# Patient Record
Sex: FEMALE | Race: BLACK OR AFRICAN AMERICAN | NOT HISPANIC OR LATINO
[De-identification: names, ages, dates, MRNs, and addresses within clinical notes are randomized per-mention and may not be internally consistent; named-entity substitution may affect disease eponyms.]

---

## 2017-01-11 ENCOUNTER — APPOINTMENT (OUTPATIENT)
Dept: OBGYN | Facility: HOSPITAL | Age: 82
End: 2017-01-11

## 2017-01-11 ENCOUNTER — OUTPATIENT (OUTPATIENT)
Dept: OUTPATIENT SERVICES | Facility: HOSPITAL | Age: 82
LOS: 1 days | End: 2017-01-11

## 2017-01-11 VITALS
BODY MASS INDEX: 19.15 KG/M2 | HEART RATE: 58 BPM | HEIGHT: 67 IN | DIASTOLIC BLOOD PRESSURE: 70 MMHG | SYSTOLIC BLOOD PRESSURE: 133 MMHG | WEIGHT: 122 LBS

## 2017-01-12 DIAGNOSIS — Z46.89 ENCOUNTER FOR FITTING AND ADJUSTMENT OF OTHER SPECIFIED DEVICES: ICD-10-CM

## 2017-02-16 ENCOUNTER — APPOINTMENT (OUTPATIENT)
Dept: ELECTROPHYSIOLOGY | Facility: CLINIC | Age: 82
End: 2017-02-16

## 2017-02-23 ENCOUNTER — APPOINTMENT (OUTPATIENT)
Dept: INTERNAL MEDICINE | Facility: HOSPITAL | Age: 82
End: 2017-02-23

## 2017-02-23 ENCOUNTER — OUTPATIENT (OUTPATIENT)
Dept: OUTPATIENT SERVICES | Facility: HOSPITAL | Age: 82
LOS: 1 days | End: 2017-02-23

## 2017-02-23 VITALS — SYSTOLIC BLOOD PRESSURE: 138 MMHG | DIASTOLIC BLOOD PRESSURE: 76 MMHG

## 2017-02-23 DIAGNOSIS — M17.9 OSTEOARTHRITIS OF KNEE, UNSPECIFIED: ICD-10-CM

## 2017-02-23 DIAGNOSIS — M81.0 AGE-RELATED OSTEOPOROSIS W/OUT CURRENT PATHOLOGICAL FRACTURE: ICD-10-CM

## 2017-02-23 DIAGNOSIS — I10 ESSENTIAL (PRIMARY) HYPERTENSION: ICD-10-CM

## 2017-02-23 DIAGNOSIS — E78.5 HYPERLIPIDEMIA, UNSPECIFIED: ICD-10-CM

## 2017-02-23 DIAGNOSIS — F09 UNSPECIFIED MENTAL DISORDER DUE TO KNOWN PHYSIOLOGICAL CONDITION: ICD-10-CM

## 2017-02-23 RX ORDER — FUROSEMIDE 20 MG/1
20 TABLET ORAL DAILY
Qty: 30 | Refills: 5 | Status: ACTIVE | COMMUNITY
Start: 2017-02-23 | End: 1900-01-01

## 2017-02-27 DIAGNOSIS — M17.9 OSTEOARTHRITIS OF KNEE, UNSPECIFIED: ICD-10-CM

## 2017-02-27 DIAGNOSIS — M81.0 AGE-RELATED OSTEOPOROSIS WITHOUT CURRENT PATHOLOGICAL FRACTURE: ICD-10-CM

## 2017-02-27 DIAGNOSIS — M17.5 OTHER UNILATERAL SECONDARY OSTEOARTHRITIS OF KNEE: ICD-10-CM

## 2017-02-27 DIAGNOSIS — E78.5 HYPERLIPIDEMIA, UNSPECIFIED: ICD-10-CM

## 2017-02-27 DIAGNOSIS — F09 UNSPECIFIED MENTAL DISORDER DUE TO KNOWN PHYSIOLOGICAL CONDITION: ICD-10-CM

## 2017-02-27 DIAGNOSIS — I42.9 CARDIOMYOPATHY, UNSPECIFIED: ICD-10-CM

## 2017-02-27 DIAGNOSIS — I10 ESSENTIAL (PRIMARY) HYPERTENSION: ICD-10-CM

## 2017-02-27 DIAGNOSIS — G40.909 EPILEPSY, UNSPECIFIED, NOT INTRACTABLE, WITHOUT STATUS EPILEPTICUS: ICD-10-CM

## 2017-03-09 ENCOUNTER — EMERGENCY (EMERGENCY)
Facility: HOSPITAL | Age: 82
LOS: 1 days | Discharge: HOME | End: 2017-03-09
Attending: EMERGENCY MEDICINE | Admitting: EMERGENCY MEDICINE
Payer: MEDICARE

## 2017-03-09 VITALS
OXYGEN SATURATION: 100 % | SYSTOLIC BLOOD PRESSURE: 160 MMHG | HEART RATE: 74 BPM | TEMPERATURE: 99 F | WEIGHT: 125 LBS | DIASTOLIC BLOOD PRESSURE: 78 MMHG | HEIGHT: 65 IN | RESPIRATION RATE: 16 BRPM

## 2017-03-09 VITALS
HEART RATE: 69 BPM | DIASTOLIC BLOOD PRESSURE: 77 MMHG | SYSTOLIC BLOOD PRESSURE: 181 MMHG | OXYGEN SATURATION: 95 % | TEMPERATURE: 99 F | RESPIRATION RATE: 16 BRPM

## 2017-03-09 LAB
ALBUMIN SERPL ELPH-MCNC: 3.9 G/DL — SIGNIFICANT CHANGE UP (ref 3.3–5)
ALP SERPL-CCNC: 59 U/L — SIGNIFICANT CHANGE UP (ref 40–120)
ALT FLD-CCNC: 10 U/L — SIGNIFICANT CHANGE UP (ref 4–33)
AST SERPL-CCNC: 21 U/L — SIGNIFICANT CHANGE UP (ref 4–32)
BASE EXCESS BLDV CALC-SCNC: 5.5 MMOL/L — SIGNIFICANT CHANGE UP
BASOPHILS # BLD AUTO: 0.03 K/UL — SIGNIFICANT CHANGE UP (ref 0–0.2)
BASOPHILS NFR BLD AUTO: 0.6 % — SIGNIFICANT CHANGE UP (ref 0–2)
BILIRUB SERPL-MCNC: 0.5 MG/DL — SIGNIFICANT CHANGE UP (ref 0.2–1.2)
BLOOD GAS VENOUS - CREATININE: 0.81 MG/DL — SIGNIFICANT CHANGE UP (ref 0.5–1.3)
BUN SERPL-MCNC: 21 MG/DL — SIGNIFICANT CHANGE UP (ref 7–23)
CALCIUM SERPL-MCNC: 9.2 MG/DL — SIGNIFICANT CHANGE UP (ref 8.4–10.5)
CHLORIDE BLDV-SCNC: 100 MMOL/L — SIGNIFICANT CHANGE UP (ref 96–108)
CHLORIDE SERPL-SCNC: 99 MMOL/L — SIGNIFICANT CHANGE UP (ref 98–107)
CO2 SERPL-SCNC: 28 MMOL/L — SIGNIFICANT CHANGE UP (ref 22–31)
CREAT SERPL-MCNC: 0.89 MG/DL — SIGNIFICANT CHANGE UP (ref 0.5–1.3)
EOSINOPHIL # BLD AUTO: 0.12 K/UL — SIGNIFICANT CHANGE UP (ref 0–0.5)
EOSINOPHIL NFR BLD AUTO: 2.4 % — SIGNIFICANT CHANGE UP (ref 0–6)
GAS PNL BLDV: 140 MMOL/L — SIGNIFICANT CHANGE UP (ref 136–146)
GLUCOSE BLDV-MCNC: 104 — HIGH (ref 70–99)
GLUCOSE SERPL-MCNC: 107 MG/DL — HIGH (ref 70–99)
HCO3 BLDV-SCNC: 29 MMOL/L — HIGH (ref 20–27)
HCT VFR BLD CALC: 34.4 % — LOW (ref 34.5–45)
HCT VFR BLDV CALC: 35.8 % — SIGNIFICANT CHANGE UP (ref 34.5–45)
HGB BLD-MCNC: 11.2 G/DL — LOW (ref 11.5–15.5)
HGB BLDV-MCNC: 11.6 G/DL — SIGNIFICANT CHANGE UP (ref 11.5–15.5)
IMM GRANULOCYTES NFR BLD AUTO: 0.2 % — SIGNIFICANT CHANGE UP (ref 0–1.5)
LACTATE BLDV-MCNC: 0.9 MMOL/L — SIGNIFICANT CHANGE UP (ref 0.5–2)
LYMPHOCYTES # BLD AUTO: 1.16 K/UL — SIGNIFICANT CHANGE UP (ref 1–3.3)
LYMPHOCYTES # BLD AUTO: 22.9 % — SIGNIFICANT CHANGE UP (ref 13–44)
MCHC RBC-ENTMCNC: 29.9 PG — SIGNIFICANT CHANGE UP (ref 27–34)
MCHC RBC-ENTMCNC: 32.6 % — SIGNIFICANT CHANGE UP (ref 32–36)
MCV RBC AUTO: 92 FL — SIGNIFICANT CHANGE UP (ref 80–100)
MONOCYTES # BLD AUTO: 0.31 K/UL — SIGNIFICANT CHANGE UP (ref 0–0.9)
MONOCYTES NFR BLD AUTO: 6.1 % — SIGNIFICANT CHANGE UP (ref 2–14)
NEUTROPHILS # BLD AUTO: 3.44 K/UL — SIGNIFICANT CHANGE UP (ref 1.8–7.4)
NEUTROPHILS NFR BLD AUTO: 67.8 % — SIGNIFICANT CHANGE UP (ref 43–77)
PCO2 BLDV: 47 MMHG — SIGNIFICANT CHANGE UP (ref 41–51)
PH BLDV: 7.42 PH — SIGNIFICANT CHANGE UP (ref 7.32–7.43)
PLATELET # BLD AUTO: 237 K/UL — SIGNIFICANT CHANGE UP (ref 150–400)
PMV BLD: 9.4 FL — SIGNIFICANT CHANGE UP (ref 7–13)
PO2 BLDV: 41 MMHG — HIGH (ref 35–40)
POTASSIUM BLDV-SCNC: 3.4 MMOL/L — SIGNIFICANT CHANGE UP (ref 3.4–4.5)
POTASSIUM SERPL-MCNC: 3.5 MMOL/L — SIGNIFICANT CHANGE UP (ref 3.5–5.3)
POTASSIUM SERPL-SCNC: 3.5 MMOL/L — SIGNIFICANT CHANGE UP (ref 3.5–5.3)
PROT SERPL-MCNC: 7 G/DL — SIGNIFICANT CHANGE UP (ref 6–8.3)
RBC # BLD: 3.74 M/UL — LOW (ref 3.8–5.2)
RBC # FLD: 13.1 % — SIGNIFICANT CHANGE UP (ref 10.3–14.5)
SAO2 % BLDV: 73.4 % — SIGNIFICANT CHANGE UP (ref 60–85)
SODIUM SERPL-SCNC: 140 MMOL/L — SIGNIFICANT CHANGE UP (ref 135–145)
WBC # BLD: 5.07 K/UL — SIGNIFICANT CHANGE UP (ref 3.8–10.5)
WBC # FLD AUTO: 5.07 K/UL — SIGNIFICANT CHANGE UP (ref 3.8–10.5)

## 2017-03-09 PROCEDURE — 71020: CPT

## 2017-03-09 PROCEDURE — 49427 INJECTION ABDOMINAL SHUNT: CPT

## 2017-03-09 PROCEDURE — 74020: CPT

## 2017-03-09 PROCEDURE — 75809 NONVASCULAR SHUNT X-RAY: CPT | Mod: 26

## 2017-03-09 PROCEDURE — 70450 CT HEAD/BRAIN W/O DYE: CPT | Mod: 26

## 2017-03-09 PROCEDURE — 99285 EMERGENCY DEPT VISIT HI MDM: CPT | Mod: 25,GC

## 2017-03-09 PROCEDURE — 70250 X-RAY EXAM OF SKULL: CPT

## 2017-03-09 RX ORDER — ACETAMINOPHEN 500 MG
650 TABLET ORAL ONCE
Qty: 0 | Refills: 0 | Status: COMPLETED | OUTPATIENT
Start: 2017-03-09 | End: 2017-03-09

## 2017-03-09 RX ADMIN — Medication 650 MILLIGRAM(S): at 06:07

## 2017-03-09 RX ADMIN — Medication 650 MILLIGRAM(S): at 05:07

## 2017-03-09 NOTE — ED PROVIDER NOTE - CONSTITUTIONAL, MLM
normal... Well appearing, well nourished, awake, alert, oriented to person, place, time/situation and in no apparent distress. nontoxic. Well appearing, well nourished, awake, alert, oriented to person, place, time/situation and in no apparent distress.

## 2017-03-09 NOTE — ED PROVIDER NOTE - OBJECTIVE STATEMENT
86 y/o female with PMH of  MR, pulmonary HTN, CHF s/p ICD, CVA, HTN, epilepsy s/p intracranial shunt, PPM 86 y/o female with PMHx of  MR, pulmonary HTN, CHF s/p ICD/PPM, CVA, HTN, epilepsy s/p  shunt p/w left sided HA x2 days. HA has been constant with varying intensity. Denies N/V/visual changes, fever and chills. Has not taken any medications to relieve pain. No photophobia, rhinorrhea. States pain is diffusely throughout left side of head, same side as shunt.

## 2017-03-09 NOTE — ED PROVIDER NOTE - PMH
Absence Seizure    Afib    CHF (Congestive Heart Failure)    CVA (Cerebral Infarction)    HTN (Hypertension)    Hydronephrosis; Right kidney    Hypercholesteremia    Memory Loss; chronic "past few years"    Pulmonary hypertension    Seizure

## 2017-03-09 NOTE — ED PROVIDER NOTE - ATTENDING CONTRIBUTION TO CARE
att85 y/o f with h/o HTN, CVA,  shunt  for cyst drainage, afib, pacemaker, seizure, not on AC, 81mg asa, presents with gradual onset L sided headache for 2 days. No fever. no photophobia. No weakness, no vomiting, no vision changes. Normal neuro exam. Well appearing. Neck supple. Plan for labs, CT head, shunt series, tylenol, and NSGY c/s if needed. Not consistent presentation with infection. Will reassess.   I have personally performed a face to face bedside history and physical examination of this patient. I have discussed the history, examination, review of systems, assessment and plan of management with the resident. I have reviewed the electronic medical record and amended it to reflect my history, review of systems, physical exam, assessment and plan.

## 2017-03-09 NOTE — ED ADULT TRIAGE NOTE - CHIEF COMPLAINT QUOTE
Pt comes via [facility] from home with complaints of 10/10 headache, malaise and weak since this evening, up until that point felt fine, ambulates with cane at baseline, history of stroke no residual weakness  shunt placement in 1994 for cyst drainage, A&OX3 offers no other complaints, denies CP, SOB, blurred vision, dizziness, N/V/D

## 2017-03-09 NOTE — ED PROVIDER NOTE - PROGRESS NOTE DETAILS
Gilmer Graves, PGY1: Pt states her headache has resolved. Neurosurgery consult does not recommend any intervention at this time. Pt will follow up with Dr. Jessica. Instructions provided in discharge paperwork. JAS: Pt was signed out to me pending NeuroSx who saw pt at bedside in ED and reports that they think she is stable to go home and f/u with PMD and neuroSx outpt. Labs and imaging reviewed. Pt reports significantly improved symptoms and no longer with pain/headache. No neurological changes. Family will bring pt back to ED immediately if symptoms worsen. Pt denies any fevers, chills, nausea, vomiting ot signs of infection.

## 2017-03-09 NOTE — ED PROVIDER NOTE - PSH
History of Stroke    HTN (Hypertension)    Infection of  Shunt    Infection of  Shunt    Pacemaker    Pacemaker    S/P Brain Surgery; for "brain cyst   few years ago"    S/P  Shunt    Seizure    Ureteral Obstruction; right kidney; stent insertion 12/10

## 2017-03-20 ENCOUNTER — APPOINTMENT (OUTPATIENT)
Dept: NEUROSURGERY | Facility: CLINIC | Age: 82
End: 2017-03-20

## 2017-03-20 VITALS
HEIGHT: 67 IN | HEART RATE: 65 BPM | BODY MASS INDEX: 19.62 KG/M2 | WEIGHT: 125 LBS | SYSTOLIC BLOOD PRESSURE: 123 MMHG | DIASTOLIC BLOOD PRESSURE: 74 MMHG

## 2017-03-20 DIAGNOSIS — R56.9 UNSPECIFIED CONVULSIONS: ICD-10-CM

## 2017-03-20 DIAGNOSIS — D33.2 BENIGN NEOPLASM OF BRAIN, UNSPECIFIED: ICD-10-CM

## 2017-03-22 PROBLEM — D33.2 BRAIN TUMOR (BENIGN): Status: ACTIVE | Noted: 2017-03-22

## 2017-03-23 ENCOUNTER — APPOINTMENT (OUTPATIENT)
Dept: RADIOLOGY | Facility: IMAGING CENTER | Age: 82
End: 2017-03-23

## 2017-03-30 ENCOUNTER — OUTPATIENT (OUTPATIENT)
Dept: OUTPATIENT SERVICES | Facility: HOSPITAL | Age: 82
LOS: 1 days | End: 2017-03-30

## 2017-03-30 ENCOUNTER — APPOINTMENT (OUTPATIENT)
Dept: OPHTHALMOLOGY | Facility: CLINIC | Age: 82
End: 2017-03-30

## 2017-04-05 ENCOUNTER — APPOINTMENT (OUTPATIENT)
Dept: RADIOLOGY | Facility: HOSPITAL | Age: 82
End: 2017-04-05

## 2017-04-05 ENCOUNTER — OUTPATIENT (OUTPATIENT)
Dept: OUTPATIENT SERVICES | Facility: HOSPITAL | Age: 82
LOS: 1 days | End: 2017-04-05
Payer: MEDICARE

## 2017-04-05 ENCOUNTER — APPOINTMENT (OUTPATIENT)
Dept: ORTHOPEDIC SURGERY | Facility: HOSPITAL | Age: 82
End: 2017-04-05

## 2017-04-05 VITALS
BODY MASS INDEX: 19.63 KG/M2 | SYSTOLIC BLOOD PRESSURE: 166 MMHG | DIASTOLIC BLOOD PRESSURE: 93 MMHG | WEIGHT: 125.33 LBS | HEART RATE: 65 BPM

## 2017-04-05 DIAGNOSIS — M17.11 UNILATERAL PRIMARY OSTEOARTHRITIS, RIGHT KNEE: ICD-10-CM

## 2017-04-05 PROCEDURE — 73562 X-RAY EXAM OF KNEE 3: CPT | Mod: 26,RT

## 2017-04-06 DIAGNOSIS — M17.11 UNILATERAL PRIMARY OSTEOARTHRITIS, RIGHT KNEE: ICD-10-CM

## 2017-04-12 ENCOUNTER — OUTPATIENT (OUTPATIENT)
Dept: OUTPATIENT SERVICES | Facility: HOSPITAL | Age: 82
LOS: 1 days | End: 2017-04-12

## 2017-04-12 ENCOUNTER — APPOINTMENT (OUTPATIENT)
Dept: OBGYN | Facility: HOSPITAL | Age: 82
End: 2017-04-12

## 2017-04-12 VITALS
WEIGHT: 124 LBS | HEART RATE: 61 BPM | DIASTOLIC BLOOD PRESSURE: 92 MMHG | SYSTOLIC BLOOD PRESSURE: 180 MMHG | BODY MASS INDEX: 19.42 KG/M2

## 2017-04-16 ENCOUNTER — INPATIENT (INPATIENT)
Facility: HOSPITAL | Age: 82
LOS: 11 days | Discharge: HOME HEALTH | End: 2017-04-28
Attending: INTERNAL MEDICINE | Admitting: INTERNAL MEDICINE
Payer: MEDICARE

## 2017-04-16 VITALS
HEIGHT: 66 IN | WEIGHT: 139.99 LBS | HEART RATE: 81 BPM | DIASTOLIC BLOOD PRESSURE: 122 MMHG | RESPIRATION RATE: 18 BRPM | SYSTOLIC BLOOD PRESSURE: 230 MMHG | OXYGEN SATURATION: 94 % | TEMPERATURE: 100 F

## 2017-04-16 DIAGNOSIS — Z98.2 PRESENCE OF CEREBROSPINAL FLUID DRAINAGE DEVICE: Chronic | ICD-10-CM

## 2017-04-16 DIAGNOSIS — R41.82 ALTERED MENTAL STATUS, UNSPECIFIED: ICD-10-CM

## 2017-04-16 DIAGNOSIS — I10 ESSENTIAL (PRIMARY) HYPERTENSION: ICD-10-CM

## 2017-04-16 DIAGNOSIS — R56.9 UNSPECIFIED CONVULSIONS: ICD-10-CM

## 2017-04-16 LAB
ALBUMIN SERPL ELPH-MCNC: 3.4 G/DL — SIGNIFICANT CHANGE UP (ref 3.3–5)
ALP SERPL-CCNC: 64 U/L — SIGNIFICANT CHANGE UP (ref 40–120)
ALT FLD-CCNC: 21 U/L — SIGNIFICANT CHANGE UP (ref 12–78)
ANION GAP SERPL CALC-SCNC: 9 MMOL/L — SIGNIFICANT CHANGE UP (ref 5–17)
APPEARANCE UR: CLEAR — SIGNIFICANT CHANGE UP
APTT BLD: 50 SEC — HIGH (ref 27.5–37.4)
AST SERPL-CCNC: 23 U/L — SIGNIFICANT CHANGE UP (ref 15–37)
BACTERIA # UR AUTO: ABNORMAL
BASOPHILS # BLD AUTO: 0.1 K/UL — SIGNIFICANT CHANGE UP (ref 0–0.2)
BASOPHILS NFR BLD AUTO: 1.3 % — SIGNIFICANT CHANGE UP (ref 0–2)
BILIRUB SERPL-MCNC: 0.5 MG/DL — SIGNIFICANT CHANGE UP (ref 0.2–1.2)
BILIRUB UR-MCNC: NEGATIVE — SIGNIFICANT CHANGE UP
BUN SERPL-MCNC: 20 MG/DL — SIGNIFICANT CHANGE UP (ref 7–23)
CALCIUM SERPL-MCNC: 8.8 MG/DL — SIGNIFICANT CHANGE UP (ref 8.5–10.1)
CHLORIDE SERPL-SCNC: 101 MMOL/L — SIGNIFICANT CHANGE UP (ref 96–108)
CO2 SERPL-SCNC: 29 MMOL/L — SIGNIFICANT CHANGE UP (ref 22–31)
COLOR SPEC: YELLOW — SIGNIFICANT CHANGE UP
COMMENT - URINE: SIGNIFICANT CHANGE UP
CREAT SERPL-MCNC: 1.25 MG/DL — SIGNIFICANT CHANGE UP (ref 0.5–1.3)
DIFF PNL FLD: ABNORMAL
EOSINOPHIL # BLD AUTO: 0.1 K/UL — SIGNIFICANT CHANGE UP (ref 0–0.5)
EOSINOPHIL NFR BLD AUTO: 1.9 % — SIGNIFICANT CHANGE UP (ref 0–6)
EPI CELLS # UR: SIGNIFICANT CHANGE UP
GLUCOSE SERPL-MCNC: 123 MG/DL — HIGH (ref 70–99)
GLUCOSE UR QL: NEGATIVE MG/DL — SIGNIFICANT CHANGE UP
HCT VFR BLD CALC: 35.3 % — SIGNIFICANT CHANGE UP (ref 34.5–45)
HGB BLD-MCNC: 12.2 G/DL — SIGNIFICANT CHANGE UP (ref 11.5–15.5)
INR BLD: 1.11 RATIO — SIGNIFICANT CHANGE UP (ref 0.88–1.16)
KETONES UR-MCNC: NEGATIVE — SIGNIFICANT CHANGE UP
LACTATE SERPL-SCNC: 1.1 MMOL/L — SIGNIFICANT CHANGE UP (ref 0.7–2)
LEUKOCYTE ESTERASE UR-ACNC: ABNORMAL
LIDOCAIN IGE QN: 88 U/L — SIGNIFICANT CHANGE UP (ref 73–393)
LYMPHOCYTES # BLD AUTO: 1.2 K/UL — SIGNIFICANT CHANGE UP (ref 1–3.3)
LYMPHOCYTES # BLD AUTO: 17.9 % — SIGNIFICANT CHANGE UP (ref 13–44)
MAGNESIUM SERPL-MCNC: 2.4 MG/DL — SIGNIFICANT CHANGE UP (ref 1.8–2.4)
MCHC RBC-ENTMCNC: 31.5 PG — SIGNIFICANT CHANGE UP (ref 27–34)
MCHC RBC-ENTMCNC: 34.4 GM/DL — SIGNIFICANT CHANGE UP (ref 32–36)
MCV RBC AUTO: 91.7 FL — SIGNIFICANT CHANGE UP (ref 80–100)
MONOCYTES # BLD AUTO: 0.6 K/UL — SIGNIFICANT CHANGE UP (ref 0–0.9)
MONOCYTES NFR BLD AUTO: 9.2 % — SIGNIFICANT CHANGE UP (ref 2–14)
NEUTROPHILS # BLD AUTO: 4.6 K/UL — SIGNIFICANT CHANGE UP (ref 1.8–7.4)
NEUTROPHILS NFR BLD AUTO: 69.6 % — SIGNIFICANT CHANGE UP (ref 43–77)
NITRITE UR-MCNC: NEGATIVE — SIGNIFICANT CHANGE UP
NT-PROBNP SERPL-SCNC: 889 PG/ML — HIGH (ref 0–450)
PH UR: 7 — SIGNIFICANT CHANGE UP (ref 4.8–8)
PLATELET # BLD AUTO: 229 K/UL — SIGNIFICANT CHANGE UP (ref 150–400)
POTASSIUM SERPL-MCNC: 4.1 MMOL/L — SIGNIFICANT CHANGE UP (ref 3.5–5.3)
POTASSIUM SERPL-SCNC: 4.1 MMOL/L — SIGNIFICANT CHANGE UP (ref 3.5–5.3)
PROT SERPL-MCNC: 7.1 GM/DL — SIGNIFICANT CHANGE UP (ref 6–8.3)
PROT UR-MCNC: 15 MG/DL
PROTHROM AB SERPL-ACNC: 12.1 SEC — SIGNIFICANT CHANGE UP (ref 9.8–12.7)
RBC # BLD: 3.85 M/UL — SIGNIFICANT CHANGE UP (ref 3.8–5.2)
RBC # FLD: 12.9 % — SIGNIFICANT CHANGE UP (ref 11–15)
RBC CASTS # UR COMP ASSIST: ABNORMAL /HPF (ref 0–4)
SODIUM SERPL-SCNC: 139 MMOL/L — SIGNIFICANT CHANGE UP (ref 135–145)
SP GR SPEC: 1 — LOW (ref 1.01–1.02)
TROPONIN I SERPL-MCNC: <.015 NG/ML — SIGNIFICANT CHANGE UP (ref 0.01–0.04)
UROBILINOGEN FLD QL: NEGATIVE MG/DL — SIGNIFICANT CHANGE UP
WBC # BLD: 6.6 K/UL — SIGNIFICANT CHANGE UP (ref 3.8–10.5)
WBC # FLD AUTO: 6.6 K/UL — SIGNIFICANT CHANGE UP (ref 3.8–10.5)
WBC UR QL: >50

## 2017-04-16 PROCEDURE — 99291 CRITICAL CARE FIRST HOUR: CPT

## 2017-04-16 PROCEDURE — 71010: CPT | Mod: 26

## 2017-04-16 PROCEDURE — 70450 CT HEAD/BRAIN W/O DYE: CPT | Mod: 26

## 2017-04-16 PROCEDURE — 99292 CRITICAL CARE ADDL 30 MIN: CPT

## 2017-04-16 RX ORDER — LABETALOL HCL 100 MG
10 TABLET ORAL EVERY 6 HOURS
Qty: 0 | Refills: 0 | Status: DISCONTINUED | OUTPATIENT
Start: 2017-04-16 | End: 2017-04-28

## 2017-04-16 RX ORDER — LABETALOL HCL 100 MG
10 TABLET ORAL ONCE
Qty: 0 | Refills: 0 | Status: COMPLETED | OUTPATIENT
Start: 2017-04-16 | End: 2017-04-16

## 2017-04-16 RX ORDER — LEVETIRACETAM 250 MG/1
1000 TABLET, FILM COATED ORAL
Qty: 0 | Refills: 0 | Status: DISCONTINUED | OUTPATIENT
Start: 2017-04-16 | End: 2017-04-17

## 2017-04-16 RX ORDER — NICARDIPINE HYDROCHLORIDE 30 MG/1
5 CAPSULE, EXTENDED RELEASE ORAL
Qty: 40 | Refills: 0 | Status: DISCONTINUED | OUTPATIENT
Start: 2017-04-16 | End: 2017-04-17

## 2017-04-16 RX ORDER — LEVETIRACETAM 250 MG/1
1000 TABLET, FILM COATED ORAL ONCE
Qty: 0 | Refills: 0 | Status: COMPLETED | OUTPATIENT
Start: 2017-04-16 | End: 2017-04-16

## 2017-04-16 RX ORDER — LAMOTRIGINE 25 MG/1
100 TABLET, ORALLY DISINTEGRATING ORAL DAILY
Qty: 0 | Refills: 0 | Status: DISCONTINUED | OUTPATIENT
Start: 2017-04-16 | End: 2017-04-28

## 2017-04-16 RX ORDER — HEPARIN SODIUM 5000 [USP'U]/ML
5000 INJECTION INTRAVENOUS; SUBCUTANEOUS EVERY 8 HOURS
Qty: 0 | Refills: 0 | Status: DISCONTINUED | OUTPATIENT
Start: 2017-04-16 | End: 2017-04-18

## 2017-04-16 RX ORDER — PANTOPRAZOLE SODIUM 20 MG/1
40 TABLET, DELAYED RELEASE ORAL DAILY
Qty: 0 | Refills: 0 | Status: DISCONTINUED | OUTPATIENT
Start: 2017-04-16 | End: 2017-04-17

## 2017-04-16 RX ORDER — LEVETIRACETAM 250 MG/1
500 TABLET, FILM COATED ORAL EVERY 12 HOURS
Qty: 0 | Refills: 0 | Status: DISCONTINUED | OUTPATIENT
Start: 2017-04-16 | End: 2017-04-17

## 2017-04-16 RX ADMIN — Medication 2 MILLIGRAM(S): at 22:19

## 2017-04-16 RX ADMIN — Medication 10 MILLIGRAM(S): at 19:57

## 2017-04-16 RX ADMIN — PANTOPRAZOLE SODIUM 40 MILLIGRAM(S): 20 TABLET, DELAYED RELEASE ORAL at 22:01

## 2017-04-16 RX ADMIN — LEVETIRACETAM 400 MILLIGRAM(S): 250 TABLET, FILM COATED ORAL at 21:48

## 2017-04-16 RX ADMIN — NICARDIPINE HYDROCHLORIDE 25 MG/HR: 30 CAPSULE, EXTENDED RELEASE ORAL at 22:06

## 2017-04-16 RX ADMIN — Medication 10 MILLIGRAM(S): at 20:58

## 2017-04-16 NOTE — ED ADULT NURSE NOTE - PMH
CHF (congestive heart failure)    High cholesterol    HTN (hypertension) CHF (congestive heart failure)    High cholesterol    HTN (hypertension)    Pulmonary hypertension    Seizure

## 2017-04-16 NOTE — H&P ADULT. - ATTENDING COMMENTS
Pt is an 87 yo F with h/o Pulm HTN, NICM, s/p AICD placement, HTN, HLD, absence Sz, CVA, s/p shunt for brain cystic lesion presented 2 to MS change. Pt was  last seen normal about 4 pm and and then at 4:15pm, pt's daughter noticed that she had slurred speech, was not herself, and ? R facial droop. Pt's daughter thought it was another Sz so she waited until later to bring her mother to the hospital when she did not get better. In the ER pt was found to be hypertensive (246/198) and sent for CT head: No acute bleed. Admitting dx: 1) Hypertensive encephalopathy vs Stroke Vs Sz 2) UTI    ID: May Rx for UTI  [facility]: SBP in 180-200 range is acceptable  Neuro: Cont Keppra + Lamictal/ ? MRI/ EEG/ F/u as per Neuro

## 2017-04-16 NOTE — H&P ADULT. - PROBLEM SELECTOR PLAN 2
- Will start nicardipine drip for SBP around 180; once MRI negative for ischemic stoke, will decrease BP more and gradually.

## 2017-04-16 NOTE — H&P ADULT. - PMH
CHF (congestive heart failure)    High cholesterol    HTN (hypertension)    Pulmonary hypertension    Seizure

## 2017-04-16 NOTE — ED PROVIDER NOTE - CARE PLAN
Principal Discharge DX:	Cerebrovascular accident (CVA), unspecified mechanism  Secondary Diagnosis:	Hypertensive emergency

## 2017-04-16 NOTE — ED PROVIDER NOTE - NEUROLOGICAL LEVEL OF CONSCIOUSNESS
Alert, intermittently responds to commands. Appears not to understand directions. No neglect. Daughter says maybe mild R. side facial droop. PERRLA. Pt does not respond to commands for more complete neuro exam. See stroke note.

## 2017-04-16 NOTE — H&P ADULT. - HISTORY OF PRESENT ILLNESS
87 y/o female with hx long-standing MR and severe pHTN, NICM s/p ICD, epilepsy s/p intracranial shunt, PPM, absent seizure, p/w sudden onset of confusion. Last seen normal was this afternoon. Around 450pm today, daughter found patient was confused and having slurred speech/aphasia. Daughter thought she was having absent seizure and did not call ambulance until 2-3 hours later. In the ED, SBP in 240s. ICU was called. tPA not given.     Seen at the bedside. Aphasic and noncomprehensable. SBP between 200-240.

## 2017-04-16 NOTE — CONSULT NOTE ADULT - SUBJECTIVE AND OBJECTIVE BOX
Subjective Complaints: Seizure and confusion.   Historian:    Daughter   Consult requested by ER doctor:   Juan Miguel              Attending:     HPI:   JOSE CHAUDHARI    PAST MEDICAL & SURGICAL HISTORY:  CHF (congestive heart failure)  High cholesterol  HTN (hypertension)  86yFemale    MEDICATIONS  (STANDING):  niCARdipine Infusion 5mG/Hr IV Continuous <Continuous>  levETIRAcetam  IVPB 1000milliGRAM(s) IV Intermittent once    MEDICATIONS  (PRN):      Allergies    No Known Allergies    Intolerances      FAMILY HISTORY:    Vital Signs Last 24 Hrs  T(C): 37.6, Max: 37.6 ( @ 19:20)  T(F): 99.7, Max: 99.7 ( @ 19:20)  HR: 85 (81 - 85)  BP: 196/100 (196/100 - 230/122)  BP(mean): --  RR: 22 (18 - 22)  SpO2: 93% (93% - 94%)    NEUROLOGICAL EXAM:  HENT:  Normocephalic head; atraumatic head.  Neck supple.  ENT: normal looking.  Mental State:    Alert.  Fully oriented to person, place and date.  Coherent.  Speech clear and intact.  Cooperative.  Responds appropriately.    Cranial Nerves:  II-XII:   Pupils round and reactive to light and accommodation.  Extraocular movements full.  Visual fields full (no homonymous hemianopsia).  Visual acuity wnl.  Facial symmetry intact.  Tongue midline.  Motor Functions:  Moves all extremities.  No pronator drift of UE.  Claps hand well.  Hand  intact bilaterally.  Ambulatory.    Sensory Functions:   Intact to touch and pinprick to face and extremities.    Reflexes:  Deep tendon reflexes normoactive to biceps, knees and ankles.  Babinski absent (present).  Cerebellar Testing:    Finger to nose intact.  Nystagmus absent.  Neurovascular: Carotid auscultation full without bruits.      LABS:                        12.2   6.6   )-----------( 229      ( 2017 20:08 )             35.3     04-16    139  |  101  |  20  ----------------------------<  123<H>  4.1   |  29  |  1.25    Ca    8.8      2017 20:08  Mg     2.4     -16    TPro  7.1  /  Alb  3.4  /  TBili  0.5  /  DBili  x   /  AST  23  /  ALT  21  /  AlkPhos  64  -        Urinalysis Basic - ( 2017 20:22 )    Color: Yellow / Appearance: Clear / S.005 / pH: x  Gluc: x / Ketone: Negative  / Bili: Negative / Urobili: Negative mg/dL   Blood: x / Protein: 15 mg/dL / Nitrite: Negative   Leuk Esterase: Moderate / RBC: x / WBC x   Sq Epi: x / Non Sq Epi: x / Bacteria: x        RADIOLOGY & ADDITIONAL STUDIES:    CT Head No Cont: Urgent   Indication: right sided weakness  Transport: Stretcher-Crib  Exam Completed  Provider&#x27;s Contact #: [phone] ( @ 19:22)  labetalol Injectable: [Known as NORMODYNE Injectable]  10 milliGRAM(s), IV Push, once, Stop After 1 Doses  Provider&#x27;s Contact #: [phone] ( @ 19:56) [completed]  Complete Blood Count + Automated Diff: STAT ( @ 19:59)  Comprehensive Metabolic Panel: STAT ( @ 19:59)  Troponin I, Serum: STAT ( @ 19:59)  Creatine Kinase,Total,Serum: STAT ( @ 19:59)  Lactate, Blood: STAT ( @ 19:59)  Lipase, Serum: STAT ( @ 19:59)  Magnesium, Serum: STAT ( @ 19:59)  Serum Pro-Brain Natriuretic Peptide: STAT ( @ 19:59)  Culture - Urine: Routine  Specimen Source: Clean Catch (Midstream) ( @ 19:59)  Urinalysis: STAT ( @ 19:59)  Xray Chest 1 View AP/PA.: Urgent   Indication: AMS  Transport: Stretcher-Crib  Provider&#x27;s Contact #: [phone] ( @ 19:59)  (Floorstock):   Qty Removed: 1 each ( @ 20:11) [completed]  labetalol Injectable: [Known as NORMODYNE Injectable]  10 milliGRAM(s), IV Push, once, Stop After 1 Doses  Provider&#x27;s Contact #: [phone] ( @ 20:18) [completed]  Levetiracetam Level, Serum: STAT ( @ 20:26)  Lamotrigine Level, Serum: STAT ( @ 20:26)  Urine Microscopic-Add On (NC): 20:15 ( @ 20:39)  (Floorstock):   Qty Removed: 1 each ( @ 20:45) [completed]  CT Head No Cont: Urgent   Indication: fall  Transport: Stretcher-Crib ( @ 20:47)  CT Cervical Spine No Cont: Urgent   Indication: fall  Transport: Stretcher-Crib ( @ 20:47)  Indwelling Urethral Catheter:     Connect To:  Straight Drainage/Gravity    Indication:  Urine Output Monitoring in Critically Ill ( @ 20:48)  niCARdipine Infusion:   40 milliGRAM(s) in sodium chloride 0.9% 200 milliLiter(s), infuse at 25 mL/Hr  Dose Rate: 5 mG/Hr  Special Instructions: Do not have BP below 180 systolic  Provider&#x27;s Contact #: [phone] ( @ 21:26)  levETIRAcetam  IVPB: [Ordered as KEPPRA IVPB]  1000 milliGRAM(s) in IV Solution 100 milliLiter(s), IV Intermittent, once, infuse over 15 Minute(s), Stop After 1 Doses  Provider&#x27;s Contact #: [phone] ( @ 21:26)      Assessment & Opinion:    Recommendations:  Brain MRI.  Carotid doppler.  Echocardiogram.  EEG.   DVT prophylaxis as ordered.  Medications: Subjective Complaints: Seizure and confusion.   Historian:    Daughter   Consult requested by ER doctor:   Juan Miguel              Attending:     HPI:   JOSE CHAUDHARI.   87 y/o female with hx long-standing MR and severe pHTN, NICM s/p ICD, epilepsy s/p intracranial shunt, PPM, absent seizure, p/w sudden onset of confusion. Last seen normal was this afternoon. Around 450pm today, daughter found patient was confused and having slurred speech/aphasia. Daughter thought she was having absent seizure and did not call ambulance until 2-3 hours later. In the ED, SBP in 240s. ICU was called. tPA not given.    Current medications:  Keppra 1000 mg BID; Lamotrigine 100 mg daily.     SBP between 200-240.     PAST MEDICAL & SURGICAL HISTORY:  CHF (congestive heart failure)  High cholesterol  HTN (hypertension)  86yFemale    MEDICATIONS  (STANDING):  niCARdipine Infusion 5mG/Hr IV Continuous <Continuous>  levETIRAcetam  IVPB 1000milliGRAM(s) IV Intermittent once    Allergies: No Known Allergies  Intolerances  FAMILY HISTORY:    Vital Signs Last 24 Hrs  T(C): 37.6, Max: 37.6 (04-16 @ 19:20)  T(F): 99.7, Max: 99.7 (04-16 @ 19:20)  HR: 85 (81 - 85)  BP: 196/100 (196/100 - 230/122)  BP(mean): --  RR: 22 (18 - 22)  SpO2: 93% (93% - 94%)    NEUROLOGICAL EXAM:  HENT:  Normocephalic head; atraumatic head.  Neck supple.  ENT: normal looking.  Mental State:    Alert.  Fully oriented to person, place and date.  Coherent.  Speech clear and intact.  Cooperative.  Responds appropriately.    Cranial Nerves:  II-XII:   Pupils round and reactive to light and accommodation.  Extraocular movements full.  Visual fields full (no homonymous hemianopsia).  Visual acuity wnl.  Facial symmetry intact.  Tongue midline.  Motor Functions:  Moves all extremities.  No pronator drift of UE.  Claps hand well.  Hand  intact bilaterally.  Ambulatory.    Sensory Functions:   Intact to touch and pinprick to face and extremities.    Reflexes:  Deep tendon reflexes normoactive to biceps, knees and ankles.  Babinski absent (present).  Cerebellar Testing:    Finger to nose intact.  Nystagmus absent.  Neurovascular: Carotid auscultation full without bruits.      LABS:                        12.2   6.6   )-----------( 229      ( 2017 20:08 )             35.3         139  |  101  |  20  ----------------------------<  123<H>  4.1   |  29  |  1.25    Ca    8.8      2017 20:08  Mg     2.4         TPro  7.1  /  Alb  3.4  /  TBili  0.5  /  DBili  x   /  AST  23  /  ALT  21  /  AlkPhos  64          Urinalysis Basic - ( 2017 20:22 )    Color: Yellow / Appearance: Clear / S.005 / pH: x  Gluc: x / Ketone: Negative  / Bili: Negative / Urobili: Negative mg/dL   Blood: x / Protein: 15 mg/dL / Nitrite: Negative   Leuk Esterase: Moderate / RBC: x / WBC x   Sq Epi: x / Non Sq Epi: x / Bacteria: x        RADIOLOGY & ADDITIONAL STUDIES:    CT Head No Cont: Urgent   Indication: right sided weakness  Transport: Stretcher-Crib  Exam Completed  Provider&#x27;s Contact #: [phone] ( @ 19:22)  labetalol Injectable: [Known as NORMODYNE Injectable]  10 milliGRAM(s), IV Push, once, Stop After 1 Doses  Provider&#x27;s Contact #: [phone] ( @ 19:56) [completed]  Complete Blood Count + Automated Diff: STAT ( @ 19:59)  Comprehensive Metabolic Panel: STAT ( @ 19:59)  Troponin I, Serum: STAT ( 19:59)  Creatine Kinase,Total,Serum: STAT ( @ 19:59)  Lactate, Blood: STAT ( 19:59)  Lipase, Serum: STAT ( 19:59)  Magnesium, Serum: STAT ( 19:59)  Serum Pro-Brain Natriuretic Peptide: STAT ( @ 19:59)  Culture - Urine: Routine  Specimen Source: Clean Catch (Midstream) ( @ 19:59)  Urinalysis: STAT ( @ 19:59)  Xray Chest 1 View AP/PA.: Urgent   Indication: AMS  Transport: Stretcher-Crib  Provider&#x27;s Contact #: [phone] ( @ 19:59)  (Floorstock):   Qty Removed: 1 each ( @ 20:11) [completed]  labetalol Injectable: [Known as NORMODYNE Injectable]  10 milliGRAM(s), IV Push, once, Stop After 1 Doses  Provider&#x27;s Contact #: [phone] ( @ 20:18) [completed]  Levetiracetam Level, Serum: STAT ( @ 20:26)  Lamotrigine Level, Serum: STAT ( @ 20:26)  Urine Microscopic-Add On (NC): 20:15 ( @ 20:39)  (Floorstock):   Qty Removed: 1 each ( @ 20:45) [completed]  CT Head No Cont: Urgent   Indication: fall  Transport: Stretcher-Crib ( @ 20:47)  CT Cervical Spine No Cont: Urgent   Indication: fall  Transport: Stretcher-Crib ( @ 20:47)  Indwelling Urethral Catheter:     Connect To:  Straight Drainage/Gravity    Indication:  Urine Output Monitoring in Critically Ill ( @ 20:48)  niCARdipine Infusion:   40 milliGRAM(s) in sodium chloride 0.9% 200 milliLiter(s), infuse at 25 mL/Hr  Dose Rate: 5 mG/Hr  Special Instructions: Do not have BP below 180 systolic  Provider&#x27;s Contact #: [phone] ( @ 21:26)  levETIRAcetam  IVPB: [Ordered as KEPPRA IVPB]  1000 milliGRAM(s) in IV Solution 100 milliLiter(s), IV Intermittent, once, infuse over 15 Minute(s), Stop After 1 Doses  Provider&#x27;s Contact #: [phone] ( @ 21:26)      Assessment & Opinion:    Recommendations:  Brain MRI.  Carotid doppler.  Echocardiogram.  EEG.   DVT prophylaxis as ordered.  Medications: Subjective Complaints: Seizure and confusion.  Patient actively having right focal seizures.  Stat Keppra 1000 mg IVPB and Ativan 2 mg iv given.   Historian:    Daughter   Consult requested by ER doctor:   Juan Miguel            Attending:     HPI:   JOSE CHAUDHARI.   87 y/o female with hx long-standing MR and severe pHTN, NICM s/p ICD, epilepsy s/p intracranial shunt, PPM, absent seizure, p/w sudden onset of confusion. Last seen normal was this afternoon. Around 450pm today, daughter found patient was confused and having slurred speech/aphasia. Daughter thought she was having absent seizure and did not call ambulance until 2-3 hours later. In the ED, SBP in 240s. ICU was called. tPA not given.    Current medications:  Keppra 1000 mg BID; Lamotrigine 100 mg daily. SBP between 200-240.   Reviewed brain CT scan:  No acute hemorrhage. Redemonstration of a left posterior approach catheter situated within a left parafalcine cystic lesion which is smaller than the prior exam of 3/9/2017.Again seen is a right posterior approach catheter which terminates within a cystic structure along the left posterior parafalcine region. This has decreased in size since the prior exam and now measures approximately 2.8 x 2.1 x 3.2 cm, previously 4.6 x 2.4 x 3.9 cm. The additional cystic structure along the superolateral margin of the dominant cyst is similar in size measuring 1.7 x 1.8 cm and is again situated adjacent to the tip of the catheter. This latter lesion is again noted to be CSF density while the dominant lesion demonstrates density slightly higher than that of fluid and may represent proteinaceous contents.    PAST MEDICAL & SURGICAL HISTORY:CHF (congestive heart failure); High cholesterol; HTN (hypertension);   MEDICATIONS  (STANDING):  niCARdipine Infusion 5mG/Hr IV Continuous <Continuous>  levETIRAcetam  IVPB 1000milliGRAM(s) IV Intermittent once    Allergies: No Known Allergies  Intolerances  FAMILY HISTORY:    Vital Signs Last 24 Hrs  T(C): 37.6, Max: 37.6 (04-16 @ 19:20)  T(F): 99.7, Max: 99.7 (04-16 @ 19:20)  HR: 85 (81 - 85)  BP: 196/100 (196/100 - 230/122)  BP(mean): --  RR: 22 (18 - 22)  SpO2: 93% (93% - 94%)    NEUROLOGICAL EXAM:  HENT:  Normocephalic head; atraumatic head.  Neck supple.  ENT: normal looking.  Mental State:    Awake but expressively aphasic and incomprehensible. Responds inappropriately.    Cranial Nerves:  II-XII:   Pupils round and reactive to light and accommodation.  Extraocular movements full.  Facial symmetry intact.  Tongue midline.  Motor Functions:  Exhibits right sided weakness with focal right hand jerks/tremors.  Right leg appears poorly mobile.     Sensory Functions:   Intact to touch and pinprick to both upper and lower extremities.    Reflexes:  Deep tendon reflexes normoactive to biceps, knees and ankles.  Babinski absent   Cerebellar Testing:    Unable to test. Neurovascular: Carotid auscultation full without bruits.      LABS:                        12.2   6.6   )-----------( 229      ( 2017 20:08 )             35.3         139  |  101  |  20  ----------------------------<  123<H>  4.1   |  29  |  1.25    Ca    8.8      2017 20:08  Mg     2.4         TPro  7.1  /  Alb  3.4  /  TBili  0.5  /  DBili  x   /  AST  23  /  ALT  21  /  AlkPhos  64      Urinalysis Basic - ( 2017 20:22 )    Color: Yellow / Appearance: Clear / S.005 / pH: x  Gluc: x / Ketone: Negative  / Bili: Negative / Urobili: Negative mg/dL   Blood: x / Protein: 15 mg/dL / Nitrite: Negative   Leuk Esterase: Moderate / RBC: x / WBC x   Sq Epi: x / Non Sq Epi: x / Bacteria: x    RADIOLOGY & ADDITIONAL STUDIES:    CT Head No Cont: Urgent   Indication: right sided weakness  Transport: Stretcher-Crib  Exam Completed  Provider&#x27;s Contact #: [phone] ( @ 19:22)  labetalol Injectable: [Known as NORMODYNE Injectable]  10 milliGRAM(s), IV Push, once, Stop After 1 Doses  Provider&#x27;s Contact #: [phone] ( @ 19:56) [completed]  Complete Blood Count + Automated Diff: STAT ( @ 19:59)  Comprehensive Metabolic Panel: STAT ( @ 19:59)  Troponin I, Serum: STAT ( @ 19:59)  Creatine Kinase,Total,Serum: STAT ( @ 19:59)  Lactate, Blood: STAT ( @ 19:59)  Lipase, Serum: STAT ( @ 19:59)  Magnesium, Serum: STAT ( @ 19:59)  Serum Pro-Brain Natriuretic Peptide: STAT ( @ 19:59)  Culture - Urine: Routine  Specimen Source: Clean Catch (Midstream) ( @ 19:59)  Urinalysis: STAT ( @ 19:59)  Xray Chest 1 View AP/PA.: Urgent   Indication: AMS  Transport: Stretcher-Crib  Provider&#x27;s Contact #: [phone] ( @ 19:59)  (Floorstock):   Qty Removed: 1 each ( @ 20:11) [completed]  labetalol Injectable: [Known as NORMODYNE Injectable]  10 milliGRAM(s), IV Push, once, Stop After 1 Doses  Provider&#x27;s Contact #: [phone] ( @ 20:18) [completed]  Levetiracetam Level, Serum: STAT ( @ 20:26)  Lamotrigine Level, Serum: STAT ( @ 20:26)  Urine Microscopic-Add On (NC): 20:15 ( @ 20:39)  (Floorstock):   Qty Removed: 1 each ( @ 20:45) [completed]  CT Head No Cont: Urgent   Indication: fall  Transport: Stretcher-Crib ( @ 20:47)  CT Cervical Spine No Cont: Urgent   Indication: fall  Transport: Stretcher-Crib ( @ 20:47)  Indwelling Urethral Catheter:     Connect To:  Straight Drainage/Gravity    Indication:  Urine Output Monitoring in Critically Ill ( @ 20:48)  niCARdipine Infusion:   40 milliGRAM(s) in sodium chloride 0.9% 200 milliLiter(s), infuse at 25 mL/Hr  Dose Rate: 5 mG/Hr  Special Instructions: Do not have BP below 180 systolic  Provider&#x27;s Contact #: [phone] ( @ 21:26)  levETIRAcetam  IVPB: [Ordered as KEPPRA IVPB]  1000 milliGRAM(s) in IV Solution 100 milliLiter(s), IV Intermittent, once, infuse over 15 Minute(s), Stop After 1 Doses  Provider&#x27;s Contact #: [phone] ( @ 21:26)    Assessment & Opinion:  Seizure Disorder, generalized convulsive and absence.  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 Doubt CVA.    Recommendations:  Unable to do Brain MRI.  Carotid doppler.  Echocardiogram.  EEG.   DVT prophylaxis as ordered.  Medications:  ContinUe Keppra AND Lamotrigine.

## 2017-04-16 NOTE — ED ADULT NURSE REASSESSMENT NOTE - NS ED NURSE REASSESS COMMENT FT1
patient was found on the floor approximately 2045,Enrique LANGSTON made aware.ccollar placed seen and examined by

## 2017-04-16 NOTE — H&P ADULT. - ASSESSMENT
85 y/o female with hx long-standing MR and severe pHTN, NICM s/p ICD, epilepsy s/p intracranial shunt, PPM, absent seizure, p/w sudden onset of confusion. Last seen normal was this afternoon. Around 450pm today, daughter found patient was confused and having slurred speech/aphasia. Daughter thought she was having absent seizure and did not call ambulance until 2-3 hours later. In the ED, SBP in 240s. ICU was called. tPA not given.

## 2017-04-16 NOTE — ED ADULT TRIAGE NOTE - CHIEF COMPLAINT QUOTE
Patient BIBA:  Patient with Stroke symptoms: Facial droop, difficultly speaking, and right sided weakness.

## 2017-04-16 NOTE — H&P ADULT. - PROBLEM SELECTOR PLAN 1
- Unknown etiology, hypertensive encephalopathy vs seizure vs stroke  - Will get MRI of the brain.  - Will get EEG.  - Will control BP to SBP around 180.  - Will start anti-epileptics.   - Closely monitor in ICU.   - Neuro check q1h  - ECHO, carotid doppler, LE doppler. - Unknown etiology, hypertensive encephalopathy vs seizure vs stroke  - Unable to get MRI of brain due to the PPM; will get CT head with contrast.   - Will get EEG.  - Will control BP to SBP around 180.  - Will start anti-epileptics.   - Closely monitor in ICU.   - Neuro check q1h  - ECHO, carotid doppler, LE doppler. - Unknown etiology, hypertensive encephalopathy vs seizure vs stroke  - Unable to get MRI of brain due to the PPM; per Neuro- more likely to be seizure than stroke so no more workup for now.   - Will get EEG.  - Will control BP to SBP around 180.  - Will start anti-epileptics.   - Closely monitor in ICU.   - Neuro check q1h  - ECHO, carotid doppler, LE doppler.

## 2017-04-16 NOTE — ED PROVIDER NOTE - OBJECTIVE STATEMENT
Pt is a 86 Pt is a 85 yo lady with a pmhx of HTN, HL, absence seizure, CVA sp shunt for cystic structure, AICD on asa who presents to the ED with ams. Was last normal at 4 PM before she went to [facility], and at 4:15, daughter noticed slurred speech, not being herself, perhaps facial droop on R. Daughter thought it may have just been another seizure, last was 2 years ago, and so waited until later to bring her to hospital when mother did not recover. No recent fevers, coughs, chest pain, sob, or dysuria. Pt now is not following commands, but comfortable and protecting airway, but very hypertensive. Code stroke called.

## 2017-04-17 DIAGNOSIS — I50.9 HEART FAILURE, UNSPECIFIED: ICD-10-CM

## 2017-04-17 LAB
ANION GAP SERPL CALC-SCNC: 11 MMOL/L — SIGNIFICANT CHANGE UP (ref 5–17)
BUN SERPL-MCNC: 15 MG/DL — SIGNIFICANT CHANGE UP (ref 7–23)
CALCIUM SERPL-MCNC: 8.2 MG/DL — LOW (ref 8.5–10.1)
CHLORIDE SERPL-SCNC: 104 MMOL/L — SIGNIFICANT CHANGE UP (ref 96–108)
CK SERPL-CCNC: 83 U/L — SIGNIFICANT CHANGE UP (ref 26–192)
CO2 SERPL-SCNC: 27 MMOL/L — SIGNIFICANT CHANGE UP (ref 22–31)
CREAT SERPL-MCNC: 0.94 MG/DL — SIGNIFICANT CHANGE UP (ref 0.5–1.3)
CULTURE RESULTS: SIGNIFICANT CHANGE UP
FOLATE SERPL-MCNC: >20 NG/ML — SIGNIFICANT CHANGE UP (ref 4.8–24.2)
GLUCOSE SERPL-MCNC: 103 MG/DL — HIGH (ref 70–99)
HCT VFR BLD CALC: 32.9 % — LOW (ref 34.5–45)
HGB BLD-MCNC: 11.2 G/DL — LOW (ref 11.5–15.5)
MAGNESIUM SERPL-MCNC: 2.3 MG/DL — SIGNIFICANT CHANGE UP (ref 1.8–2.4)
MCHC RBC-ENTMCNC: 31.5 PG — SIGNIFICANT CHANGE UP (ref 27–34)
MCHC RBC-ENTMCNC: 34.1 GM/DL — SIGNIFICANT CHANGE UP (ref 32–36)
MCV RBC AUTO: 92.3 FL — SIGNIFICANT CHANGE UP (ref 80–100)
NT-PROBNP SERPL-SCNC: 1520 PG/ML — HIGH (ref 0–450)
PHOSPHATE SERPL-MCNC: 2.7 MG/DL — SIGNIFICANT CHANGE UP (ref 2.5–4.5)
PLATELET # BLD AUTO: 221 K/UL — SIGNIFICANT CHANGE UP (ref 150–400)
POTASSIUM SERPL-MCNC: 3.7 MMOL/L — SIGNIFICANT CHANGE UP (ref 3.5–5.3)
POTASSIUM SERPL-SCNC: 3.7 MMOL/L — SIGNIFICANT CHANGE UP (ref 3.5–5.3)
RBC # BLD: 3.56 M/UL — LOW (ref 3.8–5.2)
RBC # FLD: 12.7 % — SIGNIFICANT CHANGE UP (ref 11–15)
SODIUM SERPL-SCNC: 142 MMOL/L — SIGNIFICANT CHANGE UP (ref 135–145)
SPECIMEN SOURCE: SIGNIFICANT CHANGE UP
TROPONIN I SERPL-MCNC: 0.05 NG/ML — HIGH (ref 0.01–0.04)
VIT B12 SERPL-MCNC: 973 PG/ML — HIGH (ref 243–894)
WBC # BLD: 8.8 K/UL — SIGNIFICANT CHANGE UP (ref 3.8–10.5)
WBC # FLD AUTO: 8.8 K/UL — SIGNIFICANT CHANGE UP (ref 3.8–10.5)

## 2017-04-17 PROCEDURE — 72125 CT NECK SPINE W/O DYE: CPT | Mod: 26

## 2017-04-17 PROCEDURE — 99233 SBSQ HOSP IP/OBS HIGH 50: CPT

## 2017-04-17 PROCEDURE — 76377 3D RENDER W/INTRP POSTPROCES: CPT | Mod: 26

## 2017-04-17 PROCEDURE — 99291 CRITICAL CARE FIRST HOUR: CPT

## 2017-04-17 PROCEDURE — 70450 CT HEAD/BRAIN W/O DYE: CPT | Mod: 26

## 2017-04-17 RX ORDER — CARVEDILOL PHOSPHATE 80 MG/1
25 CAPSULE, EXTENDED RELEASE ORAL EVERY 12 HOURS
Qty: 0 | Refills: 0 | Status: DISCONTINUED | OUTPATIENT
Start: 2017-04-17 | End: 2017-04-21

## 2017-04-17 RX ORDER — LEVETIRACETAM 250 MG/1
500 TABLET, FILM COATED ORAL
Qty: 0 | Refills: 0 | Status: DISCONTINUED | OUTPATIENT
Start: 2017-04-17 | End: 2017-04-19

## 2017-04-17 RX ORDER — SODIUM CHLORIDE 9 MG/ML
1000 INJECTION, SOLUTION INTRAVENOUS
Qty: 0 | Refills: 0 | Status: DISCONTINUED | OUTPATIENT
Start: 2017-04-17 | End: 2017-04-17

## 2017-04-17 RX ORDER — ASPIRIN/CALCIUM CARB/MAGNESIUM 324 MG
81 TABLET ORAL DAILY
Qty: 0 | Refills: 0 | Status: DISCONTINUED | OUTPATIENT
Start: 2017-04-17 | End: 2017-04-18

## 2017-04-17 RX ORDER — LOSARTAN POTASSIUM 100 MG/1
50 TABLET, FILM COATED ORAL DAILY
Qty: 0 | Refills: 0 | Status: DISCONTINUED | OUTPATIENT
Start: 2017-04-17 | End: 2017-04-21

## 2017-04-17 RX ORDER — FUROSEMIDE 40 MG
20 TABLET ORAL DAILY
Qty: 0 | Refills: 0 | Status: DISCONTINUED | OUTPATIENT
Start: 2017-04-18 | End: 2017-04-21

## 2017-04-17 RX ORDER — ATORVASTATIN CALCIUM 80 MG/1
40 TABLET, FILM COATED ORAL AT BEDTIME
Qty: 0 | Refills: 0 | Status: DISCONTINUED | OUTPATIENT
Start: 2017-04-17 | End: 2017-04-28

## 2017-04-17 RX ORDER — AMLODIPINE BESYLATE 2.5 MG/1
10 TABLET ORAL DAILY
Qty: 0 | Refills: 0 | Status: DISCONTINUED | OUTPATIENT
Start: 2017-04-17 | End: 2017-04-21

## 2017-04-17 RX ADMIN — LAMOTRIGINE 100 MILLIGRAM(S): 25 TABLET, ORALLY DISINTEGRATING ORAL at 13:29

## 2017-04-17 RX ADMIN — LEVETIRACETAM 500 MILLIGRAM(S): 250 TABLET, FILM COATED ORAL at 17:05

## 2017-04-17 RX ADMIN — LOSARTAN POTASSIUM 50 MILLIGRAM(S): 100 TABLET, FILM COATED ORAL at 17:05

## 2017-04-17 RX ADMIN — ATORVASTATIN CALCIUM 40 MILLIGRAM(S): 80 TABLET, FILM COATED ORAL at 21:45

## 2017-04-17 RX ADMIN — HEPARIN SODIUM 5000 UNIT(S): 5000 INJECTION INTRAVENOUS; SUBCUTANEOUS at 13:38

## 2017-04-17 RX ADMIN — HEPARIN SODIUM 5000 UNIT(S): 5000 INJECTION INTRAVENOUS; SUBCUTANEOUS at 00:06

## 2017-04-17 RX ADMIN — Medication 81 MILLIGRAM(S): at 11:53

## 2017-04-17 RX ADMIN — CARVEDILOL PHOSPHATE 25 MILLIGRAM(S): 80 CAPSULE, EXTENDED RELEASE ORAL at 17:06

## 2017-04-17 RX ADMIN — PANTOPRAZOLE SODIUM 40 MILLIGRAM(S): 20 TABLET, DELAYED RELEASE ORAL at 11:53

## 2017-04-17 RX ADMIN — AMLODIPINE BESYLATE 10 MILLIGRAM(S): 2.5 TABLET ORAL at 11:53

## 2017-04-17 RX ADMIN — LEVETIRACETAM 420 MILLIGRAM(S): 250 TABLET, FILM COATED ORAL at 06:26

## 2017-04-17 RX ADMIN — HEPARIN SODIUM 5000 UNIT(S): 5000 INJECTION INTRAVENOUS; SUBCUTANEOUS at 06:26

## 2017-04-17 RX ADMIN — HEPARIN SODIUM 5000 UNIT(S): 5000 INJECTION INTRAVENOUS; SUBCUTANEOUS at 21:45

## 2017-04-17 NOTE — PROGRESS NOTE ADULT - SUBJECTIVE AND OBJECTIVE BOX
INTERVAL HPI/OVERNIGHT EVENTS:   HPI:  87 y/o female with hx long-standing MR and severe pHTN, NICM s/p ICD, epilepsy s/p intracranial shunt, PPM, absent seizure, p/w sudden onset of confusion. Last seen normal was this afternoon. Around 450pm today, daughter found patient was confused and having slurred speech/aphasia. Daughter thought she was having absent seizure and did not call ambulance until 2-3 hours later. In the ED, SBP in 240s. ICU was called. tPA not given.     Seen at the bedside. Aphasic and noncomprehensable. SBP between 200-240. (2017 21:34)    Admitted to ICU for acute CVA outside the tPA window requiring critical care neurochecks / monitoring.      CENTRAL LINE: [ ] YES [X ] NO  LOCATION:   DATE INSERTED:  REMOVE: [ ] YES [ ] NO  EXPLAIN:    FU: [ ] YES [X ] NO    DATE INSERTED:  REMOVE:  [ ] YES [ ] NO  EXPLAIN:    A-LINE:  [ ] YES [X ] NO  LOCATION:   DATE INSERTED:  REMOVE:  [ ] YES [ ] NO  EXPLAIN:    PAST MEDICAL & SURGICAL HISTORY:  Pulmonary hypertension  Seizure  CHF (congestive heart failure)  High cholesterol  HTN (hypertension)  S/P  shunt      REVIEW OF SYSTEMS:    CONSTITUTIONAL: No fever, weight loss, or fatigue  EYES: No eye pain, visual disturbances, or discharge  ENMT:  No difficulty hearing, tinnitus, vertigo; No sinus or throat pain  RESPIRATORY: No cough, wheezing, chills or hemoptysis; No shortness of breath  CARDIOVASCULAR: No chest pain, palpitations, dizziness, or leg swelling  GASTROINTESTINAL: No abdominal or epigastric pain. No nausea, vomiting, or hematemesis; No diarrhea or constipation. No melena or hematochezia.  GENITOURINARY: No dysuria, frequency, hematuria, or incontinence  NEUROLOGICAL: (+) confusion, slurred speech  SKIN: No itching, burning, rashes, or lesions   LYMPH NODES: No enlarged glands  ENDOCRINE: No heat or cold intolerance; No hair loss  MUSCULOSKELETAL: No joint pain or swelling; No muscle, back, or extremity pain  PSYCHIATRIC: No depression, anxiety, mood swings, or difficulty sleeping    ICU Vital Signs Last 24 Hrs  T(C): 36.6, Max: 37.8 (0416 @ 23:09)  T(F): 97.8, Max: 100 (04-16 @ 23:09)  HR: 64 (61 - 130)  BP: 185/93 (128/93 - 230/122)  BP(mean): 107 (91 - 107)  ABP: --  ABP(mean): --  RR: 21 (18 - 26)  SpO2: 98% (93% - 100%)          I&O's Detail    I & Os for current day (as of 2017 15:00)  =============================================  IN:    lactated ringers.: 375 ml    IV PiggyBack: 100 ml    Total IN: 475 ml  ---------------------------------------------  OUT:    Indwelling Catheter - Urethral: 1375 ml    Total OUT: 1375 ml  ---------------------------------------------  Total NET: -900 ml        CAPILLARY BLOOD GLUCOSE  128 (2017 19:20)    PHYSICAL EXAM:    GENERAL: NAD, well-groomed, well-developed  HEAD:  Atraumatic, Normocephalic  EYES: EOMI, PERRLA, conjunctiva and sclera clear  ENMT: No tonsillar erythema, exudates, or enlargement; Moist mucous membranes, Good dentition, No lesions  NECK: Supple, No JVD, Normal thyroid  NERVOUS SYSTEM:  Alert & Oriented X3, Good concentration; Motor Strength 5/5 B/L upper and lower extremities; DTRs 2+ intact and symmetric  CHEST/LUNG: Clear to percussion bilaterally; No rales, rhonchi, wheezing, or rubs  HEART: Regular rate and rhythm; No murmurs, rubs, or gallops  ABDOMEN: Soft, Nontender, Nondistended; Bowel sounds present  EXTREMITIES:  2+ Peripheral Pulses, No clubbing, cyanosis, or edema  SKIN: No rashes or lesions      LABS:                        11.2   8.8   )-----------( 221      ( 2017 04:06 )             32.9      04-17    142  |  104  |  15  ----------------------------<  103<H>  3.7   |  27  |  0.94    Ca    8.2<L>      2017 04:06  Phos  2.7       Mg     2.3         TPro  7.1  /  Alb  3.4  /  TBili  0.5  /  DBili  x   /  AST  23  /  ALT  21  /  AlkPhos  64  -16    PT/INR - ( 2017 23:24 )   PT: 12.1 sec;   INR: 1.11 ratio         PTT - ( 2017 23:24 )  PTT:50.0 sec  Urinalysis Basic - ( 2017 20:22 )    Color: Yellow / Appearance: Clear / S.005 / pH: x  Gluc: x / Ketone: Negative  / Bili: Negative / Urobili: Negative mg/dL   Blood: x / Protein: 15 mg/dL / Nitrite: Negative   Leuk Esterase: Moderate / RBC: 6-10 /HPF / WBC >50   Sq Epi: x / Non Sq Epi: Few / Bacteria: Moderate          RADIOLOGY & ADDITIONAL STUDIES:      Assessment and Plan:    CRITICAL CARE TIME SPENT: INTERVAL HPI/OVERNIGHT EVENTS:   HPI:  87 y/o female with hx long-standing MR and severe pHTN, NICM s/p ICD, epilepsy s/p intracranial shunt, PPM, absent seizure, p/w sudden onset of confusion. Last seen normal was this afternoon. Around 450pm today, daughter found patient was confused and having slurred speech/aphasia. Daughter thought she was having absent seizure and did not call ambulance until 2-3 hours later. In the ED, SBP in 240s. ICU was called. tPA not given.     Seen at the bedside. Aphasic and noncomprehensable. SBP between 200-240. (2017 21:34)    Admitted to ICU for acute CVA outside the tPA window requiring critical care neurochecks / monitoring.      CENTRAL LINE: [ ] YES [X ] NO  LOCATION:   DATE INSERTED:  REMOVE: [ ] YES [ ] NO  EXPLAIN:    FU: [ ] YES [X ] NO    DATE INSERTED:  REMOVE:  [ ] YES [ ] NO  EXPLAIN:    A-LINE:  [ ] YES [X ] NO  LOCATION:   DATE INSERTED:  REMOVE:  [ ] YES [ ] NO  EXPLAIN:    PAST MEDICAL & SURGICAL HISTORY:  Pulmonary hypertension  Seizure  CHF (congestive heart failure)  High cholesterol  HTN (hypertension)  S/P  shunt      REVIEW OF SYSTEMS:    CONSTITUTIONAL: No fever, weight loss, or fatigue  EYES: No eye pain, visual disturbances, or discharge  ENMT:  No difficulty hearing, tinnitus, vertigo; No sinus or throat pain  RESPIRATORY: No cough, wheezing, chills or hemoptysis; No shortness of breath  CARDIOVASCULAR: No chest pain, palpitations, dizziness, or leg swelling  GASTROINTESTINAL: No abdominal or epigastric pain. No nausea, vomiting, or hematemesis; No diarrhea or constipation. No melena or hematochezia.  GENITOURINARY: No dysuria, frequency, hematuria, or incontinence  NEUROLOGICAL: (+) confusion, slurred speech  SKIN: No itching, burning, rashes, or lesions   LYMPH NODES: No enlarged glands  ENDOCRINE: No heat or cold intolerance; No hair loss  MUSCULOSKELETAL: No joint pain or swelling; No muscle, back, or extremity pain  PSYCHIATRIC: No depression, anxiety, mood swings, or difficulty sleeping    ICU Vital Signs Last 24 Hrs  T(C): 36.6, Max: 37.8 (0416 @ 23:09)  T(F): 97.8, Max: 100 (04-16 @ 23:09)  HR: 64 (61 - 130)  BP: 185/93 (128/93 - 230/122)  BP(mean): 107 (91 - 107)  ABP: --  ABP(mean): --  RR: 21 (18 - 26)  SpO2: 98% (93% - 100%)          I&O's Detail    I & Os for current day (as of 2017 15:00)  =============================================  IN:    lactated ringers.: 375 ml    IV PiggyBack: 100 ml    Total IN: 475 ml  ---------------------------------------------  OUT:    Indwelling Catheter - Urethral: 1375 ml    Total OUT: 1375 ml  ---------------------------------------------  Total NET: -900 ml        CAPILLARY BLOOD GLUCOSE  128 (2017 19:20)    PHYSICAL EXAM:    GENERAL: NAD, well-groomed, well-developed. pleasant demeanor.  HEAD:  Atraumatic, Normocephalic  EYES: EOMI, PERRLA, conjunctiva and sclera clear  ENMT: No tonsillar erythema, exudates, or enlargement; Moist mucous membranes, Good dentition, No lesions  NECK: Supple, No JVD, Normal thyroid  NERVOUS SYSTEM:  Alert & Oriented X3, Motor Strength 5/5 B/L upper and lower extremities;   CHEST/LUNG: Clear to percussion bilaterally; No rales, rhonchi, wheezing, or rubs  HEART: Regular rate and rhythm; No murmurs, rubs, or gallops  ABDOMEN: Soft, Nontender, Nondistended; Bowel sounds present  EXTREMITIES:  2+ Peripheral Pulses, No clubbing, cyanosis, or edema  SKIN: No rashes or lesions      LABS:                        11.2   8.8   )-----------( 221      ( 2017 04:06 )             32.9      04-17    142  |  104  |  15  ----------------------------<  103<H>  3.7   |  27  |  0.94    Ca    8.2<L>      2017 04:06  Phos  2.7       Mg     2.3         TPro  7.1  /  Alb  3.4  /  TBili  0.5  /  DBili  x   /  AST  23  /  ALT  21  /  AlkPhos  64      PT/INR - ( 2017 23:24 )   PT: 12.1 sec;   INR: 1.11 ratio         PTT - ( 2017 23:24 )  PTT:50.0 sec  Urinalysis Basic - ( 2017 20:22 )    Color: Yellow / Appearance: Clear / S.005 / pH: x  Gluc: x / Ketone: Negative  / Bili: Negative / Urobili: Negative mg/dL   Blood: x / Protein: 15 mg/dL / Nitrite: Negative   Leuk Esterase: Moderate / RBC: 6-10 /HPF / WBC >50   Sq Epi: x / Non Sq Epi: Few / Bacteria: Moderate          RADIOLOGY & ADDITIONAL STUDIES: negative Ct head for large infarcts or bleeding.  shunt in place.      CRITICAL CARE TIME SPENT:30 minutes INTERVAL HPI/OVERNIGHT EVENTS:   HPI:  85 y/o female with hx long-standing MR and severe pHTN, NICM s/p ICD, epilepsy s/p intracranial shunt, PPM, absent seizure, p/w sudden onset of confusion. Last seen normal was this afternoon. Around 450pm today, daughter found patient was confused and having slurred speech/aphasia. Daughter thought she was having absent seizure and did not call ambulance until 2-3 hours later. In the ED, SBP in 240s. ICU was called. tPA not given.     Seen at the bedside. Aphasic and noncomprehensable. SBP between 200-240. (2017 21:34)    Admitted to ICU for r/o acute CVA, outside the tPA window requiring critical care neurochecks / monitoring and for hypertensive urgency on cardene drip.       CENTRAL LINE: [ ] YES [X ] NO      FU: [ ] YES [X ] NO  - d/mitiz    A-LINE:  [ ] YES [X ] NO      PAST MEDICAL & SURGICAL HISTORY:  Pulmonary hypertension  Seizure  CHF (congestive heart failure)  High cholesterol  HTN (hypertension)  S/P  shunt      REVIEW OF SYSTEMS:    CONSTITUTIONAL: No fever or fatigue  EYES: No visual disturbances  ENMT:  No difficulty hearing, No sinus or throat pain  RESPIRATORY: No cough; No shortness of breath  CARDIOVASCULAR: No chest pain, palpitations  GASTROINTESTINAL: No abdominal. No nausea, vomiting  GENITOURINARY: No dysuria, frequency  NEUROLOGICAL: (+) confusion, slurred speech  MUSCULOSKELETAL: No joint pain; No muscle, back, or extremity pain  PSYCHIATRIC: No depression, anxiety    ICU Vital Signs Last 24 Hrs  T(C): 36.6, Max: 37.8 (16 @ 23:09)  T(F): 97.8, Max: 100 (-16 @ 23:09)  HR: 64 (61 - 130)  BP: 185/93 (128/93 - 230/122)  BP(mean): 107 (91 - 107)  RR: 21 (18 - 26)  SpO2: 98% (93% - 100%)      I&O's Detail    I & Os for current day (as of 2017 15:00)  =============================================  IN:    lactated ringers.: 375 ml    IV PiggyBack: 100 ml    Total IN: 475 ml  ---------------------------------------------  OUT:    Indwelling Catheter - Urethral: 1375 ml    Total OUT: 1375 ml  ---------------------------------------------  Total NET: -900 ml        CAPILLARY BLOOD GLUCOSE: 128     PHYSICAL EXAM:    GENERAL: NAD, well-groomed, well-developed. pleasant demeanor.  HEAD:  Atraumatic, Normocephalic  EYES: EOMI, PERRLA, conjunctiva and sclera clear  ENMT: No tonsillar erythema, exudates, or enlargement; Moist mucous membranes  NECK: Supple, No JVD, Normal thyroid  NERVOUS SYSTEM:  Alert & Oriented X2 (mildly confused at times), Motor Strength 5/5 B/L upper and lower extremities with the slightest bit of weakness on right   CHEST/LUNG: Clear to auscultation bilaterally; No rales, rhonchi, wheezing  HEART: Regular rate and rhythm  ABDOMEN: Soft, Nontender, Nondistended; Bowel sounds present  EXTREMITIES:  2+ Peripheral Pulses, No clubbing, cyanosis, or edema        LABS:                        11.2   8.8   )-----------( 221      ( 2017 04:06 )             32.9      04-17    142  |  104  |  15  ----------------------------<  103<H>  3.7   |  27  |  0.94    Ca    8.2<L>      2017 04:06  Phos  2.7     04-17  Mg     2.3     04-17    TPro  7.1  /  Alb  3.4  /  TBili  0.5  /  AST  23  /  ALT  21  /  AlkPhos  64  04-16    PT/INR - ( 2017 23:24 )   PT: 12.1 sec;   INR: 1.11 ratio         PTT - ( 2017 23:24 )  PTT:50.0 sec  Urinalysis Basic - ( 2017 20:22 )    Color: Yellow / Appearance: Clear / S.005 / Ketone: Negative  / Bili: Negative / Urobili: Negative mg/dL / Protein: 15 mg/dL / Nitrite: Negative / Leuk Esterase: Moderate / RBC: 6-10 /HPF / WBC >50 / Non Sq Epi: Few / Bacteria: Moderate      RADIOLOGY & ADDITIONAL STUDIES: negative Ct head for large infarcts or bleeding.  shunt in place.      CRITICAL CARE TIME SPENT: 35 minutes

## 2017-04-17 NOTE — PHYSICAL THERAPY INITIAL EVALUATION ADULT - GENERAL OBSERVATIONS, REHAB EVAL
Pt encountered supine in bed, alert and oriented x3, cardiac monitor, hua, IV and supplemental oxygen via nasal canula in place, NAD.

## 2017-04-17 NOTE — PHYSICAL THERAPY INITIAL EVALUATION ADULT - PERTINENT HX OF CURRENT PROBLEM, REHAB EVAL
CVA, hypertensive emergency. CT is negative. Daughter thought that pt was having a seizure, was brought in secondary to confusion and slurred speech.  EEG was performed, results pending.

## 2017-04-17 NOTE — SWALLOW BEDSIDE ASSESSMENT ADULT - SLP GENERAL OBSERVATIONS
Pt received, sitting upright in chair. Pt able to follow presented 1-2 step commands presented. Pt demonstrates difficulty recalling personal history, however; pt communicates basic information. Pt with increased desire for PO consumption.

## 2017-04-17 NOTE — PHYSICAL THERAPY INITIAL EVALUATION ADULT - MODIFIED CLINICAL TEST OF SENSORY INTEGRATION IN BALANCE TEST
Barthel Index: Feeding Score _10__, Bathing Score _0__, Grooming Score _0__, Dressing Score _5__, Bowels Score _10__, Bladder Score _0__, Toilet Score _5__, Transfers Score _10__, Mobility Score _0__, Stairs Score _0__,     Total Score __40_

## 2017-04-17 NOTE — PHYSICAL THERAPY INITIAL EVALUATION ADULT - PRECAUTIONS/LIMITATIONS, REHAB EVAL
fall precautions/on supplemental oxygen via nasal canula to start session/oxygen therapy device and L/min

## 2017-04-17 NOTE — SWALLOW BEDSIDE ASSESSMENT ADULT - SWALLOW EVAL: DIAGNOSIS
Pt presents with mild oral phase dysphagia, characterized by prolonged mastication and oral residue.

## 2017-04-17 NOTE — SWALLOW BEDSIDE ASSESSMENT ADULT - SWALLOW EVAL: FUNCTIONAL LEVEL AT TIME OF EVAL
Pt alert, oriented x1 and verbally responsive. Pt able to express needs/wants. Pt alert, oriented x3 and verbally responsive. Pt able to express needs/wants. Pt appeared to frequently forget desired thoughts during evaluation.

## 2017-04-17 NOTE — SWALLOW BEDSIDE ASSESSMENT ADULT - COMMENTS
CHEST XRAY: 04/16/2017 IMPRESSION: Cardiomegaly. Pacemaker in situ. Underlying COPD.    CT BRAIN: 04/16/2017 IMPRESSION: No acute hemorrhage. Redemonstration of a left posterior approach catheter situated within a left parafalcine cystic lesion which is smaller than the prior exam of 3/9/2017.

## 2017-04-17 NOTE — PROGRESS NOTE ADULT - SUBJECTIVE AND OBJECTIVE BOX
INTERVAL HPI/OVERNIGHT EVENTS:  Subjective Complaints:  Offers no complaints.  Patient in CCU sitting up and talking with no trace of aphasia; responding and reacting quite appropriately; moving all extremities. Vital signs stable.    RECENT RADIOLOGY & ADDITIONAL TESTS:    NEUROLOGICAL EXAM (Pertinent):  Vital Signs Last 24 Hrs  T(C): 37, Max: 37.8 ( @ 23:09)  T(F): 98.6, Max: 100 ( @ 23:09)  HR: 64 (61 - 130)  BP: 185/93 (128/93 - 230/122)  BP(mean): 107 (91 - 107)  RR: 21 (18 - 26)  SpO2: 98% (93% - 100%)    MEDICATIONS  (STANDING):  pantoprazole  Injectable 40milliGRAM(s) IV Push daily  heparin  Injectable 5000Unit(s) SubCutaneous every 8 hours  levETIRAcetam  IVPB 500milliGRAM(s) IV Intermittent every 12 hours  lamoTRIgine 100milliGRAM(s) Oral daily  lactated ringers. 1000milliLiter(s) IV Continuous <Continuous>  amLODIPine   Tablet 10milliGRAM(s) Oral daily  carvedilol 25milliGRAM(s) Oral every 12 hours  aspirin  chewable 81milliGRAM(s) Oral daily    MEDICATIONS  (PRN):  labetalol Injectable 10milliGRAM(s) IV Push every 6 hours PRN Systolic blood pressure >180    LABS:                        11.2   8.8   )-----------( 221      ( 2017 04:06 )             32.9     -17    142  |  104  |  15  ----------------------------<  103<H>  3.7   |  27  |  0.94    Ca    8.2<L>      2017 04:06  Phos  2.7     04-17  Mg     2.3     -17    TPro  7.1  /  Alb  3.4  /  TBili  0.5  /  DBili  x   /  AST  23  /  ALT  21  /  AlkPhos  64  04-16    PT/INR - ( 2017 23:24 )   PT: 12.1 sec;   INR: 1.11 ratio         PTT - ( 2017 23:24 )  PTT:50.0 sec  Urinalysis Basic - ( 2017 20:22 )    Color: Yellow / Appearance: Clear / S.005 / pH: x  Gluc: x / Ketone: Negative  / Bili: Negative / Urobili: Negative mg/dL   Blood: x / Protein: 15 mg/dL / Nitrite: Negative   Leuk Esterase: Moderate / RBC: 6-10 /HPF / WBC >50   Sq Epi: x / Non Sq Epi: Few / Bacteria: Moderate      Assessment & Opinion:  Seizure Disorder, generalized convulsive and absence.  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 Doubt CVA.    Recommendations:  Unable to do Brain MRI.  Carotid doppler.  Echocardiogram.  EEG.   DVT prophylaxis as ordered.  Medications:  Continue Keppra AND Lamotrigine.

## 2017-04-17 NOTE — CHART NOTE - NSCHARTNOTEFT_GEN_A_CORE
86F PMH Pulm HTN, non ischemic cardiomyopathy, s/p AICD placement, HTN, HLD, absence Sz, CVA, s/p  shunt presents to ER AMS. pt noted by family with slurred speech, not acting herself, with questionable R facial droop. Family assumed pt had another seizure. But when symptoms did not improve, pt was brought into ER for evaluation. Code stroke activated in ER. Pt found to be hypertensive (246/198) and sent for CT head: No acute bleed. Admitted to ICU for hypertensive urgency. Placed briefly on cardene drip (off since 4/17/17 1AM). now on home oral antihypertensive with -160s.     Symptoms resolved. Pt awake and alert. Following commands. Strength relatively symmetrical with very slight right sided weakness (which unclear if is from prior old CVA). Suspect pt to have had AMS secondary to hypertensive encephalopathy vs seizure.     Cont with lamictal, keppra. Neurology following patient. EEG performed. Follow up results.    BP improved. Mental status improved. Sitting in chair, eating. Worked with PT.    Stable for transfer to medical floor. Sign out given to hospitalist service, Dr Seay.

## 2017-04-17 NOTE — SWALLOW BEDSIDE ASSESSMENT ADULT - SWALLOW EVAL: RECOMMENDED FEEDING/EATING TECHNIQUES
maintain upright posture during/after eating for 30 mins/allow for swallow between intakes/alternate food with liquid/small sips/bites/check mouth frequently for oral residue/pocketing

## 2017-04-18 DIAGNOSIS — I16.1 HYPERTENSIVE EMERGENCY: ICD-10-CM

## 2017-04-18 DIAGNOSIS — R55 SYNCOPE AND COLLAPSE: ICD-10-CM

## 2017-04-18 DIAGNOSIS — K92.2 GASTROINTESTINAL HEMORRHAGE, UNSPECIFIED: ICD-10-CM

## 2017-04-18 DIAGNOSIS — I50.9 HEART FAILURE, UNSPECIFIED: ICD-10-CM

## 2017-04-18 DIAGNOSIS — G93.41 METABOLIC ENCEPHALOPATHY: ICD-10-CM

## 2017-04-18 LAB
ANION GAP SERPL CALC-SCNC: 10 MMOL/L — SIGNIFICANT CHANGE UP (ref 5–17)
ANION GAP SERPL CALC-SCNC: 7 MMOL/L — SIGNIFICANT CHANGE UP (ref 5–17)
BLD GP AB SCN SERPL QL: SIGNIFICANT CHANGE UP
BUN SERPL-MCNC: 10 MG/DL — SIGNIFICANT CHANGE UP (ref 7–23)
BUN SERPL-MCNC: 13 MG/DL — SIGNIFICANT CHANGE UP (ref 7–23)
CALCIUM SERPL-MCNC: 7.5 MG/DL — LOW (ref 8.5–10.1)
CALCIUM SERPL-MCNC: 8.6 MG/DL — SIGNIFICANT CHANGE UP (ref 8.5–10.1)
CHLORIDE SERPL-SCNC: 103 MMOL/L — SIGNIFICANT CHANGE UP (ref 96–108)
CHLORIDE SERPL-SCNC: 106 MMOL/L — SIGNIFICANT CHANGE UP (ref 96–108)
CHOLEST SERPL-MCNC: 160 MG/DL — SIGNIFICANT CHANGE UP (ref 10–199)
CO2 SERPL-SCNC: 26 MMOL/L — SIGNIFICANT CHANGE UP (ref 22–31)
CO2 SERPL-SCNC: 31 MMOL/L — SIGNIFICANT CHANGE UP (ref 22–31)
CREAT SERPL-MCNC: 0.86 MG/DL — SIGNIFICANT CHANGE UP (ref 0.5–1.3)
CREAT SERPL-MCNC: 1.02 MG/DL — SIGNIFICANT CHANGE UP (ref 0.5–1.3)
GLUCOSE SERPL-MCNC: 160 MG/DL — HIGH (ref 70–99)
GLUCOSE SERPL-MCNC: 87 MG/DL — SIGNIFICANT CHANGE UP (ref 70–99)
HCT VFR BLD CALC: 29 % — LOW (ref 34.5–45)
HCT VFR BLD CALC: 32.5 % — LOW (ref 34.5–45)
HCT VFR BLD CALC: 35.2 % — SIGNIFICANT CHANGE UP (ref 34.5–45)
HDLC SERPL-MCNC: 80 MG/DL — SIGNIFICANT CHANGE UP (ref 40–125)
HGB BLD-MCNC: 10 G/DL — LOW (ref 11.5–15.5)
HGB BLD-MCNC: 11.4 G/DL — LOW (ref 11.5–15.5)
HGB BLD-MCNC: 12.1 G/DL — SIGNIFICANT CHANGE UP (ref 11.5–15.5)
INR BLD: 1.3 RATIO — HIGH (ref 0.88–1.16)
LACTATE SERPL-SCNC: 2.7 MMOL/L — HIGH (ref 0.7–2)
LAMOTRIGINE SERPL-MCNC: 2.2 MCG/ML — LOW (ref 2.5–15)
LEVETIRACETAM SERPL-MCNC: 83.2 MCG/ML — HIGH (ref 12–46)
LIPID PNL WITH DIRECT LDL SERPL: 72 MG/DL — SIGNIFICANT CHANGE UP
MAGNESIUM SERPL-MCNC: 2 MG/DL — SIGNIFICANT CHANGE UP (ref 1.8–2.4)
MCHC RBC-ENTMCNC: 31.4 PG — SIGNIFICANT CHANGE UP (ref 27–34)
MCHC RBC-ENTMCNC: 31.9 PG — SIGNIFICANT CHANGE UP (ref 27–34)
MCHC RBC-ENTMCNC: 32 PG — SIGNIFICANT CHANGE UP (ref 27–34)
MCHC RBC-ENTMCNC: 34.3 GM/DL — SIGNIFICANT CHANGE UP (ref 32–36)
MCHC RBC-ENTMCNC: 34.7 GM/DL — SIGNIFICANT CHANGE UP (ref 32–36)
MCHC RBC-ENTMCNC: 35 GM/DL — SIGNIFICANT CHANGE UP (ref 32–36)
MCV RBC AUTO: 91.3 FL — SIGNIFICANT CHANGE UP (ref 80–100)
MCV RBC AUTO: 91.7 FL — SIGNIFICANT CHANGE UP (ref 80–100)
MCV RBC AUTO: 92.1 FL — SIGNIFICANT CHANGE UP (ref 80–100)
PHOSPHATE SERPL-MCNC: 3.3 MG/DL — SIGNIFICANT CHANGE UP (ref 2.5–4.5)
PLATELET # BLD AUTO: 180 K/UL — SIGNIFICANT CHANGE UP (ref 150–400)
PLATELET # BLD AUTO: 206 K/UL — SIGNIFICANT CHANGE UP (ref 150–400)
PLATELET # BLD AUTO: 209 K/UL — SIGNIFICANT CHANGE UP (ref 150–400)
POTASSIUM SERPL-MCNC: 3.5 MMOL/L — SIGNIFICANT CHANGE UP (ref 3.5–5.3)
POTASSIUM SERPL-MCNC: 3.5 MMOL/L — SIGNIFICANT CHANGE UP (ref 3.5–5.3)
POTASSIUM SERPL-SCNC: 3.5 MMOL/L — SIGNIFICANT CHANGE UP (ref 3.5–5.3)
POTASSIUM SERPL-SCNC: 3.5 MMOL/L — SIGNIFICANT CHANGE UP (ref 3.5–5.3)
PROTHROM AB SERPL-ACNC: 14.3 SEC — HIGH (ref 9.8–12.7)
RBC # BLD: 3.15 M/UL — LOW (ref 3.8–5.2)
RBC # BLD: 3.56 M/UL — LOW (ref 3.8–5.2)
RBC # BLD: 3.84 M/UL — SIGNIFICANT CHANGE UP (ref 3.8–5.2)
RBC # FLD: 12.3 % — SIGNIFICANT CHANGE UP (ref 11–15)
RBC # FLD: 12.6 % — SIGNIFICANT CHANGE UP (ref 11–15)
RBC # FLD: 12.7 % — SIGNIFICANT CHANGE UP (ref 11–15)
SODIUM SERPL-SCNC: 141 MMOL/L — SIGNIFICANT CHANGE UP (ref 135–145)
SODIUM SERPL-SCNC: 142 MMOL/L — SIGNIFICANT CHANGE UP (ref 135–145)
TOTAL CHOLESTEROL/HDL RATIO MEASUREMENT: 2 RATIO — LOW (ref 3.3–7.1)
TRIGL SERPL-MCNC: 42 MG/DL — SIGNIFICANT CHANGE UP (ref 10–149)
WBC # BLD: 5.2 K/UL — SIGNIFICANT CHANGE UP (ref 3.8–10.5)
WBC # BLD: 6.3 K/UL — SIGNIFICANT CHANGE UP (ref 3.8–10.5)
WBC # BLD: 8.2 K/UL — SIGNIFICANT CHANGE UP (ref 3.8–10.5)
WBC # FLD AUTO: 5.2 K/UL — SIGNIFICANT CHANGE UP (ref 3.8–10.5)
WBC # FLD AUTO: 6.3 K/UL — SIGNIFICANT CHANGE UP (ref 3.8–10.5)
WBC # FLD AUTO: 8.2 K/UL — SIGNIFICANT CHANGE UP (ref 3.8–10.5)

## 2017-04-18 PROCEDURE — 99291 CRITICAL CARE FIRST HOUR: CPT

## 2017-04-18 PROCEDURE — 74177 CT ABD & PELVIS W/CONTRAST: CPT | Mod: 26

## 2017-04-18 RX ORDER — SODIUM CHLORIDE 9 MG/ML
1000 INJECTION INTRAMUSCULAR; INTRAVENOUS; SUBCUTANEOUS ONCE
Qty: 0 | Refills: 0 | Status: COMPLETED | OUTPATIENT
Start: 2017-04-18 | End: 2017-04-18

## 2017-04-18 RX ORDER — PANTOPRAZOLE SODIUM 20 MG/1
8 TABLET, DELAYED RELEASE ORAL
Qty: 80 | Refills: 0 | Status: DISCONTINUED | OUTPATIENT
Start: 2017-04-18 | End: 2017-04-19

## 2017-04-18 RX ORDER — IOHEXOL 300 MG/ML
30 INJECTION, SOLUTION INTRAVENOUS ONCE
Qty: 0 | Refills: 0 | Status: COMPLETED | OUTPATIENT
Start: 2017-04-18 | End: 2017-04-18

## 2017-04-18 RX ORDER — SODIUM CHLORIDE 9 MG/ML
1000 INJECTION INTRAMUSCULAR; INTRAVENOUS; SUBCUTANEOUS
Qty: 0 | Refills: 0 | Status: DISCONTINUED | OUTPATIENT
Start: 2017-04-18 | End: 2017-04-21

## 2017-04-18 RX ADMIN — IOHEXOL 30 MILLILITER(S): 300 INJECTION, SOLUTION INTRAVENOUS at 21:38

## 2017-04-18 RX ADMIN — CARVEDILOL PHOSPHATE 25 MILLIGRAM(S): 80 CAPSULE, EXTENDED RELEASE ORAL at 17:42

## 2017-04-18 RX ADMIN — LOSARTAN POTASSIUM 50 MILLIGRAM(S): 100 TABLET, FILM COATED ORAL at 06:10

## 2017-04-18 RX ADMIN — LAMOTRIGINE 100 MILLIGRAM(S): 25 TABLET, ORALLY DISINTEGRATING ORAL at 12:15

## 2017-04-18 RX ADMIN — HEPARIN SODIUM 5000 UNIT(S): 5000 INJECTION INTRAVENOUS; SUBCUTANEOUS at 05:56

## 2017-04-18 RX ADMIN — ATORVASTATIN CALCIUM 40 MILLIGRAM(S): 80 TABLET, FILM COATED ORAL at 22:27

## 2017-04-18 RX ADMIN — LEVETIRACETAM 500 MILLIGRAM(S): 250 TABLET, FILM COATED ORAL at 05:56

## 2017-04-18 RX ADMIN — CARVEDILOL PHOSPHATE 25 MILLIGRAM(S): 80 CAPSULE, EXTENDED RELEASE ORAL at 05:56

## 2017-04-18 RX ADMIN — SODIUM CHLORIDE 50 MILLILITER(S): 9 INJECTION INTRAMUSCULAR; INTRAVENOUS; SUBCUTANEOUS at 16:52

## 2017-04-18 RX ADMIN — SODIUM CHLORIDE 4000 MILLILITER(S): 9 INJECTION INTRAMUSCULAR; INTRAVENOUS; SUBCUTANEOUS at 13:45

## 2017-04-18 RX ADMIN — Medication 20 MILLIGRAM(S): at 06:10

## 2017-04-18 RX ADMIN — AMLODIPINE BESYLATE 10 MILLIGRAM(S): 2.5 TABLET ORAL at 05:56

## 2017-04-18 RX ADMIN — LEVETIRACETAM 500 MILLIGRAM(S): 250 TABLET, FILM COATED ORAL at 17:42

## 2017-04-18 RX ADMIN — SODIUM CHLORIDE 4000 MILLILITER(S): 9 INJECTION INTRAMUSCULAR; INTRAVENOUS; SUBCUTANEOUS at 13:34

## 2017-04-18 RX ADMIN — PANTOPRAZOLE SODIUM 10 MG/HR: 20 TABLET, DELAYED RELEASE ORAL at 16:52

## 2017-04-18 NOTE — CONSULT NOTE ADULT - SUBJECTIVE AND OBJECTIVE BOX
HPI:  85 y/o female with hx long-standing MR and severe pHTN, NICM s/p ICD, epilepsy s/p intracranial shunt, PPM, absent seizure, p/w sudden onset of confusion. Last seen normal was this afternoon. Around 450pm today, daughter found patient was confused and having slurred speech/aphasia. Daughter thought she was having absent seizure and did not call ambulance until 2-3 hours later. In the ED, SBP in 240s. ICU was called. tPA not given.     Seen at the bedside. Aphasic and noncomprehensable. SBP between 200-240. (2017 21:34)    Patient was found today sitting on the toilet unresponsive. There was blood in the toilet and  rectal vault. Patient does not recall incident. She has a history of vaginal spotting ( history of pessary )   Otherwise, the patient denies melena, hematochezia, hematemesis, nausea, vomiting, abdominal pain, constipation, diarrhea, or change in bowel movements  Last colonoscopy many years ago. ( findings ? )  Patient now asymptomatic.      PAST MEDICAL & SURGICAL HISTORY:  Pulmonary hypertension  Seizure  CHF (congestive heart failure)  High cholesterol  HTN (hypertension)  S/P  shunt      MEDICATIONS  (STANDING):  lamoTRIgine 100milliGRAM(s) Oral daily  amLODIPine   Tablet 10milliGRAM(s) Oral daily  carvedilol 25milliGRAM(s) Oral every 12 hours  atorvastatin 40milliGRAM(s) Oral at bedtime  losartan 50milliGRAM(s) Oral daily  levETIRAcetam 500milliGRAM(s) Oral two times a day  furosemide    Tablet 20milliGRAM(s) Oral daily  pantoprazole Infusion 8mG/Hr IV Continuous <Continuous>  sodium chloride 0.9%. 1000milliLiter(s) IV Continuous <Continuous>    MEDICATIONS  (PRN):  labetalol Injectable 10milliGRAM(s) IV Push every 6 hours PRN Systolic blood pressure >180      Allergies    No Known Allergies    Intolerances        FAMILY HISTORY:      REVIEW OF SYSTEMS:    CONSTITUTIONAL: No fever, weight loss, or fatigue  EYES: No eye pain, visual disturbances, or discharge  ENMT:  No difficulty hearing, tinnitus, vertigo; No sinus or throat pain  NECK: No pain or stiffness  BREASTS: No pain, masses, or nipple discharge  RESPIRATORY: No cough, wheezing, chills or hemoptysis; No shortness of breath  CARDIOVASCULAR: No chest pain, palpitations, dizziness, or leg swelling  GASTROINTESTINAL: See above.  GENITOURINARY: No dysuria, frequency, hematuria, or incontinence  NEUROLOGICAL: No headaches, memory loss, loss of strength, numbness, or tremors  SKIN: No itching, burning, rashes, or lesions   LYMPH NODES: No enlarged glands  ENDOCRINE: No heat or cold intolerance; No hair loss  MUSCULOSKELETAL: No joint pain or swelling; No muscle, back, or extremity pain  PSYCHIATRIC: No depression, anxiety, mood swings, or difficulty sleeping  HEME/LYMPH: No easy bruising, or bleeding gums  ALLERGY AND IMMUNOLOGIC: No hives or eczema          SOCIAL HISTORY:    FAMILY HISTORY:      Vital Signs Last 24 Hrs  T(C): 36.7, Max: 37.6 ( @ 11:52)  T(F): 98, Max: 99.6 ( @ 11:52)  HR: 67 (50 - 67)  BP: 150/77 (75/47 - 184/76)  BP(mean): --  RR: 16 (16 - 28)  SpO2: 98% (81% - 99%)    PHYSICAL EXAM:    GENERAL: NAD, well-groomed, well-developed  HEAD:  Atraumatic, Normocephalic  EYES: EOMI, PERRLA, conjunctiva and sclera clear  ENMT: No tonsillar erythema, exudates, or enlargement; Moist mucous membranes, Good dentition, No lesions  NECK: Supple, No JVD, Normal thyroid  NERVOUS SYSTEM:  Alert & Oriented X3, Good concentration; Motor Strength 5/5 B/L upper and lower extremities; DTRs 2+ intact and symmetric  CHEST/LUNG: Clear to percussion bilaterally; No rales, rhonchi, wheezing, or rubs  HEART: Regular rate and rhythm; No murmurs, rubs, or gallops  ABDOMEN: Soft, Nontender, Nondistended; Bowel sounds present  EXTREMITIES:  2+ Peripheral Pulses, No clubbing, cyanosis, or edema  LYMPH: No lymphadenopathy noted   RECTAL: Refused.   SKIN: No rashes or lesions    LABS:                        10.0   6.3   )-----------( 180      ( 2017 14:05 )             29.0     2017 14:05    142    |  106    |  13     ----------------------------<  160    3.5     |  26     |  1.02   2017 07:26    141    |  103    |  10     ----------------------------<  87     3.5     |  31     |  0.86   2017 04:06    142    |  104    |  15     ----------------------------<  103    3.7     |  27     |  0.94   2017 20:08    139    |  101    |  20     ----------------------------<  123    4.1     |  29     |  1.25     Ca    7.5        2017 14:05  Ca    8.6        2017 07:26  Ca    8.2        2017 04:06  Ca    8.8        2017 20:08  Phos  3.3       2017 07:26  Phos  2.7       2017 04:06  Mg     2.0       2017 07:26  Mg     2.3       2017 04:06  Mg     2.4       2017 20:08    TPro  7.1    /  Alb  3.4    /  TBili  0.5    /  DBili  x      /  AST  23     /  ALT  21     /  AlkPhos  64     2017 20:08    PT/INR - ( 2017 14:05 )   PT: 14.3 sec;   INR: 1.30 ratio         PTT - ( 2017 23:24 )  PTT:50.0 sec  Urinalysis Basic - ( 2017 20:22 )    Color: Yellow / Appearance: Clear / S.005 / pH: x  Gluc: x / Ketone: Negative  / Bili: Negative / Urobili: Negative mg/dL   Blood: x / Protein: 15 mg/dL / Nitrite: Negative   Leuk Esterase: Moderate / RBC: 6-10 /HPF / WBC >50   Sq Epi: x / Non Sq Epi: Few / Bacteria: Moderate          RADIOLOGY & ADDITIONAL STUDIES:

## 2017-04-18 NOTE — PROGRESS NOTE ADULT - PROBLEM SELECTOR PLAN 1
continue with bp meds with holding parameters, may need to back off if continues to have active bleed

## 2017-04-18 NOTE — PROGRESS NOTE ADULT - SUBJECTIVE AND OBJECTIVE BOX
Patient is a 86y old  Female who presents with a chief complaint of AMS, confusion (2017 21:34)      INTERVAL HPI/OVERNIGHT EVENTS:  transferred out of ICU, first nursing report of "vaginal bleed", then pt. had syncopal episode in bathroom, now evidence of rectal bleed rather than vaginal  pt. seen twice today, before and after RRT for syncope, is awake and alert , "feels confused", family report that her slurred speech is improved  MEDICATIONS  (STANDING):  lamoTRIgine 100milliGRAM(s) Oral daily  amLODIPine   Tablet 10milliGRAM(s) Oral daily  carvedilol 25milliGRAM(s) Oral every 12 hours  atorvastatin 40milliGRAM(s) Oral at bedtime  losartan 50milliGRAM(s) Oral daily  levETIRAcetam 500milliGRAM(s) Oral two times a day  furosemide    Tablet 20milliGRAM(s) Oral daily  pantoprazole Infusion 8mG/Hr IV Continuous <Continuous>  sodium chloride 0.9%. 1000milliLiter(s) IV Continuous <Continuous>    MEDICATIONS  (PRN):  labetalol Injectable 10milliGRAM(s) IV Push every 6 hours PRN Systolic blood pressure >180      Allergies    No Known Allergies    Intolerances        REVIEW OF SYSTEMS:  CONSTITUTIONAL: No fever, weight loss, +fatigue  EYES: No eye pain, visual disturbances, or discharge  ENMT:  No difficulty hearing, tinnitus, vertigo; No sinus or throat pain  NECK: No pain or stiffness  BREASTS: No pain, masses, or nipple discharge  RESPIRATORY: No cough, wheezing, chills or hemoptysis; No shortness of breath  CARDIOVASCULAR: No chest pain, palpitations, dizziness, or leg swelling  GASTROINTESTINAL: No abdominal, +rectal bleed  GENITOURINARY: No dysuria, frequency, hematuria, or incontinence  NEUROLOGICAL: No headaches, memory loss, loss of strength, numbness, or tremors, "feels confused"  SKIN: No itching, burning, rashes, or lesions   LYMPH NODES: No enlarged glands  ENDOCRINE: No heat or cold intolerance; No hair loss  MUSCULOSKELETAL: No joint pain or swelling; No muscle, back, or extremity pain  PSYCHIATRIC: No depression, anxiety, mood swings, or difficulty sleeping  HEME/LYMPH: No easy bruising, or bleeding gums  ALLERGY AND IMMUNOLOGIC: No hives or eczema    Vital Signs Last 24 Hrs  T(C): 36.7, Max: 37.6 ( @ 11:52)  T(F): 98, Max: 99.6 ( @ 11:52)  HR: 67 (50 - 67)  BP: 150/77 (75/47 - 184/76)  BP(mean): --  RR: 16 (16 - 28)  SpO2: 98% (81% - 98%)    PHYSICAL EXAM:  GENERAL: NAD, alert, smiling  HEAD:  Atraumatic, Normocephalic  EYES: EOMI, PERRLA, conjunctiva and sclera clear  ENMT: No tonsillar erythema, exudates, or enlargement; Moist mucous membranes,   NECK: Supple, No JVD, Normal thyroid  NERVOUS SYSTEM:  Alert & Oriented X2,  Motor Strength 4/5 B/L upper and lower extremities; DTRs 2+ intact and symmetric  CHEST/LUNG: Clear to percussion bilaterally; No rales, rhonchi, wheezing, or rubs  HEART: Regular rate and rhythm; No murmurs, rubs, or gallops  ABDOMEN: Soft, Nontender, Nondistended; Bowel sounds present  EXTREMITIES:  2+ Peripheral Pulses, No clubbing, cyanosis, or edema  LYMPH: No lymphadenopathy noted  SKIN: No rashes or lesions    LABS:                        11.4   8.2   )-----------( 209      ( 2017 17:58 )             32.5     -    142  |  106  |  13  ----------------------------<  160<H>  3.5   |  26  |  1.02    Ca    7.5<L>      2017 14:05  Phos  3.3     -18  Mg     2.0         TPro  7.1  /  Alb  3.4  /  TBili  0.5  /  DBili  x   /  AST  23  /  ALT  21  /  AlkPhos  64  04-16    PT/INR - ( 2017 14:05 )   PT: 14.3 sec;   INR: 1.30 ratio         PTT - ( 2017 23:24 )  PTT:50.0 sec  Urinalysis Basic - ( 2017 20:22 )    Color: Yellow / Appearance: Clear / S.005 / pH: x  Gluc: x / Ketone: Negative  / Bili: Negative / Urobili: Negative mg/dL   Blood: x / Protein: 15 mg/dL / Nitrite: Negative   Leuk Esterase: Moderate / RBC: 6-10 /HPF / WBC >50   Sq Epi: x / Non Sq Epi: Few / Bacteria: Moderate      CAPILLARY BLOOD GLUCOSE  182 (2017 13:32)      RADIOLOGY & ADDITIONAL TESTS:    Imaging Personally Reviewed:  [x ] YES  [ ] NO    Consultant(s) Notes Reviewed:  [x ] YES  [ ] NO    Care Discussed with Consultants/Other Providers [x ] YES  [ ] NO

## 2017-04-18 NOTE — CONSULT NOTE ADULT - ASSESSMENT
GI bleed - probably lower GI bleed. Did the bleed cause the patient her unresponsiveness or is the causeof the bleed ischemia. ( patient w/o any abdominal tenderness ).  1) clear liquid diet  2) IV PPI  3) hold anticoagulation  4) GI bleed - probably lower GI bleed. Did the bleed cause the patient her unresponsiveness or is the causeof the bleed ischemia. ( patient w/o any abdominal tenderness ).  1) clear liquid diet  2) IV PPI  3) hold anticoagulation  4)  abd/pelvic CT scan ( evaluate for ischemia and watershed area )

## 2017-04-18 NOTE — CHART NOTE - NSCHARTNOTEFT_GEN_A_CORE
RRT called for being found unresponsive in bathroom on toilet.  According to PCT, pt was found sitting on toilet and not responding to commands.  Pt was brought to bed and was minimally responsive.  BP noted to be in SBP 70's.  1L NS bolus given with good response.    HR noted to be in 50's and pacing.  Pt slowly regained consciousness and able to follow commands.    RN noted bleeding in susy.  Dr. Ojeda examined pt and found blood in rectal vault.      Stat labs ordered, will endorse to hospitalist to follow up.  Recommend GI consult. RRT called for being found unresponsive in bathroom on toilet.  According to PCT, pt was found sitting on toilet and not responding to commands.  Pt was brought to bed and was minimally responsive.  BP noted to be in SBP 70's.  1L NS bolus given with good response.    HR noted to be in 50's and pacing.  Pt slowly regained consciousness and able to follow commands.    RN noted bleeding in susy.  Dr. Ojeda examined pt and found blood in rectal vault.      Stat labs ordered, will endorse to hospitalist to follow up.  Recommend GI consult.          AttendinF PMH Pulm HTN, non ischemic cardiomyopathy, s/p AICD placement, HTN, HLD, absence Sz, CVA, s/p  shunt presents to ER for AMS. pt noted by family with slurred speech, not acting herself, with questionable R facial droop. Family assumed pt had another seizure. But when symptoms did not improve, pt was brought into ER for evaluation. Code stroke activated in ER. Pt found to be hypertensive (246/198) and sent for CT head: No acute bleed. Admitted to ICU for hypertensive urgency. Placed briefly on cardene drip and transitioned to home oral antihypertensive with -160s. AMS and symtpoms resolved- suspect underlying hypertensive encephlopathy vs seizure with post ictal state and less likely CVA.    Pt today in AM noted to have blood mixed with clots when using bathroom. Per nursing staff report pt had 3 episodes total with presumed vaginal bleeding. Pt was found in the bathroom on toilet unresponsive. RRT called. Pt moved to bed. Noted to have blood on bed susy. Pt hypotensive SBP 70s which responded with 1L IVF bolus with -160s and pt becoming fully awake and alert.     Manual vaginal exam done without blood noted and no blood on gloves    rectal exam performed and blood noted on exam and gloves.    STAT CBC, coags, and type and screen performed    Spoke with hospitalist, recommend GI eval. pRBC transfusion prn. asa and heparin sc on hold    Total Critical Care Time: 35 min    DX: syncope and collapse, hypotension, acute GI bleed RRT called for being found unresponsive in bathroom on toilet.  According to PCT, pt was found sitting on toilet and not responding to commands.  Pt was brought to bed and was minimally responsive.  BP noted to be in SBP 70's.  1L NS bolus given with good response.    HR noted to be in 50's and pacing.  Pt slowly regained consciousness and able to follow commands.    RN noted bleeding in susy.  Dr. Ojeda examined pt and found blood in rectal vault.      Stat labs ordered, will endorse to hospitalist to follow up.  Recommend GI consult.          AttendinF PMH Pulm HTN, non ischemic cardiomyopathy, s/p AICD placement, HTN, HLD, absence Sz, CVA, s/p  shunt presents to ER for AMS. pt noted by family with slurred speech, not acting herself, with questionable R facial droop. Family assumed pt had another seizure. But when symptoms did not improve, pt was brought into ER for evaluation. Code stroke activated in ER. Pt found to be hypertensive (246/198) and sent for CT head: No acute bleed. Admitted to ICU for hypertensive urgency. Placed briefly on cardene drip and transitioned to home oral antihypertensive with -160s. AMS and symtpoms resolved- suspect underlying seizure with post ictal state +/- hypertensive encephalopathy and less likely CVA. EEG with spike wave concerning for sz and lamictal level noted to be subtherapeutic.     Pt today in AM noted to have blood mixed with clots when using bathroom. Per nursing staff report pt had 3 episodes total with presumed vaginal bleeding. Pt was found in the bathroom on toilet unresponsive. RRT called. Pt moved to bed. Noted to have blood on bed susy. Pt hypotensive SBP 70s which responded with 1L IVF bolus with -160s and pt becoming fully awake and alert.     Manual vaginal exam done without blood noted and no blood on gloves    rectal exam performed and blood noted on exam and gloves.    STAT CBC, coags, and type and screen performed    Spoke with hospitalist, recommend GI eval. pRBC transfusion prn. asa and heparin sc on hold    Total Critical Care Time: 35 min    DX: syncope and collapse, hypotension, acute GI bleed seizure disorder    - neuro follow up regarding antiepileptic management ? need for increase in dose of lamictal

## 2017-04-19 DIAGNOSIS — G93.40 ENCEPHALOPATHY, UNSPECIFIED: ICD-10-CM

## 2017-04-19 DIAGNOSIS — Z46.89 ENCOUNTER FOR FITTING AND ADJUSTMENT OF OTHER SPECIFIED DEVICES: ICD-10-CM

## 2017-04-19 DIAGNOSIS — I67.4 HYPERTENSIVE ENCEPHALOPATHY: ICD-10-CM

## 2017-04-19 DIAGNOSIS — K55.039 ACUTE (REVERSIBLE) ISCHEMIA OF LARGE INTESTINE, EXTENT UNSPECIFIED: ICD-10-CM

## 2017-04-19 LAB
ANION GAP SERPL CALC-SCNC: 7 MMOL/L — SIGNIFICANT CHANGE UP (ref 5–17)
BUN SERPL-MCNC: 9 MG/DL — SIGNIFICANT CHANGE UP (ref 7–23)
CALCIUM SERPL-MCNC: 7.9 MG/DL — LOW (ref 8.5–10.1)
CHLORIDE SERPL-SCNC: 105 MMOL/L — SIGNIFICANT CHANGE UP (ref 96–108)
CO2 SERPL-SCNC: 29 MMOL/L — SIGNIFICANT CHANGE UP (ref 22–31)
CREAT SERPL-MCNC: 0.87 MG/DL — SIGNIFICANT CHANGE UP (ref 0.5–1.3)
GLUCOSE SERPL-MCNC: 92 MG/DL — SIGNIFICANT CHANGE UP (ref 70–99)
HBA1C BLD-MCNC: 5.6 % — SIGNIFICANT CHANGE UP (ref 4–5.6)
HCT VFR BLD CALC: 29 % — LOW (ref 34.5–45)
HGB BLD-MCNC: 9.9 G/DL — LOW (ref 11.5–15.5)
MAGNESIUM SERPL-MCNC: 2 MG/DL — SIGNIFICANT CHANGE UP (ref 1.8–2.4)
MCHC RBC-ENTMCNC: 30.6 PG — SIGNIFICANT CHANGE UP (ref 27–34)
MCHC RBC-ENTMCNC: 34.1 GM/DL — SIGNIFICANT CHANGE UP (ref 32–36)
MCV RBC AUTO: 89.9 FL — SIGNIFICANT CHANGE UP (ref 80–100)
PHOSPHATE SERPL-MCNC: 3.1 MG/DL — SIGNIFICANT CHANGE UP (ref 2.5–4.5)
PLATELET # BLD AUTO: 209 K/UL — SIGNIFICANT CHANGE UP (ref 150–400)
POTASSIUM SERPL-MCNC: 3.5 MMOL/L — SIGNIFICANT CHANGE UP (ref 3.5–5.3)
POTASSIUM SERPL-SCNC: 3.5 MMOL/L — SIGNIFICANT CHANGE UP (ref 3.5–5.3)
RBC # BLD: 3.23 M/UL — LOW (ref 3.8–5.2)
RBC # FLD: 12.1 % — SIGNIFICANT CHANGE UP (ref 11–15)
SODIUM SERPL-SCNC: 141 MMOL/L — SIGNIFICANT CHANGE UP (ref 135–145)
TROPONIN I SERPL-MCNC: 0.03 NG/ML — SIGNIFICANT CHANGE UP (ref 0.01–0.04)
WBC # BLD: 7.6 K/UL — SIGNIFICANT CHANGE UP (ref 3.8–10.5)
WBC # FLD AUTO: 7.6 K/UL — SIGNIFICANT CHANGE UP (ref 3.8–10.5)

## 2017-04-19 PROCEDURE — 99233 SBSQ HOSP IP/OBS HIGH 50: CPT

## 2017-04-19 PROCEDURE — 93306 TTE W/DOPPLER COMPLETE: CPT | Mod: 26

## 2017-04-19 RX ORDER — PIPERACILLIN AND TAZOBACTAM 4; .5 G/20ML; G/20ML
3.38 INJECTION, POWDER, LYOPHILIZED, FOR SOLUTION INTRAVENOUS EVERY 8 HOURS
Qty: 0 | Refills: 0 | Status: DISCONTINUED | OUTPATIENT
Start: 2017-04-19 | End: 2017-04-23

## 2017-04-19 RX ORDER — PIPERACILLIN AND TAZOBACTAM 4; .5 G/20ML; G/20ML
2.25 INJECTION, POWDER, LYOPHILIZED, FOR SOLUTION INTRAVENOUS EVERY 6 HOURS
Qty: 0 | Refills: 0 | Status: DISCONTINUED | OUTPATIENT
Start: 2017-04-19 | End: 2017-04-19

## 2017-04-19 RX ORDER — PANTOPRAZOLE SODIUM 20 MG/1
40 TABLET, DELAYED RELEASE ORAL
Qty: 0 | Refills: 0 | Status: DISCONTINUED | OUTPATIENT
Start: 2017-04-19 | End: 2017-04-28

## 2017-04-19 RX ORDER — PIPERACILLIN AND TAZOBACTAM 4; .5 G/20ML; G/20ML
3.38 INJECTION, POWDER, LYOPHILIZED, FOR SOLUTION INTRAVENOUS ONCE
Qty: 0 | Refills: 0 | Status: COMPLETED | OUTPATIENT
Start: 2017-04-19 | End: 2017-04-19

## 2017-04-19 RX ADMIN — CARVEDILOL PHOSPHATE 25 MILLIGRAM(S): 80 CAPSULE, EXTENDED RELEASE ORAL at 18:34

## 2017-04-19 RX ADMIN — PIPERACILLIN AND TAZOBACTAM 200 GRAM(S): 4; .5 INJECTION, POWDER, LYOPHILIZED, FOR SOLUTION INTRAVENOUS at 12:30

## 2017-04-19 RX ADMIN — CARVEDILOL PHOSPHATE 25 MILLIGRAM(S): 80 CAPSULE, EXTENDED RELEASE ORAL at 05:56

## 2017-04-19 RX ADMIN — ATORVASTATIN CALCIUM 40 MILLIGRAM(S): 80 TABLET, FILM COATED ORAL at 22:33

## 2017-04-19 RX ADMIN — AMLODIPINE BESYLATE 10 MILLIGRAM(S): 2.5 TABLET ORAL at 05:56

## 2017-04-19 RX ADMIN — PIPERACILLIN AND TAZOBACTAM 25 GRAM(S): 4; .5 INJECTION, POWDER, LYOPHILIZED, FOR SOLUTION INTRAVENOUS at 22:33

## 2017-04-19 RX ADMIN — PIPERACILLIN AND TAZOBACTAM 25 GRAM(S): 4; .5 INJECTION, POWDER, LYOPHILIZED, FOR SOLUTION INTRAVENOUS at 15:14

## 2017-04-19 RX ADMIN — PANTOPRAZOLE SODIUM 10 MG/HR: 20 TABLET, DELAYED RELEASE ORAL at 02:38

## 2017-04-19 RX ADMIN — Medication 20 MILLIGRAM(S): at 05:56

## 2017-04-19 RX ADMIN — LOSARTAN POTASSIUM 50 MILLIGRAM(S): 100 TABLET, FILM COATED ORAL at 05:56

## 2017-04-19 RX ADMIN — LAMOTRIGINE 100 MILLIGRAM(S): 25 TABLET, ORALLY DISINTEGRATING ORAL at 15:16

## 2017-04-19 RX ADMIN — SODIUM CHLORIDE 50 MILLILITER(S): 9 INJECTION INTRAMUSCULAR; INTRAVENOUS; SUBCUTANEOUS at 14:57

## 2017-04-19 NOTE — PROGRESS NOTE ADULT - SUBJECTIVE AND OBJECTIVE BOX
INTERVAL HPI/OVERNIGHT EVENTS:  Subjective Complaints:  Offers no complaints.  Patient in 1E  talking with no trace of aphasia; responding and reacting quite appropriately; moving all extremities. Vital signs stable.  Currently on Keppra and Lamotrigine.    RECENT RADIOLOGY & ADDITIONAL TESTS:  EEG: Abnormal EEG with the presence of paroxysmal single spike wave complexes noted over the left frontotemporal regions consistent with focal epileptiform focus. Correlate clinically.    NEUROLOGICAL EXAM (Pertinent):  Vital Signs Last 24 Hrs  T(C): 36.6, Max: 36.7 (04-18 @ 14:00)  T(F): 97.9, Max: 98 (04-18 @ 14:00)  HR: 74 (50 - 74)  BP: 145/75 (75/47 - 160/78)  BP(mean): --  RR: 17 (16 - 28)  SpO2: 98% (81% - 100%)    MEDICATIONS  (STANDING):  lamoTRIgine 100milliGRAM(s) Oral daily  amLODIPine   Tablet 10milliGRAM(s) Oral daily  carvedilol 25milliGRAM(s) Oral every 12 hours  atorvastatin 40milliGRAM(s) Oral at bedtime  losartan 50milliGRAM(s) Oral daily  levETIRAcetam 500milliGRAM(s) Oral two times a day  furosemide    Tablet 20milliGRAM(s) Oral daily  pantoprazole Infusion 8mG/Hr IV Continuous <Continuous>  sodium chloride 0.9%. 1000milliLiter(s) IV Continuous <Continuous>  piperacillin/tazobactam IVPB. 3.375Gram(s) IV Intermittent once  piperacillin/tazobactam IVPB. 3.375Gram(s) IV Intermittent every 8 hours    MEDICATIONS  (PRN):  labetalol Injectable 10milliGRAM(s) IV Push every 6 hours PRN Systolic blood pressure >180    LABS:                        9.9    7.6   )-----------( 209      ( 19 Apr 2017 06:42 )             29.0     04-19    141  |  105  |  9   ----------------------------<  92  3.5   |  29  |  0.87    Ca    7.9<L>      19 Apr 2017 06:42  Phos  3.1     04-19  Mg     2.0     04-19      PT/INR - ( 18 Apr 2017 14:05 )   PT: 14.3 sec;   INR: 1.30 ratio         Assessment & Opinion:  Seizure Disorder, generalized convulsive and absence.  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 No CVA  Rec:  Continue Keppra and Lamotrigine as ordered.

## 2017-04-19 NOTE — PROGRESS NOTE ADULT - SUBJECTIVE AND OBJECTIVE BOX
Patient is a 86y old  Female who presents with a chief complaint of AMS, confusion (16 Apr 2017 21:34)      HPI:  87 y/o female with hx long-standing MR and severe pHTN, NICM s/p ICD, epilepsy s/p intracranial shunt, PPM, absent seizure, p/w sudden onset of confusion. Last seen normal was this afternoon. Around 450pm today, daughter found patient was confused and having slurred speech/aphasia. Daughter thought she was having absent seizure and did not call ambulance until 2-3 hours later. In the ED, SBP in 240s. ICU was called. tPA not given.     Seen at the bedside. Aphasic and noncomprehensable. SBP between 200-240. (16 Apr 2017 21:34)      INTERVAL HPI/OVERNIGHT EVENTS:  Patient had one small blood clot in the toilet bowel while urinating. The patient, otherwise, denies melena, hematemesis, nausea, vomiting, abdominal pain, constipation, diarrhea, or change in bowel movements     MEDICATIONS  (STANDING):  lamoTRIgine 100milliGRAM(s) Oral daily  amLODIPine   Tablet 10milliGRAM(s) Oral daily  carvedilol 25milliGRAM(s) Oral every 12 hours  atorvastatin 40milliGRAM(s) Oral at bedtime  losartan 50milliGRAM(s) Oral daily  levETIRAcetam 500milliGRAM(s) Oral two times a day  furosemide    Tablet 20milliGRAM(s) Oral daily  pantoprazole Infusion 8mG/Hr IV Continuous <Continuous>  sodium chloride 0.9%. 1000milliLiter(s) IV Continuous <Continuous>    MEDICATIONS  (PRN):  labetalol Injectable 10milliGRAM(s) IV Push every 6 hours PRN Systolic blood pressure >180      FAMILY HISTORY:      Allergies    No Known Allergies    Intolerances        PMH/PSH:  Pulmonary hypertension  Seizure  CHF (congestive heart failure)  High cholesterol  HTN (hypertension)  S/P  shunt        REVIEW OF SYSTEMS:  CONSTITUTIONAL: No fever, weight loss, or fatigue  EYES: No eye pain, visual disturbances, or discharge  ENMT:  No difficulty hearing, tinnitus, vertigo; No sinus or throat pain  NECK: No pain or stiffness  BREASTS: No pain, masses, or nipple discharge  RESPIRATORY: No cough, wheezing, chills or hemoptysis; No shortness of breath  CARDIOVASCULAR: No chest pain, palpitations, dizziness, or leg swelling  GASTROINTESTINAL: No abdominal or epigastric pain. No nausea, vomiting, or hematemesis; No diarrhea or constipation. No melena or hematochezia.  GENITOURINARY: No dysuria, frequency, hematuria, or incontinence  NEUROLOGICAL: No headaches, memory loss, loss of strength, numbness, or tremors  SKIN: No itching, burning, rashes, or lesions     Vital Signs Last 24 Hrs  T(C): 36.6, Max: 37.6 (04-18 @ 11:52)  T(F): 97.9, Max: 99.6 (04-18 @ 11:52)  HR: 74 (50 - 74)  BP: 145/75 (75/47 - 163/-)  BP(mean): --  RR: 17 (16 - 28)  SpO2: 98% (81% - 100%)    PHYSICAL EXAM:  GENERAL: NAD, well-groomed, well-developed  HEAD:  Atraumatic, Normocephalic  EYES: EOMI, PERRLA, conjunctiva and sclera clear  NECK: Supple, No JVD, Normal thyroid  NERVOUS SYSTEM:  Alert & Oriented X3, Good concentration;   CHEST/LUNG: Clear to percussion bilaterally; No rales, rhonchi, wheezing, or rubs  HEART: Regular rate and rhythm; No murmurs, rubs, or gallops  ABDOMEN: Soft, Nontender, Nondistended; Bowel sounds present  EXTREMITIES:  2+ Peripheral Pulses, No clubbing, cyanosis, or edema  LYMPH: No lymphadenopathy noted  SKIN: No rashes or lesions    LAB  04-16 @ 20:08  amylase --   lipase 88                           9.9    7.6   )-----------( 209      ( 19 Apr 2017 06:42 )             29.0       CBC:  04-19 @ 06:42  WBC 7.6   Hgb 9.9   Hct 29.0   Plts 209    04-18 @ 17:58  WBC 8.2   Hgb 11.4   Hct 32.5   Plts 209    04-18 @ 14:05  WBC 6.3   Hgb 10.0   Hct 29.0   Plts 180    04-18 @ 07:26  WBC 5.2   Hgb 12.1   Hct 35.2   Plts 206    04-17 @ 04:06  WBC 8.8   Hgb 11.2   Hct 32.9   Plts 221    04-16 @ 20:08  WBC 6.6   Hgb 12.2   Hct 35.3   Plts 229        Chemistry:  04-19 @ 06:42  Na+ 141  K+ 3.5  Cl- 105  CO2 29  BUN 9  Cr 0.87     04-18 @ 14:05  Na+ 142  K+ 3.5  Cl- 106  CO2 26  BUN 13  Cr 1.02     04-18 @ 07:26  Na+ 141  K+ 3.5  Cl- 103  CO2 31  BUN 10  Cr 0.86     04-17 @ 04:06  Na+ 142  K+ 3.7  Cl- 104  CO2 27  BUN 15  Cr 0.94     04-16 @ 20:08  Na+ 139  K+ 4.1  Cl- 101  CO2 29  BUN 20  Cr 1.25         Glucose, Serum: 92 mg/dL (04-19 @ 06:42)  Glucose, Serum: 160 mg/dL (04-18 @ 14:05)  Glucose, Serum: 87 mg/dL (04-18 @ 07:26)  Glucose, Serum: 103 mg/dL (04-17 @ 04:06)  Glucose, Serum: 123 mg/dL (04-16 @ 20:08)      19 Apr 2017 06:42    141    |  105    |  9      ----------------------------<  92     3.5     |  29     |  0.87   18 Apr 2017 14:05    142    |  106    |  13     ----------------------------<  160    3.5     |  26     |  1.02   18 Apr 2017 07:26    141    |  103    |  10     ----------------------------<  87     3.5     |  31     |  0.86   17 Apr 2017 04:06    142    |  104    |  15     ----------------------------<  103    3.7     |  27     |  0.94   16 Apr 2017 20:08    139    |  101    |  20     ----------------------------<  123    4.1     |  29     |  1.25     Ca    7.9        19 Apr 2017 06:42  Ca    7.5        18 Apr 2017 14:05  Ca    8.6        18 Apr 2017 07:26  Ca    8.2        17 Apr 2017 04:06  Ca    8.8        16 Apr 2017 20:08  Phos  3.1       19 Apr 2017 06:42  Phos  3.3       18 Apr 2017 07:26  Phos  2.7       17 Apr 2017 04:06  Mg     2.0       19 Apr 2017 06:42  Mg     2.0       18 Apr 2017 07:26  Mg     2.3       17 Apr 2017 04:06  Mg     2.4       16 Apr 2017 20:08    TPro  7.1    /  Alb  3.4    /  TBili  0.5    /  DBili  x      /  AST  23     /  ALT  21     /  AlkPhos  64     16 Apr 2017 20:08      PT/INR - ( 18 Apr 2017 14:05 )   PT: 14.3 sec;   INR: 1.30 ratio                 CAPILLARY BLOOD GLUCOSE  182 (18 Apr 2017 13:32)          RADIOLOGY & ADDITIONAL TESTS:      EXAM:  CT ABDOMEN AND PELVIS OC IC                            PROCEDURE DATE:  04/18/2017        INTERPRETATION:  CLINICAL INFORMATION: CVA with hypertensive emergency,   evaluate for ischemic bowel    COMPARISON: None    PROCEDURE:   CT of the Abdomen and Pelvis was performed with intravenous contrast.   Intravenous contrast: 96 ml Omnipaque 350. 4 ml discarded.  Oral contrast: positive contrast was administered.  Sagittal and coronal reformats were performed.    FINDINGS:    LOWER CHEST: There are 5 patchy/nodular opacities within the left lower   lobe. The heart is enlarged and cardiac pacing wires are partially   visualized.    LIVER: There are scattered subcentimeter hypodensities within the liver,   too small to characterize.  BILE DUCTS: Normal caliber.  GALLBLADDER: Within normal limits.  SPLEEN: Within normal limits.  PANCREAS: Within normal limits.  ADRENALS: Nonspecific bilateral adrenal gland thickening  KIDNEYS/URETERS: There is a right-sided double-J ureteral stent with mild   right hydronephrosis.. There are large bilateral renal cysts as well as   additional subcentimeter hypodensities which are too small to   characterize. The largest is in the upper pole of the left kidney   measuring 8.3 cm.    BLADDER: Mildly distended. Distal end of the left-sided double-J ureteral   stent is seen within the bladder.  REPRODUCTIVE ORGANS: Fibroid uterus. Pessary within the vagina.    BOWEL: No bowel obstruction. Short segment wall thickening involving the   distal descending/proximal sigmoid colon. The appendix is normal.  PERITONEUM: No ascites.  VESSELS:  Marked atherosclerotic changes of the aorta and branching   vessels  RETROPERITONEUM: No lymphadenopathy.    ABDOMINAL WALL: Within normal limits.  BONES: Degenerative changes of the lower lumbar spine with mild   levoscoliosis.    IMPRESSION:     Short segment wall thickening involving the distal descending/proximal   sigmoid colon compatible with colitis which may be ischemic, infectious   or inflammatory in etiology. Correlation with colonoscopy is also   recommended to exclude underlying neoplasm.    Right-sided double-J ureteral stent with mild right   hydroureteronephrosis. Large bilateral renal cysts.    Additional incidental findings as above.                  LOBO MORALES M.D., ATTENDING RADIOLOGIST  This document has been electronically signed. Apr 19 2017  9:52AM              Imaging Personally Reviewed:  [ ] YES  [ ] NO    Consultant(s) Notes Reviewed:  [ ] YES  [ ] NO    Care Discussed with Consultants/Other Providers [ ] YES  [ ] NO

## 2017-04-19 NOTE — PROGRESS NOTE ADULT - PROBLEM SELECTOR PLAN 1
Patient passed a small blood clot while urinating. Lactate 2.7 [id number]. CT Scan shows wall thickening of descending/sigmoid colon that could be consistent with ischemic colitis.  - Cardiac w/u in progress. MUST R/O ischemic colitis.  -- Clear liquid diet, CRP, f/u WBC and broad spectrum antibiotics. Will hold off on any invasive GI testing at present time.

## 2017-04-19 NOTE — PROGRESS NOTE ADULT - PROBLEM SELECTOR PLAN 1
-likely 2/2 to hypertensive encephalopathy vs seizure activity  - EEG= Abnormal EEG with the presence of paroxysmal single spike   wave complexes noted over the left frontotemporal regions consistent with   focal epileptiform focus.   - Mental status at baseline as per family(daughter at bedside)

## 2017-04-19 NOTE — PROGRESS NOTE ADULT - SUBJECTIVE AND OBJECTIVE BOX
Patient is a 86y old  Female who presents with a chief complaint of AMS, confusion (16 Apr 2017 21:34)       OVERNIGHT EVENTS:  Keppra levels elevated    MEDICATIONS  (STANDING):  lamoTRIgine 100milliGRAM(s) Oral daily  amLODIPine   Tablet 10milliGRAM(s) Oral daily  carvedilol 25milliGRAM(s) Oral every 12 hours  atorvastatin 40milliGRAM(s) Oral at bedtime  losartan 50milliGRAM(s) Oral daily  furosemide    Tablet 20milliGRAM(s) Oral daily  sodium chloride 0.9%. 1000milliLiter(s) IV Continuous <Continuous>  piperacillin/tazobactam IVPB. 3.375Gram(s) IV Intermittent every 8 hours  pantoprazole    Tablet 40milliGRAM(s) Oral before breakfast    MEDICATIONS  (PRN):  labetalol Injectable 10milliGRAM(s) IV Push every 6 hours PRN Systolic blood pressure >180       Vital Signs Last 24 Hrs  T(C): 37.5, Max: 37.5 (04-19 @ 17:54)  T(F): 99.5, Max: 99.5 (04-19 @ 17:54)  HR: 67 (66 - 74)  BP: 132/72 (132/72 - 147/77)  BP(mean): --  RR: 17 (16 - 17)  SpO2: 98% (96% - 100%)    PHYSICAL EXAM:  GENERAL: NAD, well-groomed, well-developed, sitting up brushing teeth  HEAD:  Atraumatic, Normocephalic  EYES: EOMI, PERRLA, conjunctiva and sclera clear  ENMT: No tonsillar erythema, exudates, or enlargement; Moist mucous membranes   NERVOUS SYSTEM:  Alert & Oriented X2, confused at times  CHEST/LUNG: Clear to percussion bilaterally; No rales, rhonchi, wheezing, or rubs  HEART: Regular rate and rhythm; No murmurs, rubs, or gallops  ABDOMEN: Soft, Nontender, Nondistended; Bowel sounds present  EXTREMITIES:  2+ Peripheral Pulses, No clubbing, cyanosis, or edema      LABS:                        9.9    7.6   )-----------( 209      ( 19 Apr 2017 06:42 )             29.0     04-19    141  |  105  |  9   ----------------------------<  92  3.5   |  29  |  0.87    Ca    7.9<L>      19 Apr 2017 06:42  Phos  3.1     04-19  Mg     2.0     04-19      PT/INR - ( 18 Apr 2017 14:05 )   PT: 14.3 sec;   INR: 1.30 ratio            cardiac markers Troponin .025  CK --      CAPILLARY BLOOD GLUCOSE    Cultures    RADIOLOGY & ADDITIONAL TESTS:    Imaging Personally Reviewed:  [x ] YES  [ ] NO    Consultant(s) Notes Reviewed:  [x ] YES  [ ] NO    Care Discussed with Consultants/Other Providers [x ] YES  [ ] NO

## 2017-04-19 NOTE — CHART NOTE - NSCHARTNOTEFT_GEN_A_CORE
Medicine Hospitalist PA    Was notified pt. had wide QRS, similar to bundle branch block as per Tele tech. Pt was seen and examined at bedside, laying comfortably. Pt has a pacemaker. Pt states she feels fine. Denies cp, palpitations, sob, dizziness, headache, N/V. VSS    VS:  BP: 138/73  HR: 70  O2: 97  RR: 17    General: alert, awake and oriented x 3  CV: S1 S2+, pacemaker   Resp: CTA b/l  Abd: soft, NT/NS, BS+    A/P: 86 YOF admitted with CVA and HTN emergency hx of seizures now with cardiac arrythmia. Keppra level elevated 83.2 possible drug side effect?   - EKG  - F/U labs + Mg/Phos  - F/U Keppra and Lamictal levels  - Hold morning dose of Keppra until further recommendations   - Will notify Dr. Gomes of Keppra levels  - Continue to monitor

## 2017-04-20 LAB
ANION GAP SERPL CALC-SCNC: 10 MMOL/L — SIGNIFICANT CHANGE UP (ref 5–17)
BUN SERPL-MCNC: 7 MG/DL — SIGNIFICANT CHANGE UP (ref 7–23)
CALCIUM SERPL-MCNC: 8.2 MG/DL — LOW (ref 8.5–10.1)
CHLORIDE SERPL-SCNC: 103 MMOL/L — SIGNIFICANT CHANGE UP (ref 96–108)
CO2 SERPL-SCNC: 29 MMOL/L — SIGNIFICANT CHANGE UP (ref 22–31)
CREAT SERPL-MCNC: 0.92 MG/DL — SIGNIFICANT CHANGE UP (ref 0.5–1.3)
CRP SERPL-MCNC: 4.4 MG/DL — HIGH (ref 0–0.4)
GLUCOSE SERPL-MCNC: 96 MG/DL — SIGNIFICANT CHANGE UP (ref 70–99)
HCT VFR BLD CALC: 28.9 % — LOW (ref 34.5–45)
HGB BLD-MCNC: 10.2 G/DL — LOW (ref 11.5–15.5)
LACTATE SERPL-SCNC: 1 MMOL/L — SIGNIFICANT CHANGE UP (ref 0.7–2)
LAMOTRIGINE SERPL-MCNC: 3.1 MCG/ML — SIGNIFICANT CHANGE UP (ref 2.5–15)
LEVETIRACETAM SERPL-MCNC: 14 MCG/ML — SIGNIFICANT CHANGE UP (ref 12–46)
MCHC RBC-ENTMCNC: 31.3 PG — SIGNIFICANT CHANGE UP (ref 27–34)
MCHC RBC-ENTMCNC: 35.4 GM/DL — SIGNIFICANT CHANGE UP (ref 32–36)
MCV RBC AUTO: 88.4 FL — SIGNIFICANT CHANGE UP (ref 80–100)
PLATELET # BLD AUTO: 196 K/UL — SIGNIFICANT CHANGE UP (ref 150–400)
POTASSIUM SERPL-MCNC: 3.3 MMOL/L — LOW (ref 3.5–5.3)
POTASSIUM SERPL-SCNC: 3.3 MMOL/L — LOW (ref 3.5–5.3)
RBC # BLD: 3.27 M/UL — LOW (ref 3.8–5.2)
RBC # FLD: 13.2 % — SIGNIFICANT CHANGE UP (ref 11–15)
SODIUM SERPL-SCNC: 142 MMOL/L — SIGNIFICANT CHANGE UP (ref 135–145)
WBC # BLD: 5.7 K/UL — SIGNIFICANT CHANGE UP (ref 3.8–10.5)
WBC # FLD AUTO: 5.7 K/UL — SIGNIFICANT CHANGE UP (ref 3.8–10.5)

## 2017-04-20 PROCEDURE — 99233 SBSQ HOSP IP/OBS HIGH 50: CPT

## 2017-04-20 RX ORDER — POTASSIUM CHLORIDE 20 MEQ
40 PACKET (EA) ORAL ONCE
Qty: 0 | Refills: 0 | Status: COMPLETED | OUTPATIENT
Start: 2017-04-20 | End: 2017-04-20

## 2017-04-20 RX ADMIN — PIPERACILLIN AND TAZOBACTAM 25 GRAM(S): 4; .5 INJECTION, POWDER, LYOPHILIZED, FOR SOLUTION INTRAVENOUS at 21:34

## 2017-04-20 RX ADMIN — CARVEDILOL PHOSPHATE 25 MILLIGRAM(S): 80 CAPSULE, EXTENDED RELEASE ORAL at 06:32

## 2017-04-20 RX ADMIN — CARVEDILOL PHOSPHATE 25 MILLIGRAM(S): 80 CAPSULE, EXTENDED RELEASE ORAL at 18:20

## 2017-04-20 RX ADMIN — SODIUM CHLORIDE 50 MILLILITER(S): 9 INJECTION INTRAMUSCULAR; INTRAVENOUS; SUBCUTANEOUS at 23:55

## 2017-04-20 RX ADMIN — LAMOTRIGINE 100 MILLIGRAM(S): 25 TABLET, ORALLY DISINTEGRATING ORAL at 11:29

## 2017-04-20 RX ADMIN — Medication 20 MILLIGRAM(S): at 06:32

## 2017-04-20 RX ADMIN — AMLODIPINE BESYLATE 10 MILLIGRAM(S): 2.5 TABLET ORAL at 06:32

## 2017-04-20 RX ADMIN — PIPERACILLIN AND TAZOBACTAM 25 GRAM(S): 4; .5 INJECTION, POWDER, LYOPHILIZED, FOR SOLUTION INTRAVENOUS at 13:46

## 2017-04-20 RX ADMIN — ATORVASTATIN CALCIUM 40 MILLIGRAM(S): 80 TABLET, FILM COATED ORAL at 21:33

## 2017-04-20 RX ADMIN — SODIUM CHLORIDE 50 MILLILITER(S): 9 INJECTION INTRAMUSCULAR; INTRAVENOUS; SUBCUTANEOUS at 06:31

## 2017-04-20 RX ADMIN — LOSARTAN POTASSIUM 50 MILLIGRAM(S): 100 TABLET, FILM COATED ORAL at 06:32

## 2017-04-20 RX ADMIN — PANTOPRAZOLE SODIUM 40 MILLIGRAM(S): 20 TABLET, DELAYED RELEASE ORAL at 08:08

## 2017-04-20 RX ADMIN — Medication 40 MILLIEQUIVALENT(S): at 21:34

## 2017-04-20 RX ADMIN — PIPERACILLIN AND TAZOBACTAM 25 GRAM(S): 4; .5 INJECTION, POWDER, LYOPHILIZED, FOR SOLUTION INTRAVENOUS at 06:31

## 2017-04-20 NOTE — DISCHARGE NOTE ADULT - PATIENT PORTAL LINK FT
“You can access the [employer] Patient Portal, offered by [facility], by registering with the following website: [email or web address]”

## 2017-04-20 NOTE — DISCHARGE NOTE ADULT - NSTOBACCOHOTLINE_GEN_A_CS
[facility] Smokers Quitline ([id number]) [facility] Smokers Quitline ([id number]) [facility] Smokers Quitline ([id number])

## 2017-04-20 NOTE — DISCHARGE NOTE ADULT - PLAN OF CARE
hold off any BP meds, follow up with  as outpatient within 1 week  follow up with  within one week continue with current meds, prescriptions sent resolved monitor BP well, f/u your PMD  within 1-2 weeks continue with keppra, lamotrigine, prescriptions sent

## 2017-04-20 NOTE — DISCHARGE NOTE ADULT - CARE PLAN
Principal Discharge DX:	Acute encephalopathy  Goal:	resolved  Instructions for follow-up, activity and diet:	monitor BP well, f/u your PMD  within 1-2 weeks  Secondary Diagnosis:	Hypertensive encephalopathy  Instructions for follow-up, activity and diet:	monitor BP well, f/u your PMD  within 1-2 weeks  Secondary Diagnosis:	Hypertensive emergency  Instructions for follow-up, activity and diet:	monitor BP well, f/u your PMD  within 1-2 weeks  Secondary Diagnosis:	Acute ischemic colitis  Instructions for follow-up, activity and diet:	resolved  Secondary Diagnosis:	Seizure  Instructions for follow-up, activity and diet:	continue with current meds, prescriptions sent  Secondary Diagnosis:	Hypotension, unspecified hypotension type  Instructions for follow-up, activity and diet:	hold off any BP meds, follow up with  as outpatient within 1 week  follow up with  within one week Principal Discharge DX:	Acute encephalopathy  Goal:	resolved  Instructions for follow-up, activity and diet:	monitor BP well, f/u your PMD  within 1-2 weeks  Secondary Diagnosis:	Hypertensive encephalopathy  Instructions for follow-up, activity and diet:	monitor BP well, f/u your PMD  within 1-2 weeks  Secondary Diagnosis:	Hypertensive emergency  Instructions for follow-up, activity and diet:	monitor BP well, f/u your PMD  within 1-2 weeks  Secondary Diagnosis:	Acute ischemic colitis  Instructions for follow-up, activity and diet:	resolved  Secondary Diagnosis:	Seizure  Instructions for follow-up, activity and diet:	continue with keppra, lamotrigine, prescriptions sent  Secondary Diagnosis:	Hypotension, unspecified hypotension type  Instructions for follow-up, activity and diet:	hold off any BP meds, follow up with  as outpatient within 1 week  follow up with  within one week

## 2017-04-20 NOTE — DISCHARGE NOTE ADULT - PROVIDER TOKENS
TOKEN:'[id number]',FREE:[LAST:[your],FIRST:[pmd],PHONE:[(   )    -],FAX:[(   )    -]],TOKEN:'[id number]' TOKEN:'[id number]',TOKEN:'[id number]',FREE:[[facility]PHONE:[(   )    -],FAX:[(   )    -]],TOKEN:'[id number]'

## 2017-04-20 NOTE — PROGRESS NOTE ADULT - PROBLEM SELECTOR PLAN 1
Patient passed a small blood clot while urinating. Lactate 2.7 [id number]. CT Scan shows wall thickening of descending/sigmoid colon that could be consistent with ischemic colitis.  - Cardiac w/u in progress. MUST R/O ischemic colitis.  -- Clear liquid diet, CRP, f/u WBC and broad spectrum antibiotics. Will hold off on any invasive GI testing at present time. Patient without any GI symptoms. Tolerating liquids. Lactate now 1.0. CRP  4.40 CT Scan shows wall thickening of descending/sigmoid colon that could be consistent with ischemic colitis.  - Must R/O ischemic colitis.  -- Full liquid diet, f/u CRP, f/u WBC and broad spectrum antibiotics. Will hold off on any invasive GI testing at present time.

## 2017-04-20 NOTE — DISCHARGE NOTE ADULT - MEDICATION SUMMARY - MEDICATIONS TO STOP TAKING
I will STOP taking the medications listed below when I get home from the hospital:    furosemide 20 mg oral tablet  --  by mouth    losartan 50 mg oral tablet  -- 1 tab(s) by mouth once a day    amLODIPine 10 mg oral tablet  -- 1 tab(s) by mouth once a day    carvedilol 25 mg oral tablet  -- 1 tab(s) by mouth 2 times a day

## 2017-04-20 NOTE — PROGRESS NOTE ADULT - SUBJECTIVE AND OBJECTIVE BOX
Patient is a 86y old  Female who presents with a chief complaint of AMS, confusion (20 Apr 2017 11:02)       OVERNIGHT EVENTS: no reported events    MEDICATIONS  (STANDING):  lamoTRIgine 100milliGRAM(s) Oral daily  amLODIPine   Tablet 10milliGRAM(s) Oral daily  carvedilol 25milliGRAM(s) Oral every 12 hours  atorvastatin 40milliGRAM(s) Oral at bedtime  losartan 50milliGRAM(s) Oral daily  furosemide    Tablet 20milliGRAM(s) Oral daily  sodium chloride 0.9%. 1000milliLiter(s) IV Continuous <Continuous>  piperacillin/tazobactam IVPB. 3.375Gram(s) IV Intermittent every 8 hours  pantoprazole    Tablet 40milliGRAM(s) Oral before breakfast    MEDICATIONS  (PRN):  labetalol Injectable 10milliGRAM(s) IV Push every 6 hours PRN Systolic blood pressure >180       Vital Signs Last 24 Hrs  T(C): 37, Max: 37.5 (04-19 @ 17:54)  T(F): 98.6, Max: 99.5 (04-19 @ 17:54)  HR: 80 (64 - 80)  BP: 117/70 (117/70 - 139/71)  BP(mean): --  RR: 16 (16 - 17)  SpO2: 98% (96% - 98%)        PHYSICAL EXAM:  GENERAL: NAD, well-groomed, well-developed, sitting up in bed  HEAD:  Atraumatic, Normocephalic  EYES: EOMI, PERRLA, conjunctiva and sclera clear  ENMT: No tonsillar erythema, exudates, or enlargement; Moist mucous membranes   NERVOUS SYSTEM:  Alert & Oriented X2, confused at times  CHEST/LUNG: Clear to percussion bilaterally; No rales, rhonchi, wheezing, or rubs  HEART: Regular rate and rhythm; No murmurs, rubs, or gallops  ABDOMEN: Soft, Nontender, Nondistended; Bowel sounds present  EXTREMITIES:  2+ Peripheral Pulses, No clubbing, cyanosis, or edema    LABS:                        10.2   5.7   )-----------( 196      ( 20 Apr 2017 06:11 )             28.9     04-20    142  |  103  |  7   ----------------------------<  96  3.3<L>   |  29  |  0.92    Ca    8.2<L>      20 Apr 2017 06:11  Phos  3.1     04-19  Mg     2.0     04-19         cardiac markers     CAPILLARY BLOOD GLUCOSE    Cultures    RADIOLOGY & ADDITIONAL TESTS:    Imaging Personally Reviewed:  [x ] YES  [ ] NO    Consultant(s) Notes Reviewed:  [x ] YES  [ ] NO    Care Discussed with Consultants/Other Providers [x ] YES  [ ] NO

## 2017-04-20 NOTE — DISCHARGE NOTE ADULT - MEDICATION SUMMARY - MEDICATIONS TO TAKE
I will START or STAY ON the medications listed below when I get home from the hospital:    lamoTRIgine 100 mg oral tablet  -- 1 tab(s) by mouth once a day  -- Indication: For Seizure    levETIRAcetam 1000 mg oral tablet  -- 1 tab(s) by mouth 2 times a day  -- Indication: For Seizure    Crestor 10 mg oral tablet  --  by mouth once a day (at bedtime)  -- Indication: For HLD I will START or STAY ON the medications listed below when I get home from the hospital:    lamoTRIgine 100 mg oral tablet  -- 1 tab(s) by mouth once a day  -- Indication: For Seizure    levETIRAcetam 1000 mg oral tablet  -- 1 tab(s) by mouth 2 times a day  -- Indication: For Seizure    Crestor 10 mg oral tablet  -- 1 tab(s) by mouth once a day (at bedtime)  -- Indication: For Hld I will START or STAY ON the medications listed below when I get home from the hospital:    physical therapy  -- please assess and treat  2 - 3 x per week  -- Indication: For unsteady gait    lamoTRIgine 100 mg oral tablet  -- 1 tab(s) by mouth once a day  -- Indication: For Seizure    levETIRAcetam 1000 mg oral tablet  -- 1 tab(s) by mouth 2 times a day  -- Indication: For Seizure    Crestor 10 mg oral tablet  -- 1 tab(s) by mouth once a day (at bedtime)  -- Indication: For Hld    Glydo 2% topical gel with applicator  -- Apply on skin to affected area 2 times a day  -- For external use only.    -- Indication: For Pain

## 2017-04-20 NOTE — PROGRESS NOTE ADULT - SUBJECTIVE AND OBJECTIVE BOX
Patient is a 86y old  Female who presents with a chief complaint of AMS, confusion (20 Apr 2017 11:02)      HPI:  87 y/o female with hx long-standing MR and severe pHTN, NICM s/p ICD, epilepsy s/p intracranial shunt, PPM, absent seizure, p/w sudden onset of confusion. Last seen normal was this afternoon. Around 450pm today, daughter found patient was confused and having slurred speech/aphasia. Daughter thought she was having absent seizure and did not call ambulance until 2-3 hours later. In the ED, SBP in 240s. ICU was called. tPA not given.     Seen at the bedside. Aphasic and noncomprehensable. SBP between 200-240. (16 Apr 2017 21:34)      INTERVAL HPI/OVERNIGHT EVENTS:  The patient denies melena, hematochezia, hematemesis, nausea, vomiting, abdominal pain, constipation, diarrhea, or change in bowel movements     MEDICATIONS  (STANDING):  lamoTRIgine 100milliGRAM(s) Oral daily  amLODIPine   Tablet 10milliGRAM(s) Oral daily  carvedilol 25milliGRAM(s) Oral every 12 hours  atorvastatin 40milliGRAM(s) Oral at bedtime  losartan 50milliGRAM(s) Oral daily  furosemide    Tablet 20milliGRAM(s) Oral daily  sodium chloride 0.9%. 1000milliLiter(s) IV Continuous <Continuous>  piperacillin/tazobactam IVPB. 3.375Gram(s) IV Intermittent every 8 hours  pantoprazole    Tablet 40milliGRAM(s) Oral before breakfast    MEDICATIONS  (PRN):  labetalol Injectable 10milliGRAM(s) IV Push every 6 hours PRN Systolic blood pressure >180      FAMILY HISTORY:      Allergies    No Known Allergies    Intolerances        PMH/PSH:  Pulmonary hypertension  Seizure  CHF (congestive heart failure)  High cholesterol  HTN (hypertension)  S/P  shunt        REVIEW OF SYSTEMS:  CONSTITUTIONAL: No fever, weight loss, or fatigue  EYES: No eye pain, visual disturbances, or discharge  ENMT:  No difficulty hearing, tinnitus, vertigo; No sinus or throat pain  NECK: No pain or stiffness  BREASTS: No pain, masses, or nipple discharge  RESPIRATORY: No cough, wheezing, chills or hemoptysis; No shortness of breath  CARDIOVASCULAR: No chest pain, palpitations, dizziness, or leg swelling  GASTROINTESTINAL: No abdominal or epigastric pain. No nausea, vomiting, or hematemesis; No diarrhea or constipation. No melena or hematochezia.  GENITOURINARY: No dysuria, frequency, hematuria, or incontinence  NEUROLOGICAL: No headaches, memory loss, loss of strength, numbness, or tremors  SKIN: No itching, burning, rashes, or lesions       Vital Signs Last 24 Hrs  T(C): 37, Max: 37.5 (04-19 @ 17:54)  T(F): 98.6, Max: 99.5 (04-19 @ 17:54)  HR: 80 (64 - 80)  BP: 117/70 (117/70 - 139/71)  BP(mean): --  RR: 16 (16 - 17)  SpO2: 98% (96% - 98%)    PHYSICAL EXAM:  GENERAL: NAD, well-groomed, well-developed  HEAD:  Atraumatic, Normocephalic  EYES: EOMI, PERRLA, conjunctiva and sclera clear  NECK: Supple, No JVD, Normal thyroid  NERVOUS SYSTEM:  Alert & Oriented X2, Good concentration;   CHEST/LUNG: Clear to percussion bilaterally; No rales, rhonchi, wheezing, or rubs  HEART: Regular rate and rhythm; No murmurs, rubs, or gallops  ABDOMEN: Soft, Nontender, Nondistended; Bowel sounds present  EXTREMITIES:  2+ Peripheral Pulses, No clubbing, cyanosis, or edema  LYMPH: No lymphadenopathy noted  SKIN: No rashes or lesions    LAB  04-16 @ 20:08  amylase --   lipase 88                           10.2   5.7   )-----------( 196      ( 20 Apr 2017 06:11 )             28.9       CBC:  04-20 @ 06:11  WBC 5.7   Hgb 10.2   Hct 28.9   Plts 196    04-19 @ 06:42  WBC 7.6   Hgb 9.9   Hct 29.0   Plts 209    04-18 @ 17:58  WBC 8.2   Hgb 11.4   Hct 32.5   Plts 209    04-18 @ 14:05  WBC 6.3   Hgb 10.0   Hct 29.0   Plts 180    04-18 @ 07:26  WBC 5.2   Hgb 12.1   Hct 35.2   Plts 206    04-17 @ 04:06  WBC 8.8   Hgb 11.2   Hct 32.9   Plts 221    04-16 @ 20:08  WBC 6.6   Hgb 12.2   Hct 35.3   Plts 229        Chemistry:  04-20 @ 06:11  Na+ 142  K+ 3.3  Cl- 103  CO2 29  BUN 7  Cr 0.92     04-19 @ 06:42  Na+ 141  K+ 3.5  Cl- 105  CO2 29  BUN 9  Cr 0.87     04-18 @ 14:05  Na+ 142  K+ 3.5  Cl- 106  CO2 26  BUN 13  Cr 1.02     04-18 @ 07:26  Na+ 141  K+ 3.5  Cl- 103  CO2 31  BUN 10  Cr 0.86     04-17 @ 04:06  Na+ 142  K+ 3.7  Cl- 104  CO2 27  BUN 15  Cr 0.94     04-16 @ 20:08  Na+ 139  K+ 4.1  Cl- 101  CO2 29  BUN 20  Cr 1.25         Glucose, Serum: 96 mg/dL (04-20 @ 06:11)  Glucose, Serum: 92 mg/dL (04-19 @ 06:42)  Glucose, Serum: 160 mg/dL (04-18 @ 14:05)  Glucose, Serum: 87 mg/dL (04-18 @ 07:26)  Glucose, Serum: 103 mg/dL (04-17 @ 04:06)  Glucose, Serum: 123 mg/dL (04-16 @ 20:08)      20 Apr 2017 06:11    142    |  103    |  7      ----------------------------<  96     3.3     |  29     |  0.92   19 Apr 2017 06:42    141    |  105    |  9      ----------------------------<  92     3.5     |  29     |  0.87   18 Apr 2017 14:05    142    |  106    |  13     ----------------------------<  160    3.5     |  26     |  1.02   18 Apr 2017 07:26    141    |  103    |  10     ----------------------------<  87     3.5     |  31     |  0.86   17 Apr 2017 04:06    142    |  104    |  15     ----------------------------<  103    3.7     |  27     |  0.94   16 Apr 2017 20:08    139    |  101    |  20     ----------------------------<  123    4.1     |  29     |  1.25     Ca    8.2        20 Apr 2017 06:11  Ca    7.9        19 Apr 2017 06:42  Ca    7.5        18 Apr 2017 14:05  Ca    8.6        18 Apr 2017 07:26  Ca    8.2        17 Apr 2017 04:06  Ca    8.8        16 Apr 2017 20:08  Phos  3.1       19 Apr 2017 06:42  Phos  3.3       18 Apr 2017 07:26  Phos  2.7       17 Apr 2017 04:06  Mg     2.0       19 Apr 2017 06:42  Mg     2.0       18 Apr 2017 07:26  Mg     2.3       17 Apr 2017 04:06  Mg     2.4       16 Apr 2017 20:08    TPro  7.1    /  Alb  3.4    /  TBili  0.5    /  DBili  x      /  AST  23     /  ALT  21     /  AlkPhos  64     16 Apr 2017 20:08      PT/INR - ( 18 Apr 2017 14:05 )   PT: 14.3 sec;   INR: 1.30 ratio                 CAPILLARY BLOOD GLUCOSE      C-Reactive Protein, Serum: 4.40 mg/dL (04-20 @ 08:02)      RADIOLOGY & ADDITIONAL TESTS:    Imaging Personally Reviewed:  [ ] YES  [ ] NO    Consultant(s) Notes Reviewed:  [ ] YES  [ ] NO    Care Discussed with Consultants/Other Providers [ ] YES  [ ] NO

## 2017-04-20 NOTE — PROGRESS NOTE ADULT - PROBLEM SELECTOR PLAN 1
-likely 2/2 to hypertensive encephalopathy vs seizure activity  - EEG= Abnormal EEG with the presence of paroxysmal single spike   wave complexes noted over the left frontotemporal regions consistent with   focal epileptiform focus.   - Mental status at baseline as per family

## 2017-04-20 NOTE — DISCHARGE NOTE ADULT - CARE PROVIDERS DIRECT ADDRESSES
,[email or web address],DirectAddress_Unknown,DirectAddress_Unknown,DirectAddress_Unknown ,[email or web address],DirectAddress_Unknown,DirectAddress_Unknown,DirectAddress_Unknown,DirectAddress_Unknown

## 2017-04-20 NOTE — DISCHARGE NOTE ADULT - SECONDARY DIAGNOSIS.
Hypertensive encephalopathy Hypertensive emergency Acute ischemic colitis Seizure Hypotension, unspecified hypotension type

## 2017-04-20 NOTE — DISCHARGE NOTE ADULT - HOSPITAL COURSE
85 y/o female with hx long-standing MR and severe pHTN, NICM s/p ICD, epilepsy s/p intracranial shunt, PPM, absent seizure, p/w sudden onset of confusion.  daughter found patient was confused and having slurred speech/aphasia. Daughter thought she was having absent seizure and did not call ambulance until 2-3 hours later. In the ED, SBP in 240s. Admitted to ICU for r/o acute CVA, outside the tPA window requiring critical care neurochecks / monitoring and for hypertensive urgency on cardene drip, stabilized and now transfered to medical floor   Acute encephalopathy.   -likely 2/2 to hypertensive encephalopathy vs seizure activity  - EEG= Abnormal EEG with the presence of paroxysmal single spike   wave complexes noted over the left frontotemporal regions consistent with   focal epileptiform focus.   - Mental status at baseline as per family.     - Orthostatic hypotension.   -Pt ambulating with PT , felt pale , dizzy, found BP in 60s/30s  - given  IVF, held BP meds,  TTE done   - cardiology  consulted.     Seizure.  Plan: - restarted keppra    - c/w Lamotrigine   - - EEG= Abnormal EEG with the presence of paroxysmal single spike   wave complexes noted over the left frontotemporal regions consistent with   focal epileptiform focus.   - discussed with .    : Acute ischemic colitis.   - likely 2/2 to hypertensive crisis  - advanced   diet, tolerating well , no abdominal pain  - on zosyn IV   - no plans for GI intervention at this point   - discussed with .   Discussed with family -daughter leon and son in law Lm in extended length 4 times today in person and over the phone and answered all Qs. Entire hospital course explained in detail; again and asked to hold off all BP meds  untill seen by PMD or cardiologist Dr.DEl barrios. PT eval done, ambulating well, pt's family upset about Pt eval . explained again in detail.  called  [phone] and was told he is no working this week and will be back next week and no one totake msg. they requested that family should call to make appt and as she is admitted in hospital she will be given appt within 1-2 weeks. told family that same and ask them to call  . Spoke to family along with nursing maxime madsen, case [place] Pat at bedside . discussed all the above in detail 85 y/o female with hx long-standing MR and severe pHTN, NICM s/p ICD, epilepsy s/p intracranial shunt, PPM, absent seizure, p/w sudden onset of confusion.  daughter found patient was confused and having slurred speech/aphasia. Daughter thought she was having absent seizure and did not call ambulance until 2-3 hours later. In the ED, SBP in 240s. Admitted to ICU for r/o acute CVA, outside the tPA window requiring critical care neurochecks / monitoring and for hypertensive urgency on cardene drip, stabilized and now transfered to medical floor   Acute encephalopathy.   -likely 2/2 to hypertensive encephalopathy vs seizure activity  - EEG= Abnormal EEG with the presence of paroxysmal single spike   wave complexes noted over the left frontotemporal regions consistent with   focal epileptiform focus.   - Mental status at baseline as per family.     - Orthostatic hypotension.   -Pt ambulating with PT , felt pale , dizzy, found BP in 60s/30s  - given  IVF, held BP meds,  TTE done   - cardiology  consulted.     Seizure.  Plan: - restarted keppra    - c/w Lamotrigine   - - EEG= Abnormal EEG with the presence of paroxysmal single spike   wave complexes noted over the left frontotemporal regions consistent with   focal epileptiform focus.   - discussed with .    : Acute ischemic colitis.   - likely 2/2 to hypertensive crisis  - advanced   diet, tolerating well , no abdominal pain  - on zosyn IV   - no plans for GI intervention at this point   - discussed with .   Discussed with family -daughter leon and son in law Lm in extended length 4 times today in person and over the phone and answered all Qs. Entire hospital course explained in detail; again and asked to hold off all BP meds  untill seen by PMD or cardiologist Dr.DEl barrios. PT eval done, ambulating well, pt's family upset about Pt eval . explained again in detail.  called  [phone] and was told he is no working this week and will be back next week and no one totake msg. they requested that family should call to make appt and as she is admitted in hospital she will be given appt within 1-2 weeks. told family that same and ask them to call  . Spoke to family along with nursing maxime madsen, case [place] Pat at bedside . discussed all the above in detail  pt. to be discharged home with home care

## 2017-04-21 DIAGNOSIS — I95.1 ORTHOSTATIC HYPOTENSION: ICD-10-CM

## 2017-04-21 LAB — LEVETIRACETAM SERPL-MCNC: 2.6 MCG/ML — LOW (ref 12–46)

## 2017-04-21 PROCEDURE — 99233 SBSQ HOSP IP/OBS HIGH 50: CPT

## 2017-04-21 RX ORDER — SODIUM CHLORIDE 9 MG/ML
1000 INJECTION INTRAMUSCULAR; INTRAVENOUS; SUBCUTANEOUS
Qty: 0 | Refills: 0 | Status: DISCONTINUED | OUTPATIENT
Start: 2017-04-21 | End: 2017-04-22

## 2017-04-21 RX ORDER — AMLODIPINE BESYLATE 2.5 MG/1
10 TABLET ORAL DAILY
Qty: 0 | Refills: 0 | Status: DISCONTINUED | OUTPATIENT
Start: 2017-04-21 | End: 2017-04-21

## 2017-04-21 RX ORDER — LOSARTAN POTASSIUM 100 MG/1
50 TABLET, FILM COATED ORAL DAILY
Qty: 0 | Refills: 0 | Status: DISCONTINUED | OUTPATIENT
Start: 2017-04-21 | End: 2017-04-21

## 2017-04-21 RX ORDER — LEVETIRACETAM 250 MG/1
1000 TABLET, FILM COATED ORAL
Qty: 0 | Refills: 0 | Status: DISCONTINUED | OUTPATIENT
Start: 2017-04-21 | End: 2017-04-28

## 2017-04-21 RX ADMIN — SODIUM CHLORIDE 80 MILLILITER(S): 9 INJECTION INTRAMUSCULAR; INTRAVENOUS; SUBCUTANEOUS at 12:44

## 2017-04-21 RX ADMIN — AMLODIPINE BESYLATE 10 MILLIGRAM(S): 2.5 TABLET ORAL at 05:56

## 2017-04-21 RX ADMIN — PIPERACILLIN AND TAZOBACTAM 25 GRAM(S): 4; .5 INJECTION, POWDER, LYOPHILIZED, FOR SOLUTION INTRAVENOUS at 14:28

## 2017-04-21 RX ADMIN — ATORVASTATIN CALCIUM 40 MILLIGRAM(S): 80 TABLET, FILM COATED ORAL at 22:11

## 2017-04-21 RX ADMIN — PANTOPRAZOLE SODIUM 40 MILLIGRAM(S): 20 TABLET, DELAYED RELEASE ORAL at 07:59

## 2017-04-21 RX ADMIN — SODIUM CHLORIDE 80 MILLILITER(S): 9 INJECTION INTRAMUSCULAR; INTRAVENOUS; SUBCUTANEOUS at 17:21

## 2017-04-21 RX ADMIN — PIPERACILLIN AND TAZOBACTAM 25 GRAM(S): 4; .5 INJECTION, POWDER, LYOPHILIZED, FOR SOLUTION INTRAVENOUS at 05:55

## 2017-04-21 RX ADMIN — LAMOTRIGINE 100 MILLIGRAM(S): 25 TABLET, ORALLY DISINTEGRATING ORAL at 12:36

## 2017-04-21 RX ADMIN — PIPERACILLIN AND TAZOBACTAM 25 GRAM(S): 4; .5 INJECTION, POWDER, LYOPHILIZED, FOR SOLUTION INTRAVENOUS at 22:11

## 2017-04-21 RX ADMIN — CARVEDILOL PHOSPHATE 25 MILLIGRAM(S): 80 CAPSULE, EXTENDED RELEASE ORAL at 05:56

## 2017-04-21 RX ADMIN — LOSARTAN POTASSIUM 50 MILLIGRAM(S): 100 TABLET, FILM COATED ORAL at 05:56

## 2017-04-21 RX ADMIN — LEVETIRACETAM 1000 MILLIGRAM(S): 250 TABLET, FILM COATED ORAL at 17:20

## 2017-04-21 RX ADMIN — Medication 20 MILLIGRAM(S): at 05:56

## 2017-04-21 RX ADMIN — LEVETIRACETAM 1000 MILLIGRAM(S): 250 TABLET, FILM COATED ORAL at 12:40

## 2017-04-21 NOTE — CONSULT NOTE ADULT - SUBJECTIVE AND OBJECTIVE BOX
HPI:  HPI:  85 y/o female with hx long-standing MR and severe pHTN, NICM s/p ICD, epilepsy s/p intracranial shunt, PPM, absent seizure, p/w sudden onset of confusion. Last seen normal was this afternoon. Around 450pm today, daughter found patient was confused and having slurred speech/aphasia. Daughter thought she was having absent seizure and did not call ambulance until 2-3 hours later. In the ED, SBP in 240s. ICU was called. tPA not given.     Seen at the bedside. Aphasic and noncomprehensable. SBP between 200-240. (16 Apr 2017 21:34)      Chief Complaint:  Patient is a 86y old  Female who presents with a chief complaint of AMS, confusion (20 Apr 2017 11:02)      Review of Systems:    ENT:  No sore throat, pain, runny nose, dysphagia  CV:  No pain, palpitations, positive hypotension  Resp:  No dyspnea, cough, tachypnea, wheezing  GI:  No pain, nausea, vomiting, diarrhea, constipation           Social History/Family History  SOCHX:   tobacco,  -  alcohol    FMHX: FA/MO  - contributory       Discussed with:  PMD, Family    Physical Exam:    Vital Signs:  Vital Signs Last 24 Hrs  T(C): 36.2, Max: 37.1 (04-21 @ 14:02)  T(F): 97.2, Max: 98.8 (04-21 @ 14:02)  HR: 68 (57 - 68)  BP: 94/51 (89/60 - 142/73)  BP(mean): --  RR: 16 (15 - 16)  SpO2: 96% (96% - 99%)  Daily     Daily   I&O's Summary  I & Os for 24h ending 21 Apr 2017 07:00  =============================================  IN: 2150 ml / OUT: 500 ml / NET: 1650 ml    I & Os for current day (as of 21 Apr 2017 20:42)  =============================================  IN: 1510 ml / OUT: 0 ml / NET: 1510 ml  D  Chest:  Full & symmetric excursion, no increased effort, breath sounds clear  Cardiovascular:  Regular rhythm, S1, S2, no murmur/rub/S3/S4, no carotid/femoral/abdominal bruit, radial/pedal pulses 2+, no edema  Abdomen:  Soft, non-tender, non-distended, normoactive bowel sounds, no HSM      Laboratory:                          10.2   5.7   )-----------( 196      ( 20 Apr 2017 06:11 )             28.9     04-20    142  |  103  |  7   ----------------------------<  96  3.3<L>   |  29  |  0.92    Ca    8.2<L>      20 Apr 2017 06:11            CAPILLARY BLOOD GLUCOSE            Assessment:  HTN, now Hypotensive.   Meds with hold parameters.   IV hydration acceptable  Midodrine as needed for orthostatic hypotension  Risk to hold SBP meds, CVA  I will hold meds and place an order for Hydralazine if SBP greater than 150, after which we may slowly add meds HPI:  HPI:  85 y/o female with hx long-standing MR and severe pHTN, NICM s/p ICD, epilepsy s/p intracranial shunt, PPM, absent seizure, p/w sudden onset of confusion. Last seen normal was this afternoon. Around 450pm today, daughter found patient was confused and having slurred speech/aphasia. Daughter thought she was having absent seizure and did not call ambulance until 2-3 hours later. In the ED, SBP in 240s. ICU was called. tPA not given.     Seen at the bedside. Aphasic and noncomprehensable. SBP between 200-240. (16 Apr 2017 21:34)      Chief Complaint:  Patient is a 86y old  Female who presents with a chief complaint of AMS, confusion (20 Apr 2017 11:02)      Review of Systems:    ENT:  No sore throat, pain, runny nose, dysphagia  CV:  No pain, palpitations, positive hypotension  Resp:  No dyspnea, cough, tachypnea, wheezing  GI:  No pain, nausea, vomiting, diarrhea, constipation           Social History/Family History  SOCHX:   tobacco,  -  alcohol    FMHX: FA/MO  - contributory       Discussed with:  PMD, Family    Physical Exam:    Vital Signs:  Vital Signs Last 24 Hrs  T(C): 36.2, Max: 37.1 (04-21 @ 14:02)  T(F): 97.2, Max: 98.8 (04-21 @ 14:02)  HR: 68 (57 - 68)  BP: 94/51 (89/60 - 142/73)  BP(mean): --  RR: 16 (15 - 16)  SpO2: 96% (96% - 99%)  Daily     Daily   I&O's Summary  I & Os for 24h ending 21 Apr 2017 07:00  =============================================  IN: 2150 ml / OUT: 500 ml / NET: 1650 ml    I & Os for current day (as of 21 Apr 2017 20:42)  =============================================  IN: 1510 ml / OUT: 0 ml / NET: 1510 ml  D  Chest:  Full & symmetric excursion, no increased effort, breath sounds clear  Cardiovascular:  Regular rhythm, S1, S2, no murmur/rub/S3/S4, no carotid/femoral/abdominal bruit, radial/pedal pulses 2+, no edema  Abdomen:  Soft, non-tender, non-distended, normoactive bowel sounds, no HSM      Laboratory:                          10.2   5.7   )-----------( 196      ( 20 Apr 2017 06:11 )             28.9     04-20    142  |  103  |  7   ----------------------------<  96  3.3<L>   |  29  |  0.92    Ca    8.2<L>      20 Apr 2017 06:11            CAPILLARY BLOOD GLUCOSE            Assessment:  HTN, now Hypotensive.   Meds with hold parameters.   IV hydration acceptable  Midodrine as needed for orthostatic hypotension  Risk to hold SBP meds, CVA  I will hold meds and place an order for Hydralazine if SBP greater than 150, after which we may slowly add meds   Or keep the Labetolol PRN order for now

## 2017-04-21 NOTE — PROGRESS NOTE ADULT - SUBJECTIVE AND OBJECTIVE BOX
Patient is a 86y old  Female who presents with a chief complaint of AMS, confusion (20 Apr 2017 11:02)      HPI:  87 y/o female with hx long-standing MR and severe pHTN, NICM s/p ICD, epilepsy s/p intracranial shunt, PPM, absent seizure, p/w sudden onset of confusion. Last seen normal was this afternoon. Around 450pm today, daughter found patient was confused and having slurred speech/aphasia. Daughter thought she was having absent seizure and did not call ambulance until 2-3 hours later. In the ED, SBP in 240s. ICU was called. tPA not given.     Seen at the bedside. Aphasic and noncomprehensable. SBP between 200-240. (16 Apr 2017 21:34)      INTERVAL HPI/OVERNIGHT EVENTS:  The patient denies melena, hematochezia, hematemesis, nausea, vomiting, abdominal pain, constipation, diarrhea, or change in bowel movements. ( nurse confirms )  MEDICATIONS  (STANDING):  lamoTRIgine 100milliGRAM(s) Oral daily  amLODIPine   Tablet 10milliGRAM(s) Oral daily  carvedilol 25milliGRAM(s) Oral every 12 hours  atorvastatin 40milliGRAM(s) Oral at bedtime  losartan 50milliGRAM(s) Oral daily  furosemide    Tablet 20milliGRAM(s) Oral daily  sodium chloride 0.9%. 1000milliLiter(s) IV Continuous <Continuous>  piperacillin/tazobactam IVPB. 3.375Gram(s) IV Intermittent every 8 hours  pantoprazole    Tablet 40milliGRAM(s) Oral before breakfast    MEDICATIONS  (PRN):  labetalol Injectable 10milliGRAM(s) IV Push every 6 hours PRN Systolic blood pressure >180      FAMILY HISTORY:      Allergies    No Known Allergies    Intolerances        PMH/PSH:  Pulmonary hypertension  Seizure  CHF (congestive heart failure)  High cholesterol  HTN (hypertension)  S/P  shunt        REVIEW OF SYSTEMS:  CONSTITUTIONAL: No fever, weight loss, or fatigue  EYES: No eye pain, visual disturbances, or discharge  ENMT:  No difficulty hearing, tinnitus, vertigo; No sinus or throat pain  NECK: No pain or stiffness  BREASTS: No pain, masses, or nipple discharge  RESPIRATORY: No cough, wheezing, chills or hemoptysis; No shortness of breath  CARDIOVASCULAR: No chest pain, palpitations, dizziness, or leg swelling  GASTROINTESTINAL: No abdominal or epigastric pain. No nausea, vomiting, or hematemesis; No diarrhea or constipation. No melena or hematochezia.  GENITOURINARY: No dysuria, frequency, hematuria, or incontinence  NEUROLOGICAL: No headaches, memory loss, loss of strength, numbness, or tremors  SKIN: No itching, burning, rashes, or lesions       Vital Signs Last 24 Hrs  T(C): 37, Max: 37.1 (04-20 @ 12:30)  T(F): 98.6, Max: 98.8 (04-20 @ 12:30)  HR: 65 (57 - 80)  BP: 142/73 (117/70 - 149/76)  BP(mean): --  RR: 16 (16 - 16)  SpO2: 96% (96% - 99%)    PHYSICAL EXAM:  GENERAL: NAD, well-groomed, well-developed  HEAD:  Atraumatic, Normocephalic  EYES: EOMI, PERRLA, conjunctiva and sclera clear  NECK: Supple, No JVD, Normal thyroid  NERVOUS SYSTEM:  Alert & Oriented X 2, Good concentration  CHEST/LUNG: Clear to percussion bilaterally; No rales, rhonchi, wheezing, or rubs  HEART: Regular rate and rhythm; No murmurs, rubs, or gallops  ABDOMEN: Soft, Nontender, Nondistended; Bowel sounds present  EXTREMITIES:  2+ Peripheral Pulses, No clubbing, cyanosis, or edema  LYMPH: No lymphadenopathy noted  SKIN: No rashes or lesions    LAB  04-16 @ 20:08  amylase --   lipase 88                           10.2   5.7   )-----------( 196      ( 20 Apr 2017 06:11 )             28.9       CBC:  04-20 @ 06:11  WBC 5.7   Hgb 10.2   Hct 28.9   Plts 196    04-19 @ 06:42  WBC 7.6   Hgb 9.9   Hct 29.0   Plts 209    04-18 @ 17:58  WBC 8.2   Hgb 11.4   Hct 32.5   Plts 209    04-18 @ 14:05  WBC 6.3   Hgb 10.0   Hct 29.0   Plts 180    04-18 @ 07:26  WBC 5.2   Hgb 12.1   Hct 35.2   Plts 206    04-17 @ 04:06  WBC 8.8   Hgb 11.2   Hct 32.9   Plts 221    04-16 @ 20:08  WBC 6.6   Hgb 12.2   Hct 35.3   Plts 229        Chemistry:  04-20 @ 06:11  Na+ 142  K+ 3.3  Cl- 103  CO2 29  BUN 7  Cr 0.92     04-19 @ 06:42  Na+ 141  K+ 3.5  Cl- 105  CO2 29  BUN 9  Cr 0.87     04-18 @ 14:05  Na+ 142  K+ 3.5  Cl- 106  CO2 26  BUN 13  Cr 1.02     04-18 @ 07:26  Na+ 141  K+ 3.5  Cl- 103  CO2 31  BUN 10  Cr 0.86     04-17 @ 04:06  Na+ 142  K+ 3.7  Cl- 104  CO2 27  BUN 15  Cr 0.94     04-16 @ 20:08  Na+ 139  K+ 4.1  Cl- 101  CO2 29  BUN 20  Cr 1.25         Glucose, Serum: 96 mg/dL (04-20 @ 06:11)  Glucose, Serum: 92 mg/dL (04-19 @ 06:42)  Glucose, Serum: 160 mg/dL (04-18 @ 14:05)  Glucose, Serum: 87 mg/dL (04-18 @ 07:26)  Glucose, Serum: 103 mg/dL (04-17 @ 04:06)  Glucose, Serum: 123 mg/dL (04-16 @ 20:08)      20 Apr 2017 06:11    142    |  103    |  7      ----------------------------<  96     3.3     |  29     |  0.92   19 Apr 2017 06:42    141    |  105    |  9      ----------------------------<  92     3.5     |  29     |  0.87   18 Apr 2017 14:05    142    |  106    |  13     ----------------------------<  160    3.5     |  26     |  1.02   18 Apr 2017 07:26    141    |  103    |  10     ----------------------------<  87     3.5     |  31     |  0.86   17 Apr 2017 04:06    142    |  104    |  15     ----------------------------<  103    3.7     |  27     |  0.94   16 Apr 2017 20:08    139    |  101    |  20     ----------------------------<  123    4.1     |  29     |  1.25     Ca    8.2        20 Apr 2017 06:11  Ca    7.9        19 Apr 2017 06:42  Ca    7.5        18 Apr 2017 14:05  Ca    8.6        18 Apr 2017 07:26  Ca    8.2        17 Apr 2017 04:06  Ca    8.8        16 Apr 2017 20:08  Phos  3.1       19 Apr 2017 06:42  Phos  3.3       18 Apr 2017 07:26  Phos  2.7       17 Apr 2017 04:06  Mg     2.0       19 Apr 2017 06:42  Mg     2.0       18 Apr 2017 07:26  Mg     2.3       17 Apr 2017 04:06  Mg     2.4       16 Apr 2017 20:08    TPro  7.1    /  Alb  3.4    /  TBili  0.5    /  DBili  x      /  AST  23     /  ALT  21     /  AlkPhos  64     16 Apr 2017 20:08              CAPILLARY BLOOD GLUCOSE      C-Reactive Protein, Serum: 4.40 mg/dL (04-20 @ 08:02)      RADIOLOGY & ADDITIONAL TESTS:    Imaging Personally Reviewed:  [ ] YES  [ ] NO    Consultant(s) Notes Reviewed:  [ ] YES  [ ] NO    Care Discussed with Consultants/Other Providers [ ] YES  [ ] NO

## 2017-04-21 NOTE — PROGRESS NOTE ADULT - SUBJECTIVE AND OBJECTIVE BOX
Patient is a 86y old  Female who presents with a chief complaint of AMS, confusion (20 Apr 2017 11:02)       OVERNIGHT EVENTS: Pt ambulating with PT , felt pale , dizzy, found BP in 60s/30s    MEDICATIONS  (STANDING):  lamoTRIgine 100milliGRAM(s) Oral daily  atorvastatin 40milliGRAM(s) Oral at bedtime  piperacillin/tazobactam IVPB. 3.375Gram(s) IV Intermittent every 8 hours  pantoprazole    Tablet 40milliGRAM(s) Oral before breakfast  levETIRAcetam 1000milliGRAM(s) Oral two times a day  sodium chloride 0.9%. 1000milliLiter(s) IV Continuous <Continuous>  losartan 50milliGRAM(s) Oral daily  amLODIPine   Tablet 10milliGRAM(s) Oral daily    MEDICATIONS  (PRN):  labetalol Injectable 10milliGRAM(s) IV Push every 6 hours PRN Systolic blood pressure >180      Vital Signs Last 24 Hrs  T(C): 37.1, Max: 37.1 (04-21 @ 14:02)  T(F): 98.8, Max: 98.8 (04-21 @ 14:02)  HR: 62 (57 - 75)  BP: 129/67 (89/60 - 149/76)  BP(mean): --  RR: 15 (15 - 16)  SpO2: 98% (96% - 99%)     PHYSICAL EXAM:  GENERAL: NAD, well-groomed, well-developed, lying in bed  HEAD:  Atraumatic, Normocephalic  EYES: EOMI, PERRLA, conjunctiva and sclera clear  ENMT: No tonsillar erythema, exudates, or enlargement; Moist mucous membranes   NERVOUS SYSTEM:  Alert & Oriented X2, confused at times  CHEST/LUNG: Clear to percussion bilaterally; No rales, rhonchi, wheezing, or rubs  HEART: Regular rate and rhythm; No murmurs, rubs, or gallops  ABDOMEN: Soft, Nontender, Nondistended; Bowel sounds present  EXTREMITIES:  2+ Peripheral Pulses, No clubbing, cyanosis, or edema        LABS:                        10.2   5.7   )-----------( 196      ( 20 Apr 2017 06:11 )             28.9     04-20    142  |  103  |  7   ----------------------------<  96  3.3<L>   |  29  |  0.92    Ca    8.2<L>      20 Apr 2017 06:11         cardiac markers     CAPILLARY BLOOD GLUCOSE    Cultures    RADIOLOGY & ADDITIONAL TESTS:  TTE=  1. Left ventricular ejection fraction, by visual estimation, is 60 to   65%.   2. Technically good study.   3. Normal global left ventricular systolic function.   4. Normal left ventricular internal cavity size.   5. Spectral Doppler shows impaired relaxation pattern of left   ventricular myocardial filling (Grade I diastolic dysfunction).   6. There is mild concentric left ventricular hypertrophy.   7. Normal right ventricular size and function.   8. Mildly dilated right atrium.   9. There is no evidence of pericardial effusion.  10. Mild mitral annular calcification.  11. Mild mitral valve regurgitation.  12. Thickening and calcification of the anterior and posterior mitral   valve leaflets.  13. Mild aortic regurgitation.  14. Sclerotic aortic valve with normal opening.  15. Mildly enlarged left atrium.  16. There is mild aortic root calcification.  Imaging Personally Reviewed:  [x ] YES  [ ] NO    Consultant(s) Notes Reviewed:  [x ] YES  [ ] NO    Care Discussed with Consultants/Other Providers [x ] YES  [ ] NO

## 2017-04-21 NOTE — PROGRESS NOTE ADULT - SUBJECTIVE AND OBJECTIVE BOX
INTERVAL HPI/OVERNIGHT EVENTS:  No seizures.  Subjective Complaints: Offers no complaints.  Alert and awake with intact speech. IN no acute distress.  Moves all extremities.    RECENT RADIOLOGY & ADDITIONAL TESTS:  Keppra level:  14.0    NEUROLOGICAL EXAM (Pertinent):  Vital Signs Last 24 Hrs  T(C): 37, Max: 37.1 (04-20 @ 12:30)  T(F): 98.6, Max: 98.8 (04-20 @ 12:30)  HR: 60 (57 - 75)  BP: 89/60 (89/60 - 149/76)  BP(mean): --  RR: 16 (16 - 16)  SpO2: 98% (96% - 99%)    MEDICATIONS  (STANDING):  lamoTRIgine 100milliGRAM(s) Oral daily  amLODIPine   Tablet 10milliGRAM(s) Oral daily  carvedilol 25milliGRAM(s) Oral every 12 hours  atorvastatin 40milliGRAM(s) Oral at bedtime  losartan 50milliGRAM(s) Oral daily  furosemide    Tablet 20milliGRAM(s) Oral daily  sodium chloride 0.9%. 1000milliLiter(s) IV Continuous <Continuous>  piperacillin/tazobactam IVPB. 3.375Gram(s) IV Intermittent every 8 hours  pantoprazole    Tablet 40milliGRAM(s) Oral before breakfast    MEDICATIONS  (PRN):  labetalol Injectable 10milliGRAM(s) IV Push every 6 hours PRN Systolic blood pressure >180    LABS:                        10.2   5.7   )-----------( 196      ( 20 Apr 2017 06:11 )             28.9     04-20    142  |  103  |  7   ----------------------------<  96  3.3<L>   |  29  |  0.92    Ca    8.2<L>      20 Apr 2017 06:11       Assessment & Opinion:  Seizure Disorder, generalized convulsive and absence.  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 No CVA  Rec:  Continue Keppra and Lamotrigine as ordered.  Lamotrigine is not enough for seizure control

## 2017-04-22 LAB
ANION GAP SERPL CALC-SCNC: 9 MMOL/L — SIGNIFICANT CHANGE UP (ref 5–17)
ANION GAP SERPL CALC-SCNC: 9 MMOL/L — SIGNIFICANT CHANGE UP (ref 5–17)
BUN SERPL-MCNC: 10 MG/DL — SIGNIFICANT CHANGE UP (ref 7–23)
BUN SERPL-MCNC: 13 MG/DL — SIGNIFICANT CHANGE UP (ref 7–23)
CALCIUM SERPL-MCNC: 7.6 MG/DL — LOW (ref 8.5–10.1)
CALCIUM SERPL-MCNC: 8.1 MG/DL — LOW (ref 8.5–10.1)
CHLORIDE SERPL-SCNC: 107 MMOL/L — SIGNIFICANT CHANGE UP (ref 96–108)
CHLORIDE SERPL-SCNC: 108 MMOL/L — SIGNIFICANT CHANGE UP (ref 96–108)
CO2 SERPL-SCNC: 27 MMOL/L — SIGNIFICANT CHANGE UP (ref 22–31)
CO2 SERPL-SCNC: 27 MMOL/L — SIGNIFICANT CHANGE UP (ref 22–31)
CREAT SERPL-MCNC: 0.85 MG/DL — SIGNIFICANT CHANGE UP (ref 0.5–1.3)
CREAT SERPL-MCNC: 0.94 MG/DL — SIGNIFICANT CHANGE UP (ref 0.5–1.3)
GLUCOSE SERPL-MCNC: 106 MG/DL — HIGH (ref 70–99)
GLUCOSE SERPL-MCNC: 88 MG/DL — SIGNIFICANT CHANGE UP (ref 70–99)
HCT VFR BLD CALC: 27 % — LOW (ref 34.5–45)
HGB BLD-MCNC: 9.4 G/DL — LOW (ref 11.5–15.5)
MCHC RBC-ENTMCNC: 32 PG — SIGNIFICANT CHANGE UP (ref 27–34)
MCHC RBC-ENTMCNC: 34.8 GM/DL — SIGNIFICANT CHANGE UP (ref 32–36)
MCV RBC AUTO: 91.9 FL — SIGNIFICANT CHANGE UP (ref 80–100)
PLATELET # BLD AUTO: 215 K/UL — SIGNIFICANT CHANGE UP (ref 150–400)
POTASSIUM SERPL-MCNC: 2.8 MMOL/L — CRITICAL LOW (ref 3.5–5.3)
POTASSIUM SERPL-MCNC: 3.6 MMOL/L — SIGNIFICANT CHANGE UP (ref 3.5–5.3)
POTASSIUM SERPL-SCNC: 2.8 MMOL/L — CRITICAL LOW (ref 3.5–5.3)
POTASSIUM SERPL-SCNC: 3.6 MMOL/L — SIGNIFICANT CHANGE UP (ref 3.5–5.3)
RBC # BLD: 2.93 M/UL — LOW (ref 3.8–5.2)
RBC # FLD: 12.6 % — SIGNIFICANT CHANGE UP (ref 11–15)
SODIUM SERPL-SCNC: 143 MMOL/L — SIGNIFICANT CHANGE UP (ref 135–145)
SODIUM SERPL-SCNC: 144 MMOL/L — SIGNIFICANT CHANGE UP (ref 135–145)
WBC # BLD: 5.7 K/UL — SIGNIFICANT CHANGE UP (ref 3.8–10.5)
WBC # FLD AUTO: 5.7 K/UL — SIGNIFICANT CHANGE UP (ref 3.8–10.5)

## 2017-04-22 RX ORDER — POTASSIUM CHLORIDE 20 MEQ
20 PACKET (EA) ORAL ONCE
Qty: 0 | Refills: 0 | Status: COMPLETED | OUTPATIENT
Start: 2017-04-22 | End: 2017-04-22

## 2017-04-22 RX ORDER — POTASSIUM CHLORIDE 20 MEQ
40 PACKET (EA) ORAL ONCE
Qty: 0 | Refills: 0 | Status: COMPLETED | OUTPATIENT
Start: 2017-04-22 | End: 2017-04-22

## 2017-04-22 RX ORDER — VANCOMYCIN HCL 1 G
1000 VIAL (EA) INTRAVENOUS ONCE
Qty: 0 | Refills: 0 | Status: COMPLETED | OUTPATIENT
Start: 2017-04-22 | End: 2017-04-22

## 2017-04-22 RX ORDER — DEXTROSE MONOHYDRATE, SODIUM CHLORIDE, AND POTASSIUM CHLORIDE 50; .745; 4.5 G/1000ML; G/1000ML; G/1000ML
1000 INJECTION, SOLUTION INTRAVENOUS
Qty: 0 | Refills: 0 | Status: DISCONTINUED | OUTPATIENT
Start: 2017-04-22 | End: 2017-04-23

## 2017-04-22 RX ADMIN — PIPERACILLIN AND TAZOBACTAM 25 GRAM(S): 4; .5 INJECTION, POWDER, LYOPHILIZED, FOR SOLUTION INTRAVENOUS at 13:30

## 2017-04-22 RX ADMIN — Medication 20 MILLIEQUIVALENT(S): at 11:46

## 2017-04-22 RX ADMIN — PIPERACILLIN AND TAZOBACTAM 25 GRAM(S): 4; .5 INJECTION, POWDER, LYOPHILIZED, FOR SOLUTION INTRAVENOUS at 05:35

## 2017-04-22 RX ADMIN — Medication 250 MILLIGRAM(S): at 10:04

## 2017-04-22 RX ADMIN — Medication 40 MILLIEQUIVALENT(S): at 06:50

## 2017-04-22 RX ADMIN — DEXTROSE MONOHYDRATE, SODIUM CHLORIDE, AND POTASSIUM CHLORIDE 80 MILLILITER(S): 50; .745; 4.5 INJECTION, SOLUTION INTRAVENOUS at 09:25

## 2017-04-22 RX ADMIN — LEVETIRACETAM 1000 MILLIGRAM(S): 250 TABLET, FILM COATED ORAL at 17:28

## 2017-04-22 RX ADMIN — SODIUM CHLORIDE 80 MILLILITER(S): 9 INJECTION INTRAMUSCULAR; INTRAVENOUS; SUBCUTANEOUS at 05:35

## 2017-04-22 RX ADMIN — ATORVASTATIN CALCIUM 40 MILLIGRAM(S): 80 TABLET, FILM COATED ORAL at 22:11

## 2017-04-22 RX ADMIN — DEXTROSE MONOHYDRATE, SODIUM CHLORIDE, AND POTASSIUM CHLORIDE 80 MILLILITER(S): 50; .745; 4.5 INJECTION, SOLUTION INTRAVENOUS at 21:00

## 2017-04-22 RX ADMIN — PANTOPRAZOLE SODIUM 40 MILLIGRAM(S): 20 TABLET, DELAYED RELEASE ORAL at 07:55

## 2017-04-22 RX ADMIN — LAMOTRIGINE 100 MILLIGRAM(S): 25 TABLET, ORALLY DISINTEGRATING ORAL at 11:46

## 2017-04-22 RX ADMIN — PIPERACILLIN AND TAZOBACTAM 25 GRAM(S): 4; .5 INJECTION, POWDER, LYOPHILIZED, FOR SOLUTION INTRAVENOUS at 22:11

## 2017-04-22 RX ADMIN — LEVETIRACETAM 1000 MILLIGRAM(S): 250 TABLET, FILM COATED ORAL at 05:35

## 2017-04-22 NOTE — PROGRESS NOTE ADULT - SUBJECTIVE AND OBJECTIVE BOX
Assessment:  HTN, now Hypotensive.   Meds with hold parameters.   IV hydration acceptable  Midodrine as needed for orthostatic hypotension  Risk to hold SBP meds, CVA  I will hold meds and place an order for Hydralazine if SBP greater than 150, after which we may slowly add meds   Or keep the Labetolol PRN order for now

## 2017-04-23 LAB
CULTURE RESULTS: SIGNIFICANT CHANGE UP
CULTURE RESULTS: SIGNIFICANT CHANGE UP
GRAM STN FLD: SIGNIFICANT CHANGE UP
SPECIMEN SOURCE: SIGNIFICANT CHANGE UP
SPECIMEN SOURCE: SIGNIFICANT CHANGE UP

## 2017-04-23 PROCEDURE — 99232 SBSQ HOSP IP/OBS MODERATE 35: CPT

## 2017-04-23 RX ORDER — HEPARIN SODIUM 5000 [USP'U]/ML
5000 INJECTION INTRAVENOUS; SUBCUTANEOUS EVERY 12 HOURS
Qty: 0 | Refills: 0 | Status: DISCONTINUED | OUTPATIENT
Start: 2017-04-23 | End: 2017-04-23

## 2017-04-23 RX ADMIN — LEVETIRACETAM 1000 MILLIGRAM(S): 250 TABLET, FILM COATED ORAL at 06:28

## 2017-04-23 RX ADMIN — LEVETIRACETAM 1000 MILLIGRAM(S): 250 TABLET, FILM COATED ORAL at 18:18

## 2017-04-23 RX ADMIN — LAMOTRIGINE 100 MILLIGRAM(S): 25 TABLET, ORALLY DISINTEGRATING ORAL at 12:46

## 2017-04-23 RX ADMIN — PIPERACILLIN AND TAZOBACTAM 25 GRAM(S): 4; .5 INJECTION, POWDER, LYOPHILIZED, FOR SOLUTION INTRAVENOUS at 14:50

## 2017-04-23 RX ADMIN — PIPERACILLIN AND TAZOBACTAM 25 GRAM(S): 4; .5 INJECTION, POWDER, LYOPHILIZED, FOR SOLUTION INTRAVENOUS at 06:28

## 2017-04-23 RX ADMIN — PANTOPRAZOLE SODIUM 40 MILLIGRAM(S): 20 TABLET, DELAYED RELEASE ORAL at 08:47

## 2017-04-23 RX ADMIN — DEXTROSE MONOHYDRATE, SODIUM CHLORIDE, AND POTASSIUM CHLORIDE 80 MILLILITER(S): 50; .745; 4.5 INJECTION, SOLUTION INTRAVENOUS at 08:50

## 2017-04-23 RX ADMIN — ATORVASTATIN CALCIUM 40 MILLIGRAM(S): 80 TABLET, FILM COATED ORAL at 22:17

## 2017-04-23 NOTE — PROGRESS NOTE ADULT - SUBJECTIVE AND OBJECTIVE BOX
CC/F/U for:    INTERVAL HPI/OVERNIGHT EVENTS:  Pt seen and examined at bedside.     Allergies/Intolerance: No Known Allergies      MEDICATIONS  (STANDING):  lamoTRIgine 100milliGRAM(s) Oral daily  atorvastatin 40milliGRAM(s) Oral at bedtime  piperacillin/tazobactam IVPB. 3.375Gram(s) IV Intermittent every 8 hours  pantoprazole    Tablet 40milliGRAM(s) Oral before breakfast  levETIRAcetam 1000milliGRAM(s) Oral two times a day  sodium chloride 0.9% with potassium chloride 20 mEq/L 1000milliLiter(s) IV Continuous <Continuous>    MEDICATIONS  (PRN):  labetalol Injectable 10milliGRAM(s) IV Push every 6 hours PRN Systolic blood pressure >180        ROS: all systems reviewed and wnl      PHYSICAL EXAMINATION:  Vital Signs Last 24 Hrs  T(C): 36.6, Max: 37.1 (04-22 @ 23:23)  T(F): 97.8, Max: 98.8 (04-22 @ 23:23)  HR: 76 (73 - 78)  BP: 125/67 (125/67 - 163/74)  BP(mean): --  RR: 16 (16 - 17)  SpO2: 96% (96% - 100%)  CAPILLARY BLOOD GLUCOSE      GENERAL: NAD, well-groomed, well-developed  HEAD:  atraumatic, normocephalic  EYES: sclera anicteric  ENMT: mucous membranes moist  NECK: supple, No JVD  CHEST/LUNG: clear to auscultation bilaterally; no rales, rhonchi, or wheezing b/l  HEART: normal S1, S2  ABDOMEN: BS+, soft, ND, NT   EXTREMITIES:  pulses palpable; no clubbing, cyanosis, or edema b/l LEs  NEURO: awake, alert, interactive; moves all extremities  SKIN: no rashes or lesions      LABS:                        9.4    5.7   )-----------( 215      ( 22 Apr 2017 05:29 )             27.0     04-22    143  |  107  |  10  ----------------------------<  106<H>  3.6   |  27  |  0.85    Ca    8.1<L>      22 Apr 2017 15:47              RADIOLOGY & ADDITIONAL TESTS:      ASSESSMENT AND PLAN: CC/F/U for: seizure and altered mental status.      INTERVAL HPI/OVERNIGHT EVENTS:  Pt seen and examined at bedside.     Allergies/Intolerance: No Known Allergies      MEDICATIONS  (STANDING):  lamoTRIgine 100milliGRAM(s) Oral daily  atorvastatin 40milliGRAM(s) Oral at bedtime  piperacillin/tazobactam IVPB. 3.375Gram(s) IV Intermittent every 8 hours  pantoprazole    Tablet 40milliGRAM(s) Oral before breakfast  levETIRAcetam 1000milliGRAM(s) Oral two times a day  sodium chloride 0.9% with potassium chloride 20 mEq/L 1000milliLiter(s) IV Continuous <Continuous>    MEDICATIONS  (PRN):  labetalol Injectable 10milliGRAM(s) IV Push every 6 hours PRN Systolic blood pressure >180        ROS: all systems reviewed and wnl      PHYSICAL EXAMINATION:  Vital Signs Last 24 Hrs  T(C): 36.6, Max: 37.1 (04-22 @ 23:23)  T(F): 97.8, Max: 98.8 (04-22 @ 23:23)  HR: 76 (73 - 78)  BP: 125/67 (125/67 - 163/74)  BP(mean): --  RR: 16 (16 - 17)  SpO2: 96% (96% - 100%)  CAPILLARY BLOOD GLUCOSE      GENERAL: NAD, well-groomed, well-developed  HEAD:  atraumatic, normocephalic  EYES: sclera anicteric  ENMT: mucous membranes moist  NECK: supple, No JVD  CHEST/LUNG: clear to auscultation bilaterally; no rales, rhonchi, or wheezing b/l  HEART: normal S1, S2  ABDOMEN: BS+, soft, ND, NT   EXTREMITIES:  pulses palpable; no clubbing, cyanosis, or edema b/l LEs  NEURO: awake, alert, interactive; moves all extremities  SKIN: no rashes or lesions      LABS:                        9.4    5.7   )-----------( 215      ( 22 Apr 2017 05:29 )             27.0     04-22    143  |  107  |  10  ----------------------------<  106<H>  3.6   |  27  |  0.85    Ca    8.1<L>      22 Apr 2017 15:47              RADIOLOGY & ADDITIONAL TESTS: none.         Plan : 85 y/o female with hx long-standing MR and severe pHTN, NICM s/p ICD, epilepsy s/p intracranial shunt, PPM, absent seizure, p/w sudden onset of confusion.  Daughter found patient was confused and having slurred speech/aphasia. Daughter thought she was having absent seizure and did not call ambulance until 2-3 hours later. In the ED, SBP in 240s. Admitted to ICU for r/o acute CVA, outside the tPA window requiring critical care neurochecks / monitoring and for hypertensive urgency on cardene drip, stabilized and now transfered to medical floor.     Continue regular diet and IVF Normal Saline 80/hour. Orthostatics normalized, will discontinue. CC/F/U for: seizure and altered mental status.      INTERVAL HPI/OVERNIGHT EVENTS:  Pt seen and examined at bedside.     Allergies/Intolerance: No Known Allergies      MEDICATIONS  (STANDING):  lamoTRIgine 100milliGRAM(s) Oral daily  atorvastatin 40milliGRAM(s) Oral at bedtime  piperacillin/tazobactam IVPB. 3.375Gram(s) IV Intermittent every 8 hours  pantoprazole    Tablet 40milliGRAM(s) Oral before breakfast  levETIRAcetam 1000milliGRAM(s) Oral two times a day  sodium chloride 0.9% with potassium chloride 20 mEq/L 1000milliLiter(s) IV Continuous <Continuous>    MEDICATIONS  (PRN):  labetalol Injectable 10milliGRAM(s) IV Push every 6 hours PRN Systolic blood pressure >180        ROS: all systems reviewed and wnl      PHYSICAL EXAMINATION:  Vital Signs Last 24 Hrs  T(C): 36.6, Max: 37.1 (04-22 @ 23:23)  T(F): 97.8, Max: 98.8 (04-22 @ 23:23)  HR: 76 (73 - 78)  BP: 125/67 (125/67 - 163/74)  BP(mean): --  RR: 16 (16 - 17)  SpO2: 96% (96% - 100%)  CAPILLARY BLOOD GLUCOSE      GENERAL: NAD, well-groomed, well-developed  HEAD:  atraumatic, normocephalic  EYES: sclera anicteric  ENMT: mucous membranes moist  NECK: supple, No JVD  CHEST/LUNG: clear to auscultation bilaterally; no rales, rhonchi, or wheezing b/l  HEART: normal S1, S2  ABDOMEN: BS+, soft, ND, NT   EXTREMITIES:  pulses palpable; no clubbing, cyanosis, or edema b/l LEs  NEURO: awake, alert, interactive; moves all extremities  SKIN: no rashes or lesions      LABS:                        9.4    5.7   )-----------( 215      ( 22 Apr 2017 05:29 )             27.0     04-22    143  |  107  |  10  ----------------------------<  106<H>  3.6   |  27  |  0.85    Ca    8.1<L>      22 Apr 2017 15:47              RADIOLOGY & ADDITIONAL TESTS: none.         Plan : 87 y/o female with hx long-standing MR and severe pHTN, NICM s/p ICD, epilepsy s/p intracranial shunt, PPM, absent seizure, p/w sudden onset of confusion.  Daughter found patient was confused and having slurred speech/aphasia. Daughter thought she was having absent seizure and did not call ambulance until 2-3 hours later. In the ED, SBP in 240s. Admitted to ICU for r/o acute CVA, outside the tPA window requiring critical care neurochecks / monitoring and for hypertensive urgency on cardene drip, stabilized and now transfered to medical floor.     Continue regular diet and IVF Normal Saline 80/hour. Orthostatics normalized, will discontinue.  Stop Zosyn.  Continue Kepra 1,000 mg bid and Lamictal 100 mg/day for seizure history. Lipitor for HLD. Continue current management and plan. CC/F/U for: seizure and altered mental status.  Stable, comfortable, NAD.     INTERVAL HPI/OVERNIGHT EVENTS:  Pt seen and examined at bedside.     Allergies/Intolerance: No Known Allergies      MEDICATIONS  (STANDING):  lamoTRIgine 100milliGRAM(s) Oral daily  atorvastatin 40milliGRAM(s) Oral at bedtime  piperacillin/tazobactam IVPB. 3.375Gram(s) IV Intermittent every 8 hours  pantoprazole    Tablet 40milliGRAM(s) Oral before breakfast  levETIRAcetam 1000milliGRAM(s) Oral two times a day  sodium chloride 0.9% with potassium chloride 20 mEq/L 1000milliLiter(s) IV Continuous <Continuous>    MEDICATIONS  (PRN):  labetalol Injectable 10milliGRAM(s) IV Push every 6 hours PRN Systolic blood pressure >180        ROS: all systems reviewed and wnl      PHYSICAL EXAMINATION:  Vital Signs Last 24 Hrs  T(C): 36.6, Max: 37.1 (04-22 @ 23:23)  T(F): 97.8, Max: 98.8 (04-22 @ 23:23)  HR: 76 (73 - 78)  BP: 125/67 (125/67 - 163/74)  BP(mean): --  RR: 16 (16 - 17)  SpO2: 96% (96% - 100%)  CAPILLARY BLOOD GLUCOSE      GENERAL: NAD, well-groomed, well-developed  HEAD:  atraumatic, normocephalic  EYES: sclera anicteric  ENMT: mucous membranes moist  NECK: supple, No JVD  CHEST/LUNG: clear to auscultation bilaterally; no rales, rhonchi, or wheezing b/l  HEART: normal S1, S2  ABDOMEN: BS+, soft, ND, NT   EXTREMITIES:  pulses palpable; no clubbing, cyanosis, or edema b/l LEs  NEURO: awake, alert, interactive; moves all extremities  SKIN: no rashes or lesions      LABS:                        9.4    5.7   )-----------( 215      ( 22 Apr 2017 05:29 )             27.0     04-22    143  |  107  |  10  ----------------------------<  106<H>  3.6   |  27  |  0.85    Ca    8.1<L>      22 Apr 2017 15:47              RADIOLOGY & ADDITIONAL TESTS: none.         Plan : 85 y/o female with hx long-standing MR and severe pHTN, NICM s/p ICD, epilepsy s/p intracranial shunt, PPM, absent seizure, p/w sudden onset of confusion.  Daughter found patient was confused and having slurred speech/aphasia. Daughter thought she was having absent seizure and did not call ambulance until 2-3 hours later. In the ED, SBP in 240s. Admitted to ICU for r/o acute CVA, outside the tPA window requiring critical care neurochecks / monitoring and for hypertensive urgency on cardene drip, stabilized and now transfered to medical floor.     Continue regular diet. Will stop IVF as BP normal. Orthostatics normalized, will discontinue.  Stop Zosyn, no obvious infection. Continue Kepra 1,000 mg bid and Lamictal 100 mg/day for seizure history. Lipitor for HLD. Continue current management and plan.

## 2017-04-23 NOTE — DIETITIAN INITIAL EVALUATION ADULT. - OTHER INFO
Pt seen today due to Length Of Stay. Pt lives @ home c her daughter whom does the food shopping/cooking. Denies food Allergies, Last BM on 4/22. positive Dentures - partial bottom. negative difficulty chewing/swallowing. 4/17- s/p swallow eval c recommendations for mechanical soft, thin liquids. Pt is alert at time of assessment. Consuming 75 - 100% of meal.

## 2017-04-23 NOTE — DIETITIAN INITIAL EVALUATION ADULT. - PROBLEM SELECTOR PLAN 1
- Unknown etiology, hypertensive encephalopathy vs seizure vs stroke  - Unable to get MRI of brain due to the PPM; per Neuro- more likely to be seizure than stroke so no more workup for now.   - Will get EEG.  - Will control BP to SBP around 180.  - Will start anti-epileptics.   - Closely monitor in ICU.   - Neuro check q1h  - ECHO, carotid doppler, LE doppler.

## 2017-04-23 NOTE — CHART NOTE - NSCHARTNOTEFT_GEN_A_CORE
Upon Nutritional Assessment by the Registered Dietitian your patient was determined to meet criteria / has evidence of the following diagnosis/diagnoses:          [ ]  Mild Protein Calorie Malnutrition        [ ]  Moderate Protein Calorie Malnutrition        [x ] Severe Protein Calorie Malnutrition        [ ] Unspecified Protein Calorie Malnutrition        [ ] Underweight / BMI <19        [ ] Morbid Obesity / BMI > 40      Findings as based on:  •  Comprehensive nutrition assessment and consultation  •  Calorie counts (nutrient intake analysis)  •  Food acceptance and intake status from observations by staff  •  Follow up  •  Patient education  •  Intervention secondary to interdisciplinary rounds  •   concerns      Treatment:    The following diet has been recommended: Mechanical soft; Fiber/residue restricted; Dash/TLC c ensure enlive 8 oz twice per day (700 audra & 40 g pro)       PROVIDER Section:     By signing this assessment you are acknowledging and agree with the diagnosis/diagnoses assigned by the Registered Dietitian    Comments:

## 2017-04-23 NOTE — DIETITIAN INITIAL EVALUATION ADULT. - PHYSICAL APPEARANCE
BMI = 19.2, no edema noted, Nutrition focused physical exam conducted ; found signs of malnutrition [ ]absent [x ]present.  Subcutaneous fat loss: [moderate ] Orbital fat pads region, [mild ]Buccal fat region, [severe ]Triceps region,  [severe ]Ribs region.  Muscle wasting: [severe ]Temples region, [severe ]Clavicle region, [severe ]Shoulder region, [severe ]Scapula region, [severe ]Interosseous region,  [ ]thigh region, [ ]Calf region/underweight

## 2017-04-23 NOTE — DIETITIAN INITIAL EVALUATION ADULT. - PERTINENT LABORATORY DATA
04-22 Na143 mmol/L Glu 106 mg/dL<H> K+ 3.6 mmol/L Cr  0.85 mg/dL BUN 10 mg/dL Phos n/a   Alb n/a   PAB n/a

## 2017-04-24 ENCOUNTER — OTHER (OUTPATIENT)
Age: 82
End: 2017-04-24

## 2017-04-24 PROCEDURE — 99233 SBSQ HOSP IP/OBS HIGH 50: CPT

## 2017-04-24 RX ORDER — LAMOTRIGINE 25 MG/1
1 TABLET, ORALLY DISINTEGRATING ORAL
Qty: 30 | Refills: 0 | OUTPATIENT
Start: 2017-04-24 | End: 2017-05-24

## 2017-04-24 RX ORDER — LEVETIRACETAM 250 MG/1
1 TABLET, FILM COATED ORAL
Qty: 60 | Refills: 0 | OUTPATIENT
Start: 2017-04-24 | End: 2017-05-24

## 2017-04-24 RX ORDER — ROSUVASTATIN CALCIUM 5 MG/1
1 TABLET ORAL
Qty: 30 | Refills: 0 | OUTPATIENT
Start: 2017-04-24 | End: 2017-05-24

## 2017-04-24 RX ORDER — LAMOTRIGINE 25 MG/1
1 TABLET, ORALLY DISINTEGRATING ORAL
Qty: 0 | Refills: 0 | COMMUNITY
Start: 2017-04-24

## 2017-04-24 RX ORDER — LEVETIRACETAM 250 MG/1
1 TABLET, FILM COATED ORAL
Qty: 0 | Refills: 0 | COMMUNITY
Start: 2017-04-24

## 2017-04-24 RX ADMIN — LAMOTRIGINE 100 MILLIGRAM(S): 25 TABLET, ORALLY DISINTEGRATING ORAL at 12:05

## 2017-04-24 RX ADMIN — LEVETIRACETAM 1000 MILLIGRAM(S): 250 TABLET, FILM COATED ORAL at 05:59

## 2017-04-24 RX ADMIN — PANTOPRAZOLE SODIUM 40 MILLIGRAM(S): 20 TABLET, DELAYED RELEASE ORAL at 07:53

## 2017-04-24 RX ADMIN — ATORVASTATIN CALCIUM 40 MILLIGRAM(S): 80 TABLET, FILM COATED ORAL at 22:18

## 2017-04-24 RX ADMIN — LEVETIRACETAM 1000 MILLIGRAM(S): 250 TABLET, FILM COATED ORAL at 17:40

## 2017-04-24 NOTE — PROGRESS NOTE ADULT - PROBLEM SELECTOR PLAN 1
-likely 2/2 to hypertensive encephalopathy vs seizure activity  - EEG= Abnormal EEG with the presence of paroxysmal single spike   wave complexes noted over the left frontotemporal regions consistent with   focal epileptiform focus.   - Mental status at baseline as per family  -pt stable to be discharged , but family appealing the discharge   Discussed with family -daughter leon and son in law Lm in extended length

## 2017-04-24 NOTE — PROGRESS NOTE ADULT - SUBJECTIVE AND OBJECTIVE BOX
INTERVAL HPI/OVERNIGHT EVENTS:  Subjective Complaints:  Offers no complaints.  Patient appears in no acute distress, smiling and with intact speech.  She is oriented to person and place.  Exhibits no focal motor sensory deficits.  Vital signs stable.    RECENT RADIOLOGY & ADDITIONAL TESTS:    NEUROLOGICAL EXAM (Pertinent):  Vital Signs Last 24 Hrs  T(C): 37.3, Max: 37.3 (04-24 @ 17:29)  T(F): 99.2, Max: 99.2 (04-24 @ 17:29)  HR: 85 (68 - 85)  BP: 151/81 (127/64 - 151/81)  BP(mean): --  RR: 16 (16 - 18)  SpO2: 98% (94% - 98%)    MEDICATIONS  (STANDING):  lamoTRIgine 100milliGRAM(s) Oral daily  atorvastatin 40milliGRAM(s) Oral at bedtime  pantoprazole    Tablet 40milliGRAM(s) Oral before breakfast  levETIRAcetam 1000milliGRAM(s) Oral two times a day    MEDICATIONS  (PRN):  labetalol Injectable 10milliGRAM(s) IV Push every 6 hours PRN Systolic blood pressure >180    Assessment & Opinion:  Seizure Disorder, generalized convulsive and absence, stable and seizure free since admission.  No CVA  Rec:  Continue Keppra and Lamotrigine as ordered.  Neurologically stable for discharge.

## 2017-04-24 NOTE — PROGRESS NOTE ADULT - SUBJECTIVE AND OBJECTIVE BOX
Patient is a 86y old  Female who presents with a chief complaint of AMS, confusion (20 Apr 2017 11:02)       OVERNIGHT EVENTS: no reported events    MEDICATIONS  (STANDING):  lamoTRIgine 100milliGRAM(s) Oral daily  atorvastatin 40milliGRAM(s) Oral at bedtime  pantoprazole    Tablet 40milliGRAM(s) Oral before breakfast  levETIRAcetam 1000milliGRAM(s) Oral two times a day    MEDICATIONS  (PRN):  labetalol Injectable 10milliGRAM(s) IV Push every 6 hours PRN Systolic blood pressure >180        Vital Signs Last 24 Hrs  T(C): 37, Max: 37.3 (04-24 @ 17:29)  T(F): 98.6, Max: 99.2 (04-24 @ 17:29)  HR: 72 (70 - 85)  BP: 143/78 (143/78 - 151/83)  BP(mean): --  RR: 16 (16 - 16)  SpO2: 98% (98% - 98%)      PHYSICAL EXAM:  GENERAL: NAD, well-groomed, well-developed, sitting up in chair  HEAD:  Atraumatic, Normocephalic  EYES: EOMI, PERRLA, conjunctiva and sclera clear  ENMT: No tonsillar erythema, exudates, or enlargement; Moist mucous membranes   NERVOUS SYSTEM:  Alert & Oriented X2, forgetful at times  CHEST/LUNG: Clear to percussion bilaterally; No rales, rhonchi, wheezing, or rubs  HEART: Regular rate and rhythm; No murmurs, rubs, or gallops  ABDOMEN: Soft, Nontender, Nondistended; Bowel sounds present  EXTREMITIES:  2+ Peripheral Pulses, No clubbing, cyanosis, or edema    LABS:             cardiac markers     CAPILLARY BLOOD GLUCOSE    Cultures    RADIOLOGY & ADDITIONAL TESTS:    Imaging Personally Reviewed:  [x ] YES  [ ] NO    Consultant(s) Notes Reviewed:  [x ] YES  [ ] NO    Care Discussed with Consultants/Other Providers [ x] YES  [ ] NO

## 2017-04-24 NOTE — PROGRESS NOTE ADULT - SUBJECTIVE AND OBJECTIVE BOX
Assessment:  HTN, now Hypotensive.   Meds with hold parameters.   IV hydration acceptable  Midodrine as needed for orthostatic hypotension  Risk to hold SBP meds, CVA  I will hold meds   and keep the Labetolol PRN order for now  SBP as been stable and no Labetalol PRN has been given.

## 2017-04-25 PROCEDURE — 99233 SBSQ HOSP IP/OBS HIGH 50: CPT

## 2017-04-25 RX ORDER — INSULIN HUMAN 100 [IU]/ML
5 INJECTION, SOLUTION SUBCUTANEOUS ONCE
Qty: 0 | Refills: 0 | Status: DISCONTINUED | OUTPATIENT
Start: 2017-04-25 | End: 2017-04-25

## 2017-04-25 RX ADMIN — LEVETIRACETAM 1000 MILLIGRAM(S): 250 TABLET, FILM COATED ORAL at 06:32

## 2017-04-25 RX ADMIN — LEVETIRACETAM 1000 MILLIGRAM(S): 250 TABLET, FILM COATED ORAL at 18:27

## 2017-04-25 RX ADMIN — LAMOTRIGINE 100 MILLIGRAM(S): 25 TABLET, ORALLY DISINTEGRATING ORAL at 12:10

## 2017-04-25 RX ADMIN — PANTOPRAZOLE SODIUM 40 MILLIGRAM(S): 20 TABLET, DELAYED RELEASE ORAL at 08:51

## 2017-04-25 NOTE — PROGRESS NOTE ADULT - NSHPATTENDINGPLANDISCUSS_GEN_ALL_CORE
daughter
Discussed with family -daughter leon and son in law Lm in extended length
left voice msg with call back info [facility] [phone]
pt's daughter at bedside in detail

## 2017-04-25 NOTE — PROGRESS NOTE ADULT - SUBJECTIVE AND OBJECTIVE BOX
Patient is a 86y old  Female who presents with a chief complaint of AMS, confusion (20 Apr 2017 11:02)       OVERNIGHT EVENTS: no reported events    MEDICATIONS  (STANDING):  lamoTRIgine 100milliGRAM(s) Oral daily  atorvastatin 40milliGRAM(s) Oral at bedtime  pantoprazole    Tablet 40milliGRAM(s) Oral before breakfast  levETIRAcetam 1000milliGRAM(s) Oral two times a day    MEDICATIONS  (PRN):  labetalol Injectable 10milliGRAM(s) IV Push every 6 hours PRN Systolic blood pressure >180         Vital Signs Last 24 Hrs  T(C): 37, Max: 37.3 (04-24 @ 17:29)  T(F): 98.6, Max: 99.2 (04-24 @ 17:29)  HR: 72 (70 - 85)  BP: 143/78 (143/78 - 151/83)  BP(mean): --  RR: 16 (16 - 16)  SpO2: 98% (98% - 98%)        PHYSICAL EXAM:  GENERAL: NAD, well-groomed, well-developed, lying in bed  HEAD:  Atraumatic, Normocephalic  EYES: EOMI, PERRLA, conjunctiva and sclera clear  ENMT: No tonsillar erythema, exudates, or enlargement; Moist mucous membranes   NERVOUS SYSTEM:  Alert & Oriented X2, confused at times  CHEST/LUNG: Clear to percussion bilaterally; No rales, rhonchi, wheezing, or rubs  HEART: Regular rate and rhythm; No murmurs, rubs, or gallops  ABDOMEN: Soft, Nontender, Nondistended; Bowel sounds present  EXTREMITIES:  2+ Peripheral Pulses, No clubbing, cyanosis, or edema    LABS:             cardiac markers     CAPILLARY BLOOD GLUCOSE    Cultures    RADIOLOGY & ADDITIONAL TESTS:    Imaging Personally Reviewed:  [x ] YES  [ ] NO    Consultant(s) Notes Reviewed:  [x ] YES  [ ] NO    Care Discussed with Consultants/Other Providers [x ] YES  [ ] NO

## 2017-04-26 PROCEDURE — 99233 SBSQ HOSP IP/OBS HIGH 50: CPT

## 2017-04-26 RX ORDER — ACETAMINOPHEN 500 MG
650 TABLET ORAL ONCE
Qty: 0 | Refills: 0 | Status: COMPLETED | OUTPATIENT
Start: 2017-04-26 | End: 2017-04-26

## 2017-04-26 RX ORDER — ACETAMINOPHEN 500 MG
650 TABLET ORAL ONCE
Qty: 0 | Refills: 0 | Status: DISCONTINUED | OUTPATIENT
Start: 2017-04-26 | End: 2017-04-26

## 2017-04-26 RX ADMIN — Medication 650 MILLIGRAM(S): at 15:36

## 2017-04-26 RX ADMIN — LEVETIRACETAM 1000 MILLIGRAM(S): 250 TABLET, FILM COATED ORAL at 18:07

## 2017-04-26 RX ADMIN — LAMOTRIGINE 100 MILLIGRAM(S): 25 TABLET, ORALLY DISINTEGRATING ORAL at 12:13

## 2017-04-26 RX ADMIN — LEVETIRACETAM 1000 MILLIGRAM(S): 250 TABLET, FILM COATED ORAL at 05:15

## 2017-04-26 RX ADMIN — Medication 650 MILLIGRAM(S): at 16:30

## 2017-04-26 RX ADMIN — ATORVASTATIN CALCIUM 40 MILLIGRAM(S): 80 TABLET, FILM COATED ORAL at 21:55

## 2017-04-26 RX ADMIN — PANTOPRAZOLE SODIUM 40 MILLIGRAM(S): 20 TABLET, DELAYED RELEASE ORAL at 07:40

## 2017-04-26 NOTE — PROGRESS NOTE ADULT - SUBJECTIVE AND OBJECTIVE BOX
Patient is a 86y old  Female who presents with a chief complaint of AMS, confusion (20 Apr 2017 11:02)       OVERNIGHT EVENTS: c/o back pain, ambulating well w/o any difficulties    MEDICATIONS  (STANDING):  lamoTRIgine 100milliGRAM(s) Oral daily  atorvastatin 40milliGRAM(s) Oral at bedtime  pantoprazole    Tablet 40milliGRAM(s) Oral before breakfast  levETIRAcetam 1000milliGRAM(s) Oral two times a day    MEDICATIONS  (PRN):  labetalol Injectable 10milliGRAM(s) IV Push every 6 hours PRN Systolic blood pressure >180       Vital Signs Last 24 Hrs  T(C): 36.9, Max: 36.9 (04-26 @ 17:36)  T(F): 98.5, Max: 98.5 (04-26 @ 17:36)  HR: 80 (68 - 85)  BP: 132/82 (106/70 - 159/79)  BP(mean): --  RR: 16 (16 - 17)  SpO2: 99% (96% - 100%)        PHYSICAL EXAM:  GENERAL: NAD, well-groomed, well-developed, lying in bed  HEAD:  Atraumatic, Normocephalic  EYES: EOMI, PERRLA, conjunctiva and sclera clear  ENMT: No tonsillar erythema, exudates, or enlargement; Moist mucous membranes   NERVOUS SYSTEM:  Alert & Oriented X2, confused at times  CHEST/LUNG: Clear to percussion bilaterally; No rales, rhonchi, wheezing, or rubs  HEART: Regular rate and rhythm; No murmurs, rubs, or gallops  ABDOMEN: Soft, Nontender, Nondistended; Bowel sounds present  EXTREMITIES:  2+ Peripheral Pulses, No clubbing, cyanosis, or edema    LABS:             cardiac markers     CAPILLARY BLOOD GLUCOSE    Cultures    RADIOLOGY & ADDITIONAL TESTS:    Imaging Personally Reviewed:  [x ] YES  [ ] NO    Consultant(s) Notes Reviewed:  [ x] YES  [ ] NO    Care Discussed with Consultants/Other Providers [x ] YES  [ ] NO

## 2017-04-26 NOTE — PROGRESS NOTE ADULT - SUBJECTIVE AND OBJECTIVE BOX
INTERVAL HPI/OVERNIGHT EVENTS:  Subjective Complaints:  Uneventful and patient offers no complaints.  No seizures or altered mental state.  On Keppra as ordered.    RECENT RADIOLOGY & ADDITIONAL TESTS:    NEUROLOGICAL EXAM (Pertinent):  Vital Signs Last 24 Hrs  T(C): 36.1, Max: 36.8 (04-25 @ 17:38)  T(F): 97, Max: 98.2 (04-25 @ 17:38)  HR: 79 (68 - 104)  BP: 123/72 (106/70 - 159/79)  BP(mean): --  RR: 17 (16 - 17)  SpO2: 96% (96% - 100%)    MEDICATIONS  (STANDING):  lamoTRIgine 100milliGRAM(s) Oral daily  atorvastatin 40milliGRAM(s) Oral at bedtime  pantoprazole    Tablet 40milliGRAM(s) Oral before breakfast  levETIRAcetam 1000milliGRAM(s) Oral two times a day    MEDICATIONS  (PRN):  labetalol Injectable 10milliGRAM(s) IV Push every 6 hours PRN Systolic blood pressure >180  acetaminophen   Tablet. 650milliGRAM(s) Oral once PRN Mild Pain (1 - 3)    LABS:    Assessment & Opinion:  Seizure Disorder, generalized convulsive and absence, stable and seizure free since admission.  No CVA  Rec:  Continue Keppra and Lamotrigine as ordered.  Neurologically stable for discharge.  No need for Neurologic follow up care for now.

## 2017-04-26 NOTE — PROGRESS NOTE ADULT - ATTENDING COMMENTS
Discussed with family -daughter leon and son in law Lm in extended length 4 times today in person and over the phone and answered all Qs. Entire hospital course explained in detail; again and asked to hold off all BP meds  untill seen by PMD or cardiologist Dr.DEl barrios. PT eval done, ambulating well, pt's family upset about Pt eval . explained again in detail.  called  [phone] and was told he is no working this week and will be back next week and no one to take msg. they requested that family should call to make appt and as she is admitted in hospital she will be given appt within 1-2 weeks. told family that same and ask them to call  . Spoke to family along with nursing benitez madsen, case BENITEZ Hollis at bedside . discussed all the above in detail  family wanting to appeal the discharge
family appealed d/c, awaiting
86F PMH Pulm HTN, non ischemic cardiomyopathy, s/p AICD placement, HTN, HLD, absence seizures (on keppra and lamictal), CVA, s/p  shunt presents to ER AMS. pt noted by family with slurred speech, not acting herself, with questionable R facial droop. Family assumed pt had another seizure. But when symptoms did not improve, pt was brought into ER for evaluation. Code stroke activated in ER. Pt found to be hypertensive (246/198) and sent for CT head: No acute bleed. Admitted to ICU for hypertensive urgency. Placed briefly on cardene drip (off since 4/17/17 1AM). DX: AMS/acute encephalopathy, Seizure disorder, hypertensive urgency, hypertensive encephalopathy.    1. NEURO  - mental status significantly improved  - pt moving all extremities spontaneously and  equally  - awake and alert, responsive  - suspect underlying AMS, slurred speech due to hypertensive encephalopathy vs seizure; doubt true CVA as symptoms quickly resolved  - follow up EEG results  - cont keprra and lamictal  - neuro follow up    2. CV  - hypertensive urgency resolved: now on home BP meds: losartan, carvedilol, amlodipine  - BP improved  - off cardene drip  - resume home lasix in next 24 hrs, no signs of volume overload currently    3. Gen  - physical therapy  - OOB to chair  - if BP stable, transfer to medical floor

## 2017-04-26 NOTE — PROGRESS NOTE ADULT - SUBJECTIVE AND OBJECTIVE BOX
Assessment:  HTN,   Meds with hold parameters.   IV hydration acceptable  Risk to hold SBP meds, CVA   and keep the Labetolol PRN order for now

## 2017-04-27 DIAGNOSIS — M54.6 PAIN IN THORACIC SPINE: ICD-10-CM

## 2017-04-27 PROCEDURE — 72070 X-RAY EXAM THORAC SPINE 2VWS: CPT | Mod: 26

## 2017-04-27 PROCEDURE — 71110 X-RAY EXAM RIBS BIL 3 VIEWS: CPT | Mod: 26

## 2017-04-27 RX ORDER — LIDOCAINE 4 G/100G
1 CREAM TOPICAL DAILY
Qty: 0 | Refills: 0 | Status: DISCONTINUED | OUTPATIENT
Start: 2017-04-27 | End: 2017-04-28

## 2017-04-27 RX ADMIN — LEVETIRACETAM 1000 MILLIGRAM(S): 250 TABLET, FILM COATED ORAL at 05:32

## 2017-04-27 RX ADMIN — PANTOPRAZOLE SODIUM 40 MILLIGRAM(S): 20 TABLET, DELAYED RELEASE ORAL at 08:53

## 2017-04-27 RX ADMIN — LAMOTRIGINE 100 MILLIGRAM(S): 25 TABLET, ORALLY DISINTEGRATING ORAL at 12:57

## 2017-04-27 RX ADMIN — LEVETIRACETAM 1000 MILLIGRAM(S): 250 TABLET, FILM COATED ORAL at 19:05

## 2017-04-27 RX ADMIN — LIDOCAINE 1 PATCH: 4 CREAM TOPICAL at 23:00

## 2017-04-27 RX ADMIN — LIDOCAINE 1 PATCH: 4 CREAM TOPICAL at 11:07

## 2017-04-27 RX ADMIN — ATORVASTATIN CALCIUM 40 MILLIGRAM(S): 80 TABLET, FILM COATED ORAL at 22:35

## 2017-04-27 NOTE — PROGRESS NOTE ADULT - SUBJECTIVE AND OBJECTIVE BOX
Patient is a 86y old  Female who presents with a chief complaint of AMS, confusion (20 Apr 2017 11:02)       OVERNIGHT EVENTS: c/o back pain, ambulating well w/o any difficulties  lidoderm added -still no relief  awaiting decision [facility] decision    MEDICATIONS  (STANDING):  lamoTRIgine 100milliGRAM(s) Oral daily  atorvastatin 40milliGRAM(s) Oral at bedtime  pantoprazole    Tablet 40milliGRAM(s) Oral before breakfast  levETIRAcetam 1000milliGRAM(s) Oral two times a day    MEDICATIONS  (PRN):  labetalol Injectable 10milliGRAM(s) IV Push every 6 hours PRN Systolic blood pressure >180       Vital Signs Last 24 Hrs  T(C): 36.9, Max: 36.9 (04-26 @ 17:36)  T(F): 98.5, Max: 98.5 (04-26 @ 17:36)  HR: 80 (68 - 85)  BP: 132/82 (106/70 - 159/79)  BP(mean): --  RR: 16 (16 - 17)  SpO2: 99% (96% - 100%)        PHYSICAL EXAM:  GENERAL: NAD, well-groomed, well-developed, lying in bed  HEAD:  Atraumatic, Normocephalic  EYES: EOMI, PERRLA, conjunctiva and sclera clear  ENMT: No tonsillar erythema, exudates, or enlargement; Moist mucous membranes   NERVOUS SYSTEM:  Alert & Oriented X2, confused at times  CHEST/LUNG: Clear to percussion bilaterally; No rales, rhonchi, wheezing, or rubs  HEART: Regular rate and rhythm; No murmurs, rubs, or gallops  ABDOMEN: Soft, Nontender, Nondistended; Bowel sounds present  EXTREMITIES:  2+ Peripheral Pulses, No clubbing, cyanosis, or edema    LABS:             cardiac markers     CAPILLARY BLOOD GLUCOSE    Cultures    RADIOLOGY & ADDITIONAL TESTS:    Imaging Personally Reviewed:  [x ] YES  [ ] NO    Consultant(s) Notes Reviewed:  [ x] YES  [ ] NO    Care Discussed with Consultants/Other Providers [x ] YES  [ ] NO

## 2017-04-27 NOTE — PROGRESS NOTE ADULT - PROBLEM SELECTOR PLAN 2
- monitor BP  - c/w  adjusted meds
- monitor BP  - c/w  adjusted meds  -
- monitor BP  - c/w current meds  - better controlled now
- monitor BP  - c/w current meds  - better controlled now
clinically euvolemic.  elevated cardiac enzymes. un likely cardiac event given presentation  would repeat cardiac enzymes in A.M.  Given patient overall comorbidities and age, if cardiac ischemia is suggested, patient would best be managed medically anyway. Patient on ASA 81mg daily currently.  continue to observe.
returning to baseline as per family

## 2017-04-27 NOTE — PROGRESS NOTE ADULT - PROBLEM SELECTOR PLAN 3
- Hold keppra for now, level>80  - Repeat level pending   - c/w Lamotrigine   - - EEG= Abnormal EEG with the presence of paroxysmal single spike   wave complexes noted over the left frontotemporal regions consistent with   focal epileptiform focus.   - discussed with 
- Hold keppra for now, level>80  - Repeat level today   - c/w Lamotrigine   - - EEG= Abnormal EEG with the presence of paroxysmal single spike   wave complexes noted over the left frontotemporal regions consistent with   focal epileptiform focus.   - discussed with 
- on monday ,Pt ambulating with PT , felt pale , dizzy, found BP in 60s/30s  -   d/c all BP meds    - cardiology  on board
- on monday ,Pt ambulating with PT , felt pale , dizzy, found BP in 60s/30s  -   d/c all BP meds    - cardiology  on board
-Pt ambulating with PT , felt pale , dizzy, found BP in 60s/30s  - increase IVF, readjusted BP meds, cannot d/c all BP meds as pt's SBP has been 140s w/ 3 BP meds.    - cardiology  on board
-Pt ambulating with PT , felt pale , dizzy, found BP in 60s/30s  - increase IVF, readjusted BP meds, cannot d/c all BP meds as pt's SBP has been 140s w/ 3 BP meds.    - cardiology  on board
-Pt ambulating with PT , felt pale , dizzy, found BP in 60s/30s  - increase IVF, readjusted BP meds, cannot d/c all BP meds as pt's SBP has been 140s w/ 3 BP meds. reordered w/ parameters  - cardiology  consulted
consent for blood transfusion obtained, monitor cbc , transfuse PRBC if cbc <8 or bleeding recurs  protonix gtt  gi following

## 2017-04-27 NOTE — PROGRESS NOTE ADULT - PROBLEM SELECTOR PROBLEM 5
Acute ischemic colitis
Syncope, unspecified syncope type

## 2017-04-27 NOTE — PROGRESS NOTE ADULT - ASSESSMENT
85 y/o female with hx long-standing MR and severe pHTN, NICM s/p ICD, epilepsy s/p intracranial shunt, PPM, absent seizure, p/w sudden onset of confusion.  daughter found patient was confused and having slurred speech/aphasia. Daughter thought she was having absent seizure and did not call ambulance until 2-3 hours later. In the ED, SBP in 240s. Admitted to ICU for r/o acute CVA, outside the tPA window requiring critical care neurochecks / monitoring and for hypertensive urgency on cardene drip, stabilized and now transfered to medical floor
86F HTN CHF w/ PPM, seizure hx,  shunt, p/w seizure (costa's paralysis) vs CVA vs TIA clinically stable and improved.
87 y/o female with hx long-standing MR and severe pHTN, NICM s/p ICD, epilepsy s/p intracranial shunt, PPM, absent seizure, p/w sudden onset of confusion.  daughter found patient was confused and having slurred speech/aphasia. Daughter thought she was having absent seizure and did not call ambulance until 2-3 hours later. In the ED, SBP in 240s. Admitted to ICU for r/o acute CVA, outside the tPA window requiring critical care neurochecks / monitoring and for hypertensive urgency on cardene drip, stabilized and now transfered to medical floor
87 y/o female with hx long-standing MR and severe pHTN, NICM s/p ICD, epilepsy s/p intracranial shunt, PPM, absent seizure, p/w sudden onset of confusion.  daughter found patient was confused and having slurred speech/aphasia. Daughter thought she was having absent seizure and did not call ambulance until 2-3 hours later. In the ED, SBP in 240s. Admitted to ICU for r/o acute CVA, outside the tPA window requiring critical care neurochecks / monitoring and for hypertensive urgency on cardene drip, stabilized and now transfered to medical floor
87 y/o female with hx long-standing MR and severe pHTN, NICM s/p ICD, epilepsy s/p intracranial shunt, PPM, absent seizure, p/w sudden onset of confusion. Last seen normal was this afternoon. Around 450pm today, daughter found patient was confused and having slurred speech/aphasia. Daughter thought she was having absent seizure and did not call ambulance until 2-3 hours later. In the ED, SBP in 240s. ICU was called. tPA not given    Seen at the bedside. Aphasic and noncomprehensable. SBP between 200-240. (16 Apr 2017 21:34)  Pt. admitted with hypertensive emergency, r/stroke vs seizure  transferred out of icu  syncopal episode today with hypotension and rectal bleed  Now with GI bleed
HPI:  85 y/o female with hx long-standing MR and severe pHTN, NICM s/p ICD, epilepsy s/p intracranial shunt, PPM, absent seizure, p/w sudden onset of confusion. Last seen normal was this afternoon. Around 450pm today, daughter found patient was confused and having slurred speech/aphasia. Daughter thought she was having absent seizure and did not call ambulance until 2-3 hours later. In the ED, SBP in 240s. ICU was called. tPA not given.     Seen at the bedside. Aphasic and noncomprehensable. SBP between 200-240. (16 Apr 2017 21:34)
HPI:  87 y/o female with hx long-standing MR and severe pHTN, NICM s/p ICD, epilepsy s/p intracranial shunt, PPM, absent seizure, p/w sudden onset of confusion. Last seen normal was this afternoon. Around 450pm today, daughter found patient was confused and having slurred speech/aphasia. Daughter thought she was having absent seizure and did not call ambulance until 2-3 hours later. In the ED, SBP in 240s. ICU was called. tPA not given.     Seen at the bedside. Aphasic and noncomprehensable. SBP between 200-240. (16 Apr 2017 21:34)
HPI:  87 y/o female with hx long-standing MR and severe pHTN, NICM s/p ICD, epilepsy s/p intracranial shunt, PPM, absent seizure, p/w sudden onset of confusion. Last seen normal was this afternoon. Around 450pm today, daughter found patient was confused and having slurred speech/aphasia. Daughter thought she was having absent seizure and did not call ambulance until 2-3 hours later. In the ED, SBP in 240s. ICU was called. tPA not given.     Seen at the bedside. Aphasic and noncomprehensable. SBP between 200-240. (16 Apr 2017 21:34)
85 y/o female with hx long-standing MR and severe pHTN, NICM s/p ICD, epilepsy s/p intracranial shunt, PPM, absent seizure, p/w sudden onset of confusion.  daughter found patient was confused and having slurred speech/aphasia. Daughter thought she was having absent seizure and did not call ambulance until 2-3 hours later. In the ED, SBP in 240s. Admitted to ICU for r/o acute CVA, outside the tPA window requiring critical care neurochecks / monitoring and for hypertensive urgency on cardene drip, stabilized and now transfered to medical floor

## 2017-04-27 NOTE — PROGRESS NOTE ADULT - PROBLEM SELECTOR PROBLEM 2
Hypertensive encephalopathy
CHF (congestive heart failure)
Metabolic encephalopathy

## 2017-04-27 NOTE — PROGRESS NOTE ADULT - PROBLEM SELECTOR PLAN 5
- advanced   diet, tolerating well , no abdominal pain   - no plans for GI intervention at this point   - discussed with 
- elevated CRP  - advanced   diet, tolerating well , no abdominal pain  - on zosyn IV   - no plans for GI intervention at this point   - discussed with 
monitor on tele

## 2017-04-27 NOTE — PROGRESS NOTE ADULT - PROBLEM SELECTOR PLAN 4
- elevated CRP  - advanced to full liquid diet, no abdominal pain  - on zosyn IV   - no plans for GI intervention at this point   - discussed with 
- repeat lactate,   - on clear liquid diet, no abdominal pain  - on zosyn IV   - no plans for GI intervention at this point   - discussed with 
- restarted keppra    - c/w Lamotrigine   - - EEG= Abnormal EEG with the presence of paroxysmal single spike   wave complexes noted over the left frontotemporal regions consistent with   focal epileptiform focus.   - discussed with 
continue with lasix for now, hold if bp drops again, TTE here or obtain outside medical records to determine type , but likely combined given MR, pulm HTN, ICD

## 2017-04-27 NOTE — PROGRESS NOTE ADULT - PROBLEM SELECTOR PROBLEM 1
Acute encephalopathy
Gastrointestinal hemorrhage, unspecified gastrointestinal hemorrhage type
Gastrointestinal hemorrhage, unspecified gastrointestinal hemorrhage type
Acute encephalopathy
Altered mental status
Gastrointestinal hemorrhage, unspecified gastrointestinal hemorrhage type
Hypertensive emergency

## 2017-04-27 NOTE — PROGRESS NOTE ADULT - PROBLEM SELECTOR PROBLEM 3
Orthostatic hypotension
Seizure
Seizure
Gastrointestinal hemorrhage, unspecified gastrointestinal hemorrhage type

## 2017-04-27 NOTE — PROGRESS NOTE ADULT - PROBLEM SELECTOR PROBLEM 4
Acute ischemic colitis
Seizure
Acute ischemic colitis
Chronic congestive heart failure, unspecified congestive heart failure type

## 2017-04-28 VITALS
DIASTOLIC BLOOD PRESSURE: 78 MMHG | OXYGEN SATURATION: 99 % | TEMPERATURE: 99 F | SYSTOLIC BLOOD PRESSURE: 121 MMHG | HEART RATE: 96 BPM | RESPIRATION RATE: 16 BRPM

## 2017-04-28 PROCEDURE — 99239 HOSP IP/OBS DSCHRG MGMT >30: CPT

## 2017-04-28 RX ORDER — LIDOCAINE 4 G/100G
3 CREAM TOPICAL
Qty: 1 | Refills: 0 | OUTPATIENT
Start: 2017-04-28 | End: 2017-05-13

## 2017-04-28 RX ADMIN — LAMOTRIGINE 100 MILLIGRAM(S): 25 TABLET, ORALLY DISINTEGRATING ORAL at 12:32

## 2017-04-28 RX ADMIN — LEVETIRACETAM 1000 MILLIGRAM(S): 250 TABLET, FILM COATED ORAL at 06:11

## 2017-04-28 RX ADMIN — PANTOPRAZOLE SODIUM 40 MILLIGRAM(S): 20 TABLET, DELAYED RELEASE ORAL at 08:37

## 2017-04-28 RX ADMIN — LEVETIRACETAM 1000 MILLIGRAM(S): 250 TABLET, FILM COATED ORAL at 17:33

## 2017-04-28 RX ADMIN — LIDOCAINE 1 PATCH: 4 CREAM TOPICAL at 12:32

## 2017-04-28 NOTE — PROGRESS NOTE ADULT - PROVIDER SPECIALTY LIST ADULT
Critical Care
Gastroenterology
Hospitalist
Neurology
Cardiology

## 2017-05-01 ENCOUNTER — OUTPATIENT (OUTPATIENT)
Dept: OUTPATIENT SERVICES | Facility: HOSPITAL | Age: 82
LOS: 1 days | End: 2017-05-01
Payer: MEDICAID

## 2017-05-01 ENCOUNTER — INPATIENT (INPATIENT)
Facility: HOSPITAL | Age: 82
LOS: 6 days | Discharge: NURSING, REHAB OR LONG-TERM CARE | End: 2017-05-08
Attending: INTERNAL MEDICINE | Admitting: INTERNAL MEDICINE
Payer: MEDICARE

## 2017-05-01 VITALS
HEIGHT: 64 IN | DIASTOLIC BLOOD PRESSURE: 88 MMHG | SYSTOLIC BLOOD PRESSURE: 172 MMHG | WEIGHT: 125 LBS | HEART RATE: 74 BPM | RESPIRATION RATE: 20 BRPM | OXYGEN SATURATION: 98 %

## 2017-05-01 DIAGNOSIS — Z98.2 PRESENCE OF CEREBROSPINAL FLUID DRAINAGE DEVICE: Chronic | ICD-10-CM

## 2017-05-01 DIAGNOSIS — I10 ESSENTIAL (PRIMARY) HYPERTENSION: ICD-10-CM

## 2017-05-01 DIAGNOSIS — R56.9 UNSPECIFIED CONVULSIONS: ICD-10-CM

## 2017-05-01 DIAGNOSIS — Z95.0 PRESENCE OF CARDIAC PACEMAKER: ICD-10-CM

## 2017-05-01 DIAGNOSIS — E78.00 PURE HYPERCHOLESTEROLEMIA, UNSPECIFIED: ICD-10-CM

## 2017-05-01 DIAGNOSIS — Z86.73 PERSONAL HISTORY OF TRANSIENT ISCHEMIC ATTACK (TIA), AND CEREBRAL INFARCTION WITHOUT RESIDUAL DEFICITS: ICD-10-CM

## 2017-05-01 DIAGNOSIS — R41.82 ALTERED MENTAL STATUS, UNSPECIFIED: ICD-10-CM

## 2017-05-01 DIAGNOSIS — I27.2 OTHER SECONDARY PULMONARY HYPERTENSION: ICD-10-CM

## 2017-05-01 DIAGNOSIS — K92.2 GASTROINTESTINAL HEMORRHAGE, UNSPECIFIED: ICD-10-CM

## 2017-05-01 DIAGNOSIS — I50.9 HEART FAILURE, UNSPECIFIED: ICD-10-CM

## 2017-05-01 LAB
ALBUMIN SERPL ELPH-MCNC: 3.2 G/DL — LOW (ref 3.3–5)
ALP SERPL-CCNC: 73 U/L — SIGNIFICANT CHANGE UP (ref 40–120)
ALT FLD-CCNC: 15 U/L — SIGNIFICANT CHANGE UP (ref 12–78)
ANION GAP SERPL CALC-SCNC: 9 MMOL/L — SIGNIFICANT CHANGE UP (ref 5–17)
APPEARANCE UR: ABNORMAL
APTT BLD: 50.7 SEC — HIGH (ref 27.5–37.4)
AST SERPL-CCNC: 22 U/L — SIGNIFICANT CHANGE UP (ref 15–37)
BACTERIA # UR AUTO: ABNORMAL
BASOPHILS # BLD AUTO: 0.1 K/UL — SIGNIFICANT CHANGE UP (ref 0–0.2)
BASOPHILS NFR BLD AUTO: 1.7 % — SIGNIFICANT CHANGE UP (ref 0–2)
BILIRUB SERPL-MCNC: 0.4 MG/DL — SIGNIFICANT CHANGE UP (ref 0.2–1.2)
BILIRUB UR-MCNC: NEGATIVE — SIGNIFICANT CHANGE UP
BLD GP AB SCN SERPL QL: SIGNIFICANT CHANGE UP
BUN SERPL-MCNC: 13 MG/DL — SIGNIFICANT CHANGE UP (ref 7–23)
CALCIUM SERPL-MCNC: 8.8 MG/DL — SIGNIFICANT CHANGE UP (ref 8.5–10.1)
CHLORIDE SERPL-SCNC: 102 MMOL/L — SIGNIFICANT CHANGE UP (ref 96–108)
CK MB BLD-MCNC: 1.2 % — SIGNIFICANT CHANGE UP (ref 0–3.5)
CK MB CFR SERPL CALC: 0.5 NG/ML — SIGNIFICANT CHANGE UP (ref 0.5–3.6)
CK SERPL-CCNC: 42 U/L — SIGNIFICANT CHANGE UP (ref 26–192)
CO2 SERPL-SCNC: 29 MMOL/L — SIGNIFICANT CHANGE UP (ref 22–31)
COLOR SPEC: YELLOW — SIGNIFICANT CHANGE UP
CREAT SERPL-MCNC: 0.96 MG/DL — SIGNIFICANT CHANGE UP (ref 0.5–1.3)
DIFF PNL FLD: ABNORMAL
EOSINOPHIL # BLD AUTO: 0.2 K/UL — SIGNIFICANT CHANGE UP (ref 0–0.5)
EOSINOPHIL NFR BLD AUTO: 3.1 % — SIGNIFICANT CHANGE UP (ref 0–6)
EPI CELLS # UR: SIGNIFICANT CHANGE UP
GLUCOSE SERPL-MCNC: 119 MG/DL — HIGH (ref 70–99)
GLUCOSE UR QL: NEGATIVE MG/DL — SIGNIFICANT CHANGE UP
HCT VFR BLD CALC: 33.8 % — LOW (ref 34.5–45)
HGB BLD-MCNC: 10.9 G/DL — LOW (ref 11.5–15.5)
INR BLD: 1.1 RATIO — SIGNIFICANT CHANGE UP (ref 0.88–1.16)
KETONES UR-MCNC: NEGATIVE — SIGNIFICANT CHANGE UP
LEUKOCYTE ESTERASE UR-ACNC: ABNORMAL
LYMPHOCYTES # BLD AUTO: 1.2 K/UL — SIGNIFICANT CHANGE UP (ref 1–3.3)
LYMPHOCYTES # BLD AUTO: 20.4 % — SIGNIFICANT CHANGE UP (ref 13–44)
MCHC RBC-ENTMCNC: 29.8 PG — SIGNIFICANT CHANGE UP (ref 27–34)
MCHC RBC-ENTMCNC: 32.4 GM/DL — SIGNIFICANT CHANGE UP (ref 32–36)
MCV RBC AUTO: 92 FL — SIGNIFICANT CHANGE UP (ref 80–100)
MONOCYTES # BLD AUTO: 0.6 K/UL — SIGNIFICANT CHANGE UP (ref 0–0.9)
MONOCYTES NFR BLD AUTO: 9.6 % — SIGNIFICANT CHANGE UP (ref 2–14)
NEUTROPHILS # BLD AUTO: 3.8 K/UL — SIGNIFICANT CHANGE UP (ref 1.8–7.4)
NEUTROPHILS NFR BLD AUTO: 65.2 % — SIGNIFICANT CHANGE UP (ref 43–77)
NITRITE UR-MCNC: NEGATIVE — SIGNIFICANT CHANGE UP
PH UR: 7 — SIGNIFICANT CHANGE UP (ref 5–8)
PLATELET # BLD AUTO: 350 K/UL — SIGNIFICANT CHANGE UP (ref 150–400)
POTASSIUM SERPL-MCNC: 4.1 MMOL/L — SIGNIFICANT CHANGE UP (ref 3.5–5.3)
POTASSIUM SERPL-SCNC: 4.1 MMOL/L — SIGNIFICANT CHANGE UP (ref 3.5–5.3)
PROT SERPL-MCNC: 7.3 GM/DL — SIGNIFICANT CHANGE UP (ref 6–8.3)
PROT UR-MCNC: 100 MG/DL
PROTHROM AB SERPL-ACNC: 12 SEC — SIGNIFICANT CHANGE UP (ref 9.8–12.7)
RBC # BLD: 3.67 M/UL — LOW (ref 3.8–5.2)
RBC # FLD: 12.8 % — SIGNIFICANT CHANGE UP (ref 11–15)
RBC CASTS # UR COMP ASSIST: >50 /HPF (ref 0–4)
SODIUM SERPL-SCNC: 140 MMOL/L — SIGNIFICANT CHANGE UP (ref 135–145)
SP GR SPEC: 1.01 — SIGNIFICANT CHANGE UP (ref 1.01–1.02)
TROPONIN I SERPL-MCNC: 0.02 NG/ML — SIGNIFICANT CHANGE UP (ref 0.01–0.04)
UROBILINOGEN FLD QL: NEGATIVE MG/DL — SIGNIFICANT CHANGE UP
WBC # BLD: 5.9 K/UL — SIGNIFICANT CHANGE UP (ref 3.8–10.5)
WBC # FLD AUTO: 5.9 K/UL — SIGNIFICANT CHANGE UP (ref 3.8–10.5)
WBC UR QL: ABNORMAL

## 2017-05-01 PROCEDURE — 99291 CRITICAL CARE FIRST HOUR: CPT

## 2017-05-01 PROCEDURE — 71010: CPT | Mod: 26

## 2017-05-01 PROCEDURE — 99223 1ST HOSP IP/OBS HIGH 75: CPT

## 2017-05-01 PROCEDURE — 70450 CT HEAD/BRAIN W/O DYE: CPT | Mod: 26

## 2017-05-01 RX ORDER — FUROSEMIDE 40 MG
20 TABLET ORAL DAILY
Qty: 0 | Refills: 0 | Status: DISCONTINUED | OUTPATIENT
Start: 2017-05-01 | End: 2017-05-08

## 2017-05-01 RX ORDER — SODIUM CHLORIDE 9 MG/ML
1000 INJECTION, SOLUTION INTRAVENOUS
Qty: 0 | Refills: 0 | Status: DISCONTINUED | OUTPATIENT
Start: 2017-05-01 | End: 2017-05-03

## 2017-05-01 RX ORDER — CHOLECALCIFEROL (VITAMIN D3) 125 MCG
1000 CAPSULE ORAL DAILY
Qty: 0 | Refills: 0 | Status: DISCONTINUED | OUTPATIENT
Start: 2017-05-01 | End: 2017-05-08

## 2017-05-01 RX ORDER — LOSARTAN POTASSIUM 100 MG/1
50 TABLET, FILM COATED ORAL DAILY
Qty: 0 | Refills: 0 | Status: DISCONTINUED | OUTPATIENT
Start: 2017-05-01 | End: 2017-05-08

## 2017-05-01 RX ORDER — ATORVASTATIN CALCIUM 80 MG/1
40 TABLET, FILM COATED ORAL AT BEDTIME
Qty: 0 | Refills: 0 | Status: DISCONTINUED | OUTPATIENT
Start: 2017-05-01 | End: 2017-05-08

## 2017-05-01 RX ORDER — AMLODIPINE BESYLATE 2.5 MG/1
10 TABLET ORAL DAILY
Qty: 0 | Refills: 0 | Status: DISCONTINUED | OUTPATIENT
Start: 2017-05-01 | End: 2017-05-08

## 2017-05-01 RX ORDER — LEVETIRACETAM 250 MG/1
1000 TABLET, FILM COATED ORAL
Qty: 0 | Refills: 0 | Status: DISCONTINUED | OUTPATIENT
Start: 2017-05-01 | End: 2017-05-08

## 2017-05-01 RX ORDER — LAMOTRIGINE 25 MG/1
100 TABLET, ORALLY DISINTEGRATING ORAL DAILY
Qty: 0 | Refills: 0 | Status: DISCONTINUED | OUTPATIENT
Start: 2017-05-01 | End: 2017-05-08

## 2017-05-01 RX ORDER — ASPIRIN/CALCIUM CARB/MAGNESIUM 324 MG
81 TABLET ORAL DAILY
Qty: 0 | Refills: 0 | Status: DISCONTINUED | OUTPATIENT
Start: 2017-05-01 | End: 2017-05-08

## 2017-05-01 RX ORDER — CARVEDILOL PHOSPHATE 80 MG/1
25 CAPSULE, EXTENDED RELEASE ORAL EVERY 12 HOURS
Qty: 0 | Refills: 0 | Status: DISCONTINUED | OUTPATIENT
Start: 2017-05-01 | End: 2017-05-08

## 2017-05-01 RX ADMIN — CARVEDILOL PHOSPHATE 25 MILLIGRAM(S): 80 CAPSULE, EXTENDED RELEASE ORAL at 19:46

## 2017-05-01 RX ADMIN — ATORVASTATIN CALCIUM 40 MILLIGRAM(S): 80 TABLET, FILM COATED ORAL at 23:34

## 2017-05-01 RX ADMIN — LOSARTAN POTASSIUM 50 MILLIGRAM(S): 100 TABLET, FILM COATED ORAL at 19:53

## 2017-05-01 RX ADMIN — SODIUM CHLORIDE 75 MILLILITER(S): 9 INJECTION, SOLUTION INTRAVENOUS at 21:51

## 2017-05-01 RX ADMIN — AMLODIPINE BESYLATE 10 MILLIGRAM(S): 2.5 TABLET ORAL at 19:46

## 2017-05-01 NOTE — ED ADULT NURSE NOTE - OBJECTIVE STATEMENT
received paitnet from covering rn post ct came in for [id number] confusion and expressive aphasia . history of cva in 1996 with left side weakness moves all extremities speech clear. patient appears to rn confused other then to name ivhl right a/c #20 blood drawn ekg monitor. daughter at bedside emotional support provided

## 2017-05-01 NOTE — H&P ADULT. - ATTENDING COMMENTS
Pt seen/examined at bed side, agree with above h/p, a/p, pt with AMS, as per family mental status worsened compared to baseline, code stroke on arrival, NIHSS 4, CT brain neg for acute pathology, pt with recent admission with w/u, will check b12, folate, rpr given AMS, also to rule out UTI  and will follow with neurology for further recommendations,

## 2017-05-01 NOTE — H&P ADULT. - HISTORY OF PRESENT ILLNESS
86 YOF with PMHx of HTN, HLD, CHF with PM/AICD, absence seizures, hx of CVA, GI Bleed, ischemic colitis, hx of shunt draining cystic structure now with AMS. Spoke to daughter, Moraima and Son-in-Law at bedside since pt unable to put thoughts together. Family says pt is disoriented and her coordination is off, having some slurred speech. States it is similar to previous admission reason and concerned. Pt able to understand, however has trouble expressing thoughts. Admits to headache located at the forehead this morning, but now resolved, similar to previous headaches. Denies cp, sob, vomiting, diarrhea, constipation, abd pain. In previous admission pt had similar episode of AMS accompanied with HTN in 240's was admitted in ICU for BP control and then cleared to floor. Mental status was slightly improved however now presenting with similar symptoms. EEG done on 4/17/17 abnormal, focal epileptiform. ECHO done 4/19/17 EF: 60-65%.     In ED Code Stroke: CT head was negative for acute findings. CXR: unchanged from previous, L defibrillator noted. NEURO CONSULT: Dr. Orosco.     ED attending Dr. Bullard spoke to Dr. Seay who admitted pt.

## 2017-05-01 NOTE — H&P ADULT. - ASSESSMENT
86 YOF with PMHx of HTN, HLD, CHF with PM/AICD, absence seizures, hx of CVA, hx of GI Bleed, ischemic colitis, hx of shunt draining cystic structure admitted with acute encephalopathy and r/o stroke.

## 2017-05-01 NOTE — CONSULT NOTE ADULT - ASSESSMENT
consult dict  awaek alert maylin foote htn ct head no acute path hx of pacemaker non focla exam for tsh b12 eeg pt rehab asa 81 mg had work up recently htn encepahlopathy

## 2017-05-01 NOTE — H&P ADULT. - RS GEN PE MLT RESP DETAILS PC
breath sounds equal/no wheezes/respirations non-labored/good air movement/no rales/no rhonchi/airway patent/normal

## 2017-05-01 NOTE — H&P ADULT. - MUSCULOSKELETAL
details… detailed exam ROM intact/normal/no joint swelling/no joint erythema/no calf tenderness/no joint warmth/normal strength

## 2017-05-01 NOTE — H&P ADULT. - GASTROINTESTINAL DETAILS
no guarding/no distention/no masses palpable/nontender/normal/soft/no rebound tenderness/no rigidity/bowel sounds normal

## 2017-05-01 NOTE — H&P ADULT. - PROBLEM SELECTOR PLAN 2
- Restarted Losartan 50mg daily  - Restarted Lasix 20mg daily  - Restarted Norvasc 10mg daily  - Restarted Coreg 25mg BID  - Continue to monitor

## 2017-05-01 NOTE — H&P ADULT. - PMH
CHF (congestive heart failure)    GI bleed    High cholesterol    History of CVA (cerebrovascular accident)    HTN (hypertension)    Pacemaker    Pulmonary hypertension    Seizure

## 2017-05-01 NOTE — ED PROVIDER NOTE - CRITICAL CARE PROVIDED
direct patient care (not related to procedure)/consultation with other physicians/documentation/consult w/ pt's family directly relating to pts condition/additional history taking

## 2017-05-01 NOTE — H&P ADULT. - PROBLEM SELECTOR PLAN 1
- Admit to Telemetry  - R/o CVA/TIA  - Neuro Consult: Dr. Orosco  - A1C ordered  - Lipid panel ordered  - Folic acid RBC ordered  - RPR ordered  - Vit b12 serum ordered  - F/U labs  - F/U Urine Culture - Admit to Telemetry  - R/o CVA/TIA  - Neuro Consult: Dr. Orosco  - A1C ordered  - Lipid panel ordered  - Folic acid RBC ordered  - RPR ordered  - Vit b12 serum ordered  - F/U labs  - F/U Urine Culture  - NPO until Speech and Swallow  - PT/OT - Admit to Telemetry  -AMS sec to ?stroke vs ?seizure   - R/o CVA/TIA  - Neuro Consult: Dr. Orosco  - A1C ordered  - Lipid panel ordered  - Folic acid RBC ordered  - RPR ordered  - Vit b12 serum ordered  - F/U labs  - F/U Urine Culture  - NPO until Speech and Swallow  - PT/OT - Admit to Telemetry for acute encephalopathy/AMS due to ?etiology  -AMS sec to ?stroke vs ?seizure   - R/o CVA/TIA  - Neuro Consult: Dr. Orosco  - A1C ordered  - Lipid panel ordered  - Folic acid RBC ordered  - RPR ordered  - Vit b12 serum ordered  - F/U labs  - F/U Urine Culture  - NPO until Speech and Swallow  - PT/OT

## 2017-05-01 NOTE — H&P ADULT. - NEUROLOGICAL DETAILS
sensation intact/responds to verbal commands/alert and oriented x 3/responds to pain/normal strength

## 2017-05-01 NOTE — STROKE CODE NOTE - RELATIVE EXCLUSION OTHER CRITERIA
Consulted with Dr. Medina and agree that no focal neuro deficits without any slurred speech, now with just AMS, not candidate for tpa. Pt family informed and are relieved and agree.

## 2017-05-01 NOTE — H&P ADULT. - NEGATIVE NEUROLOGICAL SYMPTOMS
no focal seizures/no weakness/no generalized seizures/no syncope/no tremors/no vertigo/no transient paralysis

## 2017-05-02 ENCOUNTER — APPOINTMENT (OUTPATIENT)
Dept: INTERNAL MEDICINE | Facility: HOSPITAL | Age: 82
End: 2017-05-02

## 2017-05-02 DIAGNOSIS — N30.00 ACUTE CYSTITIS WITHOUT HEMATURIA: ICD-10-CM

## 2017-05-02 DIAGNOSIS — I10 ESSENTIAL (PRIMARY) HYPERTENSION: ICD-10-CM

## 2017-05-02 DIAGNOSIS — G93.41 METABOLIC ENCEPHALOPATHY: ICD-10-CM

## 2017-05-02 LAB
ALBUMIN SERPL ELPH-MCNC: 2.9 G/DL — LOW (ref 3.3–5)
ALP SERPL-CCNC: 65 U/L — SIGNIFICANT CHANGE UP (ref 40–120)
ALT FLD-CCNC: 11 U/L — LOW (ref 12–78)
ANION GAP SERPL CALC-SCNC: 9 MMOL/L — SIGNIFICANT CHANGE UP (ref 5–17)
AST SERPL-CCNC: 24 U/L — SIGNIFICANT CHANGE UP (ref 15–37)
BILIRUB SERPL-MCNC: 0.5 MG/DL — SIGNIFICANT CHANGE UP (ref 0.2–1.2)
BUN SERPL-MCNC: 9 MG/DL — SIGNIFICANT CHANGE UP (ref 7–23)
CALCIUM SERPL-MCNC: 8.6 MG/DL — SIGNIFICANT CHANGE UP (ref 8.5–10.1)
CHLORIDE SERPL-SCNC: 105 MMOL/L — SIGNIFICANT CHANGE UP (ref 96–108)
CHOLEST SERPL-MCNC: 156 MG/DL — SIGNIFICANT CHANGE UP (ref 10–199)
CO2 SERPL-SCNC: 28 MMOL/L — SIGNIFICANT CHANGE UP (ref 22–31)
CREAT SERPL-MCNC: 0.78 MG/DL — SIGNIFICANT CHANGE UP (ref 0.5–1.3)
FOLATE RBC-MCNC: 1475 NG/ML — SIGNIFICANT CHANGE UP (ref 499–1504)
GLUCOSE SERPL-MCNC: 110 MG/DL — HIGH (ref 70–99)
HCT VFR BLD CALC: 29.5 % — LOW (ref 34.5–45)
HCT VFR BLD CALC: 31 % — LOW (ref 34–45)
HDLC SERPL-MCNC: 81 MG/DL — SIGNIFICANT CHANGE UP (ref 40–125)
HGB BLD-MCNC: 10.7 G/DL — LOW (ref 11.5–15.5)
LIPID PNL WITH DIRECT LDL SERPL: 70 MG/DL — SIGNIFICANT CHANGE UP
MCHC RBC-ENTMCNC: 32.8 PG — SIGNIFICANT CHANGE UP (ref 27–34)
MCHC RBC-ENTMCNC: 36.4 GM/DL — HIGH (ref 32–36)
MCV RBC AUTO: 90.4 FL — SIGNIFICANT CHANGE UP (ref 80–100)
PLATELET # BLD AUTO: 316 K/UL — SIGNIFICANT CHANGE UP (ref 150–400)
POTASSIUM SERPL-MCNC: 3.6 MMOL/L — SIGNIFICANT CHANGE UP (ref 3.5–5.3)
POTASSIUM SERPL-SCNC: 3.6 MMOL/L — SIGNIFICANT CHANGE UP (ref 3.5–5.3)
PROT SERPL-MCNC: 6.5 GM/DL — SIGNIFICANT CHANGE UP (ref 6–8.3)
RBC # BLD: 3.27 M/UL — LOW (ref 3.8–5.2)
RBC # FLD: 12.5 % — SIGNIFICANT CHANGE UP (ref 11–15)
SODIUM SERPL-SCNC: 142 MMOL/L — SIGNIFICANT CHANGE UP (ref 135–145)
T PALLIDUM AB TITR SER: NEGATIVE — SIGNIFICANT CHANGE UP
TOTAL CHOLESTEROL/HDL RATIO MEASUREMENT: 1.9 RATIO — LOW (ref 3.3–7.1)
TRIGL SERPL-MCNC: 27 MG/DL — SIGNIFICANT CHANGE UP (ref 10–149)
TSH SERPL-MCNC: 1.16 UIU/ML — SIGNIFICANT CHANGE UP (ref 0.36–3.74)
VIT B12 SERPL-MCNC: 1107 PG/ML — HIGH (ref 243–894)
WBC # BLD: 4.8 K/UL — SIGNIFICANT CHANGE UP (ref 3.8–10.5)
WBC # FLD AUTO: 4.8 K/UL — SIGNIFICANT CHANGE UP (ref 3.8–10.5)

## 2017-05-02 RX ORDER — CEFTRIAXONE 500 MG/1
INJECTION, POWDER, FOR SOLUTION INTRAMUSCULAR; INTRAVENOUS
Qty: 0 | Refills: 0 | Status: DISCONTINUED | OUTPATIENT
Start: 2017-05-02 | End: 2017-05-08

## 2017-05-02 RX ORDER — CEFTRIAXONE 500 MG/1
1 INJECTION, POWDER, FOR SOLUTION INTRAMUSCULAR; INTRAVENOUS ONCE
Qty: 0 | Refills: 0 | Status: COMPLETED | OUTPATIENT
Start: 2017-05-02 | End: 2017-05-02

## 2017-05-02 RX ORDER — CEFTRIAXONE 500 MG/1
1 INJECTION, POWDER, FOR SOLUTION INTRAMUSCULAR; INTRAVENOUS EVERY 24 HOURS
Qty: 0 | Refills: 0 | Status: DISCONTINUED | OUTPATIENT
Start: 2017-05-03 | End: 2017-05-08

## 2017-05-02 RX ADMIN — ATORVASTATIN CALCIUM 40 MILLIGRAM(S): 80 TABLET, FILM COATED ORAL at 21:28

## 2017-05-02 RX ADMIN — Medication 1000 UNIT(S): at 12:07

## 2017-05-02 RX ADMIN — CEFTRIAXONE 100 GRAM(S): 500 INJECTION, POWDER, FOR SOLUTION INTRAMUSCULAR; INTRAVENOUS at 21:28

## 2017-05-02 RX ADMIN — Medication 81 MILLIGRAM(S): at 12:08

## 2017-05-02 RX ADMIN — Medication 20 MILLIGRAM(S): at 07:15

## 2017-05-02 RX ADMIN — LEVETIRACETAM 1000 MILLIGRAM(S): 250 TABLET, FILM COATED ORAL at 07:14

## 2017-05-02 RX ADMIN — Medication 1 TABLET(S): at 12:08

## 2017-05-02 RX ADMIN — LAMOTRIGINE 100 MILLIGRAM(S): 25 TABLET, ORALLY DISINTEGRATING ORAL at 12:07

## 2017-05-02 RX ADMIN — CARVEDILOL PHOSPHATE 25 MILLIGRAM(S): 80 CAPSULE, EXTENDED RELEASE ORAL at 18:00

## 2017-05-02 RX ADMIN — LEVETIRACETAM 1000 MILLIGRAM(S): 250 TABLET, FILM COATED ORAL at 18:00

## 2017-05-02 RX ADMIN — CARVEDILOL PHOSPHATE 25 MILLIGRAM(S): 80 CAPSULE, EXTENDED RELEASE ORAL at 07:17

## 2017-05-02 RX ADMIN — AMLODIPINE BESYLATE 10 MILLIGRAM(S): 2.5 TABLET ORAL at 07:17

## 2017-05-02 RX ADMIN — LOSARTAN POTASSIUM 50 MILLIGRAM(S): 100 TABLET, FILM COATED ORAL at 07:14

## 2017-05-02 NOTE — PHYSICAL THERAPY INITIAL EVALUATION ADULT - ACTIVE RANGE OF MOTION EXAMINATION, REHAB EVAL
rogelio. upper extremity Active ROM was WNL (within normal limits)/bilateral lower extremity Active ROM was WNL (within normal limits)

## 2017-05-02 NOTE — OCCUPATIONAL THERAPY INITIAL EVALUATION ADULT - IMPAIRMENTS CONTRIBUTING IMPAIRED BED MOBILITY, REHAB EVAL
decreased flexibility/impaired sensory feedback/decreased sensation/impaired motor control/narrow base of support/pain/impaired postural control/scissoring/decreased ROM/cognition/impaired coordination/abnormal muscle tone/decreased strength/ataxic/impaired balance

## 2017-05-02 NOTE — OCCUPATIONAL THERAPY INITIAL EVALUATION ADULT - IMPAIRED TRANSFERS: BED/CHAIR, REHAB EVAL
decreased flexibility/impaired sensory feedback/decreased strength/scissoring/impaired coordination/abnormal muscle tone/impaired postural control/impaired motor control/decreased ROM/decreased sensation/ataxic/impaired balance/cognition/narrow base of support/pain

## 2017-05-02 NOTE — OCCUPATIONAL THERAPY INITIAL EVALUATION ADULT - RANGE OF MOTION EXAMINATION, LOWER EXTREMITY
Right LE Active ROM was WFL   (within functional limits)/Right LE Passive ROM was WFL  (within functional limits)/Left LE Passive ROM was WFL (w/i functional limits)/Left LE Active Assistive ROM was WFL (within functional limits)/decreased concentric and eccentric control  are noted in LLE due to weakness

## 2017-05-02 NOTE — OCCUPATIONAL THERAPY INITIAL EVALUATION ADULT - PERSONAL SAFETY AND JUDGMENT, REHAB EVAL
at risk behaviors demonstrated/Pt needs verbal cues  for proper hand and foot placement during transfer

## 2017-05-02 NOTE — SWALLOW BEDSIDE ASSESSMENT ADULT - SLP PERTINENT HISTORY OF CURRENT PROBLEM
86 YOF with PMHx of HTN, HLD, CHF with PM/AICD, absence seizures, hx of CVA, GI Bleed, ischemic colitis, hx of shunt draining cystic structure now with AMS. Spoke to daughter, Moraima and Son-in-Law at bedside since pt unable to put thoughts together. Family says pt is disoriented and her coordination is off, having some slurred speech. States it is similar to previous admission reason and concerned. Pt able to understand, however has trouble expressing thoughts. Admits to headache located at the forehead this morning, but now resolved, similar to previous headaches. Denies cp, sob, vomiting, diarrhea, constipation, abd pain. In previous admission pt had similar episode of AMS accompanied with HTN in 240's was admitted in ICU for BP control and then cleared to floor. Mental status was slightly improved however now presenting with similar symptoms. EEG done on 4/17/17 abnormal, focal epileptiform. ECHO done 4/19/17 EF: 60-65%.

## 2017-05-02 NOTE — SWALLOW BEDSIDE ASSESSMENT ADULT - SWALLOW EVAL: DIAGNOSIS
Pt is an 86 y o female admitted with altered mental status, h/o CVA, CHF presented with oropharyngeal phases of the swallow marked by increased mastication time with solids, suspect mild delay in swallow trigger, reduced laryngeal elevation, and inconsistent cough with thin liquid.

## 2017-05-02 NOTE — OCCUPATIONAL THERAPY INITIAL EVALUATION ADULT - RANGE OF MOTION EXAMINATION, UPPER EXTREMITY
decreased concentric and eccentric control  are noted in LUE due to weakness/Left UE Passive ROM was WFL  (within functional limits)/Left UE Active Assistive ROM was WFL  (within functional limits)

## 2017-05-02 NOTE — SWALLOW BEDSIDE ASSESSMENT ADULT - SWALLOW EVAL: RECOMMENDED FEEDING/EATING TECHNIQUES
allow for swallow between intakes/position upright (90 degrees)/maintain upright posture during/after eating for 30 mins/oral hygiene

## 2017-05-02 NOTE — PHYSICAL THERAPY INITIAL EVALUATION ADULT - GENERAL OBSERVATIONS, REHAB EVAL
Found supine, awake, follow simple one step commands with verbal, visual and tactile cues, with periods of confusion, disorientation, forgetfulness and short attention span.l

## 2017-05-02 NOTE — SWALLOW BEDSIDE ASSESSMENT ADULT - SPECIFY REASON(S)
MD order states NPO until further recommendations made. Pt known to this department from previous admission and eval on 4/17/2017 at which time SLP recommended mechanical soft with thin liquid.

## 2017-05-02 NOTE — PHYSICAL THERAPY INITIAL EVALUATION ADULT - LIVES WITH, PROFILE
As per patient she lives in an apartment house with daughter, has few steps to enter (but cannot remember details, information to be confirmed from other  reliable sources  due to patient confusion)

## 2017-05-02 NOTE — OCCUPATIONAL THERAPY INITIAL EVALUATION ADULT - GENERAL OBSERVATIONS, REHAB EVAL
Ms Gonzalez seen for initial OT evaluation in the ER , encountered supine in bed, AA&Ox to self; pt is confused , but, cooperative & followed simmple command with verbal cues to initiate, sequence and attend to tasks.Left side neglect necessitate verbal cues to attend to left side during ADL task; pt  presents with left side weakness and impaired muscle tone, balance and coordination from old CVA; . Dexterity and fine motor skills are diminished.  in both hands, but is more pronounced in left hand. All prehension patterns are impaired in leftt UE; pt is able to use left hand as a stabilizer during transfer and can incorporate both sides of her body with assistance; tendon reflexes are diminished in LUE/LE and movement velocity in left UE/LE  is reduced. pt is able to make needs & wants known .

## 2017-05-02 NOTE — OCCUPATIONAL THERAPY INITIAL EVALUATION ADULT - LEVEL OF CONSCIOUSNESS, OT EVAL
Pt is functioning at level  2a on the [place] [employer].  Pt. initiated attention but has difficulty sustaining attention. Pt. is able to follow one-step commands but inconsistently. Pt. may functional automatically in over-learned behaviors. Pt. does not initiate activities and may wander if left unsupervised./alert/confused

## 2017-05-02 NOTE — PHYSICAL THERAPY INITIAL EVALUATION ADULT - IMPAIRMENTS FOUND, PT EVAL
muscle strength/ergonomics and body mechanics/arousal, attention, and cognition/gait, locomotion, and balance/aerobic capacity/endurance

## 2017-05-02 NOTE — PHYSICAL THERAPY INITIAL EVALUATION ADULT - ADDITIONAL COMMENTS
As per patient she uses a cane prior to admission (but needs to be confirmed from other reliable source due to patient being confused and disoriented)

## 2017-05-02 NOTE — PHYSICAL THERAPY INITIAL EVALUATION ADULT - TRANSFER SAFETY CONCERNS NOTED: BED/CHAIR, REHAB EVAL
decreased balance during turns decreased sequencing ability/decreased balance during turns/decreased step length

## 2017-05-02 NOTE — OCCUPATIONAL THERAPY INITIAL EVALUATION ADULT - SOCIAL CONCERNS
Pt's  voiced concerns about getting help at home to assist with  the care of her mother./Complex psychosocial needs/coping issues Complex psychosocial needs/coping issues/Pt's daughter voiced concerns about getting help at home to assist with  the care of her mother.

## 2017-05-02 NOTE — OCCUPATIONAL THERAPY INITIAL EVALUATION ADULT - PATIENT/FAMILY/SIGNIFICANT OTHER GOALS STATEMENT, OT EVAL
Pt's daughter Mana would like her mother to be restored to prior level of function so that she can safely care her at home..

## 2017-05-02 NOTE — OCCUPATIONAL THERAPY INITIAL EVALUATION ADULT - PLANNED THERAPY INTERVENTIONS, OT EVAL
parent/caregiver training.../strengthening/stretching/cognitive, visual perceptual/joint mobilization/neuromuscular re-education/IADL retraining/fine motor coordination training/energy conservation techniques/ADL retraining/balance training/bed mobility training/ROM/massage/motor coordination training/transfer training

## 2017-05-02 NOTE — ED ADULT NURSE REASSESSMENT NOTE - NS ED NURSE REASSESS COMMENT FT1
Pt alert and confused, no seizure activity noted while in ED. Pt denies any chest pain, respiration even and unlabored. Pt awaiting transfer to bed assigned. Pt continue to be monitored
pt Received from [facility] [place] with c/o unresponsive, code stroke called and followed, not in any distress, medicated as per ordered for b/p of over 180 systolic, will monitor for b/p
pt stable, not in any distress, vss, due am medication given, report given to EL[facility] [place],
refused dinner tray  family refused straight cath says patient urinates   sbp high 180's maintained doctor gely informed no further orders.

## 2017-05-02 NOTE — OCCUPATIONAL THERAPY INITIAL EVALUATION ADULT - TRANSFER SAFETY CONCERNS NOTED: BED/CHAIR, REHAB EVAL
stand pivot/decreased step length/losing balance/decreased sequencing ability/decreased proprioception/squat pivot/decreased weight-shifting ability

## 2017-05-02 NOTE — OCCUPATIONAL THERAPY INITIAL EVALUATION ADULT - BALANCE DISTURBANCE, IDENTIFIED IMPAIRMENT CONTRIBUTE, REHAB EVAL
abnormal muscle tone/decreased strength/impaired coordination/impaired postural control/pain/impaired sensory feedback

## 2017-05-02 NOTE — PHYSICAL THERAPY INITIAL EVALUATION ADULT - CRITERIA FOR SKILLED THERAPEUTIC INTERVENTIONS
risk reduction/prevention/anticipated discharge recommendation/functional limitations in following categories/impairments found

## 2017-05-02 NOTE — PHYSICAL THERAPY INITIAL EVALUATION ADULT - PASSIVE RANGE OF MOTION EXAMINATION, REHAB EVAL
bilateral upper extremity Passive ROM was WNL (within normal limits)/bilateral lower extremity Passive ROM was WNL

## 2017-05-02 NOTE — SWALLOW BEDSIDE ASSESSMENT ADULT - SLP GENERAL OBSERVATIONS
Pt was seen bedside alert and oriented to self. Pt followed 1-step directions. Communicated with some tangential utterances however speech intelligibility good.

## 2017-05-02 NOTE — OCCUPATIONAL THERAPY INITIAL EVALUATION ADULT - PRECAUTIONS/LIMITATIONS, REHAB EVAL
cardiac precautions/Pt exhibits diminished reaction time due to age related changes ; spatial relation, depth perception, figure ground and proprioception are impaired  ;

## 2017-05-02 NOTE — SWALLOW BEDSIDE ASSESSMENT ADULT - PHARYNGEAL PHASE
Delayed pharyngeal swallow/Decreased laryngeal elevation Decreased laryngeal elevation/Delayed pharyngeal swallow 3/4/Delayed pharyngeal swallow/Decreased laryngeal elevation/Cough post oral intake

## 2017-05-02 NOTE — SWALLOW BEDSIDE ASSESSMENT ADULT - COMMENTS
CT Brain Stroke Protocol 5/1/2017 Impression: Involutional and ischemic gliotic changes. No acute intracranial hemorrhage.    CXR 5/1/2017 Impression: Chest is similar to April 27 at which time lungs were clear.

## 2017-05-02 NOTE — PROGRESS NOTE ADULT - SUBJECTIVE AND OBJECTIVE BOX
Patient is a 86y old  Female who presents with a chief complaint of altered mental status (01 May 2017 18:02)      INTERVAL HPI/OVERNIGHT EVENTS:  no new complaitns, pt. awake and alert an but states she feels "confused"    MEDICATIONS  (STANDING):  lamoTRIgine 100milliGRAM(s) Oral daily  levETIRAcetam 1000milliGRAM(s) Oral two times a day  atorvastatin 40milliGRAM(s) Oral at bedtime  furosemide    Tablet 20milliGRAM(s) Oral daily  losartan 50milliGRAM(s) Oral daily  amLODIPine   Tablet 10milliGRAM(s) Oral daily  carvedilol 25milliGRAM(s) Oral every 12 hours  multivitamin 1Tablet(s) Oral daily  cholecalciferol 1000Unit(s) Oral daily  dextrose 5% + sodium chloride 0.9%. 1000milliLiter(s) IV Continuous <Continuous>  aspirin  chewable 81milliGRAM(s) Oral daily  cefTRIAXone   IVPB  IV Intermittent     MEDICATIONS  (PRN):      Allergies    No Known Allergies    Intolerances        REVIEW OF SYSTEMS:  unreliable historian, unable to assess    Vital Signs Last 24 Hrs  T(C): 36.7, Max: 36.8 (-02 @ 07:20)  T(F): 98, Max: 98.3 (05-02 @ 07:20)  HR: 70 (58 - 79)  BP: 151/72 (130/76 - 165/80)  BP(mean): --  RR: 17 (15 - 20)  SpO2: 98% (96% - 100%)    PHYSICAL EXAM:  GENERAL: NAD, pleasant, awake, family at bed side  HEAD:  Atraumatic, Normocephalic  EYES: EOMI, PERRLA, conjunctiva and sclera clear  ENMT: No tonsillar erythema, exudates, or enlargement; Moist mucous membranes,   NECK: Supple, No JVD, Normal thyroid  NERVOUS SYSTEM:  Alert  Motor Strength 4/5 B/L upper and lower extremities; DTRs 2+ intact and symmetric  CHEST/LUNG: Clear to percussion bilaterally; No rales, rhonchi, wheezing, or rubs  HEART: Regular rate and rhythm; No murmurs, rubs, or gallops  ABDOMEN: Soft, Nontender, Nondistended; Bowel sounds present  EXTREMITIES:  2+ Peripheral Pulses, No clubbing, cyanosis, or edema      LABS:                        x      x     )-----------( x        ( 02 May 2017 08:18 )             31       05-    142  |  105  |  9   ----------------------------<  110<H>  3.6   |  28  |  0.78    Ca    8.6      02 May 2017 05:41    TPro  6.5  /  Alb  2.9<L>  /  TBili  0.5  /  DBili  x   /  AST  24  /  ALT  11<L>  /  AlkPhos  65  05-02    PT/INR - ( 01 May 2017 16:24 )   PT: 12.0 sec;   INR: 1.10 ratio         PTT - ( 01 May 2017 16:24 )  PTT:50.7 sec  Urinalysis Basic - ( 01 May 2017 22:07 )    Color: Yellow / Appearance: Slightly Turbid / S.010 / pH: x  Gluc: x / Ketone: Negative  / Bili: Negative / Urobili: Negative mg/dL   Blood: x / Protein: 100 mg/dL / Nitrite: Negative   Leuk Esterase: Moderate / RBC: >50 /HPF / WBC 26-50   Sq Epi: x / Non Sq Epi: Few / Bacteria: Many      CAPILLARY BLOOD GLUCOSE      RADIOLOGY & ADDITIONAL TESTS:    Imaging Personally Reviewed:  [ x] YES  [ ] NO    Consultant(s) Notes Reviewed:  [ ] YES  [ ] NO    Care Discussed with Consultants/Other Providers [ ] YES  [ ] NO

## 2017-05-02 NOTE — PHYSICAL THERAPY INITIAL EVALUATION ADULT - ORIENTATION, REHAB EVAL
not oriented to person, place, time or situation/with combination of being confused, disoriented , forgetful and short t attention span

## 2017-05-02 NOTE — OCCUPATIONAL THERAPY INITIAL EVALUATION ADULT - PERTINENT HX OF CURRENT PROBLEM, REHAB EVAL
Ms Carlos Pt presented to ER due to acute onset of neurological deficit. Pt is diagnosed with  altered mental status  MRI  is pending ; head CT on 5/1/17results confirm  involutional  isthmica gliotic changes

## 2017-05-02 NOTE — OCCUPATIONAL THERAPY INITIAL EVALUATION ADULT - ADDITIONAL COMMENTS
Prior to admission, Pt was functioning in her roles as best as she could , Pt ambulated  independently  with a straight cane and daughter assisted with BADL and IADL; presently , pt needs more assistance with ADL and functional mobility that before.  Pt displays decreased protective responses in sitting and standing  when balance  is displaced .Pt able to reach out of base of support in sitting without loss of balance but unable to do it in standing. The scale below depicts a picture of the pt's current level of functioning. Barthel Index: Feeding Score____5_, Bathing Score__0____, Grooming Score____0_, Dressing Score__0___, Bowel Score__0___, Bladder Score___0___, Toilet Score_0____, Transfer Score___5___, Mobility Score_____10, Stairs Score_____, Total Score___15/100__.

## 2017-05-02 NOTE — OCCUPATIONAL THERAPY INITIAL EVALUATION ADULT - LIVES WITH, PROFILE
her daughter and son -in law in a private  house with  15 steps to enter  with shaky bilateral hand rails her daughter and son -in law in a private  house with  15 steps to enter  with shaky bilateral hand rails; pot's bathroom hasa tub and shower comination aaand is not equipped with shower chair or grab bars . her daughter and son-in law in a private house with  15 steps to enter with shaky bilateral hand rails; pt's bathroom has a tub and shower combination and is not equipped with shower chair or grab bars .

## 2017-05-03 DIAGNOSIS — E78.00 PURE HYPERCHOLESTEROLEMIA, UNSPECIFIED: ICD-10-CM

## 2017-05-03 DIAGNOSIS — N13.30 UNSPECIFIED HYDRONEPHROSIS: ICD-10-CM

## 2017-05-03 DIAGNOSIS — I16.0 HYPERTENSIVE URGENCY: ICD-10-CM

## 2017-05-03 DIAGNOSIS — F79 UNSPECIFIED INTELLECTUAL DISABILITIES: ICD-10-CM

## 2017-05-03 DIAGNOSIS — G40.909 EPILEPSY, UNSPECIFIED, NOT INTRACTABLE, WITHOUT STATUS EPILEPTICUS: ICD-10-CM

## 2017-05-03 DIAGNOSIS — E43 UNSPECIFIED SEVERE PROTEIN-CALORIE MALNUTRITION: ICD-10-CM

## 2017-05-03 DIAGNOSIS — I08.0 RHEUMATIC DISORDERS OF BOTH MITRAL AND AORTIC VALVES: ICD-10-CM

## 2017-05-03 DIAGNOSIS — R55 SYNCOPE AND COLLAPSE: ICD-10-CM

## 2017-05-03 DIAGNOSIS — N39.0 URINARY TRACT INFECTION, SITE NOT SPECIFIED: ICD-10-CM

## 2017-05-03 DIAGNOSIS — R26.81 UNSTEADINESS ON FEET: ICD-10-CM

## 2017-05-03 DIAGNOSIS — K92.2 GASTROINTESTINAL HEMORRHAGE, UNSPECIFIED: ICD-10-CM

## 2017-05-03 DIAGNOSIS — N28.1 CYST OF KIDNEY, ACQUIRED: ICD-10-CM

## 2017-05-03 DIAGNOSIS — I95.1 ORTHOSTATIC HYPOTENSION: ICD-10-CM

## 2017-05-03 DIAGNOSIS — I50.9 HEART FAILURE, UNSPECIFIED: ICD-10-CM

## 2017-05-03 DIAGNOSIS — I49.9 CARDIAC ARRHYTHMIA, UNSPECIFIED: ICD-10-CM

## 2017-05-03 DIAGNOSIS — I42.9 CARDIOMYOPATHY, UNSPECIFIED: ICD-10-CM

## 2017-05-03 DIAGNOSIS — G93.0 CEREBRAL CYSTS: ICD-10-CM

## 2017-05-03 DIAGNOSIS — I67.4 HYPERTENSIVE ENCEPHALOPATHY: ICD-10-CM

## 2017-05-03 DIAGNOSIS — K55.9 VASCULAR DISORDER OF INTESTINE, UNSPECIFIED: ICD-10-CM

## 2017-05-03 DIAGNOSIS — G93.41 METABOLIC ENCEPHALOPATHY: ICD-10-CM

## 2017-05-03 DIAGNOSIS — Z98.2 PRESENCE OF CEREBROSPINAL FLUID DRAINAGE DEVICE: ICD-10-CM

## 2017-05-03 DIAGNOSIS — R47.01 APHASIA: ICD-10-CM

## 2017-05-03 DIAGNOSIS — I11.0 HYPERTENSIVE HEART DISEASE WITH HEART FAILURE: ICD-10-CM

## 2017-05-03 DIAGNOSIS — Z95.810 PRESENCE OF AUTOMATIC (IMPLANTABLE) CARDIAC DEFIBRILLATOR: ICD-10-CM

## 2017-05-03 LAB
BASE EXCESS BLDA CALC-SCNC: 5.3 MMOL/L — HIGH (ref -2–2)
BLOOD GAS COMMENTS: SIGNIFICANT CHANGE UP
BLOOD GAS SOURCE: SIGNIFICANT CHANGE UP
CULTURE RESULTS: SIGNIFICANT CHANGE UP
HCO3 BLDA-SCNC: 29 MMOL/L — SIGNIFICANT CHANGE UP (ref 21–29)
HOROWITZ INDEX BLDA+IHG-RTO: 21 — SIGNIFICANT CHANGE UP
PCO2 BLDA: 38 MMHG — SIGNIFICANT CHANGE UP (ref 32–46)
PH BLD: 7.49 — HIGH (ref 7.35–7.45)
PO2 BLDA: 83 MMHG — SIGNIFICANT CHANGE UP (ref 74–108)
SAO2 % BLDA: 97 % — HIGH (ref 92–96)
SPECIMEN SOURCE: SIGNIFICANT CHANGE UP

## 2017-05-03 PROCEDURE — 70450 CT HEAD/BRAIN W/O DYE: CPT | Mod: 26

## 2017-05-03 PROCEDURE — 99232 SBSQ HOSP IP/OBS MODERATE 35: CPT

## 2017-05-03 RX ADMIN — CARVEDILOL PHOSPHATE 25 MILLIGRAM(S): 80 CAPSULE, EXTENDED RELEASE ORAL at 17:18

## 2017-05-03 RX ADMIN — ATORVASTATIN CALCIUM 40 MILLIGRAM(S): 80 TABLET, FILM COATED ORAL at 21:27

## 2017-05-03 RX ADMIN — Medication 81 MILLIGRAM(S): at 12:26

## 2017-05-03 RX ADMIN — LOSARTAN POTASSIUM 50 MILLIGRAM(S): 100 TABLET, FILM COATED ORAL at 05:50

## 2017-05-03 RX ADMIN — CARVEDILOL PHOSPHATE 25 MILLIGRAM(S): 80 CAPSULE, EXTENDED RELEASE ORAL at 05:50

## 2017-05-03 RX ADMIN — Medication 20 MILLIGRAM(S): at 05:50

## 2017-05-03 RX ADMIN — AMLODIPINE BESYLATE 10 MILLIGRAM(S): 2.5 TABLET ORAL at 05:50

## 2017-05-03 RX ADMIN — Medication 1000 UNIT(S): at 12:26

## 2017-05-03 RX ADMIN — Medication 1 TABLET(S): at 12:26

## 2017-05-03 RX ADMIN — LEVETIRACETAM 1000 MILLIGRAM(S): 250 TABLET, FILM COATED ORAL at 05:50

## 2017-05-03 RX ADMIN — LEVETIRACETAM 1000 MILLIGRAM(S): 250 TABLET, FILM COATED ORAL at 17:18

## 2017-05-03 RX ADMIN — LAMOTRIGINE 100 MILLIGRAM(S): 25 TABLET, ORALLY DISINTEGRATING ORAL at 12:26

## 2017-05-03 RX ADMIN — CEFTRIAXONE 100 GRAM(S): 500 INJECTION, POWDER, FOR SOLUTION INTRAMUSCULAR; INTRAVENOUS at 17:19

## 2017-05-03 NOTE — CHART NOTE - NSCHARTNOTEFT_GEN_A_CORE
RRT called to pt's room for unresponsiveness.     On arrival to room SBP 160s. FS > 100  Pt withdraws and moves all extremities to tactile painful stimuli. Moans and groans. Nods head. lethargic but attempting to follow commands.     Blood gas sent. EKG done    ABG without elevated in CO2 to explain for lethargy. EKG a paced rhythm.    NEURO: pupils equal, moves all extremities with painful stimuli, withdraws to pain, opens eyes on command but lethargic. more arousable towards end of RRT.     CT head urgent ordered    Pt progressively becoming more responsive spontaneously opening eyes. moving all 4 extremities.     86F PMH HTN, HLD, CHF s/p PPM, absence seizures, hx of CVA, GI Bleed presents for altered mental status. Again with episode of unresponsiveness / AMS.     - follow up CT head  - ? seizure with post ictal state   - recommend neuro follow up  - recommend rechecking keppra and lamotrigine levels

## 2017-05-04 PROCEDURE — 99233 SBSQ HOSP IP/OBS HIGH 50: CPT

## 2017-05-04 RX ADMIN — Medication 20 MILLIGRAM(S): at 05:15

## 2017-05-04 RX ADMIN — CARVEDILOL PHOSPHATE 25 MILLIGRAM(S): 80 CAPSULE, EXTENDED RELEASE ORAL at 17:44

## 2017-05-04 RX ADMIN — Medication 1000 UNIT(S): at 11:42

## 2017-05-04 RX ADMIN — LAMOTRIGINE 100 MILLIGRAM(S): 25 TABLET, ORALLY DISINTEGRATING ORAL at 11:42

## 2017-05-04 RX ADMIN — LOSARTAN POTASSIUM 50 MILLIGRAM(S): 100 TABLET, FILM COATED ORAL at 05:15

## 2017-05-04 RX ADMIN — CEFTRIAXONE 100 GRAM(S): 500 INJECTION, POWDER, FOR SOLUTION INTRAMUSCULAR; INTRAVENOUS at 17:45

## 2017-05-04 RX ADMIN — Medication 81 MILLIGRAM(S): at 11:42

## 2017-05-04 RX ADMIN — CARVEDILOL PHOSPHATE 25 MILLIGRAM(S): 80 CAPSULE, EXTENDED RELEASE ORAL at 05:21

## 2017-05-04 RX ADMIN — AMLODIPINE BESYLATE 10 MILLIGRAM(S): 2.5 TABLET ORAL at 05:15

## 2017-05-04 RX ADMIN — Medication 1 TABLET(S): at 11:42

## 2017-05-04 RX ADMIN — LEVETIRACETAM 1000 MILLIGRAM(S): 250 TABLET, FILM COATED ORAL at 17:44

## 2017-05-04 RX ADMIN — LEVETIRACETAM 1000 MILLIGRAM(S): 250 TABLET, FILM COATED ORAL at 05:15

## 2017-05-04 RX ADMIN — ATORVASTATIN CALCIUM 40 MILLIGRAM(S): 80 TABLET, FILM COATED ORAL at 21:43

## 2017-05-04 NOTE — PROGRESS NOTE ADULT - SUBJECTIVE AND OBJECTIVE BOX
CHIEF COMPLAINT/INTERVAL HISTORY:    Patient is a 86y old  Female who presents with a chief complaint of altered mental status (01 May 2017 18:02)      HPI:  86 YOF with PMHx of HTN, HLD, CHF with PM/AICD, absence seizures, hx of CVA, GI Bleed, ischemic colitis, hx of shunt draining cystic structure now with AMS. Spoke to daughter, Moraima and Son-in-Law at bedside since pt unable to put thoughts together. Family says pt is disoriented and her coordination is off, having some slurred speech. States it is similar to previous admission reason and concerned. Pt able to understand, however has trouble expressing thoughts. Admits to headache located at the forehead this morning, but now resolved, similar to previous headaches. Denies cp, sob, vomiting, diarrhea, constipation, abd pain. In previous admission pt had similar episode of AMS accompanied with HTN in 240's was admitted in ICU for BP control and then cleared to floor. Mental status was slightly improved however now presenting with similar symptoms. EEG done on 4/17/17 abnormal, focal epileptiform. ECHO done 4/19/17 EF: 60-65%.     In ED Code Stroke: CT head was negative for acute findings. CXR: unchanged from previous, L defibrillator noted. NEURO CONSULT: Dr. Orosco.     ED attending Dr. Bullard spoke to Dr. Seay who admitted pt. (01 May 2017 18:02)    Overnight issues  had a seizure           SUBJECTIVE & OBJECTIVE: Pt seen and examined at bedside.   ROS:  CONSTITUTIONAL: No fever, weight loss, or fatigue  NECK: No pain or stiffness  RESPIRATORY: No cough, wheezing, chills or hemoptysis; No shortness of breath  CARDIOVASCULAR: No chest pain, palpitations, dizziness, or leg swelling  GASTROINTESTINAL: No abdominal or epigastric pain. No nausea, vomiting, or hematemesis; No diarrhea or constipation. No melena or hematochezia.  GENITOURINARY: No dysuria, frequency, hematuria, or incontinence  NEUROLOGICAL: No headaches, memory loss, loss of strength, numbness, or POSITIVE tremors  SKIN: No itching, burning, rashes, or lesions   ICU Vital Signs Last 24 Hrs  T(C): 36.7, Max: 37.6 (05-04 @ 04:12)  T(F): 98, Max: 99.6 (05-04 @ 04:12)  HR: 70 (64 - 75)  BP: 137/63 (130/72 - 165/71)  BP(mean): --  ABP: --  ABP(mean): --  RR: 17 (16 - 189)  SpO2: 97% (97% - 99%)        MEDICATIONS  (STANDING):  lamoTRIgine 100milliGRAM(s) Oral daily  levETIRAcetam 1000milliGRAM(s) Oral two times a day  atorvastatin 40milliGRAM(s) Oral at bedtime  furosemide    Tablet 20milliGRAM(s) Oral daily  losartan 50milliGRAM(s) Oral daily  amLODIPine   Tablet 10milliGRAM(s) Oral daily  carvedilol 25milliGRAM(s) Oral every 12 hours  multivitamin 1Tablet(s) Oral daily  cholecalciferol 1000Unit(s) Oral daily  aspirin  chewable 81milliGRAM(s) Oral daily  cefTRIAXone   IVPB 1Gram(s) IV Intermittent every 24 hours  cefTRIAXone   IVPB  IV Intermittent     MEDICATIONS  (PRN):        PHYSICAL EXAM:    GENERAL: NAD, well-groomed, well-developed  HEAD:  Atraumatic, Normocephalic  EYES: EOMI, PERRLA, conjunctiva and sclera clear  ENMT: Moist mucous membranes  NECK: Supple, No JVD  NERVOUS SYSTEM:  Alert & Oriented X1 Motor Strength 4/5 B/L upper and lower extremities; DTRs 2+ intact and symmetric  CHEST/LUNG: Clear to auscultation bilaterally; No rales, rhonchi, wheezing, or rubs  HEART: Regular rate and rhythm; No murmurs, rubs, or gallops  ABDOMEN: Soft, Nontender, Nondistended; Bowel sounds present  EXTREMITIES:  2+ Peripheral Pulses, No clubbing, cyanosis, or edema    LABS:                CAPILLARY BLOOD GLUCOSE      RECENT CULTURES:      RADIOLOGY & ADDITIONAL TESTS:  Imaging Personally Reviewed:  [ ] YES      Consultant(s) Notes Reviewed:  [ ] YES     Care Discussed with [ ] Consultants [X ] Patient [ ] Family  [x ]    [x ]  Other; RN  HEALTH ISSUES - PROBLEM Dx:  Essential hypertension: Essential hypertension  Acute cystitis without hematuria: Acute cystitis without hematuria  Metabolic encephalopathy: Metabolic encephalopathy  Pacemaker: Pacemaker  History of CVA (cerebrovascular accident): History of CVA (cerebrovascular accident)  GI bleed: GI bleed  Pulmonary hypertension: Pulmonary hypertension  Seizure: Seizure  CHF (congestive heart failure): CHF (congestive heart failure)  High cholesterol: High cholesterol  HTN (hypertension): HTN (hypertension)  Altered mental status, unspecified altered mental status type: Altered mental status, unspecified altered mental status type        DVT/GI ppx  Discussed with pt @ bedside

## 2017-05-05 DIAGNOSIS — R69 ILLNESS, UNSPECIFIED: ICD-10-CM

## 2017-05-05 PROCEDURE — 99233 SBSQ HOSP IP/OBS HIGH 50: CPT

## 2017-05-05 RX ORDER — AMLODIPINE BESYLATE 2.5 MG/1
1 TABLET ORAL
Qty: 0 | Refills: 0 | COMMUNITY
Start: 2017-05-05

## 2017-05-05 RX ORDER — ASPIRIN/CALCIUM CARB/MAGNESIUM 324 MG
1 TABLET ORAL
Qty: 0 | Refills: 0 | COMMUNITY
Start: 2017-05-05

## 2017-05-05 RX ORDER — CARVEDILOL PHOSPHATE 80 MG/1
1 CAPSULE, EXTENDED RELEASE ORAL
Qty: 0 | Refills: 0 | COMMUNITY
Start: 2017-05-05

## 2017-05-05 RX ORDER — LEVETIRACETAM 250 MG/1
1 TABLET, FILM COATED ORAL
Qty: 0 | Refills: 0 | COMMUNITY
Start: 2017-05-05

## 2017-05-05 RX ORDER — FUROSEMIDE 40 MG
1 TABLET ORAL
Qty: 0 | Refills: 0 | COMMUNITY
Start: 2017-05-05

## 2017-05-05 RX ORDER — CHOLECALCIFEROL (VITAMIN D3) 125 MCG
1000 CAPSULE ORAL
Qty: 0 | Refills: 0 | DISCHARGE
Start: 2017-05-05

## 2017-05-05 RX ORDER — LAMOTRIGINE 25 MG/1
1 TABLET, ORALLY DISINTEGRATING ORAL
Qty: 0 | Refills: 0 | COMMUNITY
Start: 2017-05-05

## 2017-05-05 RX ORDER — LOSARTAN POTASSIUM 100 MG/1
1 TABLET, FILM COATED ORAL
Qty: 0 | Refills: 0 | COMMUNITY
Start: 2017-05-05

## 2017-05-05 RX ORDER — ATORVASTATIN CALCIUM 80 MG/1
1 TABLET, FILM COATED ORAL
Qty: 0 | Refills: 0 | COMMUNITY
Start: 2017-05-05

## 2017-05-05 RX ADMIN — CEFTRIAXONE 100 GRAM(S): 500 INJECTION, POWDER, FOR SOLUTION INTRAMUSCULAR; INTRAVENOUS at 17:37

## 2017-05-05 RX ADMIN — Medication 1 TABLET(S): at 11:18

## 2017-05-05 RX ADMIN — LEVETIRACETAM 1000 MILLIGRAM(S): 250 TABLET, FILM COATED ORAL at 17:37

## 2017-05-05 RX ADMIN — CARVEDILOL PHOSPHATE 25 MILLIGRAM(S): 80 CAPSULE, EXTENDED RELEASE ORAL at 05:38

## 2017-05-05 RX ADMIN — Medication 20 MILLIGRAM(S): at 05:37

## 2017-05-05 RX ADMIN — LOSARTAN POTASSIUM 50 MILLIGRAM(S): 100 TABLET, FILM COATED ORAL at 05:38

## 2017-05-05 RX ADMIN — LAMOTRIGINE 100 MILLIGRAM(S): 25 TABLET, ORALLY DISINTEGRATING ORAL at 11:18

## 2017-05-05 RX ADMIN — AMLODIPINE BESYLATE 10 MILLIGRAM(S): 2.5 TABLET ORAL at 05:41

## 2017-05-05 RX ADMIN — ATORVASTATIN CALCIUM 40 MILLIGRAM(S): 80 TABLET, FILM COATED ORAL at 21:45

## 2017-05-05 RX ADMIN — Medication 81 MILLIGRAM(S): at 11:18

## 2017-05-05 RX ADMIN — LEVETIRACETAM 1000 MILLIGRAM(S): 250 TABLET, FILM COATED ORAL at 05:38

## 2017-05-05 RX ADMIN — Medication 1000 UNIT(S): at 11:18

## 2017-05-05 RX ADMIN — CARVEDILOL PHOSPHATE 25 MILLIGRAM(S): 80 CAPSULE, EXTENDED RELEASE ORAL at 17:37

## 2017-05-05 NOTE — DISCHARGE NOTE ADULT - CARE PLAN
Principal Discharge DX:	Altered mental status, unspecified altered mental status type  Goal:	resolved  Instructions for follow-up, activity and diet:	continue current seizaure meds  dr mcdermott Follow up  Secondary Diagnosis:	Seizure  Secondary Diagnosis:	Pulmonary hypertension  Secondary Diagnosis:	Essential hypertension  Secondary Diagnosis:	Acute cystitis without hematuria

## 2017-05-05 NOTE — DISCHARGE NOTE ADULT - CARE PROVIDER_API CALL
Karly Medina), Neurology  [facility] [place], [place] [id number]  Phone: [phone]  Fax: [phone] Karly Medina (MD), Neurology  [facility] [place], [place] [id number]  Phone: [phone]  Fax: [phone]    Alber Gray (DO), Obstetrics and Gynecology  [address] [address]  Phone: [phone]  Fax: [phone]

## 2017-05-05 NOTE — PROGRESS NOTE ADULT - SUBJECTIVE AND OBJECTIVE BOX
CHIEF COMPLAINT/INTERVAL HISTORY:    Patient is a 86y old  Female who presents with a chief complaint of altered mental status (01 May 2017 18:02)      HPI:  86 YOF with PMHx of HTN, HLD, CHF with PM/AICD, absence seizures, hx of CVA, GI Bleed, ischemic colitis, hx of shunt draining cystic structure now with AMS. Spoke to daughter, Moraima and Son-in-Law at bedside since pt unable to put thoughts together. Family says pt is disoriented and her coordination is off, having some slurred speech. States it is similar to previous admission reason and concerned. Pt able to understand, however has trouble expressing thoughts. Admits to headache located at the forehead this morning, but now resolved, similar to previous headaches. Denies cp, sob, vomiting, diarrhea, constipation, abd pain. In previous admission pt had similar episode of AMS accompanied with HTN in 240's was admitted in ICU for BP control and then cleared to floor. Mental status was slightly improved however now presenting with similar symptoms. EEG done on 4/17/17 abnormal, focal epileptiform. ECHO done 4/19/17 EF: 60-65%.     In ED Code Stroke: CT head was negative for acute findings. CXR: unchanged from previous, L defibrillator noted. NEURO CONSULT: Dr. Orosco.     ED attending Dr. Bullard spoke to Dr. Seay who admitted pt. (01 May 2017 18:02)    Overnight issues clinically improved             SUBJECTIVE & OBJECTIVE: Pt seen and examined at bedside.   ROS:  CONSTITUTIONAL: No fever, weight loss, or fatigue  NECK: No pain or stiffness  RESPIRATORY: No cough, wheezing, chills or hemoptysis; No shortness of breath  CARDIOVASCULAR: No chest pain, palpitations, dizziness, or leg swelling  GASTROINTESTINAL: No abdominal or epigastric pain. No nausea, vomiting, or hematemesis; No diarrhea or constipation. No melena or hematochezia.  GENITOURINARY: No dysuria, frequency, hematuria, or incontinence  NEUROLOGICAL: No headaches, memory loss, loss of strength, numbness, or tremors  SKIN: No itching, burning, rashes, or lesions   ICU Vital Signs Last 24 Hrs  T(C): 37.3, Max: 37.3 (05-05 @ 04:52)  T(F): 99.2, Max: 99.2 (05-05 @ 04:52)  HR: 67 (67 - 82)  BP: 152/74 (115/83 - 152/74)  BP(mean): --  ABP: --  ABP(mean): --  RR: 17 (16 - 18)  SpO2: 97% (95% - 100%)        MEDICATIONS  (STANDING):  lamoTRIgine 100milliGRAM(s) Oral daily  levETIRAcetam 1000milliGRAM(s) Oral two times a day  atorvastatin 40milliGRAM(s) Oral at bedtime  furosemide    Tablet 20milliGRAM(s) Oral daily  losartan 50milliGRAM(s) Oral daily  amLODIPine   Tablet 10milliGRAM(s) Oral daily  carvedilol 25milliGRAM(s) Oral every 12 hours  multivitamin 1Tablet(s) Oral daily  cholecalciferol 1000Unit(s) Oral daily  aspirin  chewable 81milliGRAM(s) Oral daily  cefTRIAXone   IVPB 1Gram(s) IV Intermittent every 24 hours  cefTRIAXone   IVPB  IV Intermittent     MEDICATIONS  (PRN):        PHYSICAL EXAM:    GENERAL: NAD, well-groomed, well-developed  HEAD:  Atraumatic, Normocephalic  EYES: EOMI, PERRLA, conjunctiva and sclera clear  ENMT: Moist mucous membranes  NECK: Supple, No JVD  NERVOUS SYSTEM:  Alert & Oriented X3, Motor Strength 5/5 B/L upper and lower extremities; DTRs 2+ intact and symmetric  CHEST/LUNG: Clear to auscultation bilaterally; No rales, rhonchi, wheezing, or rubs  HEART: Regular rate and rhythm; No murmurs, rubs, or gallops  ABDOMEN: Soft, Nontender, Nondistended; Bowel sounds present  EXTREMITIES:  2+ Peripheral Pulses, No clubbing, cyanosis, or edema    LABS:                CAPILLARY BLOOD GLUCOSE      RECENT CULTURES:      RADIOLOGY & ADDITIONAL TESTS:  Imaging Personally Reviewed:  [ ] YES      Consultant(s) Notes Reviewed:  [ ] YES     Care Discussed with [ ] Consultants [X ] Patient [ ] Family  [x ]    [x ]  Other; RN  HEALTH ISSUES - PROBLEM Dx:  Essential hypertension: Essential hypertension  Acute cystitis without hematuria: Acute cystitis without hematuria  Metabolic encephalopathy: Metabolic encephalopathy  Pacemaker: Pacemaker  History of CVA (cerebrovascular accident): History of CVA (cerebrovascular accident)  GI bleed: GI bleed  Pulmonary hypertension: Pulmonary hypertension  Seizure: Seizure  CHF (congestive heart failure): CHF (congestive heart failure)  High cholesterol: High cholesterol  HTN (hypertension): HTN (hypertension)  Altered mental status, unspecified altered mental status type: Altered mental status, unspecified altered mental status type        DVT/GI ppx  Discussed with pt @ bedside

## 2017-05-05 NOTE — DISCHARGE NOTE ADULT - HOSPITAL COURSE
86 YOF with PMHx of HTN, HLD, CHF with PM/AICD, absence seizures, hx of CVA, GI Bleed, ischemic colitis, hx of shunt draining cystic structure now with AMS. Spoke to daughter, Moraima and Son-in-Law at bedside since pt unable to put thoughts together. Family says pt is disoriented and her coordination is off, having some slurred speech. States it is similar to previous admission reason and concerned. Pt able to understand, however has trouble expressing thoughts. Admits to headache located at the forehead this morning, but now resolved, similar to previous headaches. Denies cp, sob, vomiting, diarrhea, constipation, abd pain. In previous admission pt had similar episode of AMS accompanied with HTN in 240's was admitted in ICU for BP control and then cleared to floor. Mental status was slightly improved however now presenting with similar symptoms. EEG done on 4/17/17 abnormal, focal epileptiform. ECHO done 4/19/17 EF: 60-65%.     In ED Code Stroke: CT head was negative for acute findings. CXR: unchanged from previous, L defibrillator noted. NEURO CONSULT: Dr. Orosco saw the patient and adjusted the seizure meds. eeg was done . patient also treated for urinary tract infection . patient evaluated by physical therapy and felt that she would be better at a skilled nursing facility . 86 YOF with PMHx of HTN, HLD, CHF with PM/AICD, absence seizures, hx of CVA, GI Bleed, ischemic colitis, hx of shunt draining cystic structure now with AMS. Spoke to daughter, Moraima and Son-in-Law at bedside since pt unable to put thoughts together. Family says pt is disoriented and her coordination is off, having some slurred speech. States it is similar to previous admission reason and concerned. Pt able to understand, however has trouble expressing thoughts. Admits to headache located at the forehead this morning, but now resolved, similar to previous headaches. Denies cp, sob, vomiting, diarrhea, constipation, abd pain. In previous admission pt had similar episode of AMS accompanied with HTN in 240's was admitted in ICU for BP control and then cleared to floor. Mental status was slightly improved however now presenting with similar symptoms. EEG done on 4/17/17 abnormal, focal epileptiform. ECHO done 4/19/17 EF: 60-65%.     In ED Code Stroke: CT head was negative for acute findings. CXR: unchanged from previous, L defibrillator noted. NEURO CONSULT: Dr. Orosco saw the patient and adjusted the seizure meds. eeg was done . patient also treated for urinary tract infection . patient evaluated by physical therapy and felt that she would be better at a skilled nursing facility .   she was seen by the gyn pa for the pesarry that was coming out and was reinserted and for Follow up with the gynecologist

## 2017-05-05 NOTE — DISCHARGE NOTE ADULT - MEDICATION SUMMARY - MEDICATIONS TO TAKE
I will START or STAY ON the medications listed below when I get home from the hospital:    aspirin 81 mg oral tablet, chewable  -- 1 tab(s) by mouth once a day  -- Indication: For CHF (congestive heart failure)    losartan 50 mg oral tablet  -- 1 tab(s) by mouth once a day  -- Indication: For CHF (congestive heart failure)    lamoTRIgine 100 mg oral tablet  -- 1 tab(s) by mouth once a day  -- Indication: For Seizure    levETIRAcetam 1000 mg oral tablet  -- 1 tab(s) by mouth 2 times a day  -- Indication: For Seizure    atorvastatin 40 mg oral tablet  -- 1 tab(s) by mouth once a day (at bedtime)  -- Indication: For High cholesterol    carvedilol 25 mg oral tablet  -- 1 tab(s) by mouth every 12 hours  -- Indication: For HTN (hypertension)    amLODIPine 10 mg oral tablet  -- 1 tab(s) by mouth once a day  -- Indication: For HTN (hypertension)    furosemide 20 mg oral tablet  -- 1 tab(s) by mouth once a day  -- Indication: For CHF (congestive heart failure)    timolol maleate 0.5% ophthalmic gel forming solution  -- 1 drop(s) to each affected eye once a day  -- Indication: For Glaucoma    Restasis 0.05% ophthalmic emulsion  -- 1 drop(s) to each affected eye every 12 hours  -- Indication: For Glaucoma    Travatan 0.004% ophthalmic solution  -- 1 drop(s) to each affected eye once a day (in the evening)  -- Indication: For Glaucoma    Multiple Vitamins oral tablet  -- 1 tab(s) by mouth once a day  -- Indication: For HTN (hypertension)    cholecalciferol oral tablet  -- 1000 unit(s) by mouth once a day  -- Indication: For osteoporosis I will START or STAY ON the medications listed below when I get home from the hospital:    aspirin 81 mg oral tablet, chewable  -- 1 tab(s) by mouth once a day  -- Indication: For CHF (congestive heart failure)    acetaminophen 325 mg oral tablet  -- 2 tab(s) by mouth every 4 hours, As needed, Moderate Pain (4 - 6)  -- Indication: For Altered mental status, unspecified altered mental status type    losartan 50 mg oral tablet  -- 1 tab(s) by mouth once a day  -- Indication: For CHF (congestive heart failure)    lamoTRIgine 100 mg oral tablet  -- 1 tab(s) by mouth once a day  -- Indication: For Seizure    levETIRAcetam 1000 mg oral tablet  -- 1 tab(s) by mouth 2 times a day  -- Indication: For Seizure    atorvastatin 40 mg oral tablet  -- 1 tab(s) by mouth once a day (at bedtime)  -- Indication: For High cholesterol    carvedilol 25 mg oral tablet  -- 1 tab(s) by mouth every 12 hours  -- Indication: For HTN (hypertension)    amLODIPine 10 mg oral tablet  -- 1 tab(s) by mouth once a day  -- Indication: For HTN (hypertension)    lidocaine 2% topical gel with applicator  -- 1 application on skin once, As needed, vaginal pain to aid in pessary repositioning  -- Indication: For Pain    furosemide 20 mg oral tablet  -- 1 tab(s) by mouth once a day  -- Indication: For CHF (congestive heart failure)    Fleet Enema 7 g-19 g rectal enema  -- 133 milliliter(s) rectally once, As Needed  -- Indication: For Contipation    timolol maleate 0.5% ophthalmic gel forming solution  -- 1 drop(s) to each affected eye once a day  -- Indication: For Glaucoma    Restasis 0.05% ophthalmic emulsion  -- 1 drop(s) to each affected eye every 12 hours  -- Indication: For Glaucoma    Travatan 0.004% ophthalmic solution  -- 1 drop(s) to each affected eye once a day (in the evening)  -- Indication: For Glaucoma    Multiple Vitamins oral tablet  -- 1 tab(s) by mouth once a day  -- Indication: For HTN (hypertension)    cholecalciferol oral tablet  -- 1000 unit(s) by mouth once a day  -- Indication: For osteoporosis I will START or STAY ON the medications listed below when I get home from the hospital:    aspirin 81 mg oral tablet, chewable  -- 1 tab(s) by mouth once a day  -- Indication: For CHF (congestive heart failure)    acetaminophen 325 mg oral tablet  -- 2 tab(s) by mouth every 4 hours, As needed, Moderate Pain (4 - 6)  -- Indication: For Altered mental status, unspecified altered mental status type    losartan 50 mg oral tablet  -- 1 tab(s) by mouth once a day  -- Indication: For CHF (congestive heart failure)    lamoTRIgine 100 mg oral tablet  -- 1 tab(s) by mouth once a day  -- Indication: For Seizure    levETIRAcetam 1000 mg oral tablet  -- 1 tab(s) by mouth 2 times a day  -- Indication: For Seizure    atorvastatin 40 mg oral tablet  -- 1 tab(s) by mouth once a day (at bedtime)  -- Indication: For High cholesterol    carvedilol 25 mg oral tablet  -- 1 tab(s) by mouth every 12 hours  -- Indication: For HTN (hypertension)    amLODIPine 10 mg oral tablet  -- 1 tab(s) by mouth once a day  -- Indication: For HTN (hypertension)    lidocaine 2% topical gel with applicator  -- 1 application on skin once, As needed, vaginal pain to aid in pessary repositioning  -- Indication: For Pain    furosemide 20 mg oral tablet  -- 1 tab(s) by mouth once a day  -- Indication: For CHF (congestive heart failure)    Fleet Enema 7 g-19 g rectal enema  -- 133 milliliter(s) rectally once, As Needed  -- Indication: For Contipation    docusate sodium 100 mg oral capsule  -- 1 cap(s) by mouth 3 times a day  -- Indication: For Constipation    polyethylene glycol 3350 oral powder for reconstitution  -- 17 gram(s) by mouth once a day  -- Indication: For Constipation    senna oral tablet  -- 2 tab(s) by mouth once a day (at bedtime)  -- Indication: For Constipation    timolol maleate 0.5% ophthalmic gel forming solution  -- 1 drop(s) to each affected eye once a day  -- Indication: For Glaucoma    Restasis 0.05% ophthalmic emulsion  -- 1 drop(s) to each affected eye every 12 hours  -- Indication: For Glaucoma    Travatan 0.004% ophthalmic solution  -- 1 drop(s) to each affected eye once a day (in the evening)  -- Indication: For Glaucoma    Multiple Vitamins oral tablet  -- 1 tab(s) by mouth once a day  -- Indication: For HTN (hypertension)    cholecalciferol oral tablet  -- 1000 unit(s) by mouth once a day  -- Indication: For osteoporosis

## 2017-05-05 NOTE — DISCHARGE NOTE ADULT - NS AS DC STROKE DX YN
----- Message from Anahi Basilio [phone] .  Thnx-[facility]    no

## 2017-05-05 NOTE — DISCHARGE NOTE ADULT - PATIENT PORTAL LINK FT
“You can access the [facility] Patient Portal, offered by [facility], by registering with the following website: [email or web address]”

## 2017-05-06 PROCEDURE — 99232 SBSQ HOSP IP/OBS MODERATE 35: CPT

## 2017-05-06 RX ORDER — ACETAMINOPHEN 500 MG
650 TABLET ORAL EVERY 6 HOURS
Qty: 0 | Refills: 0 | Status: DISCONTINUED | OUTPATIENT
Start: 2017-05-06 | End: 2017-05-07

## 2017-05-06 RX ADMIN — LOSARTAN POTASSIUM 50 MILLIGRAM(S): 100 TABLET, FILM COATED ORAL at 05:01

## 2017-05-06 RX ADMIN — LAMOTRIGINE 100 MILLIGRAM(S): 25 TABLET, ORALLY DISINTEGRATING ORAL at 11:31

## 2017-05-06 RX ADMIN — CARVEDILOL PHOSPHATE 25 MILLIGRAM(S): 80 CAPSULE, EXTENDED RELEASE ORAL at 18:39

## 2017-05-06 RX ADMIN — CEFTRIAXONE 100 GRAM(S): 500 INJECTION, POWDER, FOR SOLUTION INTRAMUSCULAR; INTRAVENOUS at 18:39

## 2017-05-06 RX ADMIN — LEVETIRACETAM 1000 MILLIGRAM(S): 250 TABLET, FILM COATED ORAL at 05:00

## 2017-05-06 RX ADMIN — Medication 1 TABLET(S): at 11:31

## 2017-05-06 RX ADMIN — Medication 650 MILLIGRAM(S): at 22:00

## 2017-05-06 RX ADMIN — Medication 650 MILLIGRAM(S): at 21:02

## 2017-05-06 RX ADMIN — CARVEDILOL PHOSPHATE 25 MILLIGRAM(S): 80 CAPSULE, EXTENDED RELEASE ORAL at 05:01

## 2017-05-06 RX ADMIN — LEVETIRACETAM 1000 MILLIGRAM(S): 250 TABLET, FILM COATED ORAL at 18:39

## 2017-05-06 RX ADMIN — Medication 20 MILLIGRAM(S): at 05:01

## 2017-05-06 RX ADMIN — ATORVASTATIN CALCIUM 40 MILLIGRAM(S): 80 TABLET, FILM COATED ORAL at 21:03

## 2017-05-06 RX ADMIN — AMLODIPINE BESYLATE 10 MILLIGRAM(S): 2.5 TABLET ORAL at 05:01

## 2017-05-06 RX ADMIN — Medication 1000 UNIT(S): at 11:31

## 2017-05-06 RX ADMIN — Medication 81 MILLIGRAM(S): at 11:31

## 2017-05-06 NOTE — PROGRESS NOTE ADULT - SUBJECTIVE AND OBJECTIVE BOX
CHIEF COMPLAINT/INTERVAL HISTORY:    Patient is a 86y old  Female who presents with a chief complaint of altered mental status (05 May 2017 11:26)      HPI:  86 YOF with PMHx of HTN, HLD, CHF with PM/AICD, absence seizures, hx of CVA, GI Bleed, ischemic colitis, hx of shunt draining cystic structure now with AMS. Spoke to daughter, Moraima and Son-in-Law at bedside since pt unable to put thoughts together. Family says pt is disoriented and her coordination is off, having some slurred speech. States it is similar to previous admission reason and concerned. Pt able to understand, however has trouble expressing thoughts. Admits to headache located at the forehead this morning, but now resolved, similar to previous headaches. Denies cp, sob, vomiting, diarrhea, constipation, abd pain. In previous admission pt had similar episode of AMS accompanied with HTN in 240's was admitted in ICU for BP control and then cleared to floor. Mental status was slightly improved however now presenting with similar symptoms. EEG done on 4/17/17 abnormal, focal epileptiform. ECHO done 4/19/17 EF: 60-65%.     In ED Code Stroke: CT head was negative for acute findings. CXR: unchanged from previous, L defibrillator noted. NEURO CONSULT: Dr. Orosco.     ED attending Dr. Bullard spoke to Dr. Seay who admitted pt. (01 May 2017 18:02)    Overnight issues  patient sitting up in chair having breakfast  No fever Chills  No chest pain/SOB  No headache  No dizziness          SUBJECTIVE & OBJECTIVE: Pt seen and examined at bedside.     ICU Vital Signs Last 24 Hrs  T(C): 37, Max: 37.1 (05-05 @ 16:43)  T(F): 98.6, Max: 98.8 (05-05 @ 16:43)  HR: 80 (66 - 81)  BP: 151/77 (113/71 - 151/77)  BP(mean): --  ABP: --  ABP(mean): --  RR: 17 (16 - 17)  SpO2: 95% (95% - 100%)        MEDICATIONS  (STANDING):  lamoTRIgine 100milliGRAM(s) Oral daily  levETIRAcetam 1000milliGRAM(s) Oral two times a day  atorvastatin 40milliGRAM(s) Oral at bedtime  furosemide    Tablet 20milliGRAM(s) Oral daily  losartan 50milliGRAM(s) Oral daily  amLODIPine   Tablet 10milliGRAM(s) Oral daily  carvedilol 25milliGRAM(s) Oral every 12 hours  multivitamin 1Tablet(s) Oral daily  cholecalciferol 1000Unit(s) Oral daily  aspirin  chewable 81milliGRAM(s) Oral daily  cefTRIAXone   IVPB 1Gram(s) IV Intermittent every 24 hours  cefTRIAXone   IVPB  IV Intermittent     MEDICATIONS  (PRN):        PHYSICAL EXAM:    GENERAL: NAD, well-groomed, well-developed  HEAD:  Atraumatic, Normocephalic  EYES: EOMI, PERRLA, conjunctiva and sclera clear  ENMT: Moist mucous membranes  NECK: Supple, No JVD  NERVOUS SYSTEM:  Alert & Oriented X3, Moving all 4 limbs  CHEST/LUNG: Clear to auscultation bilaterally; No rales, rhonchi, wheezing, or rubs  HEART: Regular rate and rhythm; No murmurs, rubs, or gallops  ABDOMEN: Soft, Nontender, Nondistended; Bowel sounds present  EXTREMITIES:  2+ Peripheral Pulses, No clubbing, cyanosis, or edema    LABS:                CAPILLARY BLOOD GLUCOSE  114 (05 May 2017 11:54)      RECENT CULTURES:      RADIOLOGY & ADDITIONAL TESTS:  Imaging Personally Reviewed:  [ ] YES      Consultant(s) Notes Reviewed:  [ ] YES     Care Discussed with [ ] Consultants [X ] Patient [ ] Family  [ ]    [x ]  Other; RN  HEALTH ISSUES - PROBLEM Dx:  Essential hypertension: Essential hypertension  Acute cystitis without hematuria: Acute cystitis without hematuria  Metabolic encephalopathy: Metabolic encephalopathy  Pacemaker: Pacemaker  History of CVA (cerebrovascular accident): History of CVA (cerebrovascular accident)  GI bleed: GI bleed  Pulmonary hypertension: Pulmonary hypertension  Seizure: Seizure  CHF (congestive heart failure): CHF (congestive heart failure)  High cholesterol: High cholesterol  HTN (hypertension): HTN (hypertension)  Altered mental status, unspecified altered mental status type: Altered mental status, unspecified altered mental status type        DVT/GI ppx  Discussed with pt @ bedside

## 2017-05-07 PROCEDURE — 99232 SBSQ HOSP IP/OBS MODERATE 35: CPT

## 2017-05-07 RX ORDER — LIDOCAINE 4 G/100G
1 CREAM TOPICAL ONCE
Qty: 0 | Refills: 0 | Status: DISCONTINUED | OUTPATIENT
Start: 2017-05-07 | End: 2017-05-08

## 2017-05-07 RX ORDER — ACETAMINOPHEN 500 MG
650 TABLET ORAL EVERY 4 HOURS
Qty: 0 | Refills: 0 | Status: DISCONTINUED | OUTPATIENT
Start: 2017-05-07 | End: 2017-05-08

## 2017-05-07 RX ORDER — ACETAMINOPHEN 500 MG
325 TABLET ORAL EVERY 4 HOURS
Qty: 0 | Refills: 0 | Status: COMPLETED | OUTPATIENT
Start: 2017-05-07 | End: 2017-05-07

## 2017-05-07 RX ADMIN — Medication 325 MILLIGRAM(S): at 22:57

## 2017-05-07 RX ADMIN — Medication 650 MILLIGRAM(S): at 19:42

## 2017-05-07 RX ADMIN — CARVEDILOL PHOSPHATE 25 MILLIGRAM(S): 80 CAPSULE, EXTENDED RELEASE ORAL at 17:33

## 2017-05-07 RX ADMIN — ATORVASTATIN CALCIUM 40 MILLIGRAM(S): 80 TABLET, FILM COATED ORAL at 21:57

## 2017-05-07 RX ADMIN — AMLODIPINE BESYLATE 10 MILLIGRAM(S): 2.5 TABLET ORAL at 05:39

## 2017-05-07 RX ADMIN — Medication 20 MILLIGRAM(S): at 05:40

## 2017-05-07 RX ADMIN — CEFTRIAXONE 100 GRAM(S): 500 INJECTION, POWDER, FOR SOLUTION INTRAMUSCULAR; INTRAVENOUS at 21:57

## 2017-05-07 RX ADMIN — Medication 325 MILLIGRAM(S): at 21:57

## 2017-05-07 RX ADMIN — LAMOTRIGINE 100 MILLIGRAM(S): 25 TABLET, ORALLY DISINTEGRATING ORAL at 12:05

## 2017-05-07 RX ADMIN — LEVETIRACETAM 1000 MILLIGRAM(S): 250 TABLET, FILM COATED ORAL at 17:33

## 2017-05-07 RX ADMIN — Medication 650 MILLIGRAM(S): at 20:42

## 2017-05-07 RX ADMIN — CARVEDILOL PHOSPHATE 25 MILLIGRAM(S): 80 CAPSULE, EXTENDED RELEASE ORAL at 05:40

## 2017-05-07 RX ADMIN — LOSARTAN POTASSIUM 50 MILLIGRAM(S): 100 TABLET, FILM COATED ORAL at 05:40

## 2017-05-07 RX ADMIN — LEVETIRACETAM 1000 MILLIGRAM(S): 250 TABLET, FILM COATED ORAL at 05:40

## 2017-05-07 RX ADMIN — Medication 1000 UNIT(S): at 12:05

## 2017-05-07 RX ADMIN — Medication 1 TABLET(S): at 12:05

## 2017-05-07 RX ADMIN — Medication 81 MILLIGRAM(S): at 12:05

## 2017-05-07 NOTE — PROGRESS NOTE ADULT - SUBJECTIVE AND OBJECTIVE BOX
SURGERY PROGRESS HPI:  Called by RN and medicine PA to evaluate patient's pessary partially out of vaginal vault. Pt seen and examined at bedside. Patient states she has had vaginal pain for the past 4 days but pain worsened at 6pm. No suprapubic pain, no abdominal pain, no back pain, no vaginal bleeding/purulence. Patient last voided 4pm, no hematuria. Pt denies chest pain, SOB, dizziness, fever, chills.       Vital Signs Last 24 Hrs  T(C): 36.8, Max: 37.4 (05-06 @ 23:43)  T(F): 98.2, Max: 99.4 (05-06 @ 23:43)  HR: 77 (65 - 77)  BP: 120/63 (98/55 - 144/68)  BP(mean): --  RR: 23 (17 - 23)  SpO2: 97% (97% - 98%)      PHYSICAL EXAM:    GENERAL: NAD  CHEST/LUNG: Clear to ausculation, bilaterally   HEART: RRR S1S2  ABDOMEN: non distended, +BS, soft, non tender, no guarding  GYN: Normal outer vaginal anatomy. Pessary slightly out of vaginal vault. No vaginal bleeding. No purulence. No discharge. Pessary completely intact. Pessary pushed back into proper position and patient's pain relieved. Patient tolerated well.   EXTREMITIES:  calf soft, non tender b/l    I&O's Detail  I & Os for 24h ending 07 May 2017 07:00  =============================================  IN:    Oral Fluid: 368 ml    Total IN: 368 ml  ---------------------------------------------  OUT:    Total OUT: 0 ml  ---------------------------------------------  Total NET: 368 ml    I & Os for current day (as of 07 May 2017 20:56)  =============================================  IN:    Oral Fluid: 230 ml    Total IN: 230 ml  ---------------------------------------------  OUT:    Total OUT: 0 ml  ---------------------------------------------  Total NET: 230 ml      Assessment: 86 YOF with PMHx of HTN, HLD, CHF with PM/AICD, absence seizures, hx of CVA, GI Bleed, ischemic colitis, hx of shunt draining cystic structure now with AMS. Spoke to daughter, Moraima and Son-in-Law at bedside since pt unable to put thoughts together. Family says pt is disoriented and her coordination is off, having some slurred speech. States it is similar to previous admission reason and concerned. Pt able to understand, however has trouble expressing thoughts. Admits to headache located at the forehead this morning, but now resolved, similar to previous headaches. Denies cp, sob, vomiting, diarrhea, constipation, abd pain. In previous admission pt had similar episode of AMS accompanied with HTN in 240's was admitted in ICU for BP control and then cleared to floor. Mental status was slightly improved however now presenting with similar symptoms. EEG done on 4/17/17 abnormal, focal epileptiform. ECHO done 4/19/17 EF: 60-65%.   with pessary slightly out of vaginal vault    Plan:  -pessary pushed back into proper position as stated above and patient tolerated well  -Dr. Gray notified and recommendation to push pessary back into proper position with GYN outpatient Follow up the next day  -Patient to Follow up with GYN outpatient to evaluate pessary  -Patient's contact leon pedersen called with no answer  -discharge to rehab set up by medicine SURGERY PROGRESS HPI:  Called by RN and medicine PA to evaluate patient's pessary partially out of vaginal vault. Pt seen and examined at bedside. Patient states she has had vaginal pain for the past 4 days but pain worsened at 6pm. No suprapubic pain, no abdominal pain, no back pain, no vaginal bleeding/purulence. Patient last voided 4pm, no hematuria. Pt denies chest pain, SOB, dizziness, fever, chills.       Vital Signs Last 24 Hrs  T(C): 36.8, Max: 37.4 (05-06 @ 23:43)  T(F): 98.2, Max: 99.4 (05-06 @ 23:43)  HR: 77 (65 - 77)  BP: 120/63 (98/55 - 144/68)  BP(mean): --  RR: 23 (17 - 23)  SpO2: 97% (97% - 98%)      PHYSICAL EXAM:    GENERAL: NAD  CHEST/LUNG: Clear to ausculation, bilaterally   HEART: RRR S1S2  ABDOMEN: non distended, +BS, soft, non tender, no guarding  GYN: Normal outer vaginal anatomy. Pessary slightly out of vaginal vault. No vaginal bleeding. No purulence. No discharge. Pessary completely intact. Pessary pushed back into proper position and patient's pain relieved. Patient tolerated well.   EXTREMITIES:  calf soft, non tender b/l    I&O's Detail  I & Os for 24h ending 07 May 2017 07:00  =============================================  IN:    Oral Fluid: 368 ml    Total IN: 368 ml  ---------------------------------------------  OUT:    Total OUT: 0 ml  ---------------------------------------------  Total NET: 368 ml    I & Os for current day (as of 07 May 2017 20:56)  =============================================  IN:    Oral Fluid: 230 ml    Total IN: 230 ml  ---------------------------------------------  OUT:    Total OUT: 0 ml  ---------------------------------------------  Total NET: 230 ml      Assessment: 86 YOF with PMHx of HTN, HLD, CHF with PM/AICD, absence seizures, hx of CVA, GI Bleed, ischemic colitis, hx of shunt draining cystic structure now with AMS. Spoke to daughter, Moraima and Son-in-Law at bedside since pt unable to put thoughts together. Family says pt is disoriented and her coordination is off, having some slurred speech. States it is similar to previous admission reason and concerned. Pt able to understand, however has trouble expressing thoughts. Admits to headache located at the forehead this morning, but now resolved, similar to previous headaches. Denies cp, sob, vomiting, diarrhea, constipation, abd pain. In previous admission pt had similar episode of AMS accompanied with HTN in 240's was admitted in ICU for BP control and then cleared to floor. Mental status was slightly improved however now presenting with similar symptoms. EEG done on 4/17/17 abnormal, focal epileptiform. ECHO done 4/19/17 EF: 60-65%.   with pessary slightly out of vaginal vault    Plan:  -pessary pushed back into proper position as stated above and patient tolerated well  -Dr. Gray notified and recommendation to push pessary back into proper position with GYN outpatient Follow up the next day  -Patient to Follow up with GYN outpatient to evaluate pessary  -Patient's contact mana kruse called with no answer  -discharge to rehab set up by medicine      Addendum:  called by RN because patient complaining of vaginal pain again  -pessary fell out of proper position again. once again the pessary was repositioned to achieve proper position with lidocaine topical gel for patient comfort  -Tylenol for pain prn  -GYN Follow up as outpatient. Daughter Mana Kruse ([phone]) called and informed of the events and instructed to make an outpatient GYN appointment for tomorrow when patient is discharged from hospital. Daughter understood

## 2017-05-07 NOTE — CHART NOTE - NSCHARTNOTEFT_GEN_A_CORE
Medicine Pa Note    Called by Rn that patient is complaining of something in her vagina. Patient seen and examined at bedside. Patient pointing to inside her vagina. Patient noted to have a pessary partial seen in vaginal vault. Spoke with BABATUNDE Ortez who is coverng surgery who spoke with Dr Gray. Will continue to monitor.

## 2017-05-08 VITALS — WEIGHT: 119.93 LBS

## 2017-05-08 PROCEDURE — 99239 HOSP IP/OBS DSCHRG MGMT >30: CPT

## 2017-05-08 RX ORDER — ACETAMINOPHEN 500 MG
2 TABLET ORAL
Qty: 0 | Refills: 0 | COMMUNITY
Start: 2017-05-08

## 2017-05-08 RX ORDER — SENNA PLUS 8.6 MG/1
2 TABLET ORAL AT BEDTIME
Qty: 0 | Refills: 0 | Status: DISCONTINUED | OUTPATIENT
Start: 2017-05-08 | End: 2017-05-08

## 2017-05-08 RX ORDER — SENNA PLUS 8.6 MG/1
2 TABLET ORAL
Qty: 0 | Refills: 0 | DISCHARGE
Start: 2017-05-08

## 2017-05-08 RX ORDER — SENNA PLUS 8.6 MG/1
2 TABLET ORAL
Qty: 0 | Refills: 0 | COMMUNITY
Start: 2017-05-08

## 2017-05-08 RX ORDER — POLYETHYLENE GLYCOL 3350 17 G/17G
17 POWDER, FOR SOLUTION ORAL DAILY
Qty: 0 | Refills: 0 | Status: DISCONTINUED | OUTPATIENT
Start: 2017-05-08 | End: 2017-05-08

## 2017-05-08 RX ORDER — POLYETHYLENE GLYCOL 3350 17 G/17G
17 POWDER, FOR SOLUTION ORAL
Qty: 0 | Refills: 0 | DISCHARGE
Start: 2017-05-08

## 2017-05-08 RX ORDER — DOCUSATE SODIUM 100 MG
1 CAPSULE ORAL
Qty: 0 | Refills: 0 | COMMUNITY
Start: 2017-05-08

## 2017-05-08 RX ORDER — DOCUSATE SODIUM 100 MG
2 CAPSULE ORAL
Qty: 0 | Refills: 0 | DISCHARGE
Start: 2017-05-08

## 2017-05-08 RX ORDER — ACETAMINOPHEN 500 MG
2 TABLET ORAL
Qty: 0 | Refills: 0 | DISCHARGE
Start: 2017-05-08

## 2017-05-08 RX ORDER — DOCUSATE SODIUM 100 MG
100 CAPSULE ORAL THREE TIMES A DAY
Qty: 0 | Refills: 0 | Status: DISCONTINUED | OUTPATIENT
Start: 2017-05-08 | End: 2017-05-08

## 2017-05-08 RX ORDER — LIDOCAINE 4 G/100G
1 CREAM TOPICAL
Qty: 0 | Refills: 0 | COMMUNITY
Start: 2017-05-08

## 2017-05-08 RX ADMIN — Medication 650 MILLIGRAM(S): at 06:11

## 2017-05-08 RX ADMIN — Medication 650 MILLIGRAM(S): at 10:05

## 2017-05-08 RX ADMIN — LOSARTAN POTASSIUM 50 MILLIGRAM(S): 100 TABLET, FILM COATED ORAL at 05:11

## 2017-05-08 RX ADMIN — Medication 20 MILLIGRAM(S): at 05:11

## 2017-05-08 RX ADMIN — POLYETHYLENE GLYCOL 3350 17 GRAM(S): 17 POWDER, FOR SOLUTION ORAL at 13:46

## 2017-05-08 RX ADMIN — Medication 100 MILLIGRAM(S): at 13:46

## 2017-05-08 RX ADMIN — LEVETIRACETAM 1000 MILLIGRAM(S): 250 TABLET, FILM COATED ORAL at 05:11

## 2017-05-08 RX ADMIN — Medication 1000 UNIT(S): at 11:52

## 2017-05-08 RX ADMIN — AMLODIPINE BESYLATE 10 MILLIGRAM(S): 2.5 TABLET ORAL at 05:11

## 2017-05-08 RX ADMIN — Medication 81 MILLIGRAM(S): at 11:52

## 2017-05-08 RX ADMIN — Medication 650 MILLIGRAM(S): at 11:05

## 2017-05-08 RX ADMIN — LAMOTRIGINE 100 MILLIGRAM(S): 25 TABLET, ORALLY DISINTEGRATING ORAL at 11:52

## 2017-05-08 RX ADMIN — CARVEDILOL PHOSPHATE 25 MILLIGRAM(S): 80 CAPSULE, EXTENDED RELEASE ORAL at 05:11

## 2017-05-08 RX ADMIN — Medication 1 ENEMA: at 12:41

## 2017-05-08 RX ADMIN — Medication 1 TABLET(S): at 11:52

## 2017-05-08 RX ADMIN — Medication 650 MILLIGRAM(S): at 05:11

## 2017-05-08 NOTE — DIETITIAN INITIAL EVALUATION ADULT. - SOURCE
other (specify)/Chart review , pt is a poor historian, was able to state date of birth, Nursing/patient

## 2017-05-08 NOTE — DIETITIAN INITIAL EVALUATION ADULT. - ENERGY NEEDS
Height (cm): 162.6 (05-02)  Weight (kg): 54.5 (05-02)  BMI (kg/m2): 20.6 (05-02)  IBW: 54.5kg  % IBW: 100%, ? accuracy of height, pt appears taller that 5'4"/162.6 cm

## 2017-05-08 NOTE — PROGRESS NOTE ADULT - SUBJECTIVE AND OBJECTIVE BOX
CHIEF COMPLAINT/INTERVAL HISTORY:    Patient is a 86y old  Female who presents with a chief complaint of altered mental status (05 May 2017 11:26)      HPI:  86 YOF with PMHx of HTN, HLD, CHF with PM/AICD, absence seizures, hx of CVA, GI Bleed, ischemic colitis, hx of shunt draining cystic structure now with AMS. Spoke to daughter, Moraima and Son-in-Law at bedside since pt unable to put thoughts together. Family says pt is disoriented and her coordination is off, having some slurred speech. States it is similar to previous admission reason and concerned. Pt able to understand, however has trouble expressing thoughts. Admits to headache located at the forehead this morning, but now resolved, similar to previous headaches. Denies cp, sob, vomiting, diarrhea, constipation, abd pain. In previous admission pt had similar episode of AMS accompanied with HTN in 240's was admitted in ICU for BP control and then cleared to floor. Mental status was slightly improved however now presenting with similar symptoms. EEG done on 4/17/17 abnormal, focal epileptiform. ECHO done 4/19/17 EF: 60-65%.     In ED Code Stroke: CT head was negative for acute findings. CXR: unchanged from previous, L defibrillator noted. NEURO CONSULT: Dr. Orosco.     ED attending Dr. Bullard spoke to Dr. Seay who admitted pt. (01 May 2017 18:02)    Overnight issues  complain of constipation          SUBJECTIVE & OBJECTIVE: Pt seen and examined at bedside.   ROS:  CONSTITUTIONAL: No fever, weight loss, or fatigue  NECK: No pain or stiffness  RESPIRATORY: No cough, wheezing, chills or hemoptysis; No shortness of breath  CARDIOVASCULAR: No chest pain, palpitations, dizziness, or leg swelling  GASTROINTESTINAL: No abdominal or epigastric pain. No nausea, vomiting, or hematemesis; POSITIVE constipation. No melena or hematochezia.  GENITOURINARY: No dysuria, frequency, hematuria, or incontinence  NEUROLOGICAL: No headaches, memory loss, loss of strength, numbness, or tremors  SKIN: No itching, burning, rashes, or lesions   ICU Vital Signs Last 24 Hrs  T(C): 37.3, Max: 37.3 (05-08 @ 11:52)  T(F): 99.2, Max: 99.2 (05-08 @ 11:52)  HR: 71 (71 - 77)  BP: 104/66 (104/66 - 140/74)  BP(mean): --  ABP: --  ABP(mean): --  RR: 18 (16 - 23)  SpO2: 99% (95% - 99%)        MEDICATIONS  (STANDING):  lamoTRIgine 100milliGRAM(s) Oral daily  levETIRAcetam 1000milliGRAM(s) Oral two times a day  atorvastatin 40milliGRAM(s) Oral at bedtime  furosemide    Tablet 20milliGRAM(s) Oral daily  losartan 50milliGRAM(s) Oral daily  amLODIPine   Tablet 10milliGRAM(s) Oral daily  carvedilol 25milliGRAM(s) Oral every 12 hours  multivitamin 1Tablet(s) Oral daily  cholecalciferol 1000Unit(s) Oral daily  aspirin  chewable 81milliGRAM(s) Oral daily  cefTRIAXone   IVPB 1Gram(s) IV Intermittent every 24 hours  cefTRIAXone   IVPB  IV Intermittent   polyethylene glycol 3350 17Gram(s) Oral daily  docusate sodium 100milliGRAM(s) Oral three times a day  senna 2Tablet(s) Oral at bedtime    MEDICATIONS  (PRN):  lidocaine 2% Gel 1Application(s) Topical once PRN vaginal pain to aid in pessary repositioning  acetaminophen   Tablet. 650milliGRAM(s) Oral every 4 hours PRN Moderate Pain (4 - 6)  sodium biphosphate Rectal Enema 1Enema Rectal daily PRN constipation        PHYSICAL EXAM:    GENERAL: NAD, well-groomed, well-developed  HEAD:  Atraumatic, Normocephalic  EYES: EOMI, PERRLA, conjunctiva and sclera clear  ENMT: Moist mucous membranes  NECK: Supple, No JVD  NERVOUS SYSTEM:  Alert & Oriented X3, Motor Strength 5/5 B/L upper and lower extremities; DTRs 2+ intact and symmetric  CHEST/LUNG: Clear to auscultation bilaterally; No rales, rhonchi, wheezing, or rubs  HEART: Regular rate and rhythm; No murmurs, rubs, or gallops  ABDOMEN: Soft, Nontender, Nondistended; Bowel sounds present  EXTREMITIES:  2+ Peripheral Pulses, No clubbing, cyanosis, or edema    LABS:                CAPILLARY BLOOD GLUCOSE      RECENT CULTURES:      RADIOLOGY & ADDITIONAL TESTS:  Imaging Personally Reviewed:  [ ] YES      Consultant(s) Notes Reviewed:  [ ] YES     Care Discussed with [ ] Consultants [X ] Patient [ ] Family  [x ]    [x ]  Other; RN  HEALTH ISSUES - PROBLEM Dx:  Essential hypertension: Essential hypertension  Acute cystitis without hematuria: Acute cystitis without hematuria  Metabolic encephalopathy: Metabolic encephalopathy  Pacemaker: Pacemaker  History of CVA (cerebrovascular accident): History of CVA (cerebrovascular accident)  GI bleed: GI bleed  Pulmonary hypertension: Pulmonary hypertension  Seizure: Seizure  CHF (congestive heart failure): CHF (congestive heart failure)  High cholesterol: High cholesterol  HTN (hypertension): HTN (hypertension)  Altered mental status, unspecified altered mental status type: Altered mental status, unspecified altered mental status type        DVT/GI ppx  Discussed with pt @ bedside

## 2017-05-08 NOTE — PROGRESS NOTE ADULT - PROBLEM SELECTOR PLAN 4
BP acceptable-continue with bp meds, monitor
BP acceptable-continue with bp meds, monitor
continue with bp meds, monitor

## 2017-05-08 NOTE — PROGRESS NOTE ADULT - ASSESSMENT
awaek alert speech fluent    arm leg 4/5 no seziure dementia  unsteady gait for sub acute rehab
events noted ct head noted no change old shunt arm leg 3/5 confused htn encephalopathy for sub aCUTE REHAB .
marlene pimentel sitting in chair in am discuss with family for sub acute rehab cathy gonzalez .
pt is sleeping hx of htn ct head no acute path dementia had work up last time for pt sub acute rehab no sezoire
86 YOF with PMHx of HTN, HLD, CHF with PM/AICD, absence seizures, hx of CVA, GI Bleed, ischemic colitis, hx of shunt draining cystic structure now with AMS. Spoke to daughter, Moraima and Son-in-Law at bedside since pt unable to put thoughts together. Family says pt is disoriented and her coordination is off, having some slurred speech. States it is similar to previous admission reason and concerned. Pt able to understand, however has trouble expressing thoughts. Admits to headache located at the forehead this morning, but now resolved, similar to previous headaches. Denies cp, sob, vomiting, diarrhea, constipation, abd pain. In previous admission pt had similar episode of AMS accompanied with HTN in 240's was admitted in ICU for BP control and then cleared to floor. Mental status was slightly improved however now presenting with similar symptoms. EEG done on 4/17/17 abnormal, focal epileptiform. ECHO done 4/19/17 EF: 60-65%.   +uti

## 2017-05-08 NOTE — DIETITIAN INITIAL EVALUATION ADULT. - FACTORS AFF FOOD INTAKE
pt w/o upper dentures in place, missing bottom teeth noted, 5/2, swallow evaluation recommended mechanical soft diet c thin liquids/difficulty chewing/persistent constipation/other (specify)

## 2017-05-08 NOTE — PROGRESS NOTE ADULT - PROBLEM SELECTOR PLAN 1
2/2 uti, treat underlying medical condition-resolved
2/2 uti, treat underlying medical condition
2/2 uti, treat underlying medical condition

## 2017-05-08 NOTE — DIETITIAN INITIAL EVALUATION ADULT. - PHYSICAL APPEARANCE
BMI=20.6(5/2), Nutrition focused physical exam conducted; found physical signs of malnutrition [ ]absent [ X]present; Subcutaneous fat Exam;  [ moderate ]  Orbital fat pads region,  [unable to partial edentulism   ]Buccal fat region,  [ severe ]triceps region, [ unable, distended abdomen noted ]ribs region.  Muscle Exam; [ moderate ]temples region, [ severe ]clavicle region, [ severe ]shoulder region, [ unable ]Scapula region, [ severe ]Interosseous region, [moderate  ]thigh region, [  moderate]Calf region/debilitated/underweight/other (specify)

## 2017-05-08 NOTE — DIETITIAN INITIAL EVALUATION ADULT. - DIET TYPE
DASH/TLC (sodium and cholesterol restricted diet)/Ensure Enlive 3 x day(5/3)/dysphagia 2, mechanical soft, thin liquids

## 2017-05-08 NOTE — CHART NOTE - NSCHARTNOTEFT_GEN_A_CORE
Upon Nutritional Assessment by the Registered Dietitian your patient was determined to meet criteria / has evidence of the following diagnosis/diagnoses:          [ ]  Mild Protein Calorie Malnutrition        [ ]  Moderate Protein Calorie Malnutrition        [X ] Severe Protein Calorie Malnutrition        [ ] Unspecified Protein Calorie Malnutrition        [ ] Underweight / BMI <19        [ ] Morbid Obesity / BMI > 40      Findings as based on:  •  Comprehensive nutrition assessment and consultation  •  Calorie counts (nutrient intake analysis)  •  Food acceptance and intake status from observations by staff  •  Follow up  •  Patient education  •  Intervention secondary to interdisciplinary rounds  •   concerns      Treatment:    The following diet has been recommended:  Low sodium , Dysphagia 2 Mechanical Soft - Thin Liquids ,  Ensure Enlive 3 x day=1125 calories, 60 grams protein         PROVIDER Section:     By signing this assessment you are acknowledging and agree with the diagnosis/diagnoses assigned by the Registered Dietitian    Comments:

## 2017-05-08 NOTE — DIETITIAN INITIAL EVALUATION ADULT. - OTHER INFO
Pt seen for length of stay .  Pt reports to be eating in small amounts & consuming Ensure Enlive.  As per Nursing Assistant, pt consumed 50% of lunch today.  Pt reports wt. loss, but unable to provide detail wt. history

## 2017-05-08 NOTE — DIETITIAN INITIAL EVALUATION ADULT. - PERTINENT MEDS FT
ABX , senna , docusate sodium , polyethylene glycol , sodium biphosphate rectal enema, cholecalciferol , multivitamin , amlodipine , carvedilol , losartan , furosemide , atorvastatin

## 2017-05-08 NOTE — DIETITIAN INITIAL EVALUATION ADULT. - NS AS NUTRI INTERV MEALS SNACK
Recommend Low sodium , Dysphagia 2 Mechanical Soft - Thin Liquids &  continue with Ensure Enlive 3 x day=1125 calories, 60 grams protein as ordered/Texture-modified diet/Mineral - modified diet

## 2017-05-08 NOTE — PROGRESS NOTE ADULT - PROBLEM SELECTOR PROBLEM 2
Acute cystitis without hematuria

## 2017-05-08 NOTE — PROGRESS NOTE ADULT - PROBLEM SELECTOR PLAN 3
cont with anti-epileptics  check eeg  stable for discharge
cont with anti-epileptics  check eeg  stable for discharge
cont with anti-epileptics  stable for discharge  as per neuro JOHAN
cont with anti-epileptics  stable for discharge  as per neuro JOHAN
cont with anti-epileptics
cont with anti-epileptics

## 2017-05-08 NOTE — DIETITIAN INITIAL EVALUATION ADULT. - PROBLEM SELECTOR PLAN 1
- Admit to Telemetry for acute encephalopathy/AMS due to ?etiology  -AMS sec to ?stroke vs ?seizure   - R/o CVA/TIA  - Neuro Consult: Dr. Orosco  - A1C ordered  - Lipid panel ordered  - Folic acid RBC ordered  - RPR ordered  - Vit b12 serum ordered  - F/U labs  - F/U Urine Culture  - NPO until Speech and Swallow  - PT/OT

## 2017-05-08 NOTE — PROGRESS NOTE ADULT - PROBLEM SELECTOR PLAN 2
continue with ceftriaxone,   culture 10,000-49,000 Coagulase negative stahp  Will continue ABX for 7 days to complete course
continue with ceftriaxone, fu cultures

## 2017-05-08 NOTE — PROGRESS NOTE ADULT - PROBLEM SELECTOR PROBLEM 1
Metabolic encephalopathy

## 2017-05-08 NOTE — PROGRESS NOTE ADULT - PROBLEM SELECTOR PROBLEM 4
Essential hypertension

## 2017-05-11 DIAGNOSIS — Z87.891 PERSONAL HISTORY OF NICOTINE DEPENDENCE: ICD-10-CM

## 2017-05-11 DIAGNOSIS — R41.82 ALTERED MENTAL STATUS, UNSPECIFIED: ICD-10-CM

## 2017-05-11 DIAGNOSIS — E43 UNSPECIFIED SEVERE PROTEIN-CALORIE MALNUTRITION: ICD-10-CM

## 2017-05-11 DIAGNOSIS — R47.81 SLURRED SPEECH: ICD-10-CM

## 2017-05-11 DIAGNOSIS — N30.00 ACUTE CYSTITIS WITHOUT HEMATURIA: ICD-10-CM

## 2017-05-11 DIAGNOSIS — Z87.19 PERSONAL HISTORY OF OTHER DISEASES OF THE DIGESTIVE SYSTEM: ICD-10-CM

## 2017-05-11 DIAGNOSIS — F03.90 UNSPECIFIED DEMENTIA WITHOUT BEHAVIORAL DISTURBANCE: ICD-10-CM

## 2017-05-11 DIAGNOSIS — R26.81 UNSTEADINESS ON FEET: ICD-10-CM

## 2017-05-11 DIAGNOSIS — E78.00 PURE HYPERCHOLESTEROLEMIA, UNSPECIFIED: ICD-10-CM

## 2017-05-11 DIAGNOSIS — I50.9 HEART FAILURE, UNSPECIFIED: ICD-10-CM

## 2017-05-11 DIAGNOSIS — I27.2 OTHER SECONDARY PULMONARY HYPERTENSION: ICD-10-CM

## 2017-05-11 DIAGNOSIS — R53.83 OTHER FATIGUE: ICD-10-CM

## 2017-05-11 DIAGNOSIS — R51 HEADACHE: ICD-10-CM

## 2017-05-11 DIAGNOSIS — Z95.810 PRESENCE OF AUTOMATIC (IMPLANTABLE) CARDIAC DEFIBRILLATOR: ICD-10-CM

## 2017-05-11 DIAGNOSIS — R13.10 DYSPHAGIA, UNSPECIFIED: ICD-10-CM

## 2017-05-11 DIAGNOSIS — E78.5 HYPERLIPIDEMIA, UNSPECIFIED: ICD-10-CM

## 2017-05-11 DIAGNOSIS — I11.0 HYPERTENSIVE HEART DISEASE WITH HEART FAILURE: ICD-10-CM

## 2017-05-11 DIAGNOSIS — G93.41 METABOLIC ENCEPHALOPATHY: ICD-10-CM

## 2017-05-11 DIAGNOSIS — R56.9 UNSPECIFIED CONVULSIONS: ICD-10-CM

## 2017-05-11 DIAGNOSIS — Z86.73 PERSONAL HISTORY OF TRANSIENT ISCHEMIC ATTACK (TIA), AND CEREBRAL INFARCTION WITHOUT RESIDUAL DEFICITS: ICD-10-CM

## 2017-05-11 DIAGNOSIS — N89.8 OTHER SPECIFIED NONINFLAMMATORY DISORDERS OF VAGINA: ICD-10-CM

## 2017-05-12 DIAGNOSIS — H40.9 UNSPECIFIED GLAUCOMA: ICD-10-CM

## 2017-05-12 DIAGNOSIS — K59.00 CONSTIPATION, UNSPECIFIED: ICD-10-CM

## 2017-05-12 RX ORDER — TRAVOPROST 0.04 MG/ML
0 SOLUTION/ DROPS OPHTHALMIC
Refills: 0 | Status: ACTIVE | COMMUNITY
Start: 2017-05-12

## 2017-05-12 RX ORDER — LORATADINE 10 MG
17 TABLET,DISINTEGRATING ORAL DAILY
Refills: 0 | Status: ACTIVE | COMMUNITY
Start: 2017-05-12

## 2017-05-12 RX ORDER — SENNOSIDES 8.6 MG TABLETS 8.6 MG/1
8.6 TABLET ORAL
Qty: 180 | Refills: 3 | Status: ACTIVE | COMMUNITY
Start: 2017-05-12

## 2017-05-12 RX ORDER — TIMOLOL MALEATE 5 MG/ML
0.5 SOLUTION OPHTHALMIC
Qty: 2 | Refills: 0 | Status: ACTIVE | COMMUNITY
Start: 2017-05-12

## 2017-05-12 RX ORDER — ATORVASTATIN CALCIUM 40 MG/1
40 TABLET, FILM COATED ORAL
Refills: 0 | Status: ACTIVE | COMMUNITY
Start: 2017-05-12

## 2017-05-12 RX ORDER — CYCLOSPORINE 0.5 MG/ML
0.05 EMULSION OPHTHALMIC
Refills: 0 | Status: ACTIVE | COMMUNITY
Start: 2017-05-12

## 2017-05-18 ENCOUNTER — APPOINTMENT (OUTPATIENT)
Dept: ELECTROPHYSIOLOGY | Facility: CLINIC | Age: 82
End: 2017-05-18

## 2017-06-06 ENCOUNTER — INPATIENT (INPATIENT)
Facility: HOSPITAL | Age: 82
LOS: 2 days | Discharge: HOME | End: 2017-06-09
Attending: HOSPITALIST | Admitting: HOSPITALIST
Payer: MEDICARE

## 2017-06-06 VITALS
SYSTOLIC BLOOD PRESSURE: 145 MMHG | OXYGEN SATURATION: 100 % | TEMPERATURE: 98 F | DIASTOLIC BLOOD PRESSURE: 82 MMHG | HEIGHT: 65 IN | HEART RATE: 98 BPM | WEIGHT: 125 LBS

## 2017-06-06 DIAGNOSIS — S32.591A OTHER SPECIFIED FRACTURE OF RIGHT PUBIS, INITIAL ENCOUNTER FOR CLOSED FRACTURE: ICD-10-CM

## 2017-06-06 DIAGNOSIS — I10 ESSENTIAL (PRIMARY) HYPERTENSION: ICD-10-CM
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
## 2017-06-06 DIAGNOSIS — E78.00 PURE HYPERCHOLESTEROLEMIA, UNSPECIFIED: ICD-10-CM

## 2017-06-06 DIAGNOSIS — E43 UNSPECIFIED SEVERE PROTEIN-CALORIE MALNUTRITION: ICD-10-CM

## 2017-06-06 DIAGNOSIS — I50.32 CHRONIC DIASTOLIC (CONGESTIVE) HEART FAILURE: ICD-10-CM

## 2017-06-06 DIAGNOSIS — N30.01 ACUTE CYSTITIS WITH HEMATURIA: ICD-10-CM

## 2017-06-06 DIAGNOSIS — Z98.2 PRESENCE OF CEREBROSPINAL FLUID DRAINAGE DEVICE: Chronic | ICD-10-CM

## 2017-06-06 DIAGNOSIS — R56.9 UNSPECIFIED CONVULSIONS: ICD-10-CM

## 2017-06-06 LAB
ALBUMIN SERPL ELPH-MCNC: 3.5 G/DL — SIGNIFICANT CHANGE UP (ref 3.3–5)
ALP SERPL-CCNC: 80 U/L — SIGNIFICANT CHANGE UP (ref 40–120)
ALT FLD-CCNC: 23 U/L — SIGNIFICANT CHANGE UP (ref 12–78)
ANION GAP SERPL CALC-SCNC: 11 MMOL/L — SIGNIFICANT CHANGE UP (ref 5–17)
APPEARANCE UR: ABNORMAL
AST SERPL-CCNC: 30 U/L — SIGNIFICANT CHANGE UP (ref 15–37)
BACTERIA # UR AUTO: ABNORMAL
BASOPHILS # BLD AUTO: 0 K/UL — SIGNIFICANT CHANGE UP (ref 0–0.2)
BASOPHILS NFR BLD AUTO: 0.3 % — SIGNIFICANT CHANGE UP (ref 0–2)
BILIRUB SERPL-MCNC: 0.5 MG/DL — SIGNIFICANT CHANGE UP (ref 0.2–1.2)
BILIRUB UR-MCNC: NEGATIVE — SIGNIFICANT CHANGE UP
BUN SERPL-MCNC: 15 MG/DL — SIGNIFICANT CHANGE UP (ref 7–23)
CALCIUM SERPL-MCNC: 9 MG/DL — SIGNIFICANT CHANGE UP (ref 8.5–10.1)
CHLORIDE SERPL-SCNC: 103 MMOL/L — SIGNIFICANT CHANGE UP (ref 96–108)
CK MB CFR SERPL CALC: <0.5 NG/ML — SIGNIFICANT CHANGE UP (ref 0.5–3.6)
CO2 SERPL-SCNC: 27 MMOL/L — SIGNIFICANT CHANGE UP (ref 22–31)
COLOR SPEC: YELLOW — SIGNIFICANT CHANGE UP
CREAT SERPL-MCNC: 0.88 MG/DL — SIGNIFICANT CHANGE UP (ref 0.5–1.3)
DIFF PNL FLD: ABNORMAL
EOSINOPHIL # BLD AUTO: 0 K/UL — SIGNIFICANT CHANGE UP (ref 0–0.5)
EOSINOPHIL NFR BLD AUTO: 0.4 % — SIGNIFICANT CHANGE UP (ref 0–6)
EPI CELLS # UR: SIGNIFICANT CHANGE UP
GLUCOSE SERPL-MCNC: 124 MG/DL — HIGH (ref 70–99)
GLUCOSE UR QL: NEGATIVE MG/DL — SIGNIFICANT CHANGE UP
HCT VFR BLD CALC: 34.6 % — SIGNIFICANT CHANGE UP (ref 34.5–45)
HGB BLD-MCNC: 12.1 G/DL — SIGNIFICANT CHANGE UP (ref 11.5–15.5)
KETONES UR-MCNC: NEGATIVE — SIGNIFICANT CHANGE UP
LACTATE SERPL-SCNC: 1.8 MMOL/L — SIGNIFICANT CHANGE UP (ref 0.7–2)
LACTATE SERPL-SCNC: 2.2 MMOL/L — HIGH (ref 0.7–2)
LEUKOCYTE ESTERASE UR-ACNC: ABNORMAL
LIDOCAIN IGE QN: 92 U/L — SIGNIFICANT CHANGE UP (ref 73–393)
LYMPHOCYTES # BLD AUTO: 1.1 K/UL — SIGNIFICANT CHANGE UP (ref 1–3.3)
LYMPHOCYTES # BLD AUTO: 11.8 % — LOW (ref 13–44)
MCHC RBC-ENTMCNC: 31.5 PG — SIGNIFICANT CHANGE UP (ref 27–34)
MCHC RBC-ENTMCNC: 34.9 GM/DL — SIGNIFICANT CHANGE UP (ref 32–36)
MCV RBC AUTO: 90.1 FL — SIGNIFICANT CHANGE UP (ref 80–100)
MONOCYTES # BLD AUTO: 0.4 K/UL — SIGNIFICANT CHANGE UP (ref 0–0.9)
MONOCYTES NFR BLD AUTO: 4.3 % — SIGNIFICANT CHANGE UP (ref 2–14)
NEUTROPHILS # BLD AUTO: 8 K/UL — HIGH (ref 1.8–7.4)
NEUTROPHILS NFR BLD AUTO: 83.2 % — HIGH (ref 43–77)
NITRITE UR-MCNC: NEGATIVE — SIGNIFICANT CHANGE UP
PH UR: 8 — SIGNIFICANT CHANGE UP (ref 5–8)
PLATELET # BLD AUTO: 296 K/UL — SIGNIFICANT CHANGE UP (ref 150–400)
POTASSIUM SERPL-MCNC: 3.3 MMOL/L — LOW (ref 3.5–5.3)
POTASSIUM SERPL-SCNC: 3.3 MMOL/L — LOW (ref 3.5–5.3)
PROT SERPL-MCNC: 7.8 GM/DL — SIGNIFICANT CHANGE UP (ref 6–8.3)
PROT UR-MCNC: 30 MG/DL
RBC # BLD: 3.84 M/UL — SIGNIFICANT CHANGE UP (ref 3.8–5.2)
RBC # FLD: 11.9 % — SIGNIFICANT CHANGE UP (ref 11–15)
RBC CASTS # UR COMP ASSIST: ABNORMAL /HPF (ref 0–4)
SODIUM SERPL-SCNC: 141 MMOL/L — SIGNIFICANT CHANGE UP (ref 135–145)
SP GR SPEC: 1.01 — SIGNIFICANT CHANGE UP (ref 1.01–1.02)
TROPONIN I SERPL-MCNC: <.015 NG/ML — SIGNIFICANT CHANGE UP (ref 0.01–0.04)
UROBILINOGEN FLD QL: NEGATIVE MG/DL — SIGNIFICANT CHANGE UP
WBC # BLD: 9.6 K/UL — SIGNIFICANT CHANGE UP (ref 3.8–10.5)
WBC # FLD AUTO: 9.6 K/UL — SIGNIFICANT CHANGE UP (ref 3.8–10.5)
WBC UR QL: ABNORMAL

## 2017-06-06 PROCEDURE — 73502 X-RAY EXAM HIP UNI 2-3 VIEWS: CPT | Mod: 26,RT

## 2017-06-06 PROCEDURE — 74177 CT ABD & PELVIS W/CONTRAST: CPT | Mod: 26

## 2017-06-06 PROCEDURE — 70450 CT HEAD/BRAIN W/O DYE: CPT | Mod: 26

## 2017-06-06 PROCEDURE — 99223 1ST HOSP IP/OBS HIGH 75: CPT

## 2017-06-06 PROCEDURE — 99285 EMERGENCY DEPT VISIT HI MDM: CPT | Mod: 25

## 2017-06-06 PROCEDURE — 71010: CPT | Mod: 26

## 2017-06-06 RX ORDER — FUROSEMIDE 40 MG
20 TABLET ORAL DAILY
Qty: 0 | Refills: 0 | Status: DISCONTINUED | OUTPATIENT
Start: 2017-06-06 | End: 2017-06-09

## 2017-06-06 RX ORDER — CARVEDILOL PHOSPHATE 80 MG/1
25 CAPSULE, EXTENDED RELEASE ORAL EVERY 12 HOURS
Qty: 0 | Refills: 0 | Status: DISCONTINUED | OUTPATIENT
Start: 2017-06-06 | End: 2017-06-09

## 2017-06-06 RX ORDER — LOSARTAN POTASSIUM 100 MG/1
50 TABLET, FILM COATED ORAL DAILY
Qty: 0 | Refills: 0 | Status: DISCONTINUED | OUTPATIENT
Start: 2017-06-06 | End: 2017-06-09

## 2017-06-06 RX ORDER — AMLODIPINE BESYLATE 2.5 MG/1
10 TABLET ORAL DAILY
Qty: 0 | Refills: 0 | Status: DISCONTINUED | OUTPATIENT
Start: 2017-06-06 | End: 2017-06-09

## 2017-06-06 RX ORDER — ASPIRIN/CALCIUM CARB/MAGNESIUM 324 MG
81 TABLET ORAL DAILY
Qty: 0 | Refills: 0 | Status: DISCONTINUED | OUTPATIENT
Start: 2017-06-06 | End: 2017-06-08

## 2017-06-06 RX ORDER — CEFTRIAXONE 500 MG/1
INJECTION, POWDER, FOR SOLUTION INTRAMUSCULAR; INTRAVENOUS
Qty: 0 | Refills: 0 | Status: DISCONTINUED | OUTPATIENT
Start: 2017-06-06 | End: 2017-06-08

## 2017-06-06 RX ORDER — ATORVASTATIN CALCIUM 80 MG/1
10 TABLET, FILM COATED ORAL AT BEDTIME
Qty: 0 | Refills: 0 | Status: DISCONTINUED | OUTPATIENT
Start: 2017-06-06 | End: 2017-06-09

## 2017-06-06 RX ORDER — POTASSIUM CHLORIDE 20 MEQ
20 PACKET (EA) ORAL ONCE
Qty: 0 | Refills: 0 | Status: COMPLETED | OUTPATIENT
Start: 2017-06-06 | End: 2017-06-06

## 2017-06-06 RX ORDER — SODIUM CHLORIDE 9 MG/ML
1000 INJECTION INTRAMUSCULAR; INTRAVENOUS; SUBCUTANEOUS ONCE
Qty: 0 | Refills: 0 | Status: COMPLETED | OUTPATIENT
Start: 2017-06-06 | End: 2017-06-06

## 2017-06-06 RX ORDER — HEPARIN SODIUM 5000 [USP'U]/ML
5000 INJECTION INTRAVENOUS; SUBCUTANEOUS EVERY 12 HOURS
Qty: 0 | Refills: 0 | Status: DISCONTINUED | OUTPATIENT
Start: 2017-06-06 | End: 2017-06-08

## 2017-06-06 RX ORDER — CEFTRIAXONE 500 MG/1
1 INJECTION, POWDER, FOR SOLUTION INTRAMUSCULAR; INTRAVENOUS EVERY 24 HOURS
Qty: 0 | Refills: 0 | Status: DISCONTINUED | OUTPATIENT
Start: 2017-06-07 | End: 2017-06-08

## 2017-06-06 RX ORDER — ONDANSETRON 8 MG/1
4 TABLET, FILM COATED ORAL ONCE
Qty: 0 | Refills: 0 | Status: COMPLETED | OUTPATIENT
Start: 2017-06-06 | End: 2017-06-06

## 2017-06-06 RX ORDER — POLYETHYLENE GLYCOL 3350 17 G/17G
17 POWDER, FOR SOLUTION ORAL DAILY
Qty: 0 | Refills: 0 | Status: DISCONTINUED | OUTPATIENT
Start: 2017-06-06 | End: 2017-06-09

## 2017-06-06 RX ORDER — SODIUM CHLORIDE 9 MG/ML
3 INJECTION INTRAMUSCULAR; INTRAVENOUS; SUBCUTANEOUS EVERY 8 HOURS
Qty: 0 | Refills: 0 | Status: DISCONTINUED | OUTPATIENT
Start: 2017-06-06 | End: 2017-06-09

## 2017-06-06 RX ORDER — ACETAMINOPHEN 500 MG
650 TABLET ORAL EVERY 6 HOURS
Qty: 0 | Refills: 0 | Status: DISCONTINUED | OUTPATIENT
Start: 2017-06-06 | End: 2017-06-09

## 2017-06-06 RX ORDER — LAMOTRIGINE 25 MG/1
100 TABLET, ORALLY DISINTEGRATING ORAL DAILY
Qty: 0 | Refills: 0 | Status: DISCONTINUED | OUTPATIENT
Start: 2017-06-06 | End: 2017-06-09

## 2017-06-06 RX ORDER — IOHEXOL 300 MG/ML
30 INJECTION, SOLUTION INTRAVENOUS ONCE
Qty: 0 | Refills: 0 | Status: COMPLETED | OUTPATIENT
Start: 2017-06-06 | End: 2017-06-06

## 2017-06-06 RX ORDER — DOCUSATE SODIUM 100 MG
100 CAPSULE ORAL
Qty: 0 | Refills: 0 | Status: DISCONTINUED | OUTPATIENT
Start: 2017-06-06 | End: 2017-06-09

## 2017-06-06 RX ORDER — LEVETIRACETAM 250 MG/1
1000 TABLET, FILM COATED ORAL
Qty: 0 | Refills: 0 | Status: DISCONTINUED | OUTPATIENT
Start: 2017-06-06 | End: 2017-06-09

## 2017-06-06 RX ORDER — SENNA PLUS 8.6 MG/1
2 TABLET ORAL AT BEDTIME
Qty: 0 | Refills: 0 | Status: DISCONTINUED | OUTPATIENT
Start: 2017-06-06 | End: 2017-06-09

## 2017-06-06 RX ORDER — CHOLECALCIFEROL (VITAMIN D3) 125 MCG
1000 CAPSULE ORAL DAILY
Qty: 0 | Refills: 0 | Status: DISCONTINUED | OUTPATIENT
Start: 2017-06-06 | End: 2017-06-09

## 2017-06-06 RX ORDER — MORPHINE SULFATE 50 MG/1
4 CAPSULE, EXTENDED RELEASE ORAL ONCE
Qty: 0 | Refills: 0 | Status: DISCONTINUED | OUTPATIENT
Start: 2017-06-06 | End: 2017-06-06

## 2017-06-06 RX ORDER — CEFTRIAXONE 500 MG/1
1 INJECTION, POWDER, FOR SOLUTION INTRAMUSCULAR; INTRAVENOUS ONCE
Qty: 0 | Refills: 0 | Status: COMPLETED | OUTPATIENT
Start: 2017-06-06 | End: 2017-06-06

## 2017-06-06 RX ADMIN — MORPHINE SULFATE 4 MILLIGRAM(S): 50 CAPSULE, EXTENDED RELEASE ORAL at 10:06

## 2017-06-06 RX ADMIN — SENNA PLUS 2 TABLET(S): 8.6 TABLET ORAL at 21:08

## 2017-06-06 RX ADMIN — SODIUM CHLORIDE 1000 MILLILITER(S): 9 INJECTION INTRAMUSCULAR; INTRAVENOUS; SUBCUTANEOUS at 14:53

## 2017-06-06 RX ADMIN — Medication 20 MILLIEQUIVALENT(S): at 18:22

## 2017-06-06 RX ADMIN — ONDANSETRON 4 MILLIGRAM(S): 8 TABLET, FILM COATED ORAL at 10:06

## 2017-06-06 RX ADMIN — Medication 20 MILLIEQUIVALENT(S): at 11:40

## 2017-06-06 RX ADMIN — CEFTRIAXONE 100 GRAM(S): 500 INJECTION, POWDER, FOR SOLUTION INTRAMUSCULAR; INTRAVENOUS at 13:30

## 2017-06-06 RX ADMIN — SODIUM CHLORIDE 3 MILLILITER(S): 9 INJECTION INTRAMUSCULAR; INTRAVENOUS; SUBCUTANEOUS at 21:11

## 2017-06-06 RX ADMIN — LEVETIRACETAM 1000 MILLIGRAM(S): 250 TABLET, FILM COATED ORAL at 18:52

## 2017-06-06 RX ADMIN — Medication 100 MILLIGRAM(S): at 18:22

## 2017-06-06 RX ADMIN — CARVEDILOL PHOSPHATE 25 MILLIGRAM(S): 80 CAPSULE, EXTENDED RELEASE ORAL at 18:22

## 2017-06-06 RX ADMIN — IOHEXOL 30 MILLILITER(S): 300 INJECTION, SOLUTION INTRAVENOUS at 09:45

## 2017-06-06 RX ADMIN — ATORVASTATIN CALCIUM 10 MILLIGRAM(S): 80 TABLET, FILM COATED ORAL at 21:08

## 2017-06-06 RX ADMIN — HEPARIN SODIUM 5000 UNIT(S): 5000 INJECTION INTRAVENOUS; SUBCUTANEOUS at 18:22

## 2017-06-06 RX ADMIN — MORPHINE SULFATE 4 MILLIGRAM(S): 50 CAPSULE, EXTENDED RELEASE ORAL at 11:06

## 2017-06-06 NOTE — H&P ADULT - PROBLEM SELECTOR PLAN 1
Admit to med/surg   Ortho consult requested, discussed with Ortho resident, will follow official ortho consult for recommendations/weight bearing instructions.  Pain control with tylenol/Percocet  PT/Rehab  SW consult

## 2017-06-06 NOTE — CONSULT NOTE ADULT - ASSESSMENT
consult dict fell fx pelvis hx of seziure  ct head noted old left cva hx of v/p shunt  arm 3/5 leg mives toes sensory intact   ortho eval  will follow  eeg

## 2017-06-06 NOTE — H&P ADULT - NSHPLABSRESULTS_GEN_ALL_CORE
LABS:                        12.1   9.6   )-----------( 296      ( 2017 08:30 )             34.6     -    141  |  103  |  15  ----------------------------<  124<H>  3.3<L>   |  27  |  0.88    Ca    9.0      2017 08:30    TPro  7.8  /  Alb  3.5  /  TBili  0.5  /  DBili  x   /  AST  30  /  ALT  23  /  AlkPhos  80  06-      Urinalysis Basic - ( 2017 10:48 )    Color: Yellow / Appearance: Slightly Turbid / S.010 / pH: x  Gluc: x / Ketone: Negative  / Bili: Negative / Urobili: Negative mg/dL   Blood: x / Protein: 30 mg/dL / Nitrite: Negative   Leuk Esterase: Moderate / RBC: 25-50 /HPF / WBC 6-10   Sq Epi: x / Non Sq Epi: Few / Bacteria: Many      Lipid panel:   CARDIAC MARKERS ( 2017 08:30 )  <.015 ng/mL / x     / x     / x     / <0.5 ng/mL    CT Head:  IMPRESSION:    1)  multifocal chronic ischemic changes. Old left PCA territory infarct.   Benign-appearing cysts again noted in the medial left parietal region   with a drainage catheter in situ. Similar findings were noted previously..  2)  no intracerebral hemorrhage or contusion is identified.    X-ray Right Hip:  IMPRESSION:  Findings suggestive of an acute fracture of the right pubic bone and   right superior pubic ramus.

## 2017-06-06 NOTE — H&P ADULT - NSHPREVIEWOFSYSTEMS_GEN_ALL_CORE
CONSTITUTIONAL: No fever, + fatigue  EYES: No eye pain, visual disturbances, or discharge  ENMT:  No difficulty hearing, tinnitus, vertigo; No sinus or throat pain  NECK: No pain or stiffness  RESPIRATORY: No cough, wheezing, chills or hemoptysis; No shortness of breath  CARDIOVASCULAR: No chest pain, palpitations, dizziness, or leg swelling  GASTROINTESTINAL: No abdominal or epigastric pain. No nausea, vomiting, or hematemesis; No diarrhea or constipation. No melena or hematochezia.  GENITOURINARY: No dysuria, frequency, hematuria, or incontinence  NEUROLOGICAL: No headaches,  loss of strength, numbness, or tremors  SKIN: No itching, burning, rashes, or lesions   MUSCULOSKELETAL: right groin pain since morning/since the fall, chronic Low back pain.

## 2017-06-06 NOTE — CONSULT NOTE ADULT - SUBJECTIVE AND OBJECTIVE BOX
HPI  86F  complains of R groin pain for 1 day status post fall at home. Pt denies numbness, paresthesias, pt cannot remember nature of fall, cannot remember headstrike/loss of consciousness.  Denies any other signs/symptoms at this time. Patient is a community ambulator who ambulates with a cane at baseline.      Allergies: NKDA   PMH: CHF, HLD, HTN, PPM, CVA s/p shunt  PSH: PPM  FH: Noncontributory  Imaging: XR/CT: right superior pubic ramus fx.  Official read pending on CT AP.    Physical exam  VS: Afebrile, vital signs stable  Gen: NAD  RLE: Skin intact. No erythema/ecchymosis/warmth. No TTP bony prominences at knee/ankle/foot/hip.+EHL/FHL/TA/GS. SILT L3-S1, +DP pulse, capillary refill brisk. Compartments soft and nontender. Pain in groin with log roll.  Neg heel strike.  unable to SLR 2/2 pain.  Secondary survey: No TTP bony prominences with full painless AROM at baseline per patient. SILT. Capillary refill brisk. Able to SLR with contralateral leg. No TTP axial spine.    A/P:  86F with right superior pubic ramus fracture  pain control  WBAT RLE  pt/ot  dvt ppx  fu CT AP official read  no acute orthopedic surgical intervention at this time  ortho stable for DC  follow up in 1-2 weeks from hospital DC as outpatient  will d/w attending, and advise if plan changes

## 2017-06-06 NOTE — H&P ADULT - HISTORY OF PRESENT ILLNESS
87 y/o F with PMH of HTN, HLD, CHF s/p PPM/AICD, absence seizures, hx of CVA, h/o GI Bleed, ischemic colitis, s/p  shunt for cyst drainage, h/o pulm HTN,  h/o multiple prior admissions, recently discharged from [facility] about a week ago and this morning pt found herself on the floor (probably fell from the bed but she does not remember), She called out for her niece who lives downstairs , niece came upstairs and helped her to the bed and called EMS, since then pt c/o new on set right groin/pelvic pain and worsening of chronic LBP.  As per daughter she works 11 to 7 overnight shift , when she came home found her on the bed and EMS was in the house.  Pt denies CP, sob, vomiting, diarrhea, constipation, abd pain.  No cough, dizziness, HA or blurry vision, all other ROS negative.  In ED x-ray c/w pubic Fx and FC was placed in ED.    EEG done in 4/17 abnormal, focal epileptiform. ECHO done 4/19/17 EF: 60-65%.     Daughter and son in law by the bed side and concerned that she might have sustained this Fx on 5/16th when she fell during prior hospitalization. I explained to them, X ray read by radiologist as acute fracture.

## 2017-06-06 NOTE — ED PROVIDER NOTE - OBJECTIVE STATEMENT
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 groin pain. Started today, per family. Was recently discharged from [facility] rehab on Friday. Family says there may have been a fall at home in the last couple days, unclear. Says had a fall on 5/16. No n/v/d, no fevers, no cough, no dysuria.

## 2017-06-06 NOTE — ED ADULT NURSE NOTE - OBJECTIVE STATEMENT
pt lives at home, pt c/o back pain, right hip pain, and groin pain. pt also c/o urinary frequency. pt sister called paramedics. pt had possible seizure episode while at bedside.

## 2017-06-06 NOTE — H&P ADULT - ASSESSMENT
85 y/o F with PMH of HTN, HLD, CHF s/p PPM/AICD, absence seizures, hx of CVA, h/o GI Bleed, ischemic colitis, s/p  shunt for cyst drainage,  severe PCM, h/o multiple prior admissions, recently discharged from [facility] about a week ago and this morning pt found herself on the floor (probably fell from the bed but she does not remember), She called out for her niece who lives downstairs , niece came upstairs and helped her to the bed and called EMS, since then pt c/o new on set right groin/pelvic pain and worsening of chronic LBP. 85 y/o F with PMH of HTN, HLD, CHF s/p PPM/AICD, absence seizures, hx of CVA, h/o GI Bleed, ischemic colitis, s/p  shunt for cyst drainage,  severe PCM, h/o multiple prior admissions, recently discharged from [facility] about a week ago and this morning pt found herself on the floor (probably fell from the bed but she does not remember), She called out for her niece who lives downstairs , niece came upstairs and helped her to the bed and called EMS, since then pt c/o new on set right groin/pelvic pain and worsening of chronic LBP. In ED X- Ray c/w acute fracture of the right pubic bone and right superior pubic ramus. CTH negative for any new changes, no ICH.  Fall likely resulted from seizures, (fall not witnessed by any one as per family), resulting in acute right pubic Fx.

## 2017-06-06 NOTE — ED PROVIDER NOTE - MEDICAL DECISION MAKING DETAILS
Ddx: Hip fx/ pelvis fx/ ro ich/ ro abdominal etiology  Plan: xrays, labs, CT head, abd/pelvis, unable to bear weight, admission

## 2017-06-06 NOTE — H&P ADULT - NSHPPHYSICALEXAM_GEN_ALL_CORE
Vital Signs Last 24 Hrs  T(C): 37.6, Max: 37.6 (06-06 @ 13:16)  T(F): 99.6, Max: 99.6 (06-06 @ 13:16)  HR: 76 (69 - 98)  BP: 165/87 (145/82 - 165/87)  BP(mean): --  RR: 18 (18 - 18)  SpO2: 100% (100% - 100%)    PHYSICAL EXAM:  GENERAL: NAD, malnourished  HEAD:  Atraumatic, Normocephalic  EYES: EOMI, PERRLA, conjunctiva and sclera clear, + pallor  ENMT: Moist mucous membranes, poor oral hygiene.  NECK: Supple, No JVD, Normal thyroid  NERVOUS SYSTEM:  Alert & Oriented X3, ; Motor Strength 4/5 B/L upper and lower extremities;   CHEST/LUNG: CTA b/l,  No rales, rhonchi, wheezing, or rubs  HEART: Regular rate and rhythm; S1, S2  ABDOMEN: Soft, Nontender, Nondistended; Bowel sounds present  EXTREMITIES:  no cyanosis, or edema  LYMPH: No lymphadenopathy noted

## 2017-06-07 LAB
ANION GAP SERPL CALC-SCNC: 10 MMOL/L — SIGNIFICANT CHANGE UP (ref 5–17)
BUN SERPL-MCNC: 15 MG/DL — SIGNIFICANT CHANGE UP (ref 7–23)
CALCIUM SERPL-MCNC: 8.5 MG/DL — SIGNIFICANT CHANGE UP (ref 8.5–10.1)
CHLORIDE SERPL-SCNC: 104 MMOL/L — SIGNIFICANT CHANGE UP (ref 96–108)
CO2 SERPL-SCNC: 27 MMOL/L — SIGNIFICANT CHANGE UP (ref 22–31)
CREAT SERPL-MCNC: 0.8 MG/DL — SIGNIFICANT CHANGE UP (ref 0.5–1.3)
GLUCOSE SERPL-MCNC: 102 MG/DL — HIGH (ref 70–99)
POTASSIUM SERPL-MCNC: 3.7 MMOL/L — SIGNIFICANT CHANGE UP (ref 3.5–5.3)
POTASSIUM SERPL-SCNC: 3.7 MMOL/L — SIGNIFICANT CHANGE UP (ref 3.5–5.3)
SODIUM SERPL-SCNC: 141 MMOL/L — SIGNIFICANT CHANGE UP (ref 135–145)
TSH SERPL-MCNC: 0.66 UIU/ML — SIGNIFICANT CHANGE UP (ref 0.36–3.74)
VIT B12 SERPL-MCNC: 1014 PG/ML — HIGH (ref 243–894)

## 2017-06-07 PROCEDURE — 99233 SBSQ HOSP IP/OBS HIGH 50: CPT

## 2017-06-07 RX ADMIN — Medication 20 MILLIGRAM(S): at 06:09

## 2017-06-07 RX ADMIN — Medication 100 MILLIGRAM(S): at 06:09

## 2017-06-07 RX ADMIN — SODIUM CHLORIDE 3 MILLILITER(S): 9 INJECTION INTRAMUSCULAR; INTRAVENOUS; SUBCUTANEOUS at 14:20

## 2017-06-07 RX ADMIN — Medication 81 MILLIGRAM(S): at 14:16

## 2017-06-07 RX ADMIN — Medication 1000 UNIT(S): at 14:16

## 2017-06-07 RX ADMIN — LAMOTRIGINE 100 MILLIGRAM(S): 25 TABLET, ORALLY DISINTEGRATING ORAL at 14:16

## 2017-06-07 RX ADMIN — SENNA PLUS 2 TABLET(S): 8.6 TABLET ORAL at 21:55

## 2017-06-07 RX ADMIN — HEPARIN SODIUM 5000 UNIT(S): 5000 INJECTION INTRAVENOUS; SUBCUTANEOUS at 17:36

## 2017-06-07 RX ADMIN — CARVEDILOL PHOSPHATE 25 MILLIGRAM(S): 80 CAPSULE, EXTENDED RELEASE ORAL at 17:40

## 2017-06-07 RX ADMIN — Medication 1 TABLET(S): at 17:36

## 2017-06-07 RX ADMIN — SODIUM CHLORIDE 3 MILLILITER(S): 9 INJECTION INTRAMUSCULAR; INTRAVENOUS; SUBCUTANEOUS at 06:12

## 2017-06-07 RX ADMIN — HEPARIN SODIUM 5000 UNIT(S): 5000 INJECTION INTRAVENOUS; SUBCUTANEOUS at 06:08

## 2017-06-07 RX ADMIN — Medication 100 MILLIGRAM(S): at 17:37

## 2017-06-07 RX ADMIN — CEFTRIAXONE 100 GRAM(S): 500 INJECTION, POWDER, FOR SOLUTION INTRAMUSCULAR; INTRAVENOUS at 17:37

## 2017-06-07 RX ADMIN — ATORVASTATIN CALCIUM 10 MILLIGRAM(S): 80 TABLET, FILM COATED ORAL at 21:55

## 2017-06-07 RX ADMIN — CARVEDILOL PHOSPHATE 25 MILLIGRAM(S): 80 CAPSULE, EXTENDED RELEASE ORAL at 06:09

## 2017-06-07 RX ADMIN — SODIUM CHLORIDE 3 MILLILITER(S): 9 INJECTION INTRAMUSCULAR; INTRAVENOUS; SUBCUTANEOUS at 22:25

## 2017-06-07 RX ADMIN — LEVETIRACETAM 1000 MILLIGRAM(S): 250 TABLET, FILM COATED ORAL at 17:36

## 2017-06-07 RX ADMIN — LEVETIRACETAM 1000 MILLIGRAM(S): 250 TABLET, FILM COATED ORAL at 06:09

## 2017-06-07 NOTE — PHYSICAL THERAPY INITIAL EVALUATION ADULT - CRITERIA FOR SKILLED THERAPEUTIC INTERVENTIONS
impairments found/therapy frequency/Subacute Rehab/anticipated discharge recommendation/risk reduction/prevention/rehab potential/functional limitations in following categories/predicted duration of therapy intervention

## 2017-06-07 NOTE — PROGRESS NOTE ADULT - PROBLEM SELECTOR PLAN 3
c/w home medications  Neuro eval appreciated   (recent EEG results reviewed).  check keppra/lamictal stable

## 2017-06-07 NOTE — PHYSICAL THERAPY INITIAL EVALUATION ADULT - GAIT DEVIATIONS NOTED, PT EVAL
decreased step length/decreased stride length/decreased weight-shifting ability/decreased ophelia/increased stride width

## 2017-06-07 NOTE — PHYSICAL THERAPY INITIAL EVALUATION ADULT - TRANSFER SAFETY CONCERNS NOTED: SIT/STAND, REHAB EVAL
decreased balance during turns/decreased sequencing ability/decreased step length/decreased weight-shifting ability

## 2017-06-07 NOTE — PROGRESS NOTE ADULT - SUBJECTIVE AND OBJECTIVE BOX
Patient is a 86y old  Female who presents with a chief complaint of Right groin pain since am.  Fall this am. (2017 12:49)      INTERVAL HPI/OVERNIGHT EVENTS: no acute events tolerating diet pain controled    MEDICATIONS  (STANDING):  cefTRIAXone   IVPB  IV Intermittent   aspirin  chewable 81milliGRAM(s) Oral daily  losartan 50milliGRAM(s) Oral daily  cefTRIAXone   IVPB 1Gram(s) IV Intermittent every 24 hours  lamoTRIgine 100milliGRAM(s) Oral daily  levETIRAcetam 1000milliGRAM(s) Oral two times a day  atorvastatin 10milliGRAM(s) Oral at bedtime  carvedilol 25milliGRAM(s) Oral every 12 hours  amLODIPine   Tablet 10milliGRAM(s) Oral daily  furosemide    Tablet 20milliGRAM(s) Oral daily  docusate sodium 100milliGRAM(s) Oral two times a day  senna 2Tablet(s) Oral at bedtime  multivitamin 1Tablet(s) Oral daily  cholecalciferol 1000Unit(s) Oral daily  heparin  Injectable 5000Unit(s) SubCutaneous every 12 hours  sodium chloride 0.9% lock flush 3milliLiter(s) IV Push every 8 hours    MEDICATIONS  (PRN):  acetaminophen   Tablet 650milliGRAM(s) Oral every 6 hours PRN Moderate Pain (4 - 6)  polyethylene glycol 3350 17Gram(s) Oral daily PRN Constipation  oxyCODONE  5 mG/acetaminophen 325 mG 1Tablet(s) Oral every 6 hours PRN Severe Pain (7 - 10)      Allergies    No Known Allergies    Intolerances        REVIEW OF SYSTEMS:  CONSTITUTIONAL: No fever, weight loss, or fatigue  EYES: No eye pain, visual disturbances, or discharge  ENMT:  No difficulty hearing, tinnitus, vertigo; No sinus or throat pain  NECK: No pain or stiffness  BREASTS: No pain, masses, or nipple discharge  RESPIRATORY: No cough, wheezing, chills or hemoptysis; No shortness of breath  CARDIOVASCULAR: No chest pain, palpitations, dizziness, or leg swelling  GASTROINTESTINAL: No abdominal or epigastric pain. No nausea, vomiting, or hematemesis; No diarrhea or constipation. No melena or hematochezia.  GENITOURINARY: No dysuria, frequency, hematuria, or incontinence  NEUROLOGICAL: No headaches, memory loss, loss of strength, numbness, or tremors  SKIN: No itching, burning, rashes, or lesions   LYMPH NODES: No enlarged glands  ENDOCRINE: No heat or cold intolerance; No hair loss  MUSCULOSKELETAL: No joint pain or swelling; No muscle, back, or extremity pain  PSYCHIATRIC: No depression, anxiety, mood swings, or difficulty sleeping  HEME/LYMPH: No easy bruising, or bleeding gums  ALLERGY AND IMMUNOLOGIC: No hives or eczema    Vital Signs Last 24 Hrs  T(C): 36.5, Max: 37.6 ( @ 23:30)  T(F): 97.7, Max: 99.7 ( @ 23:30)  HR: 55 (55 - 91)  BP: 127/65 (121/70 - 154/81)  BP(mean): --  RR: 17 (14 - 18)  SpO2: 98% (96% - 99%)    PHYSICAL EXAM:  GENERAL: NAD, well-groomed, well-developed  HEAD:  Atraumatic, Normocephalic  EYES: EOMI, PERRLA, conjunctiva and sclera clear  ENMT: No tonsillar erythema, exudates, or enlargement; Moist mucous membranes, Good dentition, No lesions  NECK: Supple, No JVD, Normal thyroid  NERVOUS SYSTEM:  Alert & Oriented X3, Good concentration; Motor Strength 5/5 B/L upper and lower extremities; DTRs 2+ intact and symmetric  CHEST/LUNG: Clear to percussion bilaterally; No rales, rhonchi, wheezing, or rubs  HEART: Regular rate and rhythm; No murmurs, rubs, or gallops  ABDOMEN: Soft, Nontender, Nondistended; Bowel sounds present  EXTREMITIES:  2+ Peripheral Pulses, No clubbing, cyanosis, or edema  LYMPH: No lymphadenopathy noted  SKIN: No rashes or lesions    LABS:                        12.1   9.6   )-----------( 296      ( 2017 08:30 )             34.6     -07    141  |  104  |  15  ----------------------------<  102<H>  3.7   |  27  |  0.80    Ca    8.5      2017 07:03    TPro  7.8  /  Alb  3.5  /  TBili  0.5  /  DBili  x   /  AST  30  /  ALT  23  /  AlkPhos  80        Urinalysis Basic - ( 2017 10:48 )    Color: Yellow / Appearance: Slightly Turbid / S.010 / pH: x  Gluc: x / Ketone: Negative  / Bili: Negative / Urobili: Negative mg/dL   Blood: x / Protein: 30 mg/dL / Nitrite: Negative   Leuk Esterase: Moderate / RBC: 25-50 /HPF / WBC 6-10   Sq Epi: x / Non Sq Epi: Few / Bacteria: Many      CAPILLARY BLOOD GLUCOSE      RADIOLOGY & ADDITIONAL TESTS:    Imaging Personally Reviewed:  [ ] YES  [ ] NO    Consultant(s) Notes Reviewed:  [ ] YES  [ ] NO    Care Discussed with Consultants/Other Providers [ ] YES  [ ] NO

## 2017-06-07 NOTE — PHYSICAL THERAPY INITIAL EVALUATION ADULT - TRANSFER SAFETY CONCERNS NOTED: BED/CHAIR, REHAB EVAL
decreased sequencing ability/decreased weight-shifting ability/decreased step length/decreased balance during turns

## 2017-06-08 LAB
-  AMIKACIN: SIGNIFICANT CHANGE UP
-  AMPICILLIN/SULBACTAM: SIGNIFICANT CHANGE UP
-  AMPICILLIN: SIGNIFICANT CHANGE UP
-  AZTREONAM: SIGNIFICANT CHANGE UP
-  CEFAZOLIN: SIGNIFICANT CHANGE UP
-  CEFEPIME: SIGNIFICANT CHANGE UP
-  CEFOXITIN: SIGNIFICANT CHANGE UP
-  CEFTAZIDIME: SIGNIFICANT CHANGE UP
-  CEFTRIAXONE: SIGNIFICANT CHANGE UP
-  CIPROFLOXACIN: SIGNIFICANT CHANGE UP
-  ERTAPENEM: SIGNIFICANT CHANGE UP
-  GENTAMICIN: SIGNIFICANT CHANGE UP
-  IMIPENEM: SIGNIFICANT CHANGE UP
-  LEVOFLOXACIN: SIGNIFICANT CHANGE UP
-  MEROPENEM: SIGNIFICANT CHANGE UP
-  NITROFURANTOIN: SIGNIFICANT CHANGE UP
-  PIPERACILLIN/TAZOBACTAM: SIGNIFICANT CHANGE UP
-  TIGECYCLINE: SIGNIFICANT CHANGE UP
-  TOBRAMYCIN: SIGNIFICANT CHANGE UP
-  TRIMETHOPRIM/SULFAMETHOXAZOLE: SIGNIFICANT CHANGE UP
ANION GAP SERPL CALC-SCNC: 9 MMOL/L — SIGNIFICANT CHANGE UP (ref 5–17)
BUN SERPL-MCNC: 20 MG/DL — SIGNIFICANT CHANGE UP (ref 7–23)
CALCIUM SERPL-MCNC: 8.3 MG/DL — LOW (ref 8.5–10.1)
CHLORIDE SERPL-SCNC: 102 MMOL/L — SIGNIFICANT CHANGE UP (ref 96–108)
CO2 SERPL-SCNC: 28 MMOL/L — SIGNIFICANT CHANGE UP (ref 22–31)
CREAT SERPL-MCNC: 0.88 MG/DL — SIGNIFICANT CHANGE UP (ref 0.5–1.3)
CULTURE RESULTS: SIGNIFICANT CHANGE UP
CULTURE RESULTS: SIGNIFICANT CHANGE UP
GLUCOSE SERPL-MCNC: 98 MG/DL — SIGNIFICANT CHANGE UP (ref 70–99)
HCT VFR BLD CALC: 28.4 % — LOW (ref 34.5–45)
HGB BLD-MCNC: 9.8 G/DL — LOW (ref 11.5–15.5)
LEVETIRACETAM SERPL-MCNC: 21.7 MCG/ML — SIGNIFICANT CHANGE UP (ref 12–46)
MCHC RBC-ENTMCNC: 31.2 PG — SIGNIFICANT CHANGE UP (ref 27–34)
MCHC RBC-ENTMCNC: 34.5 GM/DL — SIGNIFICANT CHANGE UP (ref 32–36)
MCV RBC AUTO: 90.6 FL — SIGNIFICANT CHANGE UP (ref 80–100)
METHOD TYPE: SIGNIFICANT CHANGE UP
ORGANISM # SPEC MICROSCOPIC CNT: SIGNIFICANT CHANGE UP
PLATELET # BLD AUTO: 239 K/UL — SIGNIFICANT CHANGE UP (ref 150–400)
POTASSIUM SERPL-MCNC: 4 MMOL/L — SIGNIFICANT CHANGE UP (ref 3.5–5.3)
POTASSIUM SERPL-SCNC: 4 MMOL/L — SIGNIFICANT CHANGE UP (ref 3.5–5.3)
RBC # BLD: 3.14 M/UL — LOW (ref 3.8–5.2)
RBC # FLD: 12.3 % — SIGNIFICANT CHANGE UP (ref 11–15)
SODIUM SERPL-SCNC: 139 MMOL/L — SIGNIFICANT CHANGE UP (ref 135–145)
SPECIMEN SOURCE: SIGNIFICANT CHANGE UP
WBC # BLD: 6.5 K/UL — SIGNIFICANT CHANGE UP (ref 3.8–10.5)
WBC # FLD AUTO: 6.5 K/UL — SIGNIFICANT CHANGE UP (ref 3.8–10.5)

## 2017-06-08 PROCEDURE — 99233 SBSQ HOSP IP/OBS HIGH 50: CPT

## 2017-06-08 RX ORDER — CIPROFLOXACIN LACTATE 400MG/40ML
VIAL (ML) INTRAVENOUS
Qty: 0 | Refills: 0 | Status: DISCONTINUED | OUTPATIENT
Start: 2017-06-09 | End: 2017-06-09

## 2017-06-08 RX ORDER — ASPIRIN/CALCIUM CARB/MAGNESIUM 324 MG
81 TABLET ORAL DAILY
Qty: 0 | Refills: 0 | Status: DISCONTINUED | OUTPATIENT
Start: 2017-06-08 | End: 2017-06-09

## 2017-06-08 RX ORDER — CIPROFLOXACIN LACTATE 400MG/40ML
400 VIAL (ML) INTRAVENOUS ONCE
Qty: 0 | Refills: 0 | Status: COMPLETED | OUTPATIENT
Start: 2017-06-08 | End: 2017-06-09

## 2017-06-08 RX ORDER — CIPROFLOXACIN LACTATE 400MG/40ML
400 VIAL (ML) INTRAVENOUS EVERY 12 HOURS
Qty: 0 | Refills: 0 | Status: DISCONTINUED | OUTPATIENT
Start: 2017-06-09 | End: 2017-06-09

## 2017-06-08 RX ADMIN — LEVETIRACETAM 1000 MILLIGRAM(S): 250 TABLET, FILM COATED ORAL at 17:28

## 2017-06-08 RX ADMIN — SENNA PLUS 2 TABLET(S): 8.6 TABLET ORAL at 21:55

## 2017-06-08 RX ADMIN — Medication 100 MILLIGRAM(S): at 05:48

## 2017-06-08 RX ADMIN — LAMOTRIGINE 100 MILLIGRAM(S): 25 TABLET, ORALLY DISINTEGRATING ORAL at 12:22

## 2017-06-08 RX ADMIN — Medication 1 TABLET(S): at 12:22

## 2017-06-08 RX ADMIN — CEFTRIAXONE 100 GRAM(S): 500 INJECTION, POWDER, FOR SOLUTION INTRAMUSCULAR; INTRAVENOUS at 13:12

## 2017-06-08 RX ADMIN — SODIUM CHLORIDE 3 MILLILITER(S): 9 INJECTION INTRAMUSCULAR; INTRAVENOUS; SUBCUTANEOUS at 21:46

## 2017-06-08 RX ADMIN — Medication 100 MILLIGRAM(S): at 17:28

## 2017-06-08 RX ADMIN — LEVETIRACETAM 1000 MILLIGRAM(S): 250 TABLET, FILM COATED ORAL at 05:48

## 2017-06-08 RX ADMIN — CARVEDILOL PHOSPHATE 25 MILLIGRAM(S): 80 CAPSULE, EXTENDED RELEASE ORAL at 17:28

## 2017-06-08 RX ADMIN — Medication 1000 UNIT(S): at 12:23

## 2017-06-08 RX ADMIN — ATORVASTATIN CALCIUM 10 MILLIGRAM(S): 80 TABLET, FILM COATED ORAL at 21:55

## 2017-06-08 RX ADMIN — SODIUM CHLORIDE 3 MILLILITER(S): 9 INJECTION INTRAMUSCULAR; INTRAVENOUS; SUBCUTANEOUS at 13:41

## 2017-06-08 RX ADMIN — Medication 20 MILLIGRAM(S): at 05:48

## 2017-06-08 RX ADMIN — Medication 81 MILLIGRAM(S): at 12:22

## 2017-06-08 RX ADMIN — SODIUM CHLORIDE 3 MILLILITER(S): 9 INJECTION INTRAMUSCULAR; INTRAVENOUS; SUBCUTANEOUS at 05:46

## 2017-06-08 RX ADMIN — HEPARIN SODIUM 5000 UNIT(S): 5000 INJECTION INTRAVENOUS; SUBCUTANEOUS at 05:48

## 2017-06-08 NOTE — PROGRESS NOTE ADULT - PROBLEM SELECTOR PLAN 3
c/w home medications  Neuro eval appreciated now new interventions   (recent EEG results reviewed).  check keppra/lamictal stable

## 2017-06-08 NOTE — DIETITIAN INITIAL EVALUATION ADULT. - NS AS NUTRI INTERV MEALS SNACK
Texture-modified diet/Recommend Low sodium , Dysphagia 3 Soft - Thin Liquids/Mineral - modified diet

## 2017-06-08 NOTE — DIETITIAN INITIAL EVALUATION ADULT. - PHYSICAL APPEARANCE
BMI=20.5(06-06 for height of 5'5' & wt. of 56 kg(06-08), ? height accuracy, previous record c height 5''4" & 5'6" Nutrition focused physical exam conducted; found physical signs of malnutrition [ ]absent [ X ]present; Subcutaneous fat Exam;  [ moderate ]  Orbital fat pads region,  [moderate  ]Buccal fat region,  [ severe ]triceps region, [WNL  ]ribs region.  Muscle Exam; [ moderate ]temples region, [ severe ]clavicle region, [ severe ]shoulder region, [ moderate ]Scapula region, [moderate  ]Interosseous region, [unable due to fracture ]thigh region, [unable to assess due to fracture  ]Calf region/underweight/debilitated

## 2017-06-08 NOTE — DIETITIAN INITIAL EVALUATION ADULT. - ETIOLOGY
inadequate protein-energy intake in setting of AMS, hx of CVA, CHF, altered GI functions/ischemic colitis

## 2017-06-08 NOTE — DIETITIAN INITIAL EVALUATION ADULT. - ENERGY NEEDS
Height (cm): 165.1 (06-06)  Weight (kg): 56.7 (06-06)  BMI (kg/m2): 20.8 (06-06)  IBW: 56.6 kg  % IBW: 98% as per current height & weight

## 2017-06-08 NOTE — DIETITIAN INITIAL EVALUATION ADULT. - PERTINENT LABORATORY DATA
06-08 Na139 mmol/L Glu 98 mg/dL K+ 4.0 mmol/L Cr  0.88 mg/dL BUN 20 mg/dL Est GFR (AA)69 mL/min/1.73M2 Phos n/a   Ca 8.3 mg/dL<L>  Hgb 9.8 g/dL<L> Hct 28.4 %<L> 06-06 Alb 3.5g/dL 06-02 CHOL 156 mg/dL 04-18 HbA1/GC 5.6 % 04-17  serum pro-brain natriuretic peptide 1520 pg/dL <H>

## 2017-06-08 NOTE — DIETITIAN INITIAL EVALUATION ADULT. - OTHER INFO
Pt seen due to MD consult for protein calorie malnutrition.  Pt is known to RD from previous adm.  Pt is a poor historian, reports wt. loss but unable to state usual wt. or amount of wt. loss.  As per previous adm records, pt was 54.1 kg 0n 04-16, therefore c wt. gain of 1.9 kg noted as per current wt. of 56 kg (06-08-17).  Pt reports that she was taking Ensure @ home.  Pt currently on  Ensure Enlive 3 x day=1125 calories, 60 grams protein, 2 bottles of unconsumed Ensure Enlive noted at bedside.  PMH include ischemic colitis.

## 2017-06-08 NOTE — PROGRESS NOTE ADULT - PROBLEM SELECTOR PLAN 1
Ortho consult appreciated   weight bearing as tolerated no ortho intervention will follow up as out patient   Pain control with tylenol/Percocet  PT/Rehab recommendations are subacute rehab   SW consult appreciated

## 2017-06-08 NOTE — DIETITIAN INITIAL EVALUATION ADULT. - SOURCE
RN, Chart review , pt appeared forgetful, was able to state date of birth , confusion noted at times/other (specify)/patient

## 2017-06-08 NOTE — CHART NOTE - NSCHARTNOTEFT_GEN_A_CORE
Upon Nutritional Assessment by the Registered Dietitian your patient was determined to meet criteria / has evidence of the following diagnosis/diagnoses:          [ ]  Mild Protein Calorie Malnutrition        [ ]  Moderate Protein Calorie Malnutrition        [ X] Severe Protein Calorie Malnutrition        [ ] Unspecified Protein Calorie Malnutrition        [ ] Underweight / BMI <19        [ ] Morbid Obesity / BMI > 40      Findings as based on:  •  Comprehensive nutrition assessment and consultation  •  Calorie counts (nutrient intake analysis)  •  Food acceptance and intake status from observations by staff  •  Follow up  •  Patient education  •  Intervention secondary to interdisciplinary rounds  •   concerns      Treatment:    The following diet has been recommended:  Low sodium , Dysphagia 3 Soft - Thin Liquids , Ensure Enlive 2 x day=750 calories, 40 grams protein       PROVIDER Section:     By signing this assessment you are acknowledging and agree with the diagnosis/diagnoses assigned by the Registered Dietitian    Comments:

## 2017-06-08 NOTE — PROGRESS NOTE ADULT - SUBJECTIVE AND OBJECTIVE BOX
Patient is a 86y old  Female who presents with a chief complaint of Right groin pain since am.  Fall this am. (06 Jun 2017 12:49)      INTERVAL HPI/OVERNIGHT EVENTS: No acute events overnight hua catheter with hematuria today  no complaints     MEDICATIONS  (STANDING):  cefTRIAXone   IVPB  IV Intermittent   losartan 50milliGRAM(s) Oral daily  cefTRIAXone   IVPB 1Gram(s) IV Intermittent every 24 hours  lamoTRIgine 100milliGRAM(s) Oral daily  levETIRAcetam 1000milliGRAM(s) Oral two times a day  atorvastatin 10milliGRAM(s) Oral at bedtime  carvedilol 25milliGRAM(s) Oral every 12 hours  amLODIPine   Tablet 10milliGRAM(s) Oral daily  furosemide    Tablet 20milliGRAM(s) Oral daily  docusate sodium 100milliGRAM(s) Oral two times a day  senna 2Tablet(s) Oral at bedtime  multivitamin 1Tablet(s) Oral daily  cholecalciferol 1000Unit(s) Oral daily  sodium chloride 0.9% lock flush 3milliLiter(s) IV Push every 8 hours  aspirin  chewable 81milliGRAM(s) Oral daily    MEDICATIONS  (PRN):  acetaminophen   Tablet 650milliGRAM(s) Oral every 6 hours PRN Moderate Pain (4 - 6)  polyethylene glycol 3350 17Gram(s) Oral daily PRN Constipation  oxyCODONE  5 mG/acetaminophen 325 mG 1Tablet(s) Oral every 6 hours PRN Severe Pain (7 - 10)      Allergies    No Known Allergies    Intolerances        REVIEW OF SYSTEMS:  CONSTITUTIONAL: No fever, weight loss, or fatigue  EYES: No eye pain, visual disturbances, or discharge  ENMT:  No difficulty hearing, tinnitus, vertigo; No sinus or throat pain  NECK: No pain or stiffness  BREASTS: No pain, masses, or nipple discharge  RESPIRATORY: No cough, wheezing, chills or hemoptysis; No shortness of breath  CARDIOVASCULAR: No chest pain, palpitations, dizziness, or leg swelling  GASTROINTESTINAL: No abdominal or epigastric pain. No nausea, vomiting, or hematemesis; No diarrhea or constipation. No melena or hematochezia.  GENITOURINARY: No dysuria, frequency, hematuria, or incontinence  NEUROLOGICAL: No headaches, memory loss, loss of strength, numbness, or tremors  SKIN: No itching, burning, rashes, or lesions   LYMPH NODES: No enlarged glands  ENDOCRINE: No heat or cold intolerance; No hair loss  MUSCULOSKELETAL:pedlvic disccmfort with movement   PSYCHIATRIC: No depression, anxiety, mood swings, or difficulty sleeping  HEME/LYMPH: No easy bruising, or bleeding gums  ALLERGY AND IMMUNOLOGIC: No hives or eczema    Vital Signs Last 24 Hrs  T(C): 37.4, Max: 37.4 (06-07 @ 17:41)  T(F): 99.4, Max: 99.4 (06-07 @ 17:41)  HR: 70 (55 - 70)  BP: 115/84 (115/84 - 130/59)  BP(mean): --  RR: 16 (15 - 17)  SpO2: 95% (95% - 100%)    PHYSICAL EXAM:  GENERAL: NAD, well-groomed, well-developed  HEAD:  Atraumatic, Normocephalic  EYES: EOMI, PERRLA, conjunctiva and sclera clear  ENMT: No tonsillar erythema, exudates, or enlargement; Moist mucous membranes, Good dentition, No lesions  NECK: Supple, No JVD, Normal thyroid  NERVOUS SYSTEM:  Alert & Oriented X3, Good concentration; Motor Strength 5/5 B/L upper and lower extremities; DTRs 2+ intact and symmetric  CHEST/LUNG: Clear to percussion bilaterally; No rales, rhonchi, wheezing, or rubs  HEART: Regular rate and rhythm; No murmurs, rubs, or gallops  ABDOMEN: Soft, Nontender, Nondistended; Bowel sounds present hua in place   EXTREMITIES:  2+ Peripheral Pulses, No clubbing, cyanosis, or edema  LYMPH: No lymphadenopathy noted  SKIN: No rashes or lesions    LABS:                        9.8    6.5   )-----------( 239      ( 08 Jun 2017 06:41 )             28.4     06-08    139  |  102  |  20  ----------------------------<  98  4.0   |  28  |  0.88    Ca    8.3<L>      08 Jun 2017 06:41          CAPILLARY BLOOD GLUCOSE      RADIOLOGY & ADDITIONAL TESTS:    Imaging Personally Reviewed:  [ ] YES  [ ] NO    Consultant(s) Notes Reviewed:  [ ] YES  [ ] NO    Care Discussed with Consultants/Other Providers [ ] YES  [ ] NO

## 2017-06-08 NOTE — DIETITIAN INITIAL EVALUATION ADULT. - PERTINENT MEDS FT
ABX , docusate sodium , multivitamin , polyethylene glycol , senna , furosemide , amlodipine, carvedilol , atorvastatin , losartan

## 2017-06-08 NOTE — DIETITIAN INITIAL EVALUATION ADULT. - DIET TYPE
DASH/TLC (sodium and cholesterol restricted diet)/Ensure Enlive 3 x day(06-06)/dysphagia 2, mechanical soft, thin liquids

## 2017-06-08 NOTE — DIETITIAN INITIAL EVALUATION ADULT. - FACTORS AFF FOOD INTAKE
pt has partial dentures in place, stated that she has no problems c chewing at present & dislikes mechanical soft / ground foods/change in mental status/other (specify)

## 2017-06-09 VITALS
OXYGEN SATURATION: 97 % | RESPIRATION RATE: 17 BRPM | TEMPERATURE: 100 F | DIASTOLIC BLOOD PRESSURE: 76 MMHG | SYSTOLIC BLOOD PRESSURE: 115 MMHG | HEART RATE: 89 BPM

## 2017-06-09 LAB
ALBUMIN SERPL ELPH-MCNC: 2.5 G/DL — LOW (ref 3.3–5)
ALP SERPL-CCNC: 60 U/L — SIGNIFICANT CHANGE UP (ref 40–120)
ALT FLD-CCNC: 13 U/L — SIGNIFICANT CHANGE UP (ref 12–78)
ANION GAP SERPL CALC-SCNC: 7 MMOL/L — SIGNIFICANT CHANGE UP (ref 5–17)
AST SERPL-CCNC: 20 U/L — SIGNIFICANT CHANGE UP (ref 15–37)
BILIRUB SERPL-MCNC: 0.5 MG/DL — SIGNIFICANT CHANGE UP (ref 0.2–1.2)
BUN SERPL-MCNC: 14 MG/DL — SIGNIFICANT CHANGE UP (ref 7–23)
CALCIUM SERPL-MCNC: 8.2 MG/DL — LOW (ref 8.5–10.1)
CHLORIDE SERPL-SCNC: 102 MMOL/L — SIGNIFICANT CHANGE UP (ref 96–108)
CO2 SERPL-SCNC: 31 MMOL/L — SIGNIFICANT CHANGE UP (ref 22–31)
CREAT SERPL-MCNC: 0.83 MG/DL — SIGNIFICANT CHANGE UP (ref 0.5–1.3)
GLUCOSE SERPL-MCNC: 102 MG/DL — HIGH (ref 70–99)
HCT VFR BLD CALC: 28.4 % — LOW (ref 34.5–45)
HGB BLD-MCNC: 9.6 G/DL — LOW (ref 11.5–15.5)
MCHC RBC-ENTMCNC: 30.6 PG — SIGNIFICANT CHANGE UP (ref 27–34)
MCHC RBC-ENTMCNC: 33.9 GM/DL — SIGNIFICANT CHANGE UP (ref 32–36)
MCV RBC AUTO: 90.5 FL — SIGNIFICANT CHANGE UP (ref 80–100)
PLATELET # BLD AUTO: 251 K/UL — SIGNIFICANT CHANGE UP (ref 150–400)
POTASSIUM SERPL-MCNC: 3.9 MMOL/L — SIGNIFICANT CHANGE UP (ref 3.5–5.3)
POTASSIUM SERPL-SCNC: 3.9 MMOL/L — SIGNIFICANT CHANGE UP (ref 3.5–5.3)
PROT SERPL-MCNC: 6.2 GM/DL — SIGNIFICANT CHANGE UP (ref 6–8.3)
RBC # BLD: 3.13 M/UL — LOW (ref 3.8–5.2)
RBC # FLD: 12.3 % — SIGNIFICANT CHANGE UP (ref 11–15)
SODIUM SERPL-SCNC: 140 MMOL/L — SIGNIFICANT CHANGE UP (ref 135–145)
WBC # BLD: 6 K/UL — SIGNIFICANT CHANGE UP (ref 3.8–10.5)
WBC # FLD AUTO: 6 K/UL — SIGNIFICANT CHANGE UP (ref 3.8–10.5)

## 2017-06-09 PROCEDURE — 99239 HOSP IP/OBS DSCHRG MGMT >30: CPT

## 2017-06-09 RX ORDER — LAMOTRIGINE 25 MG/1
1 TABLET, ORALLY DISINTEGRATING ORAL
Qty: 0 | Refills: 0 | COMMUNITY
Start: 2017-06-09

## 2017-06-09 RX ORDER — LEVETIRACETAM 250 MG/1
1 TABLET, FILM COATED ORAL
Qty: 0 | Refills: 0 | COMMUNITY
Start: 2017-06-09

## 2017-06-09 RX ORDER — CIPROFLOXACIN LACTATE 400MG/40ML
1 VIAL (ML) INTRAVENOUS
Qty: 10 | Refills: 0 | OUTPATIENT
Start: 2017-06-09 | End: 2017-06-14

## 2017-06-09 RX ORDER — OXYCODONE HYDROCHLORIDE 5 MG/1
1 TABLET ORAL
Qty: 0 | Refills: 0 | COMMUNITY
Start: 2017-06-09

## 2017-06-09 RX ADMIN — SODIUM CHLORIDE 3 MILLILITER(S): 9 INJECTION INTRAMUSCULAR; INTRAVENOUS; SUBCUTANEOUS at 05:45

## 2017-06-09 RX ADMIN — LAMOTRIGINE 100 MILLIGRAM(S): 25 TABLET, ORALLY DISINTEGRATING ORAL at 11:35

## 2017-06-09 RX ADMIN — Medication 200 MILLIGRAM(S): at 11:36

## 2017-06-09 RX ADMIN — LEVETIRACETAM 1000 MILLIGRAM(S): 250 TABLET, FILM COATED ORAL at 17:54

## 2017-06-09 RX ADMIN — SODIUM CHLORIDE 3 MILLILITER(S): 9 INJECTION INTRAMUSCULAR; INTRAVENOUS; SUBCUTANEOUS at 14:04

## 2017-06-09 RX ADMIN — Medication 100 MILLIGRAM(S): at 05:44

## 2017-06-09 RX ADMIN — LEVETIRACETAM 1000 MILLIGRAM(S): 250 TABLET, FILM COATED ORAL at 05:45

## 2017-06-09 RX ADMIN — Medication 81 MILLIGRAM(S): at 11:35

## 2017-06-09 RX ADMIN — Medication 1000 UNIT(S): at 11:35

## 2017-06-09 RX ADMIN — Medication 200 MILLIGRAM(S): at 00:26

## 2017-06-09 RX ADMIN — Medication 100 MILLIGRAM(S): at 17:54

## 2017-06-09 RX ADMIN — Medication 20 MILLIGRAM(S): at 05:45

## 2017-06-09 RX ADMIN — LOSARTAN POTASSIUM 50 MILLIGRAM(S): 100 TABLET, FILM COATED ORAL at 05:45

## 2017-06-09 RX ADMIN — Medication 1 TABLET(S): at 11:35

## 2017-06-09 NOTE — DISCHARGE NOTE ADULT - CARE PLAN
Principal Discharge DX:	Fracture of pubic ramus, right, closed, initial encounter  Goal:	weight bearing as tolerated for rehab  Instructions for follow-up, activity and diet:	rehab to follow up with PMD and ortho

## 2017-06-09 NOTE — DISCHARGE NOTE ADULT - MEDICATION SUMMARY - MEDICATIONS TO TAKE
I will START or STAY ON the medications listed below when I get home from the hospital:    acetaminophen 325 mg oral tablet  -- 2 tab(s) by mouth every 4 hours, As needed, Moderate Pain (4 - 6)  -- Indication: For pain    acetaminophen-oxycodone 325 mg-5 mg oral tablet  -- 1 tab(s) by mouth every 6 hours, As needed, Severe Pain (7 - 10)  -- Indication: For pain    aspirin 81 mg oral tablet, chewable  -- 1 tab(s) by mouth once a day  -- Indication: For Heart    losartan 50 mg oral tablet  -- 1 tab(s) by mouth once a day  -- Indication: For blood pressure    lamoTRIgine 100 mg oral tablet  -- 1 tab(s) by mouth once a day  -- Indication: For Seizure    lamoTRIgine 100 mg oral tablet  -- 1 tab(s) by mouth once a day  -- Indication: For Seizure    levETIRAcetam 1000 mg oral tablet  -- 1 tab(s) by mouth 2 times a day  -- Indication: For Seizure    atorvastatin 40 mg oral tablet  -- 1 tab(s) by mouth once a day (at bedtime)  -- Indication: For Cholesterol    carvedilol 25 mg oral tablet  -- 1 tab(s) by mouth every 12 hours  -- Indication: For Heart     amLODIPine 10 mg oral tablet  -- 1 tab(s) by mouth once a day  -- Indication: For blood pressure    furosemide 20 mg oral tablet  -- 1 tab(s) by mouth once a day  -- Indication: For blood pressure    Fleet Enema 7 g-19 g rectal enema  -- 133 milliliter(s) rectally once, As Needed  -- Indication: For Constipation    docusate sodium 100 mg oral capsule  -- 1 cap(s) by mouth 3 times a day  -- Indication: For Constipation    polyethylene glycol 3350 oral powder for reconstitution  -- 17 gram(s) by mouth once a day  -- Indication: For Constipation    senna oral tablet  -- 2 tab(s) by mouth once a day (at bedtime)  -- Indication: For Constipation    timolol maleate 0.5% ophthalmic gel forming solution  -- 1 drop(s) to each affected eye once a day  -- Indication: For Eyes    Restasis 0.05% ophthalmic emulsion  -- 1 drop(s) to each affected eye every 12 hours  -- Indication: For Eyes    Travatan 0.004% ophthalmic solution  -- 1 drop(s) to each affected eye once a day (in the evening)  -- Indication: For Eyes    ciprofloxacin 500 mg oral tablet  -- 1 tab(s) by mouth every 12 hours  -- Indication: For uti for  5 days     Multiple Vitamins oral tablet  -- 1 tab(s) by mouth once a day  -- Indication: For Supplement     cholecalciferol oral tablet  -- 1000 unit(s) by mouth once a day  -- Indication: For Supplement

## 2017-06-09 NOTE — DISCHARGE NOTE ADULT - PATIENT PORTAL LINK FT
“You can access the [employer] Patient Portal, offered by [facility], by registering with the following website: [email or web address]”

## 2017-06-09 NOTE — DISCHARGE NOTE ADULT - HOSPITAL COURSE
Outpatient Providers	Dr. Ravin Contreras (but has not seen PCP in months)	  	Dr. Ravin Contreras	    Language:  · Patient/Family of Limited English Proficiency	No	      History of Present Illness:  Chief Complaint/Reason for Admission: Right groin pain since am. Fall this am.	  History of Present Illness: 	  85 y/o F with PMH of HTN, HLD, CHF s/p PPM/AICD, absence seizures, hx of CVA, h/o GI Bleed, ischemic colitis, s/p  shunt for cyst drainage, h/o pulm HTN,  h/o multiple prior admissions, recently discharged from [facility] about a week ago and this morning pt found herself on the floor (probably fell from the bed but she does not remember), She called out for her niece who lives downstairs , niece came upstairs and helped her to the bed and called EMS, since then pt c/o new on set right groin/pelvic pain and worsening of chronic LBP.  As per daughter she works 11 to 7 overnight shift , when she came home found her on the bed and EMS was in the house.  Pt denies CP, sob, vomiting, diarrhea, constipation, abd pain.  No cough, dizziness, HA or blurry vision, all other ROS negative.  In ED x-ray c/w pubic Fx and FC was placed in ED.    EEG done in 4/17 abnormal, focal epileptiform. ECHO done 4/19/17 EF: 60-65%.     Daughter and son in law by the bed side and concerned that she might have sustained this Fx on 5/16th when she fell during prior hospitalization. I explained to them, X ray read by radiologist as acute fracture.  XAM:  XR HIP WITH PELV 2-3V RT                            PROCEDURE DATE:  06/06/2017        INTERPRETATION:  Right hip pain    Findings.    3 views were obtained inclusive of 2 hip and one AP pelvis were obtained.     A fracture is identified of the right superior pubic ramus.    The right hip appears intact. The left hip is unremarkable.    A ureteral stent is noted in situ. Calcified fibroid is noted.    IMPRESSION:     Findings suggestive of an acute fracture of the right pubic bone and   right superior pubic ramus.        Ct abdomen and pelvis     Comminuted fracture of the right superior pubic ramus and incomplete   fracture of the right inferior pubic ramus.    Right-sided double-J ureteral stent with mild right   hydroureteronephrosis. New right-sided urothelial enhancement. Correlate   with urinalysis for pyelitis.    Dilatation of the main pancreatic duct which measures up to 8 mm,   increased from the prior study. Correlate with laboratory values and   follow-up MRI/MRCP.      A/P:  86F with right superior pubic ramus fracture  pain control  WBAT RLE  pt/ot  dvt ppx  no acute orthopedic surgical intervention at this time  ortho stable for DC  follow up in 1-2 weeks from hospital DC as outpatient  will d/w attending, and advise if plan changes      Patient was discharged to rehab tolerating pain and beginning to ambulate

## 2017-06-09 NOTE — DISCHARGE NOTE ADULT - CARE PROVIDER_API CALL
Rodolfo Gannon (MD), Orthopaedic Surgery  [address]  [place], [place] [address]  Phone: [phone]  Fax: [phone]

## 2017-06-09 NOTE — PROGRESS NOTE ADULT - ASSESSMENT
awake alert speechn lyndsey s/p fall fx pelvis  for sub acute rehab no seziure
pt is sleeping s//p fall gfx pelvis no seziuire cT HEAD NO ACUTE PATH FOPR PT SUB ACUTE REHAB
85 y/o F with PMH of HTN, HLD, CHF s/p PPM/AICD, absence seizures, hx of CVA, h/o GI Bleed, ischemic colitis, s/p  shunt for cyst drainage,  severe PCM, h/o multiple prior admissions, recently discharged from [facility] about a week ago and this morning pt found herself on the floor (probably fell from the bed but she does not remember), She called out for her niece who lives downstairs , niece came upstairs and helped her to the bed and called EMS, since then pt c/o new on set right groin/pelvic pain and worsening of chronic LBP. In ED X- Ray c/w acute fracture of the right pubic bone and right superior pubic ramus. CTH negative for any new changes, no ICH.  Fall likely resulted from seizures, (fall not witnessed by any one as per family), resulting in acute right pubic Fx.    Dispo planning to rehab
87 y/o F with PMH of HTN, HLD, CHF s/p PPM/AICD, absence seizures, hx of CVA, h/o GI Bleed, ischemic colitis, s/p  shunt for cyst drainage,  severe PCM, h/o multiple prior admissions, recently discharged from [facility] about a week ago and this morning pt found herself on the floor (probably fell from the bed but she does not remember), She called out for her niece who lives downstairs , niece came upstairs and helped her to the bed and called EMS, since then pt c/o new on set right groin/pelvic pain and worsening of chronic LBP. In ED X- Ray c/w acute fracture of the right pubic bone and right superior pubic ramus. CTH negative for any new changes, no ICH.  Fall likely resulted from seizures, (fall not witnessed by any one as per family), resulting in acute right pubic Fx.    Dispo planning to sub acute rehab

## 2017-06-10 ENCOUNTER — INPATIENT (INPATIENT)
Facility: HOSPITAL | Age: 82
LOS: 4 days | Discharge: NURSING, REHAB OR LONG-TERM CARE | End: 2017-06-15
Attending: INTERNAL MEDICINE | Admitting: INTERNAL MEDICINE
Payer: MEDICARE

## 2017-06-10 VITALS
WEIGHT: 134.92 LBS | HEIGHT: 64 IN | SYSTOLIC BLOOD PRESSURE: 119 MMHG | RESPIRATION RATE: 17 BRPM | HEART RATE: 67 BPM | TEMPERATURE: 98 F | DIASTOLIC BLOOD PRESSURE: 66 MMHG | OXYGEN SATURATION: 97 %

## 2017-06-10 DIAGNOSIS — N30.00 ACUTE CYSTITIS WITHOUT HEMATURIA: ICD-10-CM

## 2017-06-10 DIAGNOSIS — Z29.9 ENCOUNTER FOR PROPHYLACTIC MEASURES, UNSPECIFIED: ICD-10-CM

## 2017-06-10 DIAGNOSIS — Z98.2 PRESENCE OF CEREBROSPINAL FLUID DRAINAGE DEVICE: Chronic | ICD-10-CM

## 2017-06-10 DIAGNOSIS — S32.591S OTHER SPECIFIED FRACTURE OF RIGHT PUBIS, SEQUELA: ICD-10-CM

## 2017-06-10 DIAGNOSIS — R55 SYNCOPE AND COLLAPSE: ICD-10-CM

## 2017-06-10 DIAGNOSIS — I50.32 CHRONIC DIASTOLIC (CONGESTIVE) HEART FAILURE: ICD-10-CM

## 2017-06-10 DIAGNOSIS — E78.00 PURE HYPERCHOLESTEROLEMIA, UNSPECIFIED: ICD-10-CM

## 2017-06-10 DIAGNOSIS — I10 ESSENTIAL (PRIMARY) HYPERTENSION: ICD-10-CM

## 2017-06-10 DIAGNOSIS — R56.9 UNSPECIFIED CONVULSIONS: ICD-10-CM

## 2017-06-10 LAB
ALBUMIN SERPL ELPH-MCNC: 2.9 G/DL — LOW (ref 3.3–5)
ALP SERPL-CCNC: 68 U/L — SIGNIFICANT CHANGE UP (ref 40–120)
ALT FLD-CCNC: 21 U/L — SIGNIFICANT CHANGE UP (ref 12–78)
ANION GAP SERPL CALC-SCNC: 6 MMOL/L — SIGNIFICANT CHANGE UP (ref 5–17)
APPEARANCE UR: ABNORMAL
APTT BLD: 41.7 SEC — HIGH (ref 27.5–37.4)
AST SERPL-CCNC: 37 U/L — SIGNIFICANT CHANGE UP (ref 15–37)
BACTERIA # UR AUTO: ABNORMAL
BASOPHILS # BLD AUTO: 0.1 K/UL — SIGNIFICANT CHANGE UP (ref 0–0.2)
BASOPHILS NFR BLD AUTO: 0.9 % — SIGNIFICANT CHANGE UP (ref 0–2)
BILIRUB SERPL-MCNC: 0.8 MG/DL — SIGNIFICANT CHANGE UP (ref 0.2–1.2)
BILIRUB UR-MCNC: NEGATIVE — SIGNIFICANT CHANGE UP
BLD GP AB SCN SERPL QL: SIGNIFICANT CHANGE UP
BUN SERPL-MCNC: 18 MG/DL — SIGNIFICANT CHANGE UP (ref 7–23)
CALCIUM SERPL-MCNC: 8.8 MG/DL — SIGNIFICANT CHANGE UP (ref 8.5–10.1)
CHLORIDE SERPL-SCNC: 97 MMOL/L — SIGNIFICANT CHANGE UP (ref 96–108)
CO2 SERPL-SCNC: 33 MMOL/L — HIGH (ref 22–31)
COLOR SPEC: YELLOW — SIGNIFICANT CHANGE UP
CREAT SERPL-MCNC: 0.92 MG/DL — SIGNIFICANT CHANGE UP (ref 0.5–1.3)
DIFF PNL FLD: ABNORMAL
EOSINOPHIL # BLD AUTO: 0.1 K/UL — SIGNIFICANT CHANGE UP (ref 0–0.5)
EOSINOPHIL NFR BLD AUTO: 1.1 % — SIGNIFICANT CHANGE UP (ref 0–6)
EPI CELLS # UR: SIGNIFICANT CHANGE UP
GLUCOSE SERPL-MCNC: 119 MG/DL — HIGH (ref 70–99)
GLUCOSE UR QL: NEGATIVE MG/DL — SIGNIFICANT CHANGE UP
HCT VFR BLD CALC: 32.3 % — LOW (ref 34.5–45)
HGB BLD-MCNC: 11.1 G/DL — LOW (ref 11.5–15.5)
INR BLD: 1.11 RATIO — SIGNIFICANT CHANGE UP (ref 0.88–1.16)
KETONES UR-MCNC: NEGATIVE — SIGNIFICANT CHANGE UP
LACTATE SERPL-SCNC: 1.5 MMOL/L — SIGNIFICANT CHANGE UP (ref 0.7–2)
LEUKOCYTE ESTERASE UR-ACNC: ABNORMAL
LYMPHOCYTES # BLD AUTO: 1.5 K/UL — SIGNIFICANT CHANGE UP (ref 1–3.3)
LYMPHOCYTES # BLD AUTO: 17.6 % — SIGNIFICANT CHANGE UP (ref 13–44)
MCHC RBC-ENTMCNC: 30.9 PG — SIGNIFICANT CHANGE UP (ref 27–34)
MCHC RBC-ENTMCNC: 34.4 GM/DL — SIGNIFICANT CHANGE UP (ref 32–36)
MCV RBC AUTO: 89.9 FL — SIGNIFICANT CHANGE UP (ref 80–100)
MONOCYTES # BLD AUTO: 0.8 K/UL — SIGNIFICANT CHANGE UP (ref 0–0.9)
MONOCYTES NFR BLD AUTO: 8.8 % — SIGNIFICANT CHANGE UP (ref 2–14)
NEUTROPHILS # BLD AUTO: 6.2 K/UL — SIGNIFICANT CHANGE UP (ref 1.8–7.4)
NEUTROPHILS NFR BLD AUTO: 71.6 % — SIGNIFICANT CHANGE UP (ref 43–77)
NITRITE UR-MCNC: NEGATIVE — SIGNIFICANT CHANGE UP
PH UR: 6 — SIGNIFICANT CHANGE UP (ref 5–8)
PLATELET # BLD AUTO: 278 K/UL — SIGNIFICANT CHANGE UP (ref 150–400)
POTASSIUM SERPL-MCNC: 4 MMOL/L — SIGNIFICANT CHANGE UP (ref 3.5–5.3)
POTASSIUM SERPL-SCNC: 4 MMOL/L — SIGNIFICANT CHANGE UP (ref 3.5–5.3)
PROT SERPL-MCNC: 6.9 GM/DL — SIGNIFICANT CHANGE UP (ref 6–8.3)
PROT UR-MCNC: 500 MG/DL
PROTHROM AB SERPL-ACNC: 12.1 SEC — SIGNIFICANT CHANGE UP (ref 9.8–12.7)
RBC # BLD: 3.59 M/UL — LOW (ref 3.8–5.2)
RBC # FLD: 12.1 % — SIGNIFICANT CHANGE UP (ref 11–15)
RBC CASTS # UR COMP ASSIST: >50 /HPF (ref 0–4)
SODIUM SERPL-SCNC: 136 MMOL/L — SIGNIFICANT CHANGE UP (ref 135–145)
SP GR SPEC: 1.01 — SIGNIFICANT CHANGE UP (ref 1.01–1.02)
UROBILINOGEN FLD QL: NEGATIVE MG/DL — SIGNIFICANT CHANGE UP
WBC # BLD: 8.7 K/UL — SIGNIFICANT CHANGE UP (ref 3.8–10.5)
WBC # FLD AUTO: 8.7 K/UL — SIGNIFICANT CHANGE UP (ref 3.8–10.5)
WBC UR QL: ABNORMAL

## 2017-06-10 PROCEDURE — 70450 CT HEAD/BRAIN W/O DYE: CPT | Mod: 26

## 2017-06-10 PROCEDURE — 71010: CPT | Mod: 26

## 2017-06-10 PROCEDURE — 99285 EMERGENCY DEPT VISIT HI MDM: CPT

## 2017-06-10 PROCEDURE — 73521 X-RAY EXAM HIPS BI 2 VIEWS: CPT | Mod: 26

## 2017-06-10 PROCEDURE — 99223 1ST HOSP IP/OBS HIGH 75: CPT

## 2017-06-10 RX ORDER — ASPIRIN/CALCIUM CARB/MAGNESIUM 324 MG
81 TABLET ORAL DAILY
Qty: 0 | Refills: 0 | Status: DISCONTINUED | OUTPATIENT
Start: 2017-06-10 | End: 2017-06-15

## 2017-06-10 RX ORDER — LEVETIRACETAM 250 MG/1
1000 TABLET, FILM COATED ORAL ONCE
Qty: 0 | Refills: 0 | Status: COMPLETED | OUTPATIENT
Start: 2017-06-10 | End: 2017-06-10

## 2017-06-10 RX ORDER — CARVEDILOL PHOSPHATE 80 MG/1
25 CAPSULE, EXTENDED RELEASE ORAL EVERY 12 HOURS
Qty: 0 | Refills: 0 | Status: DISCONTINUED | OUTPATIENT
Start: 2017-06-10 | End: 2017-06-14

## 2017-06-10 RX ORDER — SODIUM CHLORIDE 9 MG/ML
500 INJECTION INTRAMUSCULAR; INTRAVENOUS; SUBCUTANEOUS ONCE
Qty: 0 | Refills: 0 | Status: COMPLETED | OUTPATIENT
Start: 2017-06-10 | End: 2017-06-10

## 2017-06-10 RX ORDER — LAMOTRIGINE 25 MG/1
100 TABLET, ORALLY DISINTEGRATING ORAL DAILY
Qty: 0 | Refills: 0 | Status: DISCONTINUED | OUTPATIENT
Start: 2017-06-10 | End: 2017-06-15

## 2017-06-10 RX ORDER — CHOLECALCIFEROL (VITAMIN D3) 125 MCG
1000 CAPSULE ORAL DAILY
Qty: 0 | Refills: 0 | Status: DISCONTINUED | OUTPATIENT
Start: 2017-06-10 | End: 2017-06-15

## 2017-06-10 RX ORDER — LAMOTRIGINE 25 MG/1
100 TABLET, ORALLY DISINTEGRATING ORAL ONCE
Qty: 0 | Refills: 0 | Status: COMPLETED | OUTPATIENT
Start: 2017-06-10 | End: 2017-06-10

## 2017-06-10 RX ORDER — AMLODIPINE BESYLATE 2.5 MG/1
10 TABLET ORAL DAILY
Qty: 0 | Refills: 0 | Status: DISCONTINUED | OUTPATIENT
Start: 2017-06-10 | End: 2017-06-11

## 2017-06-10 RX ORDER — LATANOPROST 0.05 MG/ML
1 SOLUTION/ DROPS OPHTHALMIC; TOPICAL AT BEDTIME
Qty: 0 | Refills: 0 | Status: DISCONTINUED | OUTPATIENT
Start: 2017-06-10 | End: 2017-06-15

## 2017-06-10 RX ORDER — LEVETIRACETAM 250 MG/1
1000 TABLET, FILM COATED ORAL ONCE
Qty: 0 | Refills: 0 | Status: DISCONTINUED | OUTPATIENT
Start: 2017-06-10 | End: 2017-06-10

## 2017-06-10 RX ORDER — SODIUM CHLORIDE 9 MG/ML
1000 INJECTION INTRAMUSCULAR; INTRAVENOUS; SUBCUTANEOUS
Qty: 0 | Refills: 0 | Status: DISCONTINUED | OUTPATIENT
Start: 2017-06-10 | End: 2017-06-15

## 2017-06-10 RX ORDER — FUROSEMIDE 40 MG
20 TABLET ORAL DAILY
Qty: 0 | Refills: 0 | Status: DISCONTINUED | OUTPATIENT
Start: 2017-06-10 | End: 2017-06-15

## 2017-06-10 RX ORDER — LOSARTAN POTASSIUM 100 MG/1
50 TABLET, FILM COATED ORAL DAILY
Qty: 0 | Refills: 0 | Status: DISCONTINUED | OUTPATIENT
Start: 2017-06-10 | End: 2017-06-15

## 2017-06-10 RX ORDER — LAMOTRIGINE 25 MG/1
100 TABLET, ORALLY DISINTEGRATING ORAL DAILY
Qty: 0 | Refills: 0 | Status: DISCONTINUED | OUTPATIENT
Start: 2017-06-10 | End: 2017-06-10

## 2017-06-10 RX ORDER — CIPROFLOXACIN LACTATE 400MG/40ML
500 VIAL (ML) INTRAVENOUS EVERY 12 HOURS
Qty: 0 | Refills: 0 | Status: DISCONTINUED | OUTPATIENT
Start: 2017-06-10 | End: 2017-06-11

## 2017-06-10 RX ORDER — ACETAMINOPHEN 500 MG
650 TABLET ORAL EVERY 6 HOURS
Qty: 0 | Refills: 0 | Status: DISCONTINUED | OUTPATIENT
Start: 2017-06-10 | End: 2017-06-15

## 2017-06-10 RX ORDER — LEVETIRACETAM 250 MG/1
1000 TABLET, FILM COATED ORAL
Qty: 0 | Refills: 0 | Status: DISCONTINUED | OUTPATIENT
Start: 2017-06-10 | End: 2017-06-15

## 2017-06-10 RX ORDER — HEPARIN SODIUM 5000 [USP'U]/ML
5000 INJECTION INTRAVENOUS; SUBCUTANEOUS EVERY 8 HOURS
Qty: 0 | Refills: 0 | Status: DISCONTINUED | OUTPATIENT
Start: 2017-06-10 | End: 2017-06-15

## 2017-06-10 RX ORDER — ATORVASTATIN CALCIUM 80 MG/1
40 TABLET, FILM COATED ORAL AT BEDTIME
Qty: 0 | Refills: 0 | Status: DISCONTINUED | OUTPATIENT
Start: 2017-06-10 | End: 2017-06-15

## 2017-06-10 RX ORDER — TIMOLOL 0.5 %
1 DROPS OPHTHALMIC (EYE) DAILY
Qty: 0 | Refills: 0 | Status: DISCONTINUED | OUTPATIENT
Start: 2017-06-10 | End: 2017-06-15

## 2017-06-10 RX ADMIN — SODIUM CHLORIDE 1000 MILLILITER(S): 9 INJECTION INTRAMUSCULAR; INTRAVENOUS; SUBCUTANEOUS at 21:19

## 2017-06-10 RX ADMIN — LEVETIRACETAM 1000 MILLIGRAM(S): 250 TABLET, FILM COATED ORAL at 19:39

## 2017-06-10 RX ADMIN — LAMOTRIGINE 100 MILLIGRAM(S): 25 TABLET, ORALLY DISINTEGRATING ORAL at 15:54

## 2017-06-10 RX ADMIN — SODIUM CHLORIDE 80 MILLILITER(S): 9 INJECTION INTRAMUSCULAR; INTRAVENOUS; SUBCUTANEOUS at 23:34

## 2017-06-10 RX ADMIN — HEPARIN SODIUM 5000 UNIT(S): 5000 INJECTION INTRAVENOUS; SUBCUTANEOUS at 23:21

## 2017-06-10 NOTE — ED ADULT TRIAGE NOTE - CHIEF COMPLAINT QUOTE
sent from [facility] for syncopal episode with unresponsiveness  patient alert and appropriate in triage

## 2017-06-10 NOTE — H&P ADULT - PROBLEM SELECTOR PLAN 3
Stable  PT consult   DC to rehab Stable  Pain control-Tylenol for mild pain, percocet for severe pain  PT consult   DC to rehab

## 2017-06-10 NOTE — H&P ADULT - HISTORY OF PRESENT ILLNESS
85 y/o F with PMH of HTN, HLD, CHF s/p PPM/AICD, absence seizures, hx of CVA, h/o GI Bleed, ischemic colitis, s/p  shunt for cyst drainage, h/o pulm HTN,  h/o multiple prior admissions sent from rehab for syncopal episode.  Patient was recently discharged to rehab after pubic ramus fx from mechanical fall.  She says she was doing exercise in rehab and felt lightheaded.  She thinks she remembers the whole incident.  As per ED attending, she lost consciousness.  She denies associated chest pain, SOB, palpitations, diaphoresis, tremors, visual disturbance, weakness, slurred speech.  She offers no complaints in the ED.      In the ED, head CT, labs unremarkable. 86 year old woman with PMH of HTN, HLD, CHF s/p PPM/AICD, absence seizures, hx of CVA, h/o GI Bleed, ischemic colitis, s/p  shunt for cyst drainage, h/o pulm HTN,  h/o multiple prior admissions sent from rehab for syncopal episode.  Patient was recently discharged to rehab after pubic ramus fx from mechanical fall.  She says she was doing exercise in rehab and felt lightheaded.  She thinks she remembers the whole incident.  As per ED attending, she lost consciousness.  She denies associated chest pain, SOB, palpitations, diaphoresis, tremors, visual disturbance, weakness, slurred speech.  She offers no complaints in the ED.      In the ED, head CT, labs unremarkable.

## 2017-06-10 NOTE — H&P ADULT - ASSESSMENT
86 year old woman with PMH of HTN, HLD, CHF s/p PPM/AICD, absence seizures, hx of CVA, h/o GI Bleed, ischemic colitis, s/p  shunt for cyst drainage, h/o pulm HTN,  h/o multiple prior admissions sent from rehab for syncopal episode.  Patient will require admission for at least 2 midnights as detailed below:

## 2017-06-10 NOTE — H&P ADULT - NSHPPHYSICALEXAM_GEN_ALL_CORE
GENERAL: NAD, well-groomed, well-developed elderly woman lying comfortably in bed  HEAD:  Atraumatic, Normocephalic  EYES: EOMI, PERRLA, conjunctiva and sclera clear  ENMT: No tonsillar erythema, exudates  NECK: Supple, No JVD, Normal thyroid  NERVOUS SYSTEM:  Alert & Oriented X3, Good concentration DTRs 2+ intact and symmetric, strength 5/5, CN 2-12 intact  CHEST/LUNG: Clear to auscultation bilaterally; No rales, rhonchi, wheezing, or rubs  HEART: Regular rate and rhythm; No murmurs, rubs, or gallops  ABDOMEN: Soft, Nontender, Nondistended; Bowel sounds present  LYMPH: No lymphadenopathy noted  SKIN: no rashes or lesions  PSYCH: normal affect and behavior

## 2017-06-10 NOTE — H&P ADULT - PROBLEM SELECTOR PLAN 8
Heparin 5000 units SC q8h for DVT prophylaxis   General precautions  DASH diet  PT consult, then DC to rehab

## 2017-06-10 NOTE — H&P ADULT - NSHPLABSRESULTS_GEN_ALL_CORE
Vital Signs Last 24 Hrs  T(C): 36.8, Max: 36.8 (06-10 @ 21:10)  T(F): 98.2, Max: 98.2 (06-10 @ 21:10)  HR: 75 (67 - 75)  BP: 131/71 (119/66 - 143/83)  BP(mean): --  RR: 18 (17 - 19)  SpO2: 98% (97% - 99%)        LABS:                        11.1   8.7   )-----------( 278      ( 10 Keven 2017 14:10 )             32.3     06-10    136  |  97  |  18  ----------------------------<  119<H>  4.0   |  33<H>  |  0.92    Ca    8.8      10 Keven 2017 14:10    TPro  6.9  /  Alb  2.9<L>  /  TBili  0.8  /  DBili  x   /  AST  37  /  ALT  21  /  AlkPhos  68  06-10    PT/INR - ( 10 Keven 2017 13:58 )   PT: 12.1 sec;   INR: 1.11 ratio         PTT - ( 10 Keven 2017 13:58 )  PTT:41.7 sec  Urinalysis Basic - ( 10 Keven 2017 21:58 )    Color: Yellow / Appearance: Slightly Turbid / S.015 / pH: x  Gluc: x / Ketone: Negative  / Bili: Negative / Urobili: Negative mg/dL   Blood: x / Protein: 500 mg/dL / Nitrite: Negative   Leuk Esterase: Moderate / RBC: >50 /HPF / WBC 6-10   Sq Epi: x / Non Sq Epi: Occasional / Bacteria: Moderate        RADIOLOGY & ADDITIONAL STUDIES:    CXR:  IMPRESSION:  No lung consolidations.    XR hip:  IMPRESSION:  Right superior and inferior pubic rami fractures.    CT head:  IMPRESSION:  Involutional and ischemic gliotic changes.  No acute intracranial hemorrhage.  No significant interval change.

## 2017-06-10 NOTE — ED PROVIDER NOTE - MEDICAL DECISION MAKING DETAILS
87 y/o F w hx of htn, CHF, abscense seizures sent in after episode of unresponsiveness; unclear if seizure vs syncope; will get labs, CT head, ekg, and reassess 85 y/o F w hx of htn, CHF, abscense seizures sent in after episode of unresponsiveness; unclear if seizure vs syncope; will get labs, CT head, ekg, and reassess    ua pending; will admit for tele monitoring, further work up

## 2017-06-10 NOTE — ED PROVIDER NOTE - PHYSICAL EXAMINATION
Gen: well appearing, in no distress;   HEENT: clear oropharynx; EOMI; PERRL ~ 2mm  neck: no c-spine ttp  CV: RRR; no m/g/r; AICD in L chest wall, no surrounding erythema  lungs; CTAB; no w/r/r  abd: soft, nd, nt  back: no abrasion  ext; wwp; + pelvis ttp  neuro: no focal deficits  skin: no rash

## 2017-06-10 NOTE — ED PROVIDER NOTE - OBJECTIVE STATEMENT
Pertinent PMH/PSH/FHx/SHx and Review of Systems contained within:    87 y/o F w hx of htn, hld, CHF s/p PPM/AICD, absence seizures, recent pelvicfracture, sent from nursing home after reported syncope episode; per nursing room staff, patient had episode of unrepsonsiveness after rehab session today; patient denies chest pain, shortness of breath, headache, abd pain, or any other complaints on ROS    PMH: as above  meds: cipro; percocet; lamotrigine; keppra; amlodipine; atorvastatin; PEG  allergies: nkda  SH: denies cig or drug use    EKG: v paced rhythm 62;

## 2017-06-11 LAB
CULTURE RESULTS: SIGNIFICANT CHANGE UP
CULTURE RESULTS: SIGNIFICANT CHANGE UP
SPECIMEN SOURCE: SIGNIFICANT CHANGE UP
SPECIMEN SOURCE: SIGNIFICANT CHANGE UP

## 2017-06-11 PROCEDURE — 99223 1ST HOSP IP/OBS HIGH 75: CPT

## 2017-06-11 PROCEDURE — 99233 SBSQ HOSP IP/OBS HIGH 50: CPT

## 2017-06-11 RX ORDER — CEFTRIAXONE 500 MG/1
INJECTION, POWDER, FOR SOLUTION INTRAMUSCULAR; INTRAVENOUS
Qty: 0 | Refills: 0 | Status: DISCONTINUED | OUTPATIENT
Start: 2017-06-11 | End: 2017-06-14

## 2017-06-11 RX ORDER — CEFTRIAXONE 500 MG/1
1 INJECTION, POWDER, FOR SOLUTION INTRAMUSCULAR; INTRAVENOUS ONCE
Qty: 0 | Refills: 0 | Status: COMPLETED | OUTPATIENT
Start: 2017-06-11 | End: 2017-06-11

## 2017-06-11 RX ORDER — CEFTRIAXONE 500 MG/1
1 INJECTION, POWDER, FOR SOLUTION INTRAMUSCULAR; INTRAVENOUS EVERY 24 HOURS
Qty: 0 | Refills: 0 | Status: DISCONTINUED | OUTPATIENT
Start: 2017-06-12 | End: 2017-06-14

## 2017-06-11 RX ADMIN — LEVETIRACETAM 1000 MILLIGRAM(S): 250 TABLET, FILM COATED ORAL at 18:03

## 2017-06-11 RX ADMIN — LATANOPROST 1 DROP(S): 0.05 SOLUTION/ DROPS OPHTHALMIC; TOPICAL at 21:51

## 2017-06-11 RX ADMIN — CEFTRIAXONE 100 GRAM(S): 500 INJECTION, POWDER, FOR SOLUTION INTRAMUSCULAR; INTRAVENOUS at 16:39

## 2017-06-11 RX ADMIN — Medication 81 MILLIGRAM(S): at 11:39

## 2017-06-11 RX ADMIN — LAMOTRIGINE 100 MILLIGRAM(S): 25 TABLET, ORALLY DISINTEGRATING ORAL at 11:38

## 2017-06-11 RX ADMIN — Medication 20 MILLIGRAM(S): at 05:58

## 2017-06-11 RX ADMIN — HEPARIN SODIUM 5000 UNIT(S): 5000 INJECTION INTRAVENOUS; SUBCUTANEOUS at 21:09

## 2017-06-11 RX ADMIN — HEPARIN SODIUM 5000 UNIT(S): 5000 INJECTION INTRAVENOUS; SUBCUTANEOUS at 05:57

## 2017-06-11 RX ADMIN — Medication 500 MILLIGRAM(S): at 05:58

## 2017-06-11 RX ADMIN — LEVETIRACETAM 1000 MILLIGRAM(S): 250 TABLET, FILM COATED ORAL at 05:58

## 2017-06-11 RX ADMIN — LOSARTAN POTASSIUM 50 MILLIGRAM(S): 100 TABLET, FILM COATED ORAL at 05:58

## 2017-06-11 RX ADMIN — AMLODIPINE BESYLATE 10 MILLIGRAM(S): 2.5 TABLET ORAL at 05:58

## 2017-06-11 RX ADMIN — CARVEDILOL PHOSPHATE 25 MILLIGRAM(S): 80 CAPSULE, EXTENDED RELEASE ORAL at 18:03

## 2017-06-11 RX ADMIN — Medication 1000 UNIT(S): at 11:38

## 2017-06-11 RX ADMIN — SODIUM CHLORIDE 80 MILLILITER(S): 9 INJECTION INTRAMUSCULAR; INTRAVENOUS; SUBCUTANEOUS at 16:40

## 2017-06-11 RX ADMIN — CARVEDILOL PHOSPHATE 25 MILLIGRAM(S): 80 CAPSULE, EXTENDED RELEASE ORAL at 05:59

## 2017-06-11 RX ADMIN — HEPARIN SODIUM 5000 UNIT(S): 5000 INJECTION INTRAVENOUS; SUBCUTANEOUS at 13:34

## 2017-06-11 RX ADMIN — Medication 1 DROP(S): at 11:38

## 2017-06-11 RX ADMIN — Medication 1 TABLET(S): at 11:38

## 2017-06-11 RX ADMIN — ATORVASTATIN CALCIUM 40 MILLIGRAM(S): 80 TABLET, FILM COATED ORAL at 21:09

## 2017-06-11 NOTE — CONSULT NOTE ADULT - SUBJECTIVE AND OBJECTIVE BOX
Chief Complaint:  Patient is a 86y old  Female who presents with a chief complaint of Syncope (10 Keven 2017 22:21)      HPI:  86 year old woman with PMH of HTN, HLD, CHF s/p PPM/AICD, absence seizures, hx of CVA, h/o GI Bleed, ischemic colitis, s/p  shunt for cyst drainage, h/o pulm HTN,  h/o multiple prior admissions sent from rehab for syncopal episode.  Patient was recently discharged to rehab after pubic ramus fx from mechanical fall.  She says she was doing exercise in rehab and felt lightheaded.  She thinks she remembers the whole incident.  As per ED attending, she lost consciousness.  She denies associated chest pain, SOB, palpitations, diaphoresis, tremors, visual disturbance, weakness, slurred speech. Head CT, labs unremarkable. (10 Keven 2017 22:21)    Allergies and Intolerances:        Allergies:  	No Known Allergies:     Home Medications:   · 	acetaminophen-oxycodone 325 mg-5 mg oral tablet: 1 tab(s) orally every 6 hours, As needed, Severe Pain (7 - 10)  · 	lamoTRIgine 100 mg oral tablet: 1 tab(s) orally once a day  · 	levETIRAcetam 1000 mg oral tablet: 1 tab(s) orally 2 times a day  · 	ciprofloxacin 500 mg oral tablet: 1 tab(s) orally every 12 hours  · 	docusate sodium 100 mg oral capsule: 1 cap(s) orally 3 times a day  · 	polyethylene glycol 3350 oral powder for reconstitution: 17 gram(s) orally once a day  · 	senna oral tablet: 2 tab(s) orally once a day (at bedtime)  · 	acetaminophen 325 mg oral tablet: 2 tab(s) orally every 4 hours, As needed, Moderate Pain (4 - 6)  · 	losartan 50 mg oral tablet: 1 tab(s) orally once a day  · 	lamoTRIgine 100 mg oral tablet: 1 tab(s) orally once a day  · 	atorvastatin 40 mg oral tablet: 1 tab(s) orally once a day (at bedtime)  · 	aspirin 81 mg oral tablet, chewable: 1 tab(s) orally once a day  · 	amLODIPine 10 mg oral tablet: 1 tab(s) orally once a day  · 	carvedilol 25 mg oral tablet: 1 tab(s) orally every 12 hours  · 	furosemide 20 mg oral tablet: 1 tab(s) orally once a day  · 	Multiple Vitamins oral tablet: 1 tab(s) orally once a day  · 	cholecalciferol oral tablet: 1000 unit(s) orally once a day  · 	Fleet Enema 7 g-19 g rectal enema: 133 milliliter(s) rectal once, As Needed  · 	timolol maleate 0.5% ophthalmic gel forming solution: 1 drop(s) to each affected eye once a day  · 	Restasis 0.05% ophthalmic emulsion: 1 drop(s) to each affected eye every 12 hours  · 	Travatan 0.004% ophthalmic solution: 1 drop(s) to each affected eye once a day (in the evening)      Past Medical History:  CHF (congestive heart failure)    GI bleed    High cholesterol    History of CVA (cerebrovascular accident)    HTN (hypertension)    Pacemaker    Pulmonary hypertension    Seizure.    Past Surgical History:  S/P  shunt.    Family History:  No pertinent family history in first degree relatives.    Social History:  Social History (marital status, living situation, occupation, tobacco use, alcohol and drug use, and sexual history): Denies ETOH, tobacco, illicit drugs.	    Review of Systems:  General:  No wt loss, fevers, chills, night sweats  Eyes:  Good vision, no reported pain  ENT:  No sore throat, pain, runny nose, dysphagia  CV:  No pain, palpitations hypo/hypertension  Resp:  No dyspnea, cough, tachypnea, wheezing  GI:  No pain, nausea, vomiting, diarrhea, constipation  :  No pain, bleeding, incontinence, nocturia  Muscle:  No pain, weakness  Breast:  No pain, abscess, mass, discharge  Neuro:  No weakness, tingling, memory problems  Psych:  No fatigue, insomnia, mood problems, depression  Endocrine:  No polyuria, polydipsia cold/heat intolerance  Heme:  No petechiae, ecchymosis, easy bruisability  Skin:  No rash, edema    Physical Exam:  Vital Signs:  Vital Signs Last 24 Hrs  T(C): 36.7, Max: 37.1 ( @ 05:17)  T(F): 98.1, Max: 98.7 ( @ 05:17)  HR: 66 (64 - 123)  BP: 82/45 (82/45 - 146/82)  RR: 18 (18 - 20)  SpO2: 95% (95% - 99%)  Daily Weight in k.9 (2017 00:35)  I&O's Summary  I & Os for 24h ending 2017 07:00  =============================================  IN: 560 ml / OUT: 0 ml / NET: 560 ml    I & Os for current day (as of 2017 15:07)  =============================================  IN: 240 ml / OUT: 0 ml / NET: 240 ml    Tele:  General:  Appears stated age, well-groomed, well-nourished, no distress  HEENT:  NC/AT, patent nares w/ pink mucosa, OP clear w/o lesions, EOMI, conjunctivae clear, no thyromegaly, nodules, adenopathy, no JVD  Chest:  Full & symmetric excursion, no increased effort, breath sounds clear  Cardiovascular:  Regular rhythm, S1, S2, no murmur/rub/S3/S4, no carotid bruit, radial pulses 2+  Abdomen:  Soft, non-tender, non-distended, normoactive bowel sounds  Extremities: no edema  Skin:  No rash/erythema. Skin is warm/dry  Musculoskeletal:  Full ROM in all joints w/o swelling/tenderness/effusion  Neuro/Psych:  Alert, oriented    Laboratory:                            11.1   8.7   )-----------( 278      ( 10 Keven 2017 14:10 )             32.3     06-10    136  |  97  |  18  ----------------------------<  119<H>  4.0   |  33<H>  |  0.92    Ca    8.8      10 Keven 2017 14:10    TPro  6.9  /  Alb  2.9<L>  /  TBili  0.8  /  DBili  x   /  AST  37  /  ALT  21  /  AlkPhos  68  06-10      LIVER FUNCTIONS - ( 10 Keven 2017 14:10 )  Alb: 2.9 g/dL / Pro: 6.9 gm/dL / ALK PHOS: 68 U/L / ALT: 21 U/L / AST: 37 U/L / GGT: x           PT/INR - ( 10 Keven 2017 13:58 )   PT: 12.1 sec;   INR: 1.11 ratio         PTT - ( 10 Keven 2017 13:58 )  PTT:41.7 sec  Urinalysis Basic - ( 10 Keven 2017 21:58 )    Color: Yellow / Appearance: Slightly Turbid / S.015 / pH: x  Gluc: x / Ketone: Negative  / Bili: Negative / Urobili: Negative mg/dL   Blood: x / Protein: 500 mg/dL / Nitrite: Negative   Leuk Esterase: Moderate / RBC: >50 /HPF / WBC 6-10   Sq Epi: x / Non Sq Epi: Occasional / Bacteria: Moderate      Imaging:  ecg:      EXAM:  CT BRAIN                            PROCEDURE DATE:  06/10/2017        INTERPRETATION:  CLINICAL INFORMATION: Syncope.    COMPARISON: 2017.    PROCEDURE:    Axial sections of the brain were obtained from base to vertex, without   the intravenous administration of contrast material. Sagittal and coronal   reformats were then generated from the axial images.    FINDINGS:    There is a chronic cystic lesion in the left posterior parietal region   with associated shunt catheter. A chronic left PCA territory infarct is   noted.    There is no intracranial hemorrhage, mass or shift of the midline   structures. There are involutional changes. Small vessel white matter   ischemic changes are noted.     No cerebellopontine angle lesion isappreciated.     The fourth, third and lateral ventricles are normal position. There is   stable ventricular enlargement. No subdural or epidural collections are   present.     No acute fracture or destructive lesion is identified.     The sinuses and mastoids are clear.    IMPRESSION:    Involutional and ischemic gliotic changes.  No acute intracranial hemorrhage.  No significant interval change.        EXAM:  XR HIPS BI WITH PELV 2V                            PROCEDURE DATE:  06/10/2017        INTERPRETATION:  PROCEDURE: AP view of the pelvis and AP and lateral   views of the bilateral hips.    CLINICAL INFORMATION: Pelvic pain.    FINDINGS:    There are right superior and inferior pubic rami fractures. The femoral   heads have a normal contour. The bilateral hip joint spaces are narrowed.   A right ureteral stent is partially imaged.    IMPRESSION:    Right superior and inferior pubic rami fractures.      EXAM:  CHEST SINGLE VIEW                            PROCEDURE DATE:  06/10/2017        INTERPRETATION:  PROCEDURE: AP view of the chest.    CLINICAL INFORMATION: Syncope, dyspnea    COMPARISON: 2017    FINDINGS:     There is a cardiac conduction device overlying the left axilla with   intact leads to the heart. A ventriculoperitoneal shunt catheter overlies   the left chest.    There are no lung consolidations. The cardiac and mediastinal contours   are prominent, which may be due to magnification from AP technique and   shallow inspiration. The osseous structures are intact.    IMPRESSION:    No lung consolidations.       EXAM:  TTE WO CON COMPLETE W DOPPL         PROCEDURE DATE:  2017      INTERPRETATION:  REPORT:    TRANSTHORACIC ECHOCARDIOGRAM REPORT       Summary:   1. Left ventricular ejection fraction, by visual estimation, is 60 to   65%.   2. Technically good study.   3. Normal global left ventricular systolic function.   4. Normal left ventricular internal cavity size.   5. Spectral Doppler shows impaired relaxation pattern of left   ventricular myocardial filling (Grade I diastolic dysfunction).   6. There is mild concentric left ventricular hypertrophy.   7. Normal right ventricular size and function.   8. Mildly dilated right atrium.   9. There is no evidence of pericardial effusion.  10. Mild mitral annular calcification.  11. Mild mitral valve regurgitation.  12. Thickening and calcification of the anterior and posterior mitral   valve leaflets.  13. Mild aortic regurgitation.  14. Sclerotic aortic valve with normal opening.  15. Mildly enlarged left atrium.  16. There is mild aortic root calcification.     Vikas Hopkins MD FACC, FASE,FACP  Electronically signed on 2017 at 1:14:59 PM      Assessment:  86 year old woman with PMH of HTN, HLD, CHF s/p PPM/AICD, absence seizures, hx of CVA, h/o GI Bleed, ischemic colitis, s/p  shunt for cyst drainage, h/o pulm HTN,  h/o multiple prior admissions sent from rehab for syncopal episode.  Patient was recently discharged to rehab after pubic ramus fx from mechanical fall.  She says she was doing exercise in rehab and felt lightheaded.  She thinks she remembers the whole incident.  As per ED attending, she lost consciousness.  She denies associated chest pain, SOB, palpitations, diaphoresis, tremors, visual disturbance, weakness, slurred speech. Head CT, labs unremarkable.     Plan:     Tele monitoring.    Con't with:  MEDICATIONS  (STANDING):  heparin  Injectable 5000Unit(s) SubCutaneous every 8 hours  aspirin  chewable 81milliGRAM(s) Oral daily  losartan 50milliGRAM(s) Oral daily  atorvastatin 40milliGRAM(s) Oral at bedtime  carvedilol 25milliGRAM(s) Oral every 12 hours  furosemide    Tablet 20milliGRAM(s) Oral daily  timolol 0.5% Solution 1Drop(s) Both EYES daily  latanoprost 0.005% Ophthalmic Solution 1Drop(s) Both EYES at bedtime  multivitamin 1Tablet(s) Oral daily  cholecalciferol 1000Unit(s) Oral daily  lamoTRIgine 100milliGRAM(s) Oral daily  levETIRAcetam 1000milliGRAM(s) Oral two times a day  sodium chloride 0.9%. 1000milliLiter(s) IV Continuous <Continuous>  cefTRIAXone   IVPB  IV Intermittent   cefTRIAXone   IVPB 1Gram(s) IV Intermittent once    Check ppm for arrhythmia burden.     Vikas Hopkins MD, FACC, SKYLAR, SHEFALINC, FACP  Director, Heart Failure Services  [facility]  , Department of Cardiology  [facility] Chief Complaint:  Patient is a 86y old  Female who presents with a chief complaint of Syncope (10 Keven 2017 22:21)      HPI:  86 year old woman with PMH of HTN, HLD, CHF s/p PPM/AICD, absence seizures, hx of CVA, h/o GI Bleed, ischemic colitis, s/p  shunt for cyst drainage, h/o pulm HTN,  h/o multiple prior admissions sent from rehab for syncopal episode.  Patient was recently discharged to rehab after pubic ramus fx from mechanical fall.  She says she was doing exercise in rehab and felt lightheaded.  She thinks she remembers the whole incident.  As per ED attending, she lost consciousness.  She denies associated chest pain, SOB, palpitations, diaphoresis, tremors, visual disturbance, weakness, slurred speech. Head CT, labs unremarkable. (10 Keven 2017 22:21)    Allergies and Intolerances:        Allergies:  	No Known Allergies:     Home Medications:   · 	acetaminophen-oxycodone 325 mg-5 mg oral tablet: 1 tab(s) orally every 6 hours, As needed, Severe Pain (7 - 10)  · 	lamoTRIgine 100 mg oral tablet: 1 tab(s) orally once a day  · 	levETIRAcetam 1000 mg oral tablet: 1 tab(s) orally 2 times a day  · 	ciprofloxacin 500 mg oral tablet: 1 tab(s) orally every 12 hours  · 	docusate sodium 100 mg oral capsule: 1 cap(s) orally 3 times a day  · 	polyethylene glycol 3350 oral powder for reconstitution: 17 gram(s) orally once a day  · 	senna oral tablet: 2 tab(s) orally once a day (at bedtime)  · 	acetaminophen 325 mg oral tablet: 2 tab(s) orally every 4 hours, As needed, Moderate Pain (4 - 6)  · 	losartan 50 mg oral tablet: 1 tab(s) orally once a day  · 	lamoTRIgine 100 mg oral tablet: 1 tab(s) orally once a day  · 	atorvastatin 40 mg oral tablet: 1 tab(s) orally once a day (at bedtime)  · 	aspirin 81 mg oral tablet, chewable: 1 tab(s) orally once a day  · 	amLODIPine 10 mg oral tablet: 1 tab(s) orally once a day  · 	carvedilol 25 mg oral tablet: 1 tab(s) orally every 12 hours  · 	furosemide 20 mg oral tablet: 1 tab(s) orally once a day  · 	Multiple Vitamins oral tablet: 1 tab(s) orally once a day  · 	cholecalciferol oral tablet: 1000 unit(s) orally once a day  · 	Fleet Enema 7 g-19 g rectal enema: 133 milliliter(s) rectal once, As Needed  · 	timolol maleate 0.5% ophthalmic gel forming solution: 1 drop(s) to each affected eye once a day  · 	Restasis 0.05% ophthalmic emulsion: 1 drop(s) to each affected eye every 12 hours  · 	Travatan 0.004% ophthalmic solution: 1 drop(s) to each affected eye once a day (in the evening)      Past Medical History:  CHF (congestive heart failure)    GI bleed    High cholesterol    History of CVA (cerebrovascular accident)    HTN (hypertension)    Pacemaker    Pulmonary hypertension    Seizure.    Past Surgical History:  S/P  shunt.    Family History:  No pertinent family history in first degree relatives.    Social History:  Social History (marital status, living situation, occupation, tobacco use, alcohol and drug use, and sexual history): Denies ETOH, tobacco, illicit drugs.	    Review of Systems:  General:  No wt loss, fevers, chills, night sweats  Eyes:  Good vision, no reported pain  ENT:  No sore throat, pain, runny nose, dysphagia  CV:  No pain, palpitations hypo/hypertension  Resp:  No dyspnea, cough, tachypnea, wheezing  GI:  No pain, nausea, vomiting, diarrhea, constipation  :  No pain, bleeding, incontinence, nocturia  Muscle:  No pain, weakness  Breast:  No pain, abscess, mass, discharge  Neuro:  No weakness, tingling, memory problems  Psych:  No fatigue, insomnia, mood problems, depression  Endocrine:  No polyuria, polydipsia cold/heat intolerance  Heme:  No petechiae, ecchymosis, easy bruisability  Skin:  No rash, edema    Physical Exam:  Vital Signs:  Vital Signs Last 24 Hrs  T(C): 36.7, Max: 37.1 ( @ 05:17)  T(F): 98.1, Max: 98.7 ( @ 05:17)  HR: 66 (64 - 123)  BP: 82/45 (82/45 - 146/82)  RR: 18 (18 - 20)  SpO2: 95% (95% - 99%)  Daily Weight in k.9 (2017 00:35)  I&O's Summary  I & Os for 24h ending 2017 07:00  =============================================  IN: 560 ml / OUT: 0 ml / NET: 560 ml    I & Os for current day (as of 2017 15:07)  =============================================  IN: 240 ml / OUT: 0 ml / NET: 240 ml    Tele:  General:  Appears stated age, well-groomed, well-nourished, no distress  HEENT:  NC/AT, patent nares w/ pink mucosa, OP clear w/o lesions, EOMI, conjunctivae clear, no thyromegaly, nodules, adenopathy, no JVD  Chest:  Full & symmetric excursion, no increased effort, breath sounds clear  Cardiovascular:  Regular rhythm, S1, S2, no murmur/rub/S3/S4, no carotid bruit, radial pulses 2+  Abdomen:  Soft, non-tender, non-distended, normoactive bowel sounds  Extremities: no edema  Skin:  No rash/erythema. Skin is warm/dry  Musculoskeletal:  Full ROM in all joints w/o swelling/tenderness/effusion  Neuro/Psych:  Alert, oriented    Laboratory:                            11.1   8.7   )-----------( 278      ( 10 Keven 2017 14:10 )             32.3     06-10    136  |  97  |  18  ----------------------------<  119<H>  4.0   |  33<H>  |  0.92    Ca    8.8      10 Keven 2017 14:10    TPro  6.9  /  Alb  2.9<L>  /  TBili  0.8  /  DBili  x   /  AST  37  /  ALT  21  /  AlkPhos  68  06-10      LIVER FUNCTIONS - ( 10 Keven 2017 14:10 )  Alb: 2.9 g/dL / Pro: 6.9 gm/dL / ALK PHOS: 68 U/L / ALT: 21 U/L / AST: 37 U/L / GGT: x           PT/INR - ( 10 Keven 2017 13:58 )   PT: 12.1 sec;   INR: 1.11 ratio         PTT - ( 10 Keven 2017 13:58 )  PTT:41.7 sec  Urinalysis Basic - ( 10 Keven 2017 21:58 )    Color: Yellow / Appearance: Slightly Turbid / S.015 / pH: x  Gluc: x / Ketone: Negative  / Bili: Negative / Urobili: Negative mg/dL   Blood: x / Protein: 500 mg/dL / Nitrite: Negative   Leuk Esterase: Moderate / RBC: >50 /HPF / WBC 6-10   Sq Epi: x / Non Sq Epi: Occasional / Bacteria: Moderate      Imaging:  ecg:      EXAM:  CT BRAIN                            PROCEDURE DATE:  06/10/2017        INTERPRETATION:  CLINICAL INFORMATION: Syncope.    COMPARISON: 2017.    PROCEDURE:    Axial sections of the brain were obtained from base to vertex, without   the intravenous administration of contrast material. Sagittal and coronal   reformats were then generated from the axial images.    FINDINGS:    There is a chronic cystic lesion in the left posterior parietal region   with associated shunt catheter. A chronic left PCA territory infarct is   noted.    There is no intracranial hemorrhage, mass or shift of the midline   structures. There are involutional changes. Small vessel white matter   ischemic changes are noted.     No cerebellopontine angle lesion isappreciated.     The fourth, third and lateral ventricles are normal position. There is   stable ventricular enlargement. No subdural or epidural collections are   present.     No acute fracture or destructive lesion is identified.     The sinuses and mastoids are clear.    IMPRESSION:    Involutional and ischemic gliotic changes.  No acute intracranial hemorrhage.  No significant interval change.        EXAM:  XR HIPS BI WITH PELV 2V                            PROCEDURE DATE:  06/10/2017        INTERPRETATION:  PROCEDURE: AP view of the pelvis and AP and lateral   views of the bilateral hips.    CLINICAL INFORMATION: Pelvic pain.    FINDINGS:    There are right superior and inferior pubic rami fractures. The femoral   heads have a normal contour. The bilateral hip joint spaces are narrowed.   A right ureteral stent is partially imaged.    IMPRESSION:    Right superior and inferior pubic rami fractures.      EXAM:  CHEST SINGLE VIEW                            PROCEDURE DATE:  06/10/2017        INTERPRETATION:  PROCEDURE: AP view of the chest.    CLINICAL INFORMATION: Syncope, dyspnea    COMPARISON: 2017    FINDINGS:     There is a cardiac conduction device overlying the left axilla with   intact leads to the heart. A ventriculoperitoneal shunt catheter overlies   the left chest.    There are no lung consolidations. The cardiac and mediastinal contours   are prominent, which may be due to magnification from AP technique and   shallow inspiration. The osseous structures are intact.    IMPRESSION:    No lung consolidations.       EXAM:  TTE WO CON COMPLETE W DOPPL         PROCEDURE DATE:  2017      INTERPRETATION:  REPORT:    TRANSTHORACIC ECHOCARDIOGRAM REPORT       Summary:   1. Left ventricular ejection fraction, by visual estimation, is 60 to   65%.   2. Technically good study.   3. Normal global left ventricular systolic function.   4. Normal left ventricular internal cavity size.   5. Spectral Doppler shows impaired relaxation pattern of left   ventricular myocardial filling (Grade I diastolic dysfunction).   6. There is mild concentric left ventricular hypertrophy.   7. Normal right ventricular size and function.   8. Mildly dilated right atrium.   9. There is no evidence of pericardial effusion.  10. Mild mitral annular calcification.  11. Mild mitral valve regurgitation.  12. Thickening and calcification of the anterior and posterior mitral   valve leaflets.  13. Mild aortic regurgitation.  14. Sclerotic aortic valve with normal opening.  15. Mildly enlarged left atrium.  16. There is mild aortic root calcification.     Vikas Hopkins MD FACC, FASE,FACP  Electronically signed on 2017 at 1:14:59 PM      Assessment:  86 year old woman with PMH of HTN, HLD, CHF s/p PPM/AICD, absence seizures, hx of CVA, h/o GI Bleed, ischemic colitis, s/p  shunt for cyst drainage, h/o pulm HTN,  h/o multiple prior admissions sent from rehab for syncopal episode.  Patient was recently discharged to rehab after pubic ramus fx from mechanical fall.  She says she was doing exercise in rehab and felt lightheaded.  She thinks she remembers the whole incident.  As per ED attending, she lost consciousness.  She denies associated chest pain, SOB, palpitations, diaphoresis, tremors, visual disturbance, weakness, slurred speech. Head CT, labs unremarkable.     Plan:     Tele monitoring.    Con't with:  MEDICATIONS  (STANDING):  heparin  Injectable 5000Unit(s) SubCutaneous every 8 hours  aspirin  chewable 81milliGRAM(s) Oral daily  losartan 50milliGRAM(s) Oral daily  atorvastatin 40milliGRAM(s) Oral at bedtime  carvedilol 25milliGRAM(s) Oral every 12 hours  furosemide    Tablet 20milliGRAM(s) Oral daily  timolol 0.5% Solution 1Drop(s) Both EYES daily  latanoprost 0.005% Ophthalmic Solution 1Drop(s) Both EYES at bedtime  multivitamin 1Tablet(s) Oral daily  cholecalciferol 1000Unit(s) Oral daily  lamoTRIgine 100milliGRAM(s) Oral daily  levETIRAcetam 1000milliGRAM(s) Oral two times a day  sodium chloride 0.9%. 1000milliLiter(s) IV Continuous <Continuous>  cefTRIAXone   IVPB  IV Intermittent   cefTRIAXone   IVPB 1Gram(s) IV Intermittent once    Check ppm for arrhythmia burden. Norvasc d/c'd for low bp. Watch labs and observe blood pressure response.    Vikas Hopkins MD, FACC, SKYLAR, SANDHYA, FACP  Director, Heart Failure Services  [facility]  , Department of Cardiology  [facility] Chief Complaint:  Patient is a 86y old  Female who presents with a chief complaint of Syncope (10 Keven 2017 22:21)      HPI:  86 year old woman with PMH of HTN, HLD, CHF s/p PPM/AICD, absence seizures, hx of CVA, h/o GI Bleed, ischemic colitis, s/p  shunt for cyst drainage, h/o pulm HTN,  h/o multiple prior admissions sent from rehab for syncopal episode.  Patient was recently discharged to rehab after pubic ramus fx from mechanical fall.  She says she was doing exercise in rehab and felt lightheaded.  She thinks she remembers the whole incident.  As per ED attending, she lost consciousness.  She denies associated chest pain, SOB, palpitations, diaphoresis, tremors, visual disturbance, weakness, slurred speech. Head CT, labs unremarkable. (10 Keven 2017 22:21) Likely a mild vagal and/or hypotensive episode.    Allergies and Intolerances:        Allergies:  	No Known Allergies:     Home Medications:   · 	acetaminophen-oxycodone 325 mg-5 mg oral tablet: 1 tab(s) orally every 6 hours, As needed, Severe Pain (7 - 10)  · 	lamoTRIgine 100 mg oral tablet: 1 tab(s) orally once a day  · 	levETIRAcetam 1000 mg oral tablet: 1 tab(s) orally 2 times a day  · 	ciprofloxacin 500 mg oral tablet: 1 tab(s) orally every 12 hours  · 	docusate sodium 100 mg oral capsule: 1 cap(s) orally 3 times a day  · 	polyethylene glycol 3350 oral powder for reconstitution: 17 gram(s) orally once a day  · 	senna oral tablet: 2 tab(s) orally once a day (at bedtime)  · 	acetaminophen 325 mg oral tablet: 2 tab(s) orally every 4 hours, As needed, Moderate Pain (4 - 6)  · 	losartan 50 mg oral tablet: 1 tab(s) orally once a day  · 	lamoTRIgine 100 mg oral tablet: 1 tab(s) orally once a day  · 	atorvastatin 40 mg oral tablet: 1 tab(s) orally once a day (at bedtime)  · 	aspirin 81 mg oral tablet, chewable: 1 tab(s) orally once a day  · 	amLODIPine 10 mg oral tablet: 1 tab(s) orally once a day  · 	carvedilol 25 mg oral tablet: 1 tab(s) orally every 12 hours  · 	furosemide 20 mg oral tablet: 1 tab(s) orally once a day  · 	Multiple Vitamins oral tablet: 1 tab(s) orally once a day  · 	cholecalciferol oral tablet: 1000 unit(s) orally once a day  · 	Fleet Enema 7 g-19 g rectal enema: 133 milliliter(s) rectal once, As Needed  · 	timolol maleate 0.5% ophthalmic gel forming solution: 1 drop(s) to each affected eye once a day  · 	Restasis 0.05% ophthalmic emulsion: 1 drop(s) to each affected eye every 12 hours  · 	Travatan 0.004% ophthalmic solution: 1 drop(s) to each affected eye once a day (in the evening)      Past Medical History:  CHF (congestive heart failure)    GI bleed    High cholesterol    History of CVA (cerebrovascular accident)    HTN (hypertension)    Pacemaker    Pulmonary hypertension    Seizure.    Past Surgical History:  S/P  shunt.    Family History:  No pertinent family history in first degree relatives.    Social History:  Social History (marital status, living situation, occupation, tobacco use, alcohol and drug use, and sexual history): Denies ETOH, tobacco, illicit drugs.	    Review of Systems:  General:  No wt loss, fevers, chills, night sweats  Eyes:  Good vision, no reported pain  ENT:  No sore throat, pain, runny nose, dysphagia  CV:  No pain, palpitations hypo/hypertension  Resp:  No dyspnea, cough, tachypnea, wheezing  GI:  No pain, nausea, vomiting, diarrhea, constipation  :  No pain, bleeding, incontinence, nocturia  Muscle:  No pain, weakness  Breast:  No pain, abscess, mass, discharge  Neuro:  No weakness, tingling, memory problems  Psych:  No fatigue, insomnia, mood problems, depression  Endocrine:  No polyuria, polydipsia cold/heat intolerance  Heme:  No petechiae, ecchymosis, easy bruisability  Skin:  No rash, edema    Physical Exam:  Vital Signs:  Vital Signs Last 24 Hrs  T(C): 36.7, Max: 37.1 ( @ 05:17)  T(F): 98.1, Max: 98.7 ( @ 05:17)  HR: 66 (64 - 123)  BP: 82/45 (82/45 - 146/82)  RR: 18 (18 - 20)  SpO2: 95% (95% - 99%)  Daily Weight in k.9 (2017 00:35)  I&O's Summary  I & Os for 24h ending 2017 07:00  =============================================  IN: 560 ml / OUT: 0 ml / NET: 560 ml    I & Os for current day (as of 2017 15:07)  =============================================  IN: 240 ml / OUT: 0 ml / NET: 240 ml    Tele:  General:  Appears stated age, well-groomed, well-nourished, no distress  HEENT:  NC/AT, patent nares w/ pink mucosa, OP clear w/o lesions, EOMI, conjunctivae clear, no thyromegaly, nodules, adenopathy, no JVD  Chest:  Full & symmetric excursion, no increased effort, breath sounds clear  Cardiovascular:  Regular rhythm, S1, S2, no murmur/rub/S3/S4, no carotid bruit, radial pulses 2+  Abdomen:  Soft, non-tender, non-distended, normoactive bowel sounds  Extremities: no edema  Skin:  No rash/erythema. Skin is warm/dry  Musculoskeletal:  Full ROM in all joints w/o swelling/tenderness/effusion  Neuro/Psych:  Alert, oriented    Laboratory:                            11.1   8.7   )-----------( 278      ( 10 Keven 2017 14:10 )             32.3     06-10    136  |  97  |  18  ----------------------------<  119<H>  4.0   |  33<H>  |  0.92    Ca    8.8      10 Keven 2017 14:10    TPro  6.9  /  Alb  2.9<L>  /  TBili  0.8  /  DBili  x   /  AST  37  /  ALT  21  /  AlkPhos  68  06-10      LIVER FUNCTIONS - ( 10 Keven 2017 14:10 )  Alb: 2.9 g/dL / Pro: 6.9 gm/dL / ALK PHOS: 68 U/L / ALT: 21 U/L / AST: 37 U/L / GGT: x           PT/INR - ( 10 Keven 2017 13:58 )   PT: 12.1 sec;   INR: 1.11 ratio         PTT - ( 10 Keven 2017 13:58 )  PTT:41.7 sec  Urinalysis Basic - ( 10 Keven 2017 21:58 )    Color: Yellow / Appearance: Slightly Turbid / S.015 / pH: x  Gluc: x / Ketone: Negative  / Bili: Negative / Urobili: Negative mg/dL   Blood: x / Protein: 500 mg/dL / Nitrite: Negative   Leuk Esterase: Moderate / RBC: >50 /HPF / WBC 6-10   Sq Epi: x / Non Sq Epi: Occasional / Bacteria: Moderate      Imaging:  ecg: SR, nonspecific ST T abnls.      EXAM:  CT BRAIN                            PROCEDURE DATE:  06/10/2017        INTERPRETATION:  CLINICAL INFORMATION: Syncope.    COMPARISON: 2017.    PROCEDURE:    Axial sections of the brain were obtained from base to vertex, without   the intravenous administration of contrast material. Sagittal and coronal   reformats were then generated from the axial images.    FINDINGS:    There is a chronic cystic lesion in the left posterior parietal region   with associated shunt catheter. A chronic left PCA territory infarct is   noted.    There is no intracranial hemorrhage, mass or shift of the midline   structures. There are involutional changes. Small vessel white matter   ischemic changes are noted.     No cerebellopontine angle lesion isappreciated.     The fourth, third and lateral ventricles are normal position. There is   stable ventricular enlargement. No subdural or epidural collections are   present.     No acute fracture or destructive lesion is identified.     The sinuses and mastoids are clear.    IMPRESSION:    Involutional and ischemic gliotic changes.  No acute intracranial hemorrhage.  No significant interval change.        EXAM:  XR HIPS BI WITH PELV 2V                            PROCEDURE DATE:  06/10/2017        INTERPRETATION:  PROCEDURE: AP view of the pelvis and AP and lateral   views of the bilateral hips.    CLINICAL INFORMATION: Pelvic pain.    FINDINGS:    There are right superior and inferior pubic rami fractures. The femoral   heads have a normal contour. The bilateral hip joint spaces are narrowed.   A right ureteral stent is partially imaged.    IMPRESSION:    Right superior and inferior pubic rami fractures.      EXAM:  CHEST SINGLE VIEW                            PROCEDURE DATE:  06/10/2017        INTERPRETATION:  PROCEDURE: AP view of the chest.    CLINICAL INFORMATION: Syncope, dyspnea    COMPARISON: 2017    FINDINGS:     There is a cardiac conduction device overlying the left axilla with   intact leads to the heart. A ventriculoperitoneal shunt catheter overlies   the left chest.    There are no lung consolidations. The cardiac and mediastinal contours   are prominent, which may be due to magnification from AP technique and   shallow inspiration. The osseous structures are intact.    IMPRESSION:    No lung consolidations.       EXAM:  TTE WO CON COMPLETE W DOPPL         PROCEDURE DATE:  2017      INTERPRETATION:  REPORT:    TRANSTHORACIC ECHOCARDIOGRAM REPORT       Summary:   1. Left ventricular ejection fraction, by visual estimation, is 60 to   65%.   2. Technically good study.   3. Normal global left ventricular systolic function.   4. Normal left ventricular internal cavity size.   5. Spectral Doppler shows impaired relaxation pattern of left   ventricular myocardial filling (Grade I diastolic dysfunction).   6. There is mild concentric left ventricular hypertrophy.   7. Normal right ventricular size and function.   8. Mildly dilated right atrium.   9. There is no evidence of pericardial effusion.  10. Mild mitral annular calcification.  11. Mild mitral valve regurgitation.  12. Thickening and calcification of the anterior and posterior mitral   valve leaflets.  13. Mild aortic regurgitation.  14. Sclerotic aortic valve with normal opening.  15. Mildly enlarged left atrium.  16. There is mild aortic root calcification.     Vikas Hopkins MD FACC, FASE,FACP  Electronically signed on 2017 at 1:14:59 PM      Assessment:  86 year old woman with PMH of HTN, HLD, CHF s/p PPM/AICD, absence seizures, hx of CVA, h/o GI Bleed, ischemic colitis, s/p  shunt for cyst drainage, h/o pulm HTN,  h/o multiple prior admissions sent from rehab for syncopal episode.  Patient was recently discharged to rehab after pubic ramus fx from mechanical fall.  She says she was doing exercise in rehab and felt lightheaded.  She thinks she remembers the whole incident.  As per ED attending, she lost consciousness.  She denies associated chest pain, SOB, palpitations, diaphoresis, tremors, visual disturbance, weakness, slurred speech. Head CT, labs unremarkable. Likely a mild vagal and/or hypotensive episode.    Plan:     Tele monitoring.    Con't with:  MEDICATIONS  (STANDING):  heparin  Injectable 5000Unit(s) SubCutaneous every 8 hours  aspirin  chewable 81milliGRAM(s) Oral daily  losartan 50milliGRAM(s) Oral daily  atorvastatin 40milliGRAM(s) Oral at bedtime  carvedilol 25milliGRAM(s) Oral every 12 hours  furosemide    Tablet 20milliGRAM(s) Oral daily  timolol 0.5% Solution 1Drop(s) Both EYES daily  latanoprost 0.005% Ophthalmic Solution 1Drop(s) Both EYES at bedtime  multivitamin 1Tablet(s) Oral daily  cholecalciferol 1000Unit(s) Oral daily  lamoTRIgine 100milliGRAM(s) Oral daily  levETIRAcetam 1000milliGRAM(s) Oral two times a day  sodium chloride 0.9%. 1000milliLiter(s) IV Continuous <Continuous>  cefTRIAXone   IVPB  IV Intermittent   cefTRIAXone   IVPB 1Gram(s) IV Intermittent once    Check ppm for arrhythmia burden. Norvasc d/c'd for low bp. Watch labs and observe blood pressure response.    Vikas Hopkins MD, FACC, SKYLAR, SANDHYA, FACP  Director, Heart Failure Services  [facility]  , Department of Cardiology  [facility]

## 2017-06-11 NOTE — PROGRESS NOTE ADULT - PROBLEM SELECTOR PLAN 1
Unclear if patient actually lost consciousness  No complaints in ED, no focal neuro deficit on exam  EKG unchanged from last  EEG done in 4/17 abnormal, focal epileptiform. ECHO done 4/19/17 EF: 60-65%.  Neurology consulted on last admission   Cardiology consult, unclear when PPM was checked last  Telemetry monitoring  IVF hydration

## 2017-06-12 DIAGNOSIS — E78.5 HYPERLIPIDEMIA, UNSPECIFIED: ICD-10-CM

## 2017-06-12 DIAGNOSIS — Z95.0 PRESENCE OF CARDIAC PACEMAKER: ICD-10-CM

## 2017-06-12 DIAGNOSIS — I10 ESSENTIAL (PRIMARY) HYPERTENSION: ICD-10-CM

## 2017-06-12 LAB
LAMOTRIGINE SERPL-MCNC: 3.9 MCG/ML — SIGNIFICANT CHANGE UP (ref 2.5–15)
LEVETIRACETAM SERPL-MCNC: 53.8 MCG/ML — HIGH (ref 12–46)

## 2017-06-12 PROCEDURE — 99232 SBSQ HOSP IP/OBS MODERATE 35: CPT

## 2017-06-12 PROCEDURE — 99233 SBSQ HOSP IP/OBS HIGH 50: CPT

## 2017-06-12 RX ADMIN — CARVEDILOL PHOSPHATE 25 MILLIGRAM(S): 80 CAPSULE, EXTENDED RELEASE ORAL at 06:19

## 2017-06-12 RX ADMIN — Medication 20 MILLIGRAM(S): at 06:19

## 2017-06-12 RX ADMIN — ATORVASTATIN CALCIUM 40 MILLIGRAM(S): 80 TABLET, FILM COATED ORAL at 22:08

## 2017-06-12 RX ADMIN — LOSARTAN POTASSIUM 50 MILLIGRAM(S): 100 TABLET, FILM COATED ORAL at 06:18

## 2017-06-12 RX ADMIN — HEPARIN SODIUM 5000 UNIT(S): 5000 INJECTION INTRAVENOUS; SUBCUTANEOUS at 22:08

## 2017-06-12 RX ADMIN — Medication 1 TABLET(S): at 12:08

## 2017-06-12 RX ADMIN — CARVEDILOL PHOSPHATE 25 MILLIGRAM(S): 80 CAPSULE, EXTENDED RELEASE ORAL at 17:25

## 2017-06-12 RX ADMIN — CEFTRIAXONE 100 GRAM(S): 500 INJECTION, POWDER, FOR SOLUTION INTRAMUSCULAR; INTRAVENOUS at 13:27

## 2017-06-12 RX ADMIN — LEVETIRACETAM 1000 MILLIGRAM(S): 250 TABLET, FILM COATED ORAL at 17:25

## 2017-06-12 RX ADMIN — Medication 1000 UNIT(S): at 12:08

## 2017-06-12 RX ADMIN — HEPARIN SODIUM 5000 UNIT(S): 5000 INJECTION INTRAVENOUS; SUBCUTANEOUS at 13:27

## 2017-06-12 RX ADMIN — LATANOPROST 1 DROP(S): 0.05 SOLUTION/ DROPS OPHTHALMIC; TOPICAL at 22:04

## 2017-06-12 RX ADMIN — LEVETIRACETAM 1000 MILLIGRAM(S): 250 TABLET, FILM COATED ORAL at 06:18

## 2017-06-12 RX ADMIN — LAMOTRIGINE 100 MILLIGRAM(S): 25 TABLET, ORALLY DISINTEGRATING ORAL at 12:08

## 2017-06-12 RX ADMIN — Medication 81 MILLIGRAM(S): at 12:08

## 2017-06-12 RX ADMIN — Medication 1 DROP(S): at 12:08

## 2017-06-12 RX ADMIN — HEPARIN SODIUM 5000 UNIT(S): 5000 INJECTION INTRAVENOUS; SUBCUTANEOUS at 06:19

## 2017-06-12 NOTE — PROGRESS NOTE ADULT - PROBLEM SELECTOR PLAN 1
No acute findings in CT head  EF 60- 65%  No further event of syncope  AICD/PM interrogated by [employer] Rep today No acute findings in CT head  EF 60- 65%  No further event of syncope  No Orthostatic hypotension noted  AICD/PM interrogated by [employer] Rep today No acute findings in CT head  EF 60- 65%  No further event of syncope  No Orthostatic hypotension noted  AICD/PM interrogated by [employer] Rep today, No reportable findings

## 2017-06-12 NOTE — PROGRESS NOTE ADULT - ASSESSMENT
86 year old woman with PMH of HTN, HLD, CHF s/p PPM/AICD, absence seizures, hx of CVA, h/o GI Bleed, ischemic colitis, s/p  shunt for cyst drainage, h/o pulm HTN,  h/o multiple prior admissions sent from rehab for syncopal episode.  Patient was recently discharged to rehab after pubic ramus fx from mechanical fall.  She says she was doing exercise in rehab and felt lightheaded.  She thinks she remembers the whole incident.  As per ED attending, she lost consciousness.  She denies associated chest pain, SOB, palpitations, diaphoresis, tremors, visual disturbance, weakness, slurred speech. Head CT, labs unremarkable. ALCD/PM interrogated by [employer], No reportable events, EF Normal 86 year old woman with PMH of HTN, HLD, CHF s/p PPM/AICD, absence seizures, hx of CVA, h/o GI Bleed, ischemic colitis, s/p  shunt for cyst drainage, h/o pulm HTN,  h/o multiple prior admissions sent from rehab for syncopal episode.  Patient was recently discharged to rehab after pubic ramus fx from mechanical fall.  She says she was doing exercise in rehab and felt lightheaded.  She thinks she remembers the whole incident.  As per ED attending, she lost consciousness.  She denies associated chest pain, SOB, palpitations, diaphoresis, tremors, visual disturbance, weakness, slurred speech. Head CT, labs unremarkable. ALCD/PM interrogated by [employer], No reportable events, EF Normal, no orthostatic hypotension.

## 2017-06-12 NOTE — PHYSICAL THERAPY INITIAL EVALUATION ADULT - MODIFIED CLINICAL TEST OF SENSORY INTEGRATION IN BALANCE TEST
Barthel Index: Feeding Score 10___, Bathing Score __0_, Grooming Score 0__, Dressing Score _0__, Bowels Score _0__, Bladder Score __0_, Toilet Score _0__, Transfers Score _0__, Mobility Score __0_, Stairs Score _0__,     Total Score _0__

## 2017-06-12 NOTE — PROGRESS NOTE ADULT - PROBLEM SELECTOR PLAN 6
c/w po lasix  monitor labs      - old CVA  c/w asa/statin c/w po lasix  monitor labs    - old CVA  c/w asa/statin

## 2017-06-12 NOTE — PROGRESS NOTE ADULT - SUBJECTIVE AND OBJECTIVE BOX
CC/F/U for: syncope    HPI:  86 year old woman with PMH of HTN, HLD, CHF s/p PPM/AICD, absence seizures, hx of CVA, h/o GI Bleed, ischemic colitis, s/p  shunt for cyst drainage, h/o pulm HTN,  h/o multiple prior admissions sent from rehab for syncopal episode.  Patient was recently discharged to rehab after pubic ramus fx from mechanical fall.  She says she was doing exercise in rehab and felt lightheaded.  She thinks she remembers the whole incident.  As per ED attending, she lost consciousness.  She denies associated chest pain, SOB, palpitations, diaphoresis, tremors, visual disturbance, weakness, slurred speech.  She offers no complaints in the ED.      In the ED, head CT, labs unremarkable. (10 Keven 2017 22:21)        INTERVAL HPI/OVERNIGHT EVENTS:  Pt seen and examined at bedside; no complaints; denies pain.     Allergies/Intolerance: No Known Allergies      MEDICATIONS  (STANDING):  heparin  Injectable 5000Unit(s) SubCutaneous every 8 hours  aspirin  chewable 81milliGRAM(s) Oral daily  losartan 50milliGRAM(s) Oral daily  atorvastatin 40milliGRAM(s) Oral at bedtime  carvedilol 25milliGRAM(s) Oral every 12 hours  furosemide    Tablet 20milliGRAM(s) Oral daily  timolol 0.5% Solution 1Drop(s) Both EYES daily  latanoprost 0.005% Ophthalmic Solution 1Drop(s) Both EYES at bedtime  multivitamin 1Tablet(s) Oral daily  cholecalciferol 1000Unit(s) Oral daily  lamoTRIgine 100milliGRAM(s) Oral daily  levETIRAcetam 1000milliGRAM(s) Oral two times a day  sodium chloride 0.9%. 1000milliLiter(s) IV Continuous <Continuous>  cefTRIAXone   IVPB  IV Intermittent   cefTRIAXone   IVPB 1Gram(s) IV Intermittent every 24 hours    MEDICATIONS  (PRN):  acetaminophen   Tablet. 650milliGRAM(s) Oral every 6 hours PRN Moderate Pain (4 - 6)  oxyCODONE  5 mG/acetaminophen 325 mG 1Tablet(s) Oral every 6 hours PRN Severe Pain (7 - 10)        ROS: as above; all other systems reviewed and wnl      PHYSICAL EXAMINATION:  Vital Signs Last 24 Hrs  T(C): 36.7, Max: 37.1 ( @ 23:25)  T(F): 98, Max: 98.8 ( @ 23:25)  HR: 69 (63 - 74)  BP: 116/71 (116/71 - 138/68)  RR: 18 (16 - 18)  SpO2: 98% (94% - 98%)      GENERAL: elderly female; NAD  HEAD:  atraumatic, normocephalic  EYES: sclera anicteric  ENMT: mucous membranes dry  NECK: supple, No JVD  CHEST/LUNG: respirations unlabored; air entry symmetric; clear to auscultation bilaterally; no rales, rhonchi, or wheezing b/l  HEART: normal S1, S2  ABDOMEN: BS+, soft, ND, NT   EXTREMITIES:  pulses palpable; no clubbing, cyanosis, or edema b/l LEs  NEURO: awake, alert, interactive; moves all extremities      LABS:                        11.1   8.7   )-----------( 278      ( 10 Keven 2017 14:10 )             32.3     06-10    136  |  97  |  18  ----------------------------<  119<H>  4.0   |  33<H>  |  0.92    Ca    8.8      10 Keven 2017 14:10    TPro  6.9  /  Alb  2.9<L>  /  TBili  0.8  /  DBili  x   /  AST  37  /  ALT  21  /  AlkPhos  68  06-10    PT/INR - ( 10 Keven 2017 13:58 )   PT: 12.1 sec;   INR: 1.11 ratio         PTT - ( 10 Keven 2017 13:58 )  PTT:41.7 sec  Urinalysis Basic - ( 10 Keven 2017 21:58 )    Color: Yellow / Appearance: Slightly Turbid / S.015 / pH: x  Gluc: x / Ketone: Negative  / Bili: Negative / Urobili: Negative mg/dL   Blood: x / Protein: 500 mg/dL / Nitrite: Negative   Leuk Esterase: Moderate / RBC: >50 /HPF / WBC 6-10   Sq Epi: x / Non Sq Epi: Occasional / Bacteria: Moderate      ASSESSMENT AND PLAN:  86y Female, HTN, HL, PPM, unspecified type CHF, hx CVA, hx GIB, hx ischemic colitis; s/p recent admit 6/6- for fall, back pain, found to have R pubic ramus fx, eval by Ortho and recomm for conservative mgmt and f/u as outpt; now trans back from [facility] w/syncope, found to have +UTI    NEURO/CV: syncope, CT head neg; for neuro/cardiac eval  -Cardiology consulted for PPM eval  -pt s/p recent TTE w/nl EF, EEG abnl slowing 2/2 cerebral dysfunction, but no epileptiform activity or focal seizure noted  -check orthostatic vitals    ID/: UTI - IV abxs cvrg w/Ceftriaxone; f/u cxs    other:  -dvt prophylaxis  -PT eval

## 2017-06-12 NOTE — PROGRESS NOTE ADULT - SUBJECTIVE AND OBJECTIVE BOX
Patient is a 86y old  Female who presents with a chief complaint of Syncope (10 Keven 2017 22:21)      PAST MEDICAL & SURGICAL HISTORY:  Pacemaker  History of CVA (cerebrovascular accident)  GI bleed  Pulmonary hypertension  Seizure  CHF (congestive heart failure)  High cholesterol  HTN (hypertension)  S/P  shunt      INTERVAL HISTORY: Resting in bed, not in any acute distress  	  MEDICATIONS:  MEDICATIONS  (STANDING):  heparin  Injectable 5000Unit(s) SubCutaneous every 8 hours  aspirin  chewable 81milliGRAM(s) Oral daily  losartan 50milliGRAM(s) Oral daily  atorvastatin 40milliGRAM(s) Oral at bedtime  carvedilol 25milliGRAM(s) Oral every 12 hours  furosemide    Tablet 20milliGRAM(s) Oral daily  timolol 0.5% Solution 1Drop(s) Both EYES daily  latanoprost 0.005% Ophthalmic Solution 1Drop(s) Both EYES at bedtime  multivitamin 1Tablet(s) Oral daily  cholecalciferol 1000Unit(s) Oral daily  lamoTRIgine 100milliGRAM(s) Oral daily  levETIRAcetam 1000milliGRAM(s) Oral two times a day  sodium chloride 0.9%. 1000milliLiter(s) IV Continuous <Continuous>  cefTRIAXone   IVPB  IV Intermittent   cefTRIAXone   IVPB 1Gram(s) IV Intermittent every 24 hours    MEDICATIONS  (PRN):  acetaminophen   Tablet. 650milliGRAM(s) Oral every 6 hours PRN Moderate Pain (4 - 6)  oxyCODONE  5 mG/acetaminophen 325 mG 1Tablet(s) Oral every 6 hours PRN Severe Pain (7 - 10)      Vitals:  T(F): 98, Max: 98.8 (06-11 @ 23:25)  HR: 70 (63 - 74)  BP: 138/72 (116/71 - 138/72)  RR: 18 (16 - 18)  SpO2: 98% (94% - 98%)  I & Os for 24h ending 06-12 @ 07:00  =============================================  IN:    Oral Fluid: 240 ml    Total IN: 240 ml  ---------------------------------------------  OUT:    Total OUT: 0 ml  ---------------------------------------------  Total NET: 240 ml    I & Os for current day (as of 06-12 @ 16:34)  =============================================  IN:    Oral Fluid: 350 ml    Total IN: 350 ml  ---------------------------------------------  OUT:    Total OUT: 0 ml  ---------------------------------------------  Total NET: 350 ml    Weight (kg): 61.2 (06-10 @ 12:18)  BMI (kg/m2): 23.1 (06-10 @ 12:18)      PHYSICAL EXAM:  Neuro: Awake, responsive  CV: S1 S2 RRR  Lungs: CTABL  GI: Soft, BS +, ND, NT  Extremities: No edema    TELEMETRY:  paced    RADIOLOGY: XR HIPS BI WITH PELV 2V        Right superior and inferior pubic rami fractures.    CHEST SINGLE VIEW            There is a cardiac conduction device overlying the left axilla with   intact leads to the heart. A ventriculoperitoneal shunt catheter overlies   the left chest.    There are no lung consolidations. The cardiac and mediastinal contours   are prominent, which may be due to magnification from AP technique and   shallow inspiration. The osseous structures are intact.    IMPRESSION:    No lung consolidations.    CT BRAIN   :         1)  multifocal chronic ischemic changes. Old left PCA territory infarct.   Benign-appearing cysts again noted in the medial left parietal region   with a drainage catheter in situ. Similar findings were noted previously..  2)  no intracerebral hemorrhage or contusion is identified.  DIAGNOSTIC TESTING:    [x ] Echocardiogram: 04/19/2017    1. Left ventricular ejection fraction, by visual estimation, is 60 to   65%.   2. Technically good study.   3. Normal global left ventricular systolic function.   4. Normal left ventricular internal cavity size.   5. Spectral Doppler shows impaired relaxation pattern of left   ventricular myocardial filling (Grade I diastolic dysfunction).   6. There is mild concentric left ventricular hypertrophy.   7. Normal right ventricular size and function.   8. Mildly dilated right atrium.   9. There is no evidence of pericardial effusion.  10. Mild mitral annular calcification.  11. Mild mitral valve regurgitation.  12. Thickening and calcification of the anterior and posterior mitral   valve leaflets.  13. Mild aortic regurgitation.  14. Sclerotic aortic valve with normal opening.  15. Mildly enlarged left atrium.  16. There is mild aortic root calcification      LABS:	 	    10 Keven 2017 14:10    136    |  97     |  18     ----------------------------<  119    4.0     |  33     |  0.92                           11.1   8.7   )-----------( 278      ( 10 Keven 2017 14:10 )             32.3     INR: 1.11 ratio (06-10 @ 13:58) Patient is a 86y old  Female who presents with a chief complaint of Syncope (10 Keven 2017 22:21)      PAST MEDICAL & SURGICAL HISTORY:  Pacemaker  History of CVA (cerebrovascular accident)  GI bleed  Pulmonary hypertension  Seizure  CHF (congestive heart failure)  High cholesterol  HTN (hypertension)  S/P  shunt      INTERVAL HISTORY: Resting in bed, not in any acute distress, no c/o sob, cp or dizziness  	  MEDICATIONS:  MEDICATIONS  (STANDING):  heparin  Injectable 5000Unit(s) SubCutaneous every 8 hours  aspirin  chewable 81milliGRAM(s) Oral daily  losartan 50milliGRAM(s) Oral daily  atorvastatin 40milliGRAM(s) Oral at bedtime  carvedilol 25milliGRAM(s) Oral every 12 hours  furosemide    Tablet 20milliGRAM(s) Oral daily  timolol 0.5% Solution 1Drop(s) Both EYES daily  latanoprost 0.005% Ophthalmic Solution 1Drop(s) Both EYES at bedtime  multivitamin 1Tablet(s) Oral daily  cholecalciferol 1000Unit(s) Oral daily  lamoTRIgine 100milliGRAM(s) Oral daily  levETIRAcetam 1000milliGRAM(s) Oral two times a day  sodium chloride 0.9%. 1000milliLiter(s) IV Continuous <Continuous>  cefTRIAXone   IVPB  IV Intermittent   cefTRIAXone   IVPB 1Gram(s) IV Intermittent every 24 hours    MEDICATIONS  (PRN):  acetaminophen   Tablet. 650milliGRAM(s) Oral every 6 hours PRN Moderate Pain (4 - 6)  oxyCODONE  5 mG/acetaminophen 325 mG 1Tablet(s) Oral every 6 hours PRN Severe Pain (7 - 10)      Vitals:  T(F): 98, Max: 98.8 (06-11 @ 23:25)  HR: 70 (63 - 74)  BP: 138/72 (116/71 - 138/72)  RR: 18 (16 - 18)  SpO2: 98% (94% - 98%)  I & Os for 24h ending 06-12 @ 07:00  =============================================  IN:    Oral Fluid: 240 ml    Total IN: 240 ml  ---------------------------------------------  OUT:    Total OUT: 0 ml  ---------------------------------------------  Total NET: 240 ml    I & Os for current day (as of 06-12 @ 16:34)  =============================================  IN:    Oral Fluid: 350 ml    Total IN: 350 ml  ---------------------------------------------  OUT:    Total OUT: 0 ml  ---------------------------------------------  Total NET: 350 ml    Weight (kg): 61.2 (06-10 @ 12:18)  BMI (kg/m2): 23.1 (06-10 @ 12:18)      PHYSICAL EXAM:  Neuro: Awake, responsive  CV: S1 S2 RRR, 1/6 TY+  Lungs: CTABL  GI: Soft, BS +, ND, NT  Extremities: No edema    TELEMETRY:  paced    RADIOLOGY: XR HIPS BI WITH PELV 2V        Right superior and inferior pubic rami fractures.    CHEST SINGLE VIEW            There is a cardiac conduction device overlying the left axilla with   intact leads to the heart. A ventriculoperitoneal shunt catheter overlies   the left chest.    There are no lung consolidations. The cardiac and mediastinal contours   are prominent, which may be due to magnification from AP technique and   shallow inspiration. The osseous structures are intact.    IMPRESSION:    No lung consolidations.    CT BRAIN   :         1)  multifocal chronic ischemic changes. Old left PCA territory infarct.   Benign-appearing cysts again noted in the medial left parietal region   with a drainage catheter in situ. Similar findings were noted previously..  2)  no intracerebral hemorrhage or contusion is identified.  DIAGNOSTIC TESTING:    [x ] Echocardiogram: 04/19/2017    1. Left ventricular ejection fraction, by visual estimation, is 60 to   65%.   2. Technically good study.   3. Normal global left ventricular systolic function.   4. Normal left ventricular internal cavity size.   5. Spectral Doppler shows impaired relaxation pattern of left   ventricular myocardial filling (Grade I diastolic dysfunction).   6. There is mild concentric left ventricular hypertrophy.   7. Normal right ventricular size and function.   8. Mildly dilated right atrium.   9. There is no evidence of pericardial effusion.  10. Mild mitral annular calcification.  11. Mild mitral valve regurgitation.  12. Thickening and calcification of the anterior and posterior mitral   valve leaflets.  13. Mild aortic regurgitation.  14. Sclerotic aortic valve with normal opening.  15. Mildly enlarged left atrium.  16. There is mild aortic root calcification      LABS:	 	    10 Keven 2017 14:10    136    |  97     |  18     ----------------------------<  119    4.0     |  33     |  0.92                           11.1   8.7   )-----------( 278      ( 10 Keven 2017 14:10 )             32.3     INR: 1.11 ratio (06-10 @ 13:58)
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
## 2017-06-13 DIAGNOSIS — S32.591A OTHER SPECIFIED FRACTURE OF RIGHT PUBIS, INITIAL ENCOUNTER FOR CLOSED FRACTURE: ICD-10-CM

## 2017-06-13 DIAGNOSIS — N30.01 ACUTE CYSTITIS WITH HEMATURIA: ICD-10-CM

## 2017-06-13 DIAGNOSIS — Z28.21 IMMUNIZATION NOT CARRIED OUT BECAUSE OF PATIENT REFUSAL: ICD-10-CM

## 2017-06-13 DIAGNOSIS — Z95.0 PRESENCE OF CARDIAC PACEMAKER: ICD-10-CM
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
## 2017-06-13 DIAGNOSIS — E43 UNSPECIFIED SEVERE PROTEIN-CALORIE MALNUTRITION: ICD-10-CM

## 2017-06-13 DIAGNOSIS — I11.0 HYPERTENSIVE HEART DISEASE WITH HEART FAILURE: ICD-10-CM

## 2017-06-13 DIAGNOSIS — E78.5 HYPERLIPIDEMIA, UNSPECIFIED: ICD-10-CM

## 2017-06-13 DIAGNOSIS — I50.32 CHRONIC DIASTOLIC (CONGESTIVE) HEART FAILURE: ICD-10-CM

## 2017-06-13 DIAGNOSIS — Z79.82 LONG TERM (CURRENT) USE OF ASPIRIN: ICD-10-CM

## 2017-06-13 DIAGNOSIS — G89.29 OTHER CHRONIC PAIN: ICD-10-CM

## 2017-06-13 DIAGNOSIS — R56.9 UNSPECIFIED CONVULSIONS: ICD-10-CM
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
## 2017-06-13 DIAGNOSIS — Z86.73 PERSONAL HISTORY OF TRANSIENT ISCHEMIC ATTACK (TIA), AND CEREBRAL INFARCTION WITHOUT RESIDUAL DEFICITS: ICD-10-CM

## 2017-06-13 DIAGNOSIS — M54.5 LOW BACK PAIN: ICD-10-CM

## 2017-06-13 LAB
-  AMPICILLIN: SIGNIFICANT CHANGE UP
-  CIPROFLOXACIN: SIGNIFICANT CHANGE UP
-  NITROFURANTOIN: SIGNIFICANT CHANGE UP
-  TETRACYCLINE: SIGNIFICANT CHANGE UP
-  VANCOMYCIN: SIGNIFICANT CHANGE UP
CULTURE RESULTS: SIGNIFICANT CHANGE UP
METHOD TYPE: SIGNIFICANT CHANGE UP
ORGANISM # SPEC MICROSCOPIC CNT: SIGNIFICANT CHANGE UP
ORGANISM # SPEC MICROSCOPIC CNT: SIGNIFICANT CHANGE UP
SPECIMEN SOURCE: SIGNIFICANT CHANGE UP

## 2017-06-13 PROCEDURE — 99232 SBSQ HOSP IP/OBS MODERATE 35: CPT

## 2017-06-13 PROCEDURE — 99233 SBSQ HOSP IP/OBS HIGH 50: CPT

## 2017-06-13 RX ADMIN — HEPARIN SODIUM 5000 UNIT(S): 5000 INJECTION INTRAVENOUS; SUBCUTANEOUS at 23:18

## 2017-06-13 RX ADMIN — HEPARIN SODIUM 5000 UNIT(S): 5000 INJECTION INTRAVENOUS; SUBCUTANEOUS at 05:33

## 2017-06-13 RX ADMIN — CARVEDILOL PHOSPHATE 25 MILLIGRAM(S): 80 CAPSULE, EXTENDED RELEASE ORAL at 18:08

## 2017-06-13 RX ADMIN — LEVETIRACETAM 1000 MILLIGRAM(S): 250 TABLET, FILM COATED ORAL at 18:08

## 2017-06-13 RX ADMIN — Medication 81 MILLIGRAM(S): at 12:59

## 2017-06-13 RX ADMIN — ATORVASTATIN CALCIUM 40 MILLIGRAM(S): 80 TABLET, FILM COATED ORAL at 23:17

## 2017-06-13 RX ADMIN — Medication 20 MILLIGRAM(S): at 05:30

## 2017-06-13 RX ADMIN — SODIUM CHLORIDE 80 MILLILITER(S): 9 INJECTION INTRAMUSCULAR; INTRAVENOUS; SUBCUTANEOUS at 05:34

## 2017-06-13 RX ADMIN — LATANOPROST 1 DROP(S): 0.05 SOLUTION/ DROPS OPHTHALMIC; TOPICAL at 23:17

## 2017-06-13 RX ADMIN — LAMOTRIGINE 100 MILLIGRAM(S): 25 TABLET, ORALLY DISINTEGRATING ORAL at 12:59

## 2017-06-13 RX ADMIN — CEFTRIAXONE 100 GRAM(S): 500 INJECTION, POWDER, FOR SOLUTION INTRAMUSCULAR; INTRAVENOUS at 14:18

## 2017-06-13 RX ADMIN — CARVEDILOL PHOSPHATE 25 MILLIGRAM(S): 80 CAPSULE, EXTENDED RELEASE ORAL at 05:26

## 2017-06-13 RX ADMIN — Medication 1000 UNIT(S): at 12:59

## 2017-06-13 RX ADMIN — LEVETIRACETAM 1000 MILLIGRAM(S): 250 TABLET, FILM COATED ORAL at 05:26

## 2017-06-13 RX ADMIN — Medication 1 DROP(S): at 12:59

## 2017-06-13 RX ADMIN — LOSARTAN POTASSIUM 50 MILLIGRAM(S): 100 TABLET, FILM COATED ORAL at 05:29

## 2017-06-13 RX ADMIN — HEPARIN SODIUM 5000 UNIT(S): 5000 INJECTION INTRAVENOUS; SUBCUTANEOUS at 14:18

## 2017-06-13 RX ADMIN — Medication 1 TABLET(S): at 12:59

## 2017-06-13 RX ADMIN — SODIUM CHLORIDE 80 MILLILITER(S): 9 INJECTION INTRAMUSCULAR; INTRAVENOUS; SUBCUTANEOUS at 18:11

## 2017-06-13 NOTE — PROGRESS NOTE ADULT - SUBJECTIVE AND OBJECTIVE BOX
Patient is a 86y old  Female who presents with a chief complaint of Syncope (10 Keven 2017 22:21)      PAST MEDICAL & SURGICAL HISTORY:  Pacemaker  History of CVA (cerebrovascular accident)  GI bleed  Pulmonary hypertension  Seizure  CHF (congestive heart failure)  High cholesterol  HTN (hypertension)  S/P  shunt      INTERVAL HISTORY: Resting in bed, not in any distress, no c/o sob or dizziness  	  MEDICATIONS:  MEDICATIONS  (STANDING):  heparin  Injectable 5000Unit(s) SubCutaneous every 8 hours  aspirin  chewable 81milliGRAM(s) Oral daily  losartan 50milliGRAM(s) Oral daily  atorvastatin 40milliGRAM(s) Oral at bedtime  carvedilol 25milliGRAM(s) Oral every 12 hours  furosemide    Tablet 20milliGRAM(s) Oral daily  timolol 0.5% Solution 1Drop(s) Both EYES daily  latanoprost 0.005% Ophthalmic Solution 1Drop(s) Both EYES at bedtime  multivitamin 1Tablet(s) Oral daily  cholecalciferol 1000Unit(s) Oral daily  lamoTRIgine 100milliGRAM(s) Oral daily  levETIRAcetam 1000milliGRAM(s) Oral two times a day  sodium chloride 0.9%. 1000milliLiter(s) IV Continuous <Continuous>  cefTRIAXone   IVPB  IV Intermittent   cefTRIAXone   IVPB 1Gram(s) IV Intermittent every 24 hours    MEDICATIONS  (PRN):  acetaminophen   Tablet. 650milliGRAM(s) Oral every 6 hours PRN Moderate Pain (4 - 6)  oxyCODONE  5 mG/acetaminophen 325 mG 1Tablet(s) Oral every 6 hours PRN Severe Pain (7 - 10)      Vitals:  T(F): 98.6, Max: 98.8 (06-13 @ 11:20)  HR: 68 (64 - 73)  BP: 146/75 (138/69 - 158/73)  RR: 16 (16 - 17)  SpO2: 97% (97% - 98%)  I & Os for 24h ending 06-13 @ 07:00  =============================================  IN:    sodium chloride 0.9%.: 960 ml    Oral Fluid: 350 ml    Total IN: 1310 ml  ---------------------------------------------  OUT:    Total OUT: 0 ml  ---------------------------------------------  Total NET: 1310 ml    I & Os for current day (as of 06-13 @ 18:23)  =============================================  IN:    Oral Fluid: 500 ml    Total IN: 500 ml  ---------------------------------------------  OUT:    Total OUT: 0 ml  ---------------------------------------------  Total NET: 500 ml    PHYSICAL EXAM:  Neuro: Awake, responsive  CV: S1 S2 RRR 1/6 TY +  Lungs: CTABL  GI: Soft, BS +, ND, NT  Extremities: No edema    RADIOLOGY: CHEST SINGLE VIEW        There is a cardiac conduction device overlying the left axilla with   intact leads to the heart. A ventriculoperitoneal shunt catheter overlies   the left chest.    There are no lung consolidations. The cardiac and mediastinal contours   are prominent, which may be due to magnification from AP technique and   shallow inspiration. The osseous structures are intact.    IMPRESSION:    No lung consolidations.    DIAGNOSTIC TESTING:    [ x] Echocardiogram: 04/19/2017     1. Left ventricular ejection fraction, by visual estimation, is 60 to   65%.   2. Technically good study.   3. Normal global left ventricular systolic function.   4. Normal left ventricular internal cavity size.   5. Spectral Doppler shows impaired relaxation pattern of left   ventricular myocardial filling (Grade I diastolic dysfunction).   6. There is mild concentric left ventricular hypertrophy.   7. Normal right ventricular size and function.   8. Mildly dilated right atrium.   9. There is no evidence of pericardial effusion.  10. Mild mitral annular calcification.  11. Mild mitral valve regurgitation.  12. Thickening and calcification of the anterior and posterior mitral   valve leaflets.  13. Mild aortic regurgitation.  14. Sclerotic aortic valve with normal opening.  15. Mildly enlarged left atrium.  16. There is mild aortic root calcification.

## 2017-06-13 NOTE — PROGRESS NOTE ADULT - PROBLEM SELECTOR PLAN 1
No acute findings in CT head  EF 60- 65%  No further event of syncope  No Orthostatic hypotension noted  AICD/PM interrogated by [employer] today, No reportable findings  PT

## 2017-06-13 NOTE — PROGRESS NOTE ADULT - PROBLEM SELECTOR PLAN 4
BP controlled  c/w coreg/losartan
Continue home medsexcept for norvasc as her b/p dropped after they were administered- could be cause of the syncope   Titrate as necessary   DASH diet
BP controlled  c/w coreg/losartan

## 2017-06-13 NOTE — PROGRESS NOTE ADULT - PROBLEM SELECTOR PLAN 3
conservative management as per ortho  PT  pain control
conservative management as per ortho  PT  pain control
Stable  Pain control-Tylenol for mild pain, percocet for severe pain  PT consult   DC to rehab

## 2017-06-13 NOTE — PROGRESS NOTE ADULT - PROBLEM SELECTOR PROBLEM 6
Chronic diastolic congestive heart failure

## 2017-06-13 NOTE — PROGRESS NOTE ADULT - PROBLEM SELECTOR PLAN 2
ABx as per primary team  + urine culture
ABx as per primary team  + urine culture
Continue Ciprofloxacin PO BID x 4 more days (was started on Cipro PO on last discharge for UTI)

## 2017-06-13 NOTE — PROGRESS NOTE ADULT - SUBJECTIVE AND OBJECTIVE BOX
CC/F/U for: syncope    HPI:  86 year old woman with PMH of HTN, HLD, CHF s/p PPM/AICD, absence seizures, hx of CVA, h/o GI Bleed, ischemic colitis, s/p  shunt for cyst drainage, h/o pulm HTN,  h/o multiple prior admissions sent from rehab for syncopal episode.  Patient was recently discharged to rehab after pubic ramus fx from mechanical fall.  She says she was doing exercise in rehab and felt lightheaded.  She thinks she remembers the whole incident.  As per ED attending, she lost consciousness.  She denies associated chest pain, SOB, palpitations, diaphoresis, tremors, visual disturbance, weakness, slurred speech.  She offers no complaints in the ED.      In the ED, head CT, labs unremarkable. (10 Keven 2017 22:21)        INTERVAL HPI/OVERNIGHT EVENTS:  Pt seen and examined at bedside; some R hip pain still.     Allergies/Intolerance: No Known Allergies      MEDICATIONS  (STANDING):  heparin  Injectable 5000Unit(s) SubCutaneous every 8 hours  aspirin  chewable 81milliGRAM(s) Oral daily  losartan 50milliGRAM(s) Oral daily  atorvastatin 40milliGRAM(s) Oral at bedtime  carvedilol 25milliGRAM(s) Oral every 12 hours  furosemide    Tablet 20milliGRAM(s) Oral daily  timolol 0.5% Solution 1Drop(s) Both EYES daily  latanoprost 0.005% Ophthalmic Solution 1Drop(s) Both EYES at bedtime  multivitamin 1Tablet(s) Oral daily  cholecalciferol 1000Unit(s) Oral daily  lamoTRIgine 100milliGRAM(s) Oral daily  levETIRAcetam 1000milliGRAM(s) Oral two times a day  sodium chloride 0.9%. 1000milliLiter(s) IV Continuous <Continuous>  cefTRIAXone   IVPB  IV Intermittent   cefTRIAXone   IVPB 1Gram(s) IV Intermittent every 24 hours    MEDICATIONS  (PRN):  acetaminophen   Tablet. 650milliGRAM(s) Oral every 6 hours PRN Moderate Pain (4 - 6)  oxyCODONE  5 mG/acetaminophen 325 mG 1Tablet(s) Oral every 6 hours PRN Severe Pain (7 - 10)        ROS: as above; all other systems reviewed and wnl      PHYSICAL EXAMINATION:  Vital Signs Last 24 Hrs  T(C): 37.1, Max: 37.1 (06-12 @ 16:49)  T(F): 98.8, Max: 98.8 (06-12 @ 16:49)  HR: 73 (64 - 73)  BP: 138/69 (124/72 - 158/73)  BP(mean): --  RR: 17 (17 - 17)  SpO2: 98% (97% - 98%)  CAPILLARY BLOOD GLUCOSE      GENERAL: elderly female; NAD  HEAD:  atraumatic, normocephalic  EYES: sclera anicteric  ENMT: mucous membranes dry  NECK: supple, No JVD  CHEST/LUNG: respirations unlabored; air entry symmetric; clear to auscultation bilaterally; no rales, rhonchi, or wheezing b/l  HEART: normal S1, S2  ABDOMEN: BS+, soft, ND, NT   EXTREMITIES:  pulses palpable; no clubbing, cyanosis, or edema b/l LEs  NEURO: awake, alert, interactive; moves all extremities      LABS: reviewed in [facility]      ASSESSMENT AND PLAN:  86y Female, HTN, HL, PPM, unspecified type CHF, hx CVA, hx GIB, hx ischemic colitis; s/p recent admit 6/6-6/9 for fall, back pain, found to have R pubic ramus fx, eval by Ortho and recomm for conservative mgmt and f/u as outpt; now trans back from [facility] w/syncope, found to have +UTI    NEURO/CV: syncope, CT head neg; for neuro/cardiac eval  -Cardiology consulted for PPM eval  -pt s/p recent TTE w/nl EF, EEG abnl slowing 2/2 cerebral dysfunction, but no epileptiform activity or focal seizure noted  -check orthostatic vitals    ID/: UTI - IV abxs cvrg w/Ceftriaxone; f/u cxs    other:  -dvt prophylaxis  -PT eval - for JOHAN at discharge - pt wants to return to [facility] where she came from

## 2017-06-13 NOTE — PROGRESS NOTE ADULT - ASSESSMENT
86 year old woman with PMH of HTN, HLD, CHF s/p PPM/AICD, absence seizures, hx of CVA, h/o GI Bleed, ischemic colitis, s/p  shunt for cyst drainage, h/o pulm HTN,  h/o multiple prior admissions sent from rehab for syncopal episode.  Patient was recently discharged to rehab after pubic ramus fx from mechanical fall.  She says she was doing exercise in rehab and felt lightheaded.  She thinks she remembers the whole incident.   Head CT, labs unremarkable. ALCD/PM interrogated by [employer], No reportable events, EF Normal, no orthostatic hypotension.

## 2017-06-14 ENCOUNTER — APPOINTMENT (OUTPATIENT)
Dept: NEUROLOGY | Facility: CLINIC | Age: 82
End: 2017-06-14

## 2017-06-14 DIAGNOSIS — N30.01 ACUTE CYSTITIS WITH HEMATURIA: ICD-10-CM

## 2017-06-14 DIAGNOSIS — A49.1 STREPTOCOCCAL INFECTION, UNSPECIFIED SITE: ICD-10-CM

## 2017-06-14 LAB
ANION GAP SERPL CALC-SCNC: 9 MMOL/L — SIGNIFICANT CHANGE UP (ref 5–17)
BUN SERPL-MCNC: 11 MG/DL — SIGNIFICANT CHANGE UP (ref 7–23)
CALCIUM SERPL-MCNC: 8.1 MG/DL — LOW (ref 8.5–10.1)
CHLORIDE SERPL-SCNC: 107 MMOL/L — SIGNIFICANT CHANGE UP (ref 96–108)
CO2 SERPL-SCNC: 26 MMOL/L — SIGNIFICANT CHANGE UP (ref 22–31)
CREAT SERPL-MCNC: 0.78 MG/DL — SIGNIFICANT CHANGE UP (ref 0.5–1.3)
GLUCOSE SERPL-MCNC: 89 MG/DL — SIGNIFICANT CHANGE UP (ref 70–99)
MAGNESIUM SERPL-MCNC: 2.2 MG/DL — SIGNIFICANT CHANGE UP (ref 1.6–2.6)
POTASSIUM SERPL-MCNC: 3.4 MMOL/L — LOW (ref 3.5–5.3)
POTASSIUM SERPL-SCNC: 3.4 MMOL/L — LOW (ref 3.5–5.3)
SODIUM SERPL-SCNC: 142 MMOL/L — SIGNIFICANT CHANGE UP (ref 135–145)

## 2017-06-14 PROCEDURE — 99233 SBSQ HOSP IP/OBS HIGH 50: CPT

## 2017-06-14 RX ORDER — CARVEDILOL PHOSPHATE 80 MG/1
12.5 CAPSULE, EXTENDED RELEASE ORAL EVERY 12 HOURS
Qty: 0 | Refills: 0 | Status: DISCONTINUED | OUTPATIENT
Start: 2017-06-14 | End: 2017-06-15

## 2017-06-14 RX ORDER — LINEZOLID 600 MG/300ML
600 INJECTION, SOLUTION INTRAVENOUS EVERY 12 HOURS
Qty: 0 | Refills: 0 | Status: DISCONTINUED | OUTPATIENT
Start: 2017-06-14 | End: 2017-06-15

## 2017-06-14 RX ADMIN — LAMOTRIGINE 100 MILLIGRAM(S): 25 TABLET, ORALLY DISINTEGRATING ORAL at 11:31

## 2017-06-14 RX ADMIN — LEVETIRACETAM 1000 MILLIGRAM(S): 250 TABLET, FILM COATED ORAL at 19:11

## 2017-06-14 RX ADMIN — SODIUM CHLORIDE 80 MILLILITER(S): 9 INJECTION INTRAMUSCULAR; INTRAVENOUS; SUBCUTANEOUS at 16:32

## 2017-06-14 RX ADMIN — LOSARTAN POTASSIUM 50 MILLIGRAM(S): 100 TABLET, FILM COATED ORAL at 06:25

## 2017-06-14 RX ADMIN — CARVEDILOL PHOSPHATE 12.5 MILLIGRAM(S): 80 CAPSULE, EXTENDED RELEASE ORAL at 19:13

## 2017-06-14 RX ADMIN — Medication 20 MILLIGRAM(S): at 06:25

## 2017-06-14 RX ADMIN — HEPARIN SODIUM 5000 UNIT(S): 5000 INJECTION INTRAVENOUS; SUBCUTANEOUS at 22:01

## 2017-06-14 RX ADMIN — HEPARIN SODIUM 5000 UNIT(S): 5000 INJECTION INTRAVENOUS; SUBCUTANEOUS at 14:56

## 2017-06-14 RX ADMIN — ATORVASTATIN CALCIUM 40 MILLIGRAM(S): 80 TABLET, FILM COATED ORAL at 22:02

## 2017-06-14 RX ADMIN — LINEZOLID 600 MILLIGRAM(S): 600 INJECTION, SOLUTION INTRAVENOUS at 19:11

## 2017-06-14 RX ADMIN — LATANOPROST 1 DROP(S): 0.05 SOLUTION/ DROPS OPHTHALMIC; TOPICAL at 22:00

## 2017-06-14 RX ADMIN — LEVETIRACETAM 1000 MILLIGRAM(S): 250 TABLET, FILM COATED ORAL at 06:25

## 2017-06-14 RX ADMIN — HEPARIN SODIUM 5000 UNIT(S): 5000 INJECTION INTRAVENOUS; SUBCUTANEOUS at 06:25

## 2017-06-14 RX ADMIN — CARVEDILOL PHOSPHATE 25 MILLIGRAM(S): 80 CAPSULE, EXTENDED RELEASE ORAL at 07:00

## 2017-06-14 RX ADMIN — Medication 81 MILLIGRAM(S): at 11:31

## 2017-06-14 RX ADMIN — Medication 1 TABLET(S): at 11:31

## 2017-06-14 RX ADMIN — CEFTRIAXONE 100 GRAM(S): 500 INJECTION, POWDER, FOR SOLUTION INTRAMUSCULAR; INTRAVENOUS at 14:56

## 2017-06-14 RX ADMIN — Medication 1 DROP(S): at 11:32

## 2017-06-14 RX ADMIN — SODIUM CHLORIDE 80 MILLILITER(S): 9 INJECTION INTRAMUSCULAR; INTRAVENOUS; SUBCUTANEOUS at 06:31

## 2017-06-14 RX ADMIN — Medication 1000 UNIT(S): at 11:31

## 2017-06-14 NOTE — DISCHARGE NOTE ADULT - SECONDARY DIAGNOSIS.
Acute cystitis without hematuria History of CVA (cerebrovascular accident) Hyperlipidemia, unspecified hyperlipidemia type Essential hypertension Fracture of pubic ramus, right, sequela VRE (vancomycin-resistant Enterococci) infection

## 2017-06-14 NOTE — CONSULT NOTE ADULT - SUBJECTIVE AND OBJECTIVE BOX
Infectious Diseases - Attending at Dr. Escobar    HPI:  86 year old woman with PMH of HTN, HLD, CHF s/p PPM/AICD, absence seizures, hx of CVA, h/o GI Bleed, ischemic colitis, s/p  shunt for cyst drainage, h/o pulm HTN,  h/o multiple prior admissions sent from rehab for syncopal episode.  Patient was recently discharged to rehab after pubic ramus fx from mechanical fall.  She says she was doing exercise in rehab and felt lightheaded.  She thinks she remembers the whole incident.  As per ED attending, she lost consciousness.  She denies associated chest pain, SOB, palpitations, diaphoresis, tremors, visual disturbance, weakness, slurred speech.  She offers no complaints in the ED.  She denies any urinary symptoms like dysuria etc,no fever or chills during the course of hositalization. NO leukocytosis   urine cultures grew VRE      In the ED, head CT, labs unremarkable. (10 Keven 2017 22:21)      PAST MEDICAL & SURGICAL HISTORY:  Pacemaker  History of CVA (cerebrovascular accident)  GI bleed  Pulmonary hypertension  Seizure  CHF (congestive heart failure)  High cholesterol  HTN (hypertension)  S/P  shunt      Allergies    No Known Allergies    Intolerances        FAMILY HISTORY:  No pertinent family history in first degree relatives      SOCIAL HISTORY:from rehab ,no smoking    REVIEW OF SYSTEMS:    Constitutional: No fever, weight loss or fatigue  Eyes: No eye pain, visual disturbances, or discharge  ENT:  No difficulty hearing, tinnitus, vertigo; No sinus or throat pain  Neck: No pain or stiffness  Respiratory: No cough, wheezing, chills or hemoptysis  Cardiovascular: No chest pain, palpitations, shortness of breath, dizziness or leg swelling  Gastrointestinal: No abdominal or epigastric pain. No nausea, vomiting or hematemesis; No diarrhea or constipation. No melena or hematochezia.  Genitourinary: No dysuria, frequency, hematuria 9pink )or incontinence  Neurological: leightheaededness (resolved)No headaches, memory loss, loss of strength, numbness or tremors  Skin: No itching, burning, rashes or lesions       MEDICATIONS  (STANDING):  heparin  Injectable 5000Unit(s) SubCutaneous every 8 hours  aspirin  chewable 81milliGRAM(s) Oral daily  losartan 50milliGRAM(s) Oral daily  atorvastatin 40milliGRAM(s) Oral at bedtime  furosemide    Tablet 20milliGRAM(s) Oral daily  timolol 0.5% Solution 1Drop(s) Both EYES daily  latanoprost 0.005% Ophthalmic Solution 1Drop(s) Both EYES at bedtime  multivitamin 1Tablet(s) Oral daily  cholecalciferol 1000Unit(s) Oral daily  lamoTRIgine 100milliGRAM(s) Oral daily  levETIRAcetam 1000milliGRAM(s) Oral two times a day  sodium chloride 0.9%. 1000milliLiter(s) IV Continuous <Continuous>  cefTRIAXone   IVPB  IV Intermittent   cefTRIAXone   IVPB 1Gram(s) IV Intermittent every 24 hours  carvedilol 12.5milliGRAM(s) Oral every 12 hours    MEDICATIONS  (PRN):  acetaminophen   Tablet. 650milliGRAM(s) Oral every 6 hours PRN Moderate Pain (4 - 6)  oxyCODONE  5 mG/acetaminophen 325 mG 1Tablet(s) Oral every 6 hours PRN Severe Pain (7 - 10)      Vital Signs Last 24 Hrs  T(C): 36.9, Max: 37 (06-13 @ 17:00)  T(F): 98.4, Max: 98.6 (06-13 @ 17:00)  HR: 81 (54 - 81)  BP: 103/74 (103/74 - 156/76)  BP(mean): --  RR: 16 (16 - 18)  SpO2: 96% (96% - 98%)    PHYSICAL EXAM:    Constitutional: NAD, well-groomed, well-developed  HEENT: PERRLA, EOMI, Normal Hearing, MMM  Neck: No LAD, No JVD  Back: Normal spine flexure, No CVA tenderness  Respiratory: CTAB/L  Cardiovascular: S1 and S2, RRR, no M/G/R  Gastrointestinal: BS+, soft, NT/ND  Extremities: No peripheral edema  Vascular: 2+ peripheral pulses  Neurological: A/O x 3, no focal deficits  Skin: No rashes      LABS:    06-14    142  |  107  |  11  ----------------------------<  89  3.4<L>   |  26  |  0.78    Ca    8.1<L>      14 Jun 2017 06:26  Mg     2.2     06-14      Urinalysis  Urinalysis + Microscopic Examination (06.10.17 @ 21:58)    Color: Yellow    Glucose Qualitative, Urine: Negative mg/dL    Blood, Urine: Large    Leukocyte Esterase Concentration: Moderate    Ketone - Urine: Negative    Nitrite: Negative    pH Urine: 6.0    Protein, Urine: 500 mg/dL    Specific Gravity: 1.015    Bilirubin: Negative    Urine Appearance: Slightly Turbid    Urobilinogen: Negative mg/dL    Red Blood Cell - Urine: >50 /HPF    White Blood Cell - Urine: 6-10    Epithelial Cells: Occasional    Bacteria: Moderate              MICROBIOLOGY:  RECENT CULTURES:  06-11 .Urine Clean Catch (Midstream) Enterococcus faecium (vancomycin resistant) XXXX   >100,000 CFU/ml Enterococcus faecium (vancomycin resistant)          RADIOLOGY & ADDITIONAL STUDIES:

## 2017-06-14 NOTE — DISCHARGE NOTE ADULT - CARE PLAN
Principal Discharge DX:	Syncope, unspecified syncope type  Goal:	followup with MD at rehab  Instructions for follow-up, activity and diet:	followup with MD at rehab  Secondary Diagnosis:	Acute cystitis without hematuria  Secondary Diagnosis:	History of CVA (cerebrovascular accident)  Secondary Diagnosis:	Hyperlipidemia, unspecified hyperlipidemia type  Secondary Diagnosis:	Essential hypertension  Secondary Diagnosis:	Fracture of pubic ramus, right, sequela Principal Discharge DX:	Syncope, unspecified syncope type  Goal:	followup with MD at rehab  Instructions for follow-up, activity and diet:	followup with MD at rehab  Secondary Diagnosis:	Acute cystitis without hematuria  Goal:	continue abxs; followup with MD at rehab  Instructions for follow-up, activity and diet:	continue abxs; followup with MD at rehab  Secondary Diagnosis:	History of CVA (cerebrovascular accident)  Goal:	continue medications; followup with MD at rehab  Instructions for follow-up, activity and diet:	continue medications; followup with MD at rehab  Secondary Diagnosis:	Hyperlipidemia, unspecified hyperlipidemia type  Goal:	continue medications; followup with MD at rehab  Instructions for follow-up, activity and diet:	continue medications; followup with MD at rehab  Secondary Diagnosis:	Essential hypertension  Goal:	continue medications; followup with MD at rehab  Instructions for follow-up, activity and diet:	continue medications; followup with MD at rehab  Secondary Diagnosis:	Fracture of pubic ramus, right, sequela  Goal:	suacute rehab; followup with MD at rehab  Instructions for follow-up, activity and diet:	suacute rehab; followup with MD at rehab  Secondary Diagnosis:	VRE (vancomycin-resistant Enterococci) infection  Goal:	continue antibiotics; followup with MD at rehab  Instructions for follow-up, activity and diet:	continue antibiotics; followup with MD at rehab

## 2017-06-14 NOTE — PROGRESS NOTE ADULT - SUBJECTIVE AND OBJECTIVE BOX
CC/F/U for: syncope, UTI, hx pubic ramus fx    HPI:  86 year old woman with PMH of HTN, HLD, CHF s/p PPM/AICD, absence seizures, hx of CVA, h/o GI Bleed, ischemic colitis, s/p  shunt for cyst drainage, h/o pulm HTN,  h/o multiple prior admissions sent from rehab for syncopal episode.  Patient was recently discharged to rehab after pubic ramus fx from mechanical fall.  She says she was doing exercise in rehab and felt lightheaded.  She thinks she remembers the whole incident.  As per ED attending, she lost consciousness.  She denies associated chest pain, SOB, palpitations, diaphoresis, tremors, visual disturbance, weakness, slurred speech.  She offers no complaints in the ED.      In the ED, head CT, labs unremarkable. (10 Keven 2017 22:21)        INTERVAL HPI/OVERNIGHT EVENTS:  Pt seen and examined at bedside; pt w/o new complaints; Ucx shows VRE.     Allergies/Intolerance: No Known Allergies      MEDICATIONS  (STANDING):  heparin  Injectable 5000Unit(s) SubCutaneous every 8 hours  aspirin  chewable 81milliGRAM(s) Oral daily  losartan 50milliGRAM(s) Oral daily  atorvastatin 40milliGRAM(s) Oral at bedtime  furosemide    Tablet 20milliGRAM(s) Oral daily  timolol 0.5% Solution 1Drop(s) Both EYES daily  latanoprost 0.005% Ophthalmic Solution 1Drop(s) Both EYES at bedtime  multivitamin 1Tablet(s) Oral daily  cholecalciferol 1000Unit(s) Oral daily  lamoTRIgine 100milliGRAM(s) Oral daily  levETIRAcetam 1000milliGRAM(s) Oral two times a day  sodium chloride 0.9%. 1000milliLiter(s) IV Continuous <Continuous>  cefTRIAXone   IVPB  IV Intermittent   cefTRIAXone   IVPB 1Gram(s) IV Intermittent every 24 hours  carvedilol 12.5milliGRAM(s) Oral every 12 hours    MEDICATIONS  (PRN):  acetaminophen   Tablet. 650milliGRAM(s) Oral every 6 hours PRN Moderate Pain (4 - 6)  oxyCODONE  5 mG/acetaminophen 325 mG 1Tablet(s) Oral every 6 hours PRN Severe Pain (7 - 10)        ROS: as above; all other systems reviewed and wnl      PHYSICAL EXAMINATION:  Vital Signs Last 24 Hrs  T(C): 36.9, Max: 37 (06-13 @ 17:00)  T(F): 98.4, Max: 98.6 (06-13 @ 17:00)  HR: 59 (54 - 68)  BP: 103/74 (103/74 - 156/76)  BP(mean): --  RR: 16 (16 - 18)  SpO2: 98% (97% - 98%)  CAPILLARY BLOOD GLUCOSE      GENERAL: elderly female; NAD  HEAD:  atraumatic, normocephalic  EYES: sclera anicteric  ENMT: mucous membranes dry  NECK: supple, No JVD  CHEST/LUNG: respirations unlabored; air entry symmetric; clear to auscultation bilaterally; no rales, rhonchi, or wheezing b/l  HEART: normal S1, S2  ABDOMEN: BS+, soft, ND, NT   EXTREMITIES:  pulses palpable; no clubbing, cyanosis, or edema b/l LEs  NEURO: awake, alert, interactive; moves all extremities      LABS:    06-14    142  |  107  |  11  ----------------------------<  89  3.4<L>   |  26  |  0.78    Ca    8.1<L>      14 Jun 2017 06:26  Mg     2.2     06-14        ASSESSMENT AND PLAN:  86y Female, HTN, HL, PPM, chronic diastolic CHF (EF nl 4/2017), hx CVA, hx GIB, hx ischemic colitis; s/p recent admit 6/6-6/9 for fall, back pain, found to have R pubic ramus fx, eval by Ortho and recomm for conservative mgmt and f/u as outpt; now trans back from [facility] w/syncope, found to have +UTI; cx now w/VRE    NEURO/CV: syncope, CT head neg; for neuro/cardiac eval  -Cardiology/Fefer following; PPM interrog showed no events  -pt s/p recent TTE w/nl EF, EEG abnl slowing 2/2 cerebral dysfunction, but no epileptiform activity or focal seizure noted  -orthostatics neg    ID/: UTI - IV abxs cvrg currently w/Ceftriaxone, but in light of urine cx w/VRE, and limited abx susceptibility, consulted ID for input - Dr Teague to see pt      CV: CHF  other:  -dvt prophylaxis  -PT eval - for JOHAN at discharge - pt wants to return to [facility] where she came from  -all diagnoses/mgmt plans d/w pt bedside RN

## 2017-06-14 NOTE — CONSULT NOTE ADULT - PROBLEM SELECTOR RECOMMENDATION 9
though pt denies symptoms of dysuria has significant hematuria and recurrent UTIs  will treat with oral zyvox for 5 days

## 2017-06-14 NOTE — DISCHARGE NOTE ADULT - HOSPITAL COURSE
86y Female, HTN, HL, PPM, chronic diastolic CHF (EF nl 4/2017), hx CVA, seizure d/o, hx GIB, hx ischemic colitis, recurrent UTI; s/p recent admit 6/6-6/9 for fall, hx back pain, found to have R pubic ramus fx, eval by Ortho and recomm for conservative mgmt and f/u as outpt; trans back from [facility] w/syncope, found to have +UTI; cx w/VRE    -syncope, CT head neg; neuro/cardiac eval w/recent TTE w/nl EF, EEG abnl slowing 2/2 cerebral dysfunction, but no epileptiform activity or focal seizure noted; orthostatics checked and were negative; Cardiology/Fefer followed; PPM interrog showed no events.     -UTI - IV abxs cvrg initially w/Ceftriaxone, but in light of urine cx w/VRE, and limited abx susceptibility, ID consulted IDHo saw pt, abxs changed to Linezolid po x 5d course.    -CHF -stable; pt continued on maintenance home meds    -PT eval - for JOHAN at discharge - pt wants to return to [facility] where she came from

## 2017-06-14 NOTE — DISCHARGE NOTE ADULT - PATIENT PORTAL LINK FT
“You can access the [facility] Patient Portal, offered by [facility], by registering with the following website: [email or web address]”

## 2017-06-14 NOTE — PROGRESS NOTE ADULT - PROBLEM SELECTOR PROBLEM 1
Syncope, unspecified syncope type

## 2017-06-14 NOTE — DISCHARGE NOTE ADULT - PLAN OF CARE
followup with MD at rehab continue abxs; followup with MD at rehab continue medications; followup with MD at rehab suacute rehab; followup with MD at rehab continue antibiotics; followup with MD at rehab

## 2017-06-15 VITALS
TEMPERATURE: 99 F | OXYGEN SATURATION: 99 % | RESPIRATION RATE: 17 BRPM | DIASTOLIC BLOOD PRESSURE: 65 MMHG | HEART RATE: 63 BPM | SYSTOLIC BLOOD PRESSURE: 156 MMHG

## 2017-06-15 LAB
HCT VFR BLD CALC: 28.4 % — LOW (ref 34.5–45)
HGB BLD-MCNC: 9.7 G/DL — LOW (ref 11.5–15.5)
MCHC RBC-ENTMCNC: 30.3 PG — SIGNIFICANT CHANGE UP (ref 27–34)
MCHC RBC-ENTMCNC: 34.1 GM/DL — SIGNIFICANT CHANGE UP (ref 32–36)
MCV RBC AUTO: 88.8 FL — SIGNIFICANT CHANGE UP (ref 80–100)
PLATELET # BLD AUTO: 343 K/UL — SIGNIFICANT CHANGE UP (ref 150–400)
RBC # BLD: 3.2 M/UL — LOW (ref 3.8–5.2)
RBC # FLD: 12.2 % — SIGNIFICANT CHANGE UP (ref 11–15)
WBC # BLD: 4.4 K/UL — SIGNIFICANT CHANGE UP (ref 3.8–10.5)
WBC # FLD AUTO: 4.4 K/UL — SIGNIFICANT CHANGE UP (ref 3.8–10.5)

## 2017-06-15 PROCEDURE — 99239 HOSP IP/OBS DSCHRG MGMT >30: CPT

## 2017-06-15 RX ORDER — LAMOTRIGINE 25 MG/1
1 TABLET, ORALLY DISINTEGRATING ORAL
Qty: 0 | Refills: 0 | COMMUNITY
Start: 2017-06-15

## 2017-06-15 RX ORDER — LINEZOLID 600 MG/300ML
1 INJECTION, SOLUTION INTRAVENOUS
Qty: 10 | Refills: 0 | OUTPATIENT
Start: 2017-06-15 | End: 2017-06-20

## 2017-06-15 RX ADMIN — LOSARTAN POTASSIUM 50 MILLIGRAM(S): 100 TABLET, FILM COATED ORAL at 05:16

## 2017-06-15 RX ADMIN — LEVETIRACETAM 1000 MILLIGRAM(S): 250 TABLET, FILM COATED ORAL at 17:00

## 2017-06-15 RX ADMIN — Medication 81 MILLIGRAM(S): at 12:03

## 2017-06-15 RX ADMIN — HEPARIN SODIUM 5000 UNIT(S): 5000 INJECTION INTRAVENOUS; SUBCUTANEOUS at 13:40

## 2017-06-15 RX ADMIN — Medication 1000 UNIT(S): at 12:03

## 2017-06-15 RX ADMIN — LEVETIRACETAM 1000 MILLIGRAM(S): 250 TABLET, FILM COATED ORAL at 05:16

## 2017-06-15 RX ADMIN — LAMOTRIGINE 100 MILLIGRAM(S): 25 TABLET, ORALLY DISINTEGRATING ORAL at 12:03

## 2017-06-15 RX ADMIN — Medication 20 MILLIGRAM(S): at 05:16

## 2017-06-15 RX ADMIN — Medication 1 TABLET(S): at 12:03

## 2017-06-15 RX ADMIN — HEPARIN SODIUM 5000 UNIT(S): 5000 INJECTION INTRAVENOUS; SUBCUTANEOUS at 05:16

## 2017-06-15 RX ADMIN — LINEZOLID 600 MILLIGRAM(S): 600 INJECTION, SOLUTION INTRAVENOUS at 05:16

## 2017-06-15 RX ADMIN — LINEZOLID 600 MILLIGRAM(S): 600 INJECTION, SOLUTION INTRAVENOUS at 17:00

## 2017-06-15 RX ADMIN — CARVEDILOL PHOSPHATE 12.5 MILLIGRAM(S): 80 CAPSULE, EXTENDED RELEASE ORAL at 17:00

## 2017-06-15 RX ADMIN — CARVEDILOL PHOSPHATE 12.5 MILLIGRAM(S): 80 CAPSULE, EXTENDED RELEASE ORAL at 05:24

## 2017-06-15 RX ADMIN — Medication 1 DROP(S): at 12:03

## 2017-06-19 DIAGNOSIS — E78.5 HYPERLIPIDEMIA, UNSPECIFIED: ICD-10-CM

## 2017-06-19 DIAGNOSIS — R55 SYNCOPE AND COLLAPSE: ICD-10-CM

## 2017-06-19 DIAGNOSIS — I50.32 CHRONIC DIASTOLIC (CONGESTIVE) HEART FAILURE: ICD-10-CM

## 2017-06-19 DIAGNOSIS — I95.9 HYPOTENSION, UNSPECIFIED: ICD-10-CM

## 2017-06-19 DIAGNOSIS — S32.501D UNSPECIFIED FRACTURE OF RIGHT PUBIS, SUBSEQUENT ENCOUNTER FOR FRACTURE WITH ROUTINE HEALING: ICD-10-CM

## 2017-06-19 DIAGNOSIS — Z86.73 PERSONAL HISTORY OF TRANSIENT ISCHEMIC ATTACK (TIA), AND CEREBRAL INFARCTION WITHOUT RESIDUAL DEFICITS: ICD-10-CM

## 2017-06-19 DIAGNOSIS — Z95.810 PRESENCE OF AUTOMATIC (IMPLANTABLE) CARDIAC DEFIBRILLATOR: ICD-10-CM

## 2017-06-19 DIAGNOSIS — N30.01 ACUTE CYSTITIS WITH HEMATURIA: ICD-10-CM

## 2017-06-19 DIAGNOSIS — Z16.21 RESISTANCE TO VANCOMYCIN: ICD-10-CM

## 2017-06-19 DIAGNOSIS — I11.0 HYPERTENSIVE HEART DISEASE WITH HEART FAILURE: ICD-10-CM

## 2017-06-19 DIAGNOSIS — Z87.440 PERSONAL HISTORY OF URINARY (TRACT) INFECTIONS: ICD-10-CM

## 2017-06-19 DIAGNOSIS — E78.00 PURE HYPERCHOLESTEROLEMIA, UNSPECIFIED: ICD-10-CM

## 2017-06-19 DIAGNOSIS — G40.909 EPILEPSY, UNSPECIFIED, NOT INTRACTABLE, WITHOUT STATUS EPILEPTICUS: ICD-10-CM
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
## 2017-06-19 DIAGNOSIS — T46.1X5A ADVERSE EFFECT OF CALCIUM-CHANNEL BLOCKERS, INITIAL ENCOUNTER: ICD-10-CM

## 2017-06-19 DIAGNOSIS — Z98.2 PRESENCE OF CEREBROSPINAL FLUID DRAINAGE DEVICE: ICD-10-CM

## 2017-06-19 DIAGNOSIS — B95.2 ENTEROCOCCUS AS THE CAUSE OF DISEASES CLASSIFIED ELSEWHERE: ICD-10-CM

## 2017-07-01 PROCEDURE — G9001: CPT

## 2017-07-12 ENCOUNTER — APPOINTMENT (OUTPATIENT)
Dept: OBGYN | Facility: HOSPITAL | Age: 82
End: 2017-07-12

## 2017-07-13 DIAGNOSIS — H40.003 PREGLAUCOMA, UNSPECIFIED, BILATERAL: ICD-10-CM

## 2017-07-13 DIAGNOSIS — H25.12 AGE-RELATED NUCLEAR CATARACT, LEFT EYE: ICD-10-CM

## 2017-07-18 ENCOUNTER — INPATIENT (INPATIENT)
Facility: HOSPITAL | Age: 82
LOS: 5 days | Discharge: NURSING, REHAB OR LONG-TERM CARE | End: 2017-07-24
Attending: INTERNAL MEDICINE | Admitting: INTERNAL MEDICINE
Payer: MEDICARE

## 2017-07-18 VITALS
TEMPERATURE: 98 F | HEART RATE: 115 BPM | OXYGEN SATURATION: 100 % | HEIGHT: 65 IN | SYSTOLIC BLOOD PRESSURE: 111 MMHG | RESPIRATION RATE: 18 BRPM | WEIGHT: 164.91 LBS | DIASTOLIC BLOOD PRESSURE: 72 MMHG

## 2017-07-18 DIAGNOSIS — E78.00 PURE HYPERCHOLESTEROLEMIA, UNSPECIFIED: ICD-10-CM

## 2017-07-18 DIAGNOSIS — Z98.2 PRESENCE OF CEREBROSPINAL FLUID DRAINAGE DEVICE: Chronic | ICD-10-CM

## 2017-07-18 DIAGNOSIS — N39.0 URINARY TRACT INFECTION, SITE NOT SPECIFIED: ICD-10-CM

## 2017-07-18 DIAGNOSIS — I10 ESSENTIAL (PRIMARY) HYPERTENSION: ICD-10-CM

## 2017-07-18 DIAGNOSIS — I50.32 CHRONIC DIASTOLIC (CONGESTIVE) HEART FAILURE: ICD-10-CM

## 2017-07-18 DIAGNOSIS — R56.9 UNSPECIFIED CONVULSIONS: ICD-10-CM

## 2017-07-18 LAB
ALBUMIN SERPL ELPH-MCNC: 3.2 G/DL — LOW (ref 3.3–5)
ALP SERPL-CCNC: 127 U/L — HIGH (ref 40–120)
ALT FLD-CCNC: 15 U/L — SIGNIFICANT CHANGE UP (ref 12–78)
ANION GAP SERPL CALC-SCNC: 19 MMOL/L — HIGH (ref 5–17)
ANION GAP SERPL CALC-SCNC: 9 MMOL/L — SIGNIFICANT CHANGE UP (ref 5–17)
APPEARANCE UR: CLEAR — SIGNIFICANT CHANGE UP
AST SERPL-CCNC: 13 U/L — LOW (ref 15–37)
BACTERIA # UR AUTO: ABNORMAL
BASE EXCESS BLDA CALC-SCNC: 1.2 MMOL/L — SIGNIFICANT CHANGE UP (ref -2–2)
BASOPHILS # BLD AUTO: 0.1 K/UL — SIGNIFICANT CHANGE UP (ref 0–0.2)
BASOPHILS # BLD AUTO: 0.1 K/UL — SIGNIFICANT CHANGE UP (ref 0–0.2)
BASOPHILS NFR BLD AUTO: 0.9 % — SIGNIFICANT CHANGE UP (ref 0–2)
BASOPHILS NFR BLD AUTO: 0.9 % — SIGNIFICANT CHANGE UP (ref 0–2)
BILIRUB SERPL-MCNC: 0.5 MG/DL — SIGNIFICANT CHANGE UP (ref 0.2–1.2)
BILIRUB UR-MCNC: NEGATIVE — SIGNIFICANT CHANGE UP
BLOOD GAS COMMENTS: SIGNIFICANT CHANGE UP
BLOOD GAS COMMENTS: SIGNIFICANT CHANGE UP
BLOOD GAS SOURCE: SIGNIFICANT CHANGE UP
BUN SERPL-MCNC: 13 MG/DL — SIGNIFICANT CHANGE UP (ref 7–23)
BUN SERPL-MCNC: 14 MG/DL — SIGNIFICANT CHANGE UP (ref 7–23)
CALCIUM SERPL-MCNC: 8.5 MG/DL — SIGNIFICANT CHANGE UP (ref 8.5–10.1)
CALCIUM SERPL-MCNC: 9 MG/DL — SIGNIFICANT CHANGE UP (ref 8.5–10.1)
CHLORIDE SERPL-SCNC: 105 MMOL/L — SIGNIFICANT CHANGE UP (ref 96–108)
CHLORIDE SERPL-SCNC: 109 MMOL/L — HIGH (ref 96–108)
CO2 SERPL-SCNC: 18 MMOL/L — LOW (ref 22–31)
CO2 SERPL-SCNC: 24 MMOL/L — SIGNIFICANT CHANGE UP (ref 22–31)
COLOR SPEC: YELLOW — SIGNIFICANT CHANGE UP
CREAT SERPL-MCNC: 0.73 MG/DL — SIGNIFICANT CHANGE UP (ref 0.5–1.3)
CREAT SERPL-MCNC: 1.34 MG/DL — HIGH (ref 0.5–1.3)
DIFF PNL FLD: ABNORMAL
EOSINOPHIL # BLD AUTO: 0 K/UL — SIGNIFICANT CHANGE UP (ref 0–0.5)
EOSINOPHIL # BLD AUTO: 0.1 K/UL — SIGNIFICANT CHANGE UP (ref 0–0.5)
EOSINOPHIL NFR BLD AUTO: 0.1 % — SIGNIFICANT CHANGE UP (ref 0–6)
EOSINOPHIL NFR BLD AUTO: 0.9 % — SIGNIFICANT CHANGE UP (ref 0–6)
EPI CELLS # UR: SIGNIFICANT CHANGE UP
GLUCOSE SERPL-MCNC: 111 MG/DL — HIGH (ref 70–99)
GLUCOSE SERPL-MCNC: 150 MG/DL — HIGH (ref 70–99)
GLUCOSE UR QL: NEGATIVE MG/DL — SIGNIFICANT CHANGE UP
HCO3 BLDA-SCNC: 25 MMOL/L — SIGNIFICANT CHANGE UP (ref 21–29)
HCT VFR BLD CALC: 30.6 % — LOW (ref 34.5–45)
HCT VFR BLD CALC: 34.4 % — LOW (ref 34.5–45)
HGB BLD-MCNC: 10.8 G/DL — LOW (ref 11.5–15.5)
HGB BLD-MCNC: 12.2 G/DL — SIGNIFICANT CHANGE UP (ref 11.5–15.5)
HOROWITZ INDEX BLDA+IHG-RTO: 21 — SIGNIFICANT CHANGE UP
KETONES UR-MCNC: NEGATIVE — SIGNIFICANT CHANGE UP
LACTATE SERPL-SCNC: 1.1 MMOL/L — SIGNIFICANT CHANGE UP (ref 0.7–2)
LACTATE SERPL-SCNC: 11.6 MMOL/L — CRITICAL HIGH (ref 0.7–2)
LEUKOCYTE ESTERASE UR-ACNC: ABNORMAL
LYMPHOCYTES # BLD AUTO: 1.4 K/UL — SIGNIFICANT CHANGE UP (ref 1–3.3)
LYMPHOCYTES # BLD AUTO: 1.9 K/UL — SIGNIFICANT CHANGE UP (ref 1–3.3)
LYMPHOCYTES # BLD AUTO: 13 % — SIGNIFICANT CHANGE UP (ref 13–44)
LYMPHOCYTES # BLD AUTO: 23.4 % — SIGNIFICANT CHANGE UP (ref 13–44)
MCHC RBC-ENTMCNC: 30.9 PG — SIGNIFICANT CHANGE UP (ref 27–34)
MCHC RBC-ENTMCNC: 31.4 PG — SIGNIFICANT CHANGE UP (ref 27–34)
MCHC RBC-ENTMCNC: 35.2 GM/DL — SIGNIFICANT CHANGE UP (ref 32–36)
MCHC RBC-ENTMCNC: 35.6 GM/DL — SIGNIFICANT CHANGE UP (ref 32–36)
MCV RBC AUTO: 87.6 FL — SIGNIFICANT CHANGE UP (ref 80–100)
MCV RBC AUTO: 88.2 FL — SIGNIFICANT CHANGE UP (ref 80–100)
MONOCYTES # BLD AUTO: 0.4 K/UL — SIGNIFICANT CHANGE UP (ref 0–0.9)
MONOCYTES # BLD AUTO: 0.6 K/UL — SIGNIFICANT CHANGE UP (ref 0–0.9)
MONOCYTES NFR BLD AUTO: 3.6 % — SIGNIFICANT CHANGE UP (ref 2–14)
MONOCYTES NFR BLD AUTO: 7.5 % — SIGNIFICANT CHANGE UP (ref 2–14)
NEUTROPHILS # BLD AUTO: 5.6 K/UL — SIGNIFICANT CHANGE UP (ref 1.8–7.4)
NEUTROPHILS # BLD AUTO: 8.5 K/UL — HIGH (ref 1.8–7.4)
NEUTROPHILS NFR BLD AUTO: 68.2 % — SIGNIFICANT CHANGE UP (ref 43–77)
NEUTROPHILS NFR BLD AUTO: 81.5 % — HIGH (ref 43–77)
NITRITE UR-MCNC: POSITIVE
PCO2 BLDA: 37 MMHG — SIGNIFICANT CHANGE UP (ref 32–46)
PH BLD: 7.44 — SIGNIFICANT CHANGE UP (ref 7.35–7.45)
PH UR: 7 — SIGNIFICANT CHANGE UP (ref 5–8)
PLATELET # BLD AUTO: 298 K/UL — SIGNIFICANT CHANGE UP (ref 150–400)
PLATELET # BLD AUTO: 382 K/UL — SIGNIFICANT CHANGE UP (ref 150–400)
PO2 BLDA: 76 MMHG — SIGNIFICANT CHANGE UP (ref 74–108)
POTASSIUM SERPL-MCNC: 3.9 MMOL/L — SIGNIFICANT CHANGE UP (ref 3.5–5.3)
POTASSIUM SERPL-MCNC: 4.1 MMOL/L — SIGNIFICANT CHANGE UP (ref 3.5–5.3)
POTASSIUM SERPL-SCNC: 3.9 MMOL/L — SIGNIFICANT CHANGE UP (ref 3.5–5.3)
POTASSIUM SERPL-SCNC: 4.1 MMOL/L — SIGNIFICANT CHANGE UP (ref 3.5–5.3)
PROT SERPL-MCNC: 7.7 GM/DL — SIGNIFICANT CHANGE UP (ref 6–8.3)
PROT UR-MCNC: 100 MG/DL
RBC # BLD: 3.49 M/UL — LOW (ref 3.8–5.2)
RBC # BLD: 3.9 M/UL — SIGNIFICANT CHANGE UP (ref 3.8–5.2)
RBC # FLD: 13 % — SIGNIFICANT CHANGE UP (ref 11–15)
RBC # FLD: 13.3 % — SIGNIFICANT CHANGE UP (ref 11–15)
RBC CASTS # UR COMP ASSIST: ABNORMAL /HPF (ref 0–4)
SAO2 % BLDA: 95 % — SIGNIFICANT CHANGE UP (ref 92–96)
SODIUM SERPL-SCNC: 142 MMOL/L — SIGNIFICANT CHANGE UP (ref 135–145)
SODIUM SERPL-SCNC: 142 MMOL/L — SIGNIFICANT CHANGE UP (ref 135–145)
SP GR SPEC: 1.01 — SIGNIFICANT CHANGE UP (ref 1.01–1.02)
TROPONIN I SERPL-MCNC: 0.03 NG/ML — SIGNIFICANT CHANGE UP (ref 0.01–0.04)
UROBILINOGEN FLD QL: NEGATIVE MG/DL — SIGNIFICANT CHANGE UP
WBC # BLD: 10.4 K/UL — SIGNIFICANT CHANGE UP (ref 3.8–10.5)
WBC # BLD: 8.2 K/UL — SIGNIFICANT CHANGE UP (ref 3.8–10.5)
WBC # FLD AUTO: 10.4 K/UL — SIGNIFICANT CHANGE UP (ref 3.8–10.5)
WBC # FLD AUTO: 8.2 K/UL — SIGNIFICANT CHANGE UP (ref 3.8–10.5)
WBC UR QL: ABNORMAL

## 2017-07-18 PROCEDURE — 99285 EMERGENCY DEPT VISIT HI MDM: CPT | Mod: 25

## 2017-07-18 PROCEDURE — 99223 1ST HOSP IP/OBS HIGH 75: CPT

## 2017-07-18 PROCEDURE — 71010: CPT | Mod: 26

## 2017-07-18 PROCEDURE — 70450 CT HEAD/BRAIN W/O DYE: CPT | Mod: 26

## 2017-07-18 PROCEDURE — 93010 ELECTROCARDIOGRAM REPORT: CPT

## 2017-07-18 RX ORDER — LEVETIRACETAM 250 MG/1
1000 TABLET, FILM COATED ORAL EVERY 12 HOURS
Qty: 0 | Refills: 0 | Status: DISCONTINUED | OUTPATIENT
Start: 2017-07-18 | End: 2017-07-24

## 2017-07-18 RX ORDER — CEFTRIAXONE 500 MG/1
1 INJECTION, POWDER, FOR SOLUTION INTRAMUSCULAR; INTRAVENOUS ONCE
Qty: 0 | Refills: 0 | Status: COMPLETED | OUTPATIENT
Start: 2017-07-18 | End: 2017-07-18

## 2017-07-18 RX ORDER — DOCUSATE SODIUM 100 MG
100 CAPSULE ORAL THREE TIMES A DAY
Qty: 0 | Refills: 0 | Status: DISCONTINUED | OUTPATIENT
Start: 2017-07-18 | End: 2017-07-24

## 2017-07-18 RX ORDER — LAMOTRIGINE 25 MG/1
100 TABLET, ORALLY DISINTEGRATING ORAL DAILY
Qty: 0 | Refills: 0 | Status: DISCONTINUED | OUTPATIENT
Start: 2017-07-18 | End: 2017-07-24

## 2017-07-18 RX ORDER — ATORVASTATIN CALCIUM 80 MG/1
40 TABLET, FILM COATED ORAL AT BEDTIME
Qty: 0 | Refills: 0 | Status: DISCONTINUED | OUTPATIENT
Start: 2017-07-18 | End: 2017-07-24

## 2017-07-18 RX ORDER — CARVEDILOL PHOSPHATE 80 MG/1
12.5 CAPSULE, EXTENDED RELEASE ORAL EVERY 12 HOURS
Qty: 0 | Refills: 0 | Status: DISCONTINUED | OUTPATIENT
Start: 2017-07-18 | End: 2017-07-24

## 2017-07-18 RX ORDER — LINEZOLID 600 MG/300ML
600 INJECTION, SOLUTION INTRAVENOUS ONCE
Qty: 0 | Refills: 0 | Status: COMPLETED | OUTPATIENT
Start: 2017-07-18 | End: 2017-07-18

## 2017-07-18 RX ORDER — SODIUM CHLORIDE 9 MG/ML
1000 INJECTION INTRAMUSCULAR; INTRAVENOUS; SUBCUTANEOUS ONCE
Qty: 0 | Refills: 0 | Status: COMPLETED | OUTPATIENT
Start: 2017-07-18 | End: 2017-07-18

## 2017-07-18 RX ORDER — ENOXAPARIN SODIUM 100 MG/ML
40 INJECTION SUBCUTANEOUS DAILY
Qty: 0 | Refills: 0 | Status: DISCONTINUED | OUTPATIENT
Start: 2017-07-18 | End: 2017-07-24

## 2017-07-18 RX ORDER — LOSARTAN POTASSIUM 100 MG/1
50 TABLET, FILM COATED ORAL DAILY
Qty: 0 | Refills: 0 | Status: DISCONTINUED | OUTPATIENT
Start: 2017-07-18 | End: 2017-07-24

## 2017-07-18 RX ORDER — SODIUM CHLORIDE 9 MG/ML
500 INJECTION INTRAMUSCULAR; INTRAVENOUS; SUBCUTANEOUS ONCE
Qty: 0 | Refills: 0 | Status: COMPLETED | OUTPATIENT
Start: 2017-07-18 | End: 2017-07-18

## 2017-07-18 RX ORDER — AMLODIPINE BESYLATE 2.5 MG/1
5 TABLET ORAL DAILY
Qty: 0 | Refills: 0 | Status: DISCONTINUED | OUTPATIENT
Start: 2017-07-18 | End: 2017-07-24

## 2017-07-18 RX ADMIN — Medication 100 MILLIGRAM(S): at 22:19

## 2017-07-18 RX ADMIN — Medication 2 MILLIGRAM(S): at 00:44

## 2017-07-18 RX ADMIN — LEVETIRACETAM 400 MILLIGRAM(S): 250 TABLET, FILM COATED ORAL at 05:11

## 2017-07-18 RX ADMIN — CEFTRIAXONE 100 GRAM(S): 500 INJECTION, POWDER, FOR SOLUTION INTRAMUSCULAR; INTRAVENOUS at 03:10

## 2017-07-18 RX ADMIN — CARVEDILOL PHOSPHATE 12.5 MILLIGRAM(S): 80 CAPSULE, EXTENDED RELEASE ORAL at 17:07

## 2017-07-18 RX ADMIN — LINEZOLID 100 MILLIGRAM(S): 600 INJECTION, SOLUTION INTRAVENOUS at 06:54

## 2017-07-18 RX ADMIN — ENOXAPARIN SODIUM 40 MILLIGRAM(S): 100 INJECTION SUBCUTANEOUS at 17:07

## 2017-07-18 RX ADMIN — LOSARTAN POTASSIUM 50 MILLIGRAM(S): 100 TABLET, FILM COATED ORAL at 11:24

## 2017-07-18 RX ADMIN — AMLODIPINE BESYLATE 5 MILLIGRAM(S): 2.5 TABLET ORAL at 11:24

## 2017-07-18 RX ADMIN — ATORVASTATIN CALCIUM 40 MILLIGRAM(S): 80 TABLET, FILM COATED ORAL at 22:19

## 2017-07-18 RX ADMIN — SODIUM CHLORIDE 500 MILLILITER(S): 9 INJECTION INTRAMUSCULAR; INTRAVENOUS; SUBCUTANEOUS at 00:58

## 2017-07-18 RX ADMIN — LAMOTRIGINE 100 MILLIGRAM(S): 25 TABLET, ORALLY DISINTEGRATING ORAL at 11:24

## 2017-07-18 RX ADMIN — SODIUM CHLORIDE 250 MILLILITER(S): 9 INJECTION INTRAMUSCULAR; INTRAVENOUS; SUBCUTANEOUS at 06:05

## 2017-07-18 RX ADMIN — LEVETIRACETAM 400 MILLIGRAM(S): 250 TABLET, FILM COATED ORAL at 17:07

## 2017-07-18 NOTE — H&P ADULT - NSHPLABSRESULTS_GEN_ALL_CORE
LABS:                        12.2   10.4  )-----------( 382      ( 2017 01:31 )             34.4     -    142  |  105  |  14  ----------------------------<  150<H>  4.1   |  18<L>  |  1.34<H>    Ca    9.0      2017 01:21    TPro  7.7  /  Alb  3.2<L>  /  TBili  0.5  /  DBili  x   /  AST  13<L>  /  ALT  15  /  AlkPhos  127<H>        Lactate, Blood: 11.6:    Urinalysis Basic - ( 2017 01:47 )    Color: Yellow / Appearance: Clear / S.010 / pH: x  Gluc: x / Ketone: Negative  / Bili: Negative / Urobili: Negative mg/dL   Blood: x / Protein: 100 mg/dL / Nitrite: Positive   Leuk Esterase: Moderate / RBC: 25-50 /HPF / WBC 26-50   Sq Epi: x / Non Sq Epi: Few / Bacteria: Many      CAPILLARY BLOOD GLUCOSE        RADIOLOGY & ADDITIONAL TESTS:   CT Head No Cont (. @ 03:02) >  No obvious acute intracranial hemorrhage, mass   effect or midline shift, given the extent of artifact.    CXR: no infiltrate    Imaging Personally Reviewed:  [ x] YES  [ ] NO  EKG: paced rhythm

## 2017-07-18 NOTE — H&P ADULT - HISTORY OF PRESENT ILLNESS
87 y/o woman PMH HTN, HL, PPM, chronic diastolic CHF (EF nl 4/2017), hx CVA, seizure d/o, hx GIB, hx ischemic colitis, recurrent UTI, hx of pubic ramus fx, sent from [facility] for seizure episode. Pt was discharged recently from [facility] for seizures. She had 5 episodes PTA, given ativan in ED, currently somnolent.  No other history obtainable. On previous admission here had VRE UTI.

## 2017-07-18 NOTE — ED PROVIDER NOTE - PRESENTING SYMPTOMS DETAILS
88F w HTN, HL, PPM, chronic diastolic CHF (EF nl 4/2017), hx CVA, seizure d/o, hx GIB, hx ischemic colitis, recurrent UTI, hx of pubic ramus fx, sent from [facility] for sz episode. pt was discharged recently from [facility] for sz. had 5 episodes PTA, given ativan.  ROS - limited

## 2017-07-18 NOTE — CONSULT NOTE ADULT - SUBJECTIVE AND OBJECTIVE BOX
Infectious Diseases - Attending at Dr. Escobar    HPI:  89 y/o woman PMH HTN, HL, PPM, chronic diastolic CHF (EF nl 2017), hx CVA, seizure d/o, hx GIB, hx ischemic colitis, recurrent UTI, hx of pubic ramus fx, sent from [facility] for seizure episode. Pt was transfersed there  recently from [facility] for seizures(and ? stroke ) was admitted for 4 days. She had 5 episodes PTA, given ativan in ED, currently somnolent.  No other history obtainable. On previous admission here had VRE UTI. (2017 04:11).Pt poor hisotrian but denies any abdominal pain and dysuria      PAST MEDICAL & SURGICAL HISTORY:  Pacemaker  History of CVA (cerebrovascular accident)  GI bleed  Pulmonary hypertension  Seizure  CHF (congestive heart failure)  High cholesterol  HTN (hypertension)  S/P  shunt      Allergies    No Known Allergies    Intolerances        FAMILY HISTORY:  No pertinent family history in first degree relatives      SOCIAL HISTORY: non smoker, from [facility]    REVIEW OF SYSTEMS:    Constitutional: No fever, weight loss or fatigue  Eyes: No eye pain, visual disturbances, or discharge  ENT:  No difficulty hearing, tinnitus, vertigo; No sinus or throat pain  Neck: No pain or stiffness  Respiratory: No cough, wheezing, chills or hemoptysis  Cardiovascular: No chest pain, palpitations, shortness of breath, dizziness or leg swelling  Gastrointestinal: No abdominal or epigastric pain. No nausea, vomiting or hematemesis; No diarrhea or constipation. No melena or hematochezia.  Genitourinary: No dysuria, frequency, hematuria or incontinence  Neurological: seizure +  Skin: No itching, burning, rashes or lesions       MEDICATIONS  (STANDING):  losartan 50 milliGRAM(s) Oral daily  lamoTRIgine 100 milliGRAM(s) Oral daily  atorvastatin 40 milliGRAM(s) Oral at bedtime  carvedilol 12.5 milliGRAM(s) Oral every 12 hours  amLODIPine   Tablet 5 milliGRAM(s) Oral daily  docusate sodium 100 milliGRAM(s) Oral three times a day  levETIRAcetam  IVPB 1000 milliGRAM(s) IV Intermittent every 12 hours  enoxaparin Injectable 40 milliGRAM(s) SubCutaneous daily    MEDICATIONS  (PRN):  acetaminophen   Tablet. 650 milliGRAM(s) Oral every 6 hours PRN Mild Pain (1 - 3)      Vital Signs Last 24 Hrs  Tmax: afebrile  HR: 92 (2017 10:49) (86 - 103)  BP: 144/81 (2017 10:49) (144/81 - 161/97)  BP(mean): --  RR: 18 (2017 10:49) (17 - 18)  SpO2: 98% (2017 10:49) (98% - 100%)    PHYSICAL EXAM:    Constitutional: NAD, well-groomed, well-developed  HEENT: PERRLA, EOMI, Normal Hearing, MMM  Neck: No LAD, No JVD  Respiratory: CTAB/L  Cardiovascular: S1 and S2, RRR, no M/G/R  Gastrointestinal: BS+, soft, NT/ND  Extremities: No peripheral edema  Vascular: 2+ peripheral pulses  Neurological: lethargic , no focal deficits  Skin: No rashes      LABS:             WBC 8.3  cr 1.3-->0.73      Ca    8.5      2017 07:44  Mg     2.0     -    TPro  7.7  /  Alb  3.2<L>  /  TBili  0.5  /  DBili  x   /  AST  13<L>  /  ALT  15  /  AlkPhos  127<H>  -      Urinalysis Basic - ( 2017 01:47 )    Color: Yellow / Appearance: Clear / S.010 / pH: x  Gluc: x / Ketone: Negative  / Bili: Negative / Urobili: Negative mg/dL   Blood: x / Protein: 100 mg/dL / Nitrite: Positive   Leuk Esterase: Moderate / RBC: 25-50 /HPF / WBC 26-50   Sq Epi: x / Non Sq Epi: Few / Bacteria: Many        MICROBIOLOGY:  RECENT CULTURES:    Urine culture-pending   blood culture -pending    RADIOLOGY & ADDITIONAL STUDIES:

## 2017-07-18 NOTE — H&P ADULT - ASSESSMENT
85 y/o resident of [facility] with seizure history, presents after several seizures. No acute finding on imaging. Seizure blood levels sent, pt given ativan in ED, and pt to receive 1,000 mg  keppra and to continue lamotrigine. EEG on previous admission showed no focal epileptic activity.  Previous echo showed nl ejection fraction and grade 1 diastolic dysfunction. She has UTI, previous culture showed VRE. Pt started on linezolid, rocephin after cultures.

## 2017-07-18 NOTE — CONSULT NOTE ADULT - ASSESSMENT
consult dict hx of old left shunt in brain hx of pace maker left hemeparesis  r cva  for eeg tsh b12  for sub acute rehab  will follow
86 yr old female seen with

## 2017-07-18 NOTE — CONSULT NOTE ADULT - PROBLEM SELECTOR RECOMMENDATION 9
UTI vs colonization   s/p 1 odse of rocephin and zyvox  pt denies UTI symptoms  no fever or leukocytosis  f/u on urine culture  monitor off antibiotic for now

## 2017-07-18 NOTE — CHART NOTE - NSCHARTNOTEFT_GEN_A_CORE
89 y/o woman PMH HTN, HL, PPM, chronic diastolic CHF (EF nl 4/2017), hx CVA, seizure d/o, hx GIB, hx ischemic colitis, recurrent UTI, hx of pubic ramus fx, sent from [facility] for seizure episode. Pt was discharged recently from [facility] for seizures. She had 5 episodes PTA, given ativan in ED. On previous admission here had VRE UTI. Currently awake, alert. Sister at bedside. Pt started on linezolid, rocephin after cultures. CT Head-Motion degraded study. No obvious acute intracranial hemorrhage, mass effect or midline shift, given the extent of artifact.    Seizure.  seizure precautions, continue antiseizure medication. neurology consult.   Urinary tract infection, site unspecified.  Plan: cultures blood and urine, rocephin, linezolid. ID consult.   Chronic diastolic congestive heart failure.  Plan: beta blocker, ARB.   High cholesterol.  Plan: continue statin.   Essential hypertension.  Plan: monitor BP continue home medication.

## 2017-07-18 NOTE — H&P ADULT - NSHPPHYSICALEXAM_GEN_ALL_CORE
T(C): 36.6 (18 Jul 2017 00:26), Max: 36.6 (18 Jul 2017 00:26)  T(F): 97.8 (18 Jul 2017 00:26), Max: 97.8 (18 Jul 2017 00:26)  HR: 83 (18 Jul 2017 01:45) (83 - 115)  BP: 109/76 (18 Jul 2017 01:45) (109/76 - 111/72)  BP(mean): --  RR: 16 (18 Jul 2017 01:45) (16 - 18)  SpO2: 98% (18 Jul 2017 01:45) (98% - 100%)    PHYSICAL EXAM:  GENERAL: NAD, well-developed  HEAD:  Atraumatic, Normocephalic  EYES: EOMI, conjunctiva and sclera clear  ENMT: No tonsillar erythema, exudates, or enlargement;  poor dentition, No lesions  NECK: Supple, No JVD  NERVOUS SYSTEM:  somnolent, CN2-12 intact, Motor Strength no sensory/motor deficits  CHEST/LUNG: Clear to percussion bilaterally; No rales, rhonchi, wheezing, or rubs  HEART: Regular rate and rhythm; No murmurs, rubs, or gallops  ABDOMEN: Soft, Nontender, Nondistended; Bowel sounds present  EXTREMITIES:  + Peripheral Pulses, No clubbing, cyanosis, or edema  LYMPH: No lymphadenopathy noted  SKIN: No rashes or lesions T(C): 36.6 (18 Jul 2017 00:26), Max: 36.6 (18 Jul 2017 00:26)  T(F): 97.8 (18 Jul 2017 00:26), Max: 97.8 (18 Jul 2017 00:26)  HR: 83 (18 Jul 2017 01:45) (83 - 115)  BP: 109/76 (18 Jul 2017 01:45) (109/76 - 111/72)  BP(mean): --  RR: 16 (18 Jul 2017 01:45) (16 - 18)  SpO2: 98% (18 Jul 2017 01:45) (98% - 100%)    PHYSICAL EXAM:  GENERAL: NAD, well-developed  HEAD:  Atraumatic, Normocephalic  EYES: EOMI, conjunctiva and sclera clear  ENMT: No tonsillar erythema, exudates, or enlargement;  poor dentition, No lesions  NECK: Supple, No JVD  NERVOUS SYSTEM:  somnolent, CN2-12 intact, Motor Strength 4/5 bilaterally, no sensory/motor deficits  CHEST/LUNG: Clear to percussion bilaterally; No rales, rhonchi, wheezing, or rubs  HEART: Regular rate and rhythm; No murmurs, rubs, or gallops  ABDOMEN: Soft, Nontender, Nondistended; Bowel sounds present  EXTREMITIES:  + Peripheral Pulses, No clubbing, cyanosis, or edema  LYMPH: No lymphadenopathy noted  SKIN: No rashes or lesions

## 2017-07-18 NOTE — ED PROVIDER NOTE - CRITICAL CARE PROVIDED
documentation/interpretation of diagnostic studies/consultation with other physicians/additional history taking

## 2017-07-18 NOTE — ED PROVIDER NOTE - PHYSICAL EXAMINATION
GEN: drowsy, responsive to voice  HEAD: atraumatic, EOMI, PERRL, normal lids/conjunctiva  ENT: normal hearing, patent oropharynx without erythema/exudate, uvula midline  NECK: +supple, no tenderness/meningismus, +Trachea midline  PULM: Bilateral BS, normal resp effort, no wheeze/stridor/retractions  CV: RRR, no M/R/G, +dist ext pulses  ABD: soft, NT/ND  BACK: no CVAT, no midline tenderness  MSK: no edema/erythema/cyanosis  SKIN: no rash  NEURO: drowsy, no gross sensory/motor deficits, CN 2-12 intact

## 2017-07-19 DIAGNOSIS — R82.90 UNSPECIFIED ABNORMAL FINDINGS IN URINE: ICD-10-CM

## 2017-07-19 DIAGNOSIS — I47.2 VENTRICULAR TACHYCARDIA: ICD-10-CM

## 2017-07-19 DIAGNOSIS — Z86.73 PERSONAL HISTORY OF TRANSIENT ISCHEMIC ATTACK (TIA), AND CEREBRAL INFARCTION WITHOUT RESIDUAL DEFICITS: ICD-10-CM

## 2017-07-19 DIAGNOSIS — Z95.0 PRESENCE OF CARDIAC PACEMAKER: ICD-10-CM

## 2017-07-19 LAB
ANION GAP SERPL CALC-SCNC: 11 MMOL/L — SIGNIFICANT CHANGE UP (ref 5–17)
BUN SERPL-MCNC: 11 MG/DL — SIGNIFICANT CHANGE UP (ref 7–23)
CALCIUM SERPL-MCNC: 8.5 MG/DL — SIGNIFICANT CHANGE UP (ref 8.5–10.1)
CHLORIDE SERPL-SCNC: 105 MMOL/L — SIGNIFICANT CHANGE UP (ref 96–108)
CO2 SERPL-SCNC: 26 MMOL/L — SIGNIFICANT CHANGE UP (ref 22–31)
CREAT SERPL-MCNC: 0.6 MG/DL — SIGNIFICANT CHANGE UP (ref 0.5–1.3)
GLUCOSE SERPL-MCNC: 95 MG/DL — SIGNIFICANT CHANGE UP (ref 70–99)
HCT VFR BLD CALC: 29.8 % — LOW (ref 34.5–45)
HGB BLD-MCNC: 10.5 G/DL — LOW (ref 11.5–15.5)
LAMOTRIGINE SERPL-MCNC: 3.3 MCG/ML — SIGNIFICANT CHANGE UP (ref 2.5–15)
LEVETIRACETAM SERPL-MCNC: 44.3 MCG/ML — SIGNIFICANT CHANGE UP (ref 12–46)
MAGNESIUM SERPL-MCNC: 2 MG/DL — SIGNIFICANT CHANGE UP (ref 1.6–2.6)
MCHC RBC-ENTMCNC: 30.6 PG — SIGNIFICANT CHANGE UP (ref 27–34)
MCHC RBC-ENTMCNC: 35.2 GM/DL — SIGNIFICANT CHANGE UP (ref 32–36)
MCV RBC AUTO: 87.1 FL — SIGNIFICANT CHANGE UP (ref 80–100)
PLATELET # BLD AUTO: 323 K/UL — SIGNIFICANT CHANGE UP (ref 150–400)
POTASSIUM SERPL-MCNC: 3.2 MMOL/L — LOW (ref 3.5–5.3)
POTASSIUM SERPL-SCNC: 3.2 MMOL/L — LOW (ref 3.5–5.3)
RBC # BLD: 3.42 M/UL — LOW (ref 3.8–5.2)
RBC # FLD: 13.2 % — SIGNIFICANT CHANGE UP (ref 11–15)
SODIUM SERPL-SCNC: 142 MMOL/L — SIGNIFICANT CHANGE UP (ref 135–145)
WBC # BLD: 6.3 K/UL — SIGNIFICANT CHANGE UP (ref 3.8–10.5)
WBC # FLD AUTO: 6.3 K/UL — SIGNIFICANT CHANGE UP (ref 3.8–10.5)

## 2017-07-19 PROCEDURE — 99233 SBSQ HOSP IP/OBS HIGH 50: CPT

## 2017-07-19 RX ORDER — POTASSIUM CHLORIDE 20 MEQ
40 PACKET (EA) ORAL ONCE
Qty: 0 | Refills: 0 | Status: COMPLETED | OUTPATIENT
Start: 2017-07-19 | End: 2017-07-19

## 2017-07-19 RX ORDER — CEFTRIAXONE 500 MG/1
1 INJECTION, POWDER, FOR SOLUTION INTRAMUSCULAR; INTRAVENOUS EVERY 24 HOURS
Qty: 0 | Refills: 0 | Status: DISCONTINUED | OUTPATIENT
Start: 2017-07-19 | End: 2017-07-20

## 2017-07-19 RX ORDER — ASPIRIN/CALCIUM CARB/MAGNESIUM 324 MG
81 TABLET ORAL DAILY
Qty: 0 | Refills: 0 | Status: DISCONTINUED | OUTPATIENT
Start: 2017-07-19 | End: 2017-07-24

## 2017-07-19 RX ORDER — OXYCODONE AND ACETAMINOPHEN 5; 325 MG/1; MG/1
1 TABLET ORAL EVERY 4 HOURS
Qty: 0 | Refills: 0 | Status: DISCONTINUED | OUTPATIENT
Start: 2017-07-19 | End: 2017-07-24

## 2017-07-19 RX ORDER — ACETAMINOPHEN 500 MG
650 TABLET ORAL EVERY 6 HOURS
Qty: 0 | Refills: 0 | Status: DISCONTINUED | OUTPATIENT
Start: 2017-07-19 | End: 2017-07-24

## 2017-07-19 RX ADMIN — ATORVASTATIN CALCIUM 40 MILLIGRAM(S): 80 TABLET, FILM COATED ORAL at 22:19

## 2017-07-19 RX ADMIN — LEVETIRACETAM 400 MILLIGRAM(S): 250 TABLET, FILM COATED ORAL at 17:36

## 2017-07-19 RX ADMIN — Medication 650 MILLIGRAM(S): at 11:41

## 2017-07-19 RX ADMIN — Medication 650 MILLIGRAM(S): at 12:41

## 2017-07-19 RX ADMIN — CARVEDILOL PHOSPHATE 12.5 MILLIGRAM(S): 80 CAPSULE, EXTENDED RELEASE ORAL at 05:04

## 2017-07-19 RX ADMIN — AMLODIPINE BESYLATE 5 MILLIGRAM(S): 2.5 TABLET ORAL at 05:04

## 2017-07-19 RX ADMIN — CARVEDILOL PHOSPHATE 12.5 MILLIGRAM(S): 80 CAPSULE, EXTENDED RELEASE ORAL at 17:36

## 2017-07-19 RX ADMIN — Medication 100 MILLIGRAM(S): at 14:47

## 2017-07-19 RX ADMIN — LOSARTAN POTASSIUM 50 MILLIGRAM(S): 100 TABLET, FILM COATED ORAL at 05:04

## 2017-07-19 RX ADMIN — Medication 100 MILLIGRAM(S): at 05:05

## 2017-07-19 RX ADMIN — LEVETIRACETAM 400 MILLIGRAM(S): 250 TABLET, FILM COATED ORAL at 05:31

## 2017-07-19 RX ADMIN — LAMOTRIGINE 100 MILLIGRAM(S): 25 TABLET, ORALLY DISINTEGRATING ORAL at 11:41

## 2017-07-19 RX ADMIN — CEFTRIAXONE 100 GRAM(S): 500 INJECTION, POWDER, FOR SOLUTION INTRAMUSCULAR; INTRAVENOUS at 16:39

## 2017-07-19 RX ADMIN — OXYCODONE AND ACETAMINOPHEN 1 TABLET(S): 5; 325 TABLET ORAL at 19:00

## 2017-07-19 RX ADMIN — Medication 40 MILLIEQUIVALENT(S): at 10:05

## 2017-07-19 RX ADMIN — Medication 81 MILLIGRAM(S): at 17:36

## 2017-07-19 RX ADMIN — ENOXAPARIN SODIUM 40 MILLIGRAM(S): 100 INJECTION SUBCUTANEOUS at 11:42

## 2017-07-19 RX ADMIN — OXYCODONE AND ACETAMINOPHEN 1 TABLET(S): 5; 325 TABLET ORAL at 18:21

## 2017-07-19 NOTE — SWALLOW BEDSIDE ASSESSMENT ADULT - SLP GENERAL OBSERVATIONS
pt seen bedside, alert and oriented to self. pt responded to questions- however expressive language effortful/halting marked by paraphasic errors and jargon, noted yes no response and facial grimace. pt inconsistently followed one step directions/models

## 2017-07-19 NOTE — PROGRESS NOTE ADULT - SUBJECTIVE AND OBJECTIVE BOX
Patient is a 86y old  Female who presents with a chief complaint of Seizures today. (2017 04:11)      OVERNIGHT EVENTS: none. Telemetry noted with non sustained v-tach. +low grade fever of 100.2. c/o pain in pelvic area from previous pubic rami fracture    REVIEW OF SYSTEMS: denies chest pain/SOB, diaphoresis, cough, dizziness, headache, blurry vision, nausea, vomiting, abdominal pain. All others review of systems negative     MEDICATIONS  (STANDING):  losartan 50 milliGRAM(s) Oral daily  lamoTRIgine 100 milliGRAM(s) Oral daily  atorvastatin 40 milliGRAM(s) Oral at bedtime  carvedilol 12.5 milliGRAM(s) Oral every 12 hours  amLODIPine   Tablet 5 milliGRAM(s) Oral daily  docusate sodium 100 milliGRAM(s) Oral three times a day  levETIRAcetam  IVPB 1000 milliGRAM(s) IV Intermittent every 12 hours  enoxaparin Injectable 40 milliGRAM(s) SubCutaneous daily  cefTRIAXone   IVPB 1 Gram(s) IV Intermittent every 24 hours    MEDICATIONS  (PRN):  acetaminophen   Tablet. 650 milliGRAM(s) Oral every 6 hours PRN Mild Pain (1 - 3)      Allergies    No Known Allergies    Intolerances        T(F): 100.2 (17 @ 10:49), Max: 100.2 (17 @ 10:49)  HR: 90 (17 @ 13:08) (86 - 103)  BP: 140/80 (17 @ 12:25) (140/80 - 161/97)  RR: 18 (17 @ 10:49) (17 - 18)  SpO2: 98% (17 @ 12:25) (98% - 100%)  Wt(kg): --    PHYSICAL EXAM:  GENERAL: mild distress due to pain, well-groomed, well-developed, elderly female, chronically ill looking   HEAD:  Atraumatic, Normocephalic  EYES: EOMI, PERRLA, conjunctiva and sclera clear  ENMT: No tonsillar erythema, exudates, or enlargement; Moist mucous membranes, Good dentition, No lesions  NECK: Supple, No JVD, Normal thyroid  NERVOUS SYSTEM:  awake, Alert, Good concentration; Motor Strength 3/5 B/L upper and lower extremities, contracted in lower extremities  CHEST/LUNG: Clear to percussion bilaterally; No rales, rhonchi, wheezing, or rubs BL, PPM in situ  HEART: Regular rate and rhythm; No murmurs, rubs, or gallops  ABDOMEN: Soft, Nontender, Nondistended; Bowel sounds present  EXTREMITIES:  2+ Peripheral Pulses, No clubbing, cyanosis, or edema BL LE  LYMPH: No lymphadenopathy noted  SKIN: No rashes or lesions    LABS:                        10.5   6.3   )-----------( 323      ( 2017 07:44 )             29.8     -    142  |  105  |  11  ----------------------------<  95  3.2<L>   |  26  |  0.60    Ca    8.5      2017 07:44  Mg     2.0         TPro  7.7  /  Alb  3.2<L>  /  TBili  0.5  /  DBili  x   /  AST  13<L>  /  ALT  15  /  AlkPhos  127<H>        Urinalysis Basic - ( 2017 01:47 )    Color: Yellow / Appearance: Clear / S.010 / pH: x  Gluc: x / Ketone: Negative  / Bili: Negative / Urobili: Negative mg/dL   Blood: x / Protein: 100 mg/dL / Nitrite: Positive   Leuk Esterase: Moderate / RBC: 25-50 /HPF / WBC 26-50   Sq Epi: x / Non Sq Epi: Few / Bacteria: Many      Cultures;   CAPILLARY BLOOD GLUCOSE        Lipid panel:     CARDIAC MARKERS ( 2017 01:21 )  .025 ng/mL / x     / x     / x     / x            RADIOLOGY & ADDITIONAL TESTS:    Imaging Personally Reviewed:  [ ] YES      Consultant(s) Notes Reviewed:  [ ] YES     Care Discussed with [ ] Consultants [X ] Patient [ ] Family  [x ]    [x ]  Other; RN

## 2017-07-19 NOTE — SWALLOW BEDSIDE ASSESSMENT ADULT - COMMENTS
CXR 7/18/2017IMPRESSION:No lung consolidations.    CT head 7/18/2017 Impression: Motion degraded study. No obvious acute intracranial hemorrhage, mass effect or midline shift, given the extent of artifact.

## 2017-07-19 NOTE — SWALLOW BEDSIDE ASSESSMENT ADULT - SLP PERTINENT HISTORY OF CURRENT PROBLEM
89 y/o woman PMH HTN, HL, PPM, chronic diastolic CHF (EF nl 4/2017), hx CVA, seizure d/o, hx GIB, hx ischemic colitis, recurrent UTI, hx of pubic ramus fx, sent from [facility] for seizure episode. Pt was discharged recently from [facility] for seizures. She had 5 episodes PTA, given ativan in ED, currently somnolent.

## 2017-07-19 NOTE — SWALLOW BEDSIDE ASSESSMENT ADULT - SPECIFY REASON(S)
pt admitted s/p seizure. MD order states history of dysphagia pt admitted s/p seizure. MD order states history of dysphagia. PCA reported pt took liquids during breakfast- without difficulty

## 2017-07-19 NOTE — PROGRESS NOTE ADULT - SUBJECTIVE AND OBJECTIVE BOX
Patient is a 86y old  Female who presents with a chief complaint of Seizures today. (2017 04:11)      INTERVAL HPI / OVERNIGHT EVENTS:admits to chris puente history unreliable    MEDICATIONS  (STANDING):  losartan 50 milliGRAM(s) Oral daily  lamoTRIgine 100 milliGRAM(s) Oral daily  atorvastatin 40 milliGRAM(s) Oral at bedtime  carvedilol 12.5 milliGRAM(s) Oral every 12 hours  amLODIPine   Tablet 5 milliGRAM(s) Oral daily  docusate sodium 100 milliGRAM(s) Oral three times a day  levETIRAcetam  IVPB 1000 milliGRAM(s) IV Intermittent every 12 hours  enoxaparin Injectable 40 milliGRAM(s) SubCutaneous daily  cefTRIAXone   IVPB 1 Gram(s) IV Intermittent every 24 hours  aspirin  chewable 81 milliGRAM(s) Oral daily    MEDICATIONS  (PRN):  acetaminophen   Tablet. 650 milliGRAM(s) Oral every 6 hours PRN Mild Pain (1 - 3)  oxyCODONE    5 mG/acetaminophen 325 mG 1 Tablet(s) Oral every 4 hours PRN Moderate Pain (4 - 6)      Vital Signs Last 24 Hrs  T(C): 37.7 (2017 18:20), Max: 37.9 (2017 10:49)  T(F): 99.8 (2017 18:20), Max: 100.2 (2017 10:49)  HR: 91 (2017 16:46) (86 - 103)  BP: 141/81 (2017 16:46) (140/80 - 161/97)  BP(mean): --  RR: 17 (2017 16:46) (17 - 18)  SpO2: 97% (2017 16:46) (97% - 98%)    PHYSICAL EXAM:  General :NAD  Constitutional:  well-groomed, well-developed  Respiratory: CTAB/L  Cardiovascular: S1 and S2, RRR, no M/G/R  Gastrointestinal: BS+, soft, NT/ND  Extremities: No peripheral edema  Vascular: 2+ peripheral pulses  Skin: No rashes      LABS:                        10.5   6.3   )-----------( 323      ( 2017 07:44 )             29.8     07-19    142  |  105  |  11  ----------------------------<  95  3.2<L>   |  26  |  0.60    Ca    8.5      2017 07:44  Mg     2.0         TPro  7.7  /  Alb  3.2<L>  /  TBili  0.5  /  DBili  x   /  AST  13<L>  /  ALT  15  /  AlkPhos  127<H>      Urinalysis Basic - ( 2017 01:47 )    Color: Yellow / Appearance: Clear / S.010 / pH: x  Gluc: x / Ketone: Negative  / Bili: Negative / Urobili: Negative mg/dL   Blood: x / Protein: 100 mg/dL / Nitrite: Positive   Leuk Esterase: Moderate / RBC: 25-50 /HPF / WBC 26-50   Sq Epi: x / Non Sq Epi: Few / Bacteria: Many          MICROBIOLOGY:  RECENT CULTURES:        RADIOLOGY & ADDITIONAL STUDIES:

## 2017-07-20 ENCOUNTER — TRANSCRIPTION ENCOUNTER (OUTPATIENT)
Age: 82
End: 2017-07-20

## 2017-07-20 DIAGNOSIS — E43 UNSPECIFIED SEVERE PROTEIN-CALORIE MALNUTRITION: ICD-10-CM

## 2017-07-20 LAB
-  AMPICILLIN: SIGNIFICANT CHANGE UP
-  CIPROFLOXACIN: SIGNIFICANT CHANGE UP
-  NITROFURANTOIN: SIGNIFICANT CHANGE UP
-  TETRACYCLINE: SIGNIFICANT CHANGE UP
-  VANCOMYCIN: SIGNIFICANT CHANGE UP
ANION GAP SERPL CALC-SCNC: 11 MMOL/L — SIGNIFICANT CHANGE UP (ref 5–17)
BASOPHILS # BLD AUTO: 0.1 K/UL — SIGNIFICANT CHANGE UP (ref 0–0.2)
BASOPHILS NFR BLD AUTO: 1.1 % — SIGNIFICANT CHANGE UP (ref 0–2)
BUN SERPL-MCNC: 11 MG/DL — SIGNIFICANT CHANGE UP (ref 7–23)
CALCIUM SERPL-MCNC: 8.5 MG/DL — SIGNIFICANT CHANGE UP (ref 8.5–10.1)
CHLORIDE SERPL-SCNC: 104 MMOL/L — SIGNIFICANT CHANGE UP (ref 96–108)
CO2 SERPL-SCNC: 26 MMOL/L — SIGNIFICANT CHANGE UP (ref 22–31)
CREAT SERPL-MCNC: 0.62 MG/DL — SIGNIFICANT CHANGE UP (ref 0.5–1.3)
CULTURE RESULTS: SIGNIFICANT CHANGE UP
EOSINOPHIL # BLD AUTO: 0.2 K/UL — SIGNIFICANT CHANGE UP (ref 0–0.5)
EOSINOPHIL NFR BLD AUTO: 2.6 % — SIGNIFICANT CHANGE UP (ref 0–6)
GLUCOSE SERPL-MCNC: 103 MG/DL — HIGH (ref 70–99)
HCT VFR BLD CALC: 30 % — LOW (ref 34.5–45)
HGB BLD-MCNC: 10.6 G/DL — LOW (ref 11.5–15.5)
LYMPHOCYTES # BLD AUTO: 1.5 K/UL — SIGNIFICANT CHANGE UP (ref 1–3.3)
LYMPHOCYTES # BLD AUTO: 21.8 % — SIGNIFICANT CHANGE UP (ref 13–44)
MAGNESIUM SERPL-MCNC: 2 MG/DL — SIGNIFICANT CHANGE UP (ref 1.6–2.6)
MCHC RBC-ENTMCNC: 30.8 PG — SIGNIFICANT CHANGE UP (ref 27–34)
MCHC RBC-ENTMCNC: 35.5 GM/DL — SIGNIFICANT CHANGE UP (ref 32–36)
MCV RBC AUTO: 86.9 FL — SIGNIFICANT CHANGE UP (ref 80–100)
METHOD TYPE: SIGNIFICANT CHANGE UP
MONOCYTES # BLD AUTO: 0.7 K/UL — SIGNIFICANT CHANGE UP (ref 0–0.9)
MONOCYTES NFR BLD AUTO: 10.8 % — SIGNIFICANT CHANGE UP (ref 2–14)
NEUTROPHILS # BLD AUTO: 4.4 K/UL — SIGNIFICANT CHANGE UP (ref 1.8–7.4)
NEUTROPHILS NFR BLD AUTO: 63.6 % — SIGNIFICANT CHANGE UP (ref 43–77)
ORGANISM # SPEC MICROSCOPIC CNT: SIGNIFICANT CHANGE UP
ORGANISM # SPEC MICROSCOPIC CNT: SIGNIFICANT CHANGE UP
PLATELET # BLD AUTO: 306 K/UL — SIGNIFICANT CHANGE UP (ref 150–400)
POTASSIUM SERPL-MCNC: 3.6 MMOL/L — SIGNIFICANT CHANGE UP (ref 3.5–5.3)
POTASSIUM SERPL-SCNC: 3.6 MMOL/L — SIGNIFICANT CHANGE UP (ref 3.5–5.3)
RBC # BLD: 3.45 M/UL — LOW (ref 3.8–5.2)
RBC # FLD: 13.2 % — SIGNIFICANT CHANGE UP (ref 11–15)
SODIUM SERPL-SCNC: 141 MMOL/L — SIGNIFICANT CHANGE UP (ref 135–145)
SPECIMEN SOURCE: SIGNIFICANT CHANGE UP
WBC # BLD: 6.9 K/UL — SIGNIFICANT CHANGE UP (ref 3.8–10.5)
WBC # FLD AUTO: 6.9 K/UL — SIGNIFICANT CHANGE UP (ref 3.8–10.5)

## 2017-07-20 PROCEDURE — 99233 SBSQ HOSP IP/OBS HIGH 50: CPT

## 2017-07-20 RX ORDER — LATANOPROST 0.05 MG/ML
1 SOLUTION/ DROPS OPHTHALMIC; TOPICAL AT BEDTIME
Qty: 0 | Refills: 0 | Status: DISCONTINUED | OUTPATIENT
Start: 2017-07-20 | End: 2017-07-24

## 2017-07-20 RX ORDER — DAPTOMYCIN 500 MG/10ML
200 INJECTION, POWDER, LYOPHILIZED, FOR SOLUTION INTRAVENOUS EVERY 24 HOURS
Qty: 0 | Refills: 0 | Status: DISCONTINUED | OUTPATIENT
Start: 2017-07-21 | End: 2017-07-24

## 2017-07-20 RX ORDER — ASPIRIN/CALCIUM CARB/MAGNESIUM 324 MG
1 TABLET ORAL
Qty: 0 | Refills: 0 | COMMUNITY
Start: 2017-07-20

## 2017-07-20 RX ORDER — LAMOTRIGINE 25 MG/1
1 TABLET, ORALLY DISINTEGRATING ORAL
Qty: 0 | Refills: 0 | COMMUNITY
Start: 2017-07-20

## 2017-07-20 RX ORDER — DAPTOMYCIN 500 MG/10ML
INJECTION, POWDER, LYOPHILIZED, FOR SOLUTION INTRAVENOUS
Qty: 0 | Refills: 0 | Status: DISCONTINUED | OUTPATIENT
Start: 2017-07-20 | End: 2017-07-24

## 2017-07-20 RX ORDER — SENNA PLUS 8.6 MG/1
2 TABLET ORAL AT BEDTIME
Qty: 0 | Refills: 0 | Status: DISCONTINUED | OUTPATIENT
Start: 2017-07-20 | End: 2017-07-24

## 2017-07-20 RX ORDER — TIMOLOL 0.5 %
1 DROPS OPHTHALMIC (EYE) DAILY
Qty: 0 | Refills: 0 | Status: DISCONTINUED | OUTPATIENT
Start: 2017-07-20 | End: 2017-07-24

## 2017-07-20 RX ORDER — DAPTOMYCIN 500 MG/10ML
200 INJECTION, POWDER, LYOPHILIZED, FOR SOLUTION INTRAVENOUS ONCE
Qty: 0 | Refills: 0 | Status: COMPLETED | OUTPATIENT
Start: 2017-07-20 | End: 2017-07-20

## 2017-07-20 RX ORDER — CARVEDILOL PHOSPHATE 80 MG/1
1 CAPSULE, EXTENDED RELEASE ORAL
Qty: 0 | Refills: 0 | DISCHARGE
Start: 2017-07-20

## 2017-07-20 RX ORDER — POLYETHYLENE GLYCOL 3350 17 G/17G
17 POWDER, FOR SOLUTION ORAL DAILY
Qty: 0 | Refills: 0 | Status: DISCONTINUED | OUTPATIENT
Start: 2017-07-20 | End: 2017-07-24

## 2017-07-20 RX ORDER — AMLODIPINE BESYLATE 2.5 MG/1
1 TABLET ORAL
Qty: 0 | Refills: 0 | DISCHARGE
Start: 2017-07-20

## 2017-07-20 RX ORDER — LOSARTAN POTASSIUM 100 MG/1
1 TABLET, FILM COATED ORAL
Qty: 0 | Refills: 0 | COMMUNITY
Start: 2017-07-20

## 2017-07-20 RX ORDER — ATORVASTATIN CALCIUM 80 MG/1
1 TABLET, FILM COATED ORAL
Qty: 0 | Refills: 0 | DISCHARGE
Start: 2017-07-20

## 2017-07-20 RX ORDER — LEVETIRACETAM 250 MG/1
10 TABLET, FILM COATED ORAL
Qty: 0 | Refills: 0 | COMMUNITY
Start: 2017-07-20

## 2017-07-20 RX ADMIN — LEVETIRACETAM 400 MILLIGRAM(S): 250 TABLET, FILM COATED ORAL at 06:19

## 2017-07-20 RX ADMIN — OXYCODONE AND ACETAMINOPHEN 1 TABLET(S): 5; 325 TABLET ORAL at 13:11

## 2017-07-20 RX ADMIN — ENOXAPARIN SODIUM 40 MILLIGRAM(S): 100 INJECTION SUBCUTANEOUS at 12:32

## 2017-07-20 RX ADMIN — LEVETIRACETAM 400 MILLIGRAM(S): 250 TABLET, FILM COATED ORAL at 17:50

## 2017-07-20 RX ADMIN — SENNA PLUS 2 TABLET(S): 8.6 TABLET ORAL at 22:53

## 2017-07-20 RX ADMIN — DAPTOMYCIN 108 MILLIGRAM(S): 500 INJECTION, POWDER, LYOPHILIZED, FOR SOLUTION INTRAVENOUS at 16:48

## 2017-07-20 RX ADMIN — Medication 100 MILLIGRAM(S): at 22:49

## 2017-07-20 RX ADMIN — Medication 100 MILLIGRAM(S): at 06:17

## 2017-07-20 RX ADMIN — OXYCODONE AND ACETAMINOPHEN 1 TABLET(S): 5; 325 TABLET ORAL at 08:30

## 2017-07-20 RX ADMIN — LOSARTAN POTASSIUM 50 MILLIGRAM(S): 100 TABLET, FILM COATED ORAL at 06:17

## 2017-07-20 RX ADMIN — Medication 650 MILLIGRAM(S): at 14:48

## 2017-07-20 RX ADMIN — ATORVASTATIN CALCIUM 40 MILLIGRAM(S): 80 TABLET, FILM COATED ORAL at 22:49

## 2017-07-20 RX ADMIN — Medication 81 MILLIGRAM(S): at 12:34

## 2017-07-20 RX ADMIN — POLYETHYLENE GLYCOL 3350 17 GRAM(S): 17 POWDER, FOR SOLUTION ORAL at 17:49

## 2017-07-20 RX ADMIN — Medication 100 MILLIGRAM(S): at 13:15

## 2017-07-20 RX ADMIN — CARVEDILOL PHOSPHATE 12.5 MILLIGRAM(S): 80 CAPSULE, EXTENDED RELEASE ORAL at 17:49

## 2017-07-20 RX ADMIN — OXYCODONE AND ACETAMINOPHEN 1 TABLET(S): 5; 325 TABLET ORAL at 19:01

## 2017-07-20 RX ADMIN — Medication 650 MILLIGRAM(S): at 16:26

## 2017-07-20 RX ADMIN — OXYCODONE AND ACETAMINOPHEN 1 TABLET(S): 5; 325 TABLET ORAL at 14:41

## 2017-07-20 RX ADMIN — LAMOTRIGINE 100 MILLIGRAM(S): 25 TABLET, ORALLY DISINTEGRATING ORAL at 12:34

## 2017-07-20 RX ADMIN — LATANOPROST 1 DROP(S): 0.05 SOLUTION/ DROPS OPHTHALMIC; TOPICAL at 22:49

## 2017-07-20 RX ADMIN — AMLODIPINE BESYLATE 5 MILLIGRAM(S): 2.5 TABLET ORAL at 06:19

## 2017-07-20 RX ADMIN — CARVEDILOL PHOSPHATE 12.5 MILLIGRAM(S): 80 CAPSULE, EXTENDED RELEASE ORAL at 06:17

## 2017-07-20 RX ADMIN — OXYCODONE AND ACETAMINOPHEN 1 TABLET(S): 5; 325 TABLET ORAL at 07:32

## 2017-07-20 RX ADMIN — Medication 1 DROP(S): at 17:50

## 2017-07-20 NOTE — PROGRESS NOTE ADULT - SUBJECTIVE AND OBJECTIVE BOX
Patient is a 86y old  Female who presents with a chief complaint of Seizures today. (18 Jul 2017 04:11)      OVERNIGHT EVENTS: Still febrile.     REVIEW OF SYSTEMS: denies chest pain/SOB, diaphoresis, cough, dizziness, headache, blurry vision, nausea, vomiting, abdominal pain. All others review of systems negative     MEDICATIONS  (STANDING):  losartan 50 milliGRAM(s) Oral daily  lamoTRIgine 100 milliGRAM(s) Oral daily  atorvastatin 40 milliGRAM(s) Oral at bedtime  carvedilol 12.5 milliGRAM(s) Oral every 12 hours  amLODIPine   Tablet 5 milliGRAM(s) Oral daily  docusate sodium 100 milliGRAM(s) Oral three times a day  levETIRAcetam  IVPB 1000 milliGRAM(s) IV Intermittent every 12 hours  enoxaparin Injectable 40 milliGRAM(s) SubCutaneous daily  cefTRIAXone   IVPB 1 Gram(s) IV Intermittent every 24 hours  aspirin  chewable 81 milliGRAM(s) Oral daily    MEDICATIONS  (PRN):  acetaminophen   Tablet. 650 milliGRAM(s) Oral every 6 hours PRN Mild Pain (1 - 3)  oxyCODONE    5 mG/acetaminophen 325 mG 1 Tablet(s) Oral every 4 hours PRN Moderate Pain (4 - 6)      Allergies    No Known Allergies    Intolerances        T(F): 100.4 (07-20-17 @ 11:04), Max: 100.4 (07-20-17 @ 11:04)  HR: 77 (07-20-17 @ 11:04) (77 - 91)  BP: 136/77 (07-20-17 @ 11:04) (136/77 - 146/77)  RR: 18 (07-20-17 @ 11:04) (17 - 20)  SpO2: 96% (07-20-17 @ 11:04) (96% - 100%)  Wt(kg): --    PHYSICAL EXAM:  GENERAL: NAD, well-groomed, well-developed, elderly female, chronically ill looking   HEAD:  Atraumatic, Normocephalic  EYES: EOMI, PERRLA, conjunctiva and sclera clear  ENMT: No tonsillar erythema, exudates, or enlargement; Moist mucous membranes, Good dentition, No lesions  NECK: Supple, No JVD, Normal thyroid  NERVOUS SYSTEM:  awake, Alert, Good concentration; Motor Strength 3/5 B/L upper and lower extremities  CHEST/LUNG: Clear to percussion bilaterally; No rales, rhonchi, wheezing, or rubs BL, PPM in situ  HEART: Regular rate and rhythm; No murmurs, rubs, or gallops  ABDOMEN: Soft, Nontender, Nondistended; Bowel sounds present  EXTREMITIES:  2+ Peripheral Pulses, No clubbing, cyanosis, or edema BL LE  LYMPH: No lymphadenopathy noted  SKIN: No rashes or lesions    LABS:                        10.6   6.9   )-----------( 306      ( 20 Jul 2017 09:01 )             30.0     07-20    141  |  104  |  11  ----------------------------<  103<H>  3.6   |  26  |  0.62    Ca    8.5      20 Jul 2017 09:01  Mg     2.0     07-20          Cultures;     BLOOD CX- Culture Results:   No growth to date. (07.18.17 @ 08:33)    URINE CX- Culture Results:   >100,000 CFU/ml Enterococcus faecium  Normal Urogenital saran present (07.18.17 @ 09:24)          RADIOLOGY & ADDITIONAL TESTS:    Imaging Personally Reviewed:  [X ] YES      Consultant(s) Notes Reviewed:  [X ] YES     Care Discussed with [X ] Consultants [X ] Patient [X ] Family  [x ]    [x ]  Other; RN

## 2017-07-20 NOTE — PROGRESS NOTE ADULT - ATTENDING COMMENTS
Anticipated Discharge Recommendations; rehabilitation facility; STR
Anticipated Discharge Recommendations; rehabilitation facility; STR

## 2017-07-20 NOTE — PROGRESS NOTE ADULT - SUBJECTIVE AND OBJECTIVE BOX
Patient is a 86y old  Female who presents with a chief complaint of Seizures today. (20 Jul 2017 13:45)      INTERVAL HPI / OVERNIGHT EVENTS: lethargic, poor historian    MEDICATIONS  (STANDING):  losartan 50 milliGRAM(s) Oral daily  lamoTRIgine 100 milliGRAM(s) Oral daily  atorvastatin 40 milliGRAM(s) Oral at bedtime  carvedilol 12.5 milliGRAM(s) Oral every 12 hours  amLODIPine   Tablet 5 milliGRAM(s) Oral daily  docusate sodium 100 milliGRAM(s) Oral three times a day  levETIRAcetam  IVPB 1000 milliGRAM(s) IV Intermittent every 12 hours  enoxaparin Injectable 40 milliGRAM(s) SubCutaneous daily  aspirin  chewable 81 milliGRAM(s) Oral daily  polyethylene glycol 3350 17 Gram(s) Oral daily  senna 2 Tablet(s) Oral at bedtime  timolol 0.5% Solution 1 Drop(s) Both EYES daily  latanoprost 0.005% Ophthalmic Solution 1 Drop(s) Both EYES at bedtime  DAPTOmycin IVPB 200 milliGRAM(s) IV Intermittent every 24 hours  DAPTOmycin IVPB   IV Intermittent     MEDICATIONS  (PRN):  acetaminophen   Tablet. 650 milliGRAM(s) Oral every 6 hours PRN Mild Pain (1 - 3)  oxyCODONE    5 mG/acetaminophen 325 mG 1 Tablet(s) Oral every 4 hours PRN Moderate Pain (4 - 6)      Vital Signs Last 24 Hrs  Tmax :100.4  HR: 86 (21 Jul 2017 11:05) (75 - 86)  BP: 116/70 (21 Jul 2017 11:05) (116/70 - 154/78)  BP(mean): --  RR: 18 (21 Jul 2017 11:05) (18 - 18)  SpO2: 100% (21 Jul 2017 11:05) (98% - 100%)    PHYSICAL EXAM:  General :NAD  Respiratory: CTAB/L  Cardiovascular: S1 and S2, RRR, no M/G/R  Gastrointestinal: BS+, soft, NT/ND  Extremities: No peripheral edema  Vascular: 2+ peripheral pulses  Skin: No rashes      LABS:             WBC WNL          MICROBIOLOGY:  RECENT CULTURES:    Urine culture VRE ( Amp resistant)    RADIOLOGY & ADDITIONAL STUDIES:

## 2017-07-20 NOTE — DISCHARGE NOTE ADULT - HOSPITAL COURSE
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 Pt was discharged recently from [facility] for seizures. She had 5 episodes PTA, given ativan in ED. On previous admission here had VRE UTI. Currently awake, alert. Sister at bedside. Pt started on linezolid, Rocephin after cultures. CT Head-Motion degraded study. No obvious acute intracranial hemorrhage, mass effect or midline shift, given the extent of artifact. Abx changed to cefTRIAXone as per sensitivity. BLOOD CX- Culture Results: No growth to date. (07.18.17 @ 08:33), URINE CX- Culture Results: >100,000 CFU/ml Enterococcus faecium, Normal Urogenital saran present. Telemetry noted with Nonsustained ventricular tachycardia x 1 day. electrolytes replaced, no further episodes. Severe protein-calorie malnutritioni seen by nutritionist, recom; Low sodium . Ensure Enlive 2 x day=750 calories, 40 grams protein, multivitamin c minerals daily. Anticipated Discharge Recommendations; rehabilitation facility; STR. 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 Pt was discharged recently from [facility] for seizures. She had 5 episodes PTA, given ativan in ED. On previous admission here had VRE UTI. Currently awake, alert. Sister at bedside. Pt started on linezolid, Rocephin after cultures. CT Head-Motion degraded study. No obvious acute intracranial hemorrhage, mass effect or midline shift, given the extent of artifact. Abx changed to cefTRIAXone as per sensitivity. BLOOD CX- Culture Results: No growth to date. (07.18.17 @ 08:33), URINE CX- Culture Results: >100,000 CFU/ml Enterococcus faecium, Normal Urogenital saran present. Telemetry noted with Nonsustained ventricular tachycardia x 1 day. electrolytes replaced, no further episodes. Severe protein-calorie malnutritioni seen by nutritionist, recom; Low sodium . Ensure Enlive 2 x day=750 calories, 40 grams protein, multivitamin c minerals daily. Anticipated Discharge Recommendations; rehabilitation facility; STR.  Plan is to d/c to [facility] once bed is available, needs to complete 5 more days of daptomycin for VRE uti  Acute metabolic encephalopathy resolving.

## 2017-07-20 NOTE — DIETITIAN INITIAL EVALUATION ADULT. - PERTINENT LABORATORY DATA
07-20 Na141 mmol/L Glu 103 mg/dL<H> K+ 3.6 mmol/L Cr  0.62 mg/dL BUN 11 mg/dL Est GFR (AA)95 mL/min/1.73M2  Ca 8.5 mg/dL Hgb 10.6 g/dL<L> Hct 30.0 %<L> 07-18 Alb 3.2 g/dL<L> Glucose 150 mg/dL<H> Cr 1.34 mg/dL<H> bun 14mg/dL

## 2017-07-20 NOTE — CHART NOTE - NSCHARTNOTEFT_GEN_A_CORE
Upon Nutritional Assessment by the Registered Dietitian your patient was determined to meet criteria / has evidence of the following diagnosis/diagnoses:          [ ]  Mild Protein Calorie Malnutrition        [ ]  Moderate Protein Calorie Malnutrition        [X ] Severe Protein Calorie Malnutrition        [ ] Unspecified Protein Calorie Malnutrition        [ ] Underweight / BMI <19        [ ] Morbid Obesity / BMI > 40      Findings as based on:  •  Comprehensive nutrition assessment and consultation  •  Calorie counts (nutrient intake analysis)  •  Food acceptance and intake status from observations by staff  •  Follow up  •  Patient education  •  Intervention secondary to interdisciplinary rounds  •   concerns      Treatment:    The following diet has been recommended:  Low sodium . Ensure Enlive 2 x day=750 calories, 40 grams protein   multivitamin c minerals daily      PROVIDER Section:     By signing this assessment you are acknowledging and agree with the diagnosis/diagnoses assigned by the Registered Dietitian    Comments:

## 2017-07-20 NOTE — DIETITIAN INITIAL EVALUATION ADULT. - ADHERENCE
pt was on dysphagia(not specified)/Low sodium , regular consistency c Boost VHC PTA as per transfer sheet from SNF/n/a

## 2017-07-20 NOTE — DIETITIAN INITIAL EVALUATION ADULT. - FACTORS AFF FOOD INTAKE
other (specify)/07-19, swallow evaluation recommended regular consistency c thin fluids/difficulty feeding self/change in mental status/difficulty swallowing

## 2017-07-20 NOTE — DIETITIAN INITIAL EVALUATION ADULT. - OTHER INFO
Pt seen due to RN consult for low BMI.  Pt is known to RD from previous adm.  Pt was diagnosed with severe malnutrition previously, PMH include ischemic colitis.

## 2017-07-20 NOTE — DISCHARGE NOTE ADULT - MEDICATION SUMMARY - MEDICATIONS TO STOP TAKING
I will STOP taking the medications listed below when I get home from the hospital:    aspirin 81 mg oral tablet, chewable  -- 1 tab(s) by mouth once a day    furosemide 20 mg oral tablet  -- 1 tab(s) by mouth once a day    docusate sodium 100 mg oral capsule  -- 1 cap(s) by mouth 3 times a day    Zyvox 600 mg oral tablet  -- 1 tab(s) by mouth every 12 hours x 5days    losartan 50 mg oral tablet  -- 1 tab(s) by mouth once a day

## 2017-07-20 NOTE — DISCHARGE NOTE ADULT - PATIENT PORTAL LINK FT
“You can access the [facility] Patient Portal, offered by [facility], by registering with the following website: [email or web address]”

## 2017-07-20 NOTE — DIETITIAN INITIAL EVALUATION ADULT. - ENERGY NEEDS
Height (cm): 165.1 (07-18)  Weight (kg): 50.1 (07-18)  BMI (kg/m2): 18.4 (07-18)  IBW: 56.6 kg % IBW: 88%

## 2017-07-20 NOTE — DISCHARGE NOTE ADULT - CARE PROVIDER_API CALL
Karly Medina), Neurology  [facility], [place] [id number]  Phone: [phone]  Fax: [phone]

## 2017-07-20 NOTE — DIETITIAN INITIAL EVALUATION ADULT. - PHYSICAL APPEARANCE
BMI=18.4/other (specify) BMI=18.4 for adm wt. of 50.1 kg, Nutrition focused physical exam conducted; found physical signs of malnutrition [ ]absent [X ]present; Subcutaneous fat Exam;  [ moderate ]  Orbital fat pads region,  [ moderate ]Buccal fat region,  [ severe ]triceps region, [moderate  ]ribs region.  Muscle Exam; [ severe ]temples region, [severe  ]clavicle region, [ severe ]shoulder region, [  unable]Scapula region, [ severe ]Interosseous region, [ unable, pt contracted ]thigh region, [ unable, pt contracted  ]Calf region/other (specify)/contracted/debilitated

## 2017-07-20 NOTE — DISCHARGE NOTE ADULT - MEDICATION SUMMARY - MEDICATIONS TO TAKE
I will START or STAY ON the medications listed below when I get home from the hospital:    acetaminophen 325 mg oral tablet  -- 2 tab(s) by mouth every 4 hours, As needed, Moderate Pain (4 - 6)  -- Indication: For Pain    aspirin 81 mg oral tablet, chewable  -- 1 tab(s) by mouth once a day  -- Indication: For CAD    acetaminophen-oxycodone 325 mg-5 mg oral tablet  -- 1 tab(s) by mouth every 4 hours, As needed, Moderate Pain (4 - 6)  -- Indication: For Pain    losartan 50 mg oral tablet  -- 1 tab(s) by mouth once a day  -- Indication: For Essential hypertension    lamoTRIgine 100 mg oral tablet  -- 1 tab(s) by mouth once a day  -- Indication: For Seizure    levETIRAcetam 100 mg/mL intravenous solution  -- 10 milliliter(s) intravenous every 12 hours  -- Indication: For Seizure    atorvastatin 40 mg oral tablet  -- 1 tab(s) by mouth once a day (at bedtime)  -- Indication: For High cholesterol    carvedilol 12.5 mg oral tablet  -- 1 tab(s) by mouth every 12 hours  -- Indication: For Chronic diastolic congestive heart failure    amLODIPine 5 mg oral tablet  -- 1 tab(s) by mouth once a day  -- Indication: For Essential hypertension    docusate sodium 100 mg oral capsule  -- 1 cap(s) by mouth 3 times a day  -- Indication: For Constipation    polyethylene glycol 3350 oral powder for reconstitution  -- 17 gram(s) by mouth once a day  -- Indication: For Constipation    senna oral tablet  -- 2 tab(s) by mouth once a day (at bedtime)  -- Indication: For Constipation    DAPTOmycin 500 mg intravenous injection  --  intravenous   -- Indication: For VRE uti    timolol maleate 0.5% ophthalmic gel forming solution  -- 1 drop(s) to each affected eye once a day  -- Indication: For glaucoma    Restasis 0.05% ophthalmic emulsion  -- 1 drop(s) to each affected eye every 12 hours  -- Indication: For dry eyes    Travatan 0.004% ophthalmic solution  -- 1 drop(s) to each affected eye once a day (in the evening)  -- Indication: For glacoma    Multiple Vitamins oral tablet  -- 1 tab(s) by mouth once a day  -- Indication: For Supplement    cholecalciferol oral tablet  -- 1000 unit(s) by mouth once a day  -- Indication: For Supplement I will START or STAY ON the medications listed below when I get home from the hospital:    acetaminophen 325 mg oral tablet  -- 2 tab(s) by mouth every 4 hours, As needed, Moderate Pain (4 - 6)  -- Indication: For Pain    aspirin 81 mg oral tablet, chewable  -- 1 tab(s) by mouth once a day  -- Indication: For CAD    acetaminophen-oxycodone 325 mg-5 mg oral tablet  -- 1 tab(s) by mouth every 4 hours, As needed, Moderate Pain (4 - 6)  -- Indication: For Pain    losartan 50 mg oral tablet  -- 1 tab(s) by mouth once a day  -- Indication: For Essential hypertension    lamoTRIgine 100 mg oral tablet  -- 1 tab(s) by mouth once a day  -- Indication: For Seizure    levETIRAcetam 100 mg/mL intravenous solution  -- 10 milliliter(s) intravenous every 12 hours  -- Indication: For Seizure    atorvastatin 40 mg oral tablet  -- 1 tab(s) by mouth once a day (at bedtime)  -- Indication: For High cholesterol    carvedilol 12.5 mg oral tablet  -- 1 tab(s) by mouth every 12 hours  -- Indication: For Chronic diastolic congestive heart failure    amLODIPine 5 mg oral tablet  -- 1 tab(s) by mouth once a day  -- Indication: For Essential hypertension    docusate sodium 100 mg oral capsule  -- 1 cap(s) by mouth 3 times a day  -- Indication: For Constipation    polyethylene glycol 3350 oral powder for reconstitution  -- 17 gram(s) by mouth once a day  -- Indication: For Constipation    senna oral tablet  -- 2 tab(s) by mouth once a day (at bedtime)  -- Indication: For Constipation    DAPTOmycin 500 mg intravenous injection  --  intravenous   -- Indication: For Urinary tract infection, site unspecified    timolol maleate 0.5% ophthalmic gel forming solution  -- 1 drop(s) to each affected eye once a day  -- Indication: For glaucoma    Restasis 0.05% ophthalmic emulsion  -- 1 drop(s) to each affected eye every 12 hours  -- Indication: For dry eyes    Travatan 0.004% ophthalmic solution  -- 1 drop(s) to each affected eye once a day (in the evening)  -- Indication: For glacoma    Multiple Vitamins oral tablet  -- 1 tab(s) by mouth once a day  -- Indication: For Supplement    cholecalciferol oral tablet  -- 1000 unit(s) by mouth once a day  -- Indication: For Supplement I will START or STAY ON the medications listed below when I get home from the hospital:    acetaminophen 325 mg oral tablet  -- 2 tab(s) by mouth every 4 hours, As needed, Moderate Pain (4 - 6)  -- Indication: For Pain    aspirin 81 mg oral tablet, chewable  -- 1 tab(s) by mouth once a day  -- Indication: For CAD    acetaminophen-oxycodone 325 mg-5 mg oral tablet  -- 1 tab(s) by mouth every 4 hours, As needed, Moderate Pain (4 - 6)  -- Indication: For Pain    losartan 50 mg oral tablet  -- 1 tab(s) by mouth once a day  -- Indication: For Essential hypertension    lamoTRIgine 100 mg oral tablet  -- 1 tab(s) by mouth once a day  -- Indication: For Seizure    levETIRAcetam 1000 mg oral tablet  -- 1 tab(s) by mouth 2 times a day  -- Indication: For Seizure    atorvastatin 40 mg oral tablet  -- 1 tab(s) by mouth once a day (at bedtime)  -- Indication: For High cholesterol    carvedilol 12.5 mg oral tablet  -- 1 tab(s) by mouth every 12 hours  -- Indication: For Chronic diastolic congestive heart failure    amLODIPine 5 mg oral tablet  -- 1 tab(s) by mouth once a day  -- Indication: For Essential hypertension    docusate sodium 100 mg oral capsule  -- 1 cap(s) by mouth 3 times a day  -- Indication: For Constipation    polyethylene glycol 3350 oral powder for reconstitution  -- 17 gram(s) by mouth once a day  -- Indication: For Constipation    senna oral tablet  -- 2 tab(s) by mouth once a day (at bedtime)  -- Indication: For Constipation    DAPTOmycin 500 mg intravenous injection  --  intravenous   -- Indication: For Urinary tract infection, site unspecified    timolol maleate 0.5% ophthalmic gel forming solution  -- 1 drop(s) to each affected eye once a day  -- Indication: For glaucoma    Restasis 0.05% ophthalmic emulsion  -- 1 drop(s) to each affected eye every 12 hours  -- Indication: For dry eyes    Travatan 0.004% ophthalmic solution  -- 1 drop(s) to each affected eye once a day (in the evening)  -- Indication: For glacoma    Multiple Vitamins oral tablet  -- 1 tab(s) by mouth once a day  -- Indication: For Supplement    cholecalciferol oral tablet  -- 1000 unit(s) by mouth once a day  -- Indication: For Supplement I will START or STAY ON the medications listed below when I get home from the hospital:    acetaminophen 325 mg oral tablet  -- 2 tab(s) by mouth every 4 hours, As needed, Moderate Pain (4 - 6)  -- Indication: For Pain    aspirin 81 mg oral tablet, chewable  -- 1 tab(s) by mouth once a day  -- Indication: For CAD    acetaminophen-oxycodone 325 mg-5 mg oral tablet  -- 1 tab(s) by mouth every 4 hours, As needed, Moderate Pain (4 - 6)  -- Indication: For Pain    losartan 50 mg oral tablet  -- 1 tab(s) by mouth once a day  -- Indication: For Essential hypertension    lamoTRIgine 100 mg oral tablet  -- 1 tab(s) by mouth once a day  -- Indication: For Seizure    levETIRAcetam 1000 mg oral tablet  -- 1 tab(s) by mouth 2 times a day  -- Indication: For Seizure    atorvastatin 40 mg oral tablet  -- 1 tab(s) by mouth once a day (at bedtime)  -- Indication: For High cholesterol    carvedilol 12.5 mg oral tablet  -- 1 tab(s) by mouth every 12 hours  -- Indication: For Chronic diastolic congestive heart failure    amLODIPine 5 mg oral tablet  -- 1 tab(s) by mouth once a day  -- Indication: For Essential hypertension    docusate sodium 100 mg oral capsule  -- 1 cap(s) by mouth 3 times a day  -- Indication: For Constipation    polyethylene glycol 3350 oral powder for reconstitution  -- 17 gram(s) by mouth once a day  -- Indication: For Constipation    senna oral tablet  -- 2 tab(s) by mouth once a day (at bedtime)  -- Indication: For Constipation    DAPTOmycin  -- 200  intravenous every 24 hours  -- Indication: For Uti    timolol maleate 0.5% ophthalmic gel forming solution  -- 1 drop(s) to each affected eye once a day  -- Indication: For glaucoma    Restasis 0.05% ophthalmic emulsion  -- 1 drop(s) to each affected eye every 12 hours  -- Indication: For dry eyes    Travatan 0.004% ophthalmic solution  -- 1 drop(s) to each affected eye once a day (in the evening)  -- Indication: For glacoma    Multiple Vitamins oral tablet  -- 1 tab(s) by mouth once a day  -- Indication: For Supplement    cholecalciferol oral tablet  -- 1000 unit(s) by mouth once a day  -- Indication: For Supplement

## 2017-07-20 NOTE — DISCHARGE NOTE ADULT - CARE PLAN
Principal Discharge DX:	Seizure  Goal:	no further episode while in the hospital  Instructions for follow-up, activity and diet:	seizure precautions, continue antiseizure medication. neurology follow up.  Secondary Diagnosis:	Urinary tract infection, site unspecified  Secondary Diagnosis:	Severe protein-calorie malnutrition  Instructions for follow-up, activity and diet:	Low sodium . Ensure Enlive 2 x day=750 calories, 40 grams protein, multivitamin c minerals daily.  Secondary Diagnosis:	Nonsustained ventricular tachycardia  Instructions for follow-up, activity and diet:	electrolytes replaced  Secondary Diagnosis:	Chronic diastolic congestive heart failure  Instructions for follow-up, activity and diet:	losartan 50 milliGRAM(s) Oral daily, -carvedilol 12.5 milliGRAM(s) Oral every 12 hours, amLODIPine   Tablet 5 milliGRAM(s) Oral daily.  Secondary Diagnosis:	Essential hypertension  Instructions for follow-up, activity and diet:	as above  Secondary Diagnosis:	High cholesterol  Instructions for follow-up, activity and diet:	atorvastatin 40 milliGRAM(s) Oral at bedtime. Principal Discharge DX:	Seizure  Goal:	no further episode while in the hospital  Instructions for follow-up, activity and diet:	seizure precautions, continue antiseizure medication. neurology follow up.  Secondary Diagnosis:	Urinary tract infection, site unspecified  Instructions for follow-up, activity and diet:	needs to complete 5 more days of IV daptomycin  Secondary Diagnosis:	Severe protein-calorie malnutrition  Instructions for follow-up, activity and diet:	Low sodium . Ensure Enlive 2 x day=750 calories, 40 grams protein, multivitamin c minerals daily.  Secondary Diagnosis:	Nonsustained ventricular tachycardia  Instructions for follow-up, activity and diet:	electrolytes replaced  Secondary Diagnosis:	Chronic diastolic congestive heart failure  Instructions for follow-up, activity and diet:	losartan 50 milliGRAM(s) Oral daily, -carvedilol 12.5 milliGRAM(s) Oral every 12 hours, amLODIPine   Tablet 5 milliGRAM(s) Oral daily.  Secondary Diagnosis:	Essential hypertension  Instructions for follow-up, activity and diet:	as above  Secondary Diagnosis:	High cholesterol  Instructions for follow-up, activity and diet:	atorvastatin 40 milliGRAM(s) Oral at bedtime.

## 2017-07-20 NOTE — DISCHARGE NOTE ADULT - PLAN OF CARE
no further episode while in the hospital seizure precautions, continue antiseizure medication. neurology follow up. electrolytes replaced Low sodium . Ensure Enlive 2 x day=750 calories, 40 grams protein, multivitamin c minerals daily. losartan 50 milliGRAM(s) Oral daily, -carvedilol 12.5 milliGRAM(s) Oral every 12 hours, amLODIPine   Tablet 5 milliGRAM(s) Oral daily. as above atorvastatin 40 milliGRAM(s) Oral at bedtime. needs to complete 5 more days of IV daptomycin

## 2017-07-21 DIAGNOSIS — G93.41 METABOLIC ENCEPHALOPATHY: ICD-10-CM

## 2017-07-21 LAB
ANION GAP SERPL CALC-SCNC: 8 MMOL/L — SIGNIFICANT CHANGE UP (ref 5–17)
BASOPHILS # BLD AUTO: 0.1 K/UL — SIGNIFICANT CHANGE UP (ref 0–0.2)
BASOPHILS NFR BLD AUTO: 1 % — SIGNIFICANT CHANGE UP (ref 0–2)
BUN SERPL-MCNC: 14 MG/DL — SIGNIFICANT CHANGE UP (ref 7–23)
CALCIUM SERPL-MCNC: 8.6 MG/DL — SIGNIFICANT CHANGE UP (ref 8.5–10.1)
CHLORIDE SERPL-SCNC: 105 MMOL/L — SIGNIFICANT CHANGE UP (ref 96–108)
CO2 SERPL-SCNC: 27 MMOL/L — SIGNIFICANT CHANGE UP (ref 22–31)
CREAT SERPL-MCNC: 0.72 MG/DL — SIGNIFICANT CHANGE UP (ref 0.5–1.3)
EOSINOPHIL # BLD AUTO: 0.2 K/UL — SIGNIFICANT CHANGE UP (ref 0–0.5)
EOSINOPHIL NFR BLD AUTO: 2.1 % — SIGNIFICANT CHANGE UP (ref 0–6)
GLUCOSE SERPL-MCNC: 113 MG/DL — HIGH (ref 70–99)
HCT VFR BLD CALC: 32.7 % — LOW (ref 34.5–45)
HGB BLD-MCNC: 10.6 G/DL — LOW (ref 11.5–15.5)
LYMPHOCYTES # BLD AUTO: 1.6 K/UL — SIGNIFICANT CHANGE UP (ref 1–3.3)
LYMPHOCYTES # BLD AUTO: 21.2 % — SIGNIFICANT CHANGE UP (ref 13–44)
MCHC RBC-ENTMCNC: 29.9 PG — SIGNIFICANT CHANGE UP (ref 27–34)
MCHC RBC-ENTMCNC: 32.4 GM/DL — SIGNIFICANT CHANGE UP (ref 32–36)
MCV RBC AUTO: 92.3 FL — SIGNIFICANT CHANGE UP (ref 80–100)
MONOCYTES # BLD AUTO: 0.7 K/UL — SIGNIFICANT CHANGE UP (ref 0–0.9)
MONOCYTES NFR BLD AUTO: 9.4 % — SIGNIFICANT CHANGE UP (ref 2–14)
NEUTROPHILS # BLD AUTO: 5 K/UL — SIGNIFICANT CHANGE UP (ref 1.8–7.4)
NEUTROPHILS NFR BLD AUTO: 66.3 % — SIGNIFICANT CHANGE UP (ref 43–77)
PLATELET # BLD AUTO: 353 K/UL — SIGNIFICANT CHANGE UP (ref 150–400)
POTASSIUM SERPL-MCNC: 3.5 MMOL/L — SIGNIFICANT CHANGE UP (ref 3.5–5.3)
POTASSIUM SERPL-SCNC: 3.5 MMOL/L — SIGNIFICANT CHANGE UP (ref 3.5–5.3)
RBC # BLD: 3.55 M/UL — LOW (ref 3.8–5.2)
RBC # FLD: 13.1 % — SIGNIFICANT CHANGE UP (ref 11–15)
SODIUM SERPL-SCNC: 140 MMOL/L — SIGNIFICANT CHANGE UP (ref 135–145)
WBC # BLD: 7.5 K/UL — SIGNIFICANT CHANGE UP (ref 3.8–10.5)
WBC # FLD AUTO: 7.5 K/UL — SIGNIFICANT CHANGE UP (ref 3.8–10.5)

## 2017-07-21 PROCEDURE — 99233 SBSQ HOSP IP/OBS HIGH 50: CPT

## 2017-07-21 RX ORDER — DAPTOMYCIN 500 MG/10ML
0 INJECTION, POWDER, LYOPHILIZED, FOR SOLUTION INTRAVENOUS
Qty: 0 | Refills: 0 | COMMUNITY
Start: 2017-07-21 | End: 2017-07-26

## 2017-07-21 RX ORDER — DAPTOMYCIN 500 MG/10ML
0 INJECTION, POWDER, LYOPHILIZED, FOR SOLUTION INTRAVENOUS
Qty: 0 | Refills: 0 | COMMUNITY
Start: 2017-07-21

## 2017-07-21 RX ORDER — OXYCODONE HYDROCHLORIDE 5 MG/1
1 TABLET ORAL
Qty: 0 | Refills: 0 | COMMUNITY
Start: 2017-07-21

## 2017-07-21 RX ADMIN — Medication 100 MILLIGRAM(S): at 15:32

## 2017-07-21 RX ADMIN — LATANOPROST 1 DROP(S): 0.05 SOLUTION/ DROPS OPHTHALMIC; TOPICAL at 22:02

## 2017-07-21 RX ADMIN — Medication 81 MILLIGRAM(S): at 11:44

## 2017-07-21 RX ADMIN — Medication 100 MILLIGRAM(S): at 22:01

## 2017-07-21 RX ADMIN — LEVETIRACETAM 400 MILLIGRAM(S): 250 TABLET, FILM COATED ORAL at 17:29

## 2017-07-21 RX ADMIN — OXYCODONE AND ACETAMINOPHEN 1 TABLET(S): 5; 325 TABLET ORAL at 11:45

## 2017-07-21 RX ADMIN — AMLODIPINE BESYLATE 5 MILLIGRAM(S): 2.5 TABLET ORAL at 06:18

## 2017-07-21 RX ADMIN — ENOXAPARIN SODIUM 40 MILLIGRAM(S): 100 INJECTION SUBCUTANEOUS at 11:45

## 2017-07-21 RX ADMIN — POLYETHYLENE GLYCOL 3350 17 GRAM(S): 17 POWDER, FOR SOLUTION ORAL at 11:45

## 2017-07-21 RX ADMIN — Medication 1 DROP(S): at 11:47

## 2017-07-21 RX ADMIN — SENNA PLUS 2 TABLET(S): 8.6 TABLET ORAL at 22:01

## 2017-07-21 RX ADMIN — LAMOTRIGINE 100 MILLIGRAM(S): 25 TABLET, ORALLY DISINTEGRATING ORAL at 11:45

## 2017-07-21 RX ADMIN — LEVETIRACETAM 400 MILLIGRAM(S): 250 TABLET, FILM COATED ORAL at 06:18

## 2017-07-21 RX ADMIN — ATORVASTATIN CALCIUM 40 MILLIGRAM(S): 80 TABLET, FILM COATED ORAL at 22:02

## 2017-07-21 RX ADMIN — DAPTOMYCIN 108 MILLIGRAM(S): 500 INJECTION, POWDER, LYOPHILIZED, FOR SOLUTION INTRAVENOUS at 15:32

## 2017-07-21 RX ADMIN — LOSARTAN POTASSIUM 50 MILLIGRAM(S): 100 TABLET, FILM COATED ORAL at 06:18

## 2017-07-21 RX ADMIN — Medication 100 MILLIGRAM(S): at 06:18

## 2017-07-21 RX ADMIN — CARVEDILOL PHOSPHATE 12.5 MILLIGRAM(S): 80 CAPSULE, EXTENDED RELEASE ORAL at 17:28

## 2017-07-21 RX ADMIN — CARVEDILOL PHOSPHATE 12.5 MILLIGRAM(S): 80 CAPSULE, EXTENDED RELEASE ORAL at 06:18

## 2017-07-21 NOTE — PROGRESS NOTE ADULT - SUBJECTIVE AND OBJECTIVE BOX
Patient is a 86y old  Female who presents with a chief complaint of Seizures today. (20 Jul 2017 13:45)      INTERVAL HPI/OVERNIGHT EVENTS:  no new complaints, pleasantly confused    MEDICATIONS  (STANDING):  losartan 50 milliGRAM(s) Oral daily  lamoTRIgine 100 milliGRAM(s) Oral daily  atorvastatin 40 milliGRAM(s) Oral at bedtime  carvedilol 12.5 milliGRAM(s) Oral every 12 hours  amLODIPine   Tablet 5 milliGRAM(s) Oral daily  docusate sodium 100 milliGRAM(s) Oral three times a day  levETIRAcetam  IVPB 1000 milliGRAM(s) IV Intermittent every 12 hours  enoxaparin Injectable 40 milliGRAM(s) SubCutaneous daily  aspirin  chewable 81 milliGRAM(s) Oral daily  polyethylene glycol 3350 17 Gram(s) Oral daily  senna 2 Tablet(s) Oral at bedtime  timolol 0.5% Solution 1 Drop(s) Both EYES daily  latanoprost 0.005% Ophthalmic Solution 1 Drop(s) Both EYES at bedtime  DAPTOmycin IVPB 200 milliGRAM(s) IV Intermittent every 24 hours  DAPTOmycin IVPB   IV Intermittent     MEDICATIONS  (PRN):  acetaminophen   Tablet. 650 milliGRAM(s) Oral every 6 hours PRN Mild Pain (1 - 3)  oxyCODONE    5 mG/acetaminophen 325 mG 1 Tablet(s) Oral every 4 hours PRN Moderate Pain (4 - 6)      Allergies    No Known Allergies    Intolerances        REVIEW OF SYSTEMS:  unable to assess given dementia    Vital Signs Last 24 Hrs  T(C): 36.3 (21 Jul 2017 16:30), Max: 37.7 (21 Jul 2017 11:05)  T(F): 97.4 (21 Jul 2017 16:30), Max: 99.8 (21 Jul 2017 11:05)  HR: 69 (21 Jul 2017 16:30) (69 - 86)  BP: 129/79 (21 Jul 2017 16:30) (116/70 - 154/78)  BP(mean): --  RR: 18 (21 Jul 2017 16:30) (18 - 18)  SpO2: 98% (21 Jul 2017 16:30) (98% - 100%)    PHYSICAL EXAM:  GENERAL: NAD, thin  HEAD:  Atraumatic, Normocephalic  EYES: EOMI, PERRLA, conjunctiva and sclera clear  ENMT: No tonsillar erythema, exudates, or enlargement; Moist mucous membranes,   NECK: Supple, No JVD, Normal thyroid  NERVOUS SYSTEM:  Alert,confused Motor Strength 4/5 B/L upper and lower extremities;   CHEST/LUNG: Clear to percussion bilaterally; No rales, rhonchi, wheezing, or rubs  HEART: Regular rate and rhythm; No murmurs, rubs, or gallops  ABDOMEN: Soft, Nontender, Nondistended; Bowel sounds present  EXTREMITIES:  2+ Peripheral Pulses, No clubbing, cyanosis, or edema      LABS:                        10.6   7.5   )-----------( 353      ( 21 Jul 2017 08:27 )             32.7     07-21    140  |  105  |  14  ----------------------------<  113<H>  3.5   |  27  |  0.72    Ca    8.6      21 Jul 2017 08:27  Mg     2.0     07-20      Culture - Urine (07.18.17 @ 09:24)    -  Tetra/Doxy: R >8    -  Nitrofurantoin: I 64    -  Ciprofloxacin: R >2    -  Vancomycin: R >16    -  Ampicillin: R >8    Specimen Source: .Urine Clean Catch (Midstream)    Culture Results:   >100,000 CFU/ml Enterococcus faecium (vancomycin resistant)  Normal Urogenital saran present    Organism Identification: Enterococcus faecium (vancomycin resistant)    Organism: Enterococcus faecium (vancomycin resistant)    Method Type: RADHA        CAPILLARY BLOOD GLUCOSE          RADIOLOGY & ADDITIONAL TESTS:  < from: CT Head No Cont (07.18.17 @ 03:02) >    EXAM:  CT BRAIN                            PROCEDURE DATE:  07/18/2017      < end of copied text >  < from: CT Head No Cont (07.18.17 @ 03:02) >  Impression:     Motion degraded study. No obvious acute intracranial hemorrhage, mass   effect or midline shift, given the extent of artifact. Additional   findings as described. If clinically indicated, a short-term follow-up or   an MRI may be obtained for furtherevaluation.     < end of copied text >      Imaging Personally Reviewed:  [x ] YES  [ ] NO    Consultant(s) Notes Reviewed:  [ x] YES  [ ] NO    Care Discussed with Consultants/Other Providers [ x] YES  [ ] NO

## 2017-07-22 PROCEDURE — 99233 SBSQ HOSP IP/OBS HIGH 50: CPT

## 2017-07-22 RX ADMIN — LEVETIRACETAM 400 MILLIGRAM(S): 250 TABLET, FILM COATED ORAL at 06:37

## 2017-07-22 RX ADMIN — Medication 100 MILLIGRAM(S): at 06:35

## 2017-07-22 RX ADMIN — LEVETIRACETAM 400 MILLIGRAM(S): 250 TABLET, FILM COATED ORAL at 17:29

## 2017-07-22 RX ADMIN — LOSARTAN POTASSIUM 50 MILLIGRAM(S): 100 TABLET, FILM COATED ORAL at 12:20

## 2017-07-22 RX ADMIN — AMLODIPINE BESYLATE 5 MILLIGRAM(S): 2.5 TABLET ORAL at 06:35

## 2017-07-22 RX ADMIN — Medication 1 DROP(S): at 12:20

## 2017-07-22 RX ADMIN — Medication 81 MILLIGRAM(S): at 12:20

## 2017-07-22 RX ADMIN — POLYETHYLENE GLYCOL 3350 17 GRAM(S): 17 POWDER, FOR SOLUTION ORAL at 12:20

## 2017-07-22 RX ADMIN — DAPTOMYCIN 108 MILLIGRAM(S): 500 INJECTION, POWDER, LYOPHILIZED, FOR SOLUTION INTRAVENOUS at 15:30

## 2017-07-22 RX ADMIN — ATORVASTATIN CALCIUM 40 MILLIGRAM(S): 80 TABLET, FILM COATED ORAL at 22:47

## 2017-07-22 RX ADMIN — Medication 100 MILLIGRAM(S): at 15:31

## 2017-07-22 RX ADMIN — LATANOPROST 1 DROP(S): 0.05 SOLUTION/ DROPS OPHTHALMIC; TOPICAL at 22:47

## 2017-07-22 RX ADMIN — SENNA PLUS 2 TABLET(S): 8.6 TABLET ORAL at 22:47

## 2017-07-22 RX ADMIN — Medication 100 MILLIGRAM(S): at 22:47

## 2017-07-22 RX ADMIN — CARVEDILOL PHOSPHATE 12.5 MILLIGRAM(S): 80 CAPSULE, EXTENDED RELEASE ORAL at 06:35

## 2017-07-22 RX ADMIN — Medication 1 TABLET(S): at 15:31

## 2017-07-22 RX ADMIN — CARVEDILOL PHOSPHATE 12.5 MILLIGRAM(S): 80 CAPSULE, EXTENDED RELEASE ORAL at 17:27

## 2017-07-22 RX ADMIN — LAMOTRIGINE 100 MILLIGRAM(S): 25 TABLET, ORALLY DISINTEGRATING ORAL at 12:19

## 2017-07-22 RX ADMIN — ENOXAPARIN SODIUM 40 MILLIGRAM(S): 100 INJECTION SUBCUTANEOUS at 12:19

## 2017-07-22 NOTE — PROGRESS NOTE ADULT - SUBJECTIVE AND OBJECTIVE BOX
HPI:  89 y/o woman PMH HTN, HL, PPM, chronic diastolic CHF (EF nl 4/2017), hx CVA, seizure d/o, hx GIB, hx ischemic colitis, recurrent UTI, hx of pubic ramus fx, sent from [facility] for seizure episode. Pt was discharged recently from [facility] for seizures. She had 5 episodes PTA, given ativan in ED, currently somnolent.  No other history obtainable. On previous admission here had VRE UTI.       7/22 SUBJECTIVE & OBJECTIVE: Pt seen and examined at bedside.  Weak appearing; otherwise no complaints.    PHYSICAL EXAM:  T(C): 37.1 (07-22-17 @ 10:30), Max: 37.1 (07-22-17 @ 05:23)  HR: 69 (07-22-17 @ 10:30) (69 - 84)  BP: 113/65 (07-22-17 @ 10:30) (113/65 - 152/80)  RR: 18 (07-22-17 @ 10:30) (16 - 18)  SpO2: 98% (07-22-17 @ 10:30) (98% - 99%)  Wt(kg): --   GENERAL: NAD, well-groomed, well-developed  HEAD:  Atraumatic, Normocephalic  EYES: EOMI, PERRLA, conjunctiva and sclera clear  ENMT: Moist mucous membranes  NECK: Supple, No JVD  NERVOUS SYSTEM:  Alert & Oriented X3, Motor Strength 5/5 B/L upper and lower extremities; DTRs 2+ intact and symmetric  CHEST/LUNG: Clear to auscultation bilaterally; No rales, rhonchi, wheezing, or rubs  HEART: Regular rate and rhythm; No murmurs, rubs, or gallops  ABDOMEN: Soft, Nontender, Nondistended; Bowel sounds present  EXTREMITIES:  2+ Peripheral Pulses, No clubbing, cyanosis, or edema        MEDICATIONS  (STANDING):  losartan 50 milliGRAM(s) Oral daily  lamoTRIgine 100 milliGRAM(s) Oral daily  atorvastatin 40 milliGRAM(s) Oral at bedtime  carvedilol 12.5 milliGRAM(s) Oral every 12 hours  amLODIPine   Tablet 5 milliGRAM(s) Oral daily  docusate sodium 100 milliGRAM(s) Oral three times a day  levETIRAcetam  IVPB 1000 milliGRAM(s) IV Intermittent every 12 hours  enoxaparin Injectable 40 milliGRAM(s) SubCutaneous daily  aspirin  chewable 81 milliGRAM(s) Oral daily  polyethylene glycol 3350 17 Gram(s) Oral daily  senna 2 Tablet(s) Oral at bedtime  timolol 0.5% Solution 1 Drop(s) Both EYES daily  latanoprost 0.005% Ophthalmic Solution 1 Drop(s) Both EYES at bedtime  DAPTOmycin IVPB 200 milliGRAM(s) IV Intermittent every 24 hours  DAPTOmycin IVPB   IV Intermittent     MEDICATIONS  (PRN):  acetaminophen   Tablet. 650 milliGRAM(s) Oral every 6 hours PRN Mild Pain (1 - 3)  oxyCODONE    5 mG/acetaminophen 325 mG 1 Tablet(s) Oral every 4 hours PRN Moderate Pain (4 - 6)      LABS:                        10.6   7.5   )-----------( 353      ( 21 Jul 2017 08:27 )             32.7     07-21    140  |  105  |  14  ----------------------------<  113<H>  3.5   |  27  |  0.72    Ca    8.6      21 Jul 2017 08:27

## 2017-07-23 LAB
ANION GAP SERPL CALC-SCNC: 8 MMOL/L — SIGNIFICANT CHANGE UP (ref 5–17)
BUN SERPL-MCNC: 22 MG/DL — SIGNIFICANT CHANGE UP (ref 7–23)
CALCIUM SERPL-MCNC: 8.4 MG/DL — LOW (ref 8.5–10.1)
CHLORIDE SERPL-SCNC: 107 MMOL/L — SIGNIFICANT CHANGE UP (ref 96–108)
CO2 SERPL-SCNC: 28 MMOL/L — SIGNIFICANT CHANGE UP (ref 22–31)
CREAT SERPL-MCNC: 0.67 MG/DL — SIGNIFICANT CHANGE UP (ref 0.5–1.3)
CULTURE RESULTS: SIGNIFICANT CHANGE UP
CULTURE RESULTS: SIGNIFICANT CHANGE UP
GLUCOSE SERPL-MCNC: 97 MG/DL — SIGNIFICANT CHANGE UP (ref 70–99)
POTASSIUM SERPL-MCNC: 3.5 MMOL/L — SIGNIFICANT CHANGE UP (ref 3.5–5.3)
POTASSIUM SERPL-SCNC: 3.5 MMOL/L — SIGNIFICANT CHANGE UP (ref 3.5–5.3)
SODIUM SERPL-SCNC: 143 MMOL/L — SIGNIFICANT CHANGE UP (ref 135–145)
SPECIMEN SOURCE: SIGNIFICANT CHANGE UP
SPECIMEN SOURCE: SIGNIFICANT CHANGE UP

## 2017-07-23 PROCEDURE — 99233 SBSQ HOSP IP/OBS HIGH 50: CPT

## 2017-07-23 RX ADMIN — Medication 650 MILLIGRAM(S): at 13:00

## 2017-07-23 RX ADMIN — Medication 81 MILLIGRAM(S): at 11:38

## 2017-07-23 RX ADMIN — LAMOTRIGINE 100 MILLIGRAM(S): 25 TABLET, ORALLY DISINTEGRATING ORAL at 11:38

## 2017-07-23 RX ADMIN — Medication 100 MILLIGRAM(S): at 05:30

## 2017-07-23 RX ADMIN — LEVETIRACETAM 400 MILLIGRAM(S): 250 TABLET, FILM COATED ORAL at 16:55

## 2017-07-23 RX ADMIN — ENOXAPARIN SODIUM 40 MILLIGRAM(S): 100 INJECTION SUBCUTANEOUS at 11:39

## 2017-07-23 RX ADMIN — CARVEDILOL PHOSPHATE 12.5 MILLIGRAM(S): 80 CAPSULE, EXTENDED RELEASE ORAL at 05:30

## 2017-07-23 RX ADMIN — Medication 100 MILLIGRAM(S): at 21:47

## 2017-07-23 RX ADMIN — LEVETIRACETAM 400 MILLIGRAM(S): 250 TABLET, FILM COATED ORAL at 05:29

## 2017-07-23 RX ADMIN — LATANOPROST 1 DROP(S): 0.05 SOLUTION/ DROPS OPHTHALMIC; TOPICAL at 21:47

## 2017-07-23 RX ADMIN — Medication 1 DROP(S): at 11:39

## 2017-07-23 RX ADMIN — ATORVASTATIN CALCIUM 40 MILLIGRAM(S): 80 TABLET, FILM COATED ORAL at 21:47

## 2017-07-23 RX ADMIN — SENNA PLUS 2 TABLET(S): 8.6 TABLET ORAL at 21:47

## 2017-07-23 RX ADMIN — CARVEDILOL PHOSPHATE 12.5 MILLIGRAM(S): 80 CAPSULE, EXTENDED RELEASE ORAL at 16:57

## 2017-07-23 RX ADMIN — AMLODIPINE BESYLATE 5 MILLIGRAM(S): 2.5 TABLET ORAL at 05:30

## 2017-07-23 RX ADMIN — LOSARTAN POTASSIUM 50 MILLIGRAM(S): 100 TABLET, FILM COATED ORAL at 05:30

## 2017-07-23 RX ADMIN — DAPTOMYCIN 108 MILLIGRAM(S): 500 INJECTION, POWDER, LYOPHILIZED, FOR SOLUTION INTRAVENOUS at 16:55

## 2017-07-23 RX ADMIN — Medication 1 TABLET(S): at 11:38

## 2017-07-23 RX ADMIN — Medication 650 MILLIGRAM(S): at 12:29

## 2017-07-23 NOTE — PROGRESS NOTE ADULT - SUBJECTIVE AND OBJECTIVE BOX
Anesthesia Evaluation     no history of anesthetic complications:  NPO Solid Status: > 8 hours  NPO Liquid Status: > 2 hours     Airway   Mallampati: II  TM distance: >3 FB  Neck ROM: full  no difficulty expected  Dental      Comment: Permanent upper bridge    Pulmonary - normal exam   (+) asthma,   Cardiovascular - normal exam  Exercise tolerance: good (4-7 METS)    (+) hypertension, hyperlipidemia      Neuro/Psych  GI/Hepatic/Renal/Endo    (+)  GERD,     Musculoskeletal     Abdominal  - normal exam   Substance History       Comment: S/P r lumpectomy with node dissection   OB/GYN          Other      history of cancer                                    Anesthesia Plan    ASA 2     MAC     Anesthetic plan and risks discussed with patient.    Plan discussed with CRNA.       HPI:  87 y/o woman PMH HTN, HL, PPM, chronic diastolic CHF (EF nl 4/2017), hx CVA, seizure d/o, hx GIB, hx ischemic colitis, recurrent UTI, hx of pubic ramus fx, sent from [facility] for seizure episode. Pt was discharged recently from [facility] for seizures. She had 5 episodes PTA, given ativan in ED, currently somnolent.  No other history obtainable. On previous admission here had VRE UTI.       7/22 SUBJECTIVE & OBJECTIVE: Pt seen and examined at bedside.  Weak appearing; otherwise no complaints.  7/23 Seen and examined.  Chart reviewed.  No current complaints.      PHYSICAL EXAM:  T(C): 36.7  HR:  72  BP: 139/75  RR: 18   SpO2: 99%    GENERAL: NAD, well-groomed, well-developed  HEAD:  Atraumatic, Normocephalic  EYES: EOMI, PERRLA, conjunctiva and sclera clear  NERVOUS SYSTEM:  Alert & Oriented X3,   CHEST/LUNG: Clear to auscultation bilaterally; No rales, rhonchi, wheezing, or rubs  HEART: Regular rate and rhythm; No murmurs, rubs, or gallops  ABDOMEN: Soft, Nontender, Nondistended; Bowel sounds present  EXTREMITIES:  2+ Peripheral Pulses, No clubbing, cyanosis, or edema        MEDICATIONS  (STANDING):  losartan 50 milliGRAM(s) Oral daily  lamoTRIgine 100 milliGRAM(s) Oral daily  atorvastatin 40 milliGRAM(s) Oral at bedtime  carvedilol 12.5 milliGRAM(s) Oral every 12 hours  amLODIPine   Tablet 5 milliGRAM(s) Oral daily  docusate sodium 100 milliGRAM(s) Oral three times a day  levETIRAcetam  IVPB 1000 milliGRAM(s) IV Intermittent every 12 hours  enoxaparin Injectable 40 milliGRAM(s) SubCutaneous daily  aspirin  chewable 81 milliGRAM(s) Oral daily  polyethylene glycol 3350 17 Gram(s) Oral daily  senna 2 Tablet(s) Oral at bedtime  timolol 0.5% Solution 1 Drop(s) Both EYES daily  latanoprost 0.005% Ophthalmic Solution 1 Drop(s) Both EYES at bedtime  DAPTOmycin IVPB 200 milliGRAM(s) IV Intermittent every 24 hours  DAPTOmycin IVPB   IV Intermittent     MEDICATIONS  (PRN):  acetaminophen   Tablet. 650 milliGRAM(s) Oral every 6 hours PRN Mild Pain (1 - 3)  oxyCODONE    5 mG/acetaminophen 325 mG 1 Tablet(s) Oral every 4 hours PRN Moderate Pain (4 - 6)      LABS:                        10.6   7.5   )-----------( 353      ( 21 Jul 2017 08:27 )             32.7     07-23    143  |  107  |  22  ----------------------------<  97  3.5   |  28  |  0.67

## 2017-07-24 VITALS
RESPIRATION RATE: 18 BRPM | OXYGEN SATURATION: 100 % | DIASTOLIC BLOOD PRESSURE: 67 MMHG | SYSTOLIC BLOOD PRESSURE: 147 MMHG | HEART RATE: 80 BPM | TEMPERATURE: 98 F

## 2017-07-24 PROCEDURE — 99239 HOSP IP/OBS DSCHRG MGMT >30: CPT

## 2017-07-24 RX ORDER — LEVETIRACETAM 250 MG/1
1 TABLET, FILM COATED ORAL
Qty: 0 | Refills: 0 | COMMUNITY
Start: 2017-07-24

## 2017-07-24 RX ADMIN — CARVEDILOL PHOSPHATE 12.5 MILLIGRAM(S): 80 CAPSULE, EXTENDED RELEASE ORAL at 17:35

## 2017-07-24 RX ADMIN — CARVEDILOL PHOSPHATE 12.5 MILLIGRAM(S): 80 CAPSULE, EXTENDED RELEASE ORAL at 05:39

## 2017-07-24 RX ADMIN — Medication 81 MILLIGRAM(S): at 12:03

## 2017-07-24 RX ADMIN — LAMOTRIGINE 100 MILLIGRAM(S): 25 TABLET, ORALLY DISINTEGRATING ORAL at 12:03

## 2017-07-24 RX ADMIN — ENOXAPARIN SODIUM 40 MILLIGRAM(S): 100 INJECTION SUBCUTANEOUS at 12:03

## 2017-07-24 RX ADMIN — Medication 100 MILLIGRAM(S): at 05:39

## 2017-07-24 RX ADMIN — Medication 1 TABLET(S): at 12:03

## 2017-07-24 RX ADMIN — AMLODIPINE BESYLATE 5 MILLIGRAM(S): 2.5 TABLET ORAL at 05:39

## 2017-07-24 RX ADMIN — Medication 1 DROP(S): at 12:04

## 2017-07-24 RX ADMIN — LEVETIRACETAM 400 MILLIGRAM(S): 250 TABLET, FILM COATED ORAL at 17:35

## 2017-07-24 RX ADMIN — DAPTOMYCIN 108 MILLIGRAM(S): 500 INJECTION, POWDER, LYOPHILIZED, FOR SOLUTION INTRAVENOUS at 15:34

## 2017-07-24 RX ADMIN — LOSARTAN POTASSIUM 50 MILLIGRAM(S): 100 TABLET, FILM COATED ORAL at 05:39

## 2017-07-24 RX ADMIN — LEVETIRACETAM 400 MILLIGRAM(S): 250 TABLET, FILM COATED ORAL at 05:39

## 2017-07-24 RX ADMIN — POLYETHYLENE GLYCOL 3350 17 GRAM(S): 17 POWDER, FOR SOLUTION ORAL at 12:03

## 2017-07-24 NOTE — PROGRESS NOTE ADULT - PROBLEM SELECTOR PROBLEM 8
High cholesterol
Metabolic encephalopathy
History of CVA (cerebrovascular accident)

## 2017-07-24 NOTE — PROGRESS NOTE ADULT - PROBLEM SELECTOR PROBLEM 5
History of CVA (cerebrovascular accident)
Pacemaker
Pacemaker

## 2017-07-24 NOTE — PROGRESS NOTE ADULT - PROBLEM SELECTOR PLAN 6
- Clinically compensated with no evidence for acute exacerbation.   Continue with:   -losartan 50 milliGRAM(s) Oral daily  -carvedilol 12.5 milliGRAM(s) Oral every 12 hours  -amLODIPine  Tablet 5 milliGRAM(s) Oral daily
-losartan 50 milliGRAM(s) Oral daily  -carvedilol 12.5 milliGRAM(s) Oral every 12 hours  -amLODIPine   Tablet 5 milliGRAM(s) Oral daily
-losartan 50 milliGRAM(s) Oral daily  -carvedilol 12.5 milliGRAM(s) Oral every 12 hours  -amLODIPine   Tablet 5 milliGRAM(s) Oral daily
-losartan 50 milliGRAM(s) Oral daily  -atorvastatin 40 milliGRAM(s) Oral at bedtime  -carvedilol 12.5 milliGRAM(s) Oral every 12 hours  -amLODIPine   Tablet 5 milliGRAM(s) Oral daily

## 2017-07-24 NOTE — PROGRESS NOTE ADULT - PROBLEM SELECTOR PLAN 1
-Seizure precautions, continue antiseizure meds keppra and lamictal. Neurology follow up  - Lamictal level ok = 3.3 on 7/18  - Keppra level ok = 44.3 on 7/18
-seizure precautions, continue antiseizure medication. neurology follow up
-seizure precautions, continue antiseizure medication. neurology follow up
possible uti ,has low grade fever as well  follow up on urine c/s  switch to dapto for 5 days as c/s + for VRE (amp resistant)  avoiding zyvox as it reduces seizure threshold and pt having recurrent seizure in last few weeks
possible uti ,has low grade fever as well  follow up on urine c/s  add rocephin
-seizure precautions, continue antiseizure medication. neurology follow up

## 2017-07-24 NOTE — PROGRESS NOTE ADULT - PROBLEM SELECTOR PLAN 3
-Electrolytes replaced, monitor, no further events, D/C Telemetry
-electrolytes replaced, monitor

## 2017-07-24 NOTE — PROGRESS NOTE ADULT - ASSESSMENT
awaek aert speech fluent arm 3/5 lef weakness unsteady gait no seziure onkeppra  for sub acute rehab  will follow
awalakisha alert  speech fluent no seziure on keppra and lamactil arm 3/5  unsteady gait for sub acute rehab
pt is sleeping no seziure reported on mkeppra for sub acute rehab pt
richardek alert spech lfuentr incoherant old cva dementia for sub acute rehab discuss with family will follow
86 yr old female seen with
86 yr old female seen with
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 Pt was discharged recently from [facility] for seizures.  She had 5 episodes PTA, given ativan in ED. On previous admission here had VRE UTI. Currently awake, alert. Sister at bedside. Pt started on linezolid, Rocephin after cultures. CT Head-Motion degraded study. No obvious acute intracranial hemorrhage, mass effect or midline shift, given the extent of artifact.
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 Pt was discharged recently from [facility] for seizures.  She had 5 episodes PTA, given ativan in ED. On previous admission here had VRE UTI. Currently awake, alert. Sister at bedside. Pt started on linezolid, Rocephin after cultures. CT Head-Motion degraded study. No obvious acute intracranial hemorrhage, mass effect or midline shift, given the extent of artifact.
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 Pt was discharged recently from [facility] for seizures.  She had 5 episodes PTA, given ativan in ED. On previous admission here had VRE UTI. Currently awake, alert. Sister at bedside. Pt started on linezolid, Rocephin after cultures. CT Head-Motion degraded study. No obvious acute intracranial hemorrhage, mass effect or midline shift, given the extent of artifact.
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 Pt was discharged recently from [facility] for seizures. She had 5 episodes PTA, given ativan in ED. On previous admission here had VRE UTI. Currently awake, alert. Sister at bedside. Pt started on linezolid, Rocephin after cultures. CT Head-Motion degraded study. No obvious acute intracranial hemorrhage, mass effect or midline shift, given the extent of artifact.
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 Pt was discharged recently from [facility] for seizures. She had 5 episodes PTA, given ativan in ED. On previous admission here had VRE UTI. Currently awake, alert. Sister at bedside. Pt started on linezolid, Rocephin after cultures. CT Head-Motion degraded study. No obvious acute intracranial hemorrhage, mass effect or midline shift, given the extent of artifact.
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 Pt was discharged recently from [facility] for seizures. She had 5 episodes PTA, given ativan in ED. On previous admission here had VRE UTI. Currently awake, alert. Sister at bedside. Pt started on linezolid, Rocephin after cultures. CT Head-Motion degraded study. No obvious acute intracranial hemorrhage, mass effect or midline shift, given the extent of artifact.

## 2017-07-24 NOTE — PROGRESS NOTE ADULT - PROBLEM SELECTOR PROBLEM 2
Seizure
Urinary tract infection, site unspecified
Seizure
Urinary tract infection, site unspecified

## 2017-07-24 NOTE — PROGRESS NOTE ADULT - PROBLEM SELECTOR PROBLEM 6
Chronic diastolic congestive heart failure

## 2017-07-24 NOTE — PROGRESS NOTE ADULT - PROBLEM SELECTOR PROBLEM 4
Pacemaker
High cholesterol
Pacemaker
Severe protein-calorie malnutrition

## 2017-07-24 NOTE — PROGRESS NOTE ADULT - PROBLEM SELECTOR PROBLEM 3
Chronic diastolic congestive heart failure
Nonsustained ventricular tachycardia
Chronic diastolic congestive heart failure
Nonsustained ventricular tachycardia

## 2017-07-24 NOTE — PROGRESS NOTE ADULT - SUBJECTIVE AND OBJECTIVE BOX
CHIEF COMPLAINT/INTERVAL HISTORY:    Patient is a 86y old  Female who presents with a chief complaint of Seizures today. (20 Jul 2017 13:45)      HPI:  89 y/o woman PMH HTN, HL, PPM, chronic diastolic CHF (EF nl 4/2017), hx CVA, seizure d/o, hx GIB, hx ischemic colitis, recurrent UTI, hx of pubic ramus fx, sent from [facility] for seizure episode. Pt was discharged recently from [facility] for seizures. She had 5 episodes PTA, given ativan in ED, currently somnolent.  No other history obtainable. On previous admission here had VRE UTI. (18 Jul 2017 04:11)    Overnight issues  stable for discharge to skilled nursing facility but needs insurance authroization   SUBJECTIVE & OBJECTIVE: Pt seen and examined at bedside.   ROS:  CONSTITUTIONAL: No fever, weight loss, or fatigue  NECK: No pain or stiffness  RESPIRATORY: No cough, wheezing, chills or hemoptysis; No shortness of breath  CARDIOVASCULAR: No chest pain, palpitations, dizziness, or leg swelling  GASTROINTESTINAL: No abdominal or epigastric pain. No nausea, vomiting, or hematemesis; No diarrhea or constipation. No melena or hematochezia.  GENITOURINARY: No dysuria, frequency, hematuria, or incontinence  NEUROLOGICAL: No headaches, memory loss, loss of strength, numbness, or tremors  SKIN: No itching, burning, rashes, or lesions   ICU Vital Signs Last 24 Hrs  T(C): 36.7 (24 Jul 2017 11:15), Max: 37.7 (23 Jul 2017 17:42)  T(F): 98.1 (24 Jul 2017 11:15), Max: 99.8 (23 Jul 2017 17:42)  HR: 72 (24 Jul 2017 11:15) (70 - 76)  BP: 153/86 (24 Jul 2017 11:15) (132/78 - 157/85)  BP(mean): --  ABP: --  ABP(mean): --  RR: 18 (24 Jul 2017 11:15) (17 - 18)  SpO2: 98% (24 Jul 2017 11:15) (97% - 100%)        MEDICATIONS  (STANDING):  losartan 50 milliGRAM(s) Oral daily  lamoTRIgine 100 milliGRAM(s) Oral daily  atorvastatin 40 milliGRAM(s) Oral at bedtime  carvedilol 12.5 milliGRAM(s) Oral every 12 hours  amLODIPine   Tablet 5 milliGRAM(s) Oral daily  docusate sodium 100 milliGRAM(s) Oral three times a day  levETIRAcetam  IVPB 1000 milliGRAM(s) IV Intermittent every 12 hours  enoxaparin Injectable 40 milliGRAM(s) SubCutaneous daily  aspirin  chewable 81 milliGRAM(s) Oral daily  polyethylene glycol 3350 17 Gram(s) Oral daily  senna 2 Tablet(s) Oral at bedtime  timolol 0.5% Solution 1 Drop(s) Both EYES daily  latanoprost 0.005% Ophthalmic Solution 1 Drop(s) Both EYES at bedtime  DAPTOmycin IVPB 200 milliGRAM(s) IV Intermittent every 24 hours  DAPTOmycin IVPB   IV Intermittent   multivitamin 1 Tablet(s) Oral daily    MEDICATIONS  (PRN):  acetaminophen   Tablet. 650 milliGRAM(s) Oral every 6 hours PRN Mild Pain (1 - 3)  oxyCODONE    5 mG/acetaminophen 325 mG 1 Tablet(s) Oral every 4 hours PRN Moderate Pain (4 - 6)        PHYSICAL EXAM:    GENERAL: NAD, well-groomed, well-developed  HEAD:  Atraumatic, Normocephalic  EYES: EOMI, PERRLA, conjunctiva and sclera clear  ENMT: Moist mucous membranes  NECK: Supple, No JVD  NERVOUS SYSTEM:  Alert & Oriented X3, Motor Strength 5/5 B/L upper and lower extremities; DTRs 2+ intact and symmetric  CHEST/LUNG: Clear to auscultation bilaterally; No rales, rhonchi, wheezing, or rubs  HEART: Regular rate and rhythm; No murmurs, rubs, or gallops  ABDOMEN: Soft, Nontender, Nondistended; Bowel sounds present  EXTREMITIES:  2+ Peripheral Pulses, No clubbing, cyanosis, or edema    LABS:    07-23    143  |  107  |  22  ----------------------------<  97  3.5   |  28  |  0.67    Ca    8.4<L>      23 Jul 2017 06:56            CAPILLARY BLOOD GLUCOSE          RECENT CULTURES:      RADIOLOGY & ADDITIONAL TESTS:  Imaging Personally Reviewed:  [ ] YES      Consultant(s) Notes Reviewed:  [ ] YES     Care Discussed with [ ] Consultants [X ] Patient [ ] Family  [x ]    [x ]  Other; RN  HEALTH ISSUES - PROBLEM Dx:  Metabolic encephalopathy: Metabolic encephalopathy  Severe protein-calorie malnutrition: Severe protein-calorie malnutrition  History of CVA (cerebrovascular accident): History of CVA (cerebrovascular accident)  Nonsustained ventricular tachycardia: Nonsustained ventricular tachycardia  Pacemaker: Pacemaker  Abnormal urinalysis: Abnormal urinalysis  Essential hypertension: Essential hypertension  High cholesterol: High cholesterol  Chronic diastolic congestive heart failure: Chronic diastolic congestive heart failure  Urinary tract infection, site unspecified: Urinary tract infection, site unspecified  Seizure: Seizure        DVT/GI ppx  Discussed with pt @ bedside

## 2017-07-24 NOTE — PROGRESS NOTE ADULT - PROBLEM SELECTOR PLAN 8
-atorvastatin 40 milliGRAM(s) Oral at bedtime
improving with treatment of underlying infection  also with dementia - supportive care
improving with treatment of underlying infection  also with dementia - supportive care/fall precautions
-on ASA

## 2017-07-26 DIAGNOSIS — I50.32 CHRONIC DIASTOLIC (CONGESTIVE) HEART FAILURE: ICD-10-CM

## 2017-07-26 DIAGNOSIS — N39.0 URINARY TRACT INFECTION, SITE NOT SPECIFIED: ICD-10-CM

## 2017-07-26 DIAGNOSIS — G40.909 EPILEPSY, UNSPECIFIED, NOT INTRACTABLE, WITHOUT STATUS EPILEPTICUS: ICD-10-CM

## 2017-07-26 DIAGNOSIS — R26.81 UNSTEADINESS ON FEET: ICD-10-CM

## 2017-07-26 DIAGNOSIS — G93.41 METABOLIC ENCEPHALOPATHY: ICD-10-CM

## 2017-07-26 DIAGNOSIS — Z79.82 LONG TERM (CURRENT) USE OF ASPIRIN: ICD-10-CM

## 2017-07-26 DIAGNOSIS — E78.5 HYPERLIPIDEMIA, UNSPECIFIED: ICD-10-CM

## 2017-07-26 DIAGNOSIS — E43 UNSPECIFIED SEVERE PROTEIN-CALORIE MALNUTRITION: ICD-10-CM

## 2017-07-26 DIAGNOSIS — Z95.0 PRESENCE OF CARDIAC PACEMAKER: ICD-10-CM

## 2017-07-26 DIAGNOSIS — B95.2 ENTEROCOCCUS AS THE CAUSE OF DISEASES CLASSIFIED ELSEWHERE: ICD-10-CM

## 2017-07-26 DIAGNOSIS — I11.0 HYPERTENSIVE HEART DISEASE WITH HEART FAILURE: ICD-10-CM

## 2017-07-26 DIAGNOSIS — G93.89 OTHER SPECIFIED DISORDERS OF BRAIN: ICD-10-CM

## 2017-07-26 DIAGNOSIS — I27.2 OTHER SECONDARY PULMONARY HYPERTENSION: ICD-10-CM

## 2017-07-26 DIAGNOSIS — I47.2 VENTRICULAR TACHYCARDIA: ICD-10-CM

## 2017-07-26 DIAGNOSIS — F03.90 UNSPECIFIED DEMENTIA WITHOUT BEHAVIORAL DISTURBANCE: ICD-10-CM

## 2017-07-26 DIAGNOSIS — I69.954 HEMIPLEGIA AND HEMIPARESIS FOLLOWING UNSPECIFIED CEREBROVASCULAR DISEASE AFFECTING LEFT NON-DOMINANT SIDE: ICD-10-CM

## 2017-07-26 DIAGNOSIS — Z87.440 PERSONAL HISTORY OF URINARY (TRACT) INFECTIONS: ICD-10-CM

## 2017-08-03 NOTE — DISCHARGE NOTE ADULT - FUNCTIONAL SCREEN CURRENT LEVEL: BATHING, MLM
PT IRP Treatment    Primary Rehabilitation Diagnosis: C2 fx  Expected Discharge Date: 08/04/17  Planned Discharge Destination: Home    ASSESSMENT:   Treatment today focused on gait, stairs, LE strengthening and balance.  Spouse was educated on supervision techniques.  Patient is demonstrating good progress supported by qualifying for room privileges with spouse during the day.    Patient limited at this time by balance and safety.  Patient will benefit from further skilled PT  for continued training with balance and safety to help the patient meet their goals of modified independent.     SUBJECTIVE: Subjective: Pt. on bed but is agreeable to therapy.  Spouse present for teaching.  Caregiver agency rep. also present near end of session. (08/03/17 1429)    OBJECTIVE:  Precautions  Neck Brace: At all times (08/03/17 0846)  Cervical Precautions: Yes (08/03/17 0846)  Other Precautions: fall risk  (08/01/17 1420)  Precautions Comments: cervical collar (08/01/17 0920)    See below for current functional status overview.  See PT flowsheet for full details regarding the PT therapy provided.          EDUCATION:   On this date, the patient and patient's spouse was educated on room privileges and supervision at home.    The response to education was: Verbalizes understanding    PLAN:   Continue skilled PT, including the following Treatment/Interventions: Functional transfer training;Strengthening;ROM;Endurance training;Patient/Family training;Equipment eval/education;Bed mobility;Gait training;Compensatory technique education;Continued evaluation;Stairs retraining;Safety Education;Neuromuscular re-education (08/01/17 1230)   PT Frequency: Twice a day;7 days/week (08/01/17 1230), Frequency Comments: BID daily (08/01/17 1230)    Treatment Plan for Next Session: balance, strengthening, gait training  Additional Plan Considerations: be mindful of neck pain and restrictions       GOALS:  Short Term Goals to Be Reviewed On:  08/04/17  Goal Agreement: Patient agrees with goals and treatment plan     Bed Mobility Discharge Goal: modified independent from flat surface without use of railing for additional time        Transfer Discharge Goal: modified independent with least restrictive device        Ambulation Discharge Goal: modified independent household distance with least restrictive device        Stairs Discharge Goal: supervision for 5 steps with loida rails                 Other Discharge Goal 1: supervision for ambulation for community distances with minimal cueing to properly scan her environment              RECOMMENDATIONS FOR DISCHARGE:  Recommendation for Discharge: PT: 24 Hour assist, Home therapy    PT/OT Mobility Equipment for Discharge: Will need w/walkre for home use (08/03/17 1235)  PT/OT ADL Equipment for Discharge: issued a reacher, long bath sponge, long shoe horn and sockaide (08/03/17 1235)      FUNCTIONAL DATA OVERVIEW LAST 24 HOURS  Bed Mobility   Bed Mobility  Boosting: Modified Independent (08/03/17 0846)  Supine to Sit: Modified Independent (08/03/17 1429)  Sit to Supine: Modified Independent (08/03/17 1429)  Bed Mobility Comments: moves well on bed. (08/03/17 1429)    Transfers  Transfers  Sit to Stand: Supervision (Supv) (08/03/17 1429)  Stand to Sit: Supervision (Supv) (08/03/17 1429)  Stand Pivot Transfers: Supervision (Supv) (08/03/17 0846)  Car Transfers: Supervision (Supv) (08/03/17 1429)  Assistive Device/: 2-wheeled walker (08/03/17 1429)  Transfer Comments 1: Supervision for safety. (08/03/17 1429)  Transfer Comments 2: Spouse trained for room privileges (08/03/17 1429)    Gait  Gait  Gait Assistance: Supervision (Supv) (08/03/17 1429)  Assistive Device/: 2-wheeled walker (08/03/17 1429)  Ambulation Distance (Feet): 150 Feet (08/03/17 1429)  IRP Gait: Yes, the patient is walking (08/03/17 1429)  Walk 10 feet: Supervision (Supv) (08/03/17 1429)  Walk 50 feet with 2 turns:  Supervision (Supv) (08/03/17 1429)  Walk 150 feet: Supervision (Supv) (08/03/17 1429)  Walk 10 feet on uneven or sloping surfaces: Supervision (Supv) (08/03/17 1429)   small object from floor: Supervision (Supv) (from chair with reacher) (08/03/17 1429)  IRP Gait Comments: Supervision for safety. (08/03/17 1429)  Pattern: Shuffle (08/03/17 1429)  Ambulation Surface: Tile (08/03/17 1429)  Gait Comments 1: Instructed supervision with spouse. (08/03/17 1429)  Gait Comments 2: Instructed pt. in safe technique for changing surfaces and ambulating on uneven surfaces.  (08/03/17 1429)  Second Trial: Yes (08/03/17 1429), Gait - Second Trial  Gait Assistance: Supervision (Supv) (08/03/17 1429)  Assistive Device/: 2-wheeled walker (08/03/17 1429)  Ambulation Distance (Feet): 110 Feet (08/03/17 1429)  Pattern: Shuffle (08/03/17 1429)  Ambulation Surface: Tile (08/03/17 1429)  Gait Comments 1: ambulated with spouse supervising (08/03/17 1429)    Stairs  Stairs Mobility  Number of Stairs: 8 (08/03/17 1429)  Stair Management Assistance: Supervision (Supv) (08/03/17 1429)  IRP Stairs: Yes (08/03/17 1429)  1 step (curb): Supervision (Supv) (08/03/17 0846)  4 steps: Supervision (Supv) (08/03/17 1429)  12 steps: Supervision (Supv) (08/03/17 0846)  IRP Stairs Comments: Close supervision due to inability to look for foot placement. (08/03/17 0846)  Stair Management Technique: Two rails;Alternating pattern (08/03/17 1429)  Stairs Mobility Comments: Instructed spouse in safe supervision techniques. (08/03/17 1429)  Second Trial: No (08/03/17 1429)    Wheelchair Mobility       Balance  Balance  Standing - Static: Supervision (Supv) (08/03/17 1429)  Standing - Dynamic (eyes open): Supervision (Supv) (08/03/17 9448)  Balance Comments #1: Pt. has occasional LOB in staggard stance with feet close and saparated. Able to recover with UE support.  (08/03/17 6047)       (4) completely dependent (2) assistive person

## 2017-08-24 ENCOUNTER — APPOINTMENT (OUTPATIENT)
Dept: ELECTROPHYSIOLOGY | Facility: CLINIC | Age: 82
End: 2017-08-24

## 2017-09-15 ENCOUNTER — APPOINTMENT (OUTPATIENT)
Dept: CT IMAGING | Facility: HOSPITAL | Age: 82
End: 2017-09-15

## 2017-09-15 ENCOUNTER — OUTPATIENT (OUTPATIENT)
Dept: OUTPATIENT SERVICES | Facility: HOSPITAL | Age: 82
LOS: 1 days | Discharge: HOME | End: 2017-09-15
Payer: MEDICARE

## 2017-09-15 DIAGNOSIS — R19.07 GENERALIZED INTRA-ABDOMINAL AND PELVIC SWELLING, MASS AND LUMP: ICD-10-CM

## 2017-09-15 DIAGNOSIS — Z98.2 PRESENCE OF CEREBROSPINAL FLUID DRAINAGE DEVICE: Chronic | ICD-10-CM

## 2017-09-15 PROBLEM — Z00.00 ENCOUNTER FOR PREVENTIVE HEALTH EXAMINATION: Noted: 2017-09-15

## 2017-09-15 PROCEDURE — 72192 CT PELVIS W/O DYE: CPT | Mod: 26

## 2017-09-27 ENCOUNTER — INPATIENT (INPATIENT)
Facility: HOSPITAL | Age: 82
LOS: 5 days | Discharge: NURSING, REHAB OR LONG-TERM CARE | End: 2017-10-03
Attending: HOSPITALIST | Admitting: HOSPITALIST
Payer: MEDICARE

## 2017-09-27 VITALS
WEIGHT: 115.08 LBS | DIASTOLIC BLOOD PRESSURE: 76 MMHG | HEIGHT: 65 IN | TEMPERATURE: 100 F | SYSTOLIC BLOOD PRESSURE: 142 MMHG | HEART RATE: 75 BPM | OXYGEN SATURATION: 100 % | RESPIRATION RATE: 24 BRPM

## 2017-09-27 DIAGNOSIS — Z98.2 PRESENCE OF CEREBROSPINAL FLUID DRAINAGE DEVICE: Chronic | ICD-10-CM

## 2017-09-27 LAB
ALBUMIN SERPL ELPH-MCNC: 3.1 G/DL — LOW (ref 3.3–5)
ALP SERPL-CCNC: 85 U/L — SIGNIFICANT CHANGE UP (ref 40–120)
ALT FLD-CCNC: 14 U/L — SIGNIFICANT CHANGE UP (ref 12–78)
ANION GAP SERPL CALC-SCNC: 9 MMOL/L — SIGNIFICANT CHANGE UP (ref 5–17)
APTT BLD: 40.8 SEC — HIGH (ref 27.5–37.4)
AST SERPL-CCNC: 18 U/L — SIGNIFICANT CHANGE UP (ref 15–37)
BASOPHILS # BLD AUTO: 0.1 K/UL — SIGNIFICANT CHANGE UP (ref 0–0.2)
BASOPHILS NFR BLD AUTO: 1.8 % — SIGNIFICANT CHANGE UP (ref 0–2)
BILIRUB SERPL-MCNC: 0.5 MG/DL — SIGNIFICANT CHANGE UP (ref 0.2–1.2)
BUN SERPL-MCNC: 14 MG/DL — SIGNIFICANT CHANGE UP (ref 7–23)
CALCIUM SERPL-MCNC: 8.7 MG/DL — SIGNIFICANT CHANGE UP (ref 8.5–10.1)
CHLORIDE SERPL-SCNC: 103 MMOL/L — SIGNIFICANT CHANGE UP (ref 96–108)
CO2 SERPL-SCNC: 29 MMOL/L — SIGNIFICANT CHANGE UP (ref 22–31)
CREAT SERPL-MCNC: 0.88 MG/DL — SIGNIFICANT CHANGE UP (ref 0.5–1.3)
EOSINOPHIL # BLD AUTO: 0.1 K/UL — SIGNIFICANT CHANGE UP (ref 0–0.5)
EOSINOPHIL NFR BLD AUTO: 2.5 % — SIGNIFICANT CHANGE UP (ref 0–6)
GLUCOSE SERPL-MCNC: 106 MG/DL — HIGH (ref 70–99)
HCT VFR BLD CALC: 34.9 % — SIGNIFICANT CHANGE UP (ref 34.5–45)
HGB BLD-MCNC: 11.2 G/DL — LOW (ref 11.5–15.5)
INR BLD: 1.07 RATIO — SIGNIFICANT CHANGE UP (ref 0.88–1.16)
LYMPHOCYTES # BLD AUTO: 1.3 K/UL — SIGNIFICANT CHANGE UP (ref 1–3.3)
LYMPHOCYTES # BLD AUTO: 30.6 % — SIGNIFICANT CHANGE UP (ref 13–44)
MCHC RBC-ENTMCNC: 29.6 PG — SIGNIFICANT CHANGE UP (ref 27–34)
MCHC RBC-ENTMCNC: 32 GM/DL — SIGNIFICANT CHANGE UP (ref 32–36)
MCV RBC AUTO: 92.6 FL — SIGNIFICANT CHANGE UP (ref 80–100)
MONOCYTES # BLD AUTO: 0.4 K/UL — SIGNIFICANT CHANGE UP (ref 0–0.9)
MONOCYTES NFR BLD AUTO: 10.2 % — SIGNIFICANT CHANGE UP (ref 2–14)
NEUTROPHILS # BLD AUTO: 2.3 K/UL — SIGNIFICANT CHANGE UP (ref 1.8–7.4)
NEUTROPHILS NFR BLD AUTO: 54.8 % — SIGNIFICANT CHANGE UP (ref 43–77)
NT-PROBNP SERPL-SCNC: 209 PG/ML — SIGNIFICANT CHANGE UP (ref 0–450)
PLATELET # BLD AUTO: 269 K/UL — SIGNIFICANT CHANGE UP (ref 150–400)
POTASSIUM SERPL-MCNC: 3.4 MMOL/L — LOW (ref 3.5–5.3)
POTASSIUM SERPL-SCNC: 3.4 MMOL/L — LOW (ref 3.5–5.3)
PROT SERPL-MCNC: 7 GM/DL — SIGNIFICANT CHANGE UP (ref 6–8.3)
PROTHROM AB SERPL-ACNC: 11.7 SEC — SIGNIFICANT CHANGE UP (ref 9.8–12.7)
RBC # BLD: 3.76 M/UL — LOW (ref 3.8–5.2)
RBC # FLD: 14.4 % — SIGNIFICANT CHANGE UP (ref 11–15)
SODIUM SERPL-SCNC: 141 MMOL/L — SIGNIFICANT CHANGE UP (ref 135–145)
WBC # BLD: 4.2 K/UL — SIGNIFICANT CHANGE UP (ref 3.8–10.5)
WBC # FLD AUTO: 4.2 K/UL — SIGNIFICANT CHANGE UP (ref 3.8–10.5)

## 2017-09-27 PROCEDURE — 99285 EMERGENCY DEPT VISIT HI MDM: CPT

## 2017-09-27 PROCEDURE — 71010: CPT | Mod: 26

## 2017-09-27 PROCEDURE — 70450 CT HEAD/BRAIN W/O DYE: CPT | Mod: 26

## 2017-09-27 RX ORDER — SODIUM CHLORIDE 9 MG/ML
500 INJECTION INTRAMUSCULAR; INTRAVENOUS; SUBCUTANEOUS ONCE
Qty: 0 | Refills: 0 | Status: COMPLETED | OUTPATIENT
Start: 2017-09-27 | End: 2017-09-27

## 2017-09-27 RX ADMIN — SODIUM CHLORIDE 500 MILLILITER(S): 9 INJECTION INTRAMUSCULAR; INTRAVENOUS; SUBCUTANEOUS at 23:24

## 2017-09-27 RX ADMIN — Medication 2 MILLIGRAM(S): at 23:24

## 2017-09-27 NOTE — ED PROVIDER NOTE - OBJECTIVE STATEMENT
Pertinent PMH/PSH/FHx/SHx and Review of Systems contained within:  Patient presents to the ED from [facility] for prolonged seizures.  Per EMS, patient was seizing for over 1 hour prior to ER arrival, was given Midazolam 5 mg IV in field with  of seizure.  Patient started seizing again as soon as she arrived to ER.  Per EMS, patient was still unresponsive after seizure stopped in field.  2mg IV ativan given in ER, seizure stopped and patient is opening eyes and responsive to simple commands.  Per EMS, patient has to come to the hospital whenever she seizes because seizures are difficult to stop.  Per chart, patient is on lamictal.      Relevant PMHx/SHx/SOCHx/FAMH:    Seizure disorder, pelvic fracture, HTN, CVA, GI bleed, CHF, UTIs, HLD, Vit D deficiency, syncope, pacemaker, Generalized weakness    Social history unknown Pertinent PMH/PSH/FHx/SHx and Review of Systems contained within:  Patient presents to the ED from [facility] for prolonged seizures.  Per EMS, patient was seizing for over 1 hour prior to ER arrival, was given Midazolam 5 mg IV in field with  of seizure.  Patient started seizing again as soon as she arrived to ER.  Per EMS, patient was still unresponsive after seizure stopped in field.  2mg IV ativan given in ER, seizure stopped and patient is opening eyes and responsive to simple commands.  Per EMS, patient has to come to the hospital whenever she seizes because seizures are difficult to stop.  Per chart, patient is on lamictal and keppra.      Relevant PMHx/SHx/SOCHx/FAMH:    Seizure disorder, pelvic fracture, HTN, CVA, GI bleed, CHF, UTIs, HLD, Vit D deficiency, syncope, pacemaker, Generalized weakness    Social history unknown

## 2017-09-27 NOTE — ED PROVIDER NOTE - MEDICAL DECISION MAKING DETAILS
Patient with multiple seizures and UTI, suspect chronic infection based on discussion with family.  VSS.  Mental status improved in ER.  Neuro Dr. Gomes paged and aware.  Keppra loading dose given.  Patient is to be admitted to the hospital and the case was discussed with the admitting physician.  Any changes in plan, additional imaging/labs, and further work up will be at the discretion of the admitting physician.

## 2017-09-27 NOTE — ED ADULT TRIAGE NOTE - CHIEF COMPLAINT QUOTE
BIBA  seizure activity since 2130hrs.  from [facility].  EMS gave Valium 5mg IVP prior to arrival.

## 2017-09-28 DIAGNOSIS — N39.0 URINARY TRACT INFECTION, SITE NOT SPECIFIED: ICD-10-CM

## 2017-09-28 DIAGNOSIS — I50.9 HEART FAILURE, UNSPECIFIED: ICD-10-CM

## 2017-09-28 DIAGNOSIS — I10 ESSENTIAL (PRIMARY) HYPERTENSION: ICD-10-CM

## 2017-09-28 DIAGNOSIS — E78.00 PURE HYPERCHOLESTEROLEMIA, UNSPECIFIED: ICD-10-CM

## 2017-09-28 DIAGNOSIS — R56.9 UNSPECIFIED CONVULSIONS: ICD-10-CM

## 2017-09-28 LAB
ANION GAP SERPL CALC-SCNC: 7 MMOL/L — SIGNIFICANT CHANGE UP (ref 5–17)
APPEARANCE UR: ABNORMAL
BACTERIA # UR AUTO: ABNORMAL
BASOPHILS # BLD AUTO: 0.1 K/UL — SIGNIFICANT CHANGE UP (ref 0–0.2)
BASOPHILS NFR BLD AUTO: 1.5 % — SIGNIFICANT CHANGE UP (ref 0–2)
BILIRUB UR-MCNC: NEGATIVE — SIGNIFICANT CHANGE UP
BUN SERPL-MCNC: 8 MG/DL — SIGNIFICANT CHANGE UP (ref 7–23)
CALCIUM SERPL-MCNC: 7.9 MG/DL — LOW (ref 8.5–10.1)
CHLORIDE SERPL-SCNC: 107 MMOL/L — SIGNIFICANT CHANGE UP (ref 96–108)
CO2 SERPL-SCNC: 28 MMOL/L — SIGNIFICANT CHANGE UP (ref 22–31)
COLOR SPEC: YELLOW — SIGNIFICANT CHANGE UP
CREAT SERPL-MCNC: 0.66 MG/DL — SIGNIFICANT CHANGE UP (ref 0.5–1.3)
DIFF PNL FLD: ABNORMAL
EOSINOPHIL # BLD AUTO: 0.1 K/UL — SIGNIFICANT CHANGE UP (ref 0–0.5)
EOSINOPHIL NFR BLD AUTO: 2 % — SIGNIFICANT CHANGE UP (ref 0–6)
EPI CELLS # UR: SIGNIFICANT CHANGE UP
GLUCOSE SERPL-MCNC: 81 MG/DL — SIGNIFICANT CHANGE UP (ref 70–99)
GLUCOSE UR QL: NEGATIVE MG/DL — SIGNIFICANT CHANGE UP
HCT VFR BLD CALC: 35.3 % — SIGNIFICANT CHANGE UP (ref 34.5–45)
HGB BLD-MCNC: 11.4 G/DL — LOW (ref 11.5–15.5)
KETONES UR-MCNC: NEGATIVE — SIGNIFICANT CHANGE UP
LEUKOCYTE ESTERASE UR-ACNC: ABNORMAL
LYMPHOCYTES # BLD AUTO: 1.3 K/UL — SIGNIFICANT CHANGE UP (ref 1–3.3)
LYMPHOCYTES # BLD AUTO: 29 % — SIGNIFICANT CHANGE UP (ref 13–44)
MCHC RBC-ENTMCNC: 29.9 PG — SIGNIFICANT CHANGE UP (ref 27–34)
MCHC RBC-ENTMCNC: 32.2 GM/DL — SIGNIFICANT CHANGE UP (ref 32–36)
MCV RBC AUTO: 93 FL — SIGNIFICANT CHANGE UP (ref 80–100)
MONOCYTES # BLD AUTO: 0.5 K/UL — SIGNIFICANT CHANGE UP (ref 0–0.9)
MONOCYTES NFR BLD AUTO: 11.6 % — SIGNIFICANT CHANGE UP (ref 2–14)
NEUTROPHILS # BLD AUTO: 2.6 K/UL — SIGNIFICANT CHANGE UP (ref 1.8–7.4)
NEUTROPHILS NFR BLD AUTO: 55.9 % — SIGNIFICANT CHANGE UP (ref 43–77)
NITRITE UR-MCNC: POSITIVE
PH UR: 7 — SIGNIFICANT CHANGE UP (ref 5–8)
PLATELET # BLD AUTO: 255 K/UL — SIGNIFICANT CHANGE UP (ref 150–400)
POTASSIUM SERPL-MCNC: 3.5 MMOL/L — SIGNIFICANT CHANGE UP (ref 3.5–5.3)
POTASSIUM SERPL-SCNC: 3.5 MMOL/L — SIGNIFICANT CHANGE UP (ref 3.5–5.3)
PROT UR-MCNC: 100 MG/DL
RBC # BLD: 3.8 M/UL — SIGNIFICANT CHANGE UP (ref 3.8–5.2)
RBC # FLD: 14.1 % — SIGNIFICANT CHANGE UP (ref 11–15)
RBC CASTS # UR COMP ASSIST: >50 /HPF (ref 0–4)
SODIUM SERPL-SCNC: 142 MMOL/L — SIGNIFICANT CHANGE UP (ref 135–145)
SP GR SPEC: 1.01 — SIGNIFICANT CHANGE UP (ref 1.01–1.02)
UROBILINOGEN FLD QL: NEGATIVE MG/DL — SIGNIFICANT CHANGE UP
WBC # BLD: 4.6 K/UL — SIGNIFICANT CHANGE UP (ref 3.8–10.5)
WBC # FLD AUTO: 4.6 K/UL — SIGNIFICANT CHANGE UP (ref 3.8–10.5)
WBC UR QL: >50

## 2017-09-28 PROCEDURE — 99223 1ST HOSP IP/OBS HIGH 75: CPT

## 2017-09-28 RX ORDER — SODIUM CHLORIDE 9 MG/ML
1000 INJECTION INTRAMUSCULAR; INTRAVENOUS; SUBCUTANEOUS ONCE
Qty: 0 | Refills: 0 | Status: COMPLETED | OUTPATIENT
Start: 2017-09-28 | End: 2017-09-28

## 2017-09-28 RX ORDER — LOSARTAN POTASSIUM 100 MG/1
50 TABLET, FILM COATED ORAL DAILY
Qty: 0 | Refills: 0 | Status: DISCONTINUED | OUTPATIENT
Start: 2017-09-28 | End: 2017-10-03

## 2017-09-28 RX ORDER — TIMOLOL 0.5 %
1 DROPS OPHTHALMIC (EYE)
Qty: 0 | Refills: 0 | Status: DISCONTINUED | OUTPATIENT
Start: 2017-09-28 | End: 2017-10-03

## 2017-09-28 RX ORDER — LATANOPROST 0.05 MG/ML
1 SOLUTION/ DROPS OPHTHALMIC; TOPICAL AT BEDTIME
Qty: 0 | Refills: 0 | Status: DISCONTINUED | OUTPATIENT
Start: 2017-09-28 | End: 2017-10-03

## 2017-09-28 RX ORDER — CIPROFLOXACIN LACTATE 400MG/40ML
400 VIAL (ML) INTRAVENOUS EVERY 12 HOURS
Qty: 0 | Refills: 0 | Status: DISCONTINUED | OUTPATIENT
Start: 2017-09-28 | End: 2017-10-01

## 2017-09-28 RX ORDER — DOCUSATE SODIUM 100 MG
100 CAPSULE ORAL THREE TIMES A DAY
Qty: 0 | Refills: 0 | Status: DISCONTINUED | OUTPATIENT
Start: 2017-09-28 | End: 2017-10-03

## 2017-09-28 RX ORDER — CARVEDILOL PHOSPHATE 80 MG/1
12.5 CAPSULE, EXTENDED RELEASE ORAL EVERY 12 HOURS
Qty: 0 | Refills: 0 | Status: DISCONTINUED | OUTPATIENT
Start: 2017-09-28 | End: 2017-10-03

## 2017-09-28 RX ORDER — PANTOPRAZOLE SODIUM 20 MG/1
40 TABLET, DELAYED RELEASE ORAL
Qty: 0 | Refills: 0 | Status: DISCONTINUED | OUTPATIENT
Start: 2017-09-28 | End: 2017-10-03

## 2017-09-28 RX ORDER — LEVETIRACETAM 250 MG/1
1000 TABLET, FILM COATED ORAL
Qty: 0 | Refills: 0 | Status: DISCONTINUED | OUTPATIENT
Start: 2017-09-28 | End: 2017-10-03

## 2017-09-28 RX ORDER — LEVETIRACETAM 250 MG/1
1000 TABLET, FILM COATED ORAL ONCE
Qty: 0 | Refills: 0 | Status: COMPLETED | OUTPATIENT
Start: 2017-09-28 | End: 2017-09-28

## 2017-09-28 RX ORDER — ATORVASTATIN CALCIUM 80 MG/1
40 TABLET, FILM COATED ORAL AT BEDTIME
Qty: 0 | Refills: 0 | Status: DISCONTINUED | OUTPATIENT
Start: 2017-09-28 | End: 2017-10-03

## 2017-09-28 RX ORDER — LAMOTRIGINE 25 MG/1
100 TABLET, ORALLY DISINTEGRATING ORAL DAILY
Qty: 0 | Refills: 0 | Status: DISCONTINUED | OUTPATIENT
Start: 2017-09-28 | End: 2017-10-03

## 2017-09-28 RX ORDER — CIPROFLOXACIN LACTATE 400MG/40ML
400 VIAL (ML) INTRAVENOUS ONCE
Qty: 0 | Refills: 0 | Status: COMPLETED | OUTPATIENT
Start: 2017-09-28 | End: 2017-09-28

## 2017-09-28 RX ORDER — ASPIRIN/CALCIUM CARB/MAGNESIUM 324 MG
81 TABLET ORAL DAILY
Qty: 0 | Refills: 0 | Status: DISCONTINUED | OUTPATIENT
Start: 2017-09-28 | End: 2017-10-03

## 2017-09-28 RX ORDER — HEPARIN SODIUM 5000 [USP'U]/ML
5000 INJECTION INTRAVENOUS; SUBCUTANEOUS EVERY 12 HOURS
Qty: 0 | Refills: 0 | Status: DISCONTINUED | OUTPATIENT
Start: 2017-09-28 | End: 2017-10-03

## 2017-09-28 RX ORDER — AMLODIPINE BESYLATE 2.5 MG/1
2.5 TABLET ORAL DAILY
Qty: 0 | Refills: 0 | Status: DISCONTINUED | OUTPATIENT
Start: 2017-09-28 | End: 2017-09-29

## 2017-09-28 RX ADMIN — CARVEDILOL PHOSPHATE 12.5 MILLIGRAM(S): 80 CAPSULE, EXTENDED RELEASE ORAL at 06:17

## 2017-09-28 RX ADMIN — HEPARIN SODIUM 5000 UNIT(S): 5000 INJECTION INTRAVENOUS; SUBCUTANEOUS at 17:15

## 2017-09-28 RX ADMIN — Medication 200 MILLIGRAM(S): at 12:04

## 2017-09-28 RX ADMIN — LATANOPROST 1 DROP(S): 0.05 SOLUTION/ DROPS OPHTHALMIC; TOPICAL at 21:45

## 2017-09-28 RX ADMIN — Medication 100 MILLIGRAM(S): at 21:45

## 2017-09-28 RX ADMIN — ATORVASTATIN CALCIUM 40 MILLIGRAM(S): 80 TABLET, FILM COATED ORAL at 21:45

## 2017-09-28 RX ADMIN — AMLODIPINE BESYLATE 2.5 MILLIGRAM(S): 2.5 TABLET ORAL at 06:19

## 2017-09-28 RX ADMIN — HEPARIN SODIUM 5000 UNIT(S): 5000 INJECTION INTRAVENOUS; SUBCUTANEOUS at 06:19

## 2017-09-28 RX ADMIN — PANTOPRAZOLE SODIUM 40 MILLIGRAM(S): 20 TABLET, DELAYED RELEASE ORAL at 06:17

## 2017-09-28 RX ADMIN — Medication 100 MILLIGRAM(S): at 06:17

## 2017-09-28 RX ADMIN — LOSARTAN POTASSIUM 50 MILLIGRAM(S): 100 TABLET, FILM COATED ORAL at 06:17

## 2017-09-28 RX ADMIN — LEVETIRACETAM 1000 MILLIGRAM(S): 250 TABLET, FILM COATED ORAL at 11:40

## 2017-09-28 RX ADMIN — Medication 200 MILLIGRAM(S): at 02:44

## 2017-09-28 RX ADMIN — CARVEDILOL PHOSPHATE 12.5 MILLIGRAM(S): 80 CAPSULE, EXTENDED RELEASE ORAL at 17:15

## 2017-09-28 RX ADMIN — Medication 81 MILLIGRAM(S): at 11:39

## 2017-09-28 RX ADMIN — LAMOTRIGINE 100 MILLIGRAM(S): 25 TABLET, ORALLY DISINTEGRATING ORAL at 11:39

## 2017-09-28 RX ADMIN — LEVETIRACETAM 400 MILLIGRAM(S): 250 TABLET, FILM COATED ORAL at 02:17

## 2017-09-28 RX ADMIN — SODIUM CHLORIDE 500 MILLILITER(S): 9 INJECTION INTRAMUSCULAR; INTRAVENOUS; SUBCUTANEOUS at 02:56

## 2017-09-28 RX ADMIN — Medication 1 DROP(S): at 17:15

## 2017-09-28 RX ADMIN — Medication 100 MILLIGRAM(S): at 12:55

## 2017-09-28 RX ADMIN — Medication 1 DROP(S): at 06:18

## 2017-09-28 NOTE — ED ADULT NURSE NOTE - CHIEF COMPLAINT QUOTE
BIBA  seizure activity since 2130hrs.  from [facility].  EMS gave Valium 5mg IVP prior to arrival.

## 2017-09-28 NOTE — H&P ADULT - HISTORY OF PRESENT ILLNESS
86 y/o female with PMHx Seizure disorder, pelvic fracture, HTN, CVA, GI bleed, CHF, UTIs, HLD, Vit D deficiency, syncope, pacemaker presents to the ED from [facility] for prolonged seizures. Patient seen and examined at bedside, unable to give history due to lethargy. Responds to some simple commands. Per EMS, patient was seizing for over 1 hour prior to ER arrival, was given Midazolam 5 mg IV in field with  of seizure.  Patient started seizing again as soon as she arrived to ER.  Per EMS, patient was still unresponsive after seizure stopped in field.  2mg IV ativan given in ER, seizure stopped and patient is opening eyes and responsive to simple commands.  Per EMS, patient has to come to the hospital whenever she seizes because seizures are difficult to stop.  Patient is on lamotrigine and keppra. No known head trauma. Patient denies complaints.

## 2017-09-28 NOTE — H&P ADULT - NSHPLABSRESULTS_GEN_ALL_CORE
Vital Signs Last 24 Hrs  T(C): 37.4 (27 Sep 2017 23:14), Max: 37.6 (27 Sep 2017 22:52)  T(F): 99.4 (27 Sep 2017 23:14), Max: 99.6 (27 Sep 2017 22:52)  HR: 75 (27 Sep 2017 22:52) (75 - 75)  BP: 142/76 (27 Sep 2017 22:52) (142/76 - 142/76)  BP(mean): --  RR: 24 (27 Sep 2017 22:52) (24 - 24)  SpO2: 100% (27 Sep 2017 22:52) (100% - 100%)                        11.2   4.2   )-----------( 269      ( 27 Sep 2017 23:28 )             34.9   09-27    141  |  103  |  14  ----------------------------<  106<H>  3.4<L>   |  29  |  0.88    Ca    8.7      27 Sep 2017 23:28    TPro  7.0  /  Alb  3.1<L>  /  TBili  0.5  /  DBili  x   /  AST  18  /  ALT  14  /  AlkPhos  85  09-27  BNP: 209    CT Head No Cont (09.27.17 @ 23:55)  IMPRESSION:  No significant interval change since 7/18/2017. No intracranial   hemorrhage or mass effect.   If there are new or persistent symptoms, consider further evaluation with   brain MRI if clinically warranted and if there are no contraindications.

## 2017-09-28 NOTE — CHART NOTE - NSCHARTNOTEFT_GEN_A_CORE
86 y/o female with PMHx Seizure disorder, pelvic fracture, HTN, CVA, GI bleed, CHF, UTIs, HLD, Vit D deficiency, syncope, pacemaker presents to the ED from [facility] for prolonged seizures. Pt was IV benzo in the field by EMS to halt seizure. Pt seized again the ED and was given ativan and loaded with Keppra. Pt was seen by neuro and ordered EEG. Pt also found to have UTI and started on abx. Pt had similar admission 3 months ago for similar presentation.     GEN: alert, AA0x1, able to respond to questions,   resp: CTABL   CV: regular, no murmur   neuro: strength 5/5. can move all extremities     Pt is more alert this morning, but still slightly confused. Pt found to have UTI, but hemodynamically stable. Will follow up AC levels and EEG.

## 2017-09-28 NOTE — PATIENT PROFILE ADULT. - FUNCTIONAL SCREEN CURRENT LEVEL: COMMUNICATION, MLM
(2) difficulty understanding (not related to language barrier) (0) understands/communicates without difficulty

## 2017-09-28 NOTE — H&P ADULT - ASSESSMENT
86 y/o female with PMHx Seizure disorder, pelvic fracture, HTN, CVA, GI bleed, CHF, UTIs, HLD, Vit D deficiency, syncope, pacemaker presents to the ED from [facility] for prolonged seizures.

## 2017-09-28 NOTE — H&P ADULT - PROBLEM SELECTOR PLAN 1
-Admit patient to telemetry  -Continue -Admit patient to telemetry  -Keppra bolus in the ED  -Patient received ativan 2mg in ED  -NPO, IVF  -Continue keppra and lamotrigine home medications   -MRI is contraindicated due to patient's pacemaker  -EEG ordered   -f/u labs including lamotrigine level and keppra level  -neurology consult  -CXR ordered  -Another bolus NS ordered  -Seizure precautions and general precautions   -DVT ppx   -Discussed with Dr. Mott

## 2017-09-28 NOTE — PROGRESS NOTE ADULT - SUBJECTIVE AND OBJECTIVE BOX
INTERVAL HPI/OVERNIGHT EVENTS:  Subjective Complaints:  No seizures.  She wants to go home.  Neuro examination unremarkable with no focal deficits.      NEUROLOGICAL EXAM (Pertinent):  Vital Signs Last 24 Hrs  T(C): 36.9 (28 Sep 2017 17:12), Max: 37.4 (27 Sep 2017 23:14)  T(F): 98.4 (28 Sep 2017 17:12), Max: 99.4 (27 Sep 2017 23:14)  HR: 67 (28 Sep 2017 17:12) (56 - 67)  BP: 148/73 (28 Sep 2017 17:12) (148/73 - 173/80)  BP(mean): --  RR: 18 (28 Sep 2017 17:12) (16 - 18)  SpO2: 98% (28 Sep 2017 17:12) (98% - 100%)    MEDICATIONS  (STANDING):  aspirin  chewable 81 milliGRAM(s) Oral daily  losartan 50 milliGRAM(s) Oral daily  lamoTRIgine 100 milliGRAM(s) Oral daily  levETIRAcetam 1000 milliGRAM(s) Oral two times a day  atorvastatin 40 milliGRAM(s) Oral at bedtime  latanoprost 0.005% Ophthalmic Solution 1 Drop(s) Both EYES at bedtime  timolol 0.5% Solution 1 Drop(s) Both EYES two times a day  docusate sodium 100 milliGRAM(s) Oral three times a day  amLODIPine   Tablet 2.5 milliGRAM(s) Oral daily  carvedilol 12.5 milliGRAM(s) Oral every 12 hours  heparin  Injectable 5000 Unit(s) SubCutaneous every 12 hours  ciprofloxacin   IVPB 400 milliGRAM(s) IV Intermittent every 12 hours  pantoprazole    Tablet 40 milliGRAM(s) Oral before breakfast    MEDICATIONS  (PRN):  LORazepam   Injectable 2 milliGRAM(s) IV Push every 4 hours PRN seizure    LABS:                        11.4   4.6   )-----------( 255      ( 28 Sep 2017 11:48 )             35.3     -    142  |  107  |  8   ----------------------------<  81  3.5   |  28  |  0.66    Ca    7.9<L>      28 Sep 2017 11:48    TPro  7.0  /  Alb  3.1<L>  /  TBili  0.5  /  DBili  x   /  AST  18  /  ALT  14  /  AlkPhos  85  -    PT/INR - ( 27 Sep 2017 23:28 )   PT: 11.7 sec;   INR: 1.07 ratio         PTT - ( 27 Sep 2017 23:28 )  PTT:40.8 sec  Urinalysis Basic - ( 28 Sep 2017 01:08 )    Color: Yellow / Appearance: x / S.010 / pH: x  Gluc: x / Ketone: Negative  / Bili: Negative / Urobili: Negative mg/dL   Blood: x / Protein: 100 mg/dL / Nitrite: Positive   Leuk Esterase: Moderate / RBC: >50 /HPF / WBC >50   Sq Epi: x / Non Sq Epi: Few / Bacteria: Many    ASSESSMENT & OPINION:   Stable Generalized Seizures  RECOMMENDATIONS:  Await EEG.  Continue current medications

## 2017-09-29 DIAGNOSIS — E87.6 HYPOKALEMIA: ICD-10-CM

## 2017-09-29 LAB
ALBUMIN SERPL ELPH-MCNC: 2.8 G/DL — LOW (ref 3.3–5)
ALP SERPL-CCNC: 88 U/L — SIGNIFICANT CHANGE UP (ref 40–120)
ALT FLD-CCNC: 12 U/L — SIGNIFICANT CHANGE UP (ref 12–78)
ANION GAP SERPL CALC-SCNC: 8 MMOL/L — SIGNIFICANT CHANGE UP (ref 5–17)
AST SERPL-CCNC: 14 U/L — LOW (ref 15–37)
BILIRUB SERPL-MCNC: 0.8 MG/DL — SIGNIFICANT CHANGE UP (ref 0.2–1.2)
BUN SERPL-MCNC: 7 MG/DL — SIGNIFICANT CHANGE UP (ref 7–23)
CALCIUM SERPL-MCNC: 8.4 MG/DL — LOW (ref 8.5–10.1)
CHLORIDE SERPL-SCNC: 103 MMOL/L — SIGNIFICANT CHANGE UP (ref 96–108)
CO2 SERPL-SCNC: 29 MMOL/L — SIGNIFICANT CHANGE UP (ref 22–31)
CREAT SERPL-MCNC: 0.78 MG/DL — SIGNIFICANT CHANGE UP (ref 0.5–1.3)
GLUCOSE SERPL-MCNC: 87 MG/DL — SIGNIFICANT CHANGE UP (ref 70–99)
HCT VFR BLD CALC: 33.4 % — LOW (ref 34.5–45)
HGB BLD-MCNC: 11.2 G/DL — LOW (ref 11.5–15.5)
LAMOTRIGINE SERPL-MCNC: 3.8 MCG/ML — SIGNIFICANT CHANGE UP (ref 2.5–15)
MAGNESIUM SERPL-MCNC: 2.1 MG/DL — SIGNIFICANT CHANGE UP (ref 1.6–2.6)
MCHC RBC-ENTMCNC: 30.4 PG — SIGNIFICANT CHANGE UP (ref 27–34)
MCHC RBC-ENTMCNC: 33.6 GM/DL — SIGNIFICANT CHANGE UP (ref 32–36)
MCV RBC AUTO: 90.5 FL — SIGNIFICANT CHANGE UP (ref 80–100)
PHOSPHATE SERPL-MCNC: 2.8 MG/DL — SIGNIFICANT CHANGE UP (ref 2.5–4.5)
PLATELET # BLD AUTO: 268 K/UL — SIGNIFICANT CHANGE UP (ref 150–400)
POTASSIUM SERPL-MCNC: 2.9 MMOL/L — CRITICAL LOW (ref 3.5–5.3)
POTASSIUM SERPL-SCNC: 2.9 MMOL/L — CRITICAL LOW (ref 3.5–5.3)
PROT SERPL-MCNC: 6.8 GM/DL — SIGNIFICANT CHANGE UP (ref 6–8.3)
RBC # BLD: 3.69 M/UL — LOW (ref 3.8–5.2)
RBC # FLD: 14.2 % — SIGNIFICANT CHANGE UP (ref 11–15)
SODIUM SERPL-SCNC: 140 MMOL/L — SIGNIFICANT CHANGE UP (ref 135–145)
WBC # BLD: 3.5 K/UL — LOW (ref 3.8–10.5)
WBC # FLD AUTO: 3.5 K/UL — LOW (ref 3.8–10.5)

## 2017-09-29 PROCEDURE — 99233 SBSQ HOSP IP/OBS HIGH 50: CPT

## 2017-09-29 RX ORDER — AMLODIPINE BESYLATE 2.5 MG/1
5 TABLET ORAL DAILY
Qty: 0 | Refills: 0 | Status: DISCONTINUED | OUTPATIENT
Start: 2017-09-29 | End: 2017-10-03

## 2017-09-29 RX ORDER — POTASSIUM CHLORIDE 20 MEQ
10 PACKET (EA) ORAL
Qty: 0 | Refills: 0 | Status: COMPLETED | OUTPATIENT
Start: 2017-09-29 | End: 2017-09-29

## 2017-09-29 RX ADMIN — PANTOPRAZOLE SODIUM 40 MILLIGRAM(S): 20 TABLET, DELAYED RELEASE ORAL at 06:21

## 2017-09-29 RX ADMIN — HEPARIN SODIUM 5000 UNIT(S): 5000 INJECTION INTRAVENOUS; SUBCUTANEOUS at 06:20

## 2017-09-29 RX ADMIN — Medication 1 DROP(S): at 17:16

## 2017-09-29 RX ADMIN — Medication 100 MILLIGRAM(S): at 21:46

## 2017-09-29 RX ADMIN — AMLODIPINE BESYLATE 2.5 MILLIGRAM(S): 2.5 TABLET ORAL at 06:21

## 2017-09-29 RX ADMIN — Medication 200 MILLIGRAM(S): at 00:10

## 2017-09-29 RX ADMIN — Medication 100 MILLIGRAM(S): at 06:21

## 2017-09-29 RX ADMIN — LEVETIRACETAM 1000 MILLIGRAM(S): 250 TABLET, FILM COATED ORAL at 00:10

## 2017-09-29 RX ADMIN — LEVETIRACETAM 1000 MILLIGRAM(S): 250 TABLET, FILM COATED ORAL at 11:48

## 2017-09-29 RX ADMIN — CARVEDILOL PHOSPHATE 12.5 MILLIGRAM(S): 80 CAPSULE, EXTENDED RELEASE ORAL at 17:15

## 2017-09-29 RX ADMIN — Medication 100 MILLIEQUIVALENT(S): at 09:11

## 2017-09-29 RX ADMIN — HEPARIN SODIUM 5000 UNIT(S): 5000 INJECTION INTRAVENOUS; SUBCUTANEOUS at 17:15

## 2017-09-29 RX ADMIN — LAMOTRIGINE 100 MILLIGRAM(S): 25 TABLET, ORALLY DISINTEGRATING ORAL at 11:50

## 2017-09-29 RX ADMIN — Medication 200 MILLIGRAM(S): at 11:49

## 2017-09-29 RX ADMIN — LEVETIRACETAM 1000 MILLIGRAM(S): 250 TABLET, FILM COATED ORAL at 23:16

## 2017-09-29 RX ADMIN — Medication 200 MILLIGRAM(S): at 23:15

## 2017-09-29 RX ADMIN — AMLODIPINE BESYLATE 5 MILLIGRAM(S): 2.5 TABLET ORAL at 11:51

## 2017-09-29 RX ADMIN — LATANOPROST 1 DROP(S): 0.05 SOLUTION/ DROPS OPHTHALMIC; TOPICAL at 21:47

## 2017-09-29 RX ADMIN — ATORVASTATIN CALCIUM 40 MILLIGRAM(S): 80 TABLET, FILM COATED ORAL at 21:46

## 2017-09-29 RX ADMIN — LOSARTAN POTASSIUM 50 MILLIGRAM(S): 100 TABLET, FILM COATED ORAL at 06:21

## 2017-09-29 RX ADMIN — Medication 81 MILLIGRAM(S): at 11:49

## 2017-09-29 RX ADMIN — Medication 100 MILLIEQUIVALENT(S): at 10:35

## 2017-09-29 RX ADMIN — Medication 1 DROP(S): at 06:21

## 2017-09-29 RX ADMIN — Medication 100 MILLIEQUIVALENT(S): at 11:47

## 2017-09-29 NOTE — PROGRESS NOTE ADULT - SUBJECTIVE AND OBJECTIVE BOX
Patient is a 87y old  Female who presents with a chief complaint of Seizure (28 Sep 2017 01:35)      INTERVAL HPI/OVERNIGHT EVENTS:    MEDICATIONS  (STANDING):  aspirin  chewable 81 milliGRAM(s) Oral daily  losartan 50 milliGRAM(s) Oral daily  lamoTRIgine 100 milliGRAM(s) Oral daily  levETIRAcetam 1000 milliGRAM(s) Oral two times a day  atorvastatin 40 milliGRAM(s) Oral at bedtime  latanoprost 0.005% Ophthalmic Solution 1 Drop(s) Both EYES at bedtime  timolol 0.5% Solution 1 Drop(s) Both EYES two times a day  docusate sodium 100 milliGRAM(s) Oral three times a day  carvedilol 12.5 milliGRAM(s) Oral every 12 hours  heparin  Injectable 5000 Unit(s) SubCutaneous every 12 hours  ciprofloxacin   IVPB 400 milliGRAM(s) IV Intermittent every 12 hours  pantoprazole    Tablet 40 milliGRAM(s) Oral before breakfast  amLODIPine   Tablet 5 milliGRAM(s) Oral daily    MEDICATIONS  (PRN):  LORazepam   Injectable 2 milliGRAM(s) IV Push every 4 hours PRN seizure      Allergies    No Known Allergies    Intolerances        REVIEW OF SYSTEMS:  CONSTITUTIONAL: No fever, weight loss, or fatigue  EYES: No eye pain, visual disturbances, or discharge  ENMT:  No difficulty hearing, tinnitus, vertigo; No sinus or throat pain  NECK: No pain or stiffness  BREASTS: No pain, masses, or nipple discharge  RESPIRATORY: No cough, wheezing, chills or hemoptysis; No shortness of breath  CARDIOVASCULAR: No chest pain, palpitations, dizziness, or leg swelling  GASTROINTESTINAL: No abdominal or epigastric pain. No nausea, vomiting, or hematemesis; No diarrhea or constipation. No melena or hematochezia.  GENITOURINARY: No dysuria, frequency, hematuria, or incontinence  NEUROLOGICAL: No headaches, memory loss, loss of strength, numbness, or tremors  SKIN: No itching, burning, rashes, or lesions   LYMPH NODES: No enlarged glands  ENDOCRINE: No heat or cold intolerance; No hair loss  MUSCULOSKELETAL: No joint pain or swelling; No muscle, back, or extremity pain  PSYCHIATRIC: No depression, anxiety, mood swings, or difficulty sleeping  HEME/LYMPH: No easy bruising, or bleeding gums  ALLERGY AND IMMUNOLOGIC: No hives or eczema    Vital Signs Last 24 Hrs  T(C): 36.6 (29 Sep 2017 16:37), Max: 37 (29 Sep 2017 11:28)  T(F): 97.8 (29 Sep 2017 16:37), Max: 98.6 (29 Sep 2017 11:28)  HR: 61 (29 Sep 2017 16:37) (56 - 67)  BP: 160/75 (29 Sep 2017 16:37) (146/69 - 163/80)  BP(mean): --  RR: 16 (29 Sep 2017 16:37) (16 - 17)  SpO2: 97% (29 Sep 2017 16:37) (97% - 98%)    PHYSICAL EXAM:  GENERAL: NAD, well-groomed, well-developed  HEAD:  Atraumatic, Normocephalic  EYES: EOMI, PERRLA, conjunctiva and sclera clear  ENMT: No tonsillar erythema, exudates, or enlargement; Moist mucous membranes, Good dentition, No lesions  NECK: Supple, No JVD, Normal thyroid  NERVOUS SYSTEM:  Alert & Oriented X3, Good concentration; Motor Strength 5/5 B/L upper and lower extremities; DTRs 2+ intact and symmetric  CHEST/LUNG: Clear to percussion bilaterally; No rales, rhonchi, wheezing, or rubs  HEART: Regular rate and rhythm; No murmurs, rubs, or gallops  ABDOMEN: Soft, Nontender, Nondistended; Bowel sounds present  EXTREMITIES:  2+ Peripheral Pulses, No clubbing, cyanosis, or edema  LYMPH: No lymphadenopathy noted  SKIN: No rashes or lesions    LABS:                        11.2   3.5   )-----------( 268      ( 29 Sep 2017 08:04 )             33.4         140  |  103  |  7   ----------------------------<  87  2.9<LL>   |  29  |  0.78    Ca    8.4<L>      29 Sep 2017 08:09  Phos  2.8       Mg     2.1         TPro  6.8  /  Alb  2.8<L>  /  TBili  0.8  /  DBili  x   /  AST  14<L>  /  ALT  12  /  AlkPhos  88      PT/INR - ( 27 Sep 2017 23:28 )   PT: 11.7 sec;   INR: 1.07 ratio         PTT - ( 27 Sep 2017 23:28 )  PTT:40.8 sec  Urinalysis Basic - ( 28 Sep 2017 01:08 )    Color: Yellow / Appearance: x / S.010 / pH: x  Gluc: x / Ketone: Negative  / Bili: Negative / Urobili: Negative mg/dL   Blood: x / Protein: 100 mg/dL / Nitrite: Positive   Leuk Esterase: Moderate / RBC: >50 /HPF / WBC >50   Sq Epi: x / Non Sq Epi: Few / Bacteria: Many      CAPILLARY BLOOD GLUCOSE          RADIOLOGY & ADDITIONAL TESTS:    Imaging Personally Reviewed:  [ ] YES  [ ] NO    Consultant(s) Notes Reviewed:  [ ] YES  [ ] NO    Care Discussed with Consultants/Other Providers [ ] YES  [ ] NO Patient is a 87y old  Female who presents with a chief complaint of Seizure (28 Sep 2017 01:35)      INTERVAL HPI/OVERNIGHT EVENTS:    MEDICATIONS  (STANDING):  aspirin  chewable 81 milliGRAM(s) Oral daily  losartan 50 milliGRAM(s) Oral daily  lamoTRIgine 100 milliGRAM(s) Oral daily  levETIRAcetam 1000 milliGRAM(s) Oral two times a day  atorvastatin 40 milliGRAM(s) Oral at bedtime  latanoprost 0.005% Ophthalmic Solution 1 Drop(s) Both EYES at bedtime  timolol 0.5% Solution 1 Drop(s) Both EYES two times a day  docusate sodium 100 milliGRAM(s) Oral three times a day  carvedilol 12.5 milliGRAM(s) Oral every 12 hours  heparin  Injectable 5000 Unit(s) SubCutaneous every 12 hours  ciprofloxacin   IVPB 400 milliGRAM(s) IV Intermittent every 12 hours  pantoprazole    Tablet 40 milliGRAM(s) Oral before breakfast  amLODIPine   Tablet 5 milliGRAM(s) Oral daily    MEDICATIONS  (PRN):  LORazepam   Injectable 2 milliGRAM(s) IV Push every 4 hours PRN seizure      Allergies    No Known Allergies    Intolerances        REVIEW OF SYSTEMS:  CONSTITUTIONAL: No fever, weight loss, or fatigue  EYES: No eye pain, visual disturbances, or discharge  ENMT:  No difficulty hearing, tinnitus, vertigo; No sinus or throat pain  NECK: No pain or stiffness  RESPIRATORY: No cough, wheezing, chills or hemoptysis; No shortness of breath  CARDIOVASCULAR: No chest pain, palpitations, dizziness, or leg swelling  GASTROINTESTINAL: No abdominal or epigastric pain. No nausea, vomiting, or hematemesis; No diarrhea or constipation. No melena or hematochezia.  GENITOURINARY: No dysuria, frequency, hematuria, or incontinence  NEUROLOGICAL: No headaches,   SKIN: No itching, burning, rashes, or lesions   LYMPH NODES: No enlarged glands  ENDOCRINE: No heat or cold intolerance; No hair loss  MUSCULOSKELETAL: No joint pain or swelling; No muscle, back, or extremity pain  PSYCHIATRIC: No depression, anxiety, mood swings, or difficulty sleeping  HEME/LYMPH: No easy bruising, or bleeding gums  ALLERGY AND IMMUNOLOGIC: No hives or eczema    Vital Signs Last 24 Hrs  T(C): 36.6 (29 Sep 2017 16:37), Max: 37 (29 Sep 2017 11:28)  T(F): 97.8 (29 Sep 2017 16:37), Max: 98.6 (29 Sep 2017 11:28)  HR: 61 (29 Sep 2017 16:37) (56 - 67)  BP: 160/75 (29 Sep 2017 16:37) (146/69 - 163/80)  BP(mean): --  RR: 16 (29 Sep 2017 16:37) (16 - 17)  SpO2: 97% (29 Sep 2017 16:37) (97% - 98%)    PHYSICAL EXAM:  GENERAL: NAD, well-groomed, well-developed  HEAD:  Atraumatic, Normocephalic  EYES: EOMI, PERRLA, conjunctiva and sclera clear  ENMT: No tonsillar erythema, exudates, or enlargement; Moist mucous membranes, Good dentition, No lesions  NECK: Supple, No JVD, Normal thyroid  NERVOUS SYSTEM:  Alert & Oriented X3, Good concentration; Motor Strength 5/5 B/L upper and lower extremities; DTRs 2+ intact and symmetric  CHEST/LUNG: Clear to percussion bilaterally; No rales, rhonchi, wheezing, or rubs  HEART: Regular rate and rhythm; No murmurs, rubs, or gallops  ABDOMEN: Soft, Nontender, Nondistended; Bowel sounds present  EXTREMITIES:  2+ Peripheral Pulses, No clubbing, cyanosis, or edema  LYMPH: No lymphadenopathy noted      LABS:                        11.2   3.5   )-----------( 268      ( 29 Sep 2017 08:04 )             33.4     -    140  |  103  |  7   ----------------------------<  87  2.9<LL>   |  29  |  0.78    Ca    8.4<L>      29 Sep 2017 08:09  Phos  2.8       Mg     2.1         TPro  6.8  /  Alb  2.8<L>  /  TBili  0.8  /  DBili  x   /  AST  14<L>  /  ALT  12  /  AlkPhos  88  -    PT/INR - ( 27 Sep 2017 23:28 )   PT: 11.7 sec;   INR: 1.07 ratio         PTT - ( 27 Sep 2017 23:28 )  PTT:40.8 sec  Urinalysis Basic - ( 28 Sep 2017 01:08 )    Color: Yellow / Appearance: x / S.010 / pH: x  Gluc: x / Ketone: Negative  / Bili: Negative / Urobili: Negative mg/dL   Blood: x / Protein: 100 mg/dL / Nitrite: Positive   Leuk Esterase: Moderate / RBC: >50 /HPF / WBC >50   Sq Epi: x / Non Sq Epi: Few / Bacteria: Many      CAPILLARY BLOOD GLUCOSE          RADIOLOGY & ADDITIONAL TESTS:    Imaging Personally Reviewed:  [x ] YES  [ ] NO    Consultant(s) Notes Reviewed:  [ ] YES  [ ] NO    Care Discussed with Consultants/Other Providers [ ] YES  [ ] NO

## 2017-09-29 NOTE — PROGRESS NOTE ADULT - PROBLEM SELECTOR PLAN 1
-Admit patient to telemetry  - c/w Keppra   - keppra level 3.8  - IVF  -Continue keppra and lamotrigine home medications   -MRI is contraindicated due to patient's pacemaker  -EEG ordered   -neurology consult  -Seizure precautions and general precautions

## 2017-09-29 NOTE — PROGRESS NOTE ADULT - ASSESSMENT
86 y/o female with PMHx Seizure disorder, pelvic fracture, HTN, CVA, GI bleed, CHF, UTIs, HLD, Vit D deficiency, syncope, pacemaker presents to the ED from [facility] for prolonged seizures.  Pt was given IV benzo in the field by EMS to halt seizure. Pt seized again the ED and was given ativan and loaded with Keppra. Pt is more alert since admission and no new seizure. Pt was seen by neuro EEG was done today, results pending. Pt also found to have UTI and started on abx. Pt had similar admission 3 months ago for similar presentation.

## 2017-09-30 LAB
-  AMIKACIN: SIGNIFICANT CHANGE UP
-  AMPICILLIN/SULBACTAM: SIGNIFICANT CHANGE UP
-  AMPICILLIN: SIGNIFICANT CHANGE UP
-  AZTREONAM: SIGNIFICANT CHANGE UP
-  CEFAZOLIN: SIGNIFICANT CHANGE UP
-  CEFEPIME: SIGNIFICANT CHANGE UP
-  CEFOXITIN: SIGNIFICANT CHANGE UP
-  CEFTAZIDIME: SIGNIFICANT CHANGE UP
-  CEFTRIAXONE: SIGNIFICANT CHANGE UP
-  CIPROFLOXACIN: SIGNIFICANT CHANGE UP
-  ERTAPENEM: SIGNIFICANT CHANGE UP
-  GENTAMICIN: SIGNIFICANT CHANGE UP
-  IMIPENEM: SIGNIFICANT CHANGE UP
-  LEVOFLOXACIN: SIGNIFICANT CHANGE UP
-  MEROPENEM: SIGNIFICANT CHANGE UP
-  NITROFURANTOIN: SIGNIFICANT CHANGE UP
-  PIPERACILLIN/TAZOBACTAM: SIGNIFICANT CHANGE UP
-  TOBRAMYCIN: SIGNIFICANT CHANGE UP
-  TRIMETHOPRIM/SULFAMETHOXAZOLE: SIGNIFICANT CHANGE UP
ANION GAP SERPL CALC-SCNC: 9 MMOL/L — SIGNIFICANT CHANGE UP (ref 5–17)
BUN SERPL-MCNC: 9 MG/DL — SIGNIFICANT CHANGE UP (ref 7–23)
CALCIUM SERPL-MCNC: 8.7 MG/DL — SIGNIFICANT CHANGE UP (ref 8.5–10.1)
CHLORIDE SERPL-SCNC: 105 MMOL/L — SIGNIFICANT CHANGE UP (ref 96–108)
CO2 SERPL-SCNC: 26 MMOL/L — SIGNIFICANT CHANGE UP (ref 22–31)
CREAT SERPL-MCNC: 0.79 MG/DL — SIGNIFICANT CHANGE UP (ref 0.5–1.3)
CULTURE RESULTS: SIGNIFICANT CHANGE UP
GLUCOSE SERPL-MCNC: 97 MG/DL — SIGNIFICANT CHANGE UP (ref 70–99)
HCT VFR BLD CALC: 35.6 % — SIGNIFICANT CHANGE UP (ref 34.5–45)
HGB BLD-MCNC: 11.6 G/DL — SIGNIFICANT CHANGE UP (ref 11.5–15.5)
LEVETIRACETAM SERPL-MCNC: 62.2 MCG/ML — HIGH (ref 12–46)
MAGNESIUM SERPL-MCNC: 2.1 MG/DL — SIGNIFICANT CHANGE UP (ref 1.6–2.6)
MCHC RBC-ENTMCNC: 30 PG — SIGNIFICANT CHANGE UP (ref 27–34)
MCHC RBC-ENTMCNC: 32.5 GM/DL — SIGNIFICANT CHANGE UP (ref 32–36)
MCV RBC AUTO: 92.5 FL — SIGNIFICANT CHANGE UP (ref 80–100)
METHOD TYPE: SIGNIFICANT CHANGE UP
ORGANISM # SPEC MICROSCOPIC CNT: SIGNIFICANT CHANGE UP
ORGANISM # SPEC MICROSCOPIC CNT: SIGNIFICANT CHANGE UP
PHOSPHATE SERPL-MCNC: 3.1 MG/DL — SIGNIFICANT CHANGE UP (ref 2.5–4.5)
PLATELET # BLD AUTO: 268 K/UL — SIGNIFICANT CHANGE UP (ref 150–400)
POTASSIUM SERPL-MCNC: 3.2 MMOL/L — LOW (ref 3.5–5.3)
POTASSIUM SERPL-SCNC: 3.2 MMOL/L — LOW (ref 3.5–5.3)
RBC # BLD: 3.85 M/UL — SIGNIFICANT CHANGE UP (ref 3.8–5.2)
RBC # FLD: 13.9 % — SIGNIFICANT CHANGE UP (ref 11–15)
SODIUM SERPL-SCNC: 140 MMOL/L — SIGNIFICANT CHANGE UP (ref 135–145)
SPECIMEN SOURCE: SIGNIFICANT CHANGE UP
WBC # BLD: 3.6 K/UL — LOW (ref 3.8–10.5)
WBC # FLD AUTO: 3.6 K/UL — LOW (ref 3.8–10.5)

## 2017-09-30 PROCEDURE — 99232 SBSQ HOSP IP/OBS MODERATE 35: CPT

## 2017-09-30 RX ORDER — POTASSIUM CHLORIDE 20 MEQ
40 PACKET (EA) ORAL EVERY 4 HOURS
Qty: 0 | Refills: 0 | Status: COMPLETED | OUTPATIENT
Start: 2017-09-30 | End: 2017-09-30

## 2017-09-30 RX ORDER — POTASSIUM CHLORIDE 20 MEQ
10 PACKET (EA) ORAL
Qty: 0 | Refills: 0 | Status: COMPLETED | OUTPATIENT
Start: 2017-09-30 | End: 2017-09-30

## 2017-09-30 RX ADMIN — LEVETIRACETAM 1000 MILLIGRAM(S): 250 TABLET, FILM COATED ORAL at 11:42

## 2017-09-30 RX ADMIN — AMLODIPINE BESYLATE 5 MILLIGRAM(S): 2.5 TABLET ORAL at 05:37

## 2017-09-30 RX ADMIN — HEPARIN SODIUM 5000 UNIT(S): 5000 INJECTION INTRAVENOUS; SUBCUTANEOUS at 17:13

## 2017-09-30 RX ADMIN — Medication 200 MILLIGRAM(S): at 11:42

## 2017-09-30 RX ADMIN — Medication 40 MILLIEQUIVALENT(S): at 15:46

## 2017-09-30 RX ADMIN — Medication 1 DROP(S): at 05:36

## 2017-09-30 RX ADMIN — LATANOPROST 1 DROP(S): 0.05 SOLUTION/ DROPS OPHTHALMIC; TOPICAL at 21:29

## 2017-09-30 RX ADMIN — ATORVASTATIN CALCIUM 40 MILLIGRAM(S): 80 TABLET, FILM COATED ORAL at 21:28

## 2017-09-30 RX ADMIN — Medication 100 MILLIEQUIVALENT(S): at 11:42

## 2017-09-30 RX ADMIN — CARVEDILOL PHOSPHATE 12.5 MILLIGRAM(S): 80 CAPSULE, EXTENDED RELEASE ORAL at 05:35

## 2017-09-30 RX ADMIN — Medication 100 MILLIGRAM(S): at 15:47

## 2017-09-30 RX ADMIN — LOSARTAN POTASSIUM 50 MILLIGRAM(S): 100 TABLET, FILM COATED ORAL at 05:35

## 2017-09-30 RX ADMIN — CARVEDILOL PHOSPHATE 12.5 MILLIGRAM(S): 80 CAPSULE, EXTENDED RELEASE ORAL at 17:14

## 2017-09-30 RX ADMIN — PANTOPRAZOLE SODIUM 40 MILLIGRAM(S): 20 TABLET, DELAYED RELEASE ORAL at 06:03

## 2017-09-30 RX ADMIN — Medication 100 MILLIEQUIVALENT(S): at 10:37

## 2017-09-30 RX ADMIN — Medication 1 DROP(S): at 17:13

## 2017-09-30 RX ADMIN — LAMOTRIGINE 100 MILLIGRAM(S): 25 TABLET, ORALLY DISINTEGRATING ORAL at 11:43

## 2017-09-30 RX ADMIN — Medication 40 MILLIEQUIVALENT(S): at 10:38

## 2017-09-30 RX ADMIN — Medication 40 MILLIEQUIVALENT(S): at 17:13

## 2017-09-30 RX ADMIN — Medication 100 MILLIGRAM(S): at 21:28

## 2017-09-30 RX ADMIN — Medication 81 MILLIGRAM(S): at 11:42

## 2017-09-30 RX ADMIN — HEPARIN SODIUM 5000 UNIT(S): 5000 INJECTION INTRAVENOUS; SUBCUTANEOUS at 05:36

## 2017-09-30 NOTE — PHYSICAL THERAPY INITIAL EVALUATION ADULT - GENERAL OBSERVATIONS, REHAB EVAL
Pt encountered supine in bed, sleeping, in enhanced observation room, NAD, alert and oriented x2 once woken up with sternal rub.

## 2017-09-30 NOTE — PROGRESS NOTE ADULT - SUBJECTIVE AND OBJECTIVE BOX
Patient is a 87y old  Female who presents with a chief complaint of Seizure (28 Sep 2017 01:35)      INTERVAL HPI/OVERNIGHT EVENTS:  pt  seen   at   bed   side   MEDICATIONS  (STANDING):  aspirin  chewable 81 milliGRAM(s) Oral daily  losartan 50 milliGRAM(s) Oral daily  lamoTRIgine 100 milliGRAM(s) Oral daily  levETIRAcetam 1000 milliGRAM(s) Oral two times a day  atorvastatin 40 milliGRAM(s) Oral at bedtime  latanoprost 0.005% Ophthalmic Solution 1 Drop(s) Both EYES at bedtime  timolol 0.5% Solution 1 Drop(s) Both EYES two times a day  docusate sodium 100 milliGRAM(s) Oral three times a day  carvedilol 12.5 milliGRAM(s) Oral every 12 hours  heparin  Injectable 5000 Unit(s) SubCutaneous every 12 hours  ciprofloxacin   IVPB 400 milliGRAM(s) IV Intermittent every 12 hours  pantoprazole    Tablet 40 milliGRAM(s) Oral before breakfast  amLODIPine   Tablet 5 milliGRAM(s) Oral daily  potassium chloride    Tablet ER 40 milliEquivalent(s) Oral every 4 hours  potassium chloride  10 mEq/100 mL IVPB 10 milliEquivalent(s) IV Intermittent every 1 hour    MEDICATIONS  (PRN):  LORazepam   Injectable 2 milliGRAM(s) IV Push every 4 hours PRN seizure      Allergies    No Known Allergies    Intolerances        Vital Signs Last 24 Hrs  T(C): 36.1 (30 Sep 2017 10:50), Max: 36.8 (30 Sep 2017 05:13)  T(F): 97 (30 Sep 2017 10:50), Max: 98.2 (30 Sep 2017 05:13)  HR: 65 (30 Sep 2017 10:50) (59 - 65)  BP: 152/76 (30 Sep 2017 10:50) (152/76 - 178/76)  BP(mean): --  RR: 16 (30 Sep 2017 10:50) (16 - 16)  SpO2: 99% (30 Sep 2017 10:50) (97% - 100%)    PHYSICAL EXAM:  Heart  - S1 , S2   +   CHEST -   Upper   LT   side   of  chest   wall  pacemaker pr  Lung-   B/L   air   entry   ABD-   soft, BS+   EXT-   No   edema   LABS:                        11.6   3.6   )-----------( 268      ( 30 Sep 2017 07:16 )             35.6     09-30    140  |  105  |  9   ----------------------------<  97  3.2<L>   |  26  |  0.79    Ca    8.7      30 Sep 2017 07:16  Phos  3.1     09-30  Mg     2.1     09-30    TPro  6.8  /  Alb  2.8<L>  /  TBili  0.8  /  DBili  x   /  AST  14<L>  /  ALT  12  /  AlkPhos  88  09-29        CAPILLARY BLOOD GLUCOSE        Lipid panel:           TSH     RADIOLOGY & ADDITIONAL TESTS:    Imaging Personally Reviewed:  [ ] YES  [ ] NO    Consultant(s) Notes Reviewed:  [ ] YES  [ ] NO    Care Discussed with Consultants/Other Providers [ ] YES  [ ] NO

## 2017-09-30 NOTE — PHYSICAL THERAPY INITIAL EVALUATION ADULT - MODIFIED CLINICAL TEST OF SENSORY INTEGRATION IN BALANCE TEST
Barthel Index: Feeding Score _0__, Bathing Score _0__, Grooming Score _0__, Dressing Score _0__, Bowels Score _0__, Bladder Score _0__, Toilet Score _0__, Transfers Score _0__, Mobility Score __0_, Stairs Score _0__,     Total Score __0_

## 2017-09-30 NOTE — PHYSICAL THERAPY INITIAL EVALUATION ADULT - BED MOBILITY LIMITATIONS, REHAB EVAL
decreased ability to use legs for bridging/pushing/decreased ability to use arms for pushing/pulling/pt lethargic, tremors

## 2017-09-30 NOTE — PHYSICAL THERAPY INITIAL EVALUATION ADULT - ADDITIONAL COMMENTS
pt comes from [facility], short term rehab  during previous admission pt took 1 step with assistance. EMR reports state that pt was ambulatory prior to being in rehab.

## 2017-10-01 ENCOUNTER — TRANSCRIPTION ENCOUNTER (OUTPATIENT)
Age: 82
End: 2017-10-01

## 2017-10-01 LAB
ANION GAP SERPL CALC-SCNC: 7 MMOL/L — SIGNIFICANT CHANGE UP (ref 5–17)
BUN SERPL-MCNC: 14 MG/DL — SIGNIFICANT CHANGE UP (ref 7–23)
CALCIUM SERPL-MCNC: 9.1 MG/DL — SIGNIFICANT CHANGE UP (ref 8.5–10.1)
CHLORIDE SERPL-SCNC: 107 MMOL/L — SIGNIFICANT CHANGE UP (ref 96–108)
CO2 SERPL-SCNC: 27 MMOL/L — SIGNIFICANT CHANGE UP (ref 22–31)
CREAT SERPL-MCNC: 0.93 MG/DL — SIGNIFICANT CHANGE UP (ref 0.5–1.3)
GLUCOSE SERPL-MCNC: 100 MG/DL — HIGH (ref 70–99)
POTASSIUM SERPL-MCNC: 4.3 MMOL/L — SIGNIFICANT CHANGE UP (ref 3.5–5.3)
POTASSIUM SERPL-SCNC: 4.3 MMOL/L — SIGNIFICANT CHANGE UP (ref 3.5–5.3)
SODIUM SERPL-SCNC: 141 MMOL/L — SIGNIFICANT CHANGE UP (ref 135–145)

## 2017-10-01 PROCEDURE — 99233 SBSQ HOSP IP/OBS HIGH 50: CPT

## 2017-10-01 RX ORDER — CIPROFLOXACIN LACTATE 400MG/40ML
250 VIAL (ML) INTRAVENOUS EVERY 12 HOURS
Qty: 0 | Refills: 0 | Status: DISCONTINUED | OUTPATIENT
Start: 2017-10-01 | End: 2017-10-03

## 2017-10-01 RX ORDER — CIPROFLOXACIN LACTATE 400MG/40ML
500 VIAL (ML) INTRAVENOUS EVERY 12 HOURS
Qty: 0 | Refills: 0 | Status: DISCONTINUED | OUTPATIENT
Start: 2017-10-01 | End: 2017-10-01

## 2017-10-01 RX ADMIN — LAMOTRIGINE 100 MILLIGRAM(S): 25 TABLET, ORALLY DISINTEGRATING ORAL at 17:17

## 2017-10-01 RX ADMIN — CARVEDILOL PHOSPHATE 12.5 MILLIGRAM(S): 80 CAPSULE, EXTENDED RELEASE ORAL at 06:04

## 2017-10-01 RX ADMIN — PANTOPRAZOLE SODIUM 40 MILLIGRAM(S): 20 TABLET, DELAYED RELEASE ORAL at 07:48

## 2017-10-01 RX ADMIN — Medication 100 MILLIGRAM(S): at 06:03

## 2017-10-01 RX ADMIN — HEPARIN SODIUM 5000 UNIT(S): 5000 INJECTION INTRAVENOUS; SUBCUTANEOUS at 06:04

## 2017-10-01 RX ADMIN — CARVEDILOL PHOSPHATE 12.5 MILLIGRAM(S): 80 CAPSULE, EXTENDED RELEASE ORAL at 17:17

## 2017-10-01 RX ADMIN — ATORVASTATIN CALCIUM 40 MILLIGRAM(S): 80 TABLET, FILM COATED ORAL at 21:10

## 2017-10-01 RX ADMIN — LOSARTAN POTASSIUM 50 MILLIGRAM(S): 100 TABLET, FILM COATED ORAL at 06:03

## 2017-10-01 RX ADMIN — Medication 200 MILLIGRAM(S): at 00:19

## 2017-10-01 RX ADMIN — Medication 200 MILLIGRAM(S): at 12:30

## 2017-10-01 RX ADMIN — HEPARIN SODIUM 5000 UNIT(S): 5000 INJECTION INTRAVENOUS; SUBCUTANEOUS at 17:17

## 2017-10-01 RX ADMIN — Medication 81 MILLIGRAM(S): at 12:30

## 2017-10-01 RX ADMIN — LEVETIRACETAM 1000 MILLIGRAM(S): 250 TABLET, FILM COATED ORAL at 00:22

## 2017-10-01 RX ADMIN — Medication 250 MILLIGRAM(S): at 17:17

## 2017-10-01 RX ADMIN — LATANOPROST 1 DROP(S): 0.05 SOLUTION/ DROPS OPHTHALMIC; TOPICAL at 21:10

## 2017-10-01 RX ADMIN — Medication 100 MILLIGRAM(S): at 12:35

## 2017-10-01 RX ADMIN — Medication 100 MILLIGRAM(S): at 21:10

## 2017-10-01 RX ADMIN — AMLODIPINE BESYLATE 5 MILLIGRAM(S): 2.5 TABLET ORAL at 06:06

## 2017-10-01 RX ADMIN — Medication 1 DROP(S): at 17:17

## 2017-10-01 RX ADMIN — LEVETIRACETAM 1000 MILLIGRAM(S): 250 TABLET, FILM COATED ORAL at 12:30

## 2017-10-01 NOTE — PROGRESS NOTE ADULT - SUBJECTIVE AND OBJECTIVE BOX
INTERVAL HPI/OVERNIGHT EVENTS:  Subjective Complaints:   No complaints.  she walked this AM to the r and the hallway.  Exhibits no focal motor sensory deficits.  Speech intact.  No seizures.  Will check EEg.    NEUROLOGICAL EXAM (Pertinent):  Vital Signs Last 24 Hrs  T(C): 36.6 (01 Oct 2017 16:50), Max: 37.6 (30 Sep 2017 23:25)  T(F): 97.8 (01 Oct 2017 16:50), Max: 99.6 (30 Sep 2017 23:25)  HR: 71 (01 Oct 2017 16:50) (69 - 80)  BP: 145/84 (01 Oct 2017 16:50) (111/70 - 153/77)  BP(mean): --  RR: 17 (01 Oct 2017 16:50) (16 - 17)  SpO2: 99% (01 Oct 2017 16:50) (98% - 100%)    MEDICATIONS  (STANDING):  aspirin  chewable 81 milliGRAM(s) Oral daily  losartan 50 milliGRAM(s) Oral daily  lamoTRIgine 100 milliGRAM(s) Oral daily  levETIRAcetam 1000 milliGRAM(s) Oral two times a day  atorvastatin 40 milliGRAM(s) Oral at bedtime  latanoprost 0.005% Ophthalmic Solution 1 Drop(s) Both EYES at bedtime  timolol 0.5% Solution 1 Drop(s) Both EYES two times a day  docusate sodium 100 milliGRAM(s) Oral three times a day  carvedilol 12.5 milliGRAM(s) Oral every 12 hours  heparin  Injectable 5000 Unit(s) SubCutaneous every 12 hours  pantoprazole    Tablet 40 milliGRAM(s) Oral before breakfast  amLODIPine   Tablet 5 milliGRAM(s) Oral daily  ciprofloxacin     Tablet 250 milliGRAM(s) Oral every 12 hours    MEDICATIONS  (PRN):  LORazepam   Injectable 2 milliGRAM(s) IV Push every 4 hours PRN seizure    LABS:                        11.6   3.6   )-----------( 268      ( 30 Sep 2017 07:16 )             35.6     10-01    141  |  107  |  14  ----------------------------<  100<H>  4.3   |  27  |  0.93    Ca    9.1      01 Oct 2017 07:40  Phos  3.1     09-30  Mg     2.1     09-30    ASSESSMENT & OPINION:   Stable Generalized Seizures  RECOMMENDATIONS:  Will check EEG.  Continue current medications

## 2017-10-01 NOTE — DISCHARGE NOTE ADULT - MEDICATION SUMMARY - MEDICATIONS TO TAKE
I will START or STAY ON the medications listed below when I get home from the hospital:    acetaminophen 325 mg oral tablet  -- 2 tab(s) by mouth every 4 hours, As needed, Moderate Pain (4 - 6)  -- Indication: For pain/fever    aspirin 81 mg oral tablet, chewable  -- 1 tab(s) by mouth once a day  -- Indication: For CHF (congestive heart failure)    acetaminophen-oxycodone 325 mg-5 mg oral tablet  -- 1 tab(s) by mouth every 4 hours, As needed, Moderate Pain (4 - 6)  -- Indication: For pain     losartan 50 mg oral tablet  -- 1 tab(s) by mouth once a day  -- Indication: For HTN (hypertension)    lamoTRIgine 100 mg oral tablet  -- 1 tab(s) by mouth once a day  -- Indication: For Seizures    levETIRAcetam 1000 mg oral tablet  -- 1 tab(s) by mouth 2 times a day  -- Indication: For Seizures    atorvastatin 40 mg oral tablet  -- 1 tab(s) by mouth once a day (at bedtime)  -- Indication: For High cholesterol    carvedilol 12.5 mg oral tablet  -- 1 tab(s) by mouth every 12 hours  -- Indication: For CHF (congestive heart failure)    amLODIPine 5 mg oral tablet  -- 1 tab(s) by mouth once a day  -- Indication: For HTN (hypertension)    docusate sodium 100 mg oral capsule  -- 1 cap(s) by mouth 3 times a day  -- Indication: For Constipation    polyethylene glycol 3350 oral powder for reconstitution  -- 17 gram(s) by mouth once a day  -- Indication: For Constipation    senna oral tablet  -- 2 tab(s) by mouth once a day (at bedtime)  -- Indication: For Constipation    timolol maleate 0.5% ophthalmic gel forming solution  -- 1 drop(s) to each affected eye once a day  -- Indication: For eyes    Restasis 0.05% ophthalmic emulsion  -- 1 drop(s) to each affected eye every 12 hours  -- Indication: For eyes    Travatan 0.004% ophthalmic solution  -- 1 drop(s) to each affected eye once a day (in the evening)  -- Indication: For eyes    Multiple Vitamins oral tablet  -- 1 tab(s) by mouth once a day  -- Indication: For Supplelment    cholecalciferol oral tablet  -- 1000 unit(s) by mouth once a day  -- Indication: For Supplement

## 2017-10-01 NOTE — PROGRESS NOTE ADULT - ASSESSMENT
86 y/o female with PMHx Seizure disorder, pelvic fracture, HTN, CVA, GI bleed, CHF, UTIs, HLD, Vit D deficiency, syncope, pacemaker presents to the ED from [facility] for prolonged seizures.  Pt was given IV benzo in the field by EMS to halt seizure. Pt seized again the ED and was given ativan and loaded with Keppra. Pt is more alert since admission and no new seizure. Pt was seen by neuro EEG was done, results pending. Pt also found to have UTI and started on abx. Pt had similar admission 3 months ago for similar presentation.Pt to be discharged to subacute rehab.

## 2017-10-01 NOTE — PROGRESS NOTE ADULT - PROBLEM SELECTOR PLAN 1
-Admit patient to telemetry  - c/w Keppra   - keppra level elevated but was taken after pt was loaded in the ED. will repeat level  -Continue keppra and lamotrigine home medications   -MRI is contraindicated due to patient's pacemaker  -EEG ordered   -neurology consult  -Seizure precautions and general precautions

## 2017-10-01 NOTE — DISCHARGE NOTE ADULT - HOSPITAL COURSE
86 y/o female with PMHx Seizure disorder, pelvic fracture, HTN, CVA, GI bleed, CHF, UTIs, HLD, Vit D deficiency, syncope, pacemaker presents to the ED from [facility] for prolonged seizures.  Pt was given IV benzo in the field by EMS to halt seizure. Pt seized again the ED and was given ativan and loaded with Keppra. Pt is more alert since admission and no new seizure. Pt was seen by neuro EEG was done, results pending. Pt also found to have UTI and started on abx. Pt had similar admission 3 months ago for similar presentation. Pt to be discharged to subacute rehab. 88 y/o female with PMHx Seizure disorder, pelvic fracture, HTN, CVA, GI bleed, CHF, UTIs, HLD, Vit D deficiency, syncope, pacemaker presents to the ED from [facility] for prolonged seizures.  Pt was given IV benzo in the field by EMS to halt seizure. Pt seized again the ED and was given ativan and loaded with Keppra. Pt is more alert since admission and no new seizure. Pt was seen by neuro EEG was done, results pending. Pt also found to have UTI and started on abx. Pt had similar admission 3 months ago for similar presentation. Pt to be discharged to subacute rehab.

## 2017-10-01 NOTE — DISCHARGE NOTE ADULT - CARE PLAN
Principal Discharge DX:	Seizures  Goal:	c/w meds  Instructions for follow-up, activity and diet:	f/u with neurology  Secondary Diagnosis:	HTN (hypertension)  Secondary Diagnosis:	UTI (urinary tract infection)  Instructions for follow-up, activity and diet:	complete 5 days of antibiotics

## 2017-10-01 NOTE — DISCHARGE NOTE ADULT - MEDICATION SUMMARY - MEDICATIONS TO STOP TAKING
I will STOP taking the medications listed below when I get home from the hospital:    DAPTOmycin  -- 200  intravenous every 24 hours

## 2017-10-01 NOTE — DISCHARGE NOTE ADULT - PATIENT PORTAL LINK FT
“You can access the [facility] Patient Portal, offered by [facility], by registering with the following website: [email or web address]”

## 2017-10-01 NOTE — PROGRESS NOTE ADULT - SUBJECTIVE AND OBJECTIVE BOX
Patient is a 87y old  Female who presents with a chief complaint of Seizure (28 Sep 2017 01:35)      INTERVAL HPI/OVERNIGHT EVENTS:    MEDICATIONS  (STANDING):  aspirin  chewable 81 milliGRAM(s) Oral daily  losartan 50 milliGRAM(s) Oral daily  lamoTRIgine 100 milliGRAM(s) Oral daily  levETIRAcetam 1000 milliGRAM(s) Oral two times a day  atorvastatin 40 milliGRAM(s) Oral at bedtime  latanoprost 0.005% Ophthalmic Solution 1 Drop(s) Both EYES at bedtime  timolol 0.5% Solution 1 Drop(s) Both EYES two times a day  docusate sodium 100 milliGRAM(s) Oral three times a day  carvedilol 12.5 milliGRAM(s) Oral every 12 hours  heparin  Injectable 5000 Unit(s) SubCutaneous every 12 hours  ciprofloxacin   IVPB 400 milliGRAM(s) IV Intermittent every 12 hours  pantoprazole    Tablet 40 milliGRAM(s) Oral before breakfast  amLODIPine   Tablet 5 milliGRAM(s) Oral daily    MEDICATIONS  (PRN):  LORazepam   Injectable 2 milliGRAM(s) IV Push every 4 hours PRN seizure  Allergies    No Known Allergies    Intolerances        REVIEW OF SYSTEMS:  CONSTITUTIONAL: No fever, weight loss, or fatigue  EYES: No eye pain, visual disturbances, or discharge  ENMT:  No difficulty hearing, tinnitus, vertigo; No sinus or throat pain  NECK: No pain or stiffness  RESPIRATORY: No cough, wheezing, chills or hemoptysis; No shortness of breath  CARDIOVASCULAR: No chest pain, palpitations, dizziness, or leg swelling  GASTROINTESTINAL: No abdominal or epigastric pain. No nausea, vomiting, or hematemesis; No diarrhea or constipation. No melena or hematochezia.  GENITOURINARY: No dysuria, frequency, hematuria, or incontinence  NEUROLOGICAL: No headaches,   SKIN: No itching, burning, rashes, or lesions   LYMPH NODES: No enlarged glands  ENDOCRINE: No heat or cold intolerance; No hair loss  MUSCULOSKELETAL: No joint pain or swelling; No muscle, back, or extremity pain  PSYCHIATRIC: No depression, anxiety, mood swings, or difficulty sleeping  HEME/LYMPH: No easy bruising, or bleeding gums  ALLERGY AND IMMUNOLOGIC: No hives or eczema    Vital Signs Last 24 Hrs  T(C): 36.6 (01 Oct 2017 11:20), Max: 37.7 (30 Sep 2017 16:50)  T(F): 97.8 (01 Oct 2017 11:20), Max: 99.8 (30 Sep 2017 16:50)  HR: 80 (01 Oct 2017 11:20) (69 - 80)  BP: 111/70 (01 Oct 2017 11:20) (111/70 - 182/83)  BP(mean): --  RR: 17 (01 Oct 2017 11:20) (16 - 17)  SpO2: 100% (01 Oct 2017 11:20) (97% - 100%)    PHYSICAL EXAM:  GENERAL: NAD, well-groomed, well-developed  HEAD:  Atraumatic, Normocephalic  EYES: EOMI, PERRLA, conjunctiva and sclera clear  ENMT: No tonsillar erythema, exudates, or enlargement; Moist mucous membranes, Good dentition, No lesions  NECK: Supple, No JVD, Normal thyroid  NERVOUS SYSTEM:   Motor Strength 5/5 B/L upper and lower extremities; DTRs 2+ intact and symmetric  CHEST/LUNG: Clear to percussion bilaterally; No rales, rhonchi, wheezing, or rubs  HEART: Regular rate and rhythm; No murmurs, rubs, or gallops  ABDOMEN: Soft, Nontender, Nondistended; Bowel sounds present  EXTREMITIES:  2+ Peripheral Pulses, No clubbing, cyanosis, or edema  LYMPH: No lymphadenopathy noted                            11.6   3.6   )-----------( 268      ( 30 Sep 2017 07:16 )             35.6     10-01    141  |  107  |  14  ----------------------------<  100<H>  4.3   |  27  |  0.93    Ca    9.1      01 Oct 2017 07:40  Phos  3.1     09-30  Mg     2.1     09-30                RADIOLOGY & ADDITIONAL TESTS:    Imaging Personally Reviewed:  [x ] YES  [ ] NO    Consultant(s) Notes Reviewed:  [ ] YES  [ ] NO    Care Discussed with Consultants/Other Providers [ ] YES  [ ] NO

## 2017-10-02 PROCEDURE — 99233 SBSQ HOSP IP/OBS HIGH 50: CPT

## 2017-10-02 RX ADMIN — Medication 1 DROP(S): at 17:25

## 2017-10-02 RX ADMIN — HEPARIN SODIUM 5000 UNIT(S): 5000 INJECTION INTRAVENOUS; SUBCUTANEOUS at 05:25

## 2017-10-02 RX ADMIN — Medication 250 MILLIGRAM(S): at 05:25

## 2017-10-02 RX ADMIN — Medication 100 MILLIGRAM(S): at 22:22

## 2017-10-02 RX ADMIN — Medication 250 MILLIGRAM(S): at 17:25

## 2017-10-02 RX ADMIN — LAMOTRIGINE 100 MILLIGRAM(S): 25 TABLET, ORALLY DISINTEGRATING ORAL at 12:45

## 2017-10-02 RX ADMIN — ATORVASTATIN CALCIUM 40 MILLIGRAM(S): 80 TABLET, FILM COATED ORAL at 22:21

## 2017-10-02 RX ADMIN — HEPARIN SODIUM 5000 UNIT(S): 5000 INJECTION INTRAVENOUS; SUBCUTANEOUS at 17:26

## 2017-10-02 RX ADMIN — LEVETIRACETAM 1000 MILLIGRAM(S): 250 TABLET, FILM COATED ORAL at 12:45

## 2017-10-02 RX ADMIN — CARVEDILOL PHOSPHATE 12.5 MILLIGRAM(S): 80 CAPSULE, EXTENDED RELEASE ORAL at 17:24

## 2017-10-02 RX ADMIN — Medication 81 MILLIGRAM(S): at 12:45

## 2017-10-02 RX ADMIN — AMLODIPINE BESYLATE 5 MILLIGRAM(S): 2.5 TABLET ORAL at 07:33

## 2017-10-02 RX ADMIN — Medication 1 DROP(S): at 05:33

## 2017-10-02 RX ADMIN — Medication 100 MILLIGRAM(S): at 05:25

## 2017-10-02 RX ADMIN — CARVEDILOL PHOSPHATE 12.5 MILLIGRAM(S): 80 CAPSULE, EXTENDED RELEASE ORAL at 05:25

## 2017-10-02 RX ADMIN — LATANOPROST 1 DROP(S): 0.05 SOLUTION/ DROPS OPHTHALMIC; TOPICAL at 22:21

## 2017-10-02 RX ADMIN — LOSARTAN POTASSIUM 50 MILLIGRAM(S): 100 TABLET, FILM COATED ORAL at 05:36

## 2017-10-02 NOTE — PROGRESS NOTE ADULT - PROBLEM SELECTOR PLAN 4
-increase amlodipine -switch to po Cipro. today is day 4.   - cx growing enterobacter, sensitive to cipro

## 2017-10-02 NOTE — PROGRESS NOTE ADULT - PROBLEM SELECTOR PLAN 1
-Admit patient to telemetry  - c/w Keppra   - keppra level elevated but was taken after pt was loaded in the ED. will repeat level  -Continue keppra and lamotrigine home medications   -MRI is contraindicated due to patient's pacemaker  -EEG ordered   -neurology consult  -Seizure precautions and general precautions likely a result of previous CVA

## 2017-10-02 NOTE — PROGRESS NOTE ADULT - ASSESSMENT
86 y/o female with PMHx Seizure disorder, pelvic fracture, HTN, CVA, GI bleed, CHF, UTIs, HLD, Vit D deficiency, syncope, pacemaker presents to the ED from [facility] for prolonged seizures.  Pt was given IV benzo in the field by EMS to halt seizure. Pt seized again the ED and was given ativan and loaded with Keppra. Pt is more alert since admission and no new seizure. Pt was seen by neuro EEG was done, results pending. Pt also found to have UTI and started on abx. Pt had similar admission 3 months ago for similar presentation. Pt to be discharged to [facility]acute rehab [facility] once bed available

## 2017-10-02 NOTE — PROGRESS NOTE ADULT - PROBLEM SELECTOR PLAN 2
-switch to po Cipro. today is day 4.   - cx growing enterobacter, sensitive to cipro stable doing well

## 2017-10-02 NOTE — PROGRESS NOTE ADULT - SUBJECTIVE AND OBJECTIVE BOX
Patient is a 87y old  Female who presents with a chief complaint of Seizure (01 Oct 2017 18:59)      INTERVAL HPI/OVERNIGHT EVENTS: no acute events overnight more awake and feeling much better     MEDICATIONS  (STANDING):  aspirin  chewable 81 milliGRAM(s) Oral daily  losartan 50 milliGRAM(s) Oral daily  lamoTRIgine 100 milliGRAM(s) Oral daily  levETIRAcetam 1000 milliGRAM(s) Oral two times a day  atorvastatin 40 milliGRAM(s) Oral at bedtime  latanoprost 0.005% Ophthalmic Solution 1 Drop(s) Both EYES at bedtime  timolol 0.5% Solution 1 Drop(s) Both EYES two times a day  docusate sodium 100 milliGRAM(s) Oral three times a day  carvedilol 12.5 milliGRAM(s) Oral every 12 hours  heparin  Injectable 5000 Unit(s) SubCutaneous every 12 hours  pantoprazole    Tablet 40 milliGRAM(s) Oral before breakfast  amLODIPine   Tablet 5 milliGRAM(s) Oral daily  ciprofloxacin     Tablet 250 milliGRAM(s) Oral every 12 hours    MEDICATIONS  (PRN):  LORazepam   Injectable 2 milliGRAM(s) IV Push every 4 hours PRN seizure      Allergies    No Known Allergies    Intolerances        REVIEW OF SYSTEMS:  CONSTITUTIONAL: No fever, weight loss, or fatigue  EYES: No eye pain, visual disturbances, or discharge  ENMT:  No difficulty hearing, tinnitus, vertigo; No sinus or throat pain  NECK: No pain or stiffness  BREASTS: No pain, masses, or nipple discharge  RESPIRATORY: No cough, wheezing, chills or hemoptysis; No shortness of breath  CARDIOVASCULAR: No chest pain, palpitations, dizziness, or leg swelling  GASTROINTESTINAL: No abdominal or epigastric pain. No nausea, vomiting, or hematemesis; No diarrhea or constipation. No melena or hematochezia.  GENITOURINARY: No dysuria, frequency, hematuria, or incontinence  NEUROLOGICAL: No headaches, memory loss, loss of strength, numbness, or tremors  SKIN: No itching, burning, rashes, or lesions   LYMPH NODES: No enlarged glands  ENDOCRINE: No heat or cold intolerance; No hair loss  MUSCULOSKELETAL: No joint pain or swelling; No muscle, back, or extremity pain  PSYCHIATRIC: No depression, anxiety, mood swings, or difficulty sleeping  HEME/LYMPH: No easy bruising, or bleeding gums  ALLERGY AND IMMUNOLOGIC: No hives or eczema    Vital Signs Last 24 Hrs  T(C): 36.7 (02 Oct 2017 17:47), Max: 37.1 (02 Oct 2017 11:36)  T(F): 98 (02 Oct 2017 17:47), Max: 98.8 (02 Oct 2017 11:36)  HR: 79 (02 Oct 2017 17:47) (69 - 97)  BP: 151/83 (02 Oct 2017 17:47) (127/69 - 151/83)  BP(mean): --  RR: 16 (02 Oct 2017 17:47) (16 - 17)  SpO2: 100% (02 Oct 2017 17:47) (99% - 100%)    PHYSICAL EXAM:  GENERAL: NAD, well-groomed, well-developed  HEAD:  Atraumatic, Normocephalic  EYES: EOMI, PERRLA, conjunctiva and sclera clear  ENMT: No tonsillar erythema, exudates, or enlargement; Moist mucous membranes, Good dentition, No lesions  NECK: Supple, No JVD, Normal thyroid  NERVOUS SYSTEM:  Alert & Oriented X3, Good concentration; Motor Strength 5/5 B/L upper and lower extremities; DTRs 2+ intact and symmetric  CHEST/LUNG: Clear to percussion bilaterally; No rales, rhonchi, wheezing, or rubs  HEART: Regular rate and rhythm; No murmurs, rubs, or gallops  ABDOMEN: Soft, Nontender, Nondistended; Bowel sounds present  EXTREMITIES:  2+ Peripheral Pulses, No clubbing, cyanosis, or edema  LYMPH: No lymphadenopathy noted  SKIN: No rashes or lesions    LABS:    10-01    141  |  107  |  14  ----------------------------<  100<H>  4.3   |  27  |  0.93    Ca    9.1      01 Oct 2017 07:40          CAPILLARY BLOOD GLUCOSE          RADIOLOGY & ADDITIONAL TESTS:    Imaging Personally Reviewed:  [ ] YES  [ ] NO    Consultant(s) Notes Reviewed:  [ ] YES  [ ] NO    Care Discussed with Consultants/Other Providers [ ] YES  [ ] NO

## 2017-10-03 VITALS
SYSTOLIC BLOOD PRESSURE: 110 MMHG | OXYGEN SATURATION: 100 % | HEART RATE: 75 BPM | TEMPERATURE: 98 F | DIASTOLIC BLOOD PRESSURE: 79 MMHG | RESPIRATION RATE: 15 BRPM

## 2017-10-03 DIAGNOSIS — Z86.73 PERSONAL HISTORY OF TRANSIENT ISCHEMIC ATTACK (TIA), AND CEREBRAL INFARCTION WITHOUT RESIDUAL DEFICITS: ICD-10-CM

## 2017-10-03 DIAGNOSIS — I50.43 ACUTE ON CHRONIC COMBINED SYSTOLIC (CONGESTIVE) AND DIASTOLIC (CONGESTIVE) HEART FAILURE: ICD-10-CM

## 2017-10-03 LAB
ALBUMIN SERPL ELPH-MCNC: 2.7 G/DL — LOW (ref 3.3–5)
ALP SERPL-CCNC: 69 U/L — SIGNIFICANT CHANGE UP (ref 40–120)
ALT FLD-CCNC: 15 U/L — SIGNIFICANT CHANGE UP (ref 12–78)
ANION GAP SERPL CALC-SCNC: 7 MMOL/L — SIGNIFICANT CHANGE UP (ref 5–17)
AST SERPL-CCNC: 26 U/L — SIGNIFICANT CHANGE UP (ref 15–37)
BILIRUB SERPL-MCNC: 0.7 MG/DL — SIGNIFICANT CHANGE UP (ref 0.2–1.2)
BUN SERPL-MCNC: 21 MG/DL — SIGNIFICANT CHANGE UP (ref 7–23)
CALCIUM SERPL-MCNC: 8.4 MG/DL — LOW (ref 8.5–10.1)
CHLORIDE SERPL-SCNC: 106 MMOL/L — SIGNIFICANT CHANGE UP (ref 96–108)
CO2 SERPL-SCNC: 28 MMOL/L — SIGNIFICANT CHANGE UP (ref 22–31)
CREAT SERPL-MCNC: 0.87 MG/DL — SIGNIFICANT CHANGE UP (ref 0.5–1.3)
GLUCOSE SERPL-MCNC: 89 MG/DL — SIGNIFICANT CHANGE UP (ref 70–99)
HCT VFR BLD CALC: 32.2 % — LOW (ref 34.5–45)
HGB BLD-MCNC: 10.4 G/DL — LOW (ref 11.5–15.5)
LEVETIRACETAM SERPL-MCNC: 64.6 MCG/ML — HIGH (ref 12–46)
MAGNESIUM SERPL-MCNC: 2.3 MG/DL — SIGNIFICANT CHANGE UP (ref 1.6–2.6)
MCHC RBC-ENTMCNC: 29.6 PG — SIGNIFICANT CHANGE UP (ref 27–34)
MCHC RBC-ENTMCNC: 32.4 GM/DL — SIGNIFICANT CHANGE UP (ref 32–36)
MCV RBC AUTO: 91.6 FL — SIGNIFICANT CHANGE UP (ref 80–100)
PLATELET # BLD AUTO: 247 K/UL — SIGNIFICANT CHANGE UP (ref 150–400)
POTASSIUM SERPL-MCNC: 4 MMOL/L — SIGNIFICANT CHANGE UP (ref 3.5–5.3)
POTASSIUM SERPL-SCNC: 4 MMOL/L — SIGNIFICANT CHANGE UP (ref 3.5–5.3)
PROT SERPL-MCNC: 6.1 GM/DL — SIGNIFICANT CHANGE UP (ref 6–8.3)
RBC # BLD: 3.52 M/UL — LOW (ref 3.8–5.2)
RBC # FLD: 14.2 % — SIGNIFICANT CHANGE UP (ref 11–15)
SODIUM SERPL-SCNC: 141 MMOL/L — SIGNIFICANT CHANGE UP (ref 135–145)
WBC # BLD: 3.6 K/UL — LOW (ref 3.8–10.5)
WBC # FLD AUTO: 3.6 K/UL — LOW (ref 3.8–10.5)

## 2017-10-03 PROCEDURE — 99239 HOSP IP/OBS DSCHRG MGMT >30: CPT

## 2017-10-03 RX ADMIN — HEPARIN SODIUM 5000 UNIT(S): 5000 INJECTION INTRAVENOUS; SUBCUTANEOUS at 17:08

## 2017-10-03 RX ADMIN — Medication 81 MILLIGRAM(S): at 13:31

## 2017-10-03 RX ADMIN — AMLODIPINE BESYLATE 5 MILLIGRAM(S): 2.5 TABLET ORAL at 05:30

## 2017-10-03 RX ADMIN — PANTOPRAZOLE SODIUM 40 MILLIGRAM(S): 20 TABLET, DELAYED RELEASE ORAL at 05:30

## 2017-10-03 RX ADMIN — LEVETIRACETAM 1000 MILLIGRAM(S): 250 TABLET, FILM COATED ORAL at 13:31

## 2017-10-03 RX ADMIN — Medication 250 MILLIGRAM(S): at 05:30

## 2017-10-03 RX ADMIN — LOSARTAN POTASSIUM 50 MILLIGRAM(S): 100 TABLET, FILM COATED ORAL at 05:30

## 2017-10-03 RX ADMIN — CARVEDILOL PHOSPHATE 12.5 MILLIGRAM(S): 80 CAPSULE, EXTENDED RELEASE ORAL at 17:08

## 2017-10-03 RX ADMIN — Medication 250 MILLIGRAM(S): at 17:08

## 2017-10-03 RX ADMIN — Medication 100 MILLIGRAM(S): at 05:30

## 2017-10-03 RX ADMIN — LAMOTRIGINE 100 MILLIGRAM(S): 25 TABLET, ORALLY DISINTEGRATING ORAL at 13:34

## 2017-10-03 RX ADMIN — LEVETIRACETAM 1000 MILLIGRAM(S): 250 TABLET, FILM COATED ORAL at 00:29

## 2017-10-03 RX ADMIN — Medication 1 DROP(S): at 05:30

## 2017-10-03 RX ADMIN — HEPARIN SODIUM 5000 UNIT(S): 5000 INJECTION INTRAVENOUS; SUBCUTANEOUS at 05:30

## 2017-10-03 RX ADMIN — Medication 1 DROP(S): at 17:08

## 2017-10-03 NOTE — DIETITIAN INITIAL EVALUATION ADULT. - ENERGY NEEDS
Height (cm): 165.1 (09-27)  Weight (kg): 53.5 (09-28)  BMI (kg/m2): 19.6 (09-28)  IBW: 56.6 kg    % IBW:  94%      UBW: ?           %UBW: ?

## 2017-10-03 NOTE — DIETITIAN INITIAL EVALUATION ADULT. - PROBLEM SELECTOR PLAN 1
-Admit patient to telemetry  -Keppra bolus in the ED  -Patient received ativan 2mg in ED  -NPO, IVF  -Continue keppra and lamotrigine home medications   -MRI is contraindicated due to patient's pacemaker  -EEG ordered   -f/u labs including lamotrigine level and keppra level  -neurology consult  -CXR ordered  -Another bolus NS ordered  -Seizure precautions and general precautions   -DVT ppx   -Discussed with Dr. Mott

## 2017-10-03 NOTE — DIETITIAN INITIAL EVALUATION ADULT. - ETIOLOGY
Week of not feeling well, quick intermittent resolved pain in right arm pit, neck pain and feet have been getting cramps. She does not normally have high blood pressure.   inadequate protein-energy intake in setting of CVA, CHF, AMS

## 2017-10-03 NOTE — DIETITIAN INITIAL EVALUATION ADULT. - SOURCE
pt is a poor historian, appeared forgetful, Chart review, pt is known to RD from previous adm, Nursing/patient

## 2017-10-03 NOTE — DIETITIAN INITIAL EVALUATION ADULT. - ADHERENCE
pt was in Rehab PTA & was on Regular diet as per transfer records, pt lived c daughter prior to Reahb/n/a

## 2017-10-03 NOTE — PROGRESS NOTE ADULT - SUBJECTIVE AND OBJECTIVE BOX
INTERVAL HPI/OVERNIGHT EVENTS:  Subjective Complaints: Offers no complaints.  Neuro examination unremarkable.  In no acute distress.  No seizures.  EEG slow but unremarkable and no seizure focus.    NEUROLOGICAL EXAM (Pertinent):  Vital Signs Last 24 Hrs  T(C): 36.7 (03 Oct 2017 11:26), Max: 36.7 (02 Oct 2017 17:47)  T(F): 98 (03 Oct 2017 11:26), Max: 98 (02 Oct 2017 17:47)  HR: 67 (03 Oct 2017 11:26) (57 - 79)  BP: 130/66 (03 Oct 2017 11:26) (127/62 - 151/83)  BP(mean): --  RR: 18 (03 Oct 2017 11:26) (16 - 18)  SpO2: 99% (03 Oct 2017 11:26) (99% - 100%)    MEDICATIONS  (STANDING):  amLODIPine   Tablet 5 milliGRAM(s) Oral daily  aspirin  chewable 81 milliGRAM(s) Oral daily  atorvastatin 40 milliGRAM(s) Oral at bedtime  carvedilol 12.5 milliGRAM(s) Oral every 12 hours  ciprofloxacin     Tablet 250 milliGRAM(s) Oral every 12 hours  docusate sodium 100 milliGRAM(s) Oral three times a day  heparin  Injectable 5000 Unit(s) SubCutaneous every 12 hours  lamoTRIgine 100 milliGRAM(s) Oral daily  latanoprost 0.005% Ophthalmic Solution 1 Drop(s) Both EYES at bedtime  levETIRAcetam 1000 milliGRAM(s) Oral two times a day  losartan 50 milliGRAM(s) Oral daily  pantoprazole    Tablet 40 milliGRAM(s) Oral before breakfast  timolol 0.5% Solution 1 Drop(s) Both EYES two times a day    MEDICATIONS  (PRN):  LORazepam   Injectable 2 milliGRAM(s) IV Push every 4 hours PRN seizure    LABS:                        10.4   3.6   )-----------( 247      ( 03 Oct 2017 08:08 )             32.2     10-03    141  |  106  |  21  ----------------------------<  89  4.0   |  28  |  0.87    Ca    8.4<L>      03 Oct 2017 08:08  Mg     2.3     10-03    TPro  6.1  /  Alb  2.7<L>  /  TBili  0.7  /  DBili  x   /  AST  26  /  ALT  15  /  AlkPhos  69  10-03    ASSESSMENT & OPINION:   Stable Generalized Seizures  RECOMMENDATIONS:   Continue current medications.  Neurologically stable for discharge.

## 2017-10-03 NOTE — DIETITIAN INITIAL EVALUATION ADULT. - NS AS NUTRI INTERV MEALS SNACK
Recommend Continue Low sodium diet c Ensure Enlive 2 x day=750 calories, 40 grams protein/Vitamin - modified diet

## 2017-10-03 NOTE — DIETITIAN INITIAL EVALUATION ADULT. - PHYSICAL APPEARANCE
BMI=19.6(09-28), Nutrition focused physical exam conducted; found physical signs of malnutrition [ ]absent [X ]present; Subcutaneous fat Exam;  [ moderate ]  Orbital fat pads region,  [moderate  ]Buccal fat region,  [moderate  ]triceps region, [moderate   ]ribs region.  Muscle Exam; [ moderate ]temples region, [ moderate  ]clavicle region, [severe  ]shoulder region, [  moderate ]Scapula region, [ moderate ]Interosseous region, [ moderate ]thigh region, [  moderate ]Calf region/underweight/other (specify)/debilitated

## 2017-10-03 NOTE — DIETITIAN INITIAL EVALUATION ADULT. - PERTINENT LABORATORY DATA
10-03 Na141 mmol/L Glu 89 mg/dL K+ 4.0 mmol/L Cr  0.87 mg/dL BUN 21 mg/dL Est GFR (AA)69 mL/min/1.73M2  Phos n/a   Ca 8.4 mg/dL<L> Alb 2.7 g/dL<L> Hgb 10.4 g/dL<L> Hct 32.2 %<L>

## 2017-10-06 DIAGNOSIS — I69.398 OTHER SEQUELAE OF CEREBRAL INFARCTION: ICD-10-CM

## 2017-10-06 DIAGNOSIS — Z95.0 PRESENCE OF CARDIAC PACEMAKER: ICD-10-CM

## 2017-10-06 DIAGNOSIS — E78.00 PURE HYPERCHOLESTEROLEMIA, UNSPECIFIED: ICD-10-CM

## 2017-10-06 DIAGNOSIS — G40.909 EPILEPSY, UNSPECIFIED, NOT INTRACTABLE, WITHOUT STATUS EPILEPTICUS: ICD-10-CM

## 2017-10-06 DIAGNOSIS — E44.0 MODERATE PROTEIN-CALORIE MALNUTRITION: ICD-10-CM

## 2017-10-06 DIAGNOSIS — B96.89 OTHER SPECIFIED BACTERIAL AGENTS AS THE CAUSE OF DISEASES CLASSIFIED ELSEWHERE: ICD-10-CM

## 2017-10-06 DIAGNOSIS — N39.0 URINARY TRACT INFECTION, SITE NOT SPECIFIED: ICD-10-CM

## 2017-10-06 DIAGNOSIS — Z87.440 PERSONAL HISTORY OF URINARY (TRACT) INFECTIONS: ICD-10-CM

## 2017-10-06 DIAGNOSIS — E87.6 HYPOKALEMIA: ICD-10-CM

## 2017-10-06 DIAGNOSIS — Z79.82 LONG TERM (CURRENT) USE OF ASPIRIN: ICD-10-CM

## 2017-10-06 DIAGNOSIS — Z79.899 OTHER LONG TERM (CURRENT) DRUG THERAPY: ICD-10-CM

## 2017-10-06 DIAGNOSIS — I11.0 HYPERTENSIVE HEART DISEASE WITH HEART FAILURE: ICD-10-CM

## 2017-10-06 DIAGNOSIS — E55.9 VITAMIN D DEFICIENCY, UNSPECIFIED: ICD-10-CM

## 2017-10-06 DIAGNOSIS — G40.409 OTHER GENERALIZED EPILEPSY AND EPILEPTIC SYNDROMES, NOT INTRACTABLE, WITHOUT STATUS EPILEPTICUS: ICD-10-CM

## 2017-10-06 DIAGNOSIS — I50.43 ACUTE ON CHRONIC COMBINED SYSTOLIC (CONGESTIVE) AND DIASTOLIC (CONGESTIVE) HEART FAILURE: ICD-10-CM

## 2017-11-15 ENCOUNTER — OUTPATIENT (OUTPATIENT)
Dept: OUTPATIENT SERVICES | Facility: HOSPITAL | Age: 82
LOS: 1 days | End: 2017-11-15

## 2017-11-15 ENCOUNTER — APPOINTMENT (OUTPATIENT)
Dept: OBGYN | Facility: HOSPITAL | Age: 82
End: 2017-11-15
Payer: MEDICARE

## 2017-11-15 VITALS
BODY MASS INDEX: 17.83 KG/M2 | DIASTOLIC BLOOD PRESSURE: 90 MMHG | SYSTOLIC BLOOD PRESSURE: 180 MMHG | WEIGHT: 113.87 LBS | HEART RATE: 79 BPM

## 2017-11-15 DIAGNOSIS — Z98.2 PRESENCE OF CEREBROSPINAL FLUID DRAINAGE DEVICE: Chronic | ICD-10-CM

## 2017-11-15 DIAGNOSIS — N81.4 UTEROVAGINAL PROLAPSE, UNSPECIFIED: ICD-10-CM

## 2017-11-15 PROCEDURE — 99213 OFFICE O/P EST LOW 20 MIN: CPT

## 2017-11-26 RX ORDER — ROSUVASTATIN CALCIUM 5 MG/1
0 TABLET ORAL
Qty: 0 | Refills: 0 | COMMUNITY

## 2017-11-26 RX ORDER — CARVEDILOL PHOSPHATE 80 MG/1
1 CAPSULE, EXTENDED RELEASE ORAL
Qty: 0 | Refills: 0 | COMMUNITY

## 2017-11-26 RX ORDER — LOSARTAN POTASSIUM 100 MG/1
1 TABLET, FILM COATED ORAL
Qty: 0 | Refills: 0 | COMMUNITY

## 2017-11-26 RX ORDER — AMLODIPINE BESYLATE 2.5 MG/1
1 TABLET ORAL
Qty: 0 | Refills: 0 | COMMUNITY

## 2017-11-26 RX ORDER — FUROSEMIDE 40 MG
0 TABLET ORAL
Qty: 0 | Refills: 0 | COMMUNITY

## 2017-11-26 RX ORDER — LAMOTRIGINE 25 MG/1
0 TABLET, ORALLY DISINTEGRATING ORAL
Qty: 0 | Refills: 0 | COMMUNITY

## 2017-11-26 RX ORDER — DAPTOMYCIN 500 MG/10ML
200 INJECTION, POWDER, LYOPHILIZED, FOR SOLUTION INTRAVENOUS
Qty: 0 | Refills: 0 | COMMUNITY

## 2017-11-26 RX ORDER — LEVETIRACETAM 250 MG/1
0 TABLET, FILM COATED ORAL
Qty: 0 | Refills: 0 | COMMUNITY

## 2018-01-04 ENCOUNTER — APPOINTMENT (OUTPATIENT)
Dept: INTERNAL MEDICINE | Facility: HOSPITAL | Age: 83
End: 2018-01-04

## 2018-01-17 ENCOUNTER — APPOINTMENT (OUTPATIENT)
Dept: OBGYN | Facility: HOSPITAL | Age: 83
End: 2018-01-17
Payer: MEDICARE

## 2018-01-17 ENCOUNTER — LABORATORY RESULT (OUTPATIENT)
Age: 83
End: 2018-01-17

## 2018-01-17 ENCOUNTER — OUTPATIENT (OUTPATIENT)
Dept: OUTPATIENT SERVICES | Facility: HOSPITAL | Age: 83
LOS: 1 days | End: 2018-01-17

## 2018-01-17 VITALS
BODY MASS INDEX: 17.99 KG/M2 | WEIGHT: 114.64 LBS | SYSTOLIC BLOOD PRESSURE: 178 MMHG | DIASTOLIC BLOOD PRESSURE: 83 MMHG | HEIGHT: 67 IN

## 2018-01-17 DIAGNOSIS — N89.8 OTHER SPECIFIED NONINFLAMMATORY DISORDERS OF VAGINA: ICD-10-CM

## 2018-01-17 DIAGNOSIS — Z98.2 PRESENCE OF CEREBROSPINAL FLUID DRAINAGE DEVICE: Chronic | ICD-10-CM

## 2018-01-17 PROBLEM — I27.2 OTHER SECONDARY PULMONARY HYPERTENSION: Chronic | Status: ACTIVE | Noted: 2017-04-16

## 2018-01-17 PROBLEM — I10 ESSENTIAL (PRIMARY) HYPERTENSION: Chronic | Status: ACTIVE | Noted: 2017-04-16

## 2018-01-17 LAB
APPEARANCE UR: SIGNIFICANT CHANGE UP
BACTERIA # UR AUTO: SIGNIFICANT CHANGE UP
BILIRUB UR-MCNC: NEGATIVE — SIGNIFICANT CHANGE UP
BLOOD UR QL VISUAL: HIGH
COLOR SPEC: YELLOW — SIGNIFICANT CHANGE UP
GLUCOSE UR-MCNC: NEGATIVE — SIGNIFICANT CHANGE UP
KETONES UR-MCNC: NEGATIVE — SIGNIFICANT CHANGE UP
LEUKOCYTE ESTERASE UR-ACNC: HIGH
MUCOUS THREADS # UR AUTO: SIGNIFICANT CHANGE UP
NITRITE UR-MCNC: POSITIVE — HIGH
PH UR: 7 — SIGNIFICANT CHANGE UP (ref 4.6–8)
PROT UR-MCNC: 150 MG/DL — HIGH
RBC CASTS # UR COMP ASSIST: SIGNIFICANT CHANGE UP (ref 0–?)
SP GR SPEC: 1.02 — SIGNIFICANT CHANGE UP (ref 1–1.04)
UROBILINOGEN FLD QL: NORMAL MG/DL — SIGNIFICANT CHANGE UP
WBC CLUMPS #/AREA URNS HPF: PRESENT — HIGH (ref 0–?)
WBC UR QL: >50 — HIGH (ref 0–?)

## 2018-01-17 PROCEDURE — 99213 OFFICE O/P EST LOW 20 MIN: CPT | Mod: GC

## 2018-01-18 DIAGNOSIS — Z87.440 PERSONAL HISTORY OF URINARY (TRACT) INFECTIONS: ICD-10-CM

## 2018-01-18 LAB
CANDIDA AB TITR SER: NOT DETECTED — SIGNIFICANT CHANGE UP
G VAGINALIS DNA SPEC QL NAA+PROBE: NOT DETECTED — SIGNIFICANT CHANGE UP
T VAGINALIS SPEC QL WET PREP: NOT DETECTED — SIGNIFICANT CHANGE UP

## 2018-01-19 DIAGNOSIS — N89.8 OTHER SPECIFIED NONINFLAMMATORY DISORDERS OF VAGINA: ICD-10-CM

## 2018-01-22 NOTE — ED ADULT NURSE NOTE - LINE DESCRIPTION (INCLUDE FLUIDS IF APPLICABLE)
Oncology Nutrition Assessment:     Clem Mason, 67 
Left hand IV intact and working, no signs of infiltration, no phlebitis

## 2018-02-05 NOTE — H&P ADULT - PROBLEM SELECTOR PLAN 1
with patient
Unclear if patient actually lost consciousness  No complaints in ED, no focal neuro deficit on exam  EKG unchanged from last  EEG done in 4/17 abnormal, focal epileptiform. ECHO done 4/19/17 EF: 60-65%.  Neurology consulted on last admission   Cardiology consult, unclear when PPM was checked last  Telemetry monitoring  IVF hydration

## 2018-02-13 ENCOUNTER — APPOINTMENT (OUTPATIENT)
Dept: CT IMAGING | Facility: CLINIC | Age: 83
End: 2018-02-13
Payer: MEDICARE

## 2018-02-13 ENCOUNTER — OUTPATIENT (OUTPATIENT)
Dept: OUTPATIENT SERVICES | Facility: HOSPITAL | Age: 83
LOS: 1 days | End: 2018-02-13
Payer: COMMERCIAL

## 2018-02-13 DIAGNOSIS — Z00.8 ENCOUNTER FOR OTHER GENERAL EXAMINATION: ICD-10-CM

## 2018-02-13 DIAGNOSIS — Z98.2 PRESENCE OF CEREBROSPINAL FLUID DRAINAGE DEVICE: Chronic | ICD-10-CM

## 2018-02-13 PROCEDURE — 70450 CT HEAD/BRAIN W/O DYE: CPT | Mod: 26

## 2018-02-13 PROCEDURE — 70450 CT HEAD/BRAIN W/O DYE: CPT

## 2018-03-13 ENCOUNTER — APPOINTMENT (OUTPATIENT)
Dept: INTERNAL MEDICINE | Facility: HOSPITAL | Age: 83
End: 2018-03-13

## 2018-03-22 NOTE — ED PROVIDER NOTE - NEUROLOGICAL, MLM
murmur loudness: I/VI Alert and oriented, no focal deficits, no motor or sensory deficits. Alert and oriented, no focal deficits, no motor or sensory deficits. cn 2-12 intact bl. ms 5/5 bl ue and le. sensation intact bl. normal heel to shin bl

## 2018-04-04 ENCOUNTER — OUTPATIENT (OUTPATIENT)
Dept: OUTPATIENT SERVICES | Facility: HOSPITAL | Age: 83
LOS: 1 days | End: 2018-04-04

## 2018-04-04 ENCOUNTER — APPOINTMENT (OUTPATIENT)
Dept: OBGYN | Facility: HOSPITAL | Age: 83
End: 2018-04-04
Payer: MEDICARE

## 2018-04-04 VITALS — DIASTOLIC BLOOD PRESSURE: 90 MMHG | HEART RATE: 78 BPM | SYSTOLIC BLOOD PRESSURE: 149 MMHG

## 2018-04-04 VITALS — HEIGHT: 67 IN | BODY MASS INDEX: 17.65 KG/M2 | WEIGHT: 112.44 LBS

## 2018-04-04 DIAGNOSIS — Z98.2 PRESENCE OF CEREBROSPINAL FLUID DRAINAGE DEVICE: Chronic | ICD-10-CM

## 2018-04-04 PROCEDURE — 99213 OFFICE O/P EST LOW 20 MIN: CPT | Mod: GE

## 2018-04-16 DIAGNOSIS — N81.9 FEMALE GENITAL PROLAPSE, UNSPECIFIED: ICD-10-CM

## 2018-04-16 DIAGNOSIS — Z46.89 ENCOUNTER FOR FITTING AND ADJUSTMENT OF OTHER SPECIFIED DEVICES: ICD-10-CM

## 2018-04-16 NOTE — PROGRESS NOTE ADULT - PROBLEM SELECTOR PLAN 1
Patient without any GI symptoms. Tolerating full liquids. Lactate now 1.0. CRP  4.40 04/20/17 CT Scan shows wall thickening of descending/sigmoid colon that could be consistent with ischemic colitis.  - Tolerating Full liquid diet, -----f/u CRP, f/u WBC and broad spectrum antibiotics. Will hold off on any invasive GI testing at present time.  Advance to low fiber diet and observe. If patient tolerates regular diet and has no GI symptoms, patient cleared from GI point of view for discharge. Patient needs to be followed by GI as out patient. n/a

## 2018-06-01 ENCOUNTER — OUTPATIENT (OUTPATIENT)
Dept: OUTPATIENT SERVICES | Facility: HOSPITAL | Age: 83
LOS: 1 days | End: 2018-06-01

## 2018-06-01 DIAGNOSIS — Z98.2 PRESENCE OF CEREBROSPINAL FLUID DRAINAGE DEVICE: Chronic | ICD-10-CM

## 2018-06-27 ENCOUNTER — INPATIENT (INPATIENT)
Facility: HOSPITAL | Age: 83
LOS: 11 days | Discharge: HOME | End: 2018-07-09
Attending: SPECIALIST | Admitting: SPECIALIST
Payer: MEDICARE

## 2018-06-27 ENCOUNTER — TRANSCRIPTION ENCOUNTER (OUTPATIENT)
Age: 83
End: 2018-06-27

## 2018-06-27 VITALS
HEART RATE: 82 BPM | DIASTOLIC BLOOD PRESSURE: 106 MMHG | RESPIRATION RATE: 16 BRPM | SYSTOLIC BLOOD PRESSURE: 175 MMHG | TEMPERATURE: 100 F | OXYGEN SATURATION: 98 %

## 2018-06-27 DIAGNOSIS — Z98.2 PRESENCE OF CEREBROSPINAL FLUID DRAINAGE DEVICE: Chronic | ICD-10-CM

## 2018-06-27 LAB
ALBUMIN SERPL ELPH-MCNC: 4.3 G/DL — SIGNIFICANT CHANGE UP (ref 3.3–5)
ALP SERPL-CCNC: 98 U/L — SIGNIFICANT CHANGE UP (ref 40–120)
ALT FLD-CCNC: 8 U/L — SIGNIFICANT CHANGE UP (ref 4–33)
APPEARANCE UR: SIGNIFICANT CHANGE UP
AST SERPL-CCNC: 23 U/L — SIGNIFICANT CHANGE UP (ref 4–32)
BASE EXCESS BLDV CALC-SCNC: 7.4 MMOL/L — SIGNIFICANT CHANGE UP
BASOPHILS # BLD AUTO: 0.01 K/UL — SIGNIFICANT CHANGE UP (ref 0–0.2)
BASOPHILS NFR BLD AUTO: 0.1 % — SIGNIFICANT CHANGE UP (ref 0–2)
BILIRUB SERPL-MCNC: 0.6 MG/DL — SIGNIFICANT CHANGE UP (ref 0.2–1.2)
BILIRUB UR-MCNC: NEGATIVE — SIGNIFICANT CHANGE UP
BLOOD GAS VENOUS - CREATININE: 0.8 MG/DL — SIGNIFICANT CHANGE UP (ref 0.5–1.3)
BLOOD UR QL VISUAL: HIGH
BUN SERPL-MCNC: 19 MG/DL — SIGNIFICANT CHANGE UP (ref 7–23)
CALCIUM SERPL-MCNC: 10 MG/DL — SIGNIFICANT CHANGE UP (ref 8.4–10.5)
CHLORIDE BLDV-SCNC: 97 MMOL/L — SIGNIFICANT CHANGE UP (ref 96–108)
CHLORIDE SERPL-SCNC: 91 MMOL/L — LOW (ref 98–107)
CO2 SERPL-SCNC: 29 MMOL/L — SIGNIFICANT CHANGE UP (ref 22–31)
COLOR SPEC: YELLOW — SIGNIFICANT CHANGE UP
CREAT SERPL-MCNC: 0.77 MG/DL — SIGNIFICANT CHANGE UP (ref 0.5–1.3)
EOSINOPHIL # BLD AUTO: 0 K/UL — SIGNIFICANT CHANGE UP (ref 0–0.5)
EOSINOPHIL NFR BLD AUTO: 0 % — SIGNIFICANT CHANGE UP (ref 0–6)
GAS PNL BLDV: 138 MMOL/L — SIGNIFICANT CHANGE UP (ref 136–146)
GLUCOSE BLDV-MCNC: 151 — HIGH (ref 70–99)
GLUCOSE SERPL-MCNC: 150 MG/DL — HIGH (ref 70–99)
GLUCOSE UR-MCNC: NEGATIVE — SIGNIFICANT CHANGE UP
HCO3 BLDV-SCNC: 30 MMOL/L — HIGH (ref 20–27)
HCT VFR BLD CALC: 42.2 % — SIGNIFICANT CHANGE UP (ref 34.5–45)
HCT VFR BLDV CALC: 44.4 % — SIGNIFICANT CHANGE UP (ref 34.5–45)
HGB BLD-MCNC: 13.5 G/DL — SIGNIFICANT CHANGE UP (ref 11.5–15.5)
HGB BLDV-MCNC: 14.5 G/DL — SIGNIFICANT CHANGE UP (ref 11.5–15.5)
IMM GRANULOCYTES # BLD AUTO: 0.03 # — SIGNIFICANT CHANGE UP
IMM GRANULOCYTES NFR BLD AUTO: 0.3 % — SIGNIFICANT CHANGE UP (ref 0–1.5)
KETONES UR-MCNC: NEGATIVE — SIGNIFICANT CHANGE UP
LACTATE BLDV-MCNC: 2.7 MMOL/L — HIGH (ref 0.5–2)
LEUKOCYTE ESTERASE UR-ACNC: HIGH
LIDOCAIN IGE QN: 7.7 U/L — SIGNIFICANT CHANGE UP (ref 7–60)
LYMPHOCYTES # BLD AUTO: 1.07 K/UL — SIGNIFICANT CHANGE UP (ref 1–3.3)
LYMPHOCYTES # BLD AUTO: 10.1 % — LOW (ref 13–44)
MCHC RBC-ENTMCNC: 29.3 PG — SIGNIFICANT CHANGE UP (ref 27–34)
MCHC RBC-ENTMCNC: 32 % — SIGNIFICANT CHANGE UP (ref 32–36)
MCV RBC AUTO: 91.7 FL — SIGNIFICANT CHANGE UP (ref 80–100)
MONOCYTES # BLD AUTO: 0.7 K/UL — SIGNIFICANT CHANGE UP (ref 0–0.9)
MONOCYTES NFR BLD AUTO: 6.6 % — SIGNIFICANT CHANGE UP (ref 2–14)
MUCOUS THREADS # UR AUTO: SIGNIFICANT CHANGE UP
NEUTROPHILS # BLD AUTO: 8.8 K/UL — HIGH (ref 1.8–7.4)
NEUTROPHILS NFR BLD AUTO: 82.9 % — HIGH (ref 43–77)
NITRITE UR-MCNC: NEGATIVE — SIGNIFICANT CHANGE UP
NON-SQ EPI CELLS # UR AUTO: <1 — SIGNIFICANT CHANGE UP
NRBC # FLD: 0 — SIGNIFICANT CHANGE UP
PCO2 BLDV: 46 MMHG — SIGNIFICANT CHANGE UP (ref 41–51)
PH BLDV: 7.45 PH — HIGH (ref 7.32–7.43)
PH UR: 7 — SIGNIFICANT CHANGE UP (ref 4.6–8)
PLATELET # BLD AUTO: 461 K/UL — HIGH (ref 150–400)
PMV BLD: 9.8 FL — SIGNIFICANT CHANGE UP (ref 7–13)
PO2 BLDV: 35 MMHG — SIGNIFICANT CHANGE UP (ref 35–40)
POTASSIUM BLDV-SCNC: 3.7 MMOL/L — SIGNIFICANT CHANGE UP (ref 3.4–4.5)
POTASSIUM SERPL-MCNC: 3.3 MMOL/L — LOW (ref 3.5–5.3)
POTASSIUM SERPL-SCNC: 3.3 MMOL/L — LOW (ref 3.5–5.3)
PROT SERPL-MCNC: 8.9 G/DL — HIGH (ref 6–8.3)
PROT UR-MCNC: 150 MG/DL — HIGH
RBC # BLD: 4.6 M/UL — SIGNIFICANT CHANGE UP (ref 3.8–5.2)
RBC # FLD: 13.5 % — SIGNIFICANT CHANGE UP (ref 10.3–14.5)
RBC CASTS # UR COMP ASSIST: SIGNIFICANT CHANGE UP (ref 0–?)
SAO2 % BLDV: 63.8 % — SIGNIFICANT CHANGE UP (ref 60–85)
SODIUM SERPL-SCNC: 137 MMOL/L — SIGNIFICANT CHANGE UP (ref 135–145)
SP GR SPEC: 1.01 — SIGNIFICANT CHANGE UP (ref 1–1.04)
SQUAMOUS # UR AUTO: SIGNIFICANT CHANGE UP
UROBILINOGEN FLD QL: NORMAL MG/DL — SIGNIFICANT CHANGE UP
WBC # BLD: 10.61 K/UL — HIGH (ref 3.8–10.5)
WBC # FLD AUTO: 10.61 K/UL — HIGH (ref 3.8–10.5)
WBC CLUMPS #/AREA URNS HPF: PRESENT — HIGH (ref 0–?)
WBC UR QL: >50 — HIGH (ref 0–?)

## 2018-06-27 PROCEDURE — 71046 X-RAY EXAM CHEST 2 VIEWS: CPT | Mod: 26

## 2018-06-27 RX ORDER — SODIUM CHLORIDE 9 MG/ML
1000 INJECTION INTRAMUSCULAR; INTRAVENOUS; SUBCUTANEOUS ONCE
Qty: 0 | Refills: 0 | Status: COMPLETED | OUTPATIENT
Start: 2018-06-27 | End: 2018-06-27

## 2018-06-27 RX ORDER — CEFTRIAXONE 500 MG/1
1 INJECTION, POWDER, FOR SOLUTION INTRAMUSCULAR; INTRAVENOUS ONCE
Qty: 0 | Refills: 0 | Status: COMPLETED | OUTPATIENT
Start: 2018-06-27 | End: 2018-06-27

## 2018-06-27 RX ADMIN — CEFTRIAXONE 100 GRAM(S): 500 INJECTION, POWDER, FOR SOLUTION INTRAMUSCULAR; INTRAVENOUS at 23:45

## 2018-06-27 RX ADMIN — SODIUM CHLORIDE 2000 MILLILITER(S): 9 INJECTION INTRAMUSCULAR; INTRAVENOUS; SUBCUTANEOUS at 22:54

## 2018-06-27 NOTE — ED PROVIDER NOTE - CARE PLAN
Principal Discharge DX:	Acute cystitis without hematuria Principal Discharge DX:	Acute cystitis without hematuria  Secondary Diagnosis:	SBO (small bowel obstruction)

## 2018-06-27 NOTE — ED PROVIDER NOTE - MEDICAL DECISION MAKING DETAILS
88 y/o female with PMHx Seizure disorder, HTN, CVA, GI bleed, CHF, UTIs, HLD, syncope, pacemaker,  shunt, dementia, presents to the ED from [facility] rehab with nausea, vomiting, weakness, and dizziness for 1-2 weeks. Her urine was checked today and she had a positive UTI. Febrile, treat as septic. Likely UTI. Also tender abdomen, will get CTAP. Will also get CT head to eval shunt/ventricles with patient's nausea and vomiting.

## 2018-06-27 NOTE — ED ADULT NURSE NOTE - OBJECTIVE STATEMENT
pt received alert and oriented x1 from [facility]. pt is a poor historian as per daughter at bedside Pt has been having n/v  weakness and decrease appetite . for 1-2 weeks worsening today. pt had recent dx of uti not on abx yet. pt denies and n/v at the moment. abdomen soft and nontender. respirations equal and unlabored.. nsr on cm. 20 g placed in left a.c. labs drawn and sent. md at bedside for eval . will continue to monitor

## 2018-06-27 NOTE — ED PROVIDER NOTE - PROGRESS NOTE DETAILS
[place]: Hypertensive, other vitals wnl. Pt well appearing. repeat vbg pending. CT w/ SBO. surgery consulted. will reassess.

## 2018-06-27 NOTE — ED ADULT NURSE NOTE - PMH
Absence Seizure    Afib    CHF (congestive heart failure)    CHF (Congestive Heart Failure)    CVA (Cerebral Infarction)    GI bleed    High cholesterol    History of CVA (cerebrovascular accident)    HTN (hypertension)    HTN (Hypertension)    Hydronephrosis; Right kidney    Hypercholesteremia    Memory Loss; chronic "past few years"    Pacemaker    Pulmonary hypertension    Pulmonary hypertension    Seizure    Seizure

## 2018-06-27 NOTE — ED PROVIDER NOTE - OBJECTIVE STATEMENT
88 y/o female with PMHx Seizure disorder, HTN, CVA, GI bleed, CHF, UTIs, HLD, syncope, pacemaker,  shunt, dementia, presents to the ED from [facility] rehab with nausea, vomiting, weakness, and dizziness for 1-2 weeks. Her urine was checked today and she had a positive UTI, has not received antibiotics yet. Patient unable to give good story, AOx1. Family notes patient hasn't been eating well for 1-2 weeks, vomited twice today, and has been complaining of intermittent nausea and dizziness for 1-2 weeks.

## 2018-06-27 NOTE — ED ADULT TRIAGE NOTE - CHIEF COMPLAINT QUOTE
Sent by [facility] for nausea, vomiting and weakness. Accompanied by sister who states patient has been feeling weak and dizzy for past week. Patient feels warm and appears fatigued in ambay. In rehab s/p pelvic fracture. Hx Seizures , Alzheimer's, CHF, PPM, CVA Sent by [facility] for nausea, vomiting and weakness. Accompanied by sister who states patient has been feeling weak and dizzy for past week. Patient feels warm and appears fatigued in ambay. In rehab s/p pelvic fracture. Recently dx with UTI as per niece currently untreated. Hx Seizures , Alzheimer's, CHF, PPM, CVA

## 2018-06-27 NOTE — ED ADULT NURSE NOTE - CHIEF COMPLAINT QUOTE
Sent by [facility] for nausea, vomiting and weakness. Accompanied by sister who states patient has been feeling weak and dizzy for past week. Patient feels warm and appears fatigued in ambay. In rehab s/p pelvic fracture. Recently dx with UTI as per niece currently untreated. Hx Seizures , Alzheimer's, CHF, PPM, CVA

## 2018-06-27 NOTE — ED PROVIDER NOTE - ATTENDING CONTRIBUTION TO CARE
I performed a face to face bedside interview with patient regarding history of present illness, review of symptoms and past medical history. I completed an independent physical exam.  I have discussed patient's plan of care.   I agree with note as stated above, having amended the EMR as needed to reflect my findings. I have discussed the assessment and plan of care.  This includes during the time I functioned as the attending physician for this patient.  Attending Contribution to Care: agree with plan of resident. pt p/w ams, vomiting, weakness. no seizure, syncope. +UTI,. will be admitted for uti with ams. ct stable with no acute changes.

## 2018-06-27 NOTE — ED PROVIDER NOTE - PHYSICAL EXAMINATION
Gen: No acute distress, cooperative  Head: Normocephalic, Atraumatic  HEENT: PERRL, oral mucosa moist, normal conjunctiva  Lung: CTAB, no respiratory distress, no crackles or wheezes  CV: rrr, no murmur, pacemaker in place  Abd: soft, Tender diffusely in epigastric and lower abdomen. ND, no rebound or guarding  MSK: No LE edema, moving all extremities independently.   Neuro: Follows commands, speech clear, AOx1, moving all extremities independently, no facial droop,   Skin: Warm and dry  Psych: unable to fully assess, giving short answers and trails off.

## 2018-06-27 NOTE — PHYSICAL THERAPY INITIAL EVALUATION ADULT - THERAPY FREQUENCY, PT EVAL
How Severe Is Your Skin Lesion?: mild
Have Your Skin Lesions Been Treated?: not been treated
Is This A New Presentation, Or A Follow-Up?: Skin Lesions
3-5x/week

## 2018-06-28 ENCOUNTER — RESULT REVIEW (OUTPATIENT)
Age: 83
End: 2018-06-28

## 2018-06-28 DIAGNOSIS — N30.00 ACUTE CYSTITIS WITHOUT HEMATURIA: ICD-10-CM

## 2018-06-28 DIAGNOSIS — K46.0 UNSPECIFIED ABDOMINAL HERNIA WITH OBSTRUCTION, WITHOUT GANGRENE: ICD-10-CM

## 2018-06-28 LAB
ANISOCYTOSIS BLD QL: SLIGHT — SIGNIFICANT CHANGE UP
APTT BLD: 42.5 SEC — HIGH (ref 27.5–37.4)
BASE EXCESS BLDA CALC-SCNC: 2 MMOL/L — SIGNIFICANT CHANGE UP
BASE EXCESS BLDA CALC-SCNC: 2.1 MMOL/L — SIGNIFICANT CHANGE UP
BASE EXCESS BLDA CALC-SCNC: 2.2 MMOL/L — SIGNIFICANT CHANGE UP
BASE EXCESS BLDV CALC-SCNC: 4.5 MMOL/L — SIGNIFICANT CHANGE UP
BASOPHILS NFR SPEC: 0.8 % — SIGNIFICANT CHANGE UP (ref 0–2)
BLASTS # FLD: 0 % — SIGNIFICANT CHANGE UP (ref 0–0)
BLD GP AB SCN SERPL QL: NEGATIVE — SIGNIFICANT CHANGE UP
BLOOD GAS VENOUS - CREATININE: 0.67 MG/DL — SIGNIFICANT CHANGE UP (ref 0.5–1.3)
BUN SERPL-MCNC: 21 MG/DL — SIGNIFICANT CHANGE UP (ref 7–23)
CA-I BLDA-SCNC: 1.17 MMOL/L — SIGNIFICANT CHANGE UP (ref 1.15–1.29)
CA-I BLDA-SCNC: 1.18 MMOL/L — SIGNIFICANT CHANGE UP (ref 1.15–1.29)
CA-I BLDA-SCNC: 1.19 MMOL/L — SIGNIFICANT CHANGE UP (ref 1.15–1.29)
CALCIUM SERPL-MCNC: 8.4 MG/DL — SIGNIFICANT CHANGE UP (ref 8.4–10.5)
CHLORIDE BLDV-SCNC: 99 MMOL/L — SIGNIFICANT CHANGE UP (ref 96–108)
CHLORIDE SERPL-SCNC: 102 MMOL/L — SIGNIFICANT CHANGE UP (ref 98–107)
CO2 SERPL-SCNC: 26 MMOL/L — SIGNIFICANT CHANGE UP (ref 22–31)
CREAT SERPL-MCNC: 0.84 MG/DL — SIGNIFICANT CHANGE UP (ref 0.5–1.3)
EOSINOPHIL NFR FLD: 0 % — SIGNIFICANT CHANGE UP (ref 0–6)
GAS PNL BLDV: 138 MMOL/L — SIGNIFICANT CHANGE UP (ref 136–146)
GIANT PLATELETS BLD QL SMEAR: PRESENT — SIGNIFICANT CHANGE UP
GLUCOSE BLDA-MCNC: 116 MG/DL — HIGH (ref 70–99)
GLUCOSE BLDA-MCNC: 118 MG/DL — HIGH (ref 70–99)
GLUCOSE BLDA-MCNC: 162 MG/DL — HIGH (ref 70–99)
GLUCOSE BLDV-MCNC: 147 — HIGH (ref 70–99)
GLUCOSE SERPL-MCNC: 112 MG/DL — HIGH (ref 70–99)
HCO3 BLDA-SCNC: 26 MMOL/L — SIGNIFICANT CHANGE UP (ref 22–26)
HCO3 BLDA-SCNC: 26 MMOL/L — SIGNIFICANT CHANGE UP (ref 22–26)
HCO3 BLDA-SCNC: 27 MMOL/L — HIGH (ref 22–26)
HCO3 BLDV-SCNC: 28 MMOL/L — HIGH (ref 20–27)
HCT VFR BLD CALC: 33.2 % — LOW (ref 34.5–45)
HCT VFR BLDA CALC: 32.7 % — LOW (ref 34.5–46.5)
HCT VFR BLDA CALC: 34.5 % — SIGNIFICANT CHANGE UP (ref 34.5–46.5)
HCT VFR BLDA CALC: 36.5 % — SIGNIFICANT CHANGE UP (ref 34.5–46.5)
HCT VFR BLDV CALC: 42.7 % — SIGNIFICANT CHANGE UP (ref 34.5–45)
HGB BLD-MCNC: 10.9 G/DL — LOW (ref 11.5–15.5)
HGB BLDA-MCNC: 10.6 G/DL — LOW (ref 11.5–15.5)
HGB BLDA-MCNC: 11.2 G/DL — LOW (ref 11.5–15.5)
HGB BLDA-MCNC: 11.9 G/DL — SIGNIFICANT CHANGE UP (ref 11.5–15.5)
HGB BLDV-MCNC: 13.9 G/DL — SIGNIFICANT CHANGE UP (ref 11.5–15.5)
INR BLD: 1.1 — SIGNIFICANT CHANGE UP (ref 0.88–1.17)
LACTATE BLDA-SCNC: 1.3 MMOL/L — SIGNIFICANT CHANGE UP (ref 0.5–2)
LACTATE BLDA-SCNC: 2.9 MMOL/L — HIGH (ref 0.5–2)
LACTATE BLDV-MCNC: 3.2 MMOL/L — HIGH (ref 0.5–2)
LYMPHOCYTES NFR SPEC AUTO: 8.7 % — LOW (ref 13–44)
MACROCYTES BLD QL: SLIGHT — SIGNIFICANT CHANGE UP
MCHC RBC-ENTMCNC: 29.7 PG — SIGNIFICANT CHANGE UP (ref 27–34)
MCHC RBC-ENTMCNC: 32.8 % — SIGNIFICANT CHANGE UP (ref 32–36)
MCV RBC AUTO: 90.5 FL — SIGNIFICANT CHANGE UP (ref 80–100)
METAMYELOCYTES # FLD: 0 % — SIGNIFICANT CHANGE UP (ref 0–1)
METHOD TYPE: SIGNIFICANT CHANGE UP
MICROCYTES BLD QL: SLIGHT — SIGNIFICANT CHANGE UP
MONOCYTES NFR BLD: 3.5 % — SIGNIFICANT CHANGE UP (ref 2–9)
MYELOCYTES NFR BLD: 0 % — SIGNIFICANT CHANGE UP (ref 0–0)
NEUTROPHIL AB SER-ACNC: 83.5 % — HIGH (ref 43–77)
NEUTS BAND # BLD: 0 % — SIGNIFICANT CHANGE UP (ref 0–6)
NRBC # FLD: 0 — SIGNIFICANT CHANGE UP
ORGANISM # SPEC MICROSCOPIC CNT: SIGNIFICANT CHANGE UP
ORGANISM # SPEC MICROSCOPIC CNT: SIGNIFICANT CHANGE UP
OTHER - HEMATOLOGY %: 0 — SIGNIFICANT CHANGE UP
PCO2 BLDA: 37 MMHG — SIGNIFICANT CHANGE UP (ref 32–48)
PCO2 BLDA: 45 MMHG — SIGNIFICANT CHANGE UP (ref 32–48)
PCO2 BLDA: 49 MMHG — HIGH (ref 32–48)
PCO2 BLDV: 41 MMHG — SIGNIFICANT CHANGE UP (ref 41–51)
PH BLDA: 7.36 PH — SIGNIFICANT CHANGE UP (ref 7.35–7.45)
PH BLDA: 7.39 PH — SIGNIFICANT CHANGE UP (ref 7.35–7.45)
PH BLDA: 7.46 PH — HIGH (ref 7.35–7.45)
PH BLDV: 7.46 PH — HIGH (ref 7.32–7.43)
PLATELET # BLD AUTO: 355 K/UL — SIGNIFICANT CHANGE UP (ref 150–400)
PLATELET COUNT - ESTIMATE: NORMAL — SIGNIFICANT CHANGE UP
PMV BLD: 10.2 FL — SIGNIFICANT CHANGE UP (ref 7–13)
PO2 BLDA: 105 MMHG — SIGNIFICANT CHANGE UP (ref 83–108)
PO2 BLDA: 380 MMHG — HIGH (ref 83–108)
PO2 BLDA: 94 MMHG — SIGNIFICANT CHANGE UP (ref 83–108)
PO2 BLDV: 42 MMHG — HIGH (ref 35–40)
POTASSIUM BLDA-SCNC: 3 MMOL/L — LOW (ref 3.4–4.5)
POTASSIUM BLDA-SCNC: 3.4 MMOL/L — SIGNIFICANT CHANGE UP (ref 3.4–4.5)
POTASSIUM BLDA-SCNC: 3.6 MMOL/L — SIGNIFICANT CHANGE UP (ref 3.4–4.5)
POTASSIUM BLDV-SCNC: 4 MMOL/L — SIGNIFICANT CHANGE UP (ref 3.4–4.5)
POTASSIUM SERPL-MCNC: 3.9 MMOL/L — SIGNIFICANT CHANGE UP (ref 3.5–5.3)
POTASSIUM SERPL-SCNC: 3.9 MMOL/L — SIGNIFICANT CHANGE UP (ref 3.5–5.3)
PROMYELOCYTES # FLD: 0 % — SIGNIFICANT CHANGE UP (ref 0–0)
PROTHROM AB SERPL-ACNC: 12.2 SEC — SIGNIFICANT CHANGE UP (ref 9.8–13.1)
RBC # BLD: 3.67 M/UL — LOW (ref 3.8–5.2)
RBC # FLD: 13.8 % — SIGNIFICANT CHANGE UP (ref 10.3–14.5)
RH IG SCN BLD-IMP: POSITIVE — SIGNIFICANT CHANGE UP
SAO2 % BLDA: 100 % — HIGH (ref 95–99)
SAO2 % BLDA: 96.7 % — SIGNIFICANT CHANGE UP (ref 95–99)
SAO2 % BLDA: 97.6 % — SIGNIFICANT CHANGE UP (ref 95–99)
SAO2 % BLDV: 75.8 % — SIGNIFICANT CHANGE UP (ref 60–85)
SODIUM BLDA-SCNC: 138 MMOL/L — SIGNIFICANT CHANGE UP (ref 136–146)
SODIUM BLDA-SCNC: 138 MMOL/L — SIGNIFICANT CHANGE UP (ref 136–146)
SODIUM BLDA-SCNC: 139 MMOL/L — SIGNIFICANT CHANGE UP (ref 136–146)
SODIUM SERPL-SCNC: 141 MMOL/L — SIGNIFICANT CHANGE UP (ref 135–145)
SPECIMEN SOURCE: SIGNIFICANT CHANGE UP
VARIANT LYMPHS # BLD: 3.5 % — SIGNIFICANT CHANGE UP
WBC # BLD: 5.12 K/UL — SIGNIFICANT CHANGE UP (ref 3.8–10.5)
WBC # FLD AUTO: 5.12 K/UL — SIGNIFICANT CHANGE UP (ref 3.8–10.5)

## 2018-06-28 PROCEDURE — 88302 TISSUE EXAM BY PATHOLOGIST: CPT | Mod: 26

## 2018-06-28 PROCEDURE — 74177 CT ABD & PELVIS W/CONTRAST: CPT | Mod: 26

## 2018-06-28 PROCEDURE — 71045 X-RAY EXAM CHEST 1 VIEW: CPT | Mod: 26,76

## 2018-06-28 PROCEDURE — 88307 TISSUE EXAM BY PATHOLOGIST: CPT | Mod: 26

## 2018-06-28 PROCEDURE — 70450 CT HEAD/BRAIN W/O DYE: CPT | Mod: 26

## 2018-06-28 RX ORDER — ONDANSETRON 8 MG/1
4 TABLET, FILM COATED ORAL ONCE
Qty: 0 | Refills: 0 | Status: COMPLETED | OUTPATIENT
Start: 2018-06-28 | End: 2018-06-28

## 2018-06-28 RX ORDER — SODIUM CHLORIDE 9 MG/ML
1000 INJECTION, SOLUTION INTRAVENOUS
Qty: 0 | Refills: 0 | Status: DISCONTINUED | OUTPATIENT
Start: 2018-06-28 | End: 2018-06-28

## 2018-06-28 RX ORDER — LEVETIRACETAM 250 MG/1
1000 TABLET, FILM COATED ORAL EVERY 12 HOURS
Qty: 0 | Refills: 0 | Status: DISCONTINUED | OUTPATIENT
Start: 2018-06-28 | End: 2018-07-02

## 2018-06-28 RX ORDER — DEXTROSE MONOHYDRATE, SODIUM CHLORIDE, AND POTASSIUM CHLORIDE 50; .745; 4.5 G/1000ML; G/1000ML; G/1000ML
1000 INJECTION, SOLUTION INTRAVENOUS
Qty: 0 | Refills: 0 | Status: DISCONTINUED | OUTPATIENT
Start: 2018-06-28 | End: 2018-06-28

## 2018-06-28 RX ORDER — POTASSIUM CHLORIDE 20 MEQ
40 PACKET (EA) ORAL ONCE
Qty: 0 | Refills: 0 | Status: COMPLETED | OUTPATIENT
Start: 2018-06-28 | End: 2018-06-28

## 2018-06-28 RX ORDER — SODIUM CHLORIDE 9 MG/ML
1000 INJECTION, SOLUTION INTRAVENOUS ONCE
Qty: 0 | Refills: 0 | Status: COMPLETED | OUTPATIENT
Start: 2018-06-28 | End: 2018-06-28

## 2018-06-28 RX ORDER — SODIUM CHLORIDE 9 MG/ML
1000 INJECTION, SOLUTION INTRAVENOUS
Qty: 0 | Refills: 0 | Status: DISCONTINUED | OUTPATIENT
Start: 2018-06-28 | End: 2018-06-29

## 2018-06-28 RX ORDER — HYDROMORPHONE HYDROCHLORIDE 2 MG/ML
0.2 INJECTION INTRAMUSCULAR; INTRAVENOUS; SUBCUTANEOUS
Qty: 0 | Refills: 0 | Status: DISCONTINUED | OUTPATIENT
Start: 2018-06-28 | End: 2018-06-29

## 2018-06-28 RX ORDER — SODIUM CHLORIDE 9 MG/ML
1000 INJECTION INTRAMUSCULAR; INTRAVENOUS; SUBCUTANEOUS ONCE
Qty: 0 | Refills: 0 | Status: COMPLETED | OUTPATIENT
Start: 2018-06-28 | End: 2018-06-28

## 2018-06-28 RX ORDER — MORPHINE SULFATE 50 MG/1
4 CAPSULE, EXTENDED RELEASE ORAL ONCE
Qty: 0 | Refills: 0 | Status: DISCONTINUED | OUTPATIENT
Start: 2018-06-28 | End: 2018-06-28

## 2018-06-28 RX ORDER — PIPERACILLIN AND TAZOBACTAM 4; .5 G/20ML; G/20ML
3.38 INJECTION, POWDER, LYOPHILIZED, FOR SOLUTION INTRAVENOUS EVERY 8 HOURS
Qty: 0 | Refills: 0 | Status: DISCONTINUED | OUTPATIENT
Start: 2018-06-28 | End: 2018-07-06

## 2018-06-28 RX ORDER — ENOXAPARIN SODIUM 100 MG/ML
40 INJECTION SUBCUTANEOUS DAILY
Qty: 0 | Refills: 0 | Status: DISCONTINUED | OUTPATIENT
Start: 2018-06-28 | End: 2018-07-05

## 2018-06-28 RX ORDER — METOPROLOL TARTRATE 50 MG
5 TABLET ORAL EVERY 6 HOURS
Qty: 0 | Refills: 0 | Status: DISCONTINUED | OUTPATIENT
Start: 2018-06-28 | End: 2018-07-02

## 2018-06-28 RX ORDER — FENTANYL CITRATE 50 UG/ML
25 INJECTION INTRAVENOUS
Qty: 0 | Refills: 0 | Status: DISCONTINUED | OUTPATIENT
Start: 2018-06-28 | End: 2018-06-28

## 2018-06-28 RX ORDER — DEXMEDETOMIDINE HYDROCHLORIDE IN 0.9% SODIUM CHLORIDE 4 UG/ML
0.2 INJECTION INTRAVENOUS
Qty: 200 | Refills: 0 | Status: DISCONTINUED | OUTPATIENT
Start: 2018-06-28 | End: 2018-06-28

## 2018-06-28 RX ADMIN — FENTANYL CITRATE 25 MICROGRAM(S): 50 INJECTION INTRAVENOUS at 16:25

## 2018-06-28 RX ADMIN — FENTANYL CITRATE 25 MICROGRAM(S): 50 INJECTION INTRAVENOUS at 16:30

## 2018-06-28 RX ADMIN — FENTANYL CITRATE 25 MICROGRAM(S): 50 INJECTION INTRAVENOUS at 17:00

## 2018-06-28 RX ADMIN — HYDROMORPHONE HYDROCHLORIDE 0.2 MILLIGRAM(S): 2 INJECTION INTRAMUSCULAR; INTRAVENOUS; SUBCUTANEOUS at 17:00

## 2018-06-28 RX ADMIN — DEXMEDETOMIDINE HYDROCHLORIDE IN 0.9% SODIUM CHLORIDE 2.75 MICROGRAM(S)/KG/HR: 4 INJECTION INTRAVENOUS at 17:10

## 2018-06-28 RX ADMIN — FENTANYL CITRATE 25 MICROGRAM(S): 50 INJECTION INTRAVENOUS at 16:20

## 2018-06-28 RX ADMIN — SODIUM CHLORIDE 2000 MILLILITER(S): 9 INJECTION, SOLUTION INTRAVENOUS at 19:12

## 2018-06-28 RX ADMIN — HYDROMORPHONE HYDROCHLORIDE 0.2 MILLIGRAM(S): 2 INJECTION INTRAMUSCULAR; INTRAVENOUS; SUBCUTANEOUS at 17:30

## 2018-06-28 RX ADMIN — FENTANYL CITRATE 25 MICROGRAM(S): 50 INJECTION INTRAVENOUS at 16:35

## 2018-06-28 RX ADMIN — ONDANSETRON 4 MILLIGRAM(S): 8 TABLET, FILM COATED ORAL at 04:14

## 2018-06-28 RX ADMIN — Medication 40 MILLIEQUIVALENT(S): at 01:46

## 2018-06-28 RX ADMIN — SODIUM CHLORIDE 100 MILLILITER(S): 9 INJECTION, SOLUTION INTRAVENOUS at 16:32

## 2018-06-28 RX ADMIN — PIPERACILLIN AND TAZOBACTAM 25 GRAM(S): 4; .5 INJECTION, POWDER, LYOPHILIZED, FOR SOLUTION INTRAVENOUS at 22:19

## 2018-06-28 RX ADMIN — SODIUM CHLORIDE 75 MILLILITER(S): 9 INJECTION, SOLUTION INTRAVENOUS at 11:19

## 2018-06-28 RX ADMIN — SODIUM CHLORIDE 2000 MILLILITER(S): 9 INJECTION INTRAMUSCULAR; INTRAVENOUS; SUBCUTANEOUS at 04:55

## 2018-06-28 RX ADMIN — HYDROMORPHONE HYDROCHLORIDE 0.2 MILLIGRAM(S): 2 INJECTION INTRAMUSCULAR; INTRAVENOUS; SUBCUTANEOUS at 17:10

## 2018-06-28 NOTE — H&P ADULT - ASSESSMENT
87y female with incarcerated right inguinal hernia, containing small bowel and resulting in SBO. Discussed patient's care with her healthcare proxy and niece over the phone, and decision was made to proceed with operative intervention. She is not DNR/DNI

## 2018-06-28 NOTE — H&P ADULT - NSHPPHYSICALEXAM_GEN_ALL_CORE
Vital Signs Last 24 Hrs  T(C): 36.4 (28 Jun 2018 11:30), Max: 38.3 (27 Jun 2018 22:11)  T(F): 97.6 (28 Jun 2018 11:30), Max: 100.9 (27 Jun 2018 22:11)  HR: 74 (28 Jun 2018 11:30) (70 - 82)  BP: 187/91 (28 Jun 2018 11:30) (147/85 - 191/92)  BP(mean): --  RR: 16 (28 Jun 2018 11:30) (16 - 20)  SpO2: 96% (28 Jun 2018 11:30) (96% - 100%)    GEN: NAD, alert but disoriented (patient demented at baseline  HEENT: WNL, NGT in place  CHEST: Symmetrical chest rise, no increased work of breathing, no stridor  HEART: RRR, non-muffled heart sounds  ABD: Distended, tympanitic, appears non-tender. R inguinal bulging mass - firm, largely immobile, tender, no skin changes overlying  EXT: No erythema/edema. Warm, sensate, motor function intact

## 2018-06-28 NOTE — H&P ADULT - PROBLEM SELECTOR PLAN 1
- Admit to Surgery  - NGT to LCWS (f/u CXR for placement confirmation)  - NPO/IVF  - Musa catheter for strict I/O's and fluid status management  - OR for repair of incarcerated right inguinal hernia

## 2018-06-28 NOTE — CONSULT NOTE ADULT - SUBJECTIVE AND OBJECTIVE BOX
SICU Consultation Note  =====================================================  HISTORY  HPI:  88 y/o female with PMHx Seizure disorder, HTN, CVA, GI bleed, CHF, UTIs, HLD, syncope, pacemaker,  shunt, dementia, presents to the ED from [facility] rehab with nausea, vomiting, weakness, and dizziness for 1-2 weeks. Her urine was checked today and she had a positive UTI, has not received antibiotics yet. Patient unable to give good story, AOx1. Family notes patient hasn't been eating well for 1-2 weeks, vomited twice today, and has been complaining of intermittent nausea and dizziness for 1-2 weeks (28 Jun 2018 08:43)    Surgery Information: R inguinal incision, reduction of hernia, primary repair of inguinal hernia, lower midline laparotomy, 5cm SBR w/ hand sewen anastomosis  Case Duration: 2hrs 	EBL: 10cc	IV Fluids: 1L	Blood Products: None 	Urine Output: 100cc  Complications: None    Allergies:   PAST MEDICAL & SURGICAL HISTORY:  Pacemaker  History of CVA (cerebrovascular accident)  GI bleed  Pulmonary hypertension  Seizure  CHF (congestive heart failure)  High cholesterol  HTN (hypertension)  Seizure  Pulmonary hypertension  Memory Loss; chronic "past few years"  Hydronephrosis; Right kidney  Afib  Hypercholesteremia  HTN (Hypertension)  Absence Seizure  CVA (Cerebral Infarction)  CHF (Congestive Heart Failure)  S/P  shunt  Ureteral Obstruction; right kidney; stent insertion 12/10  S/P Brain Surgery; for "brain cyst   few years ago"  S/P  Shunt  Pacemaker  Pacemaker  Infection of  Shunt  Infection of  Shunt  History of Stroke  Seizure  HTN (Hypertension)    FAMILY HISTORY:  No pertinent family history in first degree relatives  No pertinent family history in first degree relatives      ADVANCE DIRECTIVES: Presumed Full Code    REVIEW OF SYSTEMS:  See HPI    HOME MEDICATIONS:    CURRENT MEDICATIONS:   --------------------------------------------------------------------------------------  Neurologic Medications  dexmedetomidine Infusion 0.2 MICROgram(s)/kG/Hr IV Continuous <Continuous>  HYDROmorphone  Injectable 0.2 milliGRAM(s) IV Push every 10 minutes PRN Moderate Pain (4 - 6)    Respiratory Medications    Cardiovascular Medications    Gastrointestinal Medications  lactated ringers. 1000 milliLiter(s) IV Continuous <Continuous>    Genitourinary Medications    Hematologic/Oncologic Medications  enoxaparin Injectable 40 milliGRAM(s) SubCutaneous daily    Antimicrobial/Immunologic Medications  piperacillin/tazobactam IVPB. 3.375 Gram(s) IV Intermittent every 8 hours    Endocrine/Metabolic Medications    Topical/Other Medications    --------------------------------------------------------------------------------------    VITAL SIGNS, INS/OUTS (last 24 hours):  --------------------------------------------------------------------------------------  T(C): 36.4 (06-28-18 @ 11:30), Max: 38.3 (06-27-18 @ 22:11)  HR: 68 (06-28-18 @ 20:30) (62 - 90)  BP: 174/71 (06-28-18 @ 20:30) (86/58 - 191/92)  BP(mean): --  ABP: 95/48 (06-28-18 @ 19:15) (89/47 - 178/85)  ABP(mean): --  RR: 13 (06-28-18 @ 20:30) (12 - 21)  SpO2: 98% (06-28-18 @ 20:30) (96% - 100%)  Wt(kg): --  CVP(mm Hg): --  CI: --  CAPILLARY BLOOD GLUCOSE      POCT Blood Glucose.: 155 mg/dL (27 Jun 2018 21:59)   N/A      06-28 @ 07:01  -  06-28 @ 21:19  --------------------------------------------------------  IN:    dexmedetomidine Infusion: 2.8 mL    lactated ringers.: 500 mL  Total IN: 502.8 mL    OUT:    Indwelling Catheter - Urethral: 777 mL  Total OUT: 777 mL    Total NET: -274.2 mL        --------------------------------------------------------------------------------------    EXAM  NEUROLOGY  RASS: -1  	GCS:  9T  Exam: Sedated but arousable, Responds to commands    HEENT  Exam: Normocephalic, atraumatic.  EOMI    RESPIRATORY  Exam: Lungs clear to auscultation, Normal expansion/effort.  Mechanical Ventilation: Mode: AC/ CMV (Assist Control/ Continuous Mandatory Ventilation), RR (machine): 12, TV (machine): 450, FiO2: 30, PEEP: 5, MAP: 12, PIP: 24    CARDIOVASCULAR  Exam: S1, S2.  Regular rate and rhythm.    GI/NUTRITION  Exam: Abdomen soft, appropriately TTP, Non-distended.  Wound: Incisions c/d/i  Current Diet:  NPO, NGT    VASCULAR  Exam: Extremities warm, pink, well-perfused.    MUSCULOSKELETAL  Exam: All extremities moving spontaneously without limitations.    SKIN:  Exam: Good skin turgor, no skin breakdown.    METABOLIC/FLUIDS/ELECTROLYTES  lactated ringers. 1000 milliLiter(s) IV Continuous <Continuous>      HEMATOLOGIC  [x] DVT Prophylaxis: enoxaparin Injectable 40 milliGRAM(s) SubCutaneous daily    Transfusions:	[] PRBC	[] Platelets		[] FFP	[] Cryoprecipitate    INFECTIOUS DISEASE  Antimicrobials/Immunologic Medications:  piperacillin/tazobactam IVPB. 3.375 Gram(s) IV Intermittent every 8 hours    Tubes/Lines/Drains  [x] Peripheral IV  [x] Central Venous Line     	[x] R	[] L	[x] IJ	[] Fem	[] SC	Date Placed: 6/28  [x] Arterial Line		[] R	[x] L	[] Fem	[x] Rad	[] Ax	Date Placed: 6/28  [] PICC:         	[] Midline		[] Mediport  [x] Urinary Catheter		Date Placed: 6/28    LABS  --------------------------------------------------------------------------------------  CBC (06-28 @ 17:30)                              10.9<L>                         5.12    )----------------(  355        --    % Neutrophils, --    % Lymphocytes, ANC: --                                  33.2<L>              CBC (06-27 @ 22:52)                              13.5                           10.61<H>  )----------------(  461<H>     82.9<H>% Neutrophils, 10.1<L>% Lymphocytes, ANC: 8.80<H>                              42.2                  BMP (06-28 @ 17:30)             141     |  102     |  21    		Ca++ --      Ca 8.4                ---------------------------------( 112<H>		Mg --                 3.9     |  26      |  0.84  			Ph --      BMP (06-27 @ 22:52)             137     |  91<L>   |  19    		Ca++ --      Ca 10.0               ---------------------------------( 150<H>		Mg --                 3.3<L>  |  29      |  0.77  			Ph --        LFTs (06-27 @ 22:52)      TPro 8.9<H> / Alb 4.3 / TBili 0.6 / DBili -- / AST 23 / ALT 8 / AlkPhos 98    Coags (06-28 @ 05:30)  aPTT 42.5<H> / INR 1.10 / PT 12.2    ABG (06-28 @ 20:20)     7.39 / 45 / 94 / 26 / 2.1 / 96.7%     Lactate: 1.3    ABG (06-28 @ 17:30)     7.36 / 49<H> / 105 / 26 / 2.0 / 97.6%     Lactate: 2.9<H>      VBG (06-28 @ 04:00)     7.46<H> / 41 / 42<H> / 28<H> / 4.5 / 75.8%  VBG (06-27 @ 22:52)     7.45<H> / 46 / 35 / 30<H> / 7.4 / 63.8%    -> BLOOD PERIPHERAL Culture (06-27 @ 23:50)       ***** CRITICAL RESULT *****  PERSON CALLED / READ-BACK: DR VILLALOBOS / Y  DATE / TIME CALLED: 06/28/18 [id number]  CALLED BY: JIGNESH DAVE^Gram Neg Rods  AFTER: 13 HOURS INCUBATION  BOTTLE: ANAEROBIC BOTTLE    BLOOD CULTURE PCR  NG    -> URINE MIDSTREAM Culture (06-27 @ 23:16)     NG    NG  NG      --------------------------------------------------------------------------------------    ASSESSMENT:  87y Female w/ Incarcerated R inguinal hernia, s/p reduction of hernia, repair of hernia, Ex-lap, SBR    PLAN:  Neuro:  - monitor mental status  - pain control w/ IV Dilaudid  - sedation w/ Precedex until extubated    Resp:  - SBT once awake  - F/u ABG, CXR  - Monitor resp status postop    CV:  - hemodynamically stable, not requiring pressor support    GI:  - NPO  - NGT to LCWS  - ARBF    :  - monitor UOP  - replete lytes PRN  - LR @ 100cc/hr    ID:  - Bcx sig for E.coli  - c/w Zosyn  - monitor WBC and temp    Heme:  - VTE ppx w/ Lovenox  - monitor CBC    Endo:  - no active issues at this time  - monitor BG on BMP    Dispo:  - SICU  - Full code  --------------------------------------------------------------------------------------    Critical Care Diagnoses:  Small bowel obstruction

## 2018-06-28 NOTE — H&P ADULT - PSH
History of Stroke    HTN (Hypertension)    Infection of  Shunt    Infection of  Shunt    Pacemaker    Pacemaker    S/P Brain Surgery; for "brain cyst   few years ago"    S/P  shunt    S/P  Shunt    Seizure    Ureteral Obstruction; right kidney; stent insertion 12/10

## 2018-06-28 NOTE — BRIEF OPERATIVE NOTE - PROCEDURE
<<-----Click on this checkbox to enter Procedure Small bowel resection  06/28/2018    Active  [email or web address]  Exploratory laparotomy  06/28/2018    Active  [email or web address]  Inguinal hernia repair, right  06/28/2018    Active  [email or web address]

## 2018-06-28 NOTE — H&P ADULT - NSHPLABSRESULTS_GEN_ALL_CORE
13.5   10.61 )-----------( 461      ( 2018 22:52 )             42.2                 PT/INR - ( 2018 05:30 )   PT: 12.2 SEC;   INR: 1.10          PTT - ( 2018 05:30 )  PTT:42.5 SEC        137  |  91<L>  |  19  ----------------------------<  150<H>  3.3<L>   |  29  |  0.77    Ca    10.0      2018 22:52    TPro  8.9<H>  /  Alb  4.3  /  TBili  0.6  /  DBili  x   /  AST  23  /  ALT  8   /  AlkPhos  98        LIVER FUNCTIONS - ( 2018 22:52 )  Alb: 4.3 g/dL / Pro: 8.9 g/dL / ALK PHOS: 98 u/L / ALT: 8 u/L / AST: 23 u/L / GGT: x             Urinalysis Basic - ( 2018 22:50 )    Color: YELLOW / Appearance: HAZY / S.014 / pH: 7.0  Gluc: NEGATIVE / Ketone: NEGATIVE  / Bili: NEGATIVE / Urobili: NORMAL mg/dL   Blood: SMALL / Protein: 150 mg/dL / Nitrite: NEGATIVE   Leuk Esterase: LARGE / RBC: 25-50 / WBC >50   Sq Epi: OCC / Non Sq Epi: x / Bacteria: x        IMAGING  < from: CT Abdomen and Pelvis w/ IV Cont (18 @ 02:58) >    IMPRESSION:     Small bowel obstruction with a transition to collapsed loops in a right   inguinal hernia. Small volume interloop edema.    Right-sided double-J ureteral stent with mild to moderate right   hydroureteronephrosis. This is not significantly changed from the prior   study dated 2017.    < end of copied text >

## 2018-06-28 NOTE — CHART NOTE - NSCHARTNOTEFT_GEN_A_CORE
Surgery Post-Op Note    Pre-Op Dx: incarcerated inguinal hernia   Procedure: Inguinal hernia repair, right  Exploratory laparotomy  Small bowel resection    Surgeon: Dr. Montgomery    Subjective:   Pt seen and examined at the bedside. Pt w/ no complaints. Patient intubated but responds to questions.  Pain is well controlled.    Vital Signs Last 24 Hrs  T(C): 36.4 (2018 11:30), Max: 38.3 (2018 22:11)  T(F): 97.6 (2018 11:30), Max: 100.9 (2018 22:11)  HR: 68 (2018 20:30) (62 - 90)  BP: 174/71 (2018 20:30) (86/58 - 191/92)  BP(mean): --  RR: 13 (2018 20:30) (12 - 21)  SpO2: 98% (2018 20:30) (96% - 100%)    Physical Exam:  General: NAD, resting comfortably in bed  Neuro: A/O x 3, no focal deficits  Pulmonary: Intubated  Cardiovascular: NSR  Abdominal: soft, ATTP. ND  Incision: C/D/I   Extremities: WWP        LABS:                        10.9   5.12  )-----------( 355      ( 2018 17:30 )             33.2     06-28    141  |  102  |  21  ----------------------------<  112<H>  3.9   |  26  |  0.84    Ca    8.4      2018 17:30    TPro  8.9<H>  /  Alb  4.3  /  TBili  0.6  /  DBili  x   /  AST  23  /  ALT  8   /  AlkPhos  98  06-27    PT/INR - ( 2018 05:30 )   PT: 12.2 SEC;   INR: 1.10          PTT - ( 2018 05:30 )  PTT:42.5 SEC  CAPILLARY BLOOD GLUCOSE      POCT Blood Glucose.: 155 mg/dL (2018 21:59)    Urinalysis Basic - ( 2018 22:50 )    Color: YELLOW / Appearance: HAZY / S.014 / pH: 7.0  Gluc: NEGATIVE / Ketone: NEGATIVE  / Bili: NEGATIVE / Urobili: NORMAL mg/dL   Blood: SMALL / Protein: 150 mg/dL / Nitrite: NEGATIVE   Leuk Esterase: LARGE / RBC: 25-50 / WBC >50   Sq Epi: OCC / Non Sq Epi: x / Bacteria: x      LIVER FUNCTIONS - ( 2018 22:52 )  Alb: 4.3 g/dL / Pro: 8.9 g/dL / ALK PHOS: 98 u/L / ALT: 8 u/L / AST: 23 u/L / GGT: x           ABO Interpretation: A ( @ 04:29)        Radiology and Additional Studies:    Assessment:87y Female s/p ex lap, SBR, inguinal hernia repair    Plan:  IVF, Diet: NPO.NGT  extubate when appropriate  Pain/nausea control PRN  Incentive spirometer/OOB/Ambulate  hua, monitor I/Os  appreciate SICU care

## 2018-06-28 NOTE — ED ADULT NURSE REASSESSMENT NOTE - NS ED NURSE REASSESS COMMENT FT1
pt had 1 episode of n/v .md made aware. meds given as ordered. pt cleaned and repositioned. aspiration precautions in place. will continue to monitor

## 2018-06-28 NOTE — H&P ADULT - HISTORY OF PRESENT ILLNESS
88 y/o female with PMHx Seizure disorder, HTN, CVA, GI bleed, CHF, UTIs, HLD, syncope, pacemaker,  shunt, dementia, presents to the ED from [facility] rehab with nausea, vomiting, weakness, and dizziness for 1-2 weeks. Her urine was checked today and she had a positive UTI, has not received antibiotics yet. Patient unable to give good story, AOx1. Family notes patient hasn't been eating well for 1-2 weeks, vomited twice today, and has been complaining of intermittent nausea and dizziness for 1-2 weeks

## 2018-06-29 DIAGNOSIS — R69 ILLNESS, UNSPECIFIED: ICD-10-CM

## 2018-06-29 LAB
BUN SERPL-MCNC: 15 MG/DL — SIGNIFICANT CHANGE UP (ref 7–23)
CALCIUM SERPL-MCNC: 8.4 MG/DL — SIGNIFICANT CHANGE UP (ref 8.4–10.5)
CHLORIDE SERPL-SCNC: 99 MMOL/L — SIGNIFICANT CHANGE UP (ref 98–107)
CO2 SERPL-SCNC: 28 MMOL/L — SIGNIFICANT CHANGE UP (ref 22–31)
CREAT SERPL-MCNC: 0.74 MG/DL — SIGNIFICANT CHANGE UP (ref 0.5–1.3)
GLUCOSE SERPL-MCNC: 127 MG/DL — HIGH (ref 70–99)
HCT VFR BLD CALC: 33.4 % — LOW (ref 34.5–45)
HGB BLD-MCNC: 10.5 G/DL — LOW (ref 11.5–15.5)
LACTATE SERPL-SCNC: 1.2 MMOL/L — SIGNIFICANT CHANGE UP (ref 0.5–2)
MAGNESIUM SERPL-MCNC: 1.8 MG/DL — SIGNIFICANT CHANGE UP (ref 1.6–2.6)
MCHC RBC-ENTMCNC: 29.9 PG — SIGNIFICANT CHANGE UP (ref 27–34)
MCHC RBC-ENTMCNC: 31.4 % — LOW (ref 32–36)
MCV RBC AUTO: 95.2 FL — SIGNIFICANT CHANGE UP (ref 80–100)
NRBC # FLD: 0 — SIGNIFICANT CHANGE UP
ORGANISM # SPEC MICROSCOPIC CNT: SIGNIFICANT CHANGE UP
PHOSPHATE SERPL-MCNC: 1.9 MG/DL — LOW (ref 2.5–4.5)
PLATELET # BLD AUTO: 327 K/UL — SIGNIFICANT CHANGE UP (ref 150–400)
PMV BLD: 10.1 FL — SIGNIFICANT CHANGE UP (ref 7–13)
POTASSIUM SERPL-MCNC: 3 MMOL/L — LOW (ref 3.5–5.3)
POTASSIUM SERPL-SCNC: 3 MMOL/L — LOW (ref 3.5–5.3)
RBC # BLD: 3.51 M/UL — LOW (ref 3.8–5.2)
RBC # FLD: 14 % — SIGNIFICANT CHANGE UP (ref 10.3–14.5)
SODIUM SERPL-SCNC: 139 MMOL/L — SIGNIFICANT CHANGE UP (ref 135–145)
WBC # BLD: 8.93 K/UL — SIGNIFICANT CHANGE UP (ref 3.8–10.5)
WBC # FLD AUTO: 8.93 K/UL — SIGNIFICANT CHANGE UP (ref 3.8–10.5)

## 2018-06-29 PROCEDURE — 71045 X-RAY EXAM CHEST 1 VIEW: CPT | Mod: 26

## 2018-06-29 PROCEDURE — 99223 1ST HOSP IP/OBS HIGH 75: CPT

## 2018-06-29 RX ORDER — POTASSIUM PHOSPHATE, MONOBASIC POTASSIUM PHOSPHATE, DIBASIC 236; 224 MG/ML; MG/ML
15 INJECTION, SOLUTION INTRAVENOUS ONCE
Qty: 0 | Refills: 0 | Status: COMPLETED | OUTPATIENT
Start: 2018-06-29 | End: 2018-06-29

## 2018-06-29 RX ORDER — MAGNESIUM SULFATE 500 MG/ML
2 VIAL (ML) INJECTION ONCE
Qty: 0 | Refills: 0 | Status: COMPLETED | OUTPATIENT
Start: 2018-06-29 | End: 2018-06-29

## 2018-06-29 RX ORDER — POTASSIUM CHLORIDE 20 MEQ
10 PACKET (EA) ORAL
Qty: 0 | Refills: 0 | Status: COMPLETED | OUTPATIENT
Start: 2018-06-29 | End: 2018-06-29

## 2018-06-29 RX ORDER — DEXTROSE MONOHYDRATE, SODIUM CHLORIDE, AND POTASSIUM CHLORIDE 50; .745; 4.5 G/1000ML; G/1000ML; G/1000ML
1000 INJECTION, SOLUTION INTRAVENOUS
Qty: 0 | Refills: 0 | Status: DISCONTINUED | OUTPATIENT
Start: 2018-06-29 | End: 2018-07-03

## 2018-06-29 RX ORDER — ACETAMINOPHEN 500 MG
1000 TABLET ORAL ONCE
Qty: 0 | Refills: 0 | Status: COMPLETED | OUTPATIENT
Start: 2018-06-29 | End: 2018-06-29

## 2018-06-29 RX ADMIN — PIPERACILLIN AND TAZOBACTAM 25 GRAM(S): 4; .5 INJECTION, POWDER, LYOPHILIZED, FOR SOLUTION INTRAVENOUS at 15:29

## 2018-06-29 RX ADMIN — Medication 50 GRAM(S): at 19:03

## 2018-06-29 RX ADMIN — Medication 100 MILLIEQUIVALENT(S): at 12:12

## 2018-06-29 RX ADMIN — Medication 100 MILLIEQUIVALENT(S): at 10:46

## 2018-06-29 RX ADMIN — Medication 5 MILLIGRAM(S): at 00:23

## 2018-06-29 RX ADMIN — PIPERACILLIN AND TAZOBACTAM 25 GRAM(S): 4; .5 INJECTION, POWDER, LYOPHILIZED, FOR SOLUTION INTRAVENOUS at 22:38

## 2018-06-29 RX ADMIN — Medication 400 MILLIGRAM(S): at 12:13

## 2018-06-29 RX ADMIN — LEVETIRACETAM 400 MILLIGRAM(S): 250 TABLET, FILM COATED ORAL at 06:55

## 2018-06-29 RX ADMIN — Medication 5 MILLIGRAM(S): at 05:32

## 2018-06-29 RX ADMIN — Medication 1000 MILLIGRAM(S): at 13:15

## 2018-06-29 RX ADMIN — SODIUM CHLORIDE 100 MILLILITER(S): 9 INJECTION, SOLUTION INTRAVENOUS at 09:17

## 2018-06-29 RX ADMIN — ENOXAPARIN SODIUM 40 MILLIGRAM(S): 100 INJECTION SUBCUTANEOUS at 12:12

## 2018-06-29 RX ADMIN — PIPERACILLIN AND TAZOBACTAM 25 GRAM(S): 4; .5 INJECTION, POWDER, LYOPHILIZED, FOR SOLUTION INTRAVENOUS at 05:32

## 2018-06-29 RX ADMIN — Medication 5 MILLIGRAM(S): at 18:22

## 2018-06-29 RX ADMIN — DEXTROSE MONOHYDRATE, SODIUM CHLORIDE, AND POTASSIUM CHLORIDE 75 MILLILITER(S): 50; .745; 4.5 INJECTION, SOLUTION INTRAVENOUS at 19:51

## 2018-06-29 RX ADMIN — Medication 5 MILLIGRAM(S): at 12:12

## 2018-06-29 RX ADMIN — Medication 5 MILLIGRAM(S): at 23:15

## 2018-06-29 RX ADMIN — POTASSIUM PHOSPHATE, MONOBASIC POTASSIUM PHOSPHATE, DIBASIC 62.5 MILLIMOLE(S): 236; 224 INJECTION, SOLUTION INTRAVENOUS at 15:30

## 2018-06-29 RX ADMIN — Medication 100 MILLIEQUIVALENT(S): at 09:17

## 2018-06-29 RX ADMIN — LEVETIRACETAM 400 MILLIGRAM(S): 250 TABLET, FILM COATED ORAL at 18:25

## 2018-06-29 NOTE — PHYSICAL THERAPY INITIAL EVALUATION ADULT - LEVEL OF INDEPENDENCE: SIT/STAND, REHAB EVAL
At this time as pt presents with weakness and requires assist to maintain sitting at edge of bed./unable to perform

## 2018-06-29 NOTE — PHYSICAL THERAPY INITIAL EVALUATION ADULT - IMPAIRMENTS FOUND, PT EVAL
cognitive impairment/muscle strength/posture/gait, locomotion, and balance/ROM/arousal, attention, and cognition

## 2018-06-29 NOTE — PHYSICAL THERAPY INITIAL EVALUATION ADULT - CRITERIA FOR SKILLED THERAPEUTIC INTERVENTIONS
therapy frequency/impairments found/predicted duration of therapy intervention/rehab potential/anticipated discharge recommendation

## 2018-06-29 NOTE — PROVIDER CONTACT NOTE (OTHER) - ASSESSMENT
Patient hypertensive at this time with a blood pressure of 177/100. Patient is asymptomatic with no c/o chest pain, Shortness of breath, or headache at this time. Medicated with metoprolol per order.

## 2018-06-29 NOTE — PROGRESS NOTE ADULT - SUBJECTIVE AND OBJECTIVE BOX
Surgery Progress Note - pager [phone]      SUBJECTIVE: Patient seen and examined on morning rounds. No acute events overnight. No GI function yet. Patient pulled NG tube out this AM. No nausea. Will monitor. Urine output adequate. Will d/c hua today.      OBJECTIVE:     ** Vital signs / I&O's **    Vital Signs Last 24 Hrs  T(C): 37.4 (29 Jun 2018 05:31), Max: 37.4 (29 Jun 2018 05:31)  T(F): 99.4 (29 Jun 2018 05:31), Max: 99.4 (29 Jun 2018 05:31)  HR: 91 (29 Jun 2018 05:31) (62 - 92)  BP: 176/98 (29 Jun 2018 05:31) (86/58 - 188/86)  BP(mean): --  RR: 18 (29 Jun 2018 05:31) (12 - 22)  SpO2: 98% (29 Jun 2018 05:31) (94% - 100%)      28 Jun 2018 07:01  -  29 Jun 2018 07:00  --------------------------------------------------------  IN:    dexmedetomidine Infusion: 2.8 mL    IV PiggyBack: 100 mL    lactated ringers.: 1100 mL  Total IN: 1202.8 mL    OUT:    Indwelling Catheter - Urethral: 1892 mL    Nasoenteral Tube: 300 mL  Total OUT: 2192 mL    Total NET: -989.2 mL      ** Physical Exam **  Gen: Alert, NAD  Abd: soft, ATTP. ND, incision C/D/I     ** Labs **                          10.5   8.93  )-----------( 327      ( 29 Jun 2018 07:01 )             33.4     29 Jun 2018 07:01    139    |  99     |  15     ----------------------------<  127    3.0     |  28     |  0.74     Ca    8.4        29 Jun 2018 07:01    TPro  8.9    /  Alb  4.3    /  TBili  0.6    /  DBili  x      /  AST  23     /  ALT  8      /  AlkPhos  98     27 Jun 2018 22:52    PT/INR - ( 28 Jun 2018 05:30 )   PT: 12.2 SEC;   INR: 1.10          PTT - ( 28 Jun 2018 05:30 )  PTT:42.5 SEC  CAPILLARY BLOOD GLUCOSE            LIVER FUNCTIONS - ( 27 Jun 2018 22:52 )  Alb: 4.3 g/dL / Pro: 8.9 g/dL / ALK PHOS: 98 u/L / ALT: 8 u/L / AST: 23 u/L / GGT: x               MEDICATIONS  (STANDING):  enoxaparin Injectable 40 milliGRAM(s) SubCutaneous daily  lactated ringers. 1000 milliLiter(s) (100 mL/Hr) IV Continuous <Continuous>  levETIRAcetam  IVPB 1000 milliGRAM(s) IV Intermittent every 12 hours  metoprolol tartrate Injectable 5 milliGRAM(s) IV Push every 6 hours  piperacillin/tazobactam IVPB. 3.375 Gram(s) IV Intermittent every 8 hours    MEDICATIONS  (PRN):

## 2018-06-29 NOTE — CONSULT NOTE ADULT - ASSESSMENT
POD #1 ex-lap, SBR, hernia repair after being admitted for SBO    Clinically stable; no current cardiovascular complaints  Hypertensive as per VS  At this time she remains NPO  Continue with IV metoprolol or hydralazine as needed  Pain control  Resume oral meds when able.  No further cardiac testing needed at this time.  Continuing with IV abx for now.  Will follow with you.

## 2018-06-29 NOTE — PROVIDER CONTACT NOTE (OTHER) - ASSESSMENT
patient has hematuria at this time. Patient had Musa removed at 9 am. Patient asymptomatic at this time

## 2018-06-29 NOTE — PHYSICAL THERAPY INITIAL EVALUATION ADULT - PLANNED THERAPY INTERVENTIONS, PT EVAL
balance training/gait training/postural re-education/strengthening/bed mobility training/ROM/transfer training

## 2018-06-29 NOTE — PROGRESS NOTE ADULT - SUBJECTIVE AND OBJECTIVE BOX
ANESTHESIA POSTOP CHECK    87y Female POSTOP DAY 1     No COMPLAINTS    NO APPARENT ANESTHESIA COMPLICATIONS

## 2018-06-29 NOTE — PHYSICAL THERAPY INITIAL EVALUATION ADULT - LIVES WITH, PROFILE
pt is confused and A&O x 1, therefore, please confirm social hx with CM/SW. As per EMR, pt admitted from [facility] - unable to determine if this is pt. residence or pt. is there for rehab

## 2018-06-29 NOTE — PROVIDER CONTACT NOTE (OTHER) - ASSESSMENT
Patient complaining of nausea and vomiting. NG tube removed overnight by patient. NPO status maintained

## 2018-06-29 NOTE — CONSULT NOTE ADULT - SUBJECTIVE AND OBJECTIVE BOX
Cardiology/Vascular Medicine Inpatient Consultation Note      HISTORY OF PRESENT ILLNESS:  Patient is an 86 yo woman with seizure disorder, hypertension,, CVA, GI bleed, CHF, UTIs, HLD, syncope, pacemaker,  shunt, dementia, presents to the ED from [facility] rehab with nausea, vomiting, weakness, and dizziness for 1-2 weeks. Her urine was checked today and she had a positive UTI, has not received antibiotics yet. Patient unable to give good story, AOx1. Family notes patient hasn't been eating well for 1-2 weeks, vomited twice today, and has been complaining of intermittent nausea and dizziness for 1-2 weeks (2018 08:43).  Noted on CT scan abdomen to have small SBO.          Allergies  No Known Allergies    MEDICATIONS:  enoxaparin Injectable 40 milliGRAM(s) SubCutaneous daily  metoprolol tartrate Injectable 5 milliGRAM(s) IV Push every 6 hours  piperacillin/tazobactam IVPB. 3.375 Gram(s) IV Intermittent every 8 hours  levETIRAcetam  IVPB 1000 milliGRAM(s) IV Intermittent every 12 hours  dextrose 5% + sodium chloride 0.45% with potassium chloride 20 mEq/L 1000 milliLiter(s) IV Continuous <Continuous>    PAST MEDICAL & SURGICAL HISTORY:  Pacemaker  History of CVA (cerebrovascular accident)  GI bleed  Pulmonary hypertension  Seizure  CHF (congestive heart failure)  High cholesterol  HTN (hypertension)  Seizure  Pulmonary hypertension  Memory Loss; chronic "past few years"  Hydronephrosis; Right kidney  Afib  Hypercholesteremia  HTN (Hypertension)  Absence Seizure  CVA (Cerebral Infarction)  CHF (Congestive Heart Failure)  S/P  shunt  Ureteral Obstruction; right kidney; stent insertion 12/10  S/P Brain Surgery; for "brain cyst   few years ago"  S/P  Shunt  Pacemaker  Pacemaker  Infection of  Shunt  Infection of  Shunt  History of Stroke  Seizure  HTN (Hypertension)      FAMILY HISTORY:  No pertinent family history in first degree relatives  No pertinent family history in first degree relatives    SOCIAL HISTORY:    As above    REVIEW OF SYSTEMS:	  [ ] Unable to obtain (patient is a poor historian)    PHYSICAL EXAM:  T(C): 37.1 (18 @ 18:20), Max: 37.7 (18 @ 11:00)  HR: 98 (18 @ 18:20) (68 - 98)  BP: 168/98 (18 @ 18:20) (139/79 - 176/98)  RR: 18 (18 @ 18:20) (13 - 22)  SpO2: 98% (18 @ 18:20) (94% - 100%)  Wt(kg): --  I&O's Summary    2018 07:  -  2018 07:00  --------------------------------------------------------  IN: 1202.8 mL / OUT: 2192 mL / NET: -989.2 mL    2018 07:  -  2018 19:44  --------------------------------------------------------  IN: 0 mL / OUT: 700 mL / NET: -700 mL    Appearance: Elderly woman, chronically-ill appearing, thin/fatigued  HEENT:   Normal oral mucosa, PERRL, EOMI	  Lymphatic: No lymphadenopathy  Cardiovascular: Reg S1S2  Respiratory: Decreased BS b/l bases	  Psychiatry: Awake, does not answer questions fully  Gastrointestinal:  Soft, Non-tender, + BS	  Skin: No rashes, No ecchymoses, No cyanosis	  Neurologic: Non-focal      LABS:	 	                          10.5   8.93  )-----------( 327      ( 2018 07:01 )             33.4         139  |  99  |  15  ----------------------------<  127<H>  3.0<L>   |  28  |  0.74      141  |  102  |  21  ----------------------------<  112<H>  3.9   |  26  |  0.84    Ca    8.4      2018 07:01  Ca    8.4      2018 17:30  Phos  1.9       Mg     1.8     06-29    TPro  8.9<H>  /  Alb  4.3  /  TBili  0.6  /  DBili  x   /  AST  23  /  ALT  8   /  AlkPhos  98  06-27    < from: TTE Echo Doppler w/o Cont (17 @ 10:24) >     EXAM:  TTE WO CON COMPLETE W DOPPL         PROCEDURE DATE:  2017        INTERPRETATION:  REPORT:    TRANSTHORACIC ECHOCARDIOGRAM REPORT           Patient Name:   JOSE CHAUDHARI Patient Location: [facility] Rec #:  [id number]    Accession #:   [id number]  Account #:                    Height:           66.0 in 167.6 cm  YOB: 1930      Weight:           119.0 lb 54.00 kg  Patient Age:    86 years      BSA:              1.60 m²  Patient Gender: F             BP:145/75 mmHg        Date of Exam: 2017 9:48:23 AM  Sonographer:  JEFF     Procedure:     2D Echo/Doppler/Color Doppler Complete.  Indications:   Cerebral infarction, unspecified - I63.9  Diagnosis:     Cerebral infarction, unspecified - I63.9  Study Details: Technically good study.           2D AND M-MODE MEASUREMENTS (normal ranges within parentheses):  Left Ventricle:                  Normal   Aorta/Left Atrium:            Normal  IVSd (2D):              0.96 cm (0.7-1.1) Aortic Root (2D):  2.98 cm   (2.4-3.7)  LVPWd (2D):             0.94 cm (0.7-1.1) Left Atrium (2D):  4.10 cm   (1.9-4.0)  LVIDd (2D):             3.26 cm (3.4-5.7) Right Ventricle:  LVIDs (2D):             2.32 cm           TAPSE:           2.18 cm  LV FS (2D):  28.9 %   (>25%)  Relative Wall Thickness  0.58    (<0.42)     LV DIASTOLIC FUNCTION:  MV Peak E: 0.53 m/s Decel Time: 317 msec  MV Peak A: 0.74 m/s  E/A Ratio: 0.72     SPECTRAL DOPPLER ANALYSIS (where applicable):  Mitral Valve:  MV P1/2 Time: 92.06 msec  MV Area, PHT: 2.39 cm²     Aortic Valve: AoV Max Mark: 1.84 m/s AoV Peak P.5 mmHg AoV Mean P.3 mmHg     LVOT Vmax: 0.81 m/s LVOT VTI: 0.142 m LVOT Diameter:     Aortic Insufficiency:  AI Half-time:  860 msec  AI Decel Rate: 1.61 m/s²     Tricuspid Valve and PA/RV Systolic Pressure: TR Max Velocity: 2.46 m/s   RA Pressure: 5 mmHg RVSP/PASP: 29.3 mmHg        PHYSICIAN INTERPRETATION:  Left Ventricle: The left ventricular internal cavity size is normal.   There is mild concentric left ventricular hypertrophy.  Global LV systolic function was normal. Left ventricular ejection   fraction, by visual estimation, is 60 to 65%. Spectral Doppler shows   impaired relaxation pattern of left ventricular myocardial filling (Grade   I diastolic dysfunction).  Right Ventricle: Normal right ventricular size and function.  Left Atrium: Mildly enlarged left atrium.  Right Atrium: The right atrium is mildly dilated.  Pericardium: There is no evidence of pericardial effusion.  Mitral Valve: Thickening and calcification of the anterior and posterior   mitral valve leaflets. Mitral leaflet mobility is normal. There is mild   mitral annular calcification. Mild mitral valve regurgitation is seen.  Tricuspid Valve: The tricuspid valve is not well seen. Mild tricuspid   regurgitation is visualized.  Aortic Valve: The aortic valve is trileaflet. Sclerotic aortic valve with   normal opening. Mild aortic valve regurgitation is seen.  Pulmonic Valve: The pulmonic valve was not well visualized.Trace   pulmonic valve regurgitation.  Aorta: Aortic root measured at Sinus of Valsalva is normal. There is mild   aortic root calcification.  Venous: The inferior vena cava was normal sized, with respiratory size   variation greater than 50%.  Additional Comments: A pacer wire is visualized in the right atrium and   right ventricle.        Summary:   1. Left ventricular ejection fraction, by visual estimation, is 60 to   65%.   2. Technically good study.   3. Normal global left ventricular systolic function.   4. Normal left ventricular internal cavity size.   5. Spectral Doppler shows impaired relaxation pattern of left   ventricular myocardial filling (Grade I diastolic dysfunction).   6. There is mild concentric left ventricular hypertrophy.   7. Normal right ventricular size and function.   8. Mildly dilated right atrium.   9. There is no evidence of pericardial effusion.  10. Mild mitral annular calcification.  11. Mild mitral valve regurgitation.  12. Thickening and calcification of the anterior and posterior mitral   valve leaflets.  13. Mild aortic regurgitation.  14. Sclerotic aortic valve with normal opening.  15. Mildly enlarged left atrium.  16. There is mild aortic root calcification.     Vikas Hopkins MD FACC, FASE,FACP  Electronically signed on 2017 at 1:14:59 PM             < from: CT Abdomen and Pelvis w/ IV Cont (.18 @ 02:58) >    EXAM:  CT ABDOMEN AND PELVIS IC        PROCEDURE DATE:  2018         INTERPRETATION:  CLINICAL INFORMATION: Nausea and vomiting with   epigastric tenderness    COMPARISON: CT of the abdomen and pelvis dated 2017    PROCEDURE:   CT of theAbdomen and Pelvis was performed with intravenous contrast.   Intravenous contrast: 90 ml Omnipaque 350. 10 ml discarded.  Oral contrast: None.  Sagittal and coronal reformats were performed.    FINDINGS:    LOWER CHEST: Coronary artery calcifications.    LIVER: Scattered hypodensities which are too small to characterize.  BILE DUCTS: Mild intrahepatic and extrahepatic biliary ductal dilatation.  GALLBLADDER: Distended.  SPLEEN: Within normal limits.  PANCREAS: Prominent pancreatic duct measuring5 mm, overall stable from   2017.  ADRENALS: Thickening of the left adrenal gland.  KIDNEYS/URETERS: A right-sided double-J ureteral stent is again noted,   unchanged from . Unchanged mild to moderate right-sided   hydroureteronephrosis. Bilateral renal cysts.    BLADDER: Within normal limits.  REPRODUCTIVE ORGANS: Calcified and noncalcified uterine fibroids. A   vaginal pessary is noted.    BOWEL: Diffusely dilated small bowel with a transition to collapsed loops   in a right inguinal hernia. The large bowel is collapsed.  PERITONEUM: No ascites.  catheter terminating in the right lower   quadrant.  VESSELS:  Atherosclerotic calcifications.  RETROPERITONEUM: No lymphadenopathy.    ABDOMINAL WALL: Left chest and abdominal wall  shunt catheter.  BONES: Advanced degenerative changes of the visualized lumbosacral spine.   Chronic fracture deformity of right superior pubic ramus.    IMPRESSION:     Small bowel obstruction with a transition to collapsed loops in a right   inguinal hernia. Small volume interloop edema.    Right-sided double-J ureteral stent with mild to moderate right   hydroureteronephrosis. This is not significantly changed from the prior   study dated 2017.    Findings were discussed with Dr. Cindy Miller at approximately   4:02 AM dated 2018 with read back.      INDIA MILLER M.D., RADIOLOGY RESIDENT  This document has been electronically signed.  DALE KNUTSON M.D., ATTENDING RADIOLOGIST  This document has been electronicallysigned. 2018  4:05AM                  < end of copied text > Cardiology/Vascular Medicine Inpatient Consultation Note      HISTORY OF PRESENT ILLNESS:  Patient is an 88 yo woman with seizure disorder, hypertension,, CVA, GI bleed, CHF, UTIs, HLD, syncope, pacemaker,  shunt, dementia, presents to the ED from [facility] rehab with nausea, vomiting, weakness, and dizziness for 1-2 weeks. Her urine was checked today and she had a positive UTI, has not received antibiotics yet. Patient unable to give good story, AOx1. Family notes patient hasn't been eating well for 1-2 weeks, vomited twice today, and has been complaining of intermittent nausea and dizziness for 1-2 weeks (2018 08:43).  Noted on CT scan abdomen to have small SBO.    Now POD #1 s/p ex lap, SBR, inguinal hernia repair.    On exam, appears awake but drowsy.  No current cardiovascular complaints.  Appears euvolemic on exam.    Allergies  No Known Allergies    MEDICATIONS:  enoxaparin Injectable 40 milliGRAM(s) SubCutaneous daily  metoprolol tartrate Injectable 5 milliGRAM(s) IV Push every 6 hours  piperacillin/tazobactam IVPB. 3.375 Gram(s) IV Intermittent every 8 hours  levETIRAcetam  IVPB 1000 milliGRAM(s) IV Intermittent every 12 hours  dextrose 5% + sodium chloride 0.45% with potassium chloride 20 mEq/L 1000 milliLiter(s) IV Continuous <Continuous>    PAST MEDICAL & SURGICAL HISTORY:  Pacemaker  History of CVA (cerebrovascular accident)  GI bleed  Pulmonary hypertension  Seizure  CHF (congestive heart failure)  High cholesterol  HTN (hypertension)  Seizure  Pulmonary hypertension  Memory Loss; chronic "past few years"  Hydronephrosis; Right kidney  Afib  Hypercholesteremia  HTN (Hypertension)  Absence Seizure  CVA (Cerebral Infarction)  CHF (Congestive Heart Failure)  S/P  shunt  Ureteral Obstruction; right kidney; stent insertion 12/10  S/P Brain Surgery; for "brain cyst   few years ago"  S/P  Shunt  Pacemaker  Pacemaker  Infection of  Shunt  Infection of  Shunt  History of Stroke  Seizure  HTN (Hypertension)      FAMILY HISTORY:  No pertinent family history in first degree relatives  No pertinent family history in first degree relatives    SOCIAL HISTORY:    As above    REVIEW OF SYSTEMS:	  [ ] Unable to obtain (patient is a poor historian)    PHYSICAL EXAM:  T(C): 37.1 (18 @ 18:20), Max: 37.7 (18 @ 11:00)  HR: 98 (18 @ 18:20) (68 - 98)  BP: 168/98 (18 @ 18:20) (139/79 - 176/98)  RR: 18 (18 @ 18:20) (13 - 22)  SpO2: 98% (18 @ 18:20) (94% - 100%)  Wt(kg): --  I&O's Summary    2018 07:  -  2018 07:00  --------------------------------------------------------  IN: 1202.8 mL / OUT: 2192 mL / NET: -989.2 mL    2018 07:  -  2018 19:44  --------------------------------------------------------  IN: 0 mL / OUT: 700 mL / NET: -700 mL    Appearance: Elderly woman, chronically-ill appearing, thin/fatigued  HEENT:   Normal oral mucosa, PERRL, EOMI	  Lymphatic: No lymphadenopathy  Cardiovascular: Reg S1S2  Respiratory: Decreased BS b/l bases	  Psychiatry: Awake, does not answer questions fully  Gastrointestinal:  Soft, Non-tender, + BS	  Skin: No rashes, No ecchymoses, No cyanosis	  Neurologic: Non-focal      LABS:	 	                          10.5   8.93  )-----------( 327      ( 2018 07:01 )             33.4         139  |  99  |  15  ----------------------------<  127<H>  3.0<L>   |  28  |  0.74      141  |  102  |  21  ----------------------------<  112<H>  3.9   |  26  |  0.84    Ca    8.4      2018 07:01  Ca    8.4      2018 17:30  Phos  1.9     06-  Mg     1.8     -    TPro  8.9<H>  /  Alb  4.3  /  TBili  0.6  /  DBili  x   /  AST  23  /  ALT  8   /  AlkPhos  98  06-27    < from: TTE Echo Doppler w/o Cont (17 @ 10:24) >     EXAM:  TTE WO CON COMPLETE W DOPPL         PROCEDURE DATE:  2017        INTERPRETATION:  REPORT:    TRANSTHORACIC ECHOCARDIOGRAM REPORT           Patient Name:   JOSE CHAUDHARI Patient Location: [facility] Rec #:  [id number]    Accession #:   [id number]  Account #:                    Height:           66.0 in 167.6 cm  YOB: 1930      Weight:           119.0 lb 54.00 kg  Patient Age:    86 years      BSA:              1.60 m²  Patient Gender: F             BP:145/75 mmHg        Date of Exam: 2017 9:48:23 AM  Sonographer:  JEFF     Procedure:     2D Echo/Doppler/Color Doppler Complete.  Indications:   Cerebral infarction, unspecified - I63.9  Diagnosis:     Cerebral infarction, unspecified - I63.9  Study Details: Technically good study.           2D AND M-MODE MEASUREMENTS (normal ranges within parentheses):  Left Ventricle:                  Normal   Aorta/Left Atrium:            Normal  IVSd (2D):              0.96 cm (0.7-1.1) Aortic Root (2D):  2.98 cm   (2.4-3.7)  LVPWd (2D):             0.94 cm (0.7-1.1) Left Atrium (2D):  4.10 cm   (1.9-4.0)  LVIDd (2D):             3.26 cm (3.4-5.7) Right Ventricle:  LVIDs (2D):             2.32 cm           TAPSE:           2.18 cm  LV FS (2D):  28.9 %   (>25%)  Relative Wall Thickness  0.58    (<0.42)     LV DIASTOLIC FUNCTION:  MV Peak E: 0.53 m/s Decel Time: 317 msec  MV Peak A: 0.74 m/s  E/A Ratio: 0.72     SPECTRAL DOPPLER ANALYSIS (where applicable):  Mitral Valve:  MV P1/2 Time: 92.06 msec  MV Area, PHT: 2.39 cm²     Aortic Valve: AoV Max Mark: 1.84 m/s AoV Peak P.5 mmHg AoV Mean P.3 mmHg     LVOT Vmax: 0.81 m/s LVOT VTI: 0.142 m LVOT Diameter:     Aortic Insufficiency:  AI Half-time:  860 msec  AI Decel Rate: 1.61 m/s²     Tricuspid Valve and PA/RV Systolic Pressure: TR Max Velocity: 2.46 m/s   RA Pressure: 5 mmHg RVSP/PASP: 29.3 mmHg        PHYSICIAN INTERPRETATION:  Left Ventricle: The left ventricular internal cavity size is normal.   There is mild concentric left ventricular hypertrophy.  Global LV systolic function was normal. Left ventricular ejection   fraction, by visual estimation, is 60 to 65%. Spectral Doppler shows   impaired relaxation pattern of left ventricular myocardial filling (Grade   I diastolic dysfunction).  Right Ventricle: Normal right ventricular size and function.  Left Atrium: Mildly enlarged left atrium.  Right Atrium: The right atrium is mildly dilated.  Pericardium: There is no evidence of pericardial effusion.  Mitral Valve: Thickening and calcification of the anterior and posterior   mitral valve leaflets. Mitral leaflet mobility is normal. There is mild   mitral annular calcification. Mild mitral valve regurgitation is seen.  Tricuspid Valve: The tricuspid valve is not well seen. Mild tricuspid   regurgitation is visualized.  Aortic Valve: The aortic valve is trileaflet. Sclerotic aortic valve with   normal opening. Mild aortic valve regurgitation is seen.  Pulmonic Valve: The pulmonic valve was not well visualized.Trace   pulmonic valve regurgitation.  Aorta: Aortic root measured at Sinus of Valsalva is normal. There is mild   aortic root calcification.  Venous: The inferior vena cava was normal sized, with respiratory size   variation greater than 50%.  Additional Comments: A pacer wire is visualized in the right atrium and   right ventricle.        Summary:   1. Left ventricular ejection fraction, by visual estimation, is 60 to   65%.   2. Technically good study.   3. Normal global left ventricular systolic function.   4. Normal left ventricular internal cavity size.   5. Spectral Doppler shows impaired relaxation pattern of left   ventricular myocardial filling (Grade I diastolic dysfunction).   6. There is mild concentric left ventricular hypertrophy.   7. Normal right ventricular size and function.   8. Mildly dilated right atrium.   9. There is no evidence of pericardial effusion.  10. Mild mitral annular calcification.  11. Mild mitral valve regurgitation.  12. Thickening and calcification of the anterior and posterior mitral   valve leaflets.  13. Mild aortic regurgitation.  14. Sclerotic aortic valve with normal opening.  15. Mildly enlarged left atrium.  16. There is mild aortic root calcification.     Vikas Hopkins MD FAC, SKYLAR,FACP  Electronically signed on 2017 at 1:14:59 PM             < from: CT Abdomen and Pelvis w/ IV Cont (18 @ 02:58) >    EXAM:  CT ABDOMEN AND PELVIS IC        PROCEDURE DATE:  2018         INTERPRETATION:  CLINICAL INFORMATION: Nausea and vomiting with   epigastric tenderness    COMPARISON: CT of the abdomen and pelvis dated 2017    PROCEDURE:   CT of theAbdomen and Pelvis was performed with intravenous contrast.   Intravenous contrast: 90 ml Omnipaque 350. 10 ml discarded.  Oral contrast: None.  Sagittal and coronal reformats were performed.    FINDINGS:    LOWER CHEST: Coronary artery calcifications.    LIVER: Scattered hypodensities which are too small to characterize.  BILE DUCTS: Mild intrahepatic and extrahepatic biliary ductal dilatation.  GALLBLADDER: Distended.  SPLEEN: Within normal limits.  PANCREAS: Prominent pancreatic duct measuring5 mm, overall stable from   2017.  ADRENALS: Thickening of the left adrenal gland.  KIDNEYS/URETERS: A right-sided double-J ureteral stent is again noted,   unchanged from . Unchanged mild to moderate right-sided   hydroureteronephrosis. Bilateral renal cysts.    BLADDER: Within normal limits.  REPRODUCTIVE ORGANS: Calcified and noncalcified uterine fibroids. A   vaginal pessary is noted.    BOWEL: Diffusely dilated small bowel with a transition to collapsed loops   in a right inguinal hernia. The large bowel is collapsed.  PERITONEUM: No ascites.  catheter terminating in the right lower   quadrant.  VESSELS:  Atherosclerotic calcifications.  RETROPERITONEUM: No lymphadenopathy.    ABDOMINAL WALL: Left chest and abdominal wall  shunt catheter.  BONES: Advanced degenerative changes of the visualized lumbosacral spine.   Chronic fracture deformity of right superior pubic ramus.    IMPRESSION:     Small bowel obstruction with a transition to collapsed loops in a right   inguinal hernia. Small volume interloop edema.    Right-sided double-J ureteral stent with mild to moderate right   hydroureteronephrosis. This is not significantly changed from the prior   study dated 2017.    Findings were discussed with Dr. Cindy Miller at approximately   4:02 AM dated 2018 with read back.      INDIA MILLER M.D., RADIOLOGY RESIDENT  This document has been electronically signed.  DALE KNUTSON M.D., ATTENDING RADIOLOGIST  This document has been electronicallysigned. 2018  4:05AM                  < end of copied text >

## 2018-06-29 NOTE — PROGRESS NOTE ADULT - ASSESSMENT
87y Female s/p ex lap, SBR, inguinal hernia repair    Plan:  -IVF, NPO  -NG tube removed by patient today, will monitor and replace if needed  -D/c hua  -Incentive spirometer/OOB/Ambulate/PT

## 2018-06-29 NOTE — PHYSICAL THERAPY INITIAL EVALUATION ADULT - ADDITIONAL COMMENTS
Unable to obtain secondary to pt. confusion. Please confirm with SW/CM.     Patient left supine in bed as received, NAD, call bell in reach, all lines/devices intact, RN aware.

## 2018-06-30 LAB
-  AMIKACIN: SIGNIFICANT CHANGE UP
-  AMIKACIN: SIGNIFICANT CHANGE UP
-  AMPICILLIN/SULBACTAM: SIGNIFICANT CHANGE UP
-  AMPICILLIN/SULBACTAM: SIGNIFICANT CHANGE UP
-  AMPICILLIN: SIGNIFICANT CHANGE UP
-  AMPICILLIN: SIGNIFICANT CHANGE UP
-  AZTREONAM: SIGNIFICANT CHANGE UP
-  AZTREONAM: SIGNIFICANT CHANGE UP
-  CEFAZOLIN: SIGNIFICANT CHANGE UP
-  CEFAZOLIN: SIGNIFICANT CHANGE UP
-  CEFEPIME: SIGNIFICANT CHANGE UP
-  CEFEPIME: SIGNIFICANT CHANGE UP
-  CEFOXITIN: SIGNIFICANT CHANGE UP
-  CEFOXITIN: SIGNIFICANT CHANGE UP
-  CEFTAZIDIME: SIGNIFICANT CHANGE UP
-  CEFTAZIDIME: SIGNIFICANT CHANGE UP
-  CEFTRIAXONE: SIGNIFICANT CHANGE UP
-  CEFTRIAXONE: SIGNIFICANT CHANGE UP
-  CIPROFLOXACIN: SIGNIFICANT CHANGE UP
-  CIPROFLOXACIN: SIGNIFICANT CHANGE UP
-  ERTAPENEM: SIGNIFICANT CHANGE UP
-  ERTAPENEM: SIGNIFICANT CHANGE UP
-  GENTAMICIN: SIGNIFICANT CHANGE UP
-  GENTAMICIN: SIGNIFICANT CHANGE UP
-  IMIPENEM: SIGNIFICANT CHANGE UP
-  IMIPENEM: SIGNIFICANT CHANGE UP
-  LEVOFLOXACIN: SIGNIFICANT CHANGE UP
-  LEVOFLOXACIN: SIGNIFICANT CHANGE UP
-  MEROPENEM: SIGNIFICANT CHANGE UP
-  MEROPENEM: SIGNIFICANT CHANGE UP
-  NITROFURANTOIN: SIGNIFICANT CHANGE UP
-  PIPERACILLIN/TAZOBACTAM: SIGNIFICANT CHANGE UP
-  PIPERACILLIN/TAZOBACTAM: SIGNIFICANT CHANGE UP
-  TIGECYCLINE: SIGNIFICANT CHANGE UP
-  TIGECYCLINE: SIGNIFICANT CHANGE UP
-  TOBRAMYCIN: SIGNIFICANT CHANGE UP
-  TOBRAMYCIN: SIGNIFICANT CHANGE UP
-  TRIMETHOPRIM/SULFAMETHOXAZOLE: SIGNIFICANT CHANGE UP
-  TRIMETHOPRIM/SULFAMETHOXAZOLE: SIGNIFICANT CHANGE UP
BACTERIA BLD CULT: SIGNIFICANT CHANGE UP
BACTERIA BLD CULT: SIGNIFICANT CHANGE UP
BACTERIA UR CULT: SIGNIFICANT CHANGE UP
BUN SERPL-MCNC: 12 MG/DL — SIGNIFICANT CHANGE UP (ref 7–23)
BUN SERPL-MCNC: 17 MG/DL — SIGNIFICANT CHANGE UP (ref 7–23)
CALCIUM SERPL-MCNC: 8.5 MG/DL — SIGNIFICANT CHANGE UP (ref 8.4–10.5)
CALCIUM SERPL-MCNC: 8.6 MG/DL — SIGNIFICANT CHANGE UP (ref 8.4–10.5)
CHLORIDE SERPL-SCNC: 94 MMOL/L — LOW (ref 98–107)
CHLORIDE SERPL-SCNC: 96 MMOL/L — LOW (ref 98–107)
CO2 SERPL-SCNC: 27 MMOL/L — SIGNIFICANT CHANGE UP (ref 22–31)
CO2 SERPL-SCNC: 28 MMOL/L — SIGNIFICANT CHANGE UP (ref 22–31)
CREAT SERPL-MCNC: 0.64 MG/DL — SIGNIFICANT CHANGE UP (ref 0.5–1.3)
CREAT SERPL-MCNC: 0.93 MG/DL — SIGNIFICANT CHANGE UP (ref 0.5–1.3)
E CLOAC COMP DNA BLD POS QL NAA+PROBE: SIGNIFICANT CHANGE UP
GLUCOSE SERPL-MCNC: 146 MG/DL — HIGH (ref 70–99)
GLUCOSE SERPL-MCNC: 161 MG/DL — HIGH (ref 70–99)
HCT VFR BLD CALC: 37.2 % — SIGNIFICANT CHANGE UP (ref 34.5–45)
HGB BLD-MCNC: 11.6 G/DL — SIGNIFICANT CHANGE UP (ref 11.5–15.5)
MAGNESIUM SERPL-MCNC: 2 MG/DL — SIGNIFICANT CHANGE UP (ref 1.6–2.6)
MCHC RBC-ENTMCNC: 29.4 PG — SIGNIFICANT CHANGE UP (ref 27–34)
MCHC RBC-ENTMCNC: 31.2 % — LOW (ref 32–36)
MCV RBC AUTO: 94.4 FL — SIGNIFICANT CHANGE UP (ref 80–100)
METHOD TYPE: SIGNIFICANT CHANGE UP
METHOD TYPE: SIGNIFICANT CHANGE UP
NRBC # FLD: 0 — SIGNIFICANT CHANGE UP
ORGANISM # SPEC MICROSCOPIC CNT: SIGNIFICANT CHANGE UP
ORGANISM # SPEC MICROSCOPIC CNT: SIGNIFICANT CHANGE UP
PHOSPHATE SERPL-MCNC: 1.6 MG/DL — LOW (ref 2.5–4.5)
PLATELET # BLD AUTO: 330 K/UL — SIGNIFICANT CHANGE UP (ref 150–400)
PMV BLD: 10.1 FL — SIGNIFICANT CHANGE UP (ref 7–13)
POTASSIUM SERPL-MCNC: 3 MMOL/L — LOW (ref 3.5–5.3)
POTASSIUM SERPL-MCNC: 3.8 MMOL/L — SIGNIFICANT CHANGE UP (ref 3.5–5.3)
POTASSIUM SERPL-SCNC: 3 MMOL/L — LOW (ref 3.5–5.3)
POTASSIUM SERPL-SCNC: 3.8 MMOL/L — SIGNIFICANT CHANGE UP (ref 3.5–5.3)
RBC # BLD: 3.94 M/UL — SIGNIFICANT CHANGE UP (ref 3.8–5.2)
RBC # FLD: 13.6 % — SIGNIFICANT CHANGE UP (ref 10.3–14.5)
SODIUM SERPL-SCNC: 135 MMOL/L — SIGNIFICANT CHANGE UP (ref 135–145)
SODIUM SERPL-SCNC: 139 MMOL/L — SIGNIFICANT CHANGE UP (ref 135–145)
WBC # BLD: 12.53 K/UL — HIGH (ref 3.8–10.5)
WBC # FLD AUTO: 12.53 K/UL — HIGH (ref 3.8–10.5)

## 2018-06-30 RX ORDER — ONDANSETRON 8 MG/1
4 TABLET, FILM COATED ORAL EVERY 6 HOURS
Qty: 0 | Refills: 0 | Status: DISCONTINUED | OUTPATIENT
Start: 2018-06-30 | End: 2018-06-30

## 2018-06-30 RX ORDER — POTASSIUM CHLORIDE 20 MEQ
10 PACKET (EA) ORAL
Qty: 0 | Refills: 0 | Status: COMPLETED | OUTPATIENT
Start: 2018-06-30 | End: 2018-06-30

## 2018-06-30 RX ORDER — HYDROMORPHONE HYDROCHLORIDE 2 MG/ML
0.5 INJECTION INTRAMUSCULAR; INTRAVENOUS; SUBCUTANEOUS
Qty: 0 | Refills: 0 | Status: DISCONTINUED | OUTPATIENT
Start: 2018-06-30 | End: 2018-06-30

## 2018-06-30 RX ORDER — HYDROMORPHONE HYDROCHLORIDE 2 MG/ML
30 INJECTION INTRAMUSCULAR; INTRAVENOUS; SUBCUTANEOUS
Qty: 0 | Refills: 0 | Status: DISCONTINUED | OUTPATIENT
Start: 2018-06-30 | End: 2018-06-30

## 2018-06-30 RX ORDER — MORPHINE SULFATE 50 MG/1
2 CAPSULE, EXTENDED RELEASE ORAL
Qty: 0 | Refills: 0 | Status: DISCONTINUED | OUTPATIENT
Start: 2018-06-30 | End: 2018-07-02

## 2018-06-30 RX ORDER — POTASSIUM PHOSPHATE, MONOBASIC POTASSIUM PHOSPHATE, DIBASIC 236; 224 MG/ML; MG/ML
15 INJECTION, SOLUTION INTRAVENOUS ONCE
Qty: 0 | Refills: 0 | Status: COMPLETED | OUTPATIENT
Start: 2018-06-30 | End: 2018-06-30

## 2018-06-30 RX ORDER — NALOXONE HYDROCHLORIDE 4 MG/.1ML
0.1 SPRAY NASAL
Qty: 0 | Refills: 0 | Status: DISCONTINUED | OUTPATIENT
Start: 2018-06-30 | End: 2018-06-30

## 2018-06-30 RX ADMIN — Medication 100 MILLIEQUIVALENT(S): at 11:27

## 2018-06-30 RX ADMIN — Medication 100 MILLIEQUIVALENT(S): at 15:00

## 2018-06-30 RX ADMIN — PIPERACILLIN AND TAZOBACTAM 25 GRAM(S): 4; .5 INJECTION, POWDER, LYOPHILIZED, FOR SOLUTION INTRAVENOUS at 22:57

## 2018-06-30 RX ADMIN — PIPERACILLIN AND TAZOBACTAM 25 GRAM(S): 4; .5 INJECTION, POWDER, LYOPHILIZED, FOR SOLUTION INTRAVENOUS at 05:49

## 2018-06-30 RX ADMIN — Medication 5 MILLIGRAM(S): at 11:27

## 2018-06-30 RX ADMIN — HYDROMORPHONE HYDROCHLORIDE 30 MILLILITER(S): 2 INJECTION INTRAMUSCULAR; INTRAVENOUS; SUBCUTANEOUS at 04:25

## 2018-06-30 RX ADMIN — Medication 100 MILLIEQUIVALENT(S): at 12:34

## 2018-06-30 RX ADMIN — Medication 5 MILLIGRAM(S): at 05:21

## 2018-06-30 RX ADMIN — POTASSIUM PHOSPHATE, MONOBASIC POTASSIUM PHOSPHATE, DIBASIC 62.5 MILLIMOLE(S): 236; 224 INJECTION, SOLUTION INTRAVENOUS at 11:27

## 2018-06-30 RX ADMIN — Medication 5 MILLIGRAM(S): at 23:01

## 2018-06-30 RX ADMIN — ENOXAPARIN SODIUM 40 MILLIGRAM(S): 100 INJECTION SUBCUTANEOUS at 12:35

## 2018-06-30 RX ADMIN — DEXTROSE MONOHYDRATE, SODIUM CHLORIDE, AND POTASSIUM CHLORIDE 75 MILLILITER(S): 50; .745; 4.5 INJECTION, SOLUTION INTRAVENOUS at 22:57

## 2018-06-30 RX ADMIN — Medication 5 MILLIGRAM(S): at 17:49

## 2018-06-30 RX ADMIN — PIPERACILLIN AND TAZOBACTAM 25 GRAM(S): 4; .5 INJECTION, POWDER, LYOPHILIZED, FOR SOLUTION INTRAVENOUS at 15:21

## 2018-06-30 RX ADMIN — HYDROMORPHONE HYDROCHLORIDE 30 MILLILITER(S): 2 INJECTION INTRAMUSCULAR; INTRAVENOUS; SUBCUTANEOUS at 08:32

## 2018-06-30 RX ADMIN — LEVETIRACETAM 400 MILLIGRAM(S): 250 TABLET, FILM COATED ORAL at 17:50

## 2018-06-30 RX ADMIN — LEVETIRACETAM 400 MILLIGRAM(S): 250 TABLET, FILM COATED ORAL at 05:21

## 2018-06-30 NOTE — PROVIDER CONTACT NOTE (OTHER) - SITUATION
patient has hematuria patient hypertensive, had episode of hematuria, and RLQ is warm, red, and firm to touch

## 2018-06-30 NOTE — PROVIDER CONTACT NOTE (OTHER) - ASSESSMENT
Patient hypertensive at this time with a blood pressure of 159/104. Patient is asymptomatic with no c/o chest pain, Shortness of breath, or headache at this time. Medicated with metoprolol per order.

## 2018-06-30 NOTE — PROVIDER CONTACT NOTE (OTHER) - SITUATION
Patient's blood pressure 167/109. IV Metoprolol 5 mg given. Repeat electronic blood pressure 171/100. Manual bp 162/96

## 2018-06-30 NOTE — PROVIDER CONTACT NOTE (OTHER) - ASSESSMENT
patient hypertensive at this time with a bp of 160/100 manually. Patient asymptomatic. Patient had another episode of hematuria. RLQ is warm, red, and firm to touch.

## 2018-06-30 NOTE — PROVIDER CONTACT NOTE (OTHER) - ASSESSMENT
Patient's NG tube removed. Patient complaining of nausea. Denies pain, shortness of breath, chest pain.

## 2018-06-30 NOTE — PROGRESS NOTE ADULT - SUBJECTIVE AND OBJECTIVE BOX
Anesthesia Pain Management Service    SUBJECTIVE: Patient is doing well with IV PCA and no significant problems reported.    Pain Scale Score	At rest: ___ 	With Activity: ___ 	[X ] Refer to charted pain scores    THERAPY:    [ ] IV PCA Morphine		[ ] 5 mg/mL	[ ] 1 mg/mL  [X ] IV PCA Hydromorphone	[ ] 5 mg/mL	[X ] 1 mg/mL  [ ] IV PCA Fentanyl		[ ] 50 micrograms/mL    Demand dose __0.2_ lockout __6_ (minutes) Continuous Rate _0__ Total: 0.4___  Daily      MEDICATIONS  (STANDING):  dextrose 5% + sodium chloride 0.45% with potassium chloride 20 mEq/L 1000 milliLiter(s) (75 mL/Hr) IV Continuous <Continuous>  enoxaparin Injectable 40 milliGRAM(s) SubCutaneous daily  levETIRAcetam  IVPB 1000 milliGRAM(s) IV Intermittent every 12 hours  metoprolol tartrate Injectable 5 milliGRAM(s) IV Push every 6 hours  piperacillin/tazobactam IVPB. 3.375 Gram(s) IV Intermittent every 8 hours  potassium chloride  10 mEq/100 mL IVPB 10 milliEquivalent(s) IV Intermittent every 1 hour  potassium phosphate IVPB 15 milliMole(s) IV Intermittent once    MEDICATIONS  (PRN):  morphine  - Injectable 2 milliGRAM(s) IV Push every 3 hours PRN Moderate Pain (4 - 6)      OBJECTIVE:    Sedation Score:	[ X] Alert	[ ] Drowsy 	[ ] Arousable	[ ] Asleep	[ ] Unresponsive    Side Effects:	[X ] None	[ ] Nausea	[ ] Vomiting	[ ] Pruritus  		[ ] Other:    Vital Signs Last 24 Hrs  T(C): 36.4 (30 Jun 2018 08:00), Max: 37.7 (29 Jun 2018 11:00)  T(F): 97.6 (30 Jun 2018 08:00), Max: 99.9 (29 Jun 2018 11:00)  HR: 68 (30 Jun 2018 08:00) (68 - 104)  BP: 160/80 (30 Jun 2018 08:00) (152/90 - 177/100)  BP(mean): --  RR: 17 (30 Jun 2018 08:00) (16 - 18)  SpO2: 95% (30 Jun 2018 04:18) (95% - 99%)    ASSESSMENT/ PLAN    Therapy to  be:	[ X] Continue   [ ] Discontinued   [ ] Change to prn Analgesics    Documentation and Verification of current medications:   [X] Done	[ ] Not done, not elligible    Comments:

## 2018-06-30 NOTE — PROVIDER CONTACT NOTE (OTHER) - ASSESSMENT
Patient resting in bed complaining of burning on urination. No complaints of other pain, no chest pain, no shortness of breath, no n/v

## 2018-06-30 NOTE — PROVIDER CONTACT NOTE (OTHER) - ASSESSMENT
Patient's blood pressure 167/109. IV Metoprolol 5 mg given. Repeat electronic blood pressure 171/100. Manual bp 162/96. Patient resting in bed comfortable. Denies any pain, chest pain, shortness of breath, blurred vision, n/v.

## 2018-06-30 NOTE — PROGRESS NOTE ADULT - SUBJECTIVE AND OBJECTIVE BOX
B Team Surgery Progress Note - pager [phone]      SUBJECTIVE: Patient seen and examined on morning rounds. NG tube was replaced last night after patient vomited. No complaints this AM. Has PCA. Denies pain.      OBJECTIVE:     ** Vital signs / I&O's **    Vital Signs Last 24 Hrs  T(C): 36.4 (30 Jun 2018 08:00), Max: 37.7 (29 Jun 2018 11:00)  T(F): 97.6 (30 Jun 2018 08:00), Max: 99.9 (29 Jun 2018 11:00)  HR: 68 (30 Jun 2018 08:00) (68 - 104)  BP: 160/80 (30 Jun 2018 08:00) (152/90 - 177/100)  BP(mean): --  RR: 17 (30 Jun 2018 08:00) (16 - 19)  SpO2: 95% (30 Jun 2018 04:18) (95% - 99%)      29 Jun 2018 07:01  -  30 Jun 2018 07:00  --------------------------------------------------------  IN:    dextrose 5% + sodium chloride 0.45% with potassium chloride 20 mEq/L: 900 mL    IV PiggyBack: 200 mL  Total IN: 1100 mL    OUT:    Indwelling Catheter - Urethral: 700 mL    Nasoenteral Tube: 1000 mL  Total OUT: 1700 mL    Total NET: -600 mL      ** Physical Exam **  Gen: Alert, NAD  Abd: Soft, NT, ND, incision c/d/i    ** Labs **                          11.6   12.53 )-----------( 330      ( 30 Jun 2018 05:30 )             37.2     30 Jun 2018 05:30    135    |  94     |  12     ----------------------------<  161    3.0     |  27     |  0.64     Ca    8.5        30 Jun 2018 05:30  Phos  1.6       30 Jun 2018 05:30  Mg     2.0       30 Jun 2018 05:30      MEDICATIONS  (STANDING):  dextrose 5% + sodium chloride 0.45% with potassium chloride 20 mEq/L 1000 milliLiter(s) (75 mL/Hr) IV Continuous <Continuous>  enoxaparin Injectable 40 milliGRAM(s) SubCutaneous daily  levETIRAcetam  IVPB 1000 milliGRAM(s) IV Intermittent every 12 hours  metoprolol tartrate Injectable 5 milliGRAM(s) IV Push every 6 hours  piperacillin/tazobactam IVPB. 3.375 Gram(s) IV Intermittent every 8 hours    MEDICATIONS  (PRN):

## 2018-06-30 NOTE — PROGRESS NOTE ADULT - ASSESSMENT
87y Female s/p ex lap, SBR, inguinal hernia repair    Plan:  -IVF, NPO  -Pain control  -Continue NG tube  -F/u voids - patient incontinent  -Incentive spirometer/OOB/Ambulate/PT

## 2018-06-30 NOTE — PROGRESS NOTE ADULT - SUBJECTIVE AND OBJECTIVE BOX
86 y/o female with PMHx Seizure disorder, HTN, CVA, GI bleed, CHF, UTIs, HLD, syncope, pacemaker,  shunt, dementia s/p exploratory laparotomy with small bowel resection and inguinal hernia repair secondary to small bowel obstruction due to an incarcerated hernia.       Vital Signs Last 24 Hrs  T(C): 36.7 (30 Jun 2018 01:51), Max: 37.7 (29 Jun 2018 11:00)  T(F): 98 (30 Jun 2018 01:51), Max: 99.9 (29 Jun 2018 11:00)  HR: 93 (30 Jun 2018 01:51) (75 - 98)  BP: 158/98 (30 Jun 2018 00:45) (158/98 - 177/100)  BP(mean): --  RR: 18 (30 Jun 2018 01:51) (16 - 19)  SpO2: 95% (30 Jun 2018 01:51) (95% - 99%)                          10.5   8.93  )-----------( 327      ( 29 Jun 2018 07:01 )             33.4   06-29    139  |  99  |  15  ----------------------------<  127<H>  3.0<L>   |  28  |  0.74    Ca    8.4      29 Jun 2018 07:01  Phos  1.9     06-29  Mg     1.8     06-29

## 2018-06-30 NOTE — PROGRESS NOTE ADULT - ASSESSMENT
87 F s/p small bowel obstruction now with re-insertion NGT requiring pain control.  - Would start IV PCA  - monitor pt for sedation

## 2018-07-01 ENCOUNTER — OUTPATIENT (OUTPATIENT)
Dept: OUTPATIENT SERVICES | Facility: HOSPITAL | Age: 83
LOS: 1 days | End: 2018-07-01
Payer: MEDICARE

## 2018-07-01 DIAGNOSIS — Z98.2 PRESENCE OF CEREBROSPINAL FLUID DRAINAGE DEVICE: Chronic | ICD-10-CM

## 2018-07-01 LAB
BUN SERPL-MCNC: 21 MG/DL — SIGNIFICANT CHANGE UP (ref 7–23)
CALCIUM SERPL-MCNC: 7.8 MG/DL — LOW (ref 8.4–10.5)
CHLORIDE SERPL-SCNC: 99 MMOL/L — SIGNIFICANT CHANGE UP (ref 98–107)
CO2 SERPL-SCNC: 24 MMOL/L — SIGNIFICANT CHANGE UP (ref 22–31)
CREAT SERPL-MCNC: 0.99 MG/DL — SIGNIFICANT CHANGE UP (ref 0.5–1.3)
GLUCOSE SERPL-MCNC: 144 MG/DL — HIGH (ref 70–99)
HCT VFR BLD CALC: 32.8 % — LOW (ref 34.5–45)
HGB BLD-MCNC: 10.8 G/DL — LOW (ref 11.5–15.5)
MAGNESIUM SERPL-MCNC: 2.2 MG/DL — SIGNIFICANT CHANGE UP (ref 1.6–2.6)
MCHC RBC-ENTMCNC: 29.7 PG — SIGNIFICANT CHANGE UP (ref 27–34)
MCHC RBC-ENTMCNC: 32.9 % — SIGNIFICANT CHANGE UP (ref 32–36)
MCV RBC AUTO: 90.1 FL — SIGNIFICANT CHANGE UP (ref 80–100)
NRBC # FLD: 0 — SIGNIFICANT CHANGE UP
PHOSPHATE SERPL-MCNC: 2.6 MG/DL — SIGNIFICANT CHANGE UP (ref 2.5–4.5)
PLATELET # BLD AUTO: 341 K/UL — SIGNIFICANT CHANGE UP (ref 150–400)
PMV BLD: 10.1 FL — SIGNIFICANT CHANGE UP (ref 7–13)
POTASSIUM SERPL-MCNC: 3.7 MMOL/L — SIGNIFICANT CHANGE UP (ref 3.5–5.3)
POTASSIUM SERPL-SCNC: 3.7 MMOL/L — SIGNIFICANT CHANGE UP (ref 3.5–5.3)
RBC # BLD: 3.64 M/UL — LOW (ref 3.8–5.2)
RBC # FLD: 13.4 % — SIGNIFICANT CHANGE UP (ref 10.3–14.5)
SODIUM SERPL-SCNC: 135 MMOL/L — SIGNIFICANT CHANGE UP (ref 135–145)
WBC # BLD: 11.35 K/UL — HIGH (ref 3.8–10.5)
WBC # FLD AUTO: 11.35 K/UL — HIGH (ref 3.8–10.5)

## 2018-07-01 PROCEDURE — 74018 RADEX ABDOMEN 1 VIEW: CPT | Mod: 26

## 2018-07-01 PROCEDURE — 99233 SBSQ HOSP IP/OBS HIGH 50: CPT

## 2018-07-01 RX ADMIN — ENOXAPARIN SODIUM 40 MILLIGRAM(S): 100 INJECTION SUBCUTANEOUS at 12:10

## 2018-07-01 RX ADMIN — LEVETIRACETAM 400 MILLIGRAM(S): 250 TABLET, FILM COATED ORAL at 17:45

## 2018-07-01 RX ADMIN — DEXTROSE MONOHYDRATE, SODIUM CHLORIDE, AND POTASSIUM CHLORIDE 75 MILLILITER(S): 50; .745; 4.5 INJECTION, SOLUTION INTRAVENOUS at 15:39

## 2018-07-01 RX ADMIN — PIPERACILLIN AND TAZOBACTAM 25 GRAM(S): 4; .5 INJECTION, POWDER, LYOPHILIZED, FOR SOLUTION INTRAVENOUS at 15:32

## 2018-07-01 RX ADMIN — LEVETIRACETAM 400 MILLIGRAM(S): 250 TABLET, FILM COATED ORAL at 05:35

## 2018-07-01 RX ADMIN — Medication 5 MILLIGRAM(S): at 05:35

## 2018-07-01 RX ADMIN — PIPERACILLIN AND TAZOBACTAM 25 GRAM(S): 4; .5 INJECTION, POWDER, LYOPHILIZED, FOR SOLUTION INTRAVENOUS at 23:00

## 2018-07-01 RX ADMIN — Medication 63.75 MILLIMOLE(S): at 16:18

## 2018-07-01 RX ADMIN — Medication 5 MILLIGRAM(S): at 17:43

## 2018-07-01 RX ADMIN — Medication 5 MILLIGRAM(S): at 12:10

## 2018-07-01 RX ADMIN — PIPERACILLIN AND TAZOBACTAM 25 GRAM(S): 4; .5 INJECTION, POWDER, LYOPHILIZED, FOR SOLUTION INTRAVENOUS at 06:02

## 2018-07-01 NOTE — PROGRESS NOTE ADULT - SUBJECTIVE AND OBJECTIVE BOX
Cardiology/Vascular Medicine Inpatient Progress Note    Events noted  NGT placed after patient vomited  No active cardiac issues    Now POD #3 s/p ex lap, SBR, inguinal hernia repair.    On exam, appears awake but drowsy.  No current cardiovascular complaints.  Appears euvolemic on exam.    Vital Signs Last 24 Hrs  T(C): 36.8 (2018 05:31), Max: 36.9 (2018 17:00)  T(F): 98.2 (2018 05:31), Max: 98.4 (2018 17:00)  HR: 93 (2018 05:31) (67 - 96)  BP: 164/103 (2018 05:31) (154/97 - 176/102)  BP(mean): --  RR: 18 (:31) (17 - 18)  SpO2: 96% (2018 05:31) (95% - 100%)    I&O's Detail    2018 07:01  -  2018 07:00  --------------------------------------------------------  IN:    dextrose 5% + sodium chloride 0.45% with potassium chloride 20 mEq/L: 900 mL    IV PiggyBack: 200 mL  Total IN: 1100 mL    OUT:    Indwelling Catheter - Urethral: 700 mL    Nasoenteral Tube: 1000 mL  Total OUT: 1700 mL    Total NET: -600 mL      2018 07:01  -  2018 06:25  --------------------------------------------------------  IN:    dextrose 5% + sodium chloride 0.45% with potassium chloride 20 mEq/L: 450 mL    IV PiggyBack: 100 mL  Total IN: 550 mL    OUT:    Intermittent Catheterization - Urethral: 875 mL    Nasoenteral Tube: 200 mL    Post-Void Residual per Intermittent Catheterization: 83 mL    Voided: 125 mL  Total OUT: 1283 mL    Total NET: -733 mL      Appearance: Elderly woman, chronically-ill appearing, thin/fatigued, NGT  HEENT:   Normal oral mucosa, PERRL, EOMI	  Lymphatic: No lymphadenopathy  Cardiovascular: Reg S1S2  Respiratory: Decreased BS b/l bases	  Psychiatry: Awake, does not answer questions fully  Gastrointestinal:  Soft, Non-tender, + BS	  Skin: No rashes, No ecchymoses, No cyanosis	  Neurologic: Non-focal    MEDICATIONS  (STANDING):  dextrose 5% + sodium chloride 0.45% with potassium chloride 20 mEq/L 1000 milliLiter(s) (75 mL/Hr) IV Continuous <Continuous>  enoxaparin Injectable 40 milliGRAM(s) SubCutaneous daily  levETIRAcetam  IVPB 1000 milliGRAM(s) IV Intermittent every 12 hours  metoprolol tartrate Injectable 5 milliGRAM(s) IV Push every 6 hours  piperacillin/tazobactam IVPB. 3.375 Gram(s) IV Intermittent every 8 hours    MEDICATIONS  (PRN):  morphine  - Injectable 2 milliGRAM(s) IV Push every 3 hours PRN Moderate Pain (4 - 6)      LABS:	 	                               11.6   12.53 )-----------( 330      ( 2018 05:30 )             37.2     06-30    139  |  96<L>  |  17  ----------------------------<  146<H>  3.8   |  28  |  0.93    Ca    8.6      2018 20:33  Phos  1.6     06-30  Mg     2.0     06-30          < from: TTE Echo Doppler w/o Cont (17 @ 10:24) >     EXAM:  TTE WO CON COMPLETE W DOPPL         PROCEDURE DATE:  2017        INTERPRETATION:  REPORT:    TRANSTHORACIC ECHOCARDIOGRAM REPORT           Patient Name:   JOSE CHAUDHARI Patient Location: [facility] Rec #:  [id number]    Accession #:   [id number]  Account #:                    Height:           66.0 in 167.6 cm  YOB: 1930      Weight:           119.0 lb 54.00 kg  Patient Age:    86 years      BSA:              1.60 m²  Patient Gender: F             BP:145/75 mmHg        Date of Exam: 2017 9:48:23 AM  Sonographer:  JEFF     Procedure:     2D Echo/Doppler/Color Doppler Complete.  Indications:   Cerebral infarction, unspecified - I63.9  Diagnosis:     Cerebral infarction, unspecified - I63.9  Study Details: Technically good study.           2D AND M-MODE MEASUREMENTS (normal ranges within parentheses):  Left Ventricle:                  Normal   Aorta/Left Atrium:            Normal  IVSd (2D):              0.96 cm (0.7-1.1) Aortic Root (2D):  2.98 cm   (2.4-3.7)  LVPWd (2D):             0.94 cm (0.7-1.1) Left Atrium (2D):  4.10 cm   (1.9-4.0)  LVIDd (2D):             3.26 cm (3.4-5.7) Right Ventricle:  LVIDs (2D):             2.32 cm           TAPSE:           2.18 cm  LV FS (2D):  28.9 %   (>25%)  Relative Wall Thickness  0.58    (<0.42)     LV DIASTOLIC FUNCTION:  MV Peak E: 0.53 m/s Decel Time: 317 msec  MV Peak A: 0.74 m/s  E/A Ratio: 0.72     SPECTRAL DOPPLER ANALYSIS (where applicable):  Mitral Valve:  MV P1/2 Time: 92.06 msec  MV Area, PHT: 2.39 cm²     Aortic Valve: AoV Max Mark: 1.84 m/s AoV Peak P.5 mmHg AoV Mean P.3 mmHg     LVOT Vmax: 0.81 m/s LVOT VTI: 0.142 m LVOT Diameter:     Aortic Insufficiency:  AI Half-time:  860 msec  AI Decel Rate: 1.61 m/s²     Tricuspid Valve and PA/RV Systolic Pressure: TR Max Velocity: 2.46 m/s   RA Pressure: 5 mmHg RVSP/PASP: 29.3 mmHg        PHYSICIAN INTERPRETATION:  Left Ventricle: The left ventricular internal cavity size is normal.   There is mild concentric left ventricular hypertrophy.  Global LV systolic function was normal. Left ventricular ejection   fraction, by visual estimation, is 60 to 65%. Spectral Doppler shows   impaired relaxation pattern of left ventricular myocardial filling (Grade   I diastolic dysfunction).  Right Ventricle: Normal right ventricular size and function.  Left Atrium: Mildly enlarged left atrium.  Right Atrium: The right atrium is mildly dilated.  Pericardium: There is no evidence of pericardial effusion.  Mitral Valve: Thickening and calcification of the anterior and posterior   mitral valve leaflets. Mitral leaflet mobility is normal. There is mild   mitral annular calcification. Mild mitral valve regurgitation is seen.  Tricuspid Valve: The tricuspid valve is not well seen. Mild tricuspid   regurgitation is visualized.  Aortic Valve: The aortic valve is trileaflet. Sclerotic aortic valve with   normal opening. Mild aortic valve regurgitation is seen.  Pulmonic Valve: The pulmonic valve was not well visualized.Trace   pulmonic valve regurgitation.  Aorta: Aortic root measured at Sinus of Valsalva is normal. There is mild   aortic root calcification.  Venous: The inferior vena cava was normal sized, with respiratory size   variation greater than 50%.  Additional Comments: A pacer wire is visualized in the right atrium and   right ventricle.        Summary:   1. Left ventricular ejection fraction, by visual estimation, is 60 to   65%.   2. Technically good study.   3. Normal global left ventricular systolic function.   4. Normal left ventricular internal cavity size.   5. Spectral Doppler shows impaired relaxation pattern of left   ventricular myocardial filling (Grade I diastolic dysfunction).   6. There is mild concentric left ventricular hypertrophy.   7. Normal right ventricular size and function.   8. Mildly dilated right atrium.   9. There is no evidence of pericardial effusion.  10. Mild mitral annular calcification.  11. Mild mitral valve regurgitation.  12. Thickening and calcification of the anterior and posterior mitral   valve leaflets.  13. Mild aortic regurgitation.  14. Sclerotic aortic valve with normal opening.  15. Mildly enlarged left atrium.  16. There is mild aortic root calcification.     Vikas Hopkins MD FACC, FASE,FACP  Electronically signed on 2017 at 1:14:59 PM             < from: CT Abdomen and Pelvis w/ IV Cont (06.28.18 @ 02:58) >    EXAM:  CT ABDOMEN AND PELVIS IC        PROCEDURE DATE:  2018         INTERPRETATION:  CLINICAL INFORMATION: Nausea and vomiting with   epigastric tenderness    COMPARISON: CT of the abdomen and pelvis dated 2017    PROCEDURE:   CT of theAbdomen and Pelvis was performed with intravenous contrast.   Intravenous contrast: 90 ml Omnipaque 350. 10 ml discarded.  Oral contrast: None.  Sagittal and coronal reformats were performed.    FINDINGS:    LOWER CHEST: Coronary artery calcifications.    LIVER: Scattered hypodensities which are too small to characterize.  BILE DUCTS: Mild intrahepatic and extrahepatic biliary ductal dilatation.  GALLBLADDER: Distended.  SPLEEN: Within normal limits.  PANCREAS: Prominent pancreatic duct measuring5 mm, overall stable from   2017.  ADRENALS: Thickening of the left adrenal gland.  KIDNEYS/URETERS: A right-sided double-J ureteral stent is again noted,   unchanged from . Unchanged mild to moderate right-sided   hydroureteronephrosis. Bilateral renal cysts.    BLADDER: Within normal limits.  REPRODUCTIVE ORGANS: Calcified and noncalcified uterine fibroids. A   vaginal pessary is noted.    BOWEL: Diffusely dilated small bowel with a transition to collapsed loops   in a right inguinal hernia. The large bowel is collapsed.  PERITONEUM: No ascites.  catheter terminating in the right lower   quadrant.  VESSELS:  Atherosclerotic calcifications.  RETROPERITONEUM: No lymphadenopathy.    ABDOMINAL WALL: Left chest and abdominal wall  shunt catheter.  BONES: Advanced degenerative changes of the visualized lumbosacral spine.   Chronic fracture deformity of right superior pubic ramus.    IMPRESSION:     Small bowel obstruction with a transition to collapsed loops in a right   inguinal hernia. Small volume interloop edema.    Right-sided double-J ureteral stent with mild to moderate right   hydroureteronephrosis. This is not significantly changed from the prior   study dated 2017.    Findings were discussed with Dr. Cindy Miller at approximately   4:02 AM dated 2018 with read back.      INDIA MILLER M.D., RADIOLOGY RESIDENT  This document has been electronically signed.  DALE KNUTSON M.D., ATTENDING RADIOLOGIST  This document has been electronicallysigned. 2018  4:05AM                  < end of copied text >

## 2018-07-01 NOTE — PROGRESS NOTE ADULT - ASSESSMENT
87y Female s/p ex lap, SBR, inguinal hernia repair    Plan:  -IVF, NPO  - f/u abdominal xray, if negative sips and chips  -Pain control  -Continue NG tube  -F/u TOV, hua if fails 3rd time (6pm)  -Replete lytes  -Incentive spirometer/OOB/Ambulate/PT

## 2018-07-01 NOTE — PROGRESS NOTE ADULT - ASSESSMENT
POD #3 ex-lap, SBR, hernia repair after being admitted for SBO  Events noted--patient vomited, NGT placed    Clinically stable; no current cardiovascular complaints  Remains hypertensive as per VS  At this time she remains NPO  Continue with IV metoprolol.  Can either increase dose or add hydralazine as needed  Pain control  Resume oral meds when able.  No further cardiac testing needed at this time.  Continuing with IV abx (on Zosyn) for now.  Will follow with you.

## 2018-07-01 NOTE — PROVIDER CONTACT NOTE (OTHER) - ASSESSMENT
patient voided minimal amount, bladder scanned per order. bladder scan volume 592. patient voided 15 mL

## 2018-07-01 NOTE — PROGRESS NOTE ADULT - SUBJECTIVE AND OBJECTIVE BOX
GENERAL SURGERY DAILY PROGRESS NOTE:     Subjective:  Pt seen and examined on morning rounds. Pain well controlled. Pulled NGT 2x overnight. Reports passing of gas. Pt failed first TOV, bladder scan showed 600 ml this am and straight cath produced 800 ml.           Objective:    MEDICATIONS  (STANDING):  dextrose 5% + sodium chloride 0.45% with potassium chloride 20 mEq/L 1000 milliLiter(s) (75 mL/Hr) IV Continuous <Continuous>  enoxaparin Injectable 40 milliGRAM(s) SubCutaneous daily  levETIRAcetam  IVPB 1000 milliGRAM(s) IV Intermittent every 12 hours  metoprolol tartrate Injectable 5 milliGRAM(s) IV Push every 6 hours  piperacillin/tazobactam IVPB. 3.375 Gram(s) IV Intermittent every 8 hours  sodium phosphate IVPB 15 milliMole(s) IV Intermittent once    MEDICATIONS  (PRN):  morphine  - Injectable 2 milliGRAM(s) IV Push every 3 hours PRN Moderate Pain (4 - 6)      Vital Signs Last 24 Hrs  T(C): 36.3 (01 Jul 2018 09:42), Max: 36.9 (30 Jun 2018 17:00)  T(F): 97.4 (01 Jul 2018 09:42), Max: 98.4 (30 Jun 2018 17:00)  HR: 88 (01 Jul 2018 12:00) (79 - 96)  BP: 158/92 (01 Jul 2018 12:00) (154/97 - 176/102)  BP(mean): --  RR: 18 (01 Jul 2018 09:42) (18 - 18)  SpO2: 97% (01 Jul 2018 09:42) (96% - 100%)    I&O's Detail    30 Jun 2018 07:01  -  01 Jul 2018 07:00  --------------------------------------------------------  IN:    dextrose 5% + sodium chloride 0.45% with potassium chloride 20 mEq/L: 450 mL    IV PiggyBack: 100 mL  Total IN: 550 mL    OUT:    Intermittent Catheterization - Urethral: 875 mL    Nasoenteral Tube: 200 mL    Post-Void Residual per Intermittent Catheterization: 83 mL    Voided: 125 mL  Total OUT: 1283 mL    Total NET: -733 mL      01 Jul 2018 07:01  -  01 Jul 2018 13:36  --------------------------------------------------------  IN:  Total IN: 0 mL    OUT:    Intermittent Catheterization - Urethral: 800 mL  Total OUT: 800 mL    Total NET: -800 mL            Daily     LABS:                        10.8   11.35 )-----------( 341      ( 01 Jul 2018 05:50 )             32.8     07-01    135  |  99  |  21  ----------------------------<  144<H>  3.7   |  24  |  0.99    Ca    7.8<L>      01 Jul 2018 05:50  Phos  2.6     07-01  Mg     2.2     07-01            RADIOLOGY & ADDITIONAL STUDIES:  Abdominal xray pending

## 2018-07-01 NOTE — PROVIDER CONTACT NOTE (OTHER) - ASSESSMENT
patient bladder scanned and straight catheterized. 875mL output from intermittent catheterization. Patient bladder scanned after and 83 mL remain in bladder.

## 2018-07-02 LAB
APPEARANCE UR: SIGNIFICANT CHANGE UP
BILIRUB UR-MCNC: NEGATIVE — SIGNIFICANT CHANGE UP
BLOOD UR QL VISUAL: HIGH
COLOR SPEC: HIGH
GLUCOSE UR-MCNC: NEGATIVE — SIGNIFICANT CHANGE UP
HCT VFR BLD CALC: 32.7 % — LOW (ref 34.5–45)
HGB BLD-MCNC: 10.5 G/DL — LOW (ref 11.5–15.5)
KETONES UR-MCNC: NEGATIVE — SIGNIFICANT CHANGE UP
LEUKOCYTE ESTERASE UR-ACNC: HIGH
MCHC RBC-ENTMCNC: 29.8 PG — SIGNIFICANT CHANGE UP (ref 27–34)
MCHC RBC-ENTMCNC: 32.1 % — SIGNIFICANT CHANGE UP (ref 32–36)
MCV RBC AUTO: 92.9 FL — SIGNIFICANT CHANGE UP (ref 80–100)
NITRITE UR-MCNC: NEGATIVE — SIGNIFICANT CHANGE UP
NRBC # FLD: 0 — SIGNIFICANT CHANGE UP
PH UR: 6 — SIGNIFICANT CHANGE UP (ref 4.6–8)
PLATELET # BLD AUTO: 313 K/UL — SIGNIFICANT CHANGE UP (ref 150–400)
PMV BLD: 10 FL — SIGNIFICANT CHANGE UP (ref 7–13)
PROT UR-MCNC: 150 MG/DL — HIGH
RBC # BLD: 3.52 M/UL — LOW (ref 3.8–5.2)
RBC # FLD: 13.3 % — SIGNIFICANT CHANGE UP (ref 10.3–14.5)
RBC CASTS # UR COMP ASSIST: >50 — HIGH (ref 0–?)
SP GR SPEC: 1.02 — SIGNIFICANT CHANGE UP (ref 1–1.04)
SPECIMEN SOURCE: SIGNIFICANT CHANGE UP
SPECIMEN SOURCE: SIGNIFICANT CHANGE UP
UROBILINOGEN FLD QL: NORMAL MG/DL — SIGNIFICANT CHANGE UP
WBC # BLD: 6.7 K/UL — SIGNIFICANT CHANGE UP (ref 3.8–10.5)
WBC # FLD AUTO: 6.7 K/UL — SIGNIFICANT CHANGE UP (ref 3.8–10.5)
WBC UR QL: >50 — HIGH (ref 0–?)

## 2018-07-02 PROCEDURE — 99233 SBSQ HOSP IP/OBS HIGH 50: CPT

## 2018-07-02 RX ORDER — POTASSIUM PHOSPHATE, MONOBASIC POTASSIUM PHOSPHATE, DIBASIC 236; 224 MG/ML; MG/ML
15 INJECTION, SOLUTION INTRAVENOUS ONCE
Qty: 0 | Refills: 0 | Status: COMPLETED | OUTPATIENT
Start: 2018-07-02 | End: 2018-07-02

## 2018-07-02 RX ORDER — DONEPEZIL HYDROCHLORIDE 10 MG/1
5 TABLET, FILM COATED ORAL AT BEDTIME
Qty: 0 | Refills: 0 | Status: DISCONTINUED | OUTPATIENT
Start: 2018-07-02 | End: 2018-07-09

## 2018-07-02 RX ORDER — LAMOTRIGINE 25 MG/1
100 TABLET, ORALLY DISINTEGRATING ORAL ONCE
Qty: 0 | Refills: 0 | Status: COMPLETED | OUTPATIENT
Start: 2018-07-02 | End: 2018-07-02

## 2018-07-02 RX ORDER — CARVEDILOL PHOSPHATE 80 MG/1
12.5 CAPSULE, EXTENDED RELEASE ORAL EVERY 12 HOURS
Qty: 0 | Refills: 0 | Status: DISCONTINUED | OUTPATIENT
Start: 2018-07-02 | End: 2018-07-09

## 2018-07-02 RX ORDER — LATANOPROST 0.05 MG/ML
1 SOLUTION/ DROPS OPHTHALMIC; TOPICAL AT BEDTIME
Qty: 0 | Refills: 0 | Status: DISCONTINUED | OUTPATIENT
Start: 2018-07-02 | End: 2018-07-09

## 2018-07-02 RX ORDER — ATORVASTATIN CALCIUM 80 MG/1
40 TABLET, FILM COATED ORAL AT BEDTIME
Qty: 0 | Refills: 0 | Status: DISCONTINUED | OUTPATIENT
Start: 2018-07-02 | End: 2018-07-09

## 2018-07-02 RX ORDER — LEVETIRACETAM 250 MG/1
1000 TABLET, FILM COATED ORAL
Qty: 0 | Refills: 0 | Status: DISCONTINUED | OUTPATIENT
Start: 2018-07-02 | End: 2018-07-09

## 2018-07-02 RX ORDER — TIMOLOL 0.5 %
1 DROPS OPHTHALMIC (EYE) DAILY
Qty: 0 | Refills: 0 | Status: DISCONTINUED | OUTPATIENT
Start: 2018-07-02 | End: 2018-07-09

## 2018-07-02 RX ORDER — ACETAMINOPHEN 500 MG
325 TABLET ORAL EVERY 4 HOURS
Qty: 0 | Refills: 0 | Status: DISCONTINUED | OUTPATIENT
Start: 2018-07-02 | End: 2018-07-09

## 2018-07-02 RX ORDER — LOSARTAN POTASSIUM 100 MG/1
50 TABLET, FILM COATED ORAL DAILY
Qty: 0 | Refills: 0 | Status: DISCONTINUED | OUTPATIENT
Start: 2018-07-02 | End: 2018-07-09

## 2018-07-02 RX ORDER — AMLODIPINE BESYLATE 2.5 MG/1
5 TABLET ORAL DAILY
Qty: 0 | Refills: 0 | Status: DISCONTINUED | OUTPATIENT
Start: 2018-07-02 | End: 2018-07-09

## 2018-07-02 RX ADMIN — Medication 1 DROP(S): at 22:30

## 2018-07-02 RX ADMIN — MORPHINE SULFATE 2 MILLIGRAM(S): 50 CAPSULE, EXTENDED RELEASE ORAL at 08:06

## 2018-07-02 RX ADMIN — LAMOTRIGINE 100 MILLIGRAM(S): 25 TABLET, ORALLY DISINTEGRATING ORAL at 14:43

## 2018-07-02 RX ADMIN — LATANOPROST 1 DROP(S): 0.05 SOLUTION/ DROPS OPHTHALMIC; TOPICAL at 22:30

## 2018-07-02 RX ADMIN — PIPERACILLIN AND TAZOBACTAM 25 GRAM(S): 4; .5 INJECTION, POWDER, LYOPHILIZED, FOR SOLUTION INTRAVENOUS at 07:52

## 2018-07-02 RX ADMIN — LOSARTAN POTASSIUM 50 MILLIGRAM(S): 100 TABLET, FILM COATED ORAL at 10:48

## 2018-07-02 RX ADMIN — DEXTROSE MONOHYDRATE, SODIUM CHLORIDE, AND POTASSIUM CHLORIDE 75 MILLILITER(S): 50; .745; 4.5 INJECTION, SOLUTION INTRAVENOUS at 18:30

## 2018-07-02 RX ADMIN — PIPERACILLIN AND TAZOBACTAM 25 GRAM(S): 4; .5 INJECTION, POWDER, LYOPHILIZED, FOR SOLUTION INTRAVENOUS at 21:22

## 2018-07-02 RX ADMIN — MORPHINE SULFATE 2 MILLIGRAM(S): 50 CAPSULE, EXTENDED RELEASE ORAL at 07:51

## 2018-07-02 RX ADMIN — Medication 5 MILLIGRAM(S): at 01:36

## 2018-07-02 RX ADMIN — POTASSIUM PHOSPHATE, MONOBASIC POTASSIUM PHOSPHATE, DIBASIC 62.5 MILLIMOLE(S): 236; 224 INJECTION, SOLUTION INTRAVENOUS at 10:48

## 2018-07-02 RX ADMIN — ATORVASTATIN CALCIUM 40 MILLIGRAM(S): 80 TABLET, FILM COATED ORAL at 21:21

## 2018-07-02 RX ADMIN — LEVETIRACETAM 1000 MILLIGRAM(S): 250 TABLET, FILM COATED ORAL at 18:30

## 2018-07-02 RX ADMIN — Medication 5 MILLIGRAM(S): at 07:52

## 2018-07-02 RX ADMIN — LEVETIRACETAM 400 MILLIGRAM(S): 250 TABLET, FILM COATED ORAL at 07:42

## 2018-07-02 RX ADMIN — AMLODIPINE BESYLATE 5 MILLIGRAM(S): 2.5 TABLET ORAL at 10:48

## 2018-07-02 RX ADMIN — ENOXAPARIN SODIUM 40 MILLIGRAM(S): 100 INJECTION SUBCUTANEOUS at 14:43

## 2018-07-02 RX ADMIN — DONEPEZIL HYDROCHLORIDE 5 MILLIGRAM(S): 10 TABLET, FILM COATED ORAL at 21:21

## 2018-07-02 RX ADMIN — CARVEDILOL PHOSPHATE 12.5 MILLIGRAM(S): 80 CAPSULE, EXTENDED RELEASE ORAL at 18:30

## 2018-07-02 RX ADMIN — PIPERACILLIN AND TAZOBACTAM 25 GRAM(S): 4; .5 INJECTION, POWDER, LYOPHILIZED, FOR SOLUTION INTRAVENOUS at 14:43

## 2018-07-02 NOTE — PROGRESS NOTE ADULT - SUBJECTIVE AND OBJECTIVE BOX
GENERAL SURGERY DAILY PROGRESS NOTE:     Subjective:    Patient seen and examined on morning rounds. Pain well controlled. Pt requests advancement of her diet. -flatus. -BM.  Recurrent urinary incontinence x3 with resultant hua placement.       Objective:    PHYSICAL EXAM:  NAD, awake and alert  Respirations nonlabored  Abdomen soft, nontender, nondistended  No guarding or rebound tenderness       MEDICATIONS  (STANDING):  amLODIPine   Tablet 5 milliGRAM(s) Oral daily  atorvastatin 40 milliGRAM(s) Oral at bedtime  carvedilol 12.5 milliGRAM(s) Oral every 12 hours  dextrose 5% + sodium chloride 0.45% with potassium chloride 20 mEq/L 1000 milliLiter(s) (75 mL/Hr) IV Continuous <Continuous>  donepezil 5 milliGRAM(s) Oral at bedtime  enoxaparin Injectable 40 milliGRAM(s) SubCutaneous daily  lamoTRIgine 100 milliGRAM(s) Oral once  latanoprost 0.005% Ophthalmic Solution 1 Drop(s) Both EYES at bedtime  levETIRAcetam 1000 milliGRAM(s) Oral two times a day  losartan 50 milliGRAM(s) Oral daily  piperacillin/tazobactam IVPB. 3.375 Gram(s) IV Intermittent every 8 hours  potassium phosphate IVPB 15 milliMole(s) IV Intermittent once  timolol 0.5% Solution 1 Drop(s) Both EYES daily    MEDICATIONS  (PRN):      Vital Signs Last 24 Hrs  T(C): 36.9 (2018 07:49), Max: 36.9 (2018 01:23)  T(F): 98.4 (2018 07:49), Max: 98.5 (2018 01:23)  HR: 82 (2018 07:49) (72 - 88)  BP: 143/80 (2018 07:49) (143/80 - 158/92)  BP(mean): --  RR: 18 (2018 07:49) (18 - 20)  SpO2: 100% (2018 07:49) (95% - 100%)    I&O's Detail    2018 07:01  -  2018 07:00  --------------------------------------------------------  IN:    dextrose 5% + sodium chloride 0.45% with potassium chloride 20 mEq/L: 675 mL  Total IN: 675 mL    OUT:    Indwelling Catheter - Urethral: 650 mL    Intermittent Catheterization - Urethral: 800 mL  Total OUT: 1450 mL    Total NET: -775 mL          Daily     Daily Weight in k.3 (2018 01:23)    LABS:                        10.8   11.35 )-----------( 341      ( 2018 05:50 )             32.8     07-    135  |  99  |  21  ----------------------------<  144<H>  3.7   |  24  |  0.99    Ca    7.8<L>      2018 05:50  Phos  2.6     07  Mg     2.2                 RADIOLOGY & ADDITIONAL STUDIES:     Abdominal XR reviewed, will wait for formal read.

## 2018-07-02 NOTE — PROGRESS NOTE ADULT - ASSESSMENT
87y Female s/p ex lap, SBR, inguinal hernia repair now POD 4. Pt continues to have intermitent GI fxn now with cloudy urine in hua.     Plan:  - advance diet to NPO w. sips  - f/u AXR formal read  -Pain control  - failed TOV, hua replaced  - replete lytes  - Incentive spirometer/OOB/Ambulate/PT  - c/w vte ppx    Surgery, B-Team  Pager: [phone] 87y Female s/p ex lap, SBR, inguinal hernia repair now POD 4. Pt continues to have intermitent GI fxn now with cloudy urine in hua.     Plan:  - advance diet to NPO w. sips  - f/u AXR formal read  -Pain control  - failed TOV, hua replaced  - replete lytes  - Incentive spirometer/OOB/Ambulate/PT  - c/w vte ppx  - ua specimen sent, will f/u with results.     Surgery, B-Team  Pager: [phone]

## 2018-07-02 NOTE — PROGRESS NOTE ADULT - ASSESSMENT
POD #4 ex-lap, SBR, hernia repair after being admitted for SBO  Clinically stable; no current cardiovascular complaints  BPs better controlled on oral regimen which has been resumed  Pain control  No further cardiac testing needed at this time.  Continuing with IV abx (on Zosyn) for now.  Will follow with you.

## 2018-07-02 NOTE — PROGRESS NOTE ADULT - SUBJECTIVE AND OBJECTIVE BOX
Cardiology/Vascular Medicine Inpatient Progress Note    Events noted  Advancing diet as noted in Surgery note  No active cardiac complaints  BPs better controlled.  On oral regimen again.    Now POD #4 s/p ex lap, SBR, inguinal hernia repair.    On exam, appears euvolemic on exam.    Vital Signs Last 24 Hrs  T(C): 37 (2018 10:23), Max: 37 (2018 10:23)  T(F): 98.6 (2018 10:23), Max: 98.6 (2018 10:23)  HR: 75 (2018 10:23) (72 - 83)  BP: 142/80 (2018 10:23) (142/80 - 145/88)  BP(mean): --  RR: 18 (2018 10:23) (18 - 20)  SpO2: 98% (2018 10:23) (98% - 100%)      Appearance: Elderly woman, chronically-ill appearing, thin/fatigued,  HEENT:   Normal oral mucosa, PERRL, EOMI	  Lymphatic: No lymphadenopathy  Cardiovascular: Reg S1S2  Respiratory: Decreased BS b/l bases	  Psychiatry: Awake, does not answer questions fully  Gastrointestinal:  Soft, Non-tender, + BS	  Skin: No rashes, No ecchymoses, No cyanosis	  Neurologic: Non-focal    MEDICATIONS  (STANDING):  amLODIPine   Tablet 5 milliGRAM(s) Oral daily  atorvastatin 40 milliGRAM(s) Oral at bedtime  carvedilol 12.5 milliGRAM(s) Oral every 12 hours  dextrose 5% + sodium chloride 0.45% with potassium chloride 20 mEq/L 1000 milliLiter(s) (75 mL/Hr) IV Continuous <Continuous>  donepezil 5 milliGRAM(s) Oral at bedtime  enoxaparin Injectable 40 milliGRAM(s) SubCutaneous daily  lamoTRIgine 100 milliGRAM(s) Oral once  latanoprost 0.005% Ophthalmic Solution 1 Drop(s) Both EYES at bedtime  levETIRAcetam 1000 milliGRAM(s) Oral two times a day  losartan 50 milliGRAM(s) Oral daily  piperacillin/tazobactam IVPB. 3.375 Gram(s) IV Intermittent every 8 hours  timolol 0.5% Solution 1 Drop(s) Both EYES daily      LABS:	 	                               11.6   12.53 )-----------( 330      ( 2018 05:30 )             37.2     06-30    139  |  96<L>  |  17  ----------------------------<  146<H>  3.8   |  28  |  0.93    Ca    8.6      2018 20:33  Phos  1.6     06-30  Mg     2.0     06-30          < from: TTE Echo Doppler w/o Cont (17 @ 10:24) >     EXAM:  TTE WO CON COMPLETE W DOPPL         PROCEDURE DATE:  2017        INTERPRETATION:  REPORT:    TRANSTHORACIC ECHOCARDIOGRAM REPORT           Patient Name:   JOSE CHAUDHARI Patient Location: [facility] Rec #:  [id number]    Accession #:   [id number]  Account #:                    Height:           66.0 in 167.6 cm  YOB: 1930      Weight:           119.0 lb 54.00 kg  Patient Age:    86 years      BSA:              1.60 m²  Patient Gender: F             BP:145/75 mmHg        Date of Exam: 2017 9:48:23 AM  Sonographer:  JEFF     Procedure:     2D Echo/Doppler/Color Doppler Complete.  Indications:   Cerebral infarction, unspecified - I63.9  Diagnosis:     Cerebral infarction, unspecified - I63.9  Study Details: Technically good study.           2D AND M-MODE MEASUREMENTS (normal ranges within parentheses):  Left Ventricle:                  Normal   Aorta/Left Atrium:            Normal  IVSd (2D):              0.96 cm (0.7-1.1) Aortic Root (2D):  2.98 cm   (2.4-3.7)  LVPWd (2D):             0.94 cm (0.7-1.1) Left Atrium (2D):  4.10 cm   (1.9-4.0)  LVIDd (2D):             3.26 cm (3.4-5.7) Right Ventricle:  LVIDs (2D):             2.32 cm           TAPSE:           2.18 cm  LV FS (2D):  28.9 %   (>25%)  Relative Wall Thickness  0.58    (<0.42)     LV DIASTOLIC FUNCTION:  MV Peak E: 0.53 m/s Decel Time: 317 msec  MV Peak A: 0.74 m/s  E/A Ratio: 0.72     SPECTRAL DOPPLER ANALYSIS (where applicable):  Mitral Valve:  MV P1/2 Time: 92.06 msec  MV Area, PHT: 2.39 cm²     Aortic Valve: AoV Max Mark: 1.84 m/s AoV Peak P.5 mmHg AoV Mean P.3 mmHg     LVOT Vmax: 0.81 m/s LVOT VTI: 0.142 m LVOT Diameter:     Aortic Insufficiency:  AI Half-time:  860 msec  AI Decel Rate: 1.61 m/s²     Tricuspid Valve and PA/RV Systolic Pressure: TR Max Velocity: 2.46 m/s   RA Pressure: 5 mmHg RVSP/PASP: 29.3 mmHg        PHYSICIAN INTERPRETATION:  Left Ventricle: The left ventricular internal cavity size is normal.   There is mild concentric left ventricular hypertrophy.  Global LV systolic function was normal. Left ventricular ejection   fraction, by visual estimation, is 60 to 65%. Spectral Doppler shows   impaired relaxation pattern of left ventricular myocardial filling (Grade   I diastolic dysfunction).  Right Ventricle: Normal right ventricular size and function.  Left Atrium: Mildly enlarged left atrium.  Right Atrium: The right atrium is mildly dilated.  Pericardium: There is no evidence of pericardial effusion.  Mitral Valve: Thickening and calcification of the anterior and posterior   mitral valve leaflets. Mitral leaflet mobility is normal. There is mild   mitral annular calcification. Mild mitral valve regurgitation is seen.  Tricuspid Valve: The tricuspid valve is not well seen. Mild tricuspid   regurgitation is visualized.  Aortic Valve: The aortic valve is trileaflet. Sclerotic aortic valve with   normal opening. Mild aortic valve regurgitation is seen.  Pulmonic Valve: The pulmonic valve was not well visualized.Trace   pulmonic valve regurgitation.  Aorta: Aortic root measured at Sinus of Valsalva is normal. There is mild   aortic root calcification.  Venous: The inferior vena cava was normal sized, with respiratory size   variation greater than 50%.  Additional Comments: A pacer wire is visualized in the right atrium and   right ventricle.        Summary:   1. Left ventricular ejection fraction, by visual estimation, is 60 to   65%.   2. Technically good study.   3. Normal global left ventricular systolic function.   4. Normal left ventricular internal cavity size.   5. Spectral Doppler shows impaired relaxation pattern of left   ventricular myocardial filling (Grade I diastolic dysfunction).   6. There is mild concentric left ventricular hypertrophy.   7. Normal right ventricular size and function.   8. Mildly dilated right atrium.   9. There is no evidence of pericardial effusion.  10. Mild mitral annular calcification.  11. Mild mitral valve regurgitation.  12. Thickening and calcification of the anterior and posterior mitral   valve leaflets.  13. Mild aortic regurgitation.  14. Sclerotic aortic valve with normal opening.  15. Mildly enlarged left atrium.  16. There is mild aortic root calcification.     Vikas Hopkins MD FAC, FASE,FACP  Electronically signed on 2017 at 1:14:59 PM             < from: CT Abdomen and Pelvis w/ IV Cont (. @ 02:58) >    EXAM:  CT ABDOMEN AND PELVIS IC        PROCEDURE DATE:  2018         INTERPRETATION:  CLINICAL INFORMATION: Nausea and vomiting with   epigastric tenderness    COMPARISON: CT of the abdomen and pelvis dated 2017    PROCEDURE:   CT of theAbdomen and Pelvis was performed with intravenous contrast.   Intravenous contrast: 90 ml Omnipaque 350. 10 ml discarded.  Oral contrast: None.  Sagittal and coronal reformats were performed.    FINDINGS:    LOWER CHEST: Coronary artery calcifications.    LIVER: Scattered hypodensities which are too small to characterize.  BILE DUCTS: Mild intrahepatic and extrahepatic biliary ductal dilatation.  GALLBLADDER: Distended.  SPLEEN: Within normal limits.  PANCREAS: Prominent pancreatic duct measuring5 mm, overall stable from   2017.  ADRENALS: Thickening of the left adrenal gland.  KIDNEYS/URETERS: A right-sided double-J ureteral stent is again noted,   unchanged from . Unchanged mild to moderate right-sided   hydroureteronephrosis. Bilateral renal cysts.    BLADDER: Within normal limits.  REPRODUCTIVE ORGANS: Calcified and noncalcified uterine fibroids. A   vaginal pessary is noted.    BOWEL: Diffusely dilated small bowel with a transition to collapsed loops   in a right inguinal hernia. The large bowel is collapsed.  PERITONEUM: No ascites.  catheter terminating in the right lower   quadrant.  VESSELS:  Atherosclerotic calcifications.  RETROPERITONEUM: No lymphadenopathy.    ABDOMINAL WALL: Left chest and abdominal wall  shunt catheter.  BONES: Advanced degenerative changes of the visualized lumbosacral spine.   Chronic fracture deformity of right superior pubic ramus.    IMPRESSION:     Small bowel obstruction with a transition to collapsed loops in a right   inguinal hernia. Small volume interloop edema.    Right-sided double-J ureteral stent with mild to moderate right   hydroureteronephrosis. This is not significantly changed from the prior   study dated 2017.    Findings were discussed with Dr. Cindy Miller at approximately   4:02 AM dated 2018 with read back.      INDIA MILLER M.D., RADIOLOGY RESIDENT  This document has been electronically signed.  DALE KNUTSON M.D., ATTENDING RADIOLOGIST  This document has been electronicallysigned. 2018  4:05AM                  < end of copied text >

## 2018-07-03 LAB
BUN SERPL-MCNC: 16 MG/DL — SIGNIFICANT CHANGE UP (ref 7–23)
CALCIUM SERPL-MCNC: 7.8 MG/DL — LOW (ref 8.4–10.5)
CHLORIDE SERPL-SCNC: 102 MMOL/L — SIGNIFICANT CHANGE UP (ref 98–107)
CO2 SERPL-SCNC: 24 MMOL/L — SIGNIFICANT CHANGE UP (ref 22–31)
CREAT SERPL-MCNC: 0.82 MG/DL — SIGNIFICANT CHANGE UP (ref 0.5–1.3)
GLUCOSE SERPL-MCNC: 108 MG/DL — HIGH (ref 70–99)
HCT VFR BLD CALC: 27.8 % — LOW (ref 34.5–45)
HGB BLD-MCNC: 8.7 G/DL — LOW (ref 11.5–15.5)
MAGNESIUM SERPL-MCNC: 1.8 MG/DL — SIGNIFICANT CHANGE UP (ref 1.6–2.6)
MCHC RBC-ENTMCNC: 29.7 PG — SIGNIFICANT CHANGE UP (ref 27–34)
MCHC RBC-ENTMCNC: 31.3 % — LOW (ref 32–36)
MCV RBC AUTO: 94.9 FL — SIGNIFICANT CHANGE UP (ref 80–100)
NRBC # FLD: 0 — SIGNIFICANT CHANGE UP
PHOSPHATE SERPL-MCNC: 2.7 MG/DL — SIGNIFICANT CHANGE UP (ref 2.5–4.5)
PLATELET # BLD AUTO: 278 K/UL — SIGNIFICANT CHANGE UP (ref 150–400)
PMV BLD: 10.3 FL — SIGNIFICANT CHANGE UP (ref 7–13)
POTASSIUM SERPL-MCNC: 3.8 MMOL/L — SIGNIFICANT CHANGE UP (ref 3.5–5.3)
POTASSIUM SERPL-SCNC: 3.8 MMOL/L — SIGNIFICANT CHANGE UP (ref 3.5–5.3)
RBC # BLD: 2.93 M/UL — LOW (ref 3.8–5.2)
RBC # FLD: 13.2 % — SIGNIFICANT CHANGE UP (ref 10.3–14.5)
SODIUM SERPL-SCNC: 138 MMOL/L — SIGNIFICANT CHANGE UP (ref 135–145)
WBC # BLD: 4.75 K/UL — SIGNIFICANT CHANGE UP (ref 3.8–10.5)
WBC # FLD AUTO: 4.75 K/UL — SIGNIFICANT CHANGE UP (ref 3.8–10.5)

## 2018-07-03 PROCEDURE — 99232 SBSQ HOSP IP/OBS MODERATE 35: CPT

## 2018-07-03 PROCEDURE — 71045 X-RAY EXAM CHEST 1 VIEW: CPT | Mod: 26

## 2018-07-03 RX ORDER — MAGNESIUM SULFATE 500 MG/ML
2 VIAL (ML) INJECTION ONCE
Qty: 0 | Refills: 0 | Status: COMPLETED | OUTPATIENT
Start: 2018-07-03 | End: 2018-07-03

## 2018-07-03 RX ORDER — POTASSIUM PHOSPHATE, MONOBASIC POTASSIUM PHOSPHATE, DIBASIC 236; 224 MG/ML; MG/ML
15 INJECTION, SOLUTION INTRAVENOUS ONCE
Qty: 0 | Refills: 0 | Status: COMPLETED | OUTPATIENT
Start: 2018-07-03 | End: 2018-07-03

## 2018-07-03 RX ORDER — DEXTROSE MONOHYDRATE, SODIUM CHLORIDE, AND POTASSIUM CHLORIDE 50; .745; 4.5 G/1000ML; G/1000ML; G/1000ML
1000 INJECTION, SOLUTION INTRAVENOUS
Qty: 0 | Refills: 0 | Status: DISCONTINUED | OUTPATIENT
Start: 2018-07-03 | End: 2018-07-03

## 2018-07-03 RX ADMIN — CARVEDILOL PHOSPHATE 12.5 MILLIGRAM(S): 80 CAPSULE, EXTENDED RELEASE ORAL at 05:42

## 2018-07-03 RX ADMIN — PIPERACILLIN AND TAZOBACTAM 25 GRAM(S): 4; .5 INJECTION, POWDER, LYOPHILIZED, FOR SOLUTION INTRAVENOUS at 05:42

## 2018-07-03 RX ADMIN — Medication 325 MILLIGRAM(S): at 21:39

## 2018-07-03 RX ADMIN — LOSARTAN POTASSIUM 50 MILLIGRAM(S): 100 TABLET, FILM COATED ORAL at 05:42

## 2018-07-03 RX ADMIN — PIPERACILLIN AND TAZOBACTAM 25 GRAM(S): 4; .5 INJECTION, POWDER, LYOPHILIZED, FOR SOLUTION INTRAVENOUS at 21:40

## 2018-07-03 RX ADMIN — CARVEDILOL PHOSPHATE 12.5 MILLIGRAM(S): 80 CAPSULE, EXTENDED RELEASE ORAL at 17:08

## 2018-07-03 RX ADMIN — Medication 50 GRAM(S): at 10:21

## 2018-07-03 RX ADMIN — Medication 325 MILLIGRAM(S): at 22:40

## 2018-07-03 RX ADMIN — ENOXAPARIN SODIUM 40 MILLIGRAM(S): 100 INJECTION SUBCUTANEOUS at 11:28

## 2018-07-03 RX ADMIN — PIPERACILLIN AND TAZOBACTAM 25 GRAM(S): 4; .5 INJECTION, POWDER, LYOPHILIZED, FOR SOLUTION INTRAVENOUS at 14:05

## 2018-07-03 RX ADMIN — LATANOPROST 1 DROP(S): 0.05 SOLUTION/ DROPS OPHTHALMIC; TOPICAL at 21:39

## 2018-07-03 RX ADMIN — AMLODIPINE BESYLATE 5 MILLIGRAM(S): 2.5 TABLET ORAL at 05:42

## 2018-07-03 RX ADMIN — DONEPEZIL HYDROCHLORIDE 5 MILLIGRAM(S): 10 TABLET, FILM COATED ORAL at 21:39

## 2018-07-03 RX ADMIN — LEVETIRACETAM 1000 MILLIGRAM(S): 250 TABLET, FILM COATED ORAL at 05:42

## 2018-07-03 RX ADMIN — Medication 1 DROP(S): at 21:38

## 2018-07-03 RX ADMIN — DEXTROSE MONOHYDRATE, SODIUM CHLORIDE, AND POTASSIUM CHLORIDE 50 MILLILITER(S): 50; .745; 4.5 INJECTION, SOLUTION INTRAVENOUS at 10:22

## 2018-07-03 RX ADMIN — LEVETIRACETAM 1000 MILLIGRAM(S): 250 TABLET, FILM COATED ORAL at 17:08

## 2018-07-03 RX ADMIN — POTASSIUM PHOSPHATE, MONOBASIC POTASSIUM PHOSPHATE, DIBASIC 62.5 MILLIMOLE(S): 236; 224 INJECTION, SOLUTION INTRAVENOUS at 11:28

## 2018-07-03 RX ADMIN — ATORVASTATIN CALCIUM 40 MILLIGRAM(S): 80 TABLET, FILM COATED ORAL at 21:39

## 2018-07-03 RX ADMIN — Medication 325 MILLIGRAM(S): at 05:57

## 2018-07-03 RX ADMIN — Medication 325 MILLIGRAM(S): at 06:32

## 2018-07-03 NOTE — PROGRESS NOTE ADULT - SUBJECTIVE AND OBJECTIVE BOX
GENERAL SURGERY DAILY PROGRESS NOTE:     Subjective:    Patient complained of mild gas pains overnight. Tylenol was ordered but patient did not take. Still on clear liquid diet. Says she is passing gas. No BM. No N/V          Objective:    NAD, awake and alert  Respirations nonlabored  Abdomen soft, appropriately tender, nondistended, incision CDI  No guarding or rebound tenderness     MEDICATIONS  (STANDING):  amLODIPine   Tablet 5 milliGRAM(s) Oral daily  atorvastatin 40 milliGRAM(s) Oral at bedtime  carvedilol 12.5 milliGRAM(s) Oral every 12 hours  dextrose 5% + sodium chloride 0.45% with potassium chloride 20 mEq/L 1000 milliLiter(s) (50 mL/Hr) IV Continuous <Continuous>  donepezil 5 milliGRAM(s) Oral at bedtime  enoxaparin Injectable 40 milliGRAM(s) SubCutaneous daily  latanoprost 0.005% Ophthalmic Solution 1 Drop(s) Both EYES at bedtime  levETIRAcetam 1000 milliGRAM(s) Oral two times a day  losartan 50 milliGRAM(s) Oral daily  piperacillin/tazobactam IVPB. 3.375 Gram(s) IV Intermittent every 8 hours  timolol 0.5% Solution 1 Drop(s) Both EYES daily    MEDICATIONS  (PRN):  acetaminophen   Tablet. 325 milliGRAM(s) Oral every 4 hours PRN Mild Pain (1 - 3)      Vital Signs Last 24 Hrs  T(C): 36.6 (2018 10:16), Max: 37 (2018 01:32)  T(F): 97.9 (2018 10:16), Max: 98.6 (2018 01:32)  HR: 68 (2018 10:16) (68 - 85)  BP: 138/89 (2018 10:16) (115/72 - 139/88)  BP(mean): --  RR: 18 (2018 10:16) (16 - 20)  SpO2: 100% (2018 10:16) (96% - 100%)    I&O's Detail    2018 07:01  -  2018 07:00  --------------------------------------------------------  IN:    dextrose 5% + sodium chloride 0.45% with potassium chloride 20 mEq/L: 1800 mL    IV PiggyBack: 525 mL  Total IN: 2325 mL    OUT:    Indwelling Catheter - Urethral: 1075 mL    Intermittent Catheterization - Urethral: 100 mL  Total OUT: 1175 mL    Total NET: 1150 mL      2018 07:01  -  2018 11:29  --------------------------------------------------------  IN:  Total IN: 0 mL    OUT:    Indwelling Catheter - Urethral: 100 mL  Total OUT: 100 mL    Total NET: -100 mL          Daily     Daily Weight in k.2 (2018 01:32)    LABS:                        8.7    4.75  )-----------( 278      ( 2018 05:33 )             27.8     07-03    138  |  102  |  16  ----------------------------<  108<H>  3.8   |  24  |  0.82    Ca    7.8<L>      2018 05:33  Phos  2.7     07-03  Mg     1.8     -        Urinalysis Basic - ( 2018 09:25 )    Color: BROWN / Appearance: TURBID / S.021 / pH: 6.0  Gluc: NEGATIVE / Ketone: NEGATIVE  / Bili: NEGATIVE / Urobili: NORMAL mg/dL   Blood: LARGE / Protein: 150 mg/dL / Nitrite: NEGATIVE   Leuk Esterase: LARGE / RBC: >50 / WBC >50   Sq Epi: x / Non Sq Epi: x / Bacteria: x        RADIOLOGY & ADDITIONAL STUDIES:

## 2018-07-03 NOTE — PROGRESS NOTE ADULT - SUBJECTIVE AND OBJECTIVE BOX
Cardiology/Vascular Medicine Inpatient Progress Note    More alert this AM  No current complaints  Denies having pain  +small BM, +flatus  No active cardiac complaints  BPs better controlled.  On oral regimen again.    POD #5 s/p ex lap, SBR, inguinal hernia repair.  On exam, appears euvolemic on exam.    Vital Signs Last 24 Hrs  T(C): 36.6 (2018 10:16), Max: 37 (2018 01:32)  T(F): 97.9 (2018 10:16), Max: 98.6 (2018 01:32)  HR: 68 (2018 10:16) (68 - 85)  BP: 138/89 (2018 10:16) (115/72 - 139/88)  BP(mean): --  RR: 18 (2018 10:16) (16 - 20)  SpO2: 100% (2018 10:16) (96% - 100%)    Appearance: Elderly woman, thin  HEENT:   Normal oral mucosa, PERRL, EOMI	  Lymphatic: No lymphadenopathy  Cardiovascular: Reg S1S2  Respiratory: Decreased BS b/l bases	  Psychiatry: Awake, does not answer questions fully  Gastrointestinal:  Soft, Non-tender, + BS	  Skin: No rashes, No ecchymoses, No cyanosis	  Neurologic: Non-focal  +Musa    MEDICATIONS  (STANDING):  amLODIPine   Tablet 5 milliGRAM(s) Oral daily  atorvastatin 40 milliGRAM(s) Oral at bedtime  carvedilol 12.5 milliGRAM(s) Oral every 12 hours  donepezil 5 milliGRAM(s) Oral at bedtime  enoxaparin Injectable 40 milliGRAM(s) SubCutaneous daily  latanoprost 0.005% Ophthalmic Solution 1 Drop(s) Both EYES at bedtime  levETIRAcetam 1000 milliGRAM(s) Oral two times a day  losartan 50 milliGRAM(s) Oral daily  piperacillin/tazobactam IVPB. 3.375 Gram(s) IV Intermittent every 8 hours  timolol 0.5% Solution 1 Drop(s) Both EYES daily    MEDICATIONS  (PRN):  acetaminophen   Tablet. 325 milliGRAM(s) Oral every 4 hours PRN Mild Pain (1 - 3)      LABS:	 	                             8.7    4.75  )-----------( 278      ( 2018 05:33 )             27.8   07-03    138  |  102  |  16  ----------------------------<  108<H>  3.8   |  24  |  0.82    Ca    7.8<L>      2018 05:33  Phos  2.7     07-03  Mg     1.8     07-03      < from: TTE Echo Doppler w/o Cont (. @ 10:24) >     EXAM:  TTE WO CON COMPLETE W DOPPL         PROCEDURE DATE:  2017        INTERPRETATION:  REPORT:    TRANSTHORACIC ECHOCARDIOGRAM REPORT           Patient Name:   JOSE CHAUDHARI Patient Location: [facility] Rec #:  [id number]    Accession #:   [id number]  Account #:                    Height:           66.0 in 167.6 cm  YOB: 1930      Weight:           119.0 lb 54.00 kg  Patient Age:    86 years      BSA:              1.60 m²  Patient Gender: F             BP:145/75 mmHg        Date of Exam: 2017 9:48:23 AM  Sonographer:  JEFF     Procedure:     2D Echo/Doppler/Color Doppler Complete.  Indications:   Cerebral infarction, unspecified - I63.9  Diagnosis:     Cerebral infarction, unspecified - I63.9  Study Details: Technically good study.           2D AND M-MODE MEASUREMENTS (normal ranges within parentheses):  Left Ventricle:                  Normal   Aorta/Left Atrium:            Normal  IVSd (2D):              0.96 cm (0.7-1.1) Aortic Root (2D):  2.98 cm   (2.4-3.7)  LVPWd (2D):             0.94 cm (0.7-1.1) Left Atrium (2D):  4.10 cm   (1.9-4.0)  LVIDd (2D):             3.26 cm (3.4-5.7) Right Ventricle:  LVIDs (2D):             2.32 cm           TAPSE:           2.18 cm  LV FS (2D):  28.9 %   (>25%)  Relative Wall Thickness  0.58    (<0.42)     LV DIASTOLIC FUNCTION:  MV Peak E: 0.53 m/s Decel Time: 317 msec  MV Peak A: 0.74 m/s  E/A Ratio: 0.72     SPECTRAL DOPPLER ANALYSIS (where applicable):  Mitral Valve:  MV P1/2 Time: 92.06 msec  MV Area, PHT: 2.39 cm²     Aortic Valve: AoV Max Mark: 1.84 m/s AoV Peak P.5 mmHg AoV Mean P.3 mmHg     LVOT Vmax: 0.81 m/s LVOT VTI: 0.142 m LVOT Diameter:     Aortic Insufficiency:  AI Half-time:  860 msec  AI Decel Rate: 1.61 m/s²     Tricuspid Valve and PA/RV Systolic Pressure: TR Max Velocity: 2.46 m/s   RA Pressure: 5 mmHg RVSP/PASP: 29.3 mmHg        PHYSICIAN INTERPRETATION:  Left Ventricle: The left ventricular internal cavity size is normal.   There is mild concentric left ventricular hypertrophy.  Global LV systolic function was normal. Left ventricular ejection   fraction, by visual estimation, is 60 to 65%. Spectral Doppler shows   impaired relaxation pattern of left ventricular myocardial filling (Grade   I diastolic dysfunction).  Right Ventricle: Normal right ventricular size and function.  Left Atrium: Mildly enlarged left atrium.  Right Atrium: The right atrium is mildly dilated.  Pericardium: There is no evidence of pericardial effusion.  Mitral Valve: Thickening and calcification of the anterior and posterior   mitral valve leaflets. Mitral leaflet mobility is normal. There is mild   mitral annular calcification. Mild mitral valve regurgitation is seen.  Tricuspid Valve: The tricuspid valve is not well seen. Mild tricuspid   regurgitation is visualized.  Aortic Valve: The aortic valve is trileaflet. Sclerotic aortic valve with   normal opening. Mild aortic valve regurgitation is seen.  Pulmonic Valve: The pulmonic valve was not well visualized.Trace   pulmonic valve regurgitation.  Aorta: Aortic root measured at Sinus of Valsalva is normal. There is mild   aortic root calcification.  Venous: The inferior vena cava was normal sized, with respiratory size   variation greater than 50%.  Additional Comments: A pacer wire is visualized in the right atrium and   right ventricle.        Summary:   1. Left ventricular ejection fraction, by visual estimation, is 60 to   65%.   2. Technically good study.   3. Normal global left ventricular systolic function.   4. Normal left ventricular internal cavity size.   5. Spectral Doppler shows impaired relaxation pattern of left   ventricular myocardial filling (Grade I diastolic dysfunction).   6. There is mild concentric left ventricular hypertrophy.   7. Normal right ventricular size and function.   8. Mildly dilated right atrium.   9. There is no evidence of pericardial effusion.  10. Mild mitral annular calcification.  11. Mild mitral valve regurgitation.  12. Thickening and calcification of the anterior and posterior mitral   valve leaflets.  13. Mild aortic regurgitation.  14. Sclerotic aortic valve with normal opening.  15. Mildly enlarged left atrium.  16. There is mild aortic root calcification.     Vikas Hopkins MD FAC, SKYLARFACP  Electronically signed on 2017 at 1:14:59 PM             < from: CT Abdomen and Pelvis w/ IV Cont (18 @ 02:58) >    EXAM:  CT ABDOMEN AND PELVIS IC        PROCEDURE DATE:  2018         INTERPRETATION:  CLINICAL INFORMATION: Nausea and vomiting with   epigastric tenderness    COMPARISON: CT of the abdomen and pelvis dated 2017    PROCEDURE:   CT of theAbdomen and Pelvis was performed with intravenous contrast.   Intravenous contrast: 90 ml Omnipaque 350. 10 ml discarded.  Oral contrast: None.  Sagittal and coronal reformats were performed.    FINDINGS:    LOWER CHEST: Coronary artery calcifications.    LIVER: Scattered hypodensities which are too small to characterize.  BILE DUCTS: Mild intrahepatic and extrahepatic biliary ductal dilatation.  GALLBLADDER: Distended.  SPLEEN: Within normal limits.  PANCREAS: Prominent pancreatic duct measuring5 mm, overall stable from   2017.  ADRENALS: Thickening of the left adrenal gland.  KIDNEYS/URETERS: A right-sided double-J ureteral stent is again noted,   unchanged from . Unchanged mild to moderate right-sided   hydroureteronephrosis. Bilateral renal cysts.    BLADDER: Within normal limits.  REPRODUCTIVE ORGANS: Calcified and noncalcified uterine fibroids. A   vaginal pessary is noted.    BOWEL: Diffusely dilated small bowel with a transition to collapsed loops   in a right inguinal hernia. The large bowel is collapsed.  PERITONEUM: No ascites.  catheter terminating in the right lower   quadrant.  VESSELS:  Atherosclerotic calcifications.  RETROPERITONEUM: No lymphadenopathy.    ABDOMINAL WALL: Left chest and abdominal wall  shunt catheter.  BONES: Advanced degenerative changes of the visualized lumbosacral spine.   Chronic fracture deformity of right superior pubic ramus.    IMPRESSION:     Small bowel obstruction with a transition to collapsed loops in a right   inguinal hernia. Small volume interloop edema.    Right-sided double-J ureteral stent with mild to moderate right   hydroureteronephrosis. This is not significantly changed from the prior   study dated 2017.    Findings were discussed with Dr. Cindy Miller at approximately   4:02 AM dated 2018 with read back.      INDIA MILLER M.D., RADIOLOGY RESIDENT  This document has been electronically signed.  DALE KNUTSON M.D., ATTENDING RADIOLOGIST  This document has been electronicallysigned. 2018  4:05AM                  < end of copied text >

## 2018-07-03 NOTE — PROGRESS NOTE ADULT - ASSESSMENT
POD #5 ex-lap, SBR, hernia repair after being admitted for SBO  Clinically stable; no current cardiovascular complaints  BPs better controlled on oral regimen which has been resumed  Pain control  No further cardiac testing needed at this time.  Continuing with IV abx (on Zosyn) for now.  Will follow with you.

## 2018-07-03 NOTE — PROGRESS NOTE ADULT - ASSESSMENT
87y Female s/p ex lap, SBR, inguinal hernia repair now POD 5. On clear liquids. Passing flatus.    Plan:  - advance to regular diet and follow  - Pain control  - hua in, uro consulted  - Incentive spirometer/OOB/Ambulate/PT  - c/w vte ppx      Surgery, B-Team  Pager: [phone]

## 2018-07-04 LAB
BACTERIA UR CULT: SIGNIFICANT CHANGE UP
BUN SERPL-MCNC: 11 MG/DL — SIGNIFICANT CHANGE UP (ref 7–23)
C DIFF TOX GENS STL QL NAA+PROBE: SIGNIFICANT CHANGE UP
CALCIUM SERPL-MCNC: 8.4 MG/DL — SIGNIFICANT CHANGE UP (ref 8.4–10.5)
CHLORIDE SERPL-SCNC: 105 MMOL/L — SIGNIFICANT CHANGE UP (ref 98–107)
CO2 SERPL-SCNC: 23 MMOL/L — SIGNIFICANT CHANGE UP (ref 22–31)
CREAT SERPL-MCNC: 0.82 MG/DL — SIGNIFICANT CHANGE UP (ref 0.5–1.3)
GLUCOSE SERPL-MCNC: 88 MG/DL — SIGNIFICANT CHANGE UP (ref 70–99)
MAGNESIUM SERPL-MCNC: 2 MG/DL — SIGNIFICANT CHANGE UP (ref 1.6–2.6)
PHOSPHATE SERPL-MCNC: 3.1 MG/DL — SIGNIFICANT CHANGE UP (ref 2.5–4.5)
POTASSIUM SERPL-MCNC: 4.4 MMOL/L — SIGNIFICANT CHANGE UP (ref 3.5–5.3)
POTASSIUM SERPL-SCNC: 4.4 MMOL/L — SIGNIFICANT CHANGE UP (ref 3.5–5.3)
SODIUM SERPL-SCNC: 139 MMOL/L — SIGNIFICANT CHANGE UP (ref 135–145)
SPECIMEN SOURCE: SIGNIFICANT CHANGE UP

## 2018-07-04 PROCEDURE — 99222 1ST HOSP IP/OBS MODERATE 55: CPT | Mod: GC

## 2018-07-04 PROCEDURE — 93010 ELECTROCARDIOGRAM REPORT: CPT

## 2018-07-04 RX ADMIN — LEVETIRACETAM 1000 MILLIGRAM(S): 250 TABLET, FILM COATED ORAL at 05:12

## 2018-07-04 RX ADMIN — PIPERACILLIN AND TAZOBACTAM 25 GRAM(S): 4; .5 INJECTION, POWDER, LYOPHILIZED, FOR SOLUTION INTRAVENOUS at 14:55

## 2018-07-04 RX ADMIN — CARVEDILOL PHOSPHATE 12.5 MILLIGRAM(S): 80 CAPSULE, EXTENDED RELEASE ORAL at 05:12

## 2018-07-04 RX ADMIN — CARVEDILOL PHOSPHATE 12.5 MILLIGRAM(S): 80 CAPSULE, EXTENDED RELEASE ORAL at 18:17

## 2018-07-04 RX ADMIN — AMLODIPINE BESYLATE 5 MILLIGRAM(S): 2.5 TABLET ORAL at 05:12

## 2018-07-04 RX ADMIN — Medication 1 DROP(S): at 21:49

## 2018-07-04 RX ADMIN — LATANOPROST 1 DROP(S): 0.05 SOLUTION/ DROPS OPHTHALMIC; TOPICAL at 21:49

## 2018-07-04 RX ADMIN — ATORVASTATIN CALCIUM 40 MILLIGRAM(S): 80 TABLET, FILM COATED ORAL at 21:49

## 2018-07-04 RX ADMIN — ENOXAPARIN SODIUM 40 MILLIGRAM(S): 100 INJECTION SUBCUTANEOUS at 11:37

## 2018-07-04 RX ADMIN — DONEPEZIL HYDROCHLORIDE 5 MILLIGRAM(S): 10 TABLET, FILM COATED ORAL at 21:49

## 2018-07-04 RX ADMIN — PIPERACILLIN AND TAZOBACTAM 25 GRAM(S): 4; .5 INJECTION, POWDER, LYOPHILIZED, FOR SOLUTION INTRAVENOUS at 05:12

## 2018-07-04 RX ADMIN — LEVETIRACETAM 1000 MILLIGRAM(S): 250 TABLET, FILM COATED ORAL at 18:17

## 2018-07-04 RX ADMIN — LOSARTAN POTASSIUM 50 MILLIGRAM(S): 100 TABLET, FILM COATED ORAL at 05:12

## 2018-07-04 RX ADMIN — PIPERACILLIN AND TAZOBACTAM 25 GRAM(S): 4; .5 INJECTION, POWDER, LYOPHILIZED, FOR SOLUTION INTRAVENOUS at 21:52

## 2018-07-04 NOTE — PROGRESS NOTE ADULT - ASSESSMENT
87y Female s/p ex lap, SBR, inguinal hernia repair now POD 5. On regular liquids. Passing flatus and now with multiple episodes of diarrhea.    Plan:  - regular diet and follow  - Pain control  - f/u GI PCR and C diff toxin PCR, per ID  - f/u ekg, Can start levaquin (per ID) pending QT. Per ID will need 10 days of abx  - hua in, uro consulted  - Incentive spirometer/OOB/Ambulate/PT  - c/w vte ppx      Surgery, B-Team  Pager: [phone]

## 2018-07-04 NOTE — CONSULT NOTE ADULT - ASSESSMENT
86 y/o female with PMHx Seizure disorder, HTN, CVA, GI bleed, CHF, UTIs, HLD, syncope, pacemaker,  shunt, dementia, presents to the ED 06/28 from [facility] rehab with nausea, vomiting, weakness, and dizziness for 1-2 weeks.  Found to have SBO with transition to collapsed loops in right inguinal hernia, s/p exp lap, smallbowel resection and right inguinal hernia repair POD# 6  She also had enterobacter cloacae bacteremia 3/4 bottles and in urine 06/28, cleared 07/01 likely secondary to urine. On chart review she has had  enterobacter cloacae/absuriae UTI and VRE UTI, microccus bacteremia in the past as wel. She has a Right sided stent-which is present at least since last year    On Zosyn day # 6  Will need at least 10 days of Abx  Would avoid carbapenems given seizure history  Can change to levaquin but Qtc is 488ms   Monitor for diarrhea if >3 watery stools per day would check C diff and GI PCR  Urology followup for stent exchange/removal given recurrent UTI's  pending discussion 88 y/o female with PMHx Seizure disorder, HTN, CVA, GI bleed, CHF, UTIs, HLD, syncope, pacemaker,  shunt, dementia, presents to the ED 06/28 from [facility] rehab with nausea, vomiting, weakness, and dizziness for 1-2 weeks.  Found to have SBO with transition to collapsed loops in right inguinal hernia, s/p exp lap, smallbowel resection and right inguinal hernia repair POD# 6  She also had enterobacter cloacae bacteremia 3/4 bottles and in urine 06/28, cleared 07/01 likely secondary to urine. On chart review she has had  enterobacter cloacae/absuriae UTI and VRE UTI, microccus bacteremia in the past as wel. She has a Right sided stent-which is present at least since last year    On Zosyn day # 6- can continue for 4 more days  Will need at least 10 days of Abx  Would avoid carbapenems given seizure history  Would avoid quinolones as Qtc is 488ms   Monitor for diarrhea if >3 watery stools per day would check C diff and GI PCR  Needs urology followup for stent exchange/removal given recurrent UTI's

## 2018-07-04 NOTE — CONSULT NOTE ADULT - SUBJECTIVE AND OBJECTIVE BOX
HPI:  88 y/o female with PMHx Seizure disorder, HTN, CVA, GI bleed, CHF, UTIs, HLD, syncope, pacemaker,  shunt, dementia, presents to the ED from [facility] rehab with nausea, vomiting, weakness, and dizziness for 1-2 weeks. Her urine was checked today and she had a positive UTI, has not received antibiotics yet. Patient unable to give good story, AOx1. Family notes patient hasn't been eating well for 1-2 weeks, vomited twice today, and has been complaining of intermittent nausea and dizziness for 1-2 weeks (2018 08:43)    Id note-above reviewed. Patient is axox2, not oriented to time but she knows its the summer. She knows she was having vomiting and was "having memory problems" prior to coming to hospital. She know she underwent surgery. Right now having abdominal pain and diarrhea since last night. Denies urinary symptoms, denies recurrent UTI in the past. Is aware she has a stent in her kidney but unsure why she needed it, does not recall stones and does not recall when it was placed but knows "its been in there a long time".    PAST MEDICAL & SURGICAL HISTORY:  Pacemaker  History of CVA (cerebrovascular accident)  GI bleed  Pulmonary hypertension  Seizure  CHF (congestive heart failure)  High cholesterol  HTN (hypertension)  Seizure  Pulmonary hypertension  Memory Loss; chronic "past few years"  Hydronephrosis; Right kidney  Afib  Hypercholesteremia  HTN (Hypertension)  Absence Seizure  CVA (Cerebral Infarction)  CHF (Congestive Heart Failure)  S/P  shunt  Ureteral Obstruction; right kidney; stent insertion 12/10  S/P Brain Surgery; for "brain cyst   few years ago"  S/P  Shunt  Pacemaker  Pacemaker  Infection of  Shunt  Infection of  Shunt  History of Stroke  Seizure  HTN (Hypertension)      Allergies  No Known Allergies        ANTIMICROBIALS:  piperacillin/tazobactam IVPB. 3.375 every 8 hours      OTHER MEDS: MEDICATIONS  (STANDING):  acetaminophen   Tablet. 325 every 4 hours PRN  amLODIPine   Tablet 5 daily  atorvastatin 40 at bedtime  carvedilol 12.5 every 12 hours  donepezil 5 at bedtime  enoxaparin Injectable 40 daily  levETIRAcetam 1000 two times a day  losartan 50 daily      SOCIAL HISTORY:  [ ] etoh [ ] tobacco [x ] former smoker [ ] IVDU    FAMILY HISTORY:  No pertinent family history in first degree relatives  No pertinent family history in first degree relatives      REVIEW OF SYSTEMS  [  ] ROS unobtainable because:    [x  ] All other systems negative except as noted below:	    Constitutional:  [ ] fever [ x] chills  [ ] weight loss  [ ] weakness  Skin:  [ ] rash [ ] phlebitis	  Eyes: [ ] icterus [ ] pain  [ ] discharge	  ENMT: [ ] sore throat  [ ] thrush [ ] ulcers [ ] exudates  Respiratory: [ ] dyspnea [ ] hemoptysis [ ] cough [ ] sputum	  Cardiovascular:  [ ] chest pain [ ] palpitations [ ] edema	  Gastrointestinal:  [ ] nausea [ ] vomiting [ x] diarrhea [ ] constipation [x ] pain	  Genitourinary:  [ ] dysuria [ ] frequency [ ] hematuria [ ] discharge [ ] flank pain  [ ] incontinence  Musculoskeletal:  [ ] myalgias [ ] arthralgias [ ] arthritis  [ ] back pain  Neurological:  [ ] headache [ ] seizures  [ ] confusion/altered mental status  Psychiatric:  [ ] anxiety [ ] depression	  Hematology/Lymphatics:  [ ] lymphadenopathy  Endocrine:  [ ] adrenal [ ] thyroid  Allergic/Immunologic:	 [ ] transplant [ ] seasonal    Vital Signs Last 24 Hrs  T(F): 98.3 (18 @ 05:11), Max: 100.9 (18 @ 22:11)    Vital Signs Last 24 Hrs  HR: 74 (18 @ 05:11) (68 - 89)  BP: 151/79 (18 @ 05:11) (116/68 - 151/79)  RR: 18 (18 @ 05:11)  SpO2: 97% (18 @ 05:11) (97% - 100%)  Wt(kg): --    PHYSICAL EXAM:  General: non-toxic a xox 2  HEAD/EYES: anicteric, PERRL  ENT:  supple  Cardiovascular:   S1, S2 left chest PPm intact  Respiratory:  clear bilaterally  GI:  soft, tender incision sites, midline and RLQ staples clean no drainage, normal bowel sounds  :  no CVA tenderness +hua  Musculoskeletal:  no synovitis  Neurologic:  grossly non-focal  Skin:  no rash  Lymph: no lymphadenopathy  Psychiatric:  appropriate affect  Vascular:  no phlebitis                                8.7    4.75  )-----------( 278      ( 2018 05:33 )             27.8       -    139  |  105  |  11  ----------------------------<  88  4.4   |  23  |  0.82    Ca    8.4      2018 06:00  Phos  3.1     07-  Mg     2.0     07-04        Urinalysis Basic - ( 2018 09:25 )    Color: BROWN / Appearance: TURBID / S.021 / pH: 6.0  Gluc: NEGATIVE / Ketone: NEGATIVE  / Bili: NEGATIVE / Urobili: NORMAL mg/dL   Blood: LARGE / Protein: 150 mg/dL / Nitrite: NEGATIVE   Leuk Esterase: LARGE / RBC: >50 / WBC >50   Sq Epi: x / Non Sq Epi: x / Bacteria: x        MICROBIOLOGY:  URINE CATHETER  18 --  --  --      BLOOD PERIPHERAL  18 --  --  --      BLOOD PERIPHERAL  18 --  --  BLOOD CULTURE PCR  Enterobacter cloacae    Culture - Blood (18 @ 23:50)    -  Enterobacter cloacae complex: + DETECT RADHA    Culture - Blood:   ***Blood Panel PCR results on this specimen are available  approximately 3 hours after the Gram stain result***  Gram stain, PCR, and/or culture results may not always  correspond due to difference in methodologies  ------------------------------------------------------------  This PCR assay was performed using [employer].  The  following targets are tested for:  Enterococcus, vancomycin  resistant enterococci, Listeria monocytogenes,  coagulase  negative staphylococci, S. aureus, methicillin resistant S.  aureus, Streptococcus agalactiae (Group B), S. pneumoniae,  S. pyogenes (Group A), Acinetobacter baumannii, Enterobacter  cloacae, E. coli, Klebsiella oxytoca, K. pneumoniae, Proteus  sp., Serratia marcescens, Haemophilus influenzae, Neisseria  meningitidis, Pseudomonas aeruginosa, Candida albicans, C.  glabrata, C. krusei, C. parapsilosis, C. tropicalis and the  KPC resistance gene.  **NOTE: Due to technical problems, Proteus sp. will NOT be  reported as part of the BCID paneluntil further notice.    -  Levofloxacin: S <=1 RADHA    -  Tigecycline: S 2 RADHA    -  Piperacillin/Tazobactam: S <=8 RADHA    -  Trimethoprim/Sulfamethoxazole: S <=0.5/9.5 RADHA    -  Tobramycin: S <=2 RADHA    -  Ciprofloxacin: S <=0.5 RADHA    -  Ertapenem: S <=0.5 RADHA    -  Ceftriaxone: R    -  Ceftazidime: S 4 RADHA    -  Meropenem: S <=1 RADHA    -  Imipenem: S <=1 RADHA    -  Gentamicin: S <=1 RADHA    -  Ampicillin/Sulbactam: R 16/8 RADHA    -  Ampicillin: R    -  Amikacin: S <=8 RADHA    -  Cefoxitin: R >16 RADHA    -  Cefepime: S <=2 RADHA    -  Cefazolin: R >16 RADHA    -  Aztreonam: S <=4 RADHA    Specimen Source: BLOOD VENOUS    Organism: BLOOD CULTURE PCR    Organism: Enterobacter cloacae    Gram Stain Blood:   ***** CRITICAL RESULT *****  PERSON CALLED / READ-BACK: DR VILLALOBOS / Y  DATE / TIME CALLED: 18 1510  CALLED BY: JIGNESH DAVE^Gram Neg Rods  AFTER: 12 HOURS INCUBATION  BOTTLE: ANAEROBIC BOTTLE    Organism Identification: BLOOD CULTURE PCR  Enterobacter cloacae    Method Type: PCR    Method Type: NEGATIVE RADHA 43      URINE MIDSTREAM  18 --  --  Enterobacter cloacae              v            RADIOLOGY:< from: Xray Chest 1 View- PORTABLE-Routine (18 @ 07:56) >  IMPRESSION:  No focal lung consolidation.    Mild accentuation of right-sided pulmonary vascular markings may be due   to mild asymmetric pulmonary vascular congestion.    < end of copied text >    < from: Xray Abdomen 1 View PORTABLE -Urgent (18 @ 12:02) >  IMPRESSION:     Nonobstructive bowel gas pattern.    < end of copied text >    < from: CT Head No Cont (18 @ 02:58) >  IMPRESSION:  Stable ventricular system size and morphology.  Moderate to severe severity microvascular ischemic change.  No intracranial midline shift or extra-axial fluid collection.    < end of copied text >    < from: CT Abdomen and Pelvis w/ IV Cont (18 @ 02:58) >  IMPRESSION:     Small bowel obstruction with a transition to collapsed loops in a right   inguinal hernia. Small volume interloop edema.    Right-sided double-J ureteral stent with mild to moderate right   hydroureteronephrosis. This is not significantly changed from the prior   study dated 2017.    Findings were discussed with Dr. Cindy Miller at approximately   4:02 AM dated 2018 with read back.    < end of copied text >

## 2018-07-04 NOTE — PROGRESS NOTE ADULT - SUBJECTIVE AND OBJECTIVE BOX
GENERAL SURGERY DAILY PROGRESS NOTE:     Subjective:  Pt seen and examined during morning rounds. Pt w/ good pain control. Tolerating regular diet, denies n/v. +flatus/+bm. Pt w/ multiple episodes of diarrhea this am, per ID will send Cdiff toxin and GI PCR.       Objective:    NAD, awake and alert  Respirations nonlabored  Abdomen soft, appropriately tender, nondistended, incisions c/d/i  No guarding or rebound tenderness        MEDICATIONS  (STANDING):  amLODIPine   Tablet 5 milliGRAM(s) Oral daily  atorvastatin 40 milliGRAM(s) Oral at bedtime  carvedilol 12.5 milliGRAM(s) Oral every 12 hours  donepezil 5 milliGRAM(s) Oral at bedtime  enoxaparin Injectable 40 milliGRAM(s) SubCutaneous daily  latanoprost 0.005% Ophthalmic Solution 1 Drop(s) Both EYES at bedtime  levETIRAcetam 1000 milliGRAM(s) Oral two times a day  losartan 50 milliGRAM(s) Oral daily  piperacillin/tazobactam IVPB. 3.375 Gram(s) IV Intermittent every 8 hours  timolol 0.5% Solution 1 Drop(s) Both EYES daily    MEDICATIONS  (PRN):  acetaminophen   Tablet. 325 milliGRAM(s) Oral every 4 hours PRN Mild Pain (1 - 3)      Vital Signs Last 24 Hrs  T(C): 36.7 (04 Jul 2018 10:00), Max: 36.9 (03 Jul 2018 21:33)  T(F): 98.1 (04 Jul 2018 10:00), Max: 98.5 (03 Jul 2018 21:33)  HR: 68 (04 Jul 2018 10:00) (68 - 89)  BP: 137/86 (04 Jul 2018 10:00) (116/68 - 151/79)  BP(mean): --  RR: 18 (04 Jul 2018 10:00) (17 - 18)  SpO2: 100% (04 Jul 2018 10:00) (97% - 100%)    I&O's Detail    03 Jul 2018 07:01  -  04 Jul 2018 07:00  --------------------------------------------------------  IN:  Total IN: 0 mL    OUT:    Indwelling Catheter - Urethral: 1130 mL  Total OUT: 1130 mL    Total NET: -1130 mL      04 Jul 2018 07:01  -  04 Jul 2018 13:41  --------------------------------------------------------  IN:  Total IN: 0 mL    OUT:    Indwelling Catheter - Urethral: 1850 mL  Total OUT: 1850 mL    Total NET: -1850 mL          Daily     Daily     LABS:                        8.7    4.75  )-----------( 278      ( 03 Jul 2018 05:33 )             27.8     07-04    139  |  105  |  11  ----------------------------<  88  4.4   |  23  |  0.82    Ca    8.4      04 Jul 2018 06:00  Phos  3.1     07-04  Mg     2.0     07-04      < from: Xray Chest 1 View- PORTABLE-Routine (07.03.18 @ 07:56) >  IMPRESSION:  No focal lung consolidation.    Mild accentuation of right-sided pulmonary vascular markings may be due   to mild asymmetric pulmonary vascular congestion.    Ucx - negative  Blood Cx 7/1 - negative

## 2018-07-04 NOTE — CONSULT NOTE ADULT - ATTENDING COMMENTS
87F with enterobacter UTI/bacteremia, s/p hernia repair  -cont zosyn 3.375 gm iv q8  -on Day 7 of abx   -10-14 days abx total  - eval for stent - ?needs change?    Shaq Mancilla  Attending Physician   Division of Infectious Disease  Pager #[phone]  After 5pm/weekend or no response, call #[phone]

## 2018-07-05 ENCOUNTER — TRANSCRIPTION ENCOUNTER (OUTPATIENT)
Age: 83
End: 2018-07-05

## 2018-07-05 DIAGNOSIS — N39.0 URINARY TRACT INFECTION, SITE NOT SPECIFIED: ICD-10-CM

## 2018-07-05 DIAGNOSIS — R78.81 BACTEREMIA: ICD-10-CM

## 2018-07-05 LAB
BUN SERPL-MCNC: 8 MG/DL — SIGNIFICANT CHANGE UP (ref 7–23)
CALCIUM SERPL-MCNC: 8.4 MG/DL — SIGNIFICANT CHANGE UP (ref 8.4–10.5)
CHLORIDE SERPL-SCNC: 101 MMOL/L — SIGNIFICANT CHANGE UP (ref 98–107)
CO2 SERPL-SCNC: 25 MMOL/L — SIGNIFICANT CHANGE UP (ref 22–31)
CREAT SERPL-MCNC: 0.75 MG/DL — SIGNIFICANT CHANGE UP (ref 0.5–1.3)
GLUCOSE SERPL-MCNC: 91 MG/DL — SIGNIFICANT CHANGE UP (ref 70–99)
HCT VFR BLD CALC: 30.6 % — LOW (ref 34.5–45)
HGB BLD-MCNC: 10 G/DL — LOW (ref 11.5–15.5)
MAGNESIUM SERPL-MCNC: 1.7 MG/DL — SIGNIFICANT CHANGE UP (ref 1.6–2.6)
MCHC RBC-ENTMCNC: 29.4 PG — SIGNIFICANT CHANGE UP (ref 27–34)
MCHC RBC-ENTMCNC: 32.7 % — SIGNIFICANT CHANGE UP (ref 32–36)
MCV RBC AUTO: 90 FL — SIGNIFICANT CHANGE UP (ref 80–100)
NRBC # FLD: 0 — SIGNIFICANT CHANGE UP
PHOSPHATE SERPL-MCNC: 2.7 MG/DL — SIGNIFICANT CHANGE UP (ref 2.5–4.5)
PLATELET # BLD AUTO: 358 K/UL — SIGNIFICANT CHANGE UP (ref 150–400)
PMV BLD: 10 FL — SIGNIFICANT CHANGE UP (ref 7–13)
POTASSIUM SERPL-MCNC: 3.4 MMOL/L — LOW (ref 3.5–5.3)
POTASSIUM SERPL-SCNC: 3.4 MMOL/L — LOW (ref 3.5–5.3)
RBC # BLD: 3.4 M/UL — LOW (ref 3.8–5.2)
RBC # FLD: 13 % — SIGNIFICANT CHANGE UP (ref 10.3–14.5)
SODIUM SERPL-SCNC: 139 MMOL/L — SIGNIFICANT CHANGE UP (ref 135–145)
WBC # BLD: 5.54 K/UL — SIGNIFICANT CHANGE UP (ref 3.8–10.5)
WBC # FLD AUTO: 5.54 K/UL — SIGNIFICANT CHANGE UP (ref 3.8–10.5)

## 2018-07-05 PROCEDURE — 93010 ELECTROCARDIOGRAM REPORT: CPT

## 2018-07-05 PROCEDURE — 99232 SBSQ HOSP IP/OBS MODERATE 35: CPT

## 2018-07-05 RX ORDER — POTASSIUM CHLORIDE 20 MEQ
40 PACKET (EA) ORAL ONCE
Qty: 0 | Refills: 0 | Status: COMPLETED | OUTPATIENT
Start: 2018-07-05 | End: 2018-07-05

## 2018-07-05 RX ORDER — ENOXAPARIN SODIUM 100 MG/ML
40 INJECTION SUBCUTANEOUS DAILY
Qty: 0 | Refills: 0 | Status: DISCONTINUED | OUTPATIENT
Start: 2018-07-05 | End: 2018-07-09

## 2018-07-05 RX ORDER — POTASSIUM PHOSPHATE, MONOBASIC POTASSIUM PHOSPHATE, DIBASIC 236; 224 MG/ML; MG/ML
15 INJECTION, SOLUTION INTRAVENOUS ONCE
Qty: 0 | Refills: 0 | Status: COMPLETED | OUTPATIENT
Start: 2018-07-05 | End: 2018-07-05

## 2018-07-05 RX ORDER — POTASSIUM CHLORIDE 20 MEQ
10 PACKET (EA) ORAL
Qty: 0 | Refills: 0 | Status: DISCONTINUED | OUTPATIENT
Start: 2018-07-05 | End: 2018-07-05

## 2018-07-05 RX ADMIN — PIPERACILLIN AND TAZOBACTAM 25 GRAM(S): 4; .5 INJECTION, POWDER, LYOPHILIZED, FOR SOLUTION INTRAVENOUS at 14:46

## 2018-07-05 RX ADMIN — ENOXAPARIN SODIUM 40 MILLIGRAM(S): 100 INJECTION SUBCUTANEOUS at 11:25

## 2018-07-05 RX ADMIN — LEVETIRACETAM 1000 MILLIGRAM(S): 250 TABLET, FILM COATED ORAL at 06:22

## 2018-07-05 RX ADMIN — DONEPEZIL HYDROCHLORIDE 5 MILLIGRAM(S): 10 TABLET, FILM COATED ORAL at 22:40

## 2018-07-05 RX ADMIN — POTASSIUM PHOSPHATE, MONOBASIC POTASSIUM PHOSPHATE, DIBASIC 62.5 MILLIMOLE(S): 236; 224 INJECTION, SOLUTION INTRAVENOUS at 11:25

## 2018-07-05 RX ADMIN — CARVEDILOL PHOSPHATE 12.5 MILLIGRAM(S): 80 CAPSULE, EXTENDED RELEASE ORAL at 06:23

## 2018-07-05 RX ADMIN — PIPERACILLIN AND TAZOBACTAM 25 GRAM(S): 4; .5 INJECTION, POWDER, LYOPHILIZED, FOR SOLUTION INTRAVENOUS at 06:22

## 2018-07-05 RX ADMIN — CARVEDILOL PHOSPHATE 12.5 MILLIGRAM(S): 80 CAPSULE, EXTENDED RELEASE ORAL at 18:33

## 2018-07-05 RX ADMIN — Medication 40 MILLIEQUIVALENT(S): at 11:25

## 2018-07-05 RX ADMIN — Medication 1 DROP(S): at 22:40

## 2018-07-05 RX ADMIN — LOSARTAN POTASSIUM 50 MILLIGRAM(S): 100 TABLET, FILM COATED ORAL at 06:22

## 2018-07-05 RX ADMIN — AMLODIPINE BESYLATE 5 MILLIGRAM(S): 2.5 TABLET ORAL at 06:23

## 2018-07-05 RX ADMIN — LEVETIRACETAM 1000 MILLIGRAM(S): 250 TABLET, FILM COATED ORAL at 18:33

## 2018-07-05 RX ADMIN — ATORVASTATIN CALCIUM 40 MILLIGRAM(S): 80 TABLET, FILM COATED ORAL at 22:40

## 2018-07-05 RX ADMIN — PIPERACILLIN AND TAZOBACTAM 25 GRAM(S): 4; .5 INJECTION, POWDER, LYOPHILIZED, FOR SOLUTION INTRAVENOUS at 22:40

## 2018-07-05 RX ADMIN — LATANOPROST 1 DROP(S): 0.05 SOLUTION/ DROPS OPHTHALMIC; TOPICAL at 22:40

## 2018-07-05 NOTE — PROGRESS NOTE ADULT - SUBJECTIVE AND OBJECTIVE BOX
GENERAL SURGERY DAILY PROGRESS NOTE:     Subjective:  Patient seen and examined on morning rounds. Patient with adequate pain control. No nausea / vomiting. +flatus. Tolerating diet.     Patient denies CP or SOB.     Objective:    PHYSICAL EXAM:  NAD, awake and alert  Respirations nonlabored  Abdomen soft, mildly tender, nondistended  No guarding or rebound tenderness     MEDICATIONS  (STANDING):  amLODIPine   Tablet 5 milliGRAM(s) Oral daily  atorvastatin 40 milliGRAM(s) Oral at bedtime  carvedilol 12.5 milliGRAM(s) Oral every 12 hours  donepezil 5 milliGRAM(s) Oral at bedtime  enoxaparin Injectable 40 milliGRAM(s) SubCutaneous daily  latanoprost 0.005% Ophthalmic Solution 1 Drop(s) Both EYES at bedtime  levETIRAcetam 1000 milliGRAM(s) Oral two times a day  losartan 50 milliGRAM(s) Oral daily  piperacillin/tazobactam IVPB. 3.375 Gram(s) IV Intermittent every 8 hours  timolol 0.5% Solution 1 Drop(s) Both EYES daily    MEDICATIONS  (PRN):  acetaminophen   Tablet. 325 milliGRAM(s) Oral every 4 hours PRN Mild Pain (1 - 3)      Vital Signs Last 24 Hrs  T(C): 37.2 (05 Jul 2018 06:18), Max: 37.4 (05 Jul 2018 02:00)  T(F): 98.9 (05 Jul 2018 06:18), Max: 99.3 (05 Jul 2018 02:00)  HR: 73 (05 Jul 2018 06:18) (68 - 78)  BP: 158/91 (05 Jul 2018 06:18) (142/73 - 158/91)  BP(mean): --  RR: 16 (05 Jul 2018 06:18) (16 - 18)  SpO2: 98% (05 Jul 2018 06:18) (98% - 100%)    I&O's Detail    04 Jul 2018 07:01  -  05 Jul 2018 07:00  --------------------------------------------------------  IN:    IV PiggyBack: 100 mL    Oral Fluid: 750 mL  Total IN: 850 mL    OUT:    Indwelling Catheter - Urethral: 4200 mL  Total OUT: 4200 mL    Total NET: -3350 mL       Daily     LABS:                        10.0   5.54  )-----------( 358      ( 05 Jul 2018 06:20 )             30.6     07-05    139  |  101  |  8   ----------------------------<  91  3.4<L>   |  25  |  0.75    Ca    8.4      05 Jul 2018 06:20  Phos  2.7     07-05  Mg     1.7     07-05      RADIOLOGY & ADDITIONAL STUDIES:    No new radiograph imaging for review.     Abnormal EKG reviewed in patient chart.

## 2018-07-05 NOTE — DISCHARGE NOTE ADULT - PATIENT PORTAL LINK FT
You can access the [facility] Patient Portal, offered by [facility], by registering with the following website: [email or web address]

## 2018-07-05 NOTE — DISCHARGE NOTE ADULT - CARE PLAN
Principal Discharge DX:	Incarcerated hernia  Goal:	wound healing  Assessment and plan of treatment:	WOUND CARE: Keep incisions clean and dry.  Do not rub or scrub yoru incision or use lotions or powders on incision.   BATHING: Please do not submerge wound underwater. You may shower and/or sponge bathe. It is OK to wash drain wound site.  ACTIVITY: No heavy lifting or straining. Otherwise, you may return to your usual level of physical activty. If you are taking narcotic pain medication (such as Percocet) DO NOT drive a car, operate machinery or make important decisions.  DIET: Return to your usual diet.  NOTIFY YOUR SURGEON IF: You have any bleeding that does not stop, any pus draining from your wound(s), any fever (over 100.4 F) or chills, persistent nausea/vomiting, persistent diarrhea, or if your pain is not controlled on your discharge pain medications.  FOLLOW-UP: Please follow up with your primary care physician in one week regarding your hospitalization.  Please follow up with Dr. Montgomery in one week.  Call to schedule an appointment.  Secondary Diagnosis:	Bacteremia due to Gram-negative bacteria  Assessment and plan of treatment:	Complete antibiotic course as prescribed.  Please follow up with your primary care physician regarding your hospitalization  Secondary Diagnosis:	CHF (congestive heart failure)  Assessment and plan of treatment:	Please follow up with your primary care physician and cardiologist regarding your hospitalization.  Please follow up with your cardiologist regarding when to repeat your echocardiogram.  Secondary Diagnosis:	HTN (hypertension)  Assessment and plan of treatment:	Please follow up with your primary care physician regarding your hospitalization Principal Discharge DX:	Incarcerated hernia  Goal:	wound healing  Assessment and plan of treatment:	WOUND CARE: Keep incisions clean and dry.  Do not rub or scrub yoru incision or use lotions or powders on incision.   BATHING: Please do not submerge wound underwater. You may shower and/or sponge bathe. It is OK to wash drain wound site.  ACTIVITY: No heavy lifting or straining. Otherwise, you may return to your usual level of physical activty. If you are taking narcotic pain medication (such as Percocet) DO NOT drive a car, operate machinery or make important decisions.  DIET: Return to your usual diet.  NOTIFY YOUR SURGEON IF: You have any bleeding that does not stop, any pus draining from your wound(s), any fever (over 100.4 F) or chills, persistent nausea/vomiting, persistent diarrhea, or if your pain is not controlled on your discharge pain medications.  FOLLOW-UP: Please follow up with your primary care physician in one week regarding your hospitalization.  Please follow up with Dr. Montgomery in one week.  Call to schedule an appointment.  Secondary Diagnosis:	Bacteremia due to Gram-negative bacteria  Assessment and plan of treatment:	Complete antibiotic course as prescribed.  Please follow up with your primary care physician regarding your hospitalization  Secondary Diagnosis:	CHF (congestive heart failure)  Assessment and plan of treatment:	Please follow up with your primary care physician and cardiologist regarding your hospitalization.  Please follow up with your cardiologist regarding when to repeat your echocardiogram.  Secondary Diagnosis:	HTN (hypertension)  Assessment and plan of treatment:	Please follow up with your primary care physician regarding your hospitalization  Secondary Diagnosis:	Urinary retention  Assessment and plan of treatment:	Keep hua and follow up with urology at the [facility] at [address] [place] [id number].  Call [phone] to schedule an appointment. Principal Discharge DX:	Incarcerated hernia  Goal:	wound healing  Assessment and plan of treatment:	WOUND CARE: Keep incisions clean and dry.  Do not rub or scrub your incision or use lotions or powders on incision.   BATHING: Please do not submerge wound underwater. You may shower and/or sponge bathe. It is OK to wash drain wound site.  ACTIVITY: No heavy lifting or straining. Otherwise, you may return to your usual level of physical activty. If you are taking narcotic pain medication (such as Percocet) DO NOT drive a car, operate machinery or make important decisions.  DIET: Return to your usual diet.  NOTIFY YOUR SURGEON IF: You have any bleeding that does not stop, any pus draining from your wound(s), any fever (over 100.4 F) or chills, persistent nausea/vomiting, persistent diarrhea, or if your pain is not controlled on your discharge pain medications.  FOLLOW-UP: Please follow up with your primary care physician in one week regarding your hospitalization.  Please follow up with Dr. Montgomery in one week.  Call to schedule an appointment.  Secondary Diagnosis:	Bacteremia due to Gram-negative bacteria  Assessment and plan of treatment:	Complete antibiotic course as prescribed.  Please follow up with your primary care physician regarding your hospitalization  Secondary Diagnosis:	CHF (congestive heart failure)  Assessment and plan of treatment:	Please follow up with your primary care physician and cardiologist regarding your hospitalization.  Please follow up with your cardiologist regarding when to repeat your echocardiogram.  Secondary Diagnosis:	HTN (hypertension)  Assessment and plan of treatment:	Please follow up with your primary care physician regarding your hospitalization  Secondary Diagnosis:	Urinary retention  Assessment and plan of treatment:	Keep hua and follow up with urology at the [facility] at [address] [place] [id number].  Call [phone] to schedule an appointment.

## 2018-07-05 NOTE — DISCHARGE NOTE ADULT - FINDINGS/TREATMENT
Procedure: R inguinal hernia repair, exploratory laparotomy, small bowel resection    Findings: A R groin incision was made. Bowel incarcerated in a direct hernia was non-viable and was reduced. Hernia was primarily repaired without mesh. A midline incision was then made and carried down through the fascia. The abdomen was entered. Loop of non-viable bowel was identified and 5 cm was resected. A hand sewn anastomosis was then performed. Fascia and skin were then closed. as above

## 2018-07-05 NOTE — PROGRESS NOTE ADULT - SUBJECTIVE AND OBJECTIVE BOX
Cardiology/Vascular Medicine Inpatient Progress Note    No current complaints  Denies having pain  No active cardiac complaints  BPs better controlled.  On oral regimen again.    POD #7 s/p ex lap, SBR, inguinal hernia repair.  On exam, appears euvolemic on exam.  EKG yesterday showed V-paced rhythm. No other notable findings on EKG. No evidence of ischemia noted on EKG.    Vital Signs Last 24 Hrs  T(C): 36.7 (2018 14:00), Max: 37.4 (2018 02:00)  T(F): 98.1 (2018 14:00), Max: 99.3 (2018 02:00)  HR: 78 (2018 14:00) (68 - 82)  BP: 153/78 (2018 14:00) (142/73 - 158/91)  BP(mean): --  RR: 17 (2018 14:00) (16 - 18)  SpO2: 99% (2018 14:00) (98% - 100%)      Appearance: Elderly woman, thin  HEENT:   Normal oral mucosa, PERRL, EOMI	  Lymphatic: No lymphadenopathy  Cardiovascular: Reg S1S2  Respiratory: Decreased BS b/l bases	  Psychiatry: Awake, does not answer questions fully  Gastrointestinal:  Soft, Non-tender, + BS	  Skin: No rashes, No ecchymoses, No cyanosis	  Neurologic: Non-focal  +Musa    MEDICATIONS  (STANDING):  amLODIPine   Tablet 5 milliGRAM(s) Oral daily  atorvastatin 40 milliGRAM(s) Oral at bedtime  carvedilol 12.5 milliGRAM(s) Oral every 12 hours  donepezil 5 milliGRAM(s) Oral at bedtime  enoxaparin Injectable 40 milliGRAM(s) SubCutaneous daily  latanoprost 0.005% Ophthalmic Solution 1 Drop(s) Both EYES at bedtime  levETIRAcetam 1000 milliGRAM(s) Oral two times a day  losartan 50 milliGRAM(s) Oral daily  piperacillin/tazobactam IVPB. 3.375 Gram(s) IV Intermittent every 8 hours  timolol 0.5% Solution 1 Drop(s) Both EYES daily    MEDICATIONS  (PRN):  acetaminophen   Tablet. 325 milliGRAM(s) Oral every 4 hours PRN Mild Pain (1 - 3)    LABS:	 	                                      10.0   5.54  )-----------( 358      ( 2018 06:20 )             30.6     07-05    139  |  101  |  8   ----------------------------<  91  3.4<L>   |  25  |  0.75    Ca    8.4      2018 06:20  Phos  2.7     07-05  Mg     1.7     07-05      < from: TTE Echo Doppler w/o Cont (17 @ 10:24) >     EXAM:  TTE WO CON COMPLETE W DOPPL         PROCEDURE DATE:  2017        INTERPRETATION:  REPORT:    TRANSTHORACIC ECHOCARDIOGRAM REPORT           Patient Name:   JOSE CHAUDHARI Patient Location: [facility] Rec #:  [id number]    Accession #:   [id number]  Account #:                    Height:           66.0 in 167.6 cm  YOB: 1930      Weight:           119.0 lb 54.00 kg  Patient Age:    86 years      BSA:              1.60 m²  Patient Gender: F             BP:145/75 mmHg        Date of Exam: 2017 9:48:23 AM  Sonographer:  JEFF     Procedure:     2D Echo/Doppler/Color Doppler Complete.  Indications:   Cerebral infarction, unspecified - I63.9  Diagnosis:     Cerebral infarction, unspecified - I63.9  Study Details: Technically good study.           2D AND M-MODE MEASUREMENTS (normal ranges within parentheses):  Left Ventricle:                  Normal   Aorta/Left Atrium:            Normal  IVSd (2D):              0.96 cm (0.7-1.1) Aortic Root (2D):  2.98 cm   (2.4-3.7)  LVPWd (2D):             0.94 cm (0.7-1.1) Left Atrium (2D):  4.10 cm   (1.9-4.0)  LVIDd (2D):             3.26 cm (3.4-5.7) Right Ventricle:  LVIDs (2D):             2.32 cm           TAPSE:           2.18 cm  LV FS (2D):  28.9 %   (>25%)  Relative Wall Thickness  0.58    (<0.42)     LV DIASTOLIC FUNCTION:  MV Peak E: 0.53 m/s Decel Time: 317 msec  MV Peak A: 0.74 m/s  E/A Ratio: 0.72     SPECTRAL DOPPLER ANALYSIS (where applicable):  Mitral Valve:  MV P1/2 Time: 92.06 msec  MV Area, PHT: 2.39 cm²     Aortic Valve: AoV Max Mark: 1.84 m/s AoV Peak P.5 mmHg AoV Mean P.3 mmHg     LVOT Vmax: 0.81 m/s LVOT VTI: 0.142 m LVOT Diameter:     Aortic Insufficiency:  AI Half-time:  860 msec  AI Decel Rate: 1.61 m/s²     Tricuspid Valve and PA/RV Systolic Pressure: TR Max Velocity: 2.46 m/s   RA Pressure: 5 mmHg RVSP/PASP: 29.3 mmHg        PHYSICIAN INTERPRETATION:  Left Ventricle: The left ventricular internal cavity size is normal.   There is mild concentric left ventricular hypertrophy.  Global LV systolic function was normal. Left ventricular ejection   fraction, by visual estimation, is 60 to 65%. Spectral Doppler shows   impaired relaxation pattern of left ventricular myocardial filling (Grade   I diastolic dysfunction).  Right Ventricle: Normal right ventricular size and function.  Left Atrium: Mildly enlarged left atrium.  Right Atrium: The right atrium is mildly dilated.  Pericardium: There is no evidence of pericardial effusion.  Mitral Valve: Thickening and calcification of the anterior and posterior   mitral valve leaflets. Mitral leaflet mobility is normal. There is mild   mitral annular calcification. Mild mitral valve regurgitation is seen.  Tricuspid Valve: The tricuspid valve is not well seen. Mild tricuspid   regurgitation is visualized.  Aortic Valve: The aortic valve is trileaflet. Sclerotic aortic valve with   normal opening. Mild aortic valve regurgitation is seen.  Pulmonic Valve: The pulmonic valve was not well visualized.Trace   pulmonic valve regurgitation.  Aorta: Aortic root measured at Sinus of Valsalva is normal. There is mild   aortic root calcification.  Venous: The inferior vena cava was normal sized, with respiratory size   variation greater than 50%.  Additional Comments: A pacer wire is visualized in the right atrium and   right ventricle.        Summary:   1. Left ventricular ejection fraction, by visual estimation, is 60 to   65%.   2. Technically good study.   3. Normal global left ventricular systolic function.   4. Normal left ventricular internal cavity size.   5. Spectral Doppler shows impaired relaxation pattern of left   ventricular myocardial filling (Grade I diastolic dysfunction).   6. There is mild concentric left ventricular hypertrophy.   7. Normal right ventricular size and function.   8. Mildly dilated right atrium.   9. There is no evidence of pericardial effusion.  10. Mild mitral annular calcification.  11. Mild mitral valve regurgitation.  12. Thickening and calcification of the anterior and posterior mitral   valve leaflets.  13. Mild aortic regurgitation.  14. Sclerotic aortic valve with normal opening.  15. Mildly enlarged left atrium.  16. There is mild aortic root calcification.     Vikas Hopkins MD FACC, FASE,FACP  Electronically signed on 2017 at 1:14:59 PM             < from: CT Abdomen and Pelvis w/ IV Cont (18 @ 02:58) >    EXAM:  CT ABDOMEN AND PELVIS IC        PROCEDURE DATE:  2018         INTERPRETATION:  CLINICAL INFORMATION: Nausea and vomiting with   epigastric tenderness    COMPARISON: CT of the abdomen and pelvis dated 2017    PROCEDURE:   CT of theAbdomen and Pelvis was performed with intravenous contrast.   Intravenous contrast: 90 ml Omnipaque 350. 10 ml discarded.  Oral contrast: None.  Sagittal and coronal reformats were performed.    FINDINGS:    LOWER CHEST: Coronary artery calcifications.    LIVER: Scattered hypodensities which are too small to characterize.  BILE DUCTS: Mild intrahepatic and extrahepatic biliary ductal dilatation.  GALLBLADDER: Distended.  SPLEEN: Within normal limits.  PANCREAS: Prominent pancreatic duct measuring5 mm, overall stable from   2017.  ADRENALS: Thickening of the left adrenal gland.  KIDNEYS/URETERS: A right-sided double-J ureteral stent is again noted,   unchanged from . Unchanged mild to moderate right-sided   hydroureteronephrosis. Bilateral renal cysts.    BLADDER: Within normal limits.  REPRODUCTIVE ORGANS: Calcified and noncalcified uterine fibroids. A   vaginal pessary is noted.    BOWEL: Diffusely dilated small bowel with a transition to collapsed loops   in a right inguinal hernia. The large bowel is collapsed.  PERITONEUM: No ascites.  catheter terminating in the right lower   quadrant.  VESSELS:  Atherosclerotic calcifications.  RETROPERITONEUM: No lymphadenopathy.    ABDOMINAL WALL: Left chest and abdominal wall  shunt catheter.  BONES: Advanced degenerative changes of the visualized lumbosacral spine.   Chronic fracture deformity of right superior pubic ramus.    IMPRESSION:     Small bowel obstruction with a transition to collapsed loops in a right   inguinal hernia. Small volume interloop edema.    Right-sided double-J ureteral stent with mild to moderate right   hydroureteronephrosis. This is not significantly changed from the prior   study dated 2017.    Findings were discussed with Dr. Cindy Miller at approximately   4:02 AM dated 2018 with read back.      INDIA MILLER M.D., RADIOLOGY RESIDENT  This document has been electronically signed.  DALE KNUTSON M.D., ATTENDING RADIOLOGIST  This document has been electronicallysigned. 2018  4:05AM                  < end of copied text >

## 2018-07-05 NOTE — PROGRESS NOTE ADULT - ASSESSMENT
86 y/o female with PMHx Seizure disorder, HTN, CVA, GI bleed, CHF, UTIs, HLD, syncope, pacemaker,  shunt, dementia, presents to the ED 06/28 from [facility] rehab with nausea, vomiting, weakness, and dizziness for 1-2 weeks.  Found to have SBO with transition to collapsed loops in right inguinal hernia, s/p exp lap, smallbowel resection and right inguinal hernia repair POD# 6  She also had enterobacter cloacae bacteremia 3/4 bottles and in urine 06/28, cleared 07/01 likely secondary to urine. On chart review she has had  enterobacter cloacae/absuriae UTI and VRE UTI, microccus bacteremia in the past as wel. She has a Right sided stent-which is present at least since last year

## 2018-07-05 NOTE — DISCHARGE NOTE ADULT - CARE PROVIDERS DIRECT ADDRESSES
,DirectAddress_Unknown,[email or web address]

## 2018-07-05 NOTE — PROGRESS NOTE ADULT - ASSESSMENT
87y Female s/p ex lap, SBR, inguinal hernia repair now POD 7. On regular liquids. Passing flatus and now with multiple episodes of diarrhea.    Plan:  - regular diet and follow  - pain control  - C.diff neg. will f/u GI PCR  - abnormal ekg, f/u cards as outpt  - uro will f/u as outpt  - Incentive spirometer/OOB/Ambulate/PT  - c/w vte ppx  - appreciate ID recs regarding outpt abx tx  - per niece, plan d/c to rehab      Surgery, B-Team  Pager: [phone]

## 2018-07-05 NOTE — DISCHARGE NOTE ADULT - MEDICATION SUMMARY - MEDICATIONS TO TAKE
I will START or STAY ON the medications listed below when I get home from the hospital:    aspirin 81 mg oral delayed release tablet  -- 1 tab(s) by mouth once a day  -- Indication: For CAD    acetaminophen 325 mg oral tablet  -- 2 tab(s) by mouth every 6 hours, As Needed - 6) for pain  -- Indication: For Pain    losartan 50 mg oral tablet  -- 1 tab(s) by mouth once a day  -- Indication: For HTN (hypertension)    levETIRAcetam 1000 mg oral tablet  -- 1 tab(s) by mouth 2 times a day  -- Indication: For Home Med    lamoTRIgine 100 mg oral tablet  -- 1 tab(s) by mouth once a day  -- Indication: For Home Med    atorvastatin 40 mg oral tablet  -- 1 tab(s) by mouth once a day (at bedtime)  -- Indication: For CHolesterol    carvedilol 12.5 mg oral tablet  -- 1 tab(s) by mouth every 12 hours  -- Indication: For HTN (hypertension)    amLODIPine 5 mg oral tablet  -- 1 tab(s) by mouth once a day  -- Indication: For HTN (hypertension)    Aricept 5 mg oral tablet  -- 1 tab(s) by mouth once a day (at bedtime)  -- Indication: For Dementia    Lasix 20 mg oral tablet  -- 1 tab(s) by mouth once a day  -- Indication: For CHF (congestive heart failure)    docusate sodium 100 mg oral capsule  -- 1 cap(s) by mouth 3 times a day  -- Indication: For Stool softener    polyethylene glycol 3350 oral powder for reconstitution  -- 17 gram(s) by mouth once a day  -- Indication: For Stool Softener    senna oral tablet  -- 2 tab(s) by mouth once a day (at bedtime)  -- Indication: For Stool softener    timolol maleate 0.5% ophthalmic gel forming solution  -- 1 drop(s) to each affected eye once a day  -- Indication: For Glaucoma    Restasis 0.05% ophthalmic emulsion  -- 1 drop(s) to each affected eye every 12 hours  -- Indication: For Glaucoma    Travatan 0.004% ophthalmic solution  -- 1 drop(s) to each affected eye once a day (in the evening)  -- Indication: For Glaucoma    Bactrim  mg-160 mg oral tablet  -- 1 tab(s) by mouth 2 times a day x 6 days  -- Indication: For Bacteremia due to Gram-negative bacteria    Multiple Vitamins oral tablet  -- 1 tab(s) by mouth once a day  -- Indication: For Vitamin    calcium-vitamin D 250 mg-250 intl units oral tablet, chewable  -- 1 tab(s) by mouth 2 times a day  -- Indication: For Vitamin    cholecalciferol oral tablet  -- 1000 unit(s) by mouth once a day  -- Indication: For Vitamin

## 2018-07-05 NOTE — DISCHARGE NOTE ADULT - CARE PROVIDER_API CALL
Cristobal Montgomery), Surgery  [address]  [address]  [place] [address]  Phone: [phone]  Fax: [phone]    Leander Dickerson (MD; PhD), Cardiology; Internal Medicine; Vascular Medicine  [address] [place] [address]  [place] [address]  Phone: [phone]  Fax: [phone]

## 2018-07-05 NOTE — DISCHARGE NOTE ADULT - PLAN OF CARE
wound healing WOUND CARE: Keep incisions clean and dry.  Do not rub or scrub yoru incision or use lotions or powders on incision.   BATHING: Please do not submerge wound underwater. You may shower and/or sponge bathe. It is OK to wash drain wound site.  ACTIVITY: No heavy lifting or straining. Otherwise, you may return to your usual level of physical activty. If you are taking narcotic pain medication (such as Percocet) DO NOT drive a car, operate machinery or make important decisions.  DIET: Return to your usual diet.  NOTIFY YOUR SURGEON IF: You have any bleeding that does not stop, any pus draining from your wound(s), any fever (over 100.4 F) or chills, persistent nausea/vomiting, persistent diarrhea, or if your pain is not controlled on your discharge pain medications.  FOLLOW-UP: Please follow up with your primary care physician in one week regarding your hospitalization.  Please follow up with Dr. Montgomery in one week.  Call to schedule an appointment. Complete antibiotic course as prescribed.  Please follow up with your primary care physician regarding your hospitalization Please follow up with your primary care physician and cardiologist regarding your hospitalization.  Please follow up with your cardiologist regarding when to repeat your echocardiogram. Please follow up with your primary care physician regarding your hospitalization Keep hua and follow up with urology at the [facility] at [address] [place] [address], [place] [address].  Call [phone] to schedule an appointment. WOUND CARE: Keep incisions clean and dry.  Do not rub or scrub your incision or use lotions or powders on incision.   BATHING: Please do not submerge wound underwater. You may shower and/or sponge bathe. It is OK to wash drain wound site.  ACTIVITY: No heavy lifting or straining. Otherwise, you may return to your usual level of physical activty. If you are taking narcotic pain medication (such as Percocet) DO NOT drive a car, operate machinery or make important decisions.  DIET: Return to your usual diet.  NOTIFY YOUR SURGEON IF: You have any bleeding that does not stop, any pus draining from your wound(s), any fever (over 100.4 F) or chills, persistent nausea/vomiting, persistent diarrhea, or if your pain is not controlled on your discharge pain medications.  FOLLOW-UP: Please follow up with your primary care physician in one week regarding your hospitalization.  Please follow up with Dr. Montgomery in one week.  Call to schedule an appointment.

## 2018-07-05 NOTE — PROGRESS NOTE ADULT - SUBJECTIVE AND OBJECTIVE BOX
JOSE CHAUDHARI 87y [id number]    Patient is a 87y old  Female who presents with a chief complaint of Incarcerated R inguinal hernia (28 Jun 2018 08:43)      Follow Up/CC:  ID following for bacteremia    Interval History/ROS: feels ok    Allergies    No Known Allergies    Intolerances        ANTIMICROBIALS:  piperacillin/tazobactam IVPB. 3.375 every 8 hours      MEDICATIONS  (STANDING):  amLODIPine   Tablet 5 milliGRAM(s) Oral daily  atorvastatin 40 milliGRAM(s) Oral at bedtime  carvedilol 12.5 milliGRAM(s) Oral every 12 hours  donepezil 5 milliGRAM(s) Oral at bedtime  enoxaparin Injectable 40 milliGRAM(s) SubCutaneous daily  latanoprost 0.005% Ophthalmic Solution 1 Drop(s) Both EYES at bedtime  levETIRAcetam 1000 milliGRAM(s) Oral two times a day  losartan 50 milliGRAM(s) Oral daily  piperacillin/tazobactam IVPB. 3.375 Gram(s) IV Intermittent every 8 hours  timolol 0.5% Solution 1 Drop(s) Both EYES daily    MEDICATIONS  (PRN):  acetaminophen   Tablet. 325 milliGRAM(s) Oral every 4 hours PRN Mild Pain (1 - 3)        Vital Signs Last 24 Hrs  T(C): 37.2 (05 Jul 2018 06:18), Max: 37.4 (05 Jul 2018 02:00)  T(F): 98.9 (05 Jul 2018 06:18), Max: 99.3 (05 Jul 2018 02:00)  HR: 73 (05 Jul 2018 06:18) (68 - 78)  BP: 158/91 (05 Jul 2018 06:18) (142/73 - 158/91)  BP(mean): --  RR: 16 (05 Jul 2018 06:18) (16 - 18)  SpO2: 98% (05 Jul 2018 06:18) (98% - 100%)    CBC Full  -  ( 05 Jul 2018 06:20 )  WBC Count : 5.54 K/uL  Hemoglobin : 10.0 g/dL  Hematocrit : 30.6 %  Platelet Count - Automated : 358 K/uL  Mean Cell Volume : 90.0 fL  Mean Cell Hemoglobin : 29.4 pg  Mean Cell Hemoglobin Concentration : 32.7 %  Auto Neutrophil # : x  Auto Lymphocyte # : x  Auto Monocyte # : x  Auto Eosinophil # : x  Auto Basophil # : x  Auto Neutrophil % : x  Auto Lymphocyte % : x  Auto Monocyte % : x  Auto Eosinophil % : x  Auto Basophil % : x    07-05    139  |  101  |  8   ----------------------------<  91  3.4<L>   |  25  |  0.75    Ca    8.4      05 Jul 2018 06:20  Phos  2.7     07-05  Mg     1.7     07-05            MICROBIOLOGY:  URINE CATHETER  07-02-18 --  --  --      BLOOD PERIPHERAL  07-01-18 --  --  --      BLOOD PERIPHERAL  06-27-18 --  --  BLOOD CULTURE PCR  Enterobacter cloacae      URINE MIDSTREAM  06-27-18 --  --  Enterobacter cloacae    RADIOLOGY

## 2018-07-05 NOTE — DISCHARGE NOTE ADULT - ADDITIONAL INSTRUCTIONS
Please call Dr. Montgomery to schedule a follow up appointment at [phone].    Please follow up with [facility] with regards to your ureteral stent by calling [phone].    The patient was instructed to follow up with Dr. Montgomery within 1-2 weeks after discharge from the hospital. The patient/family felt comfortable with discharge. The patient was discharged to rehab. The patient had no other issues and was recovering appropriately.

## 2018-07-05 NOTE — DISCHARGE NOTE ADULT - SECONDARY DIAGNOSIS.
Bacteremia due to Gram-negative bacteria CHF (congestive heart failure) HTN (hypertension) Urinary retention

## 2018-07-06 LAB
BACTERIA BLD CULT: SIGNIFICANT CHANGE UP
BACTERIA BLD CULT: SIGNIFICANT CHANGE UP
BUN SERPL-MCNC: 7 MG/DL — SIGNIFICANT CHANGE UP (ref 7–23)
CALCIUM SERPL-MCNC: 8.4 MG/DL — SIGNIFICANT CHANGE UP (ref 8.4–10.5)
CHLORIDE SERPL-SCNC: 103 MMOL/L — SIGNIFICANT CHANGE UP (ref 98–107)
CO2 SERPL-SCNC: 26 MMOL/L — SIGNIFICANT CHANGE UP (ref 22–31)
CREAT SERPL-MCNC: 0.69 MG/DL — SIGNIFICANT CHANGE UP (ref 0.5–1.3)
GI PCR PANEL, STOOL: SIGNIFICANT CHANGE UP
GLUCOSE SERPL-MCNC: 98 MG/DL — SIGNIFICANT CHANGE UP (ref 70–99)
MAGNESIUM SERPL-MCNC: 1.6 MG/DL — SIGNIFICANT CHANGE UP (ref 1.6–2.6)
PHOSPHATE SERPL-MCNC: 2.5 MG/DL — SIGNIFICANT CHANGE UP (ref 2.5–4.5)
POTASSIUM SERPL-MCNC: 3.5 MMOL/L — SIGNIFICANT CHANGE UP (ref 3.5–5.3)
POTASSIUM SERPL-SCNC: 3.5 MMOL/L — SIGNIFICANT CHANGE UP (ref 3.5–5.3)
SODIUM SERPL-SCNC: 139 MMOL/L — SIGNIFICANT CHANGE UP (ref 135–145)
SPECIMEN SOURCE: SIGNIFICANT CHANGE UP
SURGICAL PATHOLOGY STUDY: SIGNIFICANT CHANGE UP

## 2018-07-06 PROCEDURE — 99232 SBSQ HOSP IP/OBS MODERATE 35: CPT

## 2018-07-06 RX ORDER — LAMOTRIGINE 25 MG/1
100 TABLET, ORALLY DISINTEGRATING ORAL DAILY
Qty: 0 | Refills: 0 | Status: DISCONTINUED | OUTPATIENT
Start: 2018-07-06 | End: 2018-07-09

## 2018-07-06 RX ORDER — MAGNESIUM SULFATE 500 MG/ML
2 VIAL (ML) INJECTION ONCE
Qty: 0 | Refills: 0 | Status: COMPLETED | OUTPATIENT
Start: 2018-07-06 | End: 2018-07-06

## 2018-07-06 RX ORDER — AZTREONAM 2 G
1 VIAL (EA) INJECTION
Qty: 0 | Refills: 0 | DISCHARGE
Start: 2018-07-06 | End: 2018-07-11

## 2018-07-06 RX ORDER — AZTREONAM 2 G
1 VIAL (EA) INJECTION
Qty: 0 | Refills: 0 | COMMUNITY
Start: 2018-07-06 | End: 2018-07-11

## 2018-07-06 RX ORDER — POTASSIUM PHOSPHATE, MONOBASIC POTASSIUM PHOSPHATE, DIBASIC 236; 224 MG/ML; MG/ML
15 INJECTION, SOLUTION INTRAVENOUS ONCE
Qty: 0 | Refills: 0 | Status: COMPLETED | OUTPATIENT
Start: 2018-07-06 | End: 2018-07-06

## 2018-07-06 RX ADMIN — LAMOTRIGINE 100 MILLIGRAM(S): 25 TABLET, ORALLY DISINTEGRATING ORAL at 14:04

## 2018-07-06 RX ADMIN — DONEPEZIL HYDROCHLORIDE 5 MILLIGRAM(S): 10 TABLET, FILM COATED ORAL at 21:14

## 2018-07-06 RX ADMIN — LEVETIRACETAM 1000 MILLIGRAM(S): 250 TABLET, FILM COATED ORAL at 06:10

## 2018-07-06 RX ADMIN — CARVEDILOL PHOSPHATE 12.5 MILLIGRAM(S): 80 CAPSULE, EXTENDED RELEASE ORAL at 17:42

## 2018-07-06 RX ADMIN — PIPERACILLIN AND TAZOBACTAM 25 GRAM(S): 4; .5 INJECTION, POWDER, LYOPHILIZED, FOR SOLUTION INTRAVENOUS at 06:10

## 2018-07-06 RX ADMIN — AMLODIPINE BESYLATE 5 MILLIGRAM(S): 2.5 TABLET ORAL at 06:10

## 2018-07-06 RX ADMIN — CARVEDILOL PHOSPHATE 12.5 MILLIGRAM(S): 80 CAPSULE, EXTENDED RELEASE ORAL at 06:10

## 2018-07-06 RX ADMIN — LATANOPROST 1 DROP(S): 0.05 SOLUTION/ DROPS OPHTHALMIC; TOPICAL at 21:14

## 2018-07-06 RX ADMIN — Medication 100 GRAM(S): at 10:20

## 2018-07-06 RX ADMIN — LEVETIRACETAM 1000 MILLIGRAM(S): 250 TABLET, FILM COATED ORAL at 17:42

## 2018-07-06 RX ADMIN — ATORVASTATIN CALCIUM 40 MILLIGRAM(S): 80 TABLET, FILM COATED ORAL at 21:14

## 2018-07-06 RX ADMIN — Medication 1 TABLET(S): at 17:42

## 2018-07-06 RX ADMIN — Medication 1 DROP(S): at 21:14

## 2018-07-06 RX ADMIN — POTASSIUM PHOSPHATE, MONOBASIC POTASSIUM PHOSPHATE, DIBASIC 62.5 MILLIMOLE(S): 236; 224 INJECTION, SOLUTION INTRAVENOUS at 10:20

## 2018-07-06 RX ADMIN — LOSARTAN POTASSIUM 50 MILLIGRAM(S): 100 TABLET, FILM COATED ORAL at 06:10

## 2018-07-06 RX ADMIN — ENOXAPARIN SODIUM 40 MILLIGRAM(S): 100 INJECTION SUBCUTANEOUS at 14:04

## 2018-07-06 NOTE — PROGRESS NOTE ADULT - SUBJECTIVE AND OBJECTIVE BOX
Cardiology/Vascular Medicine Inpatient Progress Note    No current complaints  Denies having pain  No active cardiac complaints  BPs better controlled.  On oral regimen again.    POD #7 s/p ex lap, SBR, inguinal hernia repair.  On exam, appears euvolemic on exam.  EKG yesterday showed V-paced rhythm. No other notable findings on EKG. No evidence of ischemia noted on EKG.    Vital Signs Last 24 Hrs  T(C): 36.9 (2018 10:08), Max: 37.1 (2018 22:41)  T(F): 98.4 (2018 10:08), Max: 98.8 (2018 22:41)  HR: 69 (2018 10:08) (69 - 83)  BP: 155/82 (2018 10:08) (153/78 - 175/87)  BP(mean): --  RR: 18 (2018 10:08) (16 - 18)  SpO2: 99% (2018 10:08) (97% - 100%)    Appearance: Elderly woman, thin  HEENT:   Normal oral mucosa, PERRL, EOMI	  Lymphatic: No lymphadenopathy  Cardiovascular: Reg S1S2  Respiratory: Decreased BS b/l bases	  Psychiatry: Awake, does not answer questions fully  Gastrointestinal:  Soft, Non-tender, + BS	  Skin: No rashes, No ecchymoses, No cyanosis	  Neurologic: Non-focal  +Musa    MEDICATIONS  (STANDING):  amLODIPine   Tablet 5 milliGRAM(s) Oral daily  atorvastatin 40 milliGRAM(s) Oral at bedtime  carvedilol 12.5 milliGRAM(s) Oral every 12 hours  donepezil 5 milliGRAM(s) Oral at bedtime  enoxaparin Injectable 40 milliGRAM(s) SubCutaneous daily  lamoTRIgine 100 milliGRAM(s) Oral daily  latanoprost 0.005% Ophthalmic Solution 1 Drop(s) Both EYES at bedtime  levETIRAcetam 1000 milliGRAM(s) Oral two times a day  losartan 50 milliGRAM(s) Oral daily  piperacillin/tazobactam IVPB. 3.375 Gram(s) IV Intermittent every 8 hours  timolol 0.5% Solution 1 Drop(s) Both EYES daily    MEDICATIONS  (PRN):  acetaminophen   Tablet. 325 milliGRAM(s) Oral every 4 hours PRN Mild Pain (1 - 3)    LABS:	 	                                    10.0   5.54  )-----------( 358      ( 2018 06:20 )             30.6   07-06    139  |  103  |  7   ----------------------------<  98  3.5   |  26  |  0.69    Ca    8.4      2018 05:58  Phos  2.5     07-06  Mg     1.6     07-06      < from: TTE Echo Doppler w/o Cont (17 @ 10:24) >     EXAM:  TTE WO CON COMPLETE W DOPPL         PROCEDURE DATE:  2017        INTERPRETATION:  REPORT:    TRANSTHORACIC ECHOCARDIOGRAM REPORT           Patient Name:   JOSE CHAUDHARI Patient Location: [facility] Rec #:  [id number]    Accession #:   [id number]  Account #:                    Height:           66.0 in 167.6 cm  YOB: 1930      Weight:           119.0 lb 54.00 kg  Patient Age:    86 years      BSA:              1.60 m²  Patient Gender: F             BP:145/75 mmHg        Date of Exam: 2017 9:48:23 AM  Sonographer:  JEFF     Procedure:     2D Echo/Doppler/Color Doppler Complete.  Indications:   Cerebral infarction, unspecified - I63.9  Diagnosis:     Cerebral infarction, unspecified - I63.9  Study Details: Technically good study.           2D AND M-MODE MEASUREMENTS (normal ranges within parentheses):  Left Ventricle:                  Normal   Aorta/Left Atrium:            Normal  IVSd (2D):              0.96 cm (0.7-1.1) Aortic Root (2D):  2.98 cm   (2.4-3.7)  LVPWd (2D):             0.94 cm (0.7-1.1) Left Atrium (2D):  4.10 cm   (1.9-4.0)  LVIDd (2D):             3.26 cm (3.4-5.7) Right Ventricle:  LVIDs (2D):             2.32 cm           TAPSE:           2.18 cm  LV FS (2D):  28.9 %   (>25%)  Relative Wall Thickness  0.58    (<0.42)     LV DIASTOLIC FUNCTION:  MV Peak E: 0.53 m/s Decel Time: 317 msec  MV Peak A: 0.74 m/s  E/A Ratio: 0.72     SPECTRAL DOPPLER ANALYSIS (where applicable):  Mitral Valve:  MV P1/2 Time: 92.06 msec  MV Area, PHT: 2.39 cm²     Aortic Valve: AoV Max Mark: 1.84 m/s AoV Peak P.5 mmHg AoV Mean P.3 mmHg     LVOT Vmax: 0.81 m/s LVOT VTI: 0.142 m LVOT Diameter:     Aortic Insufficiency:  AI Half-time:  860 msec  AI Decel Rate: 1.61 m/s²     Tricuspid Valve and PA/RV Systolic Pressure: TR Max Velocity: 2.46 m/s   RA Pressure: 5 mmHg RVSP/PASP: 29.3 mmHg        PHYSICIAN INTERPRETATION:  Left Ventricle: The left ventricular internal cavity size is normal.   There is mild concentric left ventricular hypertrophy.  Global LV systolic function was normal. Left ventricular ejection   fraction, by visual estimation, is 60 to 65%. Spectral Doppler shows   impaired relaxation pattern of left ventricular myocardial filling (Grade   I diastolic dysfunction).  Right Ventricle: Normal right ventricular size and function.  Left Atrium: Mildly enlarged left atrium.  Right Atrium: The right atrium is mildly dilated.  Pericardium: There is no evidence of pericardial effusion.  Mitral Valve: Thickening and calcification of the anterior and posterior   mitral valve leaflets. Mitral leaflet mobility is normal. There is mild   mitral annular calcification. Mild mitral valve regurgitation is seen.  Tricuspid Valve: The tricuspid valve is not well seen. Mild tricuspid   regurgitation is visualized.  Aortic Valve: The aortic valve is trileaflet. Sclerotic aortic valve with   normal opening. Mild aortic valve regurgitation is seen.  Pulmonic Valve: The pulmonic valve was not well visualized.Trace   pulmonic valve regurgitation.  Aorta: Aortic root measured at Sinus of Valsalva is normal. There is mild   aortic root calcification.  Venous: The inferior vena cava was normal sized, with respiratory size   variation greater than 50%.  Additional Comments: A pacer wire is visualized in the right atrium and   right ventricle.        Summary:   1. Left ventricular ejection fraction, by visual estimation, is 60 to   65%.   2. Technically good study.   3. Normal global left ventricular systolic function.   4. Normal left ventricular internal cavity size.   5. Spectral Doppler shows impaired relaxation pattern of left   ventricular myocardial filling (Grade I diastolic dysfunction).   6. There is mild concentric left ventricular hypertrophy.   7. Normal right ventricular size and function.   8. Mildly dilated right atrium.   9. There is no evidence of pericardial effusion.  10. Mild mitral annular calcification.  11. Mild mitral valve regurgitation.  12. Thickening and calcification of the anterior and posterior mitral   valve leaflets.  13. Mild aortic regurgitation.  14. Sclerotic aortic valve with normal opening.  15. Mildly enlarged left atrium.  16. There is mild aortic root calcification.     Vikas Hopkins MD FAC, FASE,FACP  Electronically signed on 2017 at 1:14:59 PM             < from: CT Abdomen and Pelvis w/ IV Cont (18 @ 02:58) >    EXAM:  CT ABDOMEN AND PELVIS IC        PROCEDURE DATE:  2018         INTERPRETATION:  CLINICAL INFORMATION: Nausea and vomiting with   epigastric tenderness    COMPARISON: CT of the abdomen and pelvis dated 2017    PROCEDURE:   CT of theAbdomen and Pelvis was performed with intravenous contrast.   Intravenous contrast: 90 ml Omnipaque 350. 10 ml discarded.  Oral contrast: None.  Sagittal and coronal reformats were performed.    FINDINGS:    LOWER CHEST: Coronary artery calcifications.    LIVER: Scattered hypodensities which are too small to characterize.  BILE DUCTS: Mild intrahepatic and extrahepatic biliary ductal dilatation.  GALLBLADDER: Distended.  SPLEEN: Within normal limits.  PANCREAS: Prominent pancreatic duct measuring5 mm, overall stable from   2017.  ADRENALS: Thickening of the left adrenal gland.  KIDNEYS/URETERS: A right-sided double-J ureteral stent is again noted,   unchanged from . Unchanged mild to moderate right-sided   hydroureteronephrosis. Bilateral renal cysts.    BLADDER: Within normal limits.  REPRODUCTIVE ORGANS: Calcified and noncalcified uterine fibroids. A   vaginal pessary is noted.    BOWEL: Diffusely dilated small bowel with a transition to collapsed loops   in a right inguinal hernia. The large bowel is collapsed.  PERITONEUM: No ascites.  catheter terminating in the right lower   quadrant.  VESSELS:  Atherosclerotic calcifications.  RETROPERITONEUM: No lymphadenopathy.    ABDOMINAL WALL: Left chest and abdominal wall  shunt catheter.  BONES: Advanced degenerative changes of the visualized lumbosacral spine.   Chronic fracture deformity of right superior pubic ramus.    IMPRESSION:     Small bowel obstruction with a transition to collapsed loops in a right   inguinal hernia. Small volume interloop edema.    Right-sided double-J ureteral stent with mild to moderate right   hydroureteronephrosis. This is not significantly changed from the prior   study dated 2017.    Findings were discussed with Dr. Cindy Miller at approximately   4:02 AM dated 2018 with read back.      INDIA MILLER M.D., RADIOLOGY RESIDENT  This document has been electronically signed.  DALE KNUTSON M.D., ATTENDING RADIOLOGIST  This document has been electronicallysigned. 2018  4:05AM                  < end of copied text > Cardiology/Vascular Medicine Inpatient Progress Note    No current complaints  Denies having pain  No active cardiac complaints  BPs better controlled.  On oral regimen again.    POD #8 s/p ex lap, SBR, inguinal hernia repair.  On exam, appears euvolemic on exam.  EKG yesterday showed V-paced rhythm. No other notable findings on EKG. No evidence of ischemia noted on EKG.    Vital Signs Last 24 Hrs  T(C): 36.9 (2018 10:08), Max: 37.1 (2018 22:41)  T(F): 98.4 (2018 10:08), Max: 98.8 (2018 22:41)  HR: 69 (2018 10:08) (69 - 83)  BP: 155/82 (2018 10:08) (153/78 - 175/87)  BP(mean): --  RR: 18 (2018 10:08) (16 - 18)  SpO2: 99% (2018 10:08) (97% - 100%)    Appearance: Elderly woman, thin  HEENT:   Normal oral mucosa, PERRL, EOMI	  Lymphatic: No lymphadenopathy  Cardiovascular: Reg S1S2  Respiratory: Decreased BS b/l bases	  Psychiatry: Awake, does not answer questions fully  Gastrointestinal:  Soft, Non-tender, + BS	  Skin: No rashes, No ecchymoses, No cyanosis	  Neurologic: Non-focal  +Musa    MEDICATIONS  (STANDING):  amLODIPine   Tablet 5 milliGRAM(s) Oral daily  atorvastatin 40 milliGRAM(s) Oral at bedtime  carvedilol 12.5 milliGRAM(s) Oral every 12 hours  donepezil 5 milliGRAM(s) Oral at bedtime  enoxaparin Injectable 40 milliGRAM(s) SubCutaneous daily  lamoTRIgine 100 milliGRAM(s) Oral daily  latanoprost 0.005% Ophthalmic Solution 1 Drop(s) Both EYES at bedtime  levETIRAcetam 1000 milliGRAM(s) Oral two times a day  losartan 50 milliGRAM(s) Oral daily  piperacillin/tazobactam IVPB. 3.375 Gram(s) IV Intermittent every 8 hours  timolol 0.5% Solution 1 Drop(s) Both EYES daily    MEDICATIONS  (PRN):  acetaminophen   Tablet. 325 milliGRAM(s) Oral every 4 hours PRN Mild Pain (1 - 3)    LABS:	 	                        10.0   5.54  )-----------( 358      ( 2018 06:20 )             30.6   07-06    139  |  103  |  7   ----------------------------<  98  3.5   |  26  |  0.69    Ca    8.4      2018 05:58  Phos  2.5     07-06  Mg     1.6     07-06        < from: TTE Echo Doppler w/o Cont (17 @ 10:24) >     EXAM:  TTE WO CON COMPLETE W DOPPL         PROCEDURE DATE:  2017        INTERPRETATION:  REPORT:    TRANSTHORACIC ECHOCARDIOGRAM REPORT           Patient Name:   JOSE CHAUDHARI Patient Location: [facility] Rec #:  [id number]    Accession #:   [id number]  Account #:                    Height:           66.0 in 167.6 cm  YOB: 1930      Weight:           119.0 lb 54.00 kg  Patient Age:    86 years      BSA:              1.60 m²  Patient Gender: F             BP:145/75 mmHg        Date of Exam: 2017 9:48:23 AM  Sonographer:  JEFF     Procedure:     2D Echo/Doppler/Color Doppler Complete.  Indications:   Cerebral infarction, unspecified - I63.9  Diagnosis:     Cerebral infarction, unspecified - I63.9  Study Details: Technically good study.           2D AND M-MODE MEASUREMENTS (normal ranges within parentheses):  Left Ventricle:                  Normal   Aorta/Left Atrium:            Normal  IVSd (2D):              0.96 cm (0.7-1.1) Aortic Root (2D):  2.98 cm   (2.4-3.7)  LVPWd (2D):             0.94 cm (0.7-1.1) Left Atrium (2D):  4.10 cm   (1.9-4.0)  LVIDd (2D):             3.26 cm (3.4-5.7) Right Ventricle:  LVIDs (2D):             2.32 cm           TAPSE:           2.18 cm  LV FS (2D):  28.9 %   (>25%)  Relative Wall Thickness  0.58    (<0.42)     LV DIASTOLIC FUNCTION:  MV Peak E: 0.53 m/s Decel Time: 317 msec  MV Peak A: 0.74 m/s  E/A Ratio: 0.72     SPECTRAL DOPPLER ANALYSIS (where applicable):  Mitral Valve:  MV P1/2 Time: 92.06 msec  MV Area, PHT: 2.39 cm²     Aortic Valve: AoV Max Mark: 1.84 m/s AoV Peak P.5 mmHg AoV Mean P.3 mmHg     LVOT Vmax: 0.81 m/s LVOT VTI: 0.142 m LVOT Diameter:     Aortic Insufficiency:  AI Half-time:  860 msec  AI Decel Rate: 1.61 m/s²     Tricuspid Valve and PA/RV Systolic Pressure: TR Max Velocity: 2.46 m/s   RA Pressure: 5 mmHg RVSP/PASP: 29.3 mmHg        PHYSICIAN INTERPRETATION:  Left Ventricle: The left ventricular internal cavity size is normal.   There is mild concentric left ventricular hypertrophy.  Global LV systolic function was normal. Left ventricular ejection   fraction, by visual estimation, is 60 to 65%. Spectral Doppler shows   impaired relaxation pattern of left ventricular myocardial filling (Grade   I diastolic dysfunction).  Right Ventricle: Normal right ventricular size and function.  Left Atrium: Mildly enlarged left atrium.  Right Atrium: The right atrium is mildly dilated.  Pericardium: There is no evidence of pericardial effusion.  Mitral Valve: Thickening and calcification of the anterior and posterior   mitral valve leaflets. Mitral leaflet mobility is normal. There is mild   mitral annular calcification. Mild mitral valve regurgitation is seen.  Tricuspid Valve: The tricuspid valve is not well seen. Mild tricuspid   regurgitation is visualized.  Aortic Valve: The aortic valve is trileaflet. Sclerotic aortic valve with   normal opening. Mild aortic valve regurgitation is seen.  Pulmonic Valve: The pulmonic valve was not well visualized.Trace   pulmonic valve regurgitation.  Aorta: Aortic root measured at Sinus of Valsalva is normal. There is mild   aortic root calcification.  Venous: The inferior vena cava was normal sized, with respiratory size   variation greater than 50%.  Additional Comments: A pacer wire is visualized in the right atrium and   right ventricle.        Summary:   1. Left ventricular ejection fraction, by visual estimation, is 60 to   65%.   2. Technically good study.   3. Normal global left ventricular systolic function.   4. Normal left ventricular internal cavity size.   5. Spectral Doppler shows impaired relaxation pattern of left   ventricular myocardial filling (Grade I diastolic dysfunction).   6. There is mild concentric left ventricular hypertrophy.   7. Normal right ventricular size and function.   8. Mildly dilated right atrium.   9. There is no evidence of pericardial effusion.  10. Mild mitral annular calcification.  11. Mild mitral valve regurgitation.  12. Thickening and calcification of the anterior and posterior mitral   valve leaflets.  13. Mild aortic regurgitation.  14. Sclerotic aortic valve with normal opening.  15. Mildly enlarged left atrium.  16. There is mild aortic root calcification.     Vikas Hopkins MD FAC, FASE,FACP  Electronically signed on 2017 at 1:14:59 PM             < from: CT Abdomen and Pelvis w/ IV Cont (18 @ 02:58) >    EXAM:  CT ABDOMEN AND PELVIS IC        PROCEDURE DATE:  2018         INTERPRETATION:  CLINICAL INFORMATION: Nausea and vomiting with   epigastric tenderness    COMPARISON: CT of the abdomen and pelvis dated 2017    PROCEDURE:   CT of theAbdomen and Pelvis was performed with intravenous contrast.   Intravenous contrast: 90 ml Omnipaque 350. 10 ml discarded.  Oral contrast: None.  Sagittal and coronal reformats were performed.    FINDINGS:    LOWER CHEST: Coronary artery calcifications.    LIVER: Scattered hypodensities which are too small to characterize.  BILE DUCTS: Mild intrahepatic and extrahepatic biliary ductal dilatation.  GALLBLADDER: Distended.  SPLEEN: Within normal limits.  PANCREAS: Prominent pancreatic duct measuring5 mm, overall stable from   2017.  ADRENALS: Thickening of the left adrenal gland.  KIDNEYS/URETERS: A right-sided double-J ureteral stent is again noted,   unchanged from . Unchanged mild to moderate right-sided   hydroureteronephrosis. Bilateral renal cysts.    BLADDER: Within normal limits.  REPRODUCTIVE ORGANS: Calcified and noncalcified uterine fibroids. A   vaginal pessary is noted.    BOWEL: Diffusely dilated small bowel with a transition to collapsed loops   in a right inguinal hernia. The large bowel is collapsed.  PERITONEUM: No ascites.  catheter terminating in the right lower   quadrant.  VESSELS:  Atherosclerotic calcifications.  RETROPERITONEUM: No lymphadenopathy.    ABDOMINAL WALL: Left chest and abdominal wall  shunt catheter.  BONES: Advanced degenerative changes of the visualized lumbosacral spine.   Chronic fracture deformity of right superior pubic ramus.    IMPRESSION:     Small bowel obstruction with a transition to collapsed loops in a right   inguinal hernia. Small volume interloop edema.    Right-sided double-J ureteral stent with mild to moderate right   hydroureteronephrosis. This is not significantly changed from the prior   study dated 2017.    Findings were discussed with Dr. Cindy Miller at approximately   4:02 AM dated 2018 with read back.      INDIA MILLER M.D., RADIOLOGY RESIDENT  This document has been electronically signed.  DALE KNUTSON M.D., ATTENDING RADIOLOGIST  This document has been electronicallysigned. 2018  4:05AM                  < end of copied text >

## 2018-07-06 NOTE — PROGRESS NOTE ADULT - ASSESSMENT
POD #5 ex-lap, SBR, hernia repair after being admitted for SBO  Clinically stable; no current cardiovascular complaints  BPs better controlled on oral regimen which has been resumed  Pain control  No further cardiac testing needed at this time.  Continuing with IV abx (on Zosyn) for now.  Will follow with you. POD #8 ex-lap, SBR, hernia repair after being admitted for SBO  Clinically stable; no current cardiovascular complaints  BPs better controlled on oral regimen which has been resumed  Pain control  No further cardiac testing needed at this time.  Continuing with IV abx (on Zosyn) for now.  Will follow with you.

## 2018-07-06 NOTE — PROGRESS NOTE ADULT - ASSESSMENT
87y Female s/p ex lap, SBR, inguinal hernia repair now POD 7. On regular diet. Pt doing well pending insurance for discharge.     Plan:  - regular diet and follow, w/ 2 ensures  - pain control  - C.diff neg. will f/u GI PCR  - abnormal ekg, f/u cards as outpt  - uro will f/u as outpt  - Incentive spirometer/OOB/Ambulate/PT  - c/w vte ppx  - appreciate ID recs regarding outpt abx tx, Bactrim  - per niece, plan d/c to rehab      Surgery, B-Team  Pager: [phone]

## 2018-07-06 NOTE — PROGRESS NOTE ADULT - ASSESSMENT
88 y/o female with PMHx Seizure disorder, HTN, CVA, GI bleed, CHF, UTIs, HLD, syncope, pacemaker,  shunt, dementia, presents to the ED 06/28 from [facility] rehab with nausea, vomiting, weakness, and dizziness for 1-2 weeks.  Found to have SBO with transition to collapsed loops in right inguinal hernia, s/p exp lap, smallbowel resection and right inguinal hernia repair POD# 6  She also had enterobacter cloacae bacteremia 3/4 bottles and in urine 06/28, cleared 07/01 likely secondary to urine. On chart review she has had  enterobacter cloacae/absuriae UTI and VRE UTI, microccus bacteremia in the past as wel. She has a Right sided stent-which is present at least since last year

## 2018-07-06 NOTE — PROGRESS NOTE ADULT - SUBJECTIVE AND OBJECTIVE BOX
GENERAL SURGERY DAILY PROGRESS NOTE:     Subjective:    Pt was seen and examined during rounds. Pt feeling well, tolerating diet. Denies n/v. +/+. Staples taken out in anticipation of discharge.           Objective:    NAD, awake and alert  Respirations nonlabored  Abdomen soft, nontender, nondistended  No guarding or rebound tenderness      MEDICATIONS  (STANDING):  amLODIPine   Tablet 5 milliGRAM(s) Oral daily  atorvastatin 40 milliGRAM(s) Oral at bedtime  carvedilol 12.5 milliGRAM(s) Oral every 12 hours  donepezil 5 milliGRAM(s) Oral at bedtime  enoxaparin Injectable 40 milliGRAM(s) SubCutaneous daily  lamoTRIgine 100 milliGRAM(s) Oral daily  latanoprost 0.005% Ophthalmic Solution 1 Drop(s) Both EYES at bedtime  levETIRAcetam 1000 milliGRAM(s) Oral two times a day  losartan 50 milliGRAM(s) Oral daily  timolol 0.5% Solution 1 Drop(s) Both EYES daily  trimethoprim  160 mG/sulfamethoxazole 800 mG 1 Tablet(s) Oral two times a day    MEDICATIONS  (PRN):  acetaminophen   Tablet. 325 milliGRAM(s) Oral every 4 hours PRN Mild Pain (1 - 3)      Vital Signs Last 24 Hrs  T(C): 36.4 (2018 16:56), Max: 37.1 (2018 22:41)  T(F): 97.6 (2018 16:56), Max: 98.8 (2018 22:41)  HR: 74 (2018 16:56) (69 - 83)  BP: 151/79 (2018 16:56) (151/79 - 175/87)  BP(mean): --  RR: 18 (2018 16:56) (16 - 18)  SpO2: 98% (2018 16:56) (97% - 100%)    I&O's Detail    2018 07:01  -  2018 07:00  --------------------------------------------------------  IN:    Oral Fluid: 800 mL  Total IN: 800 mL    OUT:    Indwelling Catheter - Urethral: 1150 mL    Intermittent Catheterization - Urethral: 1150 mL  Total OUT: 2300 mL    Total NET: -1500 mL      2018 07:01  -  2018 18:10  --------------------------------------------------------  IN:  Total IN: 0 mL    OUT:    Indwelling Catheter - Urethral: 1250 mL  Total OUT: 1250 mL    Total NET: -1250 mL          Daily     Daily Weight in k.2 (2018 17:20)    LABS:                        10.0   5.54  )-----------( 358      ( 2018 06:20 )             30.6     07-06    139  |  103  |  7   ----------------------------<  98  3.5   |  26  |  0.69    Ca    8.4      2018 05:58  Phos  2.5     07-06  Mg     1.6     07-06            RADIOLOGY & ADDITIONAL STUDIES:

## 2018-07-06 NOTE — PROGRESS NOTE ADULT - SUBJECTIVE AND OBJECTIVE BOX
JOSE CHAUDHARI 87y [id number]    Patient is a 87y old  Female who presents with a chief complaint of Incarcerated R inguinal hernia (05 Jul 2018 14:46)      Follow Up/CC:  ID following for    Interval History/ROS:    Allergies    No Known Allergies    Intolerances        ANTIMICROBIALS:  piperacillin/tazobactam IVPB. 3.375 every 8 hours      MEDICATIONS  (STANDING):  amLODIPine   Tablet 5 milliGRAM(s) Oral daily  atorvastatin 40 milliGRAM(s) Oral at bedtime  carvedilol 12.5 milliGRAM(s) Oral every 12 hours  donepezil 5 milliGRAM(s) Oral at bedtime  enoxaparin Injectable 40 milliGRAM(s) SubCutaneous daily  lamoTRIgine 100 milliGRAM(s) Oral daily  latanoprost 0.005% Ophthalmic Solution 1 Drop(s) Both EYES at bedtime  levETIRAcetam 1000 milliGRAM(s) Oral two times a day  losartan 50 milliGRAM(s) Oral daily  piperacillin/tazobactam IVPB. 3.375 Gram(s) IV Intermittent every 8 hours  timolol 0.5% Solution 1 Drop(s) Both EYES daily    MEDICATIONS  (PRN):  acetaminophen   Tablet. 325 milliGRAM(s) Oral every 4 hours PRN Mild Pain (1 - 3)        Vital Signs Last 24 Hrs  T(C): 36.9 (06 Jul 2018 10:08), Max: 37.1 (05 Jul 2018 22:41)  T(F): 98.4 (06 Jul 2018 10:08), Max: 98.8 (05 Jul 2018 22:41)  HR: 69 (06 Jul 2018 10:08) (69 - 83)  BP: 155/82 (06 Jul 2018 10:08) (154/94 - 175/87)  BP(mean): --  RR: 18 (06 Jul 2018 10:08) (16 - 18)  SpO2: 99% (06 Jul 2018 10:08) (97% - 100%)    CBC Full  -  ( 05 Jul 2018 06:20 )  WBC Count : 5.54 K/uL  Hemoglobin : 10.0 g/dL  Hematocrit : 30.6 %  Platelet Count - Automated : 358 K/uL  Mean Cell Volume : 90.0 fL  Mean Cell Hemoglobin : 29.4 pg  Mean Cell Hemoglobin Concentration : 32.7 %  Auto Neutrophil # : x  Auto Lymphocyte # : x  Auto Monocyte # : x  Auto Eosinophil # : x  Auto Basophil # : x  Auto Neutrophil % : x  Auto Lymphocyte % : x  Auto Monocyte % : x  Auto Eosinophil % : x  Auto Basophil % : x    07-06    139  |  103  |  7   ----------------------------<  98  3.5   |  26  |  0.69    Ca    8.4      06 Jul 2018 05:58  Phos  2.5     07-06  Mg     1.6     07-06        MICROBIOLOGY:  URINE CATHETER  07-02-18 --  --  --      BLOOD PERIPHERAL  07-01-18 --  --  --      BLOOD PERIPHERAL  06-27-18 --  --  BLOOD CULTURE PCR  Enterobacter cloacae      URINE MIDSTREAM  06-27-18 --  --  Enterobacter cloacae      RADIOLOGY

## 2018-07-06 NOTE — DIETITIAN INITIAL EVALUATION ADULT. - OTHER INFO
Pt seen for Length of stay. Pt 86 yo female with incarcerated hernia s/p R inguinal hernia repair, exploratory laparotomy, small bowel resection 6/28. Pt appears alert; no family @ bed side. Per Pt her appetite not that well lately. Noted Pt ate ~50% of lunch today per tray waste observation. No chewing or swallowing difficulty voiced; no report of nausea/vomiting/diarrhea @ present. Pt usually eats Regular food reported. Pt also stated she lost weight PTA, but unable to quantify. Pt not sure about her height or current body weight either. Food preferences discussed with Pt. Spoke to nurse, sx team B. RDN remains available, Pt made aware.

## 2018-07-06 NOTE — DIETITIAN INITIAL EVALUATION ADULT. - NS AS NUTRI INTERV MEALS SNACK
1. Suggest: PO diet rx: Regular; PO supplement: Ensure Enlive 8oz. 2x daily (will provide additional ~700 Kcal, ~40 gm Protein);                 2. Encourage & assist Pt with meals; Monitor PO diet tolerance; Honor food preferences;           3. Monitor labs, weights, hydration status;/Other (specify)

## 2018-07-07 PROCEDURE — 99232 SBSQ HOSP IP/OBS MODERATE 35: CPT

## 2018-07-07 RX ORDER — POTASSIUM CHLORIDE 20 MEQ
40 PACKET (EA) ORAL EVERY 4 HOURS
Qty: 0 | Refills: 0 | Status: COMPLETED | OUTPATIENT
Start: 2018-07-07 | End: 2018-07-07

## 2018-07-07 RX ADMIN — CARVEDILOL PHOSPHATE 12.5 MILLIGRAM(S): 80 CAPSULE, EXTENDED RELEASE ORAL at 05:42

## 2018-07-07 RX ADMIN — DONEPEZIL HYDROCHLORIDE 5 MILLIGRAM(S): 10 TABLET, FILM COATED ORAL at 21:42

## 2018-07-07 RX ADMIN — AMLODIPINE BESYLATE 5 MILLIGRAM(S): 2.5 TABLET ORAL at 05:42

## 2018-07-07 RX ADMIN — Medication 40 MILLIEQUIVALENT(S): at 10:27

## 2018-07-07 RX ADMIN — Medication 40 MILLIEQUIVALENT(S): at 14:06

## 2018-07-07 RX ADMIN — LATANOPROST 1 DROP(S): 0.05 SOLUTION/ DROPS OPHTHALMIC; TOPICAL at 21:42

## 2018-07-07 RX ADMIN — LOSARTAN POTASSIUM 50 MILLIGRAM(S): 100 TABLET, FILM COATED ORAL at 05:42

## 2018-07-07 RX ADMIN — Medication 1 TABLET(S): at 05:41

## 2018-07-07 RX ADMIN — LEVETIRACETAM 1000 MILLIGRAM(S): 250 TABLET, FILM COATED ORAL at 17:54

## 2018-07-07 RX ADMIN — CARVEDILOL PHOSPHATE 12.5 MILLIGRAM(S): 80 CAPSULE, EXTENDED RELEASE ORAL at 17:54

## 2018-07-07 RX ADMIN — LAMOTRIGINE 100 MILLIGRAM(S): 25 TABLET, ORALLY DISINTEGRATING ORAL at 12:10

## 2018-07-07 RX ADMIN — Medication 1 DROP(S): at 21:42

## 2018-07-07 RX ADMIN — LEVETIRACETAM 1000 MILLIGRAM(S): 250 TABLET, FILM COATED ORAL at 05:42

## 2018-07-07 RX ADMIN — ENOXAPARIN SODIUM 40 MILLIGRAM(S): 100 INJECTION SUBCUTANEOUS at 12:10

## 2018-07-07 RX ADMIN — Medication 1 TABLET(S): at 17:54

## 2018-07-07 RX ADMIN — ATORVASTATIN CALCIUM 40 MILLIGRAM(S): 80 TABLET, FILM COATED ORAL at 21:42

## 2018-07-07 NOTE — PROGRESS NOTE ADULT - SUBJECTIVE AND OBJECTIVE BOX
Cardiology/Vascular Medicine Inpatient Progress Note    No current complaints  Denies having pain  No active cardiac complaints  BPs better controlled.  On oral regimen again.    POD #9 s/p ex lap, SBR, inguinal hernia repair.  On exam, appears euvolemic on exam.  EKG yesterday showed V-paced rhythm. No other notable findings on EKG. No evidence of ischemia noted on EKG.    Vital Signs Last 24 Hrs  T(C): 37 (2018 05:39), Max: 37.3 (2018 01:32)  T(F): 98.6 (2018 05:39), Max: 99.2 (2018 01:32)  HR: 79 (2018 05:39) (69 - 79)  BP: 161/95 (2018 05:39) (147/80 - 161/95)  BP(mean): --  RR: 16 (2018 05:39) (16 - 18)  SpO2: 100% (2018 05:39) (95% - 100%)    Appearance: Elderly woman, thin  HEENT:   Normal oral mucosa, PERRL, EOMI	  Lymphatic: No lymphadenopathy  Cardiovascular: Reg S1S2  Respiratory: Decreased BS b/l bases	  Psychiatry: Awake, does not answer questions fully  Gastrointestinal:  Soft, Non-tender, + BS	  Skin: No rashes, No ecchymoses, No cyanosis	  Neurologic: Non-focal  +Musa    MEDICATIONS  (STANDING):  amLODIPine   Tablet 5 milliGRAM(s) Oral daily  atorvastatin 40 milliGRAM(s) Oral at bedtime  carvedilol 12.5 milliGRAM(s) Oral every 12 hours  donepezil 5 milliGRAM(s) Oral at bedtime  enoxaparin Injectable 40 milliGRAM(s) SubCutaneous daily  lamoTRIgine 100 milliGRAM(s) Oral daily  latanoprost 0.005% Ophthalmic Solution 1 Drop(s) Both EYES at bedtime  levETIRAcetam 1000 milliGRAM(s) Oral two times a day  losartan 50 milliGRAM(s) Oral daily  timolol 0.5% Solution 1 Drop(s) Both EYES daily  trimethoprim  160 mG/sulfamethoxazole 800 mG 1 Tablet(s) Oral two times a day    MEDICATIONS  (PRN):  acetaminophen   Tablet. 325 milliGRAM(s) Oral every 4 hours PRN Mild Pain (1 - 3)    LABS:	 	                 139  |  103  |  7   ----------------------------<  98  3.5   |  26  |  0.69    Ca    8.4      2018 05:58  Phos  2.5     07-06  Mg     1.6     07-06          < from: TTE Echo Doppler w/o Cont (17 @ 10:24) >     EXAM:  TTE WO CON COMPLETE W DOPPL         PROCEDURE DATE:  2017        INTERPRETATION:  REPORT:    TRANSTHORACIC ECHOCARDIOGRAM REPORT           Patient Name:   JOSE CHAUDHARI Patient Location: [facility] Rec #:  [id number]    Accession #:   [id number]  Account #:                    Height:           66.0 in 167.6 cm  YOB: 1930      Weight:           119.0 lb 54.00 kg  Patient Age:    86 years      BSA:              1.60 m²  Patient Gender: F             BP:145/75 mmHg        Date of Exam: 2017 9:48:23 AM  Sonographer:  JEFF     Procedure:     2D Echo/Doppler/Color Doppler Complete.  Indications:   Cerebral infarction, unspecified - I63.9  Diagnosis:     Cerebral infarction, unspecified - I63.9  Study Details: Technically good study.           2D AND M-MODE MEASUREMENTS (normal ranges within parentheses):  Left Ventricle:                  Normal   Aorta/Left Atrium:            Normal  IVSd (2D):              0.96 cm (0.7-1.1) Aortic Root (2D):  2.98 cm   (2.4-3.7)  LVPWd (2D):             0.94 cm (0.7-1.1) Left Atrium (2D):  4.10 cm   (1.9-4.0)  LVIDd (2D):             3.26 cm (3.4-5.7) Right Ventricle:  LVIDs (2D):             2.32 cm           TAPSE:           2.18 cm  LV FS (2D):  28.9 %   (>25%)  Relative Wall Thickness  0.58    (<0.42)     LV DIASTOLIC FUNCTION:  MV Peak E: 0.53 m/s Decel Time: 317 msec  MV Peak A: 0.74 m/s  E/A Ratio: 0.72     SPECTRAL DOPPLER ANALYSIS (where applicable):  Mitral Valve:  MV P1/2 Time: 92.06 msec  MV Area, PHT: 2.39 cm²     Aortic Valve: AoV Max Mark: 1.84 m/s AoV Peak P.5 mmHg AoV Mean P.3 mmHg     LVOT Vmax: 0.81 m/s LVOT VTI: 0.142 m LVOT Diameter:     Aortic Insufficiency:  AI Half-time:  860 msec  AI Decel Rate: 1.61 m/s²     Tricuspid Valve and PA/RV Systolic Pressure: TR Max Velocity: 2.46 m/s   RA Pressure: 5 mmHg RVSP/PASP: 29.3 mmHg        PHYSICIAN INTERPRETATION:  Left Ventricle: The left ventricular internal cavity size is normal.   There is mild concentric left ventricular hypertrophy.  Global LV systolic function was normal. Left ventricular ejection   fraction, by visual estimation, is 60 to 65%. Spectral Doppler shows   impaired relaxation pattern of left ventricular myocardial filling (Grade   I diastolic dysfunction).  Right Ventricle: Normal right ventricular size and function.  Left Atrium: Mildly enlarged left atrium.  Right Atrium: The right atrium is mildly dilated.  Pericardium: There is no evidence of pericardial effusion.  Mitral Valve: Thickening and calcification of the anterior and posterior   mitral valve leaflets. Mitral leaflet mobility is normal. There is mild   mitral annular calcification. Mild mitral valve regurgitation is seen.  Tricuspid Valve: The tricuspid valve is not well seen. Mild tricuspid   regurgitation is visualized.  Aortic Valve: The aortic valve is trileaflet. Sclerotic aortic valve with   normal opening. Mild aortic valve regurgitation is seen.  Pulmonic Valve: The pulmonic valve was not well visualized.Trace   pulmonic valve regurgitation.  Aorta: Aortic root measured at Sinus of Valsalva is normal. There is mild   aortic root calcification.  Venous: The inferior vena cava was normal sized, with respiratory size   variation greater than 50%.  Additional Comments: A pacer wire is visualized in the right atrium and   right ventricle.        Summary:   1. Left ventricular ejection fraction, by visual estimation, is 60 to   65%.   2. Technically good study.   3. Normal global left ventricular systolic function.   4. Normal left ventricular internal cavity size.   5. Spectral Doppler shows impaired relaxation pattern of left   ventricular myocardial filling (Grade I diastolic dysfunction).   6. There is mild concentric left ventricular hypertrophy.   7. Normal right ventricular size and function.   8. Mildly dilated right atrium.   9. There is no evidence of pericardial effusion.  10. Mild mitral annular calcification.  11. Mild mitral valve regurgitation.  12. Thickening and calcification of the anterior and posterior mitral   valve leaflets.  13. Mild aortic regurgitation.  14. Sclerotic aortic valve with normal opening.  15. Mildly enlarged left atrium.  16. There is mild aortic root calcification.     Vikas Hopkins MD FAC, SKYLAR,FACP  Electronically signed on 2017 at 1:14:59 PM             < from: CT Abdomen and Pelvis w/ IV Cont (18 @ 02:58) >    EXAM:  CT ABDOMEN AND PELVIS IC        PROCEDURE DATE:  2018         INTERPRETATION:  CLINICAL INFORMATION: Nausea and vomiting with   epigastric tenderness    COMPARISON: CT of the abdomen and pelvis dated 2017    PROCEDURE:   CT of theAbdomen and Pelvis was performed with intravenous contrast.   Intravenous contrast: 90 ml Omnipaque 350. 10 ml discarded.  Oral contrast: None.  Sagittal and coronal reformats were performed.    FINDINGS:    LOWER CHEST: Coronary artery calcifications.    LIVER: Scattered hypodensities which are too small to characterize.  BILE DUCTS: Mild intrahepatic and extrahepatic biliary ductal dilatation.  GALLBLADDER: Distended.  SPLEEN: Within normal limits.  PANCREAS: Prominent pancreatic duct measuring5 mm, overall stable from   2017.  ADRENALS: Thickening of the left adrenal gland.  KIDNEYS/URETERS: A right-sided double-J ureteral stent is again noted,   unchanged from . Unchanged mild to moderate right-sided   hydroureteronephrosis. Bilateral renal cysts.    BLADDER: Within normal limits.  REPRODUCTIVE ORGANS: Calcified and noncalcified uterine fibroids. A   vaginal pessary is noted.    BOWEL: Diffusely dilated small bowel with a transition to collapsed loops   in a right inguinal hernia. The large bowel is collapsed.  PERITONEUM: No ascites.  catheter terminating in the right lower   quadrant.  VESSELS:  Atherosclerotic calcifications.  RETROPERITONEUM: No lymphadenopathy.    ABDOMINAL WALL: Left chest and abdominal wall  shunt catheter.  BONES: Advanced degenerative changes of the visualized lumbosacral spine.   Chronic fracture deformity of right superior pubic ramus.    IMPRESSION:     Small bowel obstruction with a transition to collapsed loops in a right   inguinal hernia. Small volume interloop edema.    Right-sided double-J ureteral stent with mild to moderate right   hydroureteronephrosis. This is not significantly changed from the prior   study dated 2017.    Findings were discussed with Dr. Cindy Miller at approximately   4:02 AM dated 2018 with read back.      INDIA MILLER M.D., RADIOLOGY RESIDENT  This document has been electronically signed.  DALE KNUTSON M.D., ATTENDING RADIOLOGIST  This document has been electronicallysigned. 2018  4:05AM                  < end of copied text >

## 2018-07-07 NOTE — PROGRESS NOTE ADULT - ASSESSMENT
POD #9 ex-lap, SBR, hernia repair after being admitted for SBO  Clinically stable; no current cardiovascular complaints  BPs better controlled on oral regimen which has been resumed, although still recently hypertensive  Can increase dose of Coreg to 25 BID  Pain control  No further cardiac testing needed at this time.  S/p IV abx

## 2018-07-07 NOTE — PROGRESS NOTE ADULT - SUBJECTIVE AND OBJECTIVE BOX
JOSE CHAUDHARI 87y [id number]    Patient is a 87y old  Female who presents with a chief complaint of Incarcerated R inguinal hernia (05 Jul 2018 14:46)      Follow Up/CC:  ID following for uti    Interval History/ROS: c/o nausea    Allergies    No Known Allergies    Intolerances        ANTIMICROBIALS:  trimethoprim  160 mG/sulfamethoxazole 800 mG 1 two times a day      MEDICATIONS  (STANDING):  amLODIPine   Tablet 5 milliGRAM(s) Oral daily  atorvastatin 40 milliGRAM(s) Oral at bedtime  carvedilol 12.5 milliGRAM(s) Oral every 12 hours  donepezil 5 milliGRAM(s) Oral at bedtime  enoxaparin Injectable 40 milliGRAM(s) SubCutaneous daily  lamoTRIgine 100 milliGRAM(s) Oral daily  latanoprost 0.005% Ophthalmic Solution 1 Drop(s) Both EYES at bedtime  levETIRAcetam 1000 milliGRAM(s) Oral two times a day  losartan 50 milliGRAM(s) Oral daily  timolol 0.5% Solution 1 Drop(s) Both EYES daily  trimethoprim  160 mG/sulfamethoxazole 800 mG 1 Tablet(s) Oral two times a day    MEDICATIONS  (PRN):  acetaminophen   Tablet. 325 milliGRAM(s) Oral every 4 hours PRN Mild Pain (1 - 3)        Vital Signs Last 24 Hrs  T(C): 37.1 (07 Jul 2018 10:00), Max: 37.3 (07 Jul 2018 01:32)  T(F): 98.8 (07 Jul 2018 10:00), Max: 99.2 (07 Jul 2018 01:32)  HR: 67 (07 Jul 2018 10:00) (67 - 79)  BP: 157/88 (07 Jul 2018 10:00) (147/80 - 161/95)  BP(mean): --  RR: 18 (07 Jul 2018 10:00) (16 - 18)  SpO2: 100% (07 Jul 2018 10:00) (95% - 100%)      07-06    139  |  103  |  7   ----------------------------<  98  3.5   |  26  |  0.69    Ca    8.4      06 Jul 2018 05:58  Phos  2.5     07-06  Mg     1.6     07-06            MICROBIOLOGY:  FECES  07-04-18 --  --  --      URINE CATHETER  07-02-18 --  --  --      BLOOD PERIPHERAL  07-01-18 --  --  --      BLOOD PERIPHERAL  06-27-18 --  --  BLOOD CULTURE PCR  Enterobacter cloacae      URINE MIDSTREAM  06-27-18 --  --  Enterobacter cloacae    RADIOLOGY

## 2018-07-07 NOTE — PROVIDER CONTACT NOTE (OTHER) - ACTION/TREATMENT ORDERED:
Will continue to monitor patient.
Assessment for NG tube reinsertion. Will continue to monitor patient in the meantime.
Continue to monitor patient
Give morning BP medications , continue to monitor
Will continue to monitor.
await order for straight cath
bladder scan again in 4 hours
continue to monitor patient as can be from Musa being removed. Draw labs in am per order to monitor H&H. Give am metoprolol at scheduled time and monitor blood pressure. provider aware about abdomen.
continue to monitor patient as can be from Musa being removed. H&H stable at this time. Draw labs in am per order.
continue to monitor. No further action/treatment ordered at this time
no further action/treatment ordered
recheck bp after medication and assess for any s/s
surg team to assess site

## 2018-07-07 NOTE — PROGRESS NOTE ADULT - SUBJECTIVE AND OBJECTIVE BOX
GENERAL SURGERY DAILY PROGRESS NOTE:     Subjective:    Pt seen and examined on morning rounds. No acute events overnight. VSS. She is tolerating regular diet. Passing gas and having BMs.     Objective:    NAD, awake and alert  Respirations nonlabored  Abdomen soft, nontender, nondistended. Staples were removed yesterday and incisions covered with steri-strips.  No guarding or rebound tenderness     MEDICATIONS  (STANDING):  amLODIPine   Tablet 5 milliGRAM(s) Oral daily  atorvastatin 40 milliGRAM(s) Oral at bedtime  carvedilol 12.5 milliGRAM(s) Oral every 12 hours  donepezil 5 milliGRAM(s) Oral at bedtime  enoxaparin Injectable 40 milliGRAM(s) SubCutaneous daily  lamoTRIgine 100 milliGRAM(s) Oral daily  latanoprost 0.005% Ophthalmic Solution 1 Drop(s) Both EYES at bedtime  levETIRAcetam 1000 milliGRAM(s) Oral two times a day  losartan 50 milliGRAM(s) Oral daily  potassium chloride    Tablet ER 40 milliEquivalent(s) Oral every 4 hours  timolol 0.5% Solution 1 Drop(s) Both EYES daily  trimethoprim  160 mG/sulfamethoxazole 800 mG 1 Tablet(s) Oral two times a day    MEDICATIONS  (PRN):  acetaminophen   Tablet. 325 milliGRAM(s) Oral every 4 hours PRN Mild Pain (1 - 3)      Vital Signs Last 24 Hrs  T(C): 37 (2018 05:39), Max: 37.3 (2018 01:32)  T(F): 98.6 (2018 05:39), Max: 99.2 (2018 01:32)  HR: 79 (2018 05:39) (69 - 79)  BP: 161/95 (2018 05:39) (147/80 - 161/95)  BP(mean): --  RR: 16 (2018 05:39) (16 - 18)  SpO2: 100% (2018 05:39) (95% - 100%)    I&O's Detail    2018 07:01  -  2018 07:00  --------------------------------------------------------  IN:    Oral Fluid: 120 mL  Total IN: 120 mL    OUT:    Indwelling Catheter - Urethral: 1750 mL  Total OUT: 1750 mL    Total NET: -1630 mL      Daily Weight in k.7 (2018 01:32)    LABS:        139  |  103  |  7   ----------------------------<  98  3.5   |  26  |  0.69    Ca    8.4      2018 05:58  Phos  2.5     -  Mg     1.6

## 2018-07-07 NOTE — PROGRESS NOTE ADULT - ASSESSMENT
87y Female s/p ex lap, SBR, inguinal hernia repair now POD 8. Tolerating regular diet. Pt doing well pending insurance for discharge.     Plan:  - f/u cards as outpt  - uro will f/u as outpt  - Incentive spirometer/OOB/Ambulate/PT  - c/w vte ppx  - Per ID, d/c on Bactrim double strength 1 tab bid 6days  - per niece, plan d/c to rehab      Surgery, B-Team  Pager: [phone]

## 2018-07-07 NOTE — PROVIDER CONTACT NOTE (OTHER) - ASSESSMENT
patient has red sediment in urine. Urine looks blood tinged and patient does not complain fo any pain at this time.

## 2018-07-07 NOTE — PROGRESS NOTE ADULT - ASSESSMENT
86 y/o female with PMHx Seizure disorder, HTN, CVA, GI bleed, CHF, UTIs, HLD, syncope, pacemaker,  shunt, dementia, presents to the ED 06/28 from [facility] rehab with nausea, vomiting, weakness, and dizziness for 1-2 weeks.  Found to have SBO with transition to collapsed loops in right inguinal hernia, s/p exp lap, smallbowel resection and right inguinal hernia repair POD# 6  She also had enterobacter cloacae bacteremia 3/4 bottles and in urine 06/28, cleared 07/01 likely secondary to urine. On chart review she has had  enterobacter cloacae/absuriae UTI and VRE UTI, microccus bacteremia in the past as wel. She has a Right sided stent-which is present at least since last year

## 2018-07-08 PROCEDURE — 99232 SBSQ HOSP IP/OBS MODERATE 35: CPT

## 2018-07-08 RX ADMIN — AMLODIPINE BESYLATE 5 MILLIGRAM(S): 2.5 TABLET ORAL at 06:14

## 2018-07-08 RX ADMIN — ENOXAPARIN SODIUM 40 MILLIGRAM(S): 100 INJECTION SUBCUTANEOUS at 10:52

## 2018-07-08 RX ADMIN — LAMOTRIGINE 100 MILLIGRAM(S): 25 TABLET, ORALLY DISINTEGRATING ORAL at 10:53

## 2018-07-08 RX ADMIN — Medication 1 TABLET(S): at 06:14

## 2018-07-08 RX ADMIN — ATORVASTATIN CALCIUM 40 MILLIGRAM(S): 80 TABLET, FILM COATED ORAL at 22:02

## 2018-07-08 RX ADMIN — Medication 1 DROP(S): at 22:02

## 2018-07-08 RX ADMIN — DONEPEZIL HYDROCHLORIDE 5 MILLIGRAM(S): 10 TABLET, FILM COATED ORAL at 22:02

## 2018-07-08 RX ADMIN — LOSARTAN POTASSIUM 50 MILLIGRAM(S): 100 TABLET, FILM COATED ORAL at 06:14

## 2018-07-08 RX ADMIN — LEVETIRACETAM 1000 MILLIGRAM(S): 250 TABLET, FILM COATED ORAL at 06:14

## 2018-07-08 RX ADMIN — LATANOPROST 1 DROP(S): 0.05 SOLUTION/ DROPS OPHTHALMIC; TOPICAL at 22:02

## 2018-07-08 RX ADMIN — CARVEDILOL PHOSPHATE 12.5 MILLIGRAM(S): 80 CAPSULE, EXTENDED RELEASE ORAL at 17:15

## 2018-07-08 RX ADMIN — Medication 1 TABLET(S): at 17:15

## 2018-07-08 RX ADMIN — LEVETIRACETAM 1000 MILLIGRAM(S): 250 TABLET, FILM COATED ORAL at 17:15

## 2018-07-08 RX ADMIN — CARVEDILOL PHOSPHATE 12.5 MILLIGRAM(S): 80 CAPSULE, EXTENDED RELEASE ORAL at 06:14

## 2018-07-08 NOTE — PROGRESS NOTE ADULT - ASSESSMENT
87y Female s/p ex lap, SBR, inguinal hernia repair now POD 9. Tolerating regular diet. Pt doing well pending insurance for discharge.     Plan:  - f/u cards as outpt  - uro will f/u as outpt  - Incentive spirometer/OOB/Ambulate/PT  - c/w vte ppx  - Per ID, d/c on Bactrim double strength 1 tab bid 6days  - per niece, plan d/c to rehab      Surgery, B-Team  Pager: [phone]

## 2018-07-08 NOTE — PROGRESS NOTE ADULT - ASSESSMENT
88 y/o female with PMHx Seizure disorder, HTN, CVA, GI bleed, CHF, UTIs, HLD, syncope, pacemaker,  shunt, dementia, presents to the ED 06/28 from [facility] rehab with nausea, vomiting, weakness, and dizziness for 1-2 weeks.  Found to have SBO with transition to collapsed loops in right inguinal hernia, s/p exp lap, smallbowel resection and right inguinal hernia repair POD# 6  She also had enterobacter cloacae bacteremia 3/4 bottles and in urine 06/28, cleared 07/01 likely secondary to urine. On chart review she has had  enterobacter cloacae/absuriae UTI and VRE UTI, microccus bacteremia in the past as wel. She has a Right sided stent-which is present at least since last year

## 2018-07-08 NOTE — PROGRESS NOTE ADULT - SUBJECTIVE AND OBJECTIVE BOX
GENERAL SURGERY DAILY PROGRESS NOTE:     Subjective:    Patient was seen and examined this morning during morning rounds. No acute events overnight. VSS overnight. She is tolerating her regular diet and having BMs.      Objective:    NAD, awake and alert  Respirations nonlabored  Abdomen soft, nontender, nondistended  No guarding or rebound tenderness     MEDICATIONS  (STANDING):  amLODIPine   Tablet 5 milliGRAM(s) Oral daily  atorvastatin 40 milliGRAM(s) Oral at bedtime  carvedilol 12.5 milliGRAM(s) Oral every 12 hours  donepezil 5 milliGRAM(s) Oral at bedtime  enoxaparin Injectable 40 milliGRAM(s) SubCutaneous daily  lamoTRIgine 100 milliGRAM(s) Oral daily  latanoprost 0.005% Ophthalmic Solution 1 Drop(s) Both EYES at bedtime  levETIRAcetam 1000 milliGRAM(s) Oral two times a day  losartan 50 milliGRAM(s) Oral daily  timolol 0.5% Solution 1 Drop(s) Both EYES daily  trimethoprim  160 mG/sulfamethoxazole 800 mG 1 Tablet(s) Oral two times a day    MEDICATIONS  (PRN):  acetaminophen   Tablet. 325 milliGRAM(s) Oral every 4 hours PRN Mild Pain (1 - 3)      Vital Signs Last 24 Hrs  T(C): 36.9 (2018 10:00), Max: 37.1 (2018 01:51)  T(F): 98.5 (2018 10:00), Max: 98.8 (2018 01:51)  HR: 67 (2018 10:00) (66 - 81)  BP: 159/81 (2018 10:00) (126/78 - 159/81)  BP(mean): --  RR: 18 (2018 10:00) (18 - 20)  SpO2: 99% (2018 10:00) (99% - 100%)    I&O's Detail    2018 07:01  -  2018 07:00  --------------------------------------------------------  IN:  Total IN: 0 mL    OUT:    Indwelling Catheter - Urethral: 750 mL  Total OUT: 750 mL    Total NET: -750 mL      2018 07:01  -  2018 11:27  --------------------------------------------------------  IN:  Total IN: 0 mL    OUT:    Indwelling Catheter - Urethral: 450 mL  Total OUT: 450 mL    Total NET: -450 mL          Daily     Daily Weight in k (2018 01:51)    LABS:                RADIOLOGY & ADDITIONAL STUDIES:

## 2018-07-08 NOTE — PROGRESS NOTE ADULT - SUBJECTIVE AND OBJECTIVE BOX
JOSE CHAUDHARI 87y [id number]    Patient is a 87y old  Female who presents with a chief complaint of Incarcerated R inguinal hernia (05 Jul 2018 14:46)      Follow Up/CC:  ID following for bacteremia    Interval History/ROS: nausea resolved, no complaints    Allergies    No Known Allergies    Intolerances        ANTIMICROBIALS:  trimethoprim  160 mG/sulfamethoxazole 800 mG 1 two times a day      MEDICATIONS  (STANDING):  amLODIPine   Tablet 5 milliGRAM(s) Oral daily  atorvastatin 40 milliGRAM(s) Oral at bedtime  carvedilol 12.5 milliGRAM(s) Oral every 12 hours  donepezil 5 milliGRAM(s) Oral at bedtime  enoxaparin Injectable 40 milliGRAM(s) SubCutaneous daily  lamoTRIgine 100 milliGRAM(s) Oral daily  latanoprost 0.005% Ophthalmic Solution 1 Drop(s) Both EYES at bedtime  levETIRAcetam 1000 milliGRAM(s) Oral two times a day  losartan 50 milliGRAM(s) Oral daily  timolol 0.5% Solution 1 Drop(s) Both EYES daily  trimethoprim  160 mG/sulfamethoxazole 800 mG 1 Tablet(s) Oral two times a day    MEDICATIONS  (PRN):  acetaminophen   Tablet. 325 milliGRAM(s) Oral every 4 hours PRN Mild Pain (1 - 3)        Vital Signs Last 24 Hrs  T(C): 36.9 (08 Jul 2018 06:10), Max: 37.1 (07 Jul 2018 10:00)  T(F): 98.5 (08 Jul 2018 06:10), Max: 98.8 (07 Jul 2018 10:00)  HR: 77 (08 Jul 2018 06:10) (66 - 81)  BP: 142/88 (08 Jul 2018 06:10) (126/78 - 157/88)  BP(mean): --  RR: 18 (08 Jul 2018 06:10) (18 - 20)  SpO2: 99% (08 Jul 2018 01:51) (99% - 100%)                  MICROBIOLOGY:  FECES  07-04-18 --  --  --      URINE CATHETER  07-02-18 --  --  --      BLOOD PERIPHERAL  07-01-18 --  --  --      BLOOD PERIPHERAL  06-27-18 --  --  BLOOD CULTURE PCR  Enterobacter cloacae      URINE MIDSTREAM  06-27-18 --  --  Enterobacter cloacae    RADIOLOGY

## 2018-07-09 VITALS
TEMPERATURE: 98 F | HEART RATE: 70 BPM | RESPIRATION RATE: 18 BRPM | DIASTOLIC BLOOD PRESSURE: 78 MMHG | SYSTOLIC BLOOD PRESSURE: 136 MMHG | OXYGEN SATURATION: 98 %

## 2018-07-09 PROCEDURE — 99232 SBSQ HOSP IP/OBS MODERATE 35: CPT

## 2018-07-09 RX ADMIN — LOSARTAN POTASSIUM 50 MILLIGRAM(S): 100 TABLET, FILM COATED ORAL at 05:44

## 2018-07-09 RX ADMIN — LAMOTRIGINE 100 MILLIGRAM(S): 25 TABLET, ORALLY DISINTEGRATING ORAL at 10:54

## 2018-07-09 RX ADMIN — CARVEDILOL PHOSPHATE 12.5 MILLIGRAM(S): 80 CAPSULE, EXTENDED RELEASE ORAL at 05:44

## 2018-07-09 RX ADMIN — ENOXAPARIN SODIUM 40 MILLIGRAM(S): 100 INJECTION SUBCUTANEOUS at 10:54

## 2018-07-09 RX ADMIN — Medication 1 TABLET(S): at 05:44

## 2018-07-09 RX ADMIN — AMLODIPINE BESYLATE 5 MILLIGRAM(S): 2.5 TABLET ORAL at 05:44

## 2018-07-09 RX ADMIN — LEVETIRACETAM 1000 MILLIGRAM(S): 250 TABLET, FILM COATED ORAL at 05:44

## 2018-07-09 NOTE — PROGRESS NOTE ADULT - PROBLEM SELECTOR PLAN 1
-stable  -repeat bcx negative  -day 9 of abx  -can change to bactrim DS 1 tab po BID in am x 5 more days (total 14 days abx)  -avoiding FQs due to prolonged QTC
-stable  -repeat bcx negative  -day 12 of abx  -cont bactrim DS 1 tab po BID x 2 more days (total 14 days abx)  -avoiding FQs due to prolonged QTC
-stable  -repeat bcx negative  -day 10 of abx  -cont bactrim DS 1 tab po BID x 4 more days (total 14 days abx)  -avoiding FQs due to prolonged QTC
-stable  -repeat bcx negative  -day 8 of abx  -can change to bactrim DS 1 tab po BID in am x 6 more days (total 14 days abx)  -avoiding FQs due to prolonged QTC
-stable  -repeat bcx negative  -day 11 of abx  -cont bactrim DS 1 tab po BID x 3 more days (total 14 days abx)  -avoiding FQs due to prolonged QTC

## 2018-07-09 NOTE — PROGRESS NOTE ADULT - ASSESSMENT
86 y/o female with PMHx Seizure disorder, HTN, CVA, GI bleed, CHF, UTIs, HLD, syncope, pacemaker,  shunt, dementia, presents to the ED 06/28 from [facility] rehab with nausea, vomiting, weakness, and dizziness for 1-2 weeks.  Found to have SBO with transition to collapsed loops in right inguinal hernia, s/p exp lap, smallbowel resection and right inguinal hernia repair POD# 6  She also had enterobacter cloacae bacteremia 3/4 bottles and in urine 06/28, cleared 07/01 likely secondary to urine. On chart review she has had  enterobacter cloacae/absuriae UTI and VRE UTI, microccus bacteremia in the past as wel. She has a Right sided stent-which is present at least since last year

## 2018-07-09 NOTE — PROGRESS NOTE ADULT - GASTROINTESTINAL DETAILS
no distention/soft/nontender
nontender/no rigidity/no distention/soft/no guarding
no rebound tenderness/soft/nontender
no distention/no rigidity/no guarding/soft/nontender
no guarding/no rigidity/soft/nontender/no distention

## 2018-07-09 NOTE — PROGRESS NOTE ADULT - NEGATIVE ENMT SYMPTOMS
no nasal congestion/no ear pain/no throat pain
no throat pain/no nasal congestion/no ear pain
no throat pain/no ear pain/no nasal congestion
no throat pain/no nasal congestion/no ear pain
no nasal congestion/no throat pain/no ear pain

## 2018-07-09 NOTE — PROGRESS NOTE ADULT - CARDIOVASCULAR DETAILS
positive S1/positive S2

## 2018-07-09 NOTE — PROGRESS NOTE ADULT - NEGATIVE GASTROINTESTINAL SYMPTOMS
no vomiting/no diarrhea/no abdominal pain
no vomiting/no nausea/no diarrhea/no abdominal pain
no abdominal pain/no vomiting/no nausea/no diarrhea
no nausea/no abdominal pain/no vomiting/no diarrhea
no nausea/no vomiting/no diarrhea/no abdominal pain

## 2018-07-09 NOTE — PROGRESS NOTE ADULT - SUBJECTIVE AND OBJECTIVE BOX
JOSE CHAUDHARI 87y [id number]    Patient is a 87y old  Female who presents with a chief complaint of Incarcerated R inguinal hernia (05 Jul 2018 14:46)      Follow Up/CC:  ID following for bacteremia    Interval History/ROS: feels well    Allergies    No Known Allergies    Intolerances        ANTIMICROBIALS:  trimethoprim  160 mG/sulfamethoxazole 800 mG 1 two times a day      MEDICATIONS  (STANDING):  amLODIPine   Tablet 5 milliGRAM(s) Oral daily  atorvastatin 40 milliGRAM(s) Oral at bedtime  carvedilol 12.5 milliGRAM(s) Oral every 12 hours  donepezil 5 milliGRAM(s) Oral at bedtime  enoxaparin Injectable 40 milliGRAM(s) SubCutaneous daily  lamoTRIgine 100 milliGRAM(s) Oral daily  latanoprost 0.005% Ophthalmic Solution 1 Drop(s) Both EYES at bedtime  levETIRAcetam 1000 milliGRAM(s) Oral two times a day  losartan 50 milliGRAM(s) Oral daily  timolol 0.5% Solution 1 Drop(s) Both EYES daily  trimethoprim  160 mG/sulfamethoxazole 800 mG 1 Tablet(s) Oral two times a day    MEDICATIONS  (PRN):  acetaminophen   Tablet. 325 milliGRAM(s) Oral every 4 hours PRN Mild Pain (1 - 3)        Vital Signs Last 24 Hrs  T(C): 36.7 (09 Jul 2018 11:06), Max: 36.7 (08 Jul 2018 22:03)  T(F): 98 (09 Jul 2018 11:06), Max: 98 (08 Jul 2018 22:03)  HR: 66 (09 Jul 2018 11:06) (66 - 80)  BP: 142/67 (09 Jul 2018 11:06) (130/68 - 142/69)  BP(mean): --  RR: 18 (09 Jul 2018 11:06) (16 - 18)  SpO2: 97% (09 Jul 2018 11:06) (97% - 100%)                  MICROBIOLOGY:  FECES  07-04-18 --  --  --    Clostridium difficile Toxin by PCR (07.04.18 @ 13:06)    Clostridium difficile Toxin by PCR: NotDetec: The results of this test should be interpreted with  consideration of all clinical and laboratory findings. C.  difficile PCR is more sensitive and specific than EIA,  therefore, repeat testing during one episode does not  provide additional clinically useful information. One test  per episode is sufficient for determining C. difficile  infection status.    A result of C. difficile tcdB gene not detected does not  preclude the possibility of colonization with C. difficile.  Negative results may occur from improper specimen  collection, handling or storage, or because the number of  organisms in the specimen is below the analytical  sensitivity of the test.    This test is performed on the Cepheid Gene Xpert detection  system which automates and integrates sample purification,  Nucleic acid amplification and detection of the target  sequence in simple or complex samples using real-time PCR  and RT-PCR assays.      URINE CATHETER  07-02-18 --  --  --      BLOOD PERIPHERAL  07-01-18 --  --  --      BLOOD PERIPHERAL  06-27-18 --  --  BLOOD CULTURE PCR  Enterobacter cloacae      URINE MIDSTREAM  06-27-18 --  --  Enterobacter cloacae      RADIOLOGY

## 2018-07-09 NOTE — PROGRESS NOTE ADULT - NEUROLOGICAL DETAILS
alert and oriented x 3/responds to verbal commands
alert and oriented x 3/responds to verbal commands
responds to verbal commands
alert and oriented x 3/responds to verbal commands
responds to verbal commands/alert and oriented x 3

## 2018-07-09 NOTE — PROGRESS NOTE ADULT - NEGATIVE OPHTHALMOLOGIC SYMPTOMS
no blurred vision L/no blurred vision R

## 2018-07-09 NOTE — PROGRESS NOTE ADULT - PROVIDER SPECIALTY LIST ADULT
Anesthesia
Anesthesia
Cardiology
Infectious Disease
Pain Medicine
Pain Medicine
Surgery
Infectious Disease

## 2018-07-09 NOTE — PROGRESS NOTE ADULT - MS EXT PE MLT D E PC
no cyanosis/no pedal edema

## 2018-07-09 NOTE — PROGRESS NOTE ADULT - ASSESSMENT
Status post ex-lap, SBR, hernia repair after being admitted for SBO  Clinically stable; no current cardiovascular complaints  BPs better controlled on current regimen  Can increase dose of Coreg to 25 BID  Pain control  No further cardiac testing needed at this time.  S/p IV abx

## 2018-07-09 NOTE — PROGRESS NOTE ADULT - RS GEN PE MLT RESP DETAILS PC
respirations non-labored/clear to auscultation bilaterally
clear to auscultation bilaterally/respirations non-labored

## 2018-07-09 NOTE — PROGRESS NOTE ADULT - PROBLEM SELECTOR PROBLEM 1
Bacteremia due to Gram-negative bacteria

## 2018-07-09 NOTE — PROGRESS NOTE ADULT - PROBLEM SELECTOR PLAN 2
-as above  -to see  as outpt to f/u on stent

## 2018-07-09 NOTE — PROGRESS NOTE ADULT - SUBJECTIVE AND OBJECTIVE BOX
Cardiology/Vascular Medicine Inpatient Progress Note    No current complaints  Denies having pain  No active cardiac complaints  BPs better controlled.  On oral regimen again.    S/p ex lap, SBR, inguinal hernia repair.  On exam, appears euvolemic on exam.    Vital Signs Last 24 Hrs  T(C): 36.7 (2018 11:06), Max: 37 (2018 13:22)  T(F): 98 (2018 11:06), Max: 98.6 (2018 13:22)  HR: 66 (2018 11:06) (66 - 80)  BP: 142/67 (2018 11:06) (130/68 - 144/92)  BP(mean): --  RR: 18 (2018 11:06) (16 - 18)  SpO2: 97% (2018 11:06) (97% - 100%)    Appearance: Elderly woman, thin  HEENT:   Normal oral mucosa, PERRL, EOMI	  Lymphatic: No lymphadenopathy  Cardiovascular: Reg S1S2  Respiratory: Decreased BS b/l bases	  Psychiatry: Awake, does not answer questions fully  Gastrointestinal:  Soft, Non-tender, + BS	  Skin: No rashes, No ecchymoses, No cyanosis	  Neurologic: Non-focal    MEDICATIONS  (STANDING):  amLODIPine   Tablet 5 milliGRAM(s) Oral daily  atorvastatin 40 milliGRAM(s) Oral at bedtime  carvedilol 12.5 milliGRAM(s) Oral every 12 hours  donepezil 5 milliGRAM(s) Oral at bedtime  enoxaparin Injectable 40 milliGRAM(s) SubCutaneous daily  lamoTRIgine 100 milliGRAM(s) Oral daily  latanoprost 0.005% Ophthalmic Solution 1 Drop(s) Both EYES at bedtime  levETIRAcetam 1000 milliGRAM(s) Oral two times a day  losartan 50 milliGRAM(s) Oral daily  timolol 0.5% Solution 1 Drop(s) Both EYES daily  trimethoprim  160 mG/sulfamethoxazole 800 mG 1 Tablet(s) Oral two times a day    MEDICATIONS  (PRN):  acetaminophen   Tablet. 325 milliGRAM(s) Oral every 4 hours PRN Mild Pain (1 - 3)    LABS:	 	           < from: TTE Echo Doppler w/o Cont (17 @ 10:24) >     EXAM:  TTE WO CON COMPLETE W DOPPL         PROCEDURE DATE:  2017        INTERPRETATION:  REPORT:    TRANSTHORACIC ECHOCARDIOGRAM REPORT           Patient Name:   JOSE CHAUDHARI Patient Location: [facility] Rec #:  [id number]    Accession #:   [id number]  Account #:                    Height:           66.0 in 167.6 cm  YOB: 1930      Weight:           119.0 lb 54.00 kg  Patient Age:    86 years      BSA:              1.60 m²  Patient Gender: F             BP:145/75 mmHg        Date of Exam: 2017 9:48:23 AM  Sonographer:  JEFF     Procedure:     2D Echo/Doppler/Color Doppler Complete.  Indications:   Cerebral infarction, unspecified - I63.9  Diagnosis:     Cerebral infarction, unspecified - I63.9  Study Details: Technically good study.           2D AND M-MODE MEASUREMENTS (normal ranges within parentheses):  Left Ventricle:                  Normal   Aorta/Left Atrium:            Normal  IVSd (2D):              0.96 cm (0.7-1.1) Aortic Root (2D):  2.98 cm   (2.4-3.7)  LVPWd (2D):             0.94 cm (0.7-1.1) Left Atrium (2D):  4.10 cm   (1.9-4.0)  LVIDd (2D):             3.26 cm (3.4-5.7) Right Ventricle:  LVIDs (2D):             2.32 cm           TAPSE:           2.18 cm  LV FS (2D):  28.9 %   (>25%)  Relative Wall Thickness  0.58    (<0.42)     LV DIASTOLIC FUNCTION:  MV Peak E: 0.53 m/s Decel Time: 317 msec  MV Peak A: 0.74 m/s  E/A Ratio: 0.72     SPECTRAL DOPPLER ANALYSIS (where applicable):  Mitral Valve:  MV P1/2 Time: 92.06 msec  MV Area, PHT: 2.39 cm²     Aortic Valve: AoV Max Mark: 1.84 m/s AoV Peak P.5 mmHg AoV Mean P.3 mmHg     LVOT Vmax: 0.81 m/s LVOT VTI: 0.142 m LVOT Diameter:     Aortic Insufficiency:  AI Half-time:  860 msec  AI Decel Rate: 1.61 m/s²     Tricuspid Valve and PA/RV Systolic Pressure: TR Max Velocity: 2.46 m/s   RA Pressure: 5 mmHg RVSP/PASP: 29.3 mmHg        PHYSICIAN INTERPRETATION:  Left Ventricle: The left ventricular internal cavity size is normal.   There is mild concentric left ventricular hypertrophy.  Global LV systolic function was normal. Left ventricular ejection   fraction, by visual estimation, is 60 to 65%. Spectral Doppler shows   impaired relaxation pattern of left ventricular myocardial filling (Grade   I diastolic dysfunction).  Right Ventricle: Normal right ventricular size and function.  Left Atrium: Mildly enlarged left atrium.  Right Atrium: The right atrium is mildly dilated.  Pericardium: There is no evidence of pericardial effusion.  Mitral Valve: Thickening and calcification of the anterior and posterior   mitral valve leaflets. Mitral leaflet mobility is normal. There is mild   mitral annular calcification. Mild mitral valve regurgitation is seen.  Tricuspid Valve: The tricuspid valve is not well seen. Mild tricuspid   regurgitation is visualized.  Aortic Valve: The aortic valve is trileaflet. Sclerotic aortic valve with   normal opening. Mild aortic valve regurgitation is seen.  Pulmonic Valve: The pulmonic valve was not well visualized.Trace   pulmonic valve regurgitation.  Aorta: Aortic root measured at Sinus of Valsalva is normal. There is mild   aortic root calcification.  Venous: The inferior vena cava was normal sized, with respiratory size   variation greater than 50%.  Additional Comments: A pacer wire is visualized in the right atrium and   right ventricle.        Summary:   1. Left ventricular ejection fraction, by visual estimation, is 60 to   65%.   2. Technically good study.   3. Normal global left ventricular systolic function.   4. Normal left ventricular internal cavity size.   5. Spectral Doppler shows impaired relaxation pattern of left   ventricular myocardial filling (Grade I diastolic dysfunction).   6. There is mild concentric left ventricular hypertrophy.   7. Normal right ventricular size and function.   8. Mildly dilated right atrium.   9. There is no evidence of pericardial effusion.  10. Mild mitral annular calcification.  11. Mild mitral valve regurgitation.  12. Thickening and calcification of the anterior and posterior mitral   valve leaflets.  13. Mild aortic regurgitation.  14. Sclerotic aortic valve with normal opening.  15. Mildly enlarged left atrium.  16. There is mild aortic root calcification.     Vikas Hopkins MD FACC, FASE,FACP  Electronically signed on 2017 at 1:14:59 PM             < from: CT Abdomen and Pelvis w/ IV Cont (. @ 02:58) >    EXAM:  CT ABDOMEN AND PELVIS IC        PROCEDURE DATE:  2018         INTERPRETATION:  CLINICAL INFORMATION: Nausea and vomiting with   epigastric tenderness    COMPARISON: CT of the abdomen and pelvis dated 2017    PROCEDURE:   CT of theAbdomen and Pelvis was performed with intravenous contrast.   Intravenous contrast: 90 ml Omnipaque 350. 10 ml discarded.  Oral contrast: None.  Sagittal and coronal reformats were performed.    FINDINGS:    LOWER CHEST: Coronary artery calcifications.    LIVER: Scattered hypodensities which are too small to characterize.  BILE DUCTS: Mild intrahepatic and extrahepatic biliary ductal dilatation.  GALLBLADDER: Distended.  SPLEEN: Within normal limits.  PANCREAS: Prominent pancreatic duct measuring5 mm, overall stable from   2017.  ADRENALS: Thickening of the left adrenal gland.  KIDNEYS/URETERS: A right-sided double-J ureteral stent is again noted,   unchanged from . Unchanged mild to moderate right-sided   hydroureteronephrosis. Bilateral renal cysts.    BLADDER: Within normal limits.  REPRODUCTIVE ORGANS: Calcified and noncalcified uterine fibroids. A   vaginal pessary is noted.    BOWEL: Diffusely dilated small bowel with a transition to collapsed loops   in a right inguinal hernia. The large bowel is collapsed.  PERITONEUM: No ascites.  catheter terminating in the right lower   quadrant.  VESSELS:  Atherosclerotic calcifications.  RETROPERITONEUM: No lymphadenopathy.    ABDOMINAL WALL: Left chest and abdominal wall  shunt catheter.  BONES: Advanced degenerative changes of the visualized lumbosacral spine.   Chronic fracture deformity of right superior pubic ramus.    IMPRESSION:     Small bowel obstruction with a transition to collapsed loops in a right   inguinal hernia. Small volume interloop edema.    Right-sided double-J ureteral stent with mild to moderate right   hydroureteronephrosis. This is not significantly changed from the prior   study dated 2017.    Findings were discussed with Dr. Cindy Miller at approximately   4:02 AM dated 2018 with read back.      INDIA IMLLER M.D., RADIOLOGY RESIDENT  This document has been electronically signed.  DALE KNUTSON M.D., ATTENDING RADIOLOGIST  This document has been electronicallysigned. 2018  4:05AM

## 2018-07-09 NOTE — PROGRESS NOTE ADULT - ATTENDING COMMENTS
Above noted. Agree with the planned treatment.
Above noted. Feels better.  Physical Exam:   Abdomen: Soft, mild distention, incisions clean.  Plan: Continue NPO, IV fluids, DVT prophylaxis.
Above noted. Vomited now. NG tube re-inserted.  Physical Exam:  Vital signs are stable  Abdomen:  Soft, mild tenderness.  Plan: NPO, NG suction, IV fluids.
Above noted. Feels better. Isn't sure whether she passe flatus or not.  Physical Exam: Afebrile.   Abdomen: Distended, tender. Incisions clean.  Plan: Continue present management. Physical therapy consult.
Shaq Mancilla  Attending Physician   Division of Infectious Disease  Pager #[phone]  After 5pm/weekend or no response, call #[phone]    Please call the ID service [phone] with questions or concerns over the weekend.
Shaq Mancilla  Attending Physician   Division of Infectious Disease  Pager #[phone]  After 5pm/weekend or no response, call #[phone]
Shaq Mancilla  Attending Physician   Division of Infectious Disease  Pager #[phone]  After 5pm/weekend or no response, call #[phone]
Shaq Mancilla  Attending Physician   Division of Infectious Disease  Pager #[phone]  After 5pm/weekend or no response, call #[phone]
Shaq Mancilla  Attending Physician   Division of Infectious Disease  Pager #[phone]  After 5pm/weekend or no response, call #[phone]

## 2018-07-09 NOTE — PROGRESS NOTE ADULT - GIT ABD PE PAL DETAILS PC
midline + right inguinal incision clean and healing
midline and inguinal incisions clean with staples
healing midline and right inguinal incision with staples-no redness/swelling/tenderness
midline and right inguinal incisions clean and healing
midline and inguinal incision with staples - clean and no cellulitis

## 2018-07-11 ENCOUNTER — APPOINTMENT (OUTPATIENT)
Dept: OBGYN | Facility: HOSPITAL | Age: 83
End: 2018-07-11

## 2018-07-24 PROBLEM — K92.2 GASTROINTESTINAL HEMORRHAGE, UNSPECIFIED: Chronic | Status: ACTIVE | Noted: 2017-05-01

## 2018-07-27 ENCOUNTER — INPATIENT (INPATIENT)
Facility: HOSPITAL | Age: 83
LOS: 4 days | Discharge: HOME | End: 2018-08-01
Attending: INTERNAL MEDICINE | Admitting: INTERNAL MEDICINE
Payer: MEDICARE

## 2018-07-27 VITALS
RESPIRATION RATE: 16 BRPM | TEMPERATURE: 101 F | OXYGEN SATURATION: 96 % | SYSTOLIC BLOOD PRESSURE: 138 MMHG | HEART RATE: 77 BPM | DIASTOLIC BLOOD PRESSURE: 88 MMHG

## 2018-07-27 DIAGNOSIS — I50.9 HEART FAILURE, UNSPECIFIED: ICD-10-CM

## 2018-07-27 DIAGNOSIS — I10 ESSENTIAL (PRIMARY) HYPERTENSION: ICD-10-CM

## 2018-07-27 DIAGNOSIS — Z98.2 PRESENCE OF CEREBROSPINAL FLUID DRAINAGE DEVICE: Chronic | ICD-10-CM

## 2018-07-27 DIAGNOSIS — R56.9 UNSPECIFIED CONVULSIONS: ICD-10-CM

## 2018-07-27 DIAGNOSIS — R50.9 FEVER, UNSPECIFIED: ICD-10-CM

## 2018-07-27 LAB
ALBUMIN SERPL ELPH-MCNC: 3.1 G/DL — LOW (ref 3.3–5)
ALBUMIN SERPL ELPH-MCNC: 3.3 G/DL — SIGNIFICANT CHANGE UP (ref 3.3–5)
ALP SERPL-CCNC: 73 U/L — SIGNIFICANT CHANGE UP (ref 40–120)
ALP SERPL-CCNC: 75 U/L — SIGNIFICANT CHANGE UP (ref 40–120)
ALT FLD-CCNC: 4 U/L — SIGNIFICANT CHANGE UP (ref 4–33)
ALT FLD-CCNC: 7 U/L — SIGNIFICANT CHANGE UP (ref 4–33)
APPEARANCE UR: SIGNIFICANT CHANGE UP
APTT BLD: 42.4 SEC — HIGH (ref 27.5–37.4)
AST SERPL-CCNC: 17 U/L — SIGNIFICANT CHANGE UP (ref 4–32)
AST SERPL-CCNC: 19 U/L — SIGNIFICANT CHANGE UP (ref 4–32)
BASE EXCESS BLDV CALC-SCNC: 8.8 MMOL/L — SIGNIFICANT CHANGE UP
BASOPHILS # BLD AUTO: 0.05 K/UL — SIGNIFICANT CHANGE UP (ref 0–0.2)
BASOPHILS NFR BLD AUTO: 1.2 % — SIGNIFICANT CHANGE UP (ref 0–2)
BILIRUB SERPL-MCNC: 0.4 MG/DL — SIGNIFICANT CHANGE UP (ref 0.2–1.2)
BILIRUB SERPL-MCNC: 0.4 MG/DL — SIGNIFICANT CHANGE UP (ref 0.2–1.2)
BILIRUB UR-MCNC: NEGATIVE — SIGNIFICANT CHANGE UP
BLOOD GAS VENOUS - CREATININE: 0.73 MG/DL — SIGNIFICANT CHANGE UP (ref 0.5–1.3)
BLOOD UR QL VISUAL: HIGH
BUN SERPL-MCNC: 15 MG/DL — SIGNIFICANT CHANGE UP (ref 7–23)
BUN SERPL-MCNC: 20 MG/DL — SIGNIFICANT CHANGE UP (ref 7–23)
CALCIUM SERPL-MCNC: 8.5 MG/DL — SIGNIFICANT CHANGE UP (ref 8.4–10.5)
CALCIUM SERPL-MCNC: 9.2 MG/DL — SIGNIFICANT CHANGE UP (ref 8.4–10.5)
CHLORIDE BLDV-SCNC: 102 MMOL/L — SIGNIFICANT CHANGE UP (ref 96–108)
CHLORIDE SERPL-SCNC: 100 MMOL/L — SIGNIFICANT CHANGE UP (ref 98–107)
CHLORIDE SERPL-SCNC: 101 MMOL/L — SIGNIFICANT CHANGE UP (ref 98–107)
CK SERPL-CCNC: 29 U/L — SIGNIFICANT CHANGE UP (ref 25–170)
CO2 SERPL-SCNC: 29 MMOL/L — SIGNIFICANT CHANGE UP (ref 22–31)
CO2 SERPL-SCNC: 31 MMOL/L — SIGNIFICANT CHANGE UP (ref 22–31)
COLOR SPEC: HIGH
CREAT SERPL-MCNC: 0.71 MG/DL — SIGNIFICANT CHANGE UP (ref 0.5–1.3)
CREAT SERPL-MCNC: 0.78 MG/DL — SIGNIFICANT CHANGE UP (ref 0.5–1.3)
EOSINOPHIL # BLD AUTO: 0.08 K/UL — SIGNIFICANT CHANGE UP (ref 0–0.5)
EOSINOPHIL NFR BLD AUTO: 1.9 % — SIGNIFICANT CHANGE UP (ref 0–6)
GAS PNL BLDV: 142 MMOL/L — SIGNIFICANT CHANGE UP (ref 136–146)
GLUCOSE BLDV-MCNC: 109 — HIGH (ref 70–99)
GLUCOSE SERPL-MCNC: 109 MG/DL — HIGH (ref 70–99)
GLUCOSE SERPL-MCNC: 93 MG/DL — SIGNIFICANT CHANGE UP (ref 70–99)
GLUCOSE UR-MCNC: NEGATIVE — SIGNIFICANT CHANGE UP
HCO3 BLDV-SCNC: 30 MMOL/L — HIGH (ref 20–27)
HCT VFR BLD CALC: 30.6 % — LOW (ref 34.5–45)
HCT VFR BLD CALC: 32.6 % — LOW (ref 34.5–45)
HCT VFR BLDV CALC: 32 % — LOW (ref 34.5–45)
HGB BLD-MCNC: 10.3 G/DL — LOW (ref 11.5–15.5)
HGB BLD-MCNC: 9.8 G/DL — LOW (ref 11.5–15.5)
HGB BLDV-MCNC: 10.3 G/DL — LOW (ref 11.5–15.5)
IMM GRANULOCYTES # BLD AUTO: 0.01 # — SIGNIFICANT CHANGE UP
IMM GRANULOCYTES NFR BLD AUTO: 0.2 % — SIGNIFICANT CHANGE UP (ref 0–1.5)
INR BLD: 1.11 — SIGNIFICANT CHANGE UP (ref 0.88–1.17)
KETONES UR-MCNC: NEGATIVE — SIGNIFICANT CHANGE UP
LACTATE BLDV-MCNC: 1.8 MMOL/L — SIGNIFICANT CHANGE UP (ref 0.5–2)
LEUKOCYTE ESTERASE UR-ACNC: HIGH
LYMPHOCYTES # BLD AUTO: 0.99 K/UL — LOW (ref 1–3.3)
LYMPHOCYTES # BLD AUTO: 23.4 % — SIGNIFICANT CHANGE UP (ref 13–44)
MCHC RBC-ENTMCNC: 30.2 PG — SIGNIFICANT CHANGE UP (ref 27–34)
MCHC RBC-ENTMCNC: 30.7 PG — SIGNIFICANT CHANGE UP (ref 27–34)
MCHC RBC-ENTMCNC: 31.6 % — LOW (ref 32–36)
MCHC RBC-ENTMCNC: 32 % — SIGNIFICANT CHANGE UP (ref 32–36)
MCV RBC AUTO: 94.4 FL — SIGNIFICANT CHANGE UP (ref 80–100)
MCV RBC AUTO: 97.3 FL — SIGNIFICANT CHANGE UP (ref 80–100)
MONOCYTES # BLD AUTO: 0.38 K/UL — SIGNIFICANT CHANGE UP (ref 0–0.9)
MONOCYTES NFR BLD AUTO: 9 % — SIGNIFICANT CHANGE UP (ref 2–14)
MUCOUS THREADS # UR AUTO: SIGNIFICANT CHANGE UP
NEUTROPHILS # BLD AUTO: 2.72 K/UL — SIGNIFICANT CHANGE UP (ref 1.8–7.4)
NEUTROPHILS NFR BLD AUTO: 64.3 % — SIGNIFICANT CHANGE UP (ref 43–77)
NITRITE UR-MCNC: NEGATIVE — SIGNIFICANT CHANGE UP
NRBC # FLD: 0 — SIGNIFICANT CHANGE UP
NRBC # FLD: 0 — SIGNIFICANT CHANGE UP
PCO2 BLDV: 63 MMHG — HIGH (ref 41–51)
PH BLDV: 7.36 PH — SIGNIFICANT CHANGE UP (ref 7.32–7.43)
PH UR: 7 — SIGNIFICANT CHANGE UP (ref 4.6–8)
PLATELET # BLD AUTO: 305 K/UL — SIGNIFICANT CHANGE UP (ref 150–400)
PLATELET # BLD AUTO: 362 K/UL — SIGNIFICANT CHANGE UP (ref 150–400)
PMV BLD: 10.4 FL — SIGNIFICANT CHANGE UP (ref 7–13)
PMV BLD: 10.4 FL — SIGNIFICANT CHANGE UP (ref 7–13)
PO2 BLDV: 26 MMHG — LOW (ref 35–40)
POTASSIUM BLDV-SCNC: 3.5 MMOL/L — SIGNIFICANT CHANGE UP (ref 3.4–4.5)
POTASSIUM SERPL-MCNC: 3.1 MMOL/L — LOW (ref 3.5–5.3)
POTASSIUM SERPL-MCNC: 3.6 MMOL/L — SIGNIFICANT CHANGE UP (ref 3.5–5.3)
POTASSIUM SERPL-SCNC: 3.1 MMOL/L — LOW (ref 3.5–5.3)
POTASSIUM SERPL-SCNC: 3.6 MMOL/L — SIGNIFICANT CHANGE UP (ref 3.5–5.3)
PROT SERPL-MCNC: 7.1 G/DL — SIGNIFICANT CHANGE UP (ref 6–8.3)
PROT SERPL-MCNC: 7.2 G/DL — SIGNIFICANT CHANGE UP (ref 6–8.3)
PROT UR-MCNC: 300 MG/DL — HIGH
PROTHROM AB SERPL-ACNC: 12.4 SEC — SIGNIFICANT CHANGE UP (ref 9.8–13.1)
RBC # BLD: 3.24 M/UL — LOW (ref 3.8–5.2)
RBC # BLD: 3.35 M/UL — LOW (ref 3.8–5.2)
RBC # FLD: 13.3 % — SIGNIFICANT CHANGE UP (ref 10.3–14.5)
RBC # FLD: 13.5 % — SIGNIFICANT CHANGE UP (ref 10.3–14.5)
RBC CASTS # UR COMP ASSIST: >50 — HIGH (ref 0–?)
SAO2 % BLDV: 34.2 % — LOW (ref 60–85)
SODIUM SERPL-SCNC: 142 MMOL/L — SIGNIFICANT CHANGE UP (ref 135–145)
SODIUM SERPL-SCNC: 142 MMOL/L — SIGNIFICANT CHANGE UP (ref 135–145)
SP GR SPEC: 1.02 — SIGNIFICANT CHANGE UP (ref 1–1.04)
TROPONIN T, HIGH SENSITIVITY: 12 NG/L — SIGNIFICANT CHANGE UP (ref ?–14)
UROBILINOGEN FLD QL: NORMAL MG/DL — SIGNIFICANT CHANGE UP
WBC # BLD: 4.23 K/UL — SIGNIFICANT CHANGE UP (ref 3.8–10.5)
WBC # BLD: 5.61 K/UL — SIGNIFICANT CHANGE UP (ref 3.8–10.5)
WBC # FLD AUTO: 4.23 K/UL — SIGNIFICANT CHANGE UP (ref 3.8–10.5)
WBC # FLD AUTO: 5.61 K/UL — SIGNIFICANT CHANGE UP (ref 3.8–10.5)
WBC UR QL: >50 — HIGH (ref 0–?)

## 2018-07-27 PROCEDURE — 70450 CT HEAD/BRAIN W/O DYE: CPT | Mod: 26

## 2018-07-27 PROCEDURE — 99223 1ST HOSP IP/OBS HIGH 75: CPT

## 2018-07-27 PROCEDURE — 99222 1ST HOSP IP/OBS MODERATE 55: CPT

## 2018-07-27 PROCEDURE — 71045 X-RAY EXAM CHEST 1 VIEW: CPT | Mod: 26

## 2018-07-27 RX ORDER — LEVETIRACETAM 250 MG/1
1000 TABLET, FILM COATED ORAL ONCE
Qty: 0 | Refills: 0 | Status: COMPLETED | OUTPATIENT
Start: 2018-07-27 | End: 2018-07-27

## 2018-07-27 RX ORDER — MEROPENEM 1 G/30ML
INJECTION INTRAVENOUS
Qty: 0 | Refills: 0 | Status: DISCONTINUED | OUTPATIENT
Start: 2018-07-27 | End: 2018-07-28

## 2018-07-27 RX ORDER — LAMOTRIGINE 25 MG/1
100 TABLET, ORALLY DISINTEGRATING ORAL DAILY
Qty: 0 | Refills: 0 | Status: DISCONTINUED | OUTPATIENT
Start: 2018-07-27 | End: 2018-08-01

## 2018-07-27 RX ORDER — ASPIRIN/CALCIUM CARB/MAGNESIUM 324 MG
81 TABLET ORAL DAILY
Qty: 0 | Refills: 0 | Status: DISCONTINUED | OUTPATIENT
Start: 2018-07-27 | End: 2018-08-01

## 2018-07-27 RX ORDER — MEROPENEM 1 G/30ML
1000 INJECTION INTRAVENOUS ONCE
Qty: 0 | Refills: 0 | Status: COMPLETED | OUTPATIENT
Start: 2018-07-27 | End: 2018-07-27

## 2018-07-27 RX ORDER — LOSARTAN POTASSIUM 100 MG/1
50 TABLET, FILM COATED ORAL DAILY
Qty: 0 | Refills: 0 | Status: DISCONTINUED | OUTPATIENT
Start: 2018-07-27 | End: 2018-08-01

## 2018-07-27 RX ORDER — ACETAMINOPHEN 500 MG
650 TABLET ORAL ONCE
Qty: 0 | Refills: 0 | Status: COMPLETED | OUTPATIENT
Start: 2018-07-27 | End: 2018-07-27

## 2018-07-27 RX ORDER — POTASSIUM CHLORIDE 20 MEQ
10 PACKET (EA) ORAL
Qty: 0 | Refills: 0 | Status: COMPLETED | OUTPATIENT
Start: 2018-07-27 | End: 2018-07-28

## 2018-07-27 RX ORDER — SODIUM CHLORIDE 9 MG/ML
1000 INJECTION, SOLUTION INTRAVENOUS
Qty: 0 | Refills: 0 | Status: DISCONTINUED | OUTPATIENT
Start: 2018-07-27 | End: 2018-08-01

## 2018-07-27 RX ORDER — CARVEDILOL PHOSPHATE 80 MG/1
12.5 CAPSULE, EXTENDED RELEASE ORAL EVERY 12 HOURS
Qty: 0 | Refills: 0 | Status: DISCONTINUED | OUTPATIENT
Start: 2018-07-27 | End: 2018-08-01

## 2018-07-27 RX ORDER — SODIUM CHLORIDE 9 MG/ML
1000 INJECTION INTRAMUSCULAR; INTRAVENOUS; SUBCUTANEOUS ONCE
Qty: 0 | Refills: 0 | Status: COMPLETED | OUTPATIENT
Start: 2018-07-27 | End: 2018-07-27

## 2018-07-27 RX ORDER — AMLODIPINE BESYLATE 2.5 MG/1
5 TABLET ORAL DAILY
Qty: 0 | Refills: 0 | Status: DISCONTINUED | OUTPATIENT
Start: 2018-07-27 | End: 2018-08-01

## 2018-07-27 RX ORDER — FUROSEMIDE 40 MG
20 TABLET ORAL DAILY
Qty: 0 | Refills: 0 | Status: DISCONTINUED | OUTPATIENT
Start: 2018-07-27 | End: 2018-08-01

## 2018-07-27 RX ORDER — DONEPEZIL HYDROCHLORIDE 10 MG/1
5 TABLET, FILM COATED ORAL AT BEDTIME
Qty: 0 | Refills: 0 | Status: DISCONTINUED | OUTPATIENT
Start: 2018-07-27 | End: 2018-08-01

## 2018-07-27 RX ORDER — LEVETIRACETAM 250 MG/1
1000 TABLET, FILM COATED ORAL
Qty: 0 | Refills: 0 | Status: DISCONTINUED | OUTPATIENT
Start: 2018-07-27 | End: 2018-08-01

## 2018-07-27 RX ORDER — ACETAMINOPHEN 500 MG
1000 TABLET ORAL ONCE
Qty: 0 | Refills: 0 | Status: DISCONTINUED | OUTPATIENT
Start: 2018-07-27 | End: 2018-07-27

## 2018-07-27 RX ORDER — PIPERACILLIN AND TAZOBACTAM 4; .5 G/20ML; G/20ML
3.38 INJECTION, POWDER, LYOPHILIZED, FOR SOLUTION INTRAVENOUS ONCE
Qty: 0 | Refills: 0 | Status: COMPLETED | OUTPATIENT
Start: 2018-07-27 | End: 2018-07-27

## 2018-07-27 RX ORDER — TIMOLOL 0.5 %
1 DROPS OPHTHALMIC (EYE) DAILY
Qty: 0 | Refills: 0 | Status: DISCONTINUED | OUTPATIENT
Start: 2018-07-27 | End: 2018-08-01

## 2018-07-27 RX ORDER — PIPERACILLIN AND TAZOBACTAM 4; .5 G/20ML; G/20ML
3.38 INJECTION, POWDER, LYOPHILIZED, FOR SOLUTION INTRAVENOUS EVERY 8 HOURS
Qty: 0 | Refills: 0 | Status: DISCONTINUED | OUTPATIENT
Start: 2018-07-27 | End: 2018-07-27

## 2018-07-27 RX ORDER — MEROPENEM 1 G/30ML
1000 INJECTION INTRAVENOUS EVERY 8 HOURS
Qty: 0 | Refills: 0 | Status: DISCONTINUED | OUTPATIENT
Start: 2018-07-27 | End: 2018-07-28

## 2018-07-27 RX ORDER — VANCOMYCIN HCL 1 G
1000 VIAL (EA) INTRAVENOUS ONCE
Qty: 0 | Refills: 0 | Status: COMPLETED | OUTPATIENT
Start: 2018-07-27 | End: 2018-07-27

## 2018-07-27 RX ORDER — LATANOPROST 0.05 MG/ML
1 SOLUTION/ DROPS OPHTHALMIC; TOPICAL AT BEDTIME
Qty: 0 | Refills: 0 | Status: DISCONTINUED | OUTPATIENT
Start: 2018-07-27 | End: 2018-08-01

## 2018-07-27 RX ORDER — ATORVASTATIN CALCIUM 80 MG/1
40 TABLET, FILM COATED ORAL AT BEDTIME
Qty: 0 | Refills: 0 | Status: DISCONTINUED | OUTPATIENT
Start: 2018-07-27 | End: 2018-08-01

## 2018-07-27 RX ORDER — VANCOMYCIN HCL 1 G
1000 VIAL (EA) INTRAVENOUS EVERY 24 HOURS
Qty: 0 | Refills: 0 | Status: DISCONTINUED | OUTPATIENT
Start: 2018-07-28 | End: 2018-07-28

## 2018-07-27 RX ADMIN — Medication 100 MILLIEQUIVALENT(S): at 23:23

## 2018-07-27 RX ADMIN — DONEPEZIL HYDROCHLORIDE 5 MILLIGRAM(S): 10 TABLET, FILM COATED ORAL at 22:12

## 2018-07-27 RX ADMIN — LEVETIRACETAM 400 MILLIGRAM(S): 250 TABLET, FILM COATED ORAL at 12:49

## 2018-07-27 RX ADMIN — PIPERACILLIN AND TAZOBACTAM 3.38 GRAM(S): 4; .5 INJECTION, POWDER, LYOPHILIZED, FOR SOLUTION INTRAVENOUS at 12:37

## 2018-07-27 RX ADMIN — PIPERACILLIN AND TAZOBACTAM 200 GRAM(S): 4; .5 INJECTION, POWDER, LYOPHILIZED, FOR SOLUTION INTRAVENOUS at 12:07

## 2018-07-27 RX ADMIN — SODIUM CHLORIDE 2000 MILLILITER(S): 9 INJECTION INTRAMUSCULAR; INTRAVENOUS; SUBCUTANEOUS at 11:56

## 2018-07-27 RX ADMIN — LEVETIRACETAM 1000 MILLIGRAM(S): 250 TABLET, FILM COATED ORAL at 13:00

## 2018-07-27 RX ADMIN — LATANOPROST 1 DROP(S): 0.05 SOLUTION/ DROPS OPHTHALMIC; TOPICAL at 22:12

## 2018-07-27 RX ADMIN — Medication 1 DROP(S): at 22:13

## 2018-07-27 RX ADMIN — MEROPENEM 100 MILLIGRAM(S): 1 INJECTION INTRAVENOUS at 22:15

## 2018-07-27 RX ADMIN — Medication 100 MILLIEQUIVALENT(S): at 22:12

## 2018-07-27 RX ADMIN — Medication 1000 MILLIGRAM(S): at 14:07

## 2018-07-27 RX ADMIN — SODIUM CHLORIDE 1000 MILLILITER(S): 9 INJECTION INTRAMUSCULAR; INTRAVENOUS; SUBCUTANEOUS at 13:13

## 2018-07-27 RX ADMIN — Medication 250 MILLIGRAM(S): at 13:04

## 2018-07-27 RX ADMIN — Medication 2 MILLIGRAM(S): at 12:07

## 2018-07-27 RX ADMIN — MEROPENEM 100 MILLIGRAM(S): 1 INJECTION INTRAVENOUS at 17:18

## 2018-07-27 RX ADMIN — ATORVASTATIN CALCIUM 40 MILLIGRAM(S): 80 TABLET, FILM COATED ORAL at 22:12

## 2018-07-27 RX ADMIN — Medication 650 MILLIGRAM(S): at 11:57

## 2018-07-27 RX ADMIN — CARVEDILOL PHOSPHATE 12.5 MILLIGRAM(S): 80 CAPSULE, EXTENDED RELEASE ORAL at 20:04

## 2018-07-27 RX ADMIN — SODIUM CHLORIDE 50 MILLILITER(S): 9 INJECTION, SOLUTION INTRAVENOUS at 16:56

## 2018-07-27 NOTE — ED ADULT NURSE NOTE - CHIEF COMPLAINT QUOTE
arrives for [facility] for seizure activity, and non complaint with medication regimen.  Patient is able to follow commands in traige, and presently twitching. 4 LO2 via NC in place.  Febrile in traige.

## 2018-07-27 NOTE — CONSULT NOTE ADULT - SUBJECTIVE AND OBJECTIVE BOX
HPI: 88 yo F PMHx dementia, seizures (on Keppra and Lamotrigine), CVA, HTN, CHF, AFIB p/w seizure. Pt arrives from [facility] with twitching of her right face and right arm. Per EMS she had a "full seizure" this AM which lasted about 30 seconds. She refused her medications this AM. Pt is febrile on triage vitals. She follows commands on exam but speech is incoherent. Patient has indwelling urinary catheter with cloudy urine with sediment.    PAST MEDICAL & SURGICAL HISTORY:  Pacemaker  History of CVA (cerebrovascular accident)  GI bleed  Pulmonary hypertension  Seizure  CHF (congestive heart failure)  High cholesterol  HTN (hypertension)  Seizure  Pulmonary hypertension  Memory Loss; chronic "past few years"  Hydronephrosis; Right kidney  Afib  Hypercholesteremia  HTN (Hypertension)  Absence Seizure  CVA (Cerebral Infarction)  CHF (Congestive Heart Failure)  S/P  shunt  Ureteral Obstruction; right kidney; stent insertion 12/10  S/P Brain Surgery; for "brain cyst   few years ago"  S/P  Shunt  Pacemaker  Pacemaker  Infection of  Shunt  Infection of  Shunt  History of Stroke  Seizure  HTN (Hypertension)      Allergies    No Known Allergies    Intolerances        ANTIMICROBIALS:      OTHER MEDS:      SOCIAL HISTORY:    FAMILY HISTORY:  No pertinent family history in first degree relatives      Drug Dosing Weight  Height (cm): 165.1 (27 Sep 2017 22:52)  Weight (kg): 55 (2018 16:53)  BMI (kg/m2): 20.2 (2018 16:53)  BSA (m2): 1.6 (2018 16:53)    PE:    Vital Signs Last 24 Hrs  T(C): 36.4 (2018 13:32), Max: 38.4 (2018 11:19)  T(F): 97.5 (2018 13:32), Max: 101.1 (2018 11:19)  HR: 76 (2018 13:32) (76 - 78)  BP: 165/81 (2018 13:32) (138/88 - 165/81)  BP(mean): --  RR: 18 (2018 13:32) (16 - 18)  SpO2: 100% (2018 13:32) (96% - 100%)        LABS:                          10.3   4.23  )-----------( 362      ( 2018 11:30 )             32.6           142  |  100  |  20  ----------------------------<  109<H>  3.6   |  31  |  0.78    Ca    9.2      2018 11:30    TPro  7.1  /  Alb  3.3  /  TBili  0.4  /  DBili  x   /  AST  19  /  ALT  4   /  AlkPhos  75        Urinalysis Basic - ( 2018 12:09 )    Color: PINK / Appearance: TURBID / S.019 / pH: 7.0  Gluc: NEGATIVE / Ketone: NEGATIVE  / Bili: NEGATIVE / Urobili: NORMAL mg/dL   Blood: LARGE / Protein: 300 mg/dL / Nitrite: NEGATIVE   Leuk Esterase: LARGE / RBC: >50 / WBC >50   Sq Epi: x / Non Sq Epi: x / Bacteria: x        MICROBIOLOGY:  v  FECES  18 --  --  --      URINE CATHETER  18 --  --  --      BLOOD PERIPHERAL  18 --  --  --      BLOOD PERIPHERAL  18 --  --  BLOOD CULTURE PCR  Enterobacter cloacae      URINE MIDSTREAM  18 --  --  Enterobacter cloacae    RADIOLOGY:    < from: CT Head No Cont (18 @ 12:55) >  IMPRESSION:    No gross acute intracranial pathology is noted. If the patient has new   and persistent symptoms, consider short interval follow-up exam.    < end of copied text >    < from: Xray Chest 1 View- PORTABLE-Urgent (18 @ 12:19) >  IMPRESSION:  Clear lungs. No pleural effusions or pneumothorax.    Stable left chest wall biventricular AICD, surgical clips over medial   left apical region, and left sided  shunt tubing.    Trachea midline.    Generalized osteopenia and spinal degenerative changes with curvature   again noted.    Partially visualized tubing again seen over right upper quadrant,   probably terminal portion of the  shunt.    < end of copied text > HPI: 87 year old female with dementia, seizures (on Keppra and Lamotrigine), CVA, HTN, CHF, AFIB p/w seizure. Pt arrives from [facility] with twitching of her right face and right arm. Per EMS she had a "full seizure" this AM which lasted about 30 seconds. She refused her medications this AM. Pt is febrile on triage vitals. She follows commands on exam but speech is incoherent. Patient has indwelling urinary catheter with cloudy urine with sediment. Continues to be febrile. Unable to provide any additional information. Remains confused, but is awake and able to answer simples questions. She also follows simple commands.     PAST MEDICAL & SURGICAL HISTORY:  Pacemaker  History of CVA (cerebrovascular accident)  GI bleed  Pulmonary hypertension  Seizure  CHF (congestive heart failure)  High cholesterol  HTN (hypertension)  Seizure  Pulmonary hypertension  Memory Loss; chronic "past few years"  Hydronephrosis; Right kidney  Afib  Hypercholesteremia  HTN (Hypertension)  Absence Seizure  CVA (Cerebral Infarction)  CHF (Congestive Heart Failure)  S/P  shunt  Ureteral Obstruction; right kidney; stent insertion 12/10  S/P Brain Surgery; for "brain cyst   few years ago"  S/P  Shunt  Pacemaker  Pacemaker  Infection of  Shunt  Infection of  Shunt  History of Stroke  Seizure  HTN (Hypertension)      Allergies    No Known Allergies    Intolerances    ANTIMICROBIALS:  Received vanco and zosyn in the ED.    SOCIAL HISTORY: Unable to obtain - patient confused    FAMILY HISTORY:  Unable to obtain - patient confused      Drug Dosing Weight  Height (cm): 165.1 (27 Sep 2017 22:52)  Weight (kg): 55 (2018 16:53)  BMI (kg/m2): 20.2 (2018 16:53)  BSA (m2): 1.6 (2018 16:53)    PE:    Vital Signs Last 24 Hrs  T(C): 36.4 (2018 13:32), Max: 38.4 (2018 11:19)  T(F): 97.5 (2018 13:32), Max: 101.1 (2018 11:19)  HR: 76 (2018 13:32) (76 - 78)  BP: 165/81 (2018 13:32) (138/88 - 165/81)  BP(mean): --  RR: 18 (2018 13:32) (16 - 18)  SpO2: 100% (2018 13:32) (96% - 100%)    Physical Exam:  Gen: Awake, oriented x 1  HEENT: PERRl, EOMI  Chest: clear anteriorly, no resp distress, poor inspiratory effort  Cardiac: S1, S2 normal, +murmur  Abd: soft NT ND +BS  : +hua with cloudy urine  Ext: no LE edema  Neuro: confused  Skin: no decub ulcer    LABS:                          10.3   4.23  )-----------( 362      ( 2018 11:30 )             32.6           142  |  100  |  20  ----------------------------<  109<H>  3.6   |  31  |  0.78    Ca    9.2      2018 11:30    TPro  7.1  /  Alb  3.3  /  TBili  0.4  /  DBili  x   /  AST  19  /  ALT  4   /  AlkPhos  75        Urinalysis Basic - ( 2018 12:09 )    Color: PINK / Appearance: TURBID / S.019 / pH: 7.0  Gluc: NEGATIVE / Ketone: NEGATIVE  / Bili: NEGATIVE / Urobili: NORMAL mg/dL   Blood: LARGE / Protein: 300 mg/dL / Nitrite: NEGATIVE   Leuk Esterase: LARGE / RBC: >50 / WBC >50   Sq Epi: x / Non Sq Epi: x / Bacteria: x        MICROBIOLOGY:  v  FECES  18 --  --  --      URINE CATHETER  18 --  --  --      BLOOD PERIPHERAL  18 --  --  --      BLOOD PERIPHERAL  18 --  --  BLOOD CULTURE PCR  Enterobacter cloacae      URINE MIDSTREAM  18 --  --  Enterobacter cloacae    RADIOLOGY:    < from: CT Head No Cont (18 @ 12:55) >  IMPRESSION:    No gross acute intracranial pathology is noted. If the patient has new   and persistent symptoms, consider short interval follow-up exam.    < end of copied text >    < from: Xray Chest 1 View- PORTABLE-Urgent (18 @ 12:19) >  IMPRESSION:  Clear lungs. No pleural effusions or pneumothorax.    Stable left chest wall biventricular AICD, surgical clips over medial   left apical region, and left sided  shunt tubing.    Trachea midline.    Generalized osteopenia and spinal degenerative changes with curvature   again noted.    Partially visualized tubing again seen over right upper quadrant,   probably terminal portion of the  shunt.    < end of copied text >

## 2018-07-27 NOTE — ED PROVIDER NOTE - PHYSICAL EXAMINATION
Gen: non-toxic  Head: NCAT  HEENT: EOMI, oral mucosa moist, normal conjunctiva, PERRL  Lung: CTAB, no respiratory distress, no wheezes/rhonchi/rales B/L, speaking in full sentences  CV: RRR, no murmurs, rubs or gallops  Abd: soft, NTND, no guarding  MSK: no visible deformities  Neuro: following commands, +5/5  strength bilaterally, PERRL, active twitching of right face, right arm  Skin: Warm, well perfused, vesicular rash under left breast, old vertical abdominal incision with slight dehiscence at distal aspect, no drainage, no redness/swelling  Psych: unable to assess  ~Arpita Carter M.D. Resident

## 2018-07-27 NOTE — CONSULT NOTE ADULT - SUBJECTIVE AND OBJECTIVE BOX
Cardiology/Vascular Medicine Inpatient Consultation Note    Patient is an 88 yo F PMHx dementia, CVA, GI bleed, CHF, UTIs, HLD, syncope, pacemaker,  shunt,,seizures (on Keppra and Lamotrigine),  recent hospitalization s/p laparotomy/ RT  inguinal hernia repair for  small bowel resection, treated for ecoli bacteremia, s/ SICU STAY  dced to NH admitted today with  seizure. Pt arrives from [facility] with twitching of her right face and right arm. Per EMS she had a "full seizure" this AM which lasted about 30 seconds. She refused her medications this AM as per NH staff . Pt is febrile on triage vitals. She follows commands on exam but speech is incoherent. Patient has indwelling urinary catheter with cloudy urine with sediment. unable to obtain ros from pt sec to pts cognitive status (2018 15:35)          Allergies  No Known Allergies    MEDICATIONS:  amLODIPine   Tablet 5 milliGRAM(s) Oral daily  aspirin enteric coated 81 milliGRAM(s) Oral daily  carvedilol 12.5 milliGRAM(s) Oral every 12 hours  furosemide    Tablet 20 milliGRAM(s) Oral daily  losartan 50 milliGRAM(s) Oral daily  meropenem  IVPB      meropenem  IVPB 1000 milliGRAM(s) IV Intermittent every 8 hours  vancomycin  IVPB 1000 milliGRAM(s) IV Intermittent every 24 hours  donepezil 5 milliGRAM(s) Oral at bedtime  lamoTRIgine 100 milliGRAM(s) Oral daily  levETIRAcetam 1000 milliGRAM(s) Oral two times a day  atorvastatin 40 milliGRAM(s) Oral at bedtime  dextrose 5% + sodium chloride 0.45%. 1000 milliLiter(s) IV Continuous <Continuous>  latanoprost 0.005% Ophthalmic Solution 1 Drop(s) Both EYES at bedtime  timolol 0.5% Solution 1 Drop(s) Both EYES daily      PAST MEDICAL & SURGICAL HISTORY:  Pacemaker  History of CVA (cerebrovascular accident)  GI bleed  Pulmonary hypertension  Seizure  CHF (congestive heart failure)  High cholesterol  HTN (hypertension)  Seizure  Pulmonary hypertension  Memory Loss; chronic "past few years"  Hydronephrosis; Right kidney  Afib  Hypercholesteremia  HTN (Hypertension)  Absence Seizure  CVA (Cerebral Infarction)  CHF (Congestive Heart Failure)  S/P  shunt  Ureteral Obstruction; right kidney; stent insertion 12/10  S/P Brain Surgery; for "brain cyst   few years ago"  S/P  Shunt  Pacemaker  Pacemaker  Infection of  Shunt  Infection of  Shunt  History of Stroke  Seizure  HTN (Hypertension)      FAMILY HISTORY:  No pertinent family history in first degree relatives  No pertinent family history in first degree relatives      SOCIAL HISTORY:    NC    REVIEW OF SYSTEMS:  CONSTITUTIONAL: No fever, weight loss, or fatigue  EYES: No eye pain, visual disturbances, or discharge  ENMT:  No difficulty hearing, tinnitus, vertigo; No sinus or throat pain  NECK: No pain or stiffness  RESPIRATORY: No cough, wheezing, chills or hemoptysis; No Shortness of Breath  CARDIOVASCULAR: No chest pain, palpitations, passing out, dizziness, or leg swelling  GASTROINTESTINAL: No abdominal or epigastric pain. No nausea, vomiting, or hematemesis; No diarrhea or constipation. No melena or hematochezia.  GENITOURINARY: No dysuria, frequency, hematuria, or incontinence  NEUROLOGICAL: No headaches, memory loss, loss of strength, numbness, or tremors  SKIN: No itching, burning, rashes, or lesions   LYMPH Nodes: No enlarged glands  ENDOCRINE: No heat or cold intolerance; No hair loss  MUSCULOSKELETAL: No joint pain or swelling; No muscle, back, or extremity pain  PSYCHIATRIC: No depression, anxiety, mood swings, or difficulty sleeping  HEME/LYMPH: No easy bruising, or bleeding gums  ALLERY AND IMMUNOLOGIC: No hives or eczema	    PHYSICAL EXAM:  T(C): 36.9 (18 @ 15:46), Max: 38.4 (18 @ 11:19)  HR: 66 (18 @ 15:46) (66 - 78)  BP: 157/71 (18 @ 15:46) (138/88 - 165/81)  RR: 18 (18 @ 15:46) (16 - 18)  SpO2: 100% (18 @ 15:46) (96% - 100%)    Appearance: Normal	  HEENT:   Normal oral mucosa, PERRL, EOMI	  Lymphatic: No lymphadenopathy  Cardiovascular: Normal S1 S2, No JVD, No murmurs, No edema  Respiratory: Decreased BS b/l bases	  Psychiatry: Awake  Gastrointestinal:  Soft, Non-tender, + BS	  Skin: No rashes, No ecchymoses, No cyanosis	  Neurologic: Non-focal  Extremities: Normal range of motion, No clubbing, cyanosis or edema  Vascular: Peripheral pulses palpable 2+ bilaterally      LABS:	 	                          10.3   4.23  )-----------( 362      ( 2018 11:30 )             32.6         142  |  100  |  20  ----------------------------<  109<H>  3.6   |  31  |  0.78    Ca    9.2      2018 11:30    TPro  7.1  /  Alb  3.3  /  TBili  0.4  /  DBili  x   /  AST  19  /  ALT  4   /  AlkPhos  75      < from: CT Head No Cont (18 @ 12:55) >  EXAM:  CT BRAIN        PROCEDURE DATE:  2018         INTERPRETATION:  History: Partial seizure. Seizure.    Description: A noncontrast head CT was performed. Axial images were   performed from the skull base to the vertex with coronal/sagittal   reconstructions.    Comparison is made to a head CT study from 2018.    A left parietal approach catheter is again noted. The tip is again noted   to be located within a cystic focus in the left parietal lobe, unchanged   in appearance.    The study is limited by motion. There is no gross evidence for acute   infarct, acute hemorrhage, mass effect, calvarial fracture, or subdural   hematoma.    Moderately severe confluent and patchy hypodensity without mass effect is   noted in the periventricular white matter which most likely represents   chronic microvascular ischemic changes given the patient's age.    Cerebral volume loss is present with secondary proportional prominence of   the sulci and ventricles.    No lytic or blastic calvarial lesions are noted. The visualized portions   of the paranasal sinuses and mastoid air cells are clear.    IMPRESSION:    No gross acute intracranial pathology is noted. If the patient has new   and persistent symptoms, consider short interval follow-up exam.        SUHAS HERNANDEZ M.D., ATTENDING RADIOLOGIST  This document has been electronically signed. 2018  1:04PM      < from: Xray Chest 1 View- PORTABLE-Urgent (18 @ 12:19) >    EXAM:  XR CHEST PORTABLE URGENT 1V        PROCEDURE DATE:  2018         INTERPRETATION:  CLINICAL INDICATION: febrile    EXAM:  Portable upright AP chest from 2018 at 1219. Compared to prior study   from 7/3/2018.    IMPRESSION:  Clear lungs. No pleural effusions or pneumothorax.    Stable left chest wall biventricular AICD, surgical clips over medial   left apical region, and left sided  shunt tubing.    Trachea midline.    Generalized osteopenia and spinal degenerative changes with curvature   again noted.    Partially visualized tubing again seen over right upper quadrant,   probably terminal portion of the  shunt.      CRISTOFER DAVID M.D., ATTENDING RADIOLOGIST  This document has been electronically signed. 2018  1:21PM            < from: TTE Echo Doppler w/o Cont (17 @ 10:24) >  EXAM:  TTE WO CON COMPLETE W DOPPL         PROCEDURE DATE:  2017        INTERPRETATION:  REPORT:    TRANSTHORACIC ECHOCARDIOGRAM REPORT           Patient Name:   JOSE CHAUDHARI Patient Location: [facility] Rec #:  [id number]    Accession #:   [id number]  Account #:                    Height:           66.0 in 167.6 cm  YOB: 1930      Weight:           119.0 lb 54.00 kg  Patient Age:    86 years      BSA:              1.60 m²  Patient Gender: F             BP:145/75 mmHg        Date of Exam: 2017 9:48:23 AM  Sonographer:  JEFF     Procedure:     2D Echo/Doppler/Color Doppler Complete.  Indications:   Cerebral infarction, unspecified - I63.9  Diagnosis:     Cerebral infarction, unspecified - I63.9  Study Details: Technically good study.           2D AND M-MODE MEASUREMENTS (normal ranges within parentheses):  Left Ventricle:                  Normal   Aorta/Left Atrium:            Normal  IVSd (2D):              0.96 cm (0.7-1.1) Aortic Root (2D):  2.98 cm   (2.4-3.7)  LVPWd (2D):             0.94 cm (0.7-1.1) Left Atrium (2D):  4.10 cm   (1.9-4.0)  LVIDd (2D):             3.26 cm (3.4-5.7) Right Ventricle:  LVIDs (2D):             2.32 cm           TAPSE:           2.18 cm  LV FS (2D):  28.9 %   (>25%)  Relative Wall Thickness  0.58    (<0.42)     LV DIASTOLIC FUNCTION:  MV Peak E: 0.53 m/s Decel Time: 317 msec  MV Peak A: 0.74 m/s  E/A Ratio: 0.72     SPECTRAL DOPPLER ANALYSIS (where applicable):  Mitral Valve:  MV P1/2 Time: 92.06 msec  MV Area, PHT: 2.39 cm²     Aortic Valve: AoV Max Mark: 1.84 m/s AoV Peak P.5 mmHg AoV Mean P.3 mmHg     LVOT Vmax: 0.81 m/s LVOT VTI: 0.142 m LVOT Diameter:     Aortic Insufficiency:  AI Half-time:  860 msec  AI Decel Rate: 1.61 m/s²     Tricuspid Valve and PA/RV Systolic Pressure: TR Max Velocity: 2.46 m/s   RA Pressure: 5 mmHg RVSP/PASP: 29.3 mmHg        PHYSICIAN INTERPRETATION:  Left Ventricle: The left ventricular internal cavity size is normal.   There is mild concentric left ventricular hypertrophy.  Global LV systolic function was normal. Left ventricular ejection   fraction, by visual estimation, is 60 to 65%. Spectral Doppler shows   impaired relaxation pattern of left ventricular myocardial filling (Grade   I diastolic dysfunction).  Right Ventricle: Normal right ventricular size and function.  Left Atrium: Mildly enlarged left atrium.  Right Atrium: The right atrium is mildly dilated.  Pericardium: There is no evidence of pericardial effusion.  Mitral Valve: Thickening and calcification of the anterior and posterior   mitral valve leaflets. Mitral leaflet mobility is normal. There is mild   mitral annular calcification. Mild mitral valve regurgitation is seen.  Tricuspid Valve: The tricuspid valve is not well seen. Mild tricuspid   regurgitation is visualized.  Aortic Valve: The aortic valve is trileaflet. Sclerotic aortic valve with   normal opening. Mild aortic valve regurgitation is seen.  Pulmonic Valve: The pulmonic valve was not well visualized.Trace   pulmonic valve regurgitation.  Aorta: Aortic root measured at Sinus of Valsalva is normal. There is mild   aortic root calcification.  Venous: The inferior vena cava was normal sized, with respiratory size   variation greater than 50%.  Additional Comments: A pacer wire is visualized in the right atrium and   right ventricle.        Summary:   1. Left ventricular ejection fraction, by visual estimation, is 60 to   65%.   2. Technically good study.   3. Normal global left ventricular systolic function.   4. Normal left ventricular internal cavity size.   5. Spectral Doppler shows impaired relaxation pattern of left   ventricular myocardial filling (Grade I diastolic dysfunction).   6. There is mild concentric left ventricular hypertrophy.   7. Normal right ventricular size and function.   8. Mildly dilated right atrium.   9. There is no evidence of pericardial effusion.  10. Mild mitral annular calcification.  11. Mild mitral valve regurgitation.  12. Thickening and calcification of the anterior and posterior mitral   valve leaflets.  13. Mild aortic regurgitation.  14. Sclerotic aortic valve with normal opening.  15. Mildly enlarged left atrium.  16. There is mild aortic root calcification.     Vikas Hopkins MD FACC, FASANDRE,FACP  Electronically signed on 2017 at 1:14:59 PM Cardiology/Vascular Medicine Inpatient Consultation Note    Patient is an 88 yo F PMHx dementia, CVA, GI bleed, CHF, UTIs, HLD, syncope, pacemaker,  shunt, seizures (on Keppra and Lamotrigine), recent hospitalization s/p laparotomy/ RT inguinal hernia repair for small bowel resection, treated for E. coli bacteremia, s/ SICU stay IA'ed to NH admitted to [facility] with apparent witnessed seizure event.   Daughter by bedside states that she witnessed event.  Patient has a had a history of seizures in the past and currently on medications.  No cardiac complaints at this time.  She does have a PPM.    At the time of my exam, patient appears stable, and able to answer my questions.  Physical examination was remarkable for a chronically-ill appearing, elderly woman. +2-3 SM cardiac murmur, no apparent neurologic deficits.    Allergies  No Known Allergies    MEDICATIONS:  amLODIPine   Tablet 5 milliGRAM(s) Oral daily  aspirin enteric coated 81 milliGRAM(s) Oral daily  carvedilol 12.5 milliGRAM(s) Oral every 12 hours  furosemide    Tablet 20 milliGRAM(s) Oral daily  losartan 50 milliGRAM(s) Oral daily  meropenem  IVPB      meropenem  IVPB 1000 milliGRAM(s) IV Intermittent every 8 hours  vancomycin  IVPB 1000 milliGRAM(s) IV Intermittent every 24 hours  donepezil 5 milliGRAM(s) Oral at bedtime  lamoTRIgine 100 milliGRAM(s) Oral daily  levETIRAcetam 1000 milliGRAM(s) Oral two times a day  atorvastatin 40 milliGRAM(s) Oral at bedtime  dextrose 5% + sodium chloride 0.45%. 1000 milliLiter(s) IV Continuous <Continuous>  latanoprost 0.005% Ophthalmic Solution 1 Drop(s) Both EYES at bedtime  timolol 0.5% Solution 1 Drop(s) Both EYES daily      PAST MEDICAL & SURGICAL HISTORY:  Pacemaker  History of CVA (cerebrovascular accident)  GI bleed  Pulmonary hypertension  Seizure  CHF (congestive heart failure)  High cholesterol  HTN (hypertension)  Seizure  Pulmonary hypertension  Memory Loss; chronic "past few years"  Hydronephrosis; Right kidney  Afib  Hypercholesteremia  HTN (Hypertension)  Absence Seizure  CVA (Cerebral Infarction)  CHF (Congestive Heart Failure)  S/P  shunt  Ureteral Obstruction; right kidney; stent insertion 12/10  S/P Brain Surgery; for "brain cyst   few years ago"  S/P  Shunt  Pacemaker  Pacemaker  Infection of  Shunt  Infection of  Shunt  History of Stroke  Seizure  HTN (Hypertension)      FAMILY HISTORY:  No pertinent family history in first degree relatives  No pertinent family history in first degree relatives      SOCIAL HISTORY:    NC      PHYSICAL EXAM:  T(C): 36.9 (18 @ 15:46), Max: 38.4 (18 @ 11:19)  HR: 66 (18 @ 15:46) (66 - 78)  BP: 157/71 (18 @ 15:46) (138/88 - 165/81)  RR: 18 (18 @ 15:46) (16 - 18)  SpO2: 100% (18 @ 15:46) (96% - 100%)    Appearance: NAD, chronically-ill appearing, thin elderly woman  HEENT:   Normal oral mucosa, PERRL, EOMI	  Lymphatic: No lymphadenopathy  Cardiovascular: Normal S1 S2, No JVD, 2-3/6 SM  Respiratory: Decreased BS b/l bases	  Psychiatry: Awake  Gastrointestinal:  Soft, Non-tender, + BS	  Skin: No rashes, No ecchymoses, No cyanosis	  Neurologic: Non-focal  Extremities: Normal range of motion, No clubbing, cyanosis or edema  Vascular: Peripheral pulses palpable 2+ bilaterally    MEDICATIONS  (STANDING):  amLODIPine   Tablet 5 milliGRAM(s) Oral daily  aspirin enteric coated 81 milliGRAM(s) Oral daily  atorvastatin 40 milliGRAM(s) Oral at bedtime  carvedilol 12.5 milliGRAM(s) Oral every 12 hours  dextrose 5% + sodium chloride 0.45%. 1000 milliLiter(s) (50 mL/Hr) IV Continuous <Continuous>  donepezil 5 milliGRAM(s) Oral at bedtime  furosemide    Tablet 20 milliGRAM(s) Oral daily  lamoTRIgine 100 milliGRAM(s) Oral daily  latanoprost 0.005% Ophthalmic Solution 1 Drop(s) Both EYES at bedtime  levETIRAcetam 1000 milliGRAM(s) Oral two times a day  losartan 50 milliGRAM(s) Oral daily  meropenem  IVPB      meropenem  IVPB 1000 milliGRAM(s) IV Intermittent every 8 hours  timolol 0.5% Solution 1 Drop(s) Both EYES daily  vancomycin  IVPB 1000 milliGRAM(s) IV Intermittent every 24 hours        LABS:	 	                          10.3   4.23  )-----------( 362      ( 2018 11:30 )             32.6         142  |  100  |  20  ----------------------------<  109<H>  3.6   |  31  |  0.78    Ca    9.2      2018 11:30    TPro  7.1  /  Alb  3.3  /  TBili  0.4  /  DBili  x   /  AST  19  /  ALT  4   /  AlkPhos  75      < from: CT Head No Cont (18 @ 12:55) >  EXAM:  CT BRAIN        PROCEDURE DATE:  2018         INTERPRETATION:  History: Partial seizure. Seizure.    Description: A noncontrast head CT was performed. Axial images were   performed from the skull base to the vertex with coronal/sagittal   reconstructions.    Comparison is made to a head CT study from 2018.    A left parietal approach catheter is again noted. The tip is again noted   to be located within a cystic focus in the left parietal lobe, unchanged   in appearance.    The study is limited by motion. There is no gross evidence for acute   infarct, acute hemorrhage, mass effect, calvarial fracture, or subdural   hematoma.    Moderately severe confluent and patchy hypodensity without mass effect is   noted in the periventricular white matter which most likely represents   chronic microvascular ischemic changes given the patient's age.    Cerebral volume loss is present with secondary proportional prominence of   the sulci and ventricles.    No lytic or blastic calvarial lesions are noted. The visualized portions   of the paranasal sinuses and mastoid air cells are clear.    IMPRESSION:    No gross acute intracranial pathology is noted. If the patient has new   and persistent symptoms, consider short interval follow-up exam.        SUHAS HERNANDEZ M.D., ATTENDING RADIOLOGIST  This document has been electronically signed. 2018  1:04PM      < from: Xray Chest 1 View- PORTABLE-Urgent (18 @ 12:19) >    EXAM:  XR CHEST PORTABLE URGENT 1V        PROCEDURE DATE:  2018         INTERPRETATION:  CLINICAL INDICATION: febrile    EXAM:  Portable upright AP chest from 2018 at 1219. Compared to prior study   from 7/3/2018.    IMPRESSION:  Clear lungs. No pleural effusions or pneumothorax.    Stable left chest wall biventricular AICD, surgical clips over medial   left apical region, and left sided  shunt tubing.    Trachea midline.    Generalized osteopenia and spinal degenerative changes with curvature   again noted.    Partially visualized tubing again seen over right upper quadrant,   probably terminal portion of the  shunt.      CRISTOFER DAVID M.D., ATTENDING RADIOLOGIST  This document has been electronically signed. 2018  1:21PM            < from: TTE Echo Doppler w/o Cont (17 @ 10:24) >  EXAM:  TTE WO CON COMPLETE W DOPPL         PROCEDURE DATE:  2017        INTERPRETATION:  REPORT:    TRANSTHORACIC ECHOCARDIOGRAM REPORT           Patient Name:   JOSE CHAUDHARI Patient Location: [facility] Rec #:  [id number]    Accession #:   [id number]  Account #:                    Height:           66.0 in 167.6 cm  YOB: 1930      Weight:           119.0 lb 54.00 kg  Patient Age:    86 years      BSA:              1.60 m²  Patient Gender: F             BP:145/75 mmHg        Date of Exam: 2017 9:48:23 AM  Sonographer:  JEFF     Procedure:     2D Echo/Doppler/Color Doppler Complete.  Indications:   Cerebral infarction, unspecified - I63.9  Diagnosis:     Cerebral infarction, unspecified - I63.9  Study Details: Technically good study.           2D AND M-MODE MEASUREMENTS (normal ranges within parentheses):  Left Ventricle:                  Normal   Aorta/Left Atrium:            Normal  IVSd (2D):              0.96 cm (0.7-1.1) Aortic Root (2D):  2.98 cm   (2.4-3.7)  LVPWd (2D):             0.94 cm (0.7-1.1) Left Atrium (2D):  4.10 cm   (1.9-4.0)  LVIDd (2D):             3.26 cm (3.4-5.7) Right Ventricle:  LVIDs (2D):             2.32 cm           TAPSE:           2.18 cm  LV FS (2D):  28.9 %   (>25%)  Relative Wall Thickness  0.58    (<0.42)     LV DIASTOLIC FUNCTION:  MV Peak E: 0.53 m/s Decel Time: 317 msec  MV Peak A: 0.74 m/s  E/A Ratio: 0.72     SPECTRAL DOPPLER ANALYSIS (where applicable):  Mitral Valve:  MV P1/2 Time: 92.06 msec  MV Area, PHT: 2.39 cm²     Aortic Valve: AoV Max Mark: 1.84 m/s AoV Peak P.5 mmHg AoV Mean P.3 mmHg     LVOT Vmax: 0.81 m/s LVOT VTI: 0.142 m LVOT Diameter:     Aortic Insufficiency:  AI Half-time:  860 msec  AI Decel Rate: 1.61 m/s²     Tricuspid Valve and PA/RV Systolic Pressure: TR Max Velocity: 2.46 m/s   RA Pressure: 5 mmHg RVSP/PASP: 29.3 mmHg        PHYSICIAN INTERPRETATION:  Left Ventricle: The left ventricular internal cavity size is normal.   There is mild concentric left ventricular hypertrophy.  Global LV systolic function was normal. Left ventricular ejection   fraction, by visual estimation, is 60 to 65%. Spectral Doppler shows   impaired relaxation pattern of left ventricular myocardial filling (Grade   I diastolic dysfunction).  Right Ventricle: Normal right ventricular size and function.  Left Atrium: Mildly enlarged left atrium.  Right Atrium: The right atrium is mildly dilated.  Pericardium: There is no evidence of pericardial effusion.  Mitral Valve: Thickening and calcification of the anterior and posterior   mitral valve leaflets. Mitral leaflet mobility is normal. There is mild   mitral annular calcification. Mild mitral valve regurgitation is seen.  Tricuspid Valve: The tricuspid valve is not well seen. Mild tricuspid   regurgitation is visualized.  Aortic Valve: The aortic valve is trileaflet. Sclerotic aortic valve with   normal opening. Mild aortic valve regurgitation is seen.  Pulmonic Valve: The pulmonic valve was not well visualized.Trace   pulmonic valve regurgitation.  Aorta: Aortic root measured at Sinus of Valsalva is normal. There is mild   aortic root calcification.  Venous: The inferior vena cava was normal sized, with respiratory size   variation greater than 50%.  Additional Comments: A pacer wire is visualized in the right atrium and   right ventricle.        Summary:   1. Left ventricular ejection fraction, by visual estimation, is 60 to   65%.   2. Technically good study.   3. Normal global left ventricular systolic function.   4. Normal left ventricular internal cavity size.   5. Spectral Doppler shows impaired relaxation pattern of left   ventricular myocardial filling (Grade I diastolic dysfunction).   6. There is mild concentric left ventricular hypertrophy.   7. Normal right ventricular size and function.   8. Mildly dilated right atrium.   9. There is no evidence of pericardial effusion.  10. Mild mitral annular calcification.  11. Mild mitral valve regurgitation.  12. Thickening and calcification of the anterior and posterior mitral   valve leaflets.  13. Mild aortic regurgitation.  14. Sclerotic aortic valve with normal opening.  15. Mildly enlarged left atrium.  16. There is mild aortic root calcification.     Vikas Hopkins MD FACC, SKYLAR,FACP  Electronically signed on 2017 at 1:14:59 PM

## 2018-07-27 NOTE — CONSULT NOTE ADULT - ASSESSMENT
Fever 101.1F  WBC WNL  UA with pyruia >50, RBC >50, LE large, nitrite neg  CTH with no acute intracranial pathology  CXR clear    Blood cxs pending  Urine cx sent 87 year old female with dementia, seizures (on Keppra and Lamotrigine), CVA, HTN, CHF, AFIB p/w seizure. Pt [facility] with twitching of her right face and right arm. In ED, found to have fever.    Fever 103.5F  WBC WNL  UA with pyruia >50, RBC >50, LE large, nitrite neg  CTH with no acute intracranial pathology  CXR clear  Blood cxs sent  Urine cx sent  Suspect possible UTI    Recommend:  -Continue vancomycin 1 gram IV q 24 hours  -Start meropenem 1 gram IV q 8 hours  -F/U blood and urine cx  -Check CT C/A/P  -Monitor for fevers  -Trend WBC    ID service available on the weekend. For questions, please call [phone].

## 2018-07-27 NOTE — CONSULT NOTE ADULT - ASSESSMENT
Pt is an 88 yo F PMH of Dementia, CVA, GI bleed, CHF, UTIs, HLD, Syncope (s/p pacemaker), A-Fib?,  shunt (2/2 cystic occipital left sided lesion), Seizures (on Keppra and Lamotrigine), recent hospitalization s/p laparotomy for RT inguinal hernia repair and small bowel resection, complicated by E. coli bacteremia earlier in the month, here for seizure at nursing home. Pt started to have twitching of her right lip and arm, then when EMS arrived had a 30 second GTC relieved by Ativan. Refuse meds at nursing home, did not get AM AEDs, was loaded with Keppra in the ED. On exam pt is lethargic, follows some simple commands, repeats "I don't know" to all questioning, non focal. Head CT stable. Labs show UTI. Likely pt with seizure provoked by UTI, possibly also AED withdrawal. Also possible encephalopathy 2/2 UTI, though based on EMR pt not far from functional baseline.    Recommendations:  - Lamotrigine level to see if pt is getting at nursing home  - c/w home dose of Keppra and Lamotrigine  - Treat underlying infection  - Fall Precautions  - Seizure Precautions  - Ativan 1mg IV q5mins for sustained seizure activity lasting greater than 3 minutes

## 2018-07-27 NOTE — ED PROVIDER NOTE - PROGRESS NOTE DETAILS
sabrina: pt's twitching resolved after ativan. labs reviewed. pending UA. 1L of IVFs only ordered as pt with hx of CHF and at risk of pulmonary edema. BP stable and no lactic acidosis.

## 2018-07-27 NOTE — ED PROVIDER NOTE - MEDICAL DECISION MAKING DETAILS
sabrina:  elderly woman from NH  with hx of seizure BIBA with seizure (generalized ->?partial (twitching right side) and found to be febrile. Alert in ED, verbal, neck supple. No e/o head injury. FS wnl.  likely break through seizure in setting of med noncompliance, r/o electorlyte abnormality, sepsis, underlying UTI/PNA.  Plan for abx, IVFs, lactate, blood cultures, CXR, UA/Ucx, keppra, ativan, CT head, admission.

## 2018-07-27 NOTE — H&P ADULT - PROBLEM SELECTOR PLAN 1
HEAD CT , NEURO EVAL  SEIZURE PRECAUTIONS  iv keppra , npo , iv hydration , if pt becomes alert feed pt and change iv anticonvulsants to po HEAD CT , NEURO EVAL  SEIZURE PRECAUTIONS  npo except for meds iv hydration , if pt becomes alert feed pt

## 2018-07-27 NOTE — H&P ADULT - HISTORY OF PRESENT ILLNESS
88 yo F PMHx dementia, seizures (on Keppra and Lamotrigine), CVA, HTN, CHF, AFIB p/w seizure. Pt arrives from [facility] with twitching of her right face and right arm. Per EMS she had a "full seizure" this AM which lasted about 30 seconds. She refused her medications this AM as per NH staff . Pt is febrile on triage vitals. She follows commands on exam but speech is incoherent. Patient has indwelling urinary catheter with cloudy urine with sediment. unable to obtain ros from pt sec to pts cognitive status 86 yo F PMHx dementia, CVA, GI bleed, CHF, UTIs, HLD, syncope, pacemaker,  shunt,,seizures (on Keppra and Lamotrigine),  recent hospitalization s/p laparotomy/ RT  inguinal hernia repair for  small bowel resection, treated for ecoli bacteremia, s/ SICU STAY  dced to NH admitted today with  seizure. Pt arrives from [facility] with twitching of her right face and right arm. Per EMS she had a "full seizure" this AM which lasted about 30 seconds. She refused her medications this AM as per NH staff . Pt is febrile on triage vitals. She follows commands on exam but speech is incoherent. Patient has indwelling urinary catheter with cloudy urine with sediment. unable to obtain ros from pt sec to pts cognitive status

## 2018-07-27 NOTE — H&P ADULT - PSYCHIATRIC DETAILS
"Subjective   Chief Complaint   Patient presents with   • Menstrual Problem     Pt states she is having long periods lasting for 25 days. Pt states she has the Nexplanon at this time and is wanting to possibly switch to another birth control.      /62  Ht 63\" (160 cm)  Wt 128 lb (58.1 kg)  LMP 2017  BMI 22.67 kg/m2   Adele Bishop is a 28 y.o. year old  presenting to be seen for complaints of abnormal bleeding on nexplanon  nexplanon in almost 2 years  She has had persistent irregular bleeding the past several months with nexplanon  She had taken low dose oc's for 2 months previously which did regulate her bleeding  She likes nexplanon otherwise but has considered removal but not sure  She has no other complaints today      Past Medical History:   Diagnosis Date   • History of Papanicolaou smear of cervix 2016        Current Outpatient Prescriptions:   •  Etonogestrel (NEXPLANON) 68 MG implant subdermal implant, Inject 1 each into the skin 1 (One) Time., Disp: , Rfl:   •  montelukast (SINGULAIR) 10 MG tablet, , Disp: , Rfl:   •  norethindrone-ethinyl estradiol FE (JUNEL FE 1/20) 1-20 MG-MCG per tablet, Take 1 tablet by mouth Daily., Disp: 28 tablet, Rfl: 2   Allergies   Allergen Reactions   • Latex       Past Surgical History:   Procedure Laterality Date   • NO PAST SURGERIES        Social History     Social History   • Marital status: Single     Spouse name: N/A   • Number of children: N/A   • Years of education: N/A     Occupational History   • Not on file.     Social History Main Topics   • Smoking status: Never Smoker   • Smokeless tobacco: Never Used   • Alcohol use Yes      Comment: 5 weekly   • Drug use: No   • Sexual activity: Defer     Other Topics Concern   • Not on file     Social History Narrative      Family History   Problem Relation Age of Onset   • Diabetes Father    • Uterine cancer Maternal Aunt        The following portions of the patient's history were reviewed and " updated as appropriate:problem list, current medications, allergies, past family history, past medical history, past social history and past surgical history.    Review of Systems   Constitutional: Negative.    Gastrointestinal: Negative.    Genitourinary: Positive for vaginal bleeding. Negative for pelvic pain.        Objective     Physical Exam  Adele was seen today for menstrual problem.    Diagnoses and all orders for this visit:    Abnormal uterine bleeding    Other orders  -     norethindrone-ethinyl estradiol FE (JUNEL FE 1/20) 1-20 MG-MCG per tablet; Take 1 tablet by mouth Daily.           This note was electronically signed.    Dalia Rodriguez PA-C   September 8, 2017   flat affect

## 2018-07-27 NOTE — ED PROVIDER NOTE - OBJECTIVE STATEMENT
86 yo F PMHx dementia, seizures (on Keppra and Lamotrigine), CVA, HTN, CHF, AFIB p/w seizure. Pt arrives from [facility] with twitching of her right face and right arm. Per EMS she had a "full seizure" this AM which lasted about 30 seconds. She refused her medications this AM. Pt is febrile on triage vitals. She follows commands on exam but speech is incoherent. Patient has indwelling urinary catheter with cloudy urine with sediment. Patient's PMD Dr. Steiner made aware of her status.

## 2018-07-27 NOTE — ED PROVIDER NOTE - ATTENDING CONTRIBUTION TO CARE
Dr. Hobbs: I have personally performed a face to face bedside history and physical examination of this patient. I have discussed the history, examination, review of systems, assessment and plan of management with the resident. I have reviewed the electronic medical record and amended it to reflect my history, review of systems, physical exam, assessment and plan.

## 2018-07-27 NOTE — CONSULT NOTE ADULT - ASSESSMENT
1. Seizure event  --CT head unremarkable for acute findings  --No significant interval findings noted on telemetry  --Recommend Neurology evaluation for optimization of anti-seizure medical regimen  --On empiric broad spectrum abx at this time    2. Cardiovascular/PPM/HTN  --Will arrange for interrogation  --Prior history of stroke --> on ASA, statin  --Will follow BPs--borderline high, on home regimen

## 2018-07-27 NOTE — ED PROVIDER NOTE - NS ED ROS FT
GENERAL: +fever, HEENT: no vision changes, CARDIAC: no chest pain, PULMONARY: no cough or SOB, GI: no abdominal pain, no nausea, no vomiting, no diarrhea or constipation, : No changes in urination, SKIN: no rashes, NEURO: +seizure,  MSK: No joint pain ~Arpita Carter M.D. Resident

## 2018-07-27 NOTE — ED ADULT TRIAGE NOTE - CHIEF COMPLAINT QUOTE
arrives for [facility] for seizure activity, and non complaint with medication regimen.  Patient is able to follow commands in traige, and presently twitching. 4 LO2 via NC in place.  Febrile in traige.

## 2018-07-27 NOTE — CONSULT NOTE ADULT - SUBJECTIVE AND OBJECTIVE BOX
HPI:  Pt is an 86 yo F PMH of Dementia, CVA, GI bleed, CHF, UTIs, HLD, Syncope (s/p pacemaker), A-Fib?,  shunt (2/2 cystic occipital left sided lesion), Seizures (on Keppra and Lamotrigine), recent hospitalization s/p laparotomy for RT inguinal hernia repair and small bowel resection, complicated by E. coli bacteremia earlier in the month, here for seizure at nursing home. Pt started to have twitching of her right lip and arm, then when EMS arrived had a 30 second GTC relieved by Ativan. Of note pt has the tendency to refuse meds at nursing home, and this AM had not received her seizure meds, was loaded with Keppra in the ED. Currently pt is lethargic, unable to provide hx or full ROS, repeats "I don't know" to all questioning.      MEDICATIONS  (STANDING):  amLODIPine   Tablet 5 milliGRAM(s) Oral daily  aspirin enteric coated 81 milliGRAM(s) Oral daily  atorvastatin 40 milliGRAM(s) Oral at bedtime  carvedilol 12.5 milliGRAM(s) Oral every 12 hours  dextrose 5% + sodium chloride 0.45%. 1000 milliLiter(s) (50 mL/Hr) IV Continuous <Continuous>  donepezil 5 milliGRAM(s) Oral at bedtime  furosemide    Tablet 20 milliGRAM(s) Oral daily  lamoTRIgine 100 milliGRAM(s) Oral daily  latanoprost 0.005% Ophthalmic Solution 1 Drop(s) Both EYES at bedtime  levETIRAcetam 1000 milliGRAM(s) Oral two times a day  losartan 50 milliGRAM(s) Oral daily  timolol 0.5% Solution 1 Drop(s) Both EYES daily    MEDICATIONS  (PRN):    PAST MEDICAL & SURGICAL HISTORY:  Pacemaker  History of CVA (cerebrovascular accident)  GI bleed  Pulmonary hypertension  Seizure  CHF (congestive heart failure)  High cholesterol  HTN (hypertension)  Seizure  Pulmonary hypertension  Memory Loss; chronic "past few years"  Hydronephrosis; Right kidney  Afib  Hypercholesteremia  HTN (Hypertension)  Absence Seizure  CVA (Cerebral Infarction)  CHF (Congestive Heart Failure)  S/P  shunt  Ureteral Obstruction; right kidney; stent insertion 12/10  S/P Brain Surgery; for "brain cyst   few years ago"  S/P  Shunt  Pacemaker  Pacemaker  Infection of  Shunt  Infection of  Shunt  History of Stroke  Seizure  HTN (Hypertension)    FAMILY HISTORY:  No pertinent family history in first degree relatives  No pertinent family history in first degree relatives    Allergies    No Known Allergies    Intolerances    SHx - No smoking, No ETOH, No drug abuse    Review of Systems:  See HPI.    Vital Signs Last 24 Hrs  T(C): 39.7 (27 Jul 2018 15:46), Max: 39.7 (27 Jul 2018 15:46)  T(F): 103.5 (27 Jul 2018 15:46), Max: 103.5 (27 Jul 2018 15:46)  HR: 66 (27 Jul 2018 15:46) (66 - 78)  BP: 157/71 (27 Jul 2018 15:46) (138/88 - 165/81)  BP(mean): --  RR: 18 (27 Jul 2018 15:46) (16 - 18)  SpO2: 100% (27 Jul 2018 15:46) (96% - 100%)      General Exam:   General appearance: No acute distress  Neurological Exam:  Mental Status: Orients to name, though responds to all questioning with "I don't know", follows some simple commands.  Cranial Nerves: PERRL, EOMI grossly, VFF grossly, no nystagmus. No gross facial asymmetry.  Motor:   Tone: normal.                  Strength: moving b/l UEs equally and b/l LEs equally (UEs>LEs)  Pronator drift: none                 No truncal ataxia.    Tremor: No resting, postural or action tremor.  No myoclonus.    Toes downgoing b/l      07-27    142  |  100  |  20  ----------------------------<  109<H>  3.6   |  31  |  0.78    Ca    9.2      27 Jul 2018 11:30    TPro  7.1  /  Alb  3.3  /  TBili  0.4  /  DBili  x   /  AST  19  /  ALT  4   /  AlkPhos  75  07-27               10.3   4.23  )-----------( 362      ( 27 Jul 2018 11:30 )             32.6     Radiology:  < from: CT Head No Cont (07.27.18 @ 12:55) >  No gross acute intracranial pathology is noted. If the patient has new   and persistent symptoms, consider short interval follow-up exam.

## 2018-07-28 LAB
BUN SERPL-MCNC: 10 MG/DL — SIGNIFICANT CHANGE UP (ref 7–23)
CALCIUM SERPL-MCNC: 8.4 MG/DL — SIGNIFICANT CHANGE UP (ref 8.4–10.5)
CHLORIDE SERPL-SCNC: 99 MMOL/L — SIGNIFICANT CHANGE UP (ref 98–107)
CO2 SERPL-SCNC: 29 MMOL/L — SIGNIFICANT CHANGE UP (ref 22–31)
CREAT SERPL-MCNC: 0.68 MG/DL — SIGNIFICANT CHANGE UP (ref 0.5–1.3)
GLUCOSE SERPL-MCNC: 102 MG/DL — HIGH (ref 70–99)
HCT VFR BLD CALC: 30.9 % — LOW (ref 34.5–45)
HGB BLD-MCNC: 9.9 G/DL — LOW (ref 11.5–15.5)
MAGNESIUM SERPL-MCNC: 1.8 MG/DL — SIGNIFICANT CHANGE UP (ref 1.6–2.6)
MCHC RBC-ENTMCNC: 30.8 PG — SIGNIFICANT CHANGE UP (ref 27–34)
MCHC RBC-ENTMCNC: 32 % — SIGNIFICANT CHANGE UP (ref 32–36)
MCV RBC AUTO: 96.3 FL — SIGNIFICANT CHANGE UP (ref 80–100)
NRBC # FLD: 0 — SIGNIFICANT CHANGE UP
PHOSPHATE SERPL-MCNC: 2.2 MG/DL — LOW (ref 2.5–4.5)
PLATELET # BLD AUTO: 332 K/UL — SIGNIFICANT CHANGE UP (ref 150–400)
PMV BLD: 10.6 FL — SIGNIFICANT CHANGE UP (ref 7–13)
POTASSIUM SERPL-MCNC: 3.3 MMOL/L — LOW (ref 3.5–5.3)
POTASSIUM SERPL-SCNC: 3.3 MMOL/L — LOW (ref 3.5–5.3)
PROLACTIN SERPL-MCNC: 78 NG/ML — HIGH (ref 3.4–24.1)
RBC # BLD: 3.21 M/UL — LOW (ref 3.8–5.2)
RBC # FLD: 13.5 % — SIGNIFICANT CHANGE UP (ref 10.3–14.5)
SODIUM SERPL-SCNC: 137 MMOL/L — SIGNIFICANT CHANGE UP (ref 135–145)
SPECIMEN SOURCE: SIGNIFICANT CHANGE UP
WBC # BLD: 4.29 K/UL — SIGNIFICANT CHANGE UP (ref 3.8–10.5)
WBC # FLD AUTO: 4.29 K/UL — SIGNIFICANT CHANGE UP (ref 3.8–10.5)

## 2018-07-28 PROCEDURE — 74177 CT ABD & PELVIS W/CONTRAST: CPT | Mod: 26

## 2018-07-28 PROCEDURE — 99232 SBSQ HOSP IP/OBS MODERATE 35: CPT

## 2018-07-28 PROCEDURE — 71260 CT THORAX DX C+: CPT | Mod: 26

## 2018-07-28 PROCEDURE — 99222 1ST HOSP IP/OBS MODERATE 55: CPT

## 2018-07-28 RX ORDER — POTASSIUM CHLORIDE 20 MEQ
10 PACKET (EA) ORAL
Qty: 0 | Refills: 0 | Status: COMPLETED | OUTPATIENT
Start: 2018-07-28 | End: 2018-07-28

## 2018-07-28 RX ORDER — MEROPENEM 1 G/30ML
1000 INJECTION INTRAVENOUS EVERY 12 HOURS
Qty: 0 | Refills: 0 | Status: DISCONTINUED | OUTPATIENT
Start: 2018-07-28 | End: 2018-07-29

## 2018-07-28 RX ORDER — POTASSIUM CHLORIDE 20 MEQ
40 PACKET (EA) ORAL ONCE
Qty: 0 | Refills: 0 | Status: DISCONTINUED | OUTPATIENT
Start: 2018-07-28 | End: 2018-07-28

## 2018-07-28 RX ORDER — ACETAMINOPHEN 500 MG
1000 TABLET ORAL ONCE
Qty: 0 | Refills: 0 | Status: COMPLETED | OUTPATIENT
Start: 2018-07-28 | End: 2018-07-28

## 2018-07-28 RX ADMIN — Medication 100 MILLIEQUIVALENT(S): at 18:14

## 2018-07-28 RX ADMIN — MEROPENEM 100 MILLIGRAM(S): 1 INJECTION INTRAVENOUS at 05:44

## 2018-07-28 RX ADMIN — LAMOTRIGINE 100 MILLIGRAM(S): 25 TABLET, ORALLY DISINTEGRATING ORAL at 12:32

## 2018-07-28 RX ADMIN — ATORVASTATIN CALCIUM 40 MILLIGRAM(S): 80 TABLET, FILM COATED ORAL at 22:25

## 2018-07-28 RX ADMIN — DONEPEZIL HYDROCHLORIDE 5 MILLIGRAM(S): 10 TABLET, FILM COATED ORAL at 22:25

## 2018-07-28 RX ADMIN — Medication 100 MILLIEQUIVALENT(S): at 00:29

## 2018-07-28 RX ADMIN — MEROPENEM 100 MILLIGRAM(S): 1 INJECTION INTRAVENOUS at 19:42

## 2018-07-28 RX ADMIN — CARVEDILOL PHOSPHATE 12.5 MILLIGRAM(S): 80 CAPSULE, EXTENDED RELEASE ORAL at 18:14

## 2018-07-28 RX ADMIN — AMLODIPINE BESYLATE 5 MILLIGRAM(S): 2.5 TABLET ORAL at 05:44

## 2018-07-28 RX ADMIN — LOSARTAN POTASSIUM 50 MILLIGRAM(S): 100 TABLET, FILM COATED ORAL at 05:44

## 2018-07-28 RX ADMIN — LEVETIRACETAM 1000 MILLIGRAM(S): 250 TABLET, FILM COATED ORAL at 05:44

## 2018-07-28 RX ADMIN — Medication 100 MILLIEQUIVALENT(S): at 16:49

## 2018-07-28 RX ADMIN — Medication 1000 MILLIGRAM(S): at 12:47

## 2018-07-28 RX ADMIN — Medication 100 MILLIEQUIVALENT(S): at 15:29

## 2018-07-28 RX ADMIN — Medication 1 DROP(S): at 12:31

## 2018-07-28 RX ADMIN — SODIUM CHLORIDE 50 MILLILITER(S): 9 INJECTION, SOLUTION INTRAVENOUS at 22:25

## 2018-07-28 RX ADMIN — Medication 81 MILLIGRAM(S): at 12:31

## 2018-07-28 RX ADMIN — LATANOPROST 1 DROP(S): 0.05 SOLUTION/ DROPS OPHTHALMIC; TOPICAL at 22:25

## 2018-07-28 RX ADMIN — Medication 400 MILLIGRAM(S): at 12:32

## 2018-07-28 RX ADMIN — CARVEDILOL PHOSPHATE 12.5 MILLIGRAM(S): 80 CAPSULE, EXTENDED RELEASE ORAL at 05:45

## 2018-07-28 RX ADMIN — Medication 63.75 MILLIMOLE(S): at 20:30

## 2018-07-28 RX ADMIN — LEVETIRACETAM 1000 MILLIGRAM(S): 250 TABLET, FILM COATED ORAL at 18:14

## 2018-07-28 RX ADMIN — Medication 20 MILLIGRAM(S): at 05:44

## 2018-07-28 NOTE — PROGRESS NOTE ADULT - SUBJECTIVE AND OBJECTIVE BOX
Cardiology/Vascular Medicine Inpatient Progress Note    Patient feels better this AM, more alert  Spoke to daughter at bedside who agrees with clinical improvement  No cardiac or neurologic complaints  No interval events noted on telemetry    Vital Signs Last 24 Hrs  T(C): 37.2 (2018 05:39), Max: 38.4 (2018 11:19)  T(F): 98.9 (2018 05:39), Max: 101.1 (2018 11:19)  HR: 84 (2018 05:39) (66 - 84)  BP: 159/83 (2018 05:39) (138/88 - 165/81)  BP(mean): --  RR: 18 (2018 05:39) (16 - 18)  SpO2: 100% (2018 05:39) (96% - 100%)    Appearance: NAD, chronically-ill appearing, thin elderly woman  HEENT:   Normal oral mucosa, PERRL, EOMI	  Lymphatic: No lymphadenopathy  Cardiovascular: Normal S1 S2, No JVD, 2-3/6 SM  Respiratory: Decreased BS b/l bases	  Psychiatry: Awake  Gastrointestinal:  Soft, Non-tender, + BS	  Skin: No rashes, No ecchymoses, No cyanosis	  Neurologic: Non-focal  Extremities: Normal range of motion, No clubbing, cyanosis or edema  Vascular: Peripheral pulses palpable 2+ bilaterally    MEDICATIONS  (STANDING):  amLODIPine   Tablet 5 milliGRAM(s) Oral daily  aspirin enteric coated 81 milliGRAM(s) Oral daily  atorvastatin 40 milliGRAM(s) Oral at bedtime  carvedilol 12.5 milliGRAM(s) Oral every 12 hours  dextrose 5% + sodium chloride 0.45%. 1000 milliLiter(s) (50 mL/Hr) IV Continuous <Continuous>  donepezil 5 milliGRAM(s) Oral at bedtime  furosemide    Tablet 20 milliGRAM(s) Oral daily  lamoTRIgine 100 milliGRAM(s) Oral daily  latanoprost 0.005% Ophthalmic Solution 1 Drop(s) Both EYES at bedtime  levETIRAcetam 1000 milliGRAM(s) Oral two times a day  losartan 50 milliGRAM(s) Oral daily  meropenem  IVPB      meropenem  IVPB 1000 milliGRAM(s) IV Intermittent every 8 hours  timolol 0.5% Solution 1 Drop(s) Both EYES daily  vancomycin  IVPB 1000 milliGRAM(s) IV Intermittent every 24 hours    LABS:	 	                          9.8    5.61  )-----------( 305      ( 2018 20:15 )             30.6       142  |  101  |  15  ----------------------------<  93  3.1<L>   |  29  |  0.71    Ca    8.5      2018 20:15    TPro  7.2  /  Alb  3.1<L>  /  TBili  0.4  /  DBili  x   /  AST  17  /  ALT  7   /  AlkPhos  73        PT/INR - ( 2018 11:30 )   PT: 12.4 SEC;   INR: 1.11     PTT - ( 2018 11:30 )  PTT:42.4 SEC        < from: CT Head No Cont (18 @ 12:55) >  EXAM:  CT BRAIN        PROCEDURE DATE:  2018         INTERPRETATION:  History: Partial seizure. Seizure.    Description: A noncontrast head CT was performed. Axial images were   performed from the skull base to the vertex with coronal/sagittal   reconstructions.    Comparison is made to a head CT study from 2018.    A left parietal approach catheter is again noted. The tip is again noted   to be located within a cystic focus in the left parietal lobe, unchanged   in appearance.    The study is limited by motion. There is no gross evidence for acute   infarct, acute hemorrhage, mass effect, calvarial fracture, or subdural   hematoma.    Moderately severe confluent and patchy hypodensity without mass effect is   noted in the periventricular white matter which most likely represents   chronic microvascular ischemic changes given the patient's age.    Cerebral volume loss is present with secondary proportional prominence of   the sulci and ventricles.    No lytic or blastic calvarial lesions are noted. The visualized portions   of the paranasal sinuses and mastoid air cells are clear.    IMPRESSION:    No gross acute intracranial pathology is noted. If the patient has new   and persistent symptoms, consider short interval follow-up exam.        SUHAS HERNANDEZ M.D., ATTENDING RADIOLOGIST  This document has been electronically signed. 2018  1:04PM      < from: Xray Chest 1 View- PORTABLE-Urgent (18 @ 12:19) >    EXAM:  XR CHEST PORTABLE URGENT 1V        PROCEDURE DATE:  2018         INTERPRETATION:  CLINICAL INDICATION: febrile    EXAM:  Portable upright AP chest from 2018 at 1219. Compared to prior study   from 7/3/2018.    IMPRESSION:  Clear lungs. No pleural effusions or pneumothorax.    Stable left chest wall biventricular AICD, surgical clips over medial   left apical region, and left sided  shunt tubing.    Trachea midline.    Generalized osteopenia and spinal degenerative changes with curvature   again noted.    Partially visualized tubing again seen over right upper quadrant,   probably terminal portion of the  shunt.      CRISTOFER DAVID M.D., ATTENDING RADIOLOGIST  This document has been electronically signed. 2018  1:21PM            < from: TTE Echo Doppler w/o Cont (17 @ 10:24) >  EXAM:  TTE WO CON COMPLETE W DOPPL         PROCEDURE DATE:  2017        INTERPRETATION:  REPORT:    TRANSTHORACIC ECHOCARDIOGRAM REPORT           Patient Name:   JOSE CHAUDHARI Patient Location: [facility] Rec #:  [id number]    Accession #:   [id number]  Account #:                    Height:           66.0 in 167.6 cm  YOB: 1930      Weight:           119.0 lb 54.00 kg  Patient Age:    86 years      BSA:              1.60 m²  Patient Gender: F             BP:145/75 mmHg        Date of Exam: 2017 9:48:23 AM  Sonographer:  JEFF     Procedure:     2D Echo/Doppler/Color Doppler Complete.  Indications:   Cerebral infarction, unspecified - I63.9  Diagnosis:     Cerebral infarction, unspecified - I63.9  Study Details: Technically good study.           2D AND M-MODE MEASUREMENTS (normal ranges within parentheses):  Left Ventricle:                  Normal   Aorta/Left Atrium:            Normal  IVSd (2D):              0.96 cm (0.7-1.1) Aortic Root (2D):  2.98 cm   (2.4-3.7)  LVPWd (2D):             0.94 cm (0.7-1.1) Left Atrium (2D):  4.10 cm   (1.9-4.0)  LVIDd (2D):             3.26 cm (3.4-5.7) Right Ventricle:  LVIDs (2D):             2.32 cm           TAPSE:           2.18 cm  LV FS (2D):  28.9 %   (>25%)  Relative Wall Thickness  0.58    (<0.42)     LV DIASTOLIC FUNCTION:  MV Peak E: 0.53 m/s Decel Time: 317 msec  MV Peak A: 0.74 m/s  E/A Ratio: 0.72     SPECTRAL DOPPLER ANALYSIS (where applicable):  Mitral Valve:  MV P1/2 Time: 92.06 msec  MV Area, PHT: 2.39 cm²     Aortic Valve: AoV Max Mark: 1.84 m/s AoV Peak P.5 mmHg AoV Mean P.3 mmHg     LVOT Vmax: 0.81 m/s LVOT VTI: 0.142 m LVOT Diameter:     Aortic Insufficiency:  AI Half-time:  860 msec  AI Decel Rate: 1.61 m/s²     Tricuspid Valve and PA/RV Systolic Pressure: TR Max Velocity: 2.46 m/s   RA Pressure: 5 mmHg RVSP/PASP: 29.3 mmHg        PHYSICIAN INTERPRETATION:  Left Ventricle: The left ventricular internal cavity size is normal.   There is mild concentric left ventricular hypertrophy.  Global LV systolic function was normal. Left ventricular ejection   fraction, by visual estimation, is 60 to 65%. Spectral Doppler shows   impaired relaxation pattern of left ventricular myocardial filling (Grade   I diastolic dysfunction).  Right Ventricle: Normal right ventricular size and function.  Left Atrium: Mildly enlarged left atrium.  Right Atrium: The right atrium is mildly dilated.  Pericardium: There is no evidence of pericardial effusion.  Mitral Valve: Thickening and calcification of the anterior and posterior   mitral valve leaflets. Mitral leaflet mobility is normal. There is mild   mitral annular calcification. Mild mitral valve regurgitation is seen.  Tricuspid Valve: The tricuspid valve is not well seen. Mild tricuspid   regurgitation is visualized.  Aortic Valve: The aortic valve is trileaflet. Sclerotic aortic valve with   normal opening. Mild aortic valve regurgitation is seen.  Pulmonic Valve: The pulmonic valve was not well visualized.Trace   pulmonic valve regurgitation.  Aorta: Aortic root measured at Sinus of Valsalva is normal. There is mild   aortic root calcification.  Venous: The inferior vena cava was normal sized, with respiratory size   variation greater than 50%.  Additional Comments: A pacer wire is visualized in the right atrium and   right ventricle.        Summary:   1. Left ventricular ejection fraction, by visual estimation, is 60 to   65%.   2. Technically good study.   3. Normal global left ventricular systolic function.   4. Normal left ventricular internal cavity size.   5. Spectral Doppler shows impaired relaxation pattern of left   ventricular myocardial filling (Grade I diastolic dysfunction).   6. There is mild concentric left ventricular hypertrophy.   7. Normal right ventricular size and function.   8. Mildly dilated right atrium.   9. There is no evidence of pericardial effusion.  10. Mild mitral annular calcification.  11. Mild mitral valve regurgitation.  12. Thickening and calcification of the anterior and posterior mitral   valve leaflets.  13. Mild aortic regurgitation.  14. Sclerotic aortic valve with normal opening.  15. Mildly enlarged left atrium.  16. There is mild aortic root calcification.     Vikas Hopkins MD FACC, FASANDRE,FACP  Electronically signed on 2017 at 1:14:59 PM

## 2018-07-28 NOTE — PROGRESS NOTE ADULT - SUBJECTIVE AND OBJECTIVE BOX
Interval History:   No overnight events. Currently patient is responsive, A&O to person only. No new seizure activity. Continue management per Primary team. AED levels recommended.     HPI:  Pt is an 88 yo F PMH of Dementia, CVA, GI bleed, CHF, UTIs, HLD, Syncope (s/p pacemaker), A-Fib?,  shunt (2/2 cystic occipital left sided lesion), Seizures (on Keppra and Lamotrigine), recent hospitalization s/p laparotomy for RT inguinal hernia repair and small bowel resection, complicated by E. coli bacteremia earlier in the month, here for seizure at nursing home. Pt started to have twitching of her right lip and arm, then when EMS arrived had a 30 second GTC relieved by Ativan. Of note pt has the tendency to refuse meds at nursing home, and this AM had not received her seizure meds, was loaded with Keppra in the ED. Currently pt is lethargic, unable to provide hx or full ROS, repeats "I don't know" to all questioning.      MEDICATIONS  (STANDING):  amLODIPine   Tablet 5 milliGRAM(s) Oral daily  aspirin enteric coated 81 milliGRAM(s) Oral daily  atorvastatin 40 milliGRAM(s) Oral at bedtime  carvedilol 12.5 milliGRAM(s) Oral every 12 hours  dextrose 5% + sodium chloride 0.45%. 1000 milliLiter(s) (50 mL/Hr) IV Continuous <Continuous>  donepezil 5 milliGRAM(s) Oral at bedtime  furosemide    Tablet 20 milliGRAM(s) Oral daily  lamoTRIgine 100 milliGRAM(s) Oral daily  latanoprost 0.005% Ophthalmic Solution 1 Drop(s) Both EYES at bedtime  levETIRAcetam 1000 milliGRAM(s) Oral two times a day  losartan 50 milliGRAM(s) Oral daily  timolol 0.5% Solution 1 Drop(s) Both EYES daily    MEDICATIONS  (PRN):    PAST MEDICAL & SURGICAL HISTORY:  Pacemaker  History of CVA (cerebrovascular accident)  GI bleed  Pulmonary hypertension  Seizure  CHF (congestive heart failure)  High cholesterol  HTN (hypertension)  Seizure  Pulmonary hypertension  Memory Loss; chronic "past few years"  Hydronephrosis; Right kidney  Afib  Hypercholesteremia  HTN (Hypertension)  Absence Seizure  CVA (Cerebral Infarction)  CHF (Congestive Heart Failure)  S/P  shunt  Ureteral Obstruction; right kidney; stent insertion 12/10  S/P Brain Surgery; for "brain cyst   few years ago"  S/P  Shunt  Pacemaker  Pacemaker  Infection of  Shunt  Infection of  Shunt  History of Stroke  Seizure  HTN (Hypertension)    FAMILY HISTORY:  No pertinent family history in first degree relatives  No pertinent family history in first degree relatives    Allergies    No Known Allergies    Intolerances    SHx - No smoking, No ETOH, No drug abuse    Review of Systems:  See HPI.    Vital Signs Last 24 Hrs  T(C): 39.7 (27 Jul 2018 15:46), Max: 39.7 (27 Jul 2018 15:46)  T(F): 103.5 (27 Jul 2018 15:46), Max: 103.5 (27 Jul 2018 15:46)  HR: 66 (27 Jul 2018 15:46) (66 - 78)  BP: 157/71 (27 Jul 2018 15:46) (138/88 - 165/81)  BP(mean): --  RR: 18 (27 Jul 2018 15:46) (16 - 18)  SpO2: 100% (27 Jul 2018 15:46) (96% - 100%)      General Exam:   General appearance: No acute distress  Neurological Exam:  Mental Status: Alert. Orients to name only. Follows some simple commands.  Cranial Nerves: PERRL, EOMI grossly, VFF grossly, no nystagmus. No gross facial asymmetry.  Motor:   Tone: normal.                  Strength: moving b/l UEs equally and b/l LEs equally (UEs>LEs)  Pronator drift: none                 No truncal ataxia.    Tremor: No resting, postural or action tremor.  No myoclonus.    Toes downgoing b/l    LABS:                        9.9    4.29  )-----------( 332      ( 28 Jul 2018 10:08 )             30.9     28 Jul 2018 10:08    137    |  99     |  10     ----------------------------<  102    3.3     |  29     |  0.68     Ca    8.4        28 Jul 2018 10:08  Phos  2.2       28 Jul 2018 10:08  Mg     1.8       28 Jul 2018 10:08      PT/INR - ( 27 Jul 2018 11:30 )   PT: 12.4 SEC;   INR: 1.11          PTT - ( 27 Jul 2018 11:30 )  PTT:42.4 SEC    Radiology:  < from: CT Head No Cont (07.27.18 @ 12:55) >  No gross acute intracranial pathology is noted. If the patient has new   and persistent symptoms, consider short interval follow-up exam. Interval History:   No overnight events. Currently patient is responsive, A&O to person only. No new seizure activity. Continue management per Primary team. AED levels recommended.     HPI:  Pt is an 86 yo F PMH of Dementia, CVA, GI bleed, CHF, UTIs, HLD, Syncope (s/p pacemaker), A-Fib?,  shunt (2/2 cystic occipital left sided lesion), Seizures (on Keppra and Lamotrigine), recent hospitalization s/p laparotomy for RT inguinal hernia repair and small bowel resection, complicated by E. coli bacteremia earlier in the month, here for seizure at nursing home. Pt started to have twitching of her right lip and arm, then when EMS arrived had a 30 second GTC relieved by Ativan. Of note pt has the tendency to refuse meds at nursing home, and this AM had not received her seizure meds, was loaded with Keppra in the ED. Currently pt is lethargic, unable to provide hx or full ROS, repeats "I don't know" to all questioning.      MEDICATIONS  (STANDING):  amLODIPine   Tablet 5 milliGRAM(s) Oral daily  aspirin enteric coated 81 milliGRAM(s) Oral daily  atorvastatin 40 milliGRAM(s) Oral at bedtime  carvedilol 12.5 milliGRAM(s) Oral every 12 hours  dextrose 5% + sodium chloride 0.45%. 1000 milliLiter(s) (50 mL/Hr) IV Continuous <Continuous>  donepezil 5 milliGRAM(s) Oral at bedtime  furosemide    Tablet 20 milliGRAM(s) Oral daily  lamoTRIgine 100 milliGRAM(s) Oral daily  latanoprost 0.005% Ophthalmic Solution 1 Drop(s) Both EYES at bedtime  levETIRAcetam 1000 milliGRAM(s) Oral two times a day  losartan 50 milliGRAM(s) Oral daily  timolol 0.5% Solution 1 Drop(s) Both EYES daily    MEDICATIONS  (PRN):    PAST MEDICAL & SURGICAL HISTORY:  Pacemaker  History of CVA (cerebrovascular accident)  GI bleed  Pulmonary hypertension  Seizure  CHF (congestive heart failure)  High cholesterol  HTN (hypertension)  Seizure  Pulmonary hypertension  Memory Loss; chronic "past few years"  Hydronephrosis; Right kidney  Afib  Hypercholesteremia  HTN (Hypertension)  Absence Seizure  CVA (Cerebral Infarction)  CHF (Congestive Heart Failure)  S/P  shunt  Ureteral Obstruction; right kidney; stent insertion 12/10  S/P Brain Surgery; for "brain cyst   few years ago"  S/P  Shunt  Pacemaker  Pacemaker  Infection of  Shunt  Infection of  Shunt  History of Stroke  Seizure  HTN (Hypertension)    FAMILY HISTORY:  No pertinent family history in first degree relatives  No pertinent family history in first degree relatives    Allergies    No Known Allergies    Intolerances    SHx - No smoking, No ETOH, No drug abuse    Review of Systems:  See HPI.    Vital Signs Last 24 Hrs  T(C): 39.7 (27 Jul 2018 15:46), Max: 39.7 (27 Jul 2018 15:46)  T(F): 103.5 (27 Jul 2018 15:46), Max: 103.5 (27 Jul 2018 15:46)  HR: 66 (27 Jul 2018 15:46) (66 - 78)  BP: 157/71 (27 Jul 2018 15:46) (138/88 - 165/81)  BP(mean): --  RR: 18 (27 Jul 2018 15:46) (16 - 18)  SpO2: 100% (27 Jul 2018 15:46) (96% - 100%)      General Exam:   General appearance: No acute distress  Neurological Exam:  Mental Status: Alert. Orients to name only. Follows some simple commands.  Cranial Nerves: PERRL, EOMI grossly, VFF grossly, no nystagmus. No gross facial asymmetry.  Motor:   Tone: normal.                  Strength: moving b/l UEs equally and b/l LEs equally (UEs>LEs)  Pronator drift: none                 No truncal ataxia.    Tremor: No resting, postural or action tremor.  No myoclonus.    Toes downgoing b/l    LABS:                        9.9    4.29  )-----------( 332      ( 28 Jul 2018 10:08 )             30.9     28 Jul 2018 10:08    137    |  99     |  10     ----------------------------<  102    3.3     |  29     |  0.68     Ca    8.4        28 Jul 2018 10:08  Phos  2.2       28 Jul 2018 10:08  Mg     1.8       28 Jul 2018 10:08      PT/INR - ( 27 Jul 2018 11:30 )   PT: 12.4 SEC;   INR: 1.11           PTT - ( 27 Jul 2018 11:30 )  PTT:42.4 SEC    Radiology:  < from: CT Head No Cont (07.27.18 @ 12:55) >  No gross acute intracranial pathology is noted. If the patient has new   and persistent symptoms, consider short interval follow-up exam.

## 2018-07-28 NOTE — PROGRESS NOTE ADULT - ASSESSMENT
Pt is an 88 yo F PMH of Dementia, CVA, GI bleed, CHF, UTIs, HLD, Syncope (s/p pacemaker), A-Fib?,  shunt (2/2 cystic occipital left sided lesion), Seizures (on Keppra and Lamotrigine), recent hospitalization s/p laparotomy for RT inguinal hernia repair and small bowel resection, complicated by E. coli bacteremia earlier in the month, here for seizure at nursing home. Pt started to have twitching of her right lip and arm, then when EMS arrived had a 30 second GTC relieved by Ativan. Refuse meds at nursing home, did not get AM AEDs, was loaded with Keppra in the ED. On exam pt is lethargic, follows some simple commands, repeats "I don't know" to all questioning, non focal. Head CT stable. Labs show UTI. Likely pt with seizure provoked by UTI, possibly also AED withdrawal. Also possible encephalopathy 2/2 UTI, though based on EMR pt not far from functional baseline.    Recommendations:  - Continue with current care.   - Keppra level.   - c/w home dose of Keppra and Lamotrigine  - Treat underlying infection  - Fall Precautions  - Seizure Precautions  - Ativan 1mg IV q5mins for sustained seizure activity lasting greater than 3 minutes Pt is an 86 yo F PMH of Dementia, CVA, GI bleed, CHF, UTIs, HLD, Syncope (s/p pacemaker), A-Fib?,  shunt (2/2 cystic occipital left sided lesion), Seizures (on Keppra and Lamotrigine), recent hospitalization s/p laparotomy for RT inguinal hernia repair and small bowel resection, complicated by E. coli bacteremia earlier in the month, here for seizure at nursing home. Pt started to have twitching of her right lip and arm, then when EMS arrived had a 30 second GTC relieved by Ativan. Refuse meds at nursing home, did not get AM AEDs, was loaded with Keppra in the ED. On exam pt is lethargic, follows some simple commands, repeats "I don't know" to all questioning, non focal. Head CT stable. Labs show UTI. Likely pt with seizure provoked by UTI, possibly also AED withdrawal. Also possible encephalopathy 2/2 UTI, though based on EMR pt not far from functional baseline.    Recommendations:  - Continue with current care.   - please obtain Keppra level and lamictal level please  - c/w home dose of Keppra and Lamotrigine  - Treat underlying infection  - Fall Precautions  - Seizure Precautions  - Ativan 1mg IV q5mins for sustained seizure activity lasting greater than 3 minutes

## 2018-07-28 NOTE — PROGRESS NOTE ADULT - SUBJECTIVE AND OBJECTIVE BOX
CC: F/U for ? sepsis    Saw/spoke to patient. Patient overall appears well. Daughter at bedside. Patient awake, able to answer questions. States no focal complaints, no cough/sob, no abd pain, no N/V/D, no dysuria.     Allergies  No Known Allergies    ANTIMICROBIALS:  meropenem  IVPB    meropenem  IVPB 1000 every 8 hours  vancomycin  IVPB 1000 every 24 hours    PE:    Vital Signs Last 24 Hrs  T(C): 37.2 (2018 05:39), Max: 38.4 (2018 11:19)  T(F): 98.9 (2018 05:39), Max: 101.1 (2018 11:19)  HR: 84 (2018 05:39) (66 - 84)  BP: 159/83 (2018 05:39) (138/88 - 165/81)  RR: 18 (2018 05:39) (16 - 18)  SpO2: 100% (2018 05:39) (96% - 100%)    Gen: AOx3, NAD, non-toxic, pleasant  CV: S1+S2 normal, no murmurs, nontachycardic  Resp: Clear bilat, no resp distress, no crackles/wheezes  Abd: Soft, nontender, +BS  Ext: No LE edema, no wounds    LABS:                        9.8    5.61  )-----------( 305      ( 2018 20:15 )             30.6     -    142  |  101  |  15  ----------------------------<  93  3.1<L>   |  29  |  0.71    Ca    8.5      2018 20:15    TPro  7.2  /  Alb  3.1<L>  /  TBili  0.4  /  DBili  x   /  AST  17  /  ALT  7   /  AlkPhos  73  -    Urinalysis Basic - ( 2018 12:09 )    Color: PINK / Appearance: TURBID / S.019 / pH: 7.0  Gluc: NEGATIVE / Ketone: NEGATIVE  / Bili: NEGATIVE / Urobili: NORMAL mg/dL   Blood: LARGE / Protein: 300 mg/dL / Nitrite: NEGATIVE   Leuk Esterase: LARGE / RBC: >50 / WBC >50   Sq Epi: x / Non Sq Epi: x / Bacteria: x    MICROBIOLOGY:    URINE CATHETER  18  GNR    (otherwise reviewed)    RADIOLOGY:     CXR:    IMPRESSION:  Clear lungs. No pleural effusions or pneumothorax.    Stable left chest wall biventricular AICD, surgical clips over medial   left apical region, and left sided  shunt tubing.    Trachea midline.    Generalized osteopenia and spinal degenerative changes with curvature   again noted.    Partially visualized tubing again seen over right upper quadrant,   probably terminal portion of the  shunt.     CTH:    IMPRESSION:    No gross acute intracranial pathology is noted. If the patient has new   and persistent symptoms, consider short interval follow-up exam.

## 2018-07-28 NOTE — PROGRESS NOTE ADULT - SUBJECTIVE AND OBJECTIVE BOX
chief complaint : admitted for seizure activity        SUBJECTIVE / OVERNIGHT EVENTS: pt alert awake follwoing commands, denies chest pain,sob, saying i dont feel well      MEDICATIONS  (STANDING):  amLODIPine   Tablet 5 milliGRAM(s) Oral daily  aspirin enteric coated 81 milliGRAM(s) Oral daily  atorvastatin 40 milliGRAM(s) Oral at bedtime  carvedilol 12.5 milliGRAM(s) Oral every 12 hours  dextrose 5% + sodium chloride 0.45%. 1000 milliLiter(s) (50 mL/Hr) IV Continuous <Continuous>  donepezil 5 milliGRAM(s) Oral at bedtime  furosemide    Tablet 20 milliGRAM(s) Oral daily  lamoTRIgine 100 milliGRAM(s) Oral daily  latanoprost 0.005% Ophthalmic Solution 1 Drop(s) Both EYES at bedtime  levETIRAcetam 1000 milliGRAM(s) Oral two times a day  losartan 50 milliGRAM(s) Oral daily  meropenem  IVPB 1000 milliGRAM(s) IV Intermittent every 12 hours  sodium phosphate IVPB 15 milliMole(s) IV Intermittent once  timolol 0.5% Solution 1 Drop(s) Both EYES daily    MEDICATIONS  (PRN):      Vital Signs Last 24 Hrs  T(C): 36.6 (2018 18:04), Max: 37.2 (2018 05:39)  T(F): 97.8 (2018 18:04), Max: 98.9 (2018 05:39)  HR: 95 (2018 18:04) (75 - 95)  BP: 121/83 (2018 18:04) (121/83 - 159/83)  BP(mean): --  RR: 18 (2018 18:04) (18 - 18)  SpO2: 99% (2018 18:04) (99% - 100%)  CAPILLARY BLOOD GLUCOSE    Vital Signs Last 24 Hrs  T(C): 36.6 (2018 18:04), Max: 37.2 (2018 05:39)  T(F): 97.8 (2018 18:04), Max: 98.9 (2018 05:39)  HR: 95 (2018 18:04) (75 - 95)  BP: 121/83 (2018 18:04) (121/83 - 159/83)  BP(mean): --  RR: 18 (2018 18:04) (18 - 18)  SpO2: 99% (2018 18:04) (99% - 100%)    I&O's Summary    2018 07:  -  2018 07:00  --------------------------------------------------------  IN: 350 mL / OUT: 900 mL / NET: -550 mL    2018 07:  -  2018 18:32  --------------------------------------------------------  IN: 0 mL / OUT: 1700 mL / NET: -1700 mL        Constitutional: No fever, fatigue  Skin: No rash.  Eyes: No recent vision problems or eye pain.  ENT: No congestion, ear pain, or sore throat.  Cardiovascular: No chest pain or palpation.  Respiratory: No cough, shortness of breath, congestion, or wheezing.  Gastrointestinal: No abdominal pain, nausea, vomiting, or diarrhea.  Genitourinary: No dysuria.  Musculoskeletal: No joint swelling.  Neurologic: No headache.    PHYSICAL EXAM:  GENERAL: NAD  EYES: EOMI, PERRLA  NECK: Supple, No JVD  CHEST/LUNG: dec breath sounds rt base  HEART:  S1 , S2 +  ABDOMEN: soft, bs+  EXTREMITIES:  no edema  NEUROLOGY:alert awake calm cooperative      LABS:                        9.9    4.29  )-----------( 332      ( 2018 10:08 )             30.9     -    137  |  99  |  10  ----------------------------<  102<H>  3.3<L>   |  29  |  0.68    Ca    8.4      2018 10:08  Phos  2.2       Mg     1.8         TPro  7.2  /  Alb  3.1<L>  /  TBili  0.4  /  DBili  x   /  AST  17  /  ALT  7   /  AlkPhos  73  07-27    PT/INR - ( 2018 11:30 )   PT: 12.4 SEC;   INR: 1.11          PTT - ( 2018 11:30 )  PTT:42.4 SEC  CARDIAC MARKERS ( 2018 20:15 )  x     / x     / 29 u/L / x     / x          Urinalysis Basic - ( 2018 12:09 )    Color: PINK / Appearance: TURBID / S.019 / pH: 7.0  Gluc: NEGATIVE / Ketone: NEGATIVE  / Bili: NEGATIVE / Urobili: NORMAL mg/dL   Blood: LARGE / Protein: 300 mg/dL / Nitrite: NEGATIVE   Leuk Esterase: LARGE / RBC: >50 / WBC >50   Sq Epi: x / Non Sq Epi: x / Bacteria: x        RADIOLOGY & ADDITIONAL TESTS:    Imaging Personally Reviewed:    Consultant(s) Notes Reviewed:      Care Discussed with Consultants/Other Providers:

## 2018-07-28 NOTE — PROGRESS NOTE ADULT - ASSESSMENT
87 year old female with dementia, seizures (on Keppra and Lamotrigine), CVA, HTN, CHF, AFIB p/w seizure. Pt arrives from [facility] with twitching of her right face and right arm. In ED, found to have fever.    Fever 103.5F  WBC WNL  UA with pyruia >50, RBC >50, LE large, nitrite neg  CTH with no acute intracranial pathology  CXR clear  Blood cxs-pending  Urine cx GNR; UA positive  Treat for suspected UTI presently; as patient is well appearing with no signs SSTI or PNA, DC Vanco  Overall, fever, UTI, positive culture finding    Recommend:  - Meropenem 1g q 12 (CrCl 41)  - DC Vanco  - F/U blood and urine cx  - F/U CT A/P  - Over weekend, please call [phone] with questions or change in status.     Lm Myers MD  Pager [phone]  After 5pm and on weekends call [phone]

## 2018-07-29 LAB
-  AMIKACIN: SIGNIFICANT CHANGE UP
-  AMPICILLIN/SULBACTAM: SIGNIFICANT CHANGE UP
-  AMPICILLIN: SIGNIFICANT CHANGE UP
-  AZTREONAM: SIGNIFICANT CHANGE UP
-  CEFAZOLIN: SIGNIFICANT CHANGE UP
-  CEFEPIME: SIGNIFICANT CHANGE UP
-  CEFOXITIN: SIGNIFICANT CHANGE UP
-  CEFTAZIDIME: SIGNIFICANT CHANGE UP
-  CEFTRIAXONE: SIGNIFICANT CHANGE UP
-  CEFUROXIME: SIGNIFICANT CHANGE UP
-  CIPROFLOXACIN: SIGNIFICANT CHANGE UP
-  ERTAPENEM: SIGNIFICANT CHANGE UP
-  GENTAMICIN: SIGNIFICANT CHANGE UP
-  IMIPENEM: SIGNIFICANT CHANGE UP
-  LEVOFLOXACIN: SIGNIFICANT CHANGE UP
-  MEROPENEM: SIGNIFICANT CHANGE UP
-  NITROFURANTOIN: SIGNIFICANT CHANGE UP
-  PIPERACILLIN/TAZOBACTAM: SIGNIFICANT CHANGE UP
-  TIGECYCLINE: SIGNIFICANT CHANGE UP
-  TOBRAMYCIN: SIGNIFICANT CHANGE UP
-  TRIMETHOPRIM/SULFAMETHOXAZOLE: SIGNIFICANT CHANGE UP
BACTERIA UR CULT: SIGNIFICANT CHANGE UP
BUN SERPL-MCNC: 8 MG/DL — SIGNIFICANT CHANGE UP (ref 7–23)
CALCIUM SERPL-MCNC: 8.4 MG/DL — SIGNIFICANT CHANGE UP (ref 8.4–10.5)
CHLORIDE SERPL-SCNC: 100 MMOL/L — SIGNIFICANT CHANGE UP (ref 98–107)
CO2 SERPL-SCNC: 28 MMOL/L — SIGNIFICANT CHANGE UP (ref 22–31)
CREAT SERPL-MCNC: 0.65 MG/DL — SIGNIFICANT CHANGE UP (ref 0.5–1.3)
GLUCOSE SERPL-MCNC: 99 MG/DL — SIGNIFICANT CHANGE UP (ref 70–99)
HCT VFR BLD CALC: 28.8 % — LOW (ref 34.5–45)
HGB BLD-MCNC: 9.4 G/DL — LOW (ref 11.5–15.5)
MAGNESIUM SERPL-MCNC: 1.8 MG/DL — SIGNIFICANT CHANGE UP (ref 1.6–2.6)
MCHC RBC-ENTMCNC: 31.2 PG — SIGNIFICANT CHANGE UP (ref 27–34)
MCHC RBC-ENTMCNC: 32.6 % — SIGNIFICANT CHANGE UP (ref 32–36)
MCV RBC AUTO: 95.7 FL — SIGNIFICANT CHANGE UP (ref 80–100)
METHOD TYPE: SIGNIFICANT CHANGE UP
NRBC # FLD: 0 — SIGNIFICANT CHANGE UP
ORGANISM # SPEC MICROSCOPIC CNT: SIGNIFICANT CHANGE UP
ORGANISM # SPEC MICROSCOPIC CNT: SIGNIFICANT CHANGE UP
PHOSPHATE SERPL-MCNC: 2.9 MG/DL — SIGNIFICANT CHANGE UP (ref 2.5–4.5)
PLATELET # BLD AUTO: 334 K/UL — SIGNIFICANT CHANGE UP (ref 150–400)
PMV BLD: 10.5 FL — SIGNIFICANT CHANGE UP (ref 7–13)
POTASSIUM SERPL-MCNC: 3.3 MMOL/L — LOW (ref 3.5–5.3)
POTASSIUM SERPL-SCNC: 3.3 MMOL/L — LOW (ref 3.5–5.3)
RBC # BLD: 3.01 M/UL — LOW (ref 3.8–5.2)
RBC # FLD: 13.4 % — SIGNIFICANT CHANGE UP (ref 10.3–14.5)
SODIUM SERPL-SCNC: 138 MMOL/L — SIGNIFICANT CHANGE UP (ref 135–145)
WBC # BLD: 4.3 K/UL — SIGNIFICANT CHANGE UP (ref 3.8–10.5)
WBC # FLD AUTO: 4.3 K/UL — SIGNIFICANT CHANGE UP (ref 3.8–10.5)

## 2018-07-29 PROCEDURE — 99232 SBSQ HOSP IP/OBS MODERATE 35: CPT

## 2018-07-29 PROCEDURE — 93010 ELECTROCARDIOGRAM REPORT: CPT

## 2018-07-29 RX ORDER — CIPROFLOXACIN LACTATE 400MG/40ML
250 VIAL (ML) INTRAVENOUS EVERY 12 HOURS
Qty: 0 | Refills: 0 | Status: DISCONTINUED | OUTPATIENT
Start: 2018-07-29 | End: 2018-08-01

## 2018-07-29 RX ORDER — POTASSIUM CHLORIDE 20 MEQ
10 PACKET (EA) ORAL
Qty: 0 | Refills: 0 | Status: COMPLETED | OUTPATIENT
Start: 2018-07-29 | End: 2018-07-29

## 2018-07-29 RX ADMIN — AMLODIPINE BESYLATE 5 MILLIGRAM(S): 2.5 TABLET ORAL at 06:44

## 2018-07-29 RX ADMIN — LEVETIRACETAM 1000 MILLIGRAM(S): 250 TABLET, FILM COATED ORAL at 06:44

## 2018-07-29 RX ADMIN — Medication 100 MILLIEQUIVALENT(S): at 10:17

## 2018-07-29 RX ADMIN — SODIUM CHLORIDE 50 MILLILITER(S): 9 INJECTION, SOLUTION INTRAVENOUS at 20:19

## 2018-07-29 RX ADMIN — LEVETIRACETAM 1000 MILLIGRAM(S): 250 TABLET, FILM COATED ORAL at 17:27

## 2018-07-29 RX ADMIN — DONEPEZIL HYDROCHLORIDE 5 MILLIGRAM(S): 10 TABLET, FILM COATED ORAL at 21:39

## 2018-07-29 RX ADMIN — CARVEDILOL PHOSPHATE 12.5 MILLIGRAM(S): 80 CAPSULE, EXTENDED RELEASE ORAL at 17:19

## 2018-07-29 RX ADMIN — Medication 250 MILLIGRAM(S): at 17:28

## 2018-07-29 RX ADMIN — Medication 81 MILLIGRAM(S): at 11:33

## 2018-07-29 RX ADMIN — ATORVASTATIN CALCIUM 40 MILLIGRAM(S): 80 TABLET, FILM COATED ORAL at 21:39

## 2018-07-29 RX ADMIN — LOSARTAN POTASSIUM 50 MILLIGRAM(S): 100 TABLET, FILM COATED ORAL at 06:44

## 2018-07-29 RX ADMIN — LAMOTRIGINE 100 MILLIGRAM(S): 25 TABLET, ORALLY DISINTEGRATING ORAL at 11:33

## 2018-07-29 RX ADMIN — LATANOPROST 1 DROP(S): 0.05 SOLUTION/ DROPS OPHTHALMIC; TOPICAL at 21:39

## 2018-07-29 RX ADMIN — Medication 20 MILLIGRAM(S): at 06:44

## 2018-07-29 RX ADMIN — MEROPENEM 100 MILLIGRAM(S): 1 INJECTION INTRAVENOUS at 06:44

## 2018-07-29 RX ADMIN — Medication 1 DROP(S): at 11:33

## 2018-07-29 RX ADMIN — Medication 100 MILLIEQUIVALENT(S): at 14:06

## 2018-07-29 RX ADMIN — Medication 100 MILLIEQUIVALENT(S): at 12:04

## 2018-07-29 RX ADMIN — CARVEDILOL PHOSPHATE 12.5 MILLIGRAM(S): 80 CAPSULE, EXTENDED RELEASE ORAL at 06:44

## 2018-07-29 NOTE — PROGRESS NOTE ADULT - ASSESSMENT
87 year old female with dementia, seizures (on Keppra and Lamotrigine), CVA, HTN, CHF, AFIB p/w seizure. Pt arrives from [facility] with twitching of her right face and right arm. In ED, found to have fever.    Fever 103.5F  WBC WNL  UA with pyruia >50, RBC >50, LE large, nitrite neg  CTH with no acute intracranial pathology  CXR clear  Blood cxs-NGTD  Urine cx Citrobacter; UA positive  CT now with severe R hydro  Overall, fever, UTI, positive culture finding    Recommend:  - Cipro 250mg q 12  - DC Amie  - F/U blood and urine cx  - Consider  eval for hydro found on CT    Lm Myers MD  Pager [phone]  After 5pm and on weekends call [phone]

## 2018-07-29 NOTE — PROGRESS NOTE ADULT - SUBJECTIVE AND OBJECTIVE BOX
Neurology Follow up note    Isabela CHAUDHARI  HPI:  Pt is an 88 yo F PMH of Dementia, CVA, GI bleed, CHF, UTIs, HLD, Syncope (s/p pacemaker), A-Fib?,  shunt (2/2 cystic occipital left sided lesion), Seizures (on Keppra and Lamotrigine), recent hospitalization s/p laparotomy for RT inguinal hernia repair and small bowel resection, complicated by E. coli bacteremia earlier in the month, here for seizure at nursing home. Pt started to have twitching of her right lip and arm, then when EMS arrived had a 30 second GTC relieved by Ativan. Of note pt has the tendency to refuse meds at nursing home, and this AM had not received her seizure meds, was loaded with Keppra in the ED.     Neurology evaluated the patient in the AM. She is arousable but mostly responds with "i dont know". She endorses RUE weakness which based on her history is very likely chronic. No new seizure activity    Review of Systems:  Negative except HPI    Neuro medications:  donepezil 5 milliGRAM(s) Oral at bedtime  lamoTRIgine 100 milliGRAM(s) Oral daily  levETIRAcetam 1000 milliGRAM(s) Oral two times a day    MEDICATIONS  (STANDING):  amLODIPine   Tablet 5 milliGRAM(s) Oral daily  aspirin enteric coated 81 milliGRAM(s) Oral daily  atorvastatin 40 milliGRAM(s) Oral at bedtime  carvedilol 12.5 milliGRAM(s) Oral every 12 hours  ciprofloxacin     Tablet 250 milliGRAM(s) Oral every 12 hours  dextrose 5% + sodium chloride 0.45%. 1000 milliLiter(s) (50 mL/Hr) IV Continuous <Continuous>  donepezil 5 milliGRAM(s) Oral at bedtime  furosemide    Tablet 20 milliGRAM(s) Oral daily  lamoTRIgine 100 milliGRAM(s) Oral daily  latanoprost 0.005% Ophthalmic Solution 1 Drop(s) Both EYES at bedtime  levETIRAcetam 1000 milliGRAM(s) Oral two times a day  losartan 50 milliGRAM(s) Oral daily  timolol 0.5% Solution 1 Drop(s) Both EYES daily    Objective:   Vital Signs Last 24 Hrs  T(C): 36.8 (29 Jul 2018 06:33), Max: 37.2 (28 Jul 2018 21:45)  T(F): 98.3 (29 Jul 2018 06:33), Max: 98.9 (28 Jul 2018 21:45)  HR: 77 (29 Jul 2018 06:33) (77 - 95)  BP: 153/83 (29 Jul 2018 06:33) (121/83 - 153/83)  BP(mean): --  RR: 18 (29 Jul 2018 06:33) (18 - 18)  SpO2: 99% (29 Jul 2018 06:33) (99% - 99%)    General Exam:   General appearance: No acute distress                 Cardiovascular: Pedal dorsalis pulses intact bilaterally    Neurological Exam:  Mental Status: Orientated to self only  Attention intact.    No dysarthria, aphasia or neglect.     Cranial Nerves:   CN I - not tested.    PERRL, EOMI, VFF, no nystagmus or diplopia.    No APD.  Fundi not visualized bilaterally.    CN V1-3 intact to light touch and pinprick.    No facial asymmetry.    Hearing intact to finger rub bilaterally.     Sternocleidomastoid and Trapezius intact bilaterally.    Motor:   Tone: normal.                  Strength: 4/5 on RUE ad RLE, LLE and LUE intact 5/5  Pronator drift: none                 No truncal ataxia.    Tremor: No resting, postural or action tremor.  No myoclonus.    Sensation: intact to light touch    Deep Tendon Reflexes: 1+ bilateral biceps, triceps, brachioradialis, knee and ankle  Other:  07-29    138  |  100  |  8   ----------------------------<  99  3.3<L>   |  28  |  0.65    Ca    8.4      29 Jul 2018 06:31  Phos  2.9     07-29  Mg     1.8     07-29    TPro  7.2  /  Alb  3.1<L>  /  TBili  0.4  /  DBili  x   /  AST  17  /  ALT  7   /  AlkPhos  73  07-27    LIVER FUNCTIONS - ( 27 Jul 2018 20:15 )  Alb: 3.1 g/dL / Pro: 7.2 g/dL / ALK PHOS: 73 u/L / ALT: 7 u/L / AST: 17 u/L / GGT: x

## 2018-07-29 NOTE — PROGRESS NOTE ADULT - SUBJECTIVE AND OBJECTIVE BOX
chief complaint : admitted for seizure activity        SUBJECTIVE / OVERNIGHT EVENTS: pt alert awake follwoing commands, denies chest pain,sob, saying i dont feel well    MEDICATIONS  (STANDING):  amLODIPine   Tablet 5 milliGRAM(s) Oral daily  aspirin enteric coated 81 milliGRAM(s) Oral daily  atorvastatin 40 milliGRAM(s) Oral at bedtime  carvedilol 12.5 milliGRAM(s) Oral every 12 hours  ciprofloxacin     Tablet 250 milliGRAM(s) Oral every 12 hours  dextrose 5% + sodium chloride 0.45%. 1000 milliLiter(s) (50 mL/Hr) IV Continuous <Continuous>  donepezil 5 milliGRAM(s) Oral at bedtime  furosemide    Tablet 20 milliGRAM(s) Oral daily  lamoTRIgine 100 milliGRAM(s) Oral daily  latanoprost 0.005% Ophthalmic Solution 1 Drop(s) Both EYES at bedtime  levETIRAcetam 1000 milliGRAM(s) Oral two times a day  losartan 50 milliGRAM(s) Oral daily  timolol 0.5% Solution 1 Drop(s) Both EYES daily    MEDICATIONS  (PRN):    Vital Signs Last 24 Hrs  T(C): 36.8 (2018 21:42), Max: 36.8 (2018 06:33)  T(F): 98.2 (2018 21:42), Max: 98.3 (2018 06:33)  HR: 130 (2018 21:42) (76 - 130)  BP: 107/76 (2018 21:42) (105/64 - 157/79)  BP(mean): --  RR: 20 (2018 21:42) (15 - 20)  SpO2: 99% (2018 21:42) (99% - 100%)    Constitutional: No fever, fatigue  Skin: No rash.  Eyes: No recent vision problems or eye pain.  ENT: No congestion, ear pain, or sore throat.  Cardiovascular: No chest pain or palpation.  Respiratory: No cough, shortness of breath, congestion, or wheezing.  Gastrointestinal: No abdominal pain, nausea, vomiting, or diarrhea.  Genitourinary: No dysuria.  Musculoskeletal: No joint swelling.  Neurologic: No headache.    PHYSICAL EXAM:  GENERAL: NAD  EYES: EOMI, PERRLA  NECK: Supple, No JVD  CHEST/LUNG: dec breath sounds rt base  HEART:  S1 , S2 +  ABDOMEN: soft, bs+  EXTREMITIES:  no edema  NEUROLOGY:alert awake calm cooperative      LABS:      138  |  100  |  8   ----------------------------<  99  3.3<L>   |  28  |  0.65    Ca    8.4      2018 06:31  Phos  2.9       Mg     1.8           Creatinine Trend: 0.65 <--, 0.68 <--, 0.71 <--, 0.78 <--                        9.4    4.30  )-----------( 334      ( 2018 06:31 )             28.8     Urine Studies:  Urinalysis Basic - ( 2018 12:09 )    Color: PINK / Appearance: TURBID / S.019 / pH: 7.0  Gluc: NEGATIVE / Ketone: NEGATIVE  / Bili: NEGATIVE / Urobili: NORMAL mg/dL   Blood: LARGE / Protein: 300 mg/dL / Nitrite: NEGATIVE   Leuk Esterase: LARGE / RBC: >50 / WBC >50   Sq Epi:  / Non Sq Epi:  / Bacteria:

## 2018-07-29 NOTE — CONSULT NOTE ADULT - ATTENDING COMMENTS
Patient seen and examined - agree with exam and plan as documented above.
Patient seen and examined, agree with above. Will have definitive stent removal with Dr. Aguilera. Will follow while in house.
Jim Havrey MD  Pager [phone]  After 5pm/weekends call [phone]

## 2018-07-29 NOTE — PROGRESS NOTE ADULT - ASSESSMENT
1. Seizure event  --CT head unremarkable for acute findings  --No significant interval findings noted on telemetry  --Recommend Neurology evaluation for optimization of anti-seizure medical regimen  --On empiric broad spectrum abx at this time    2. Cardiovascular/PPM/HTN  --Will arrange for interrogation  --Prior history of stroke --> on ASA, statin  --Will follow BPs--better this AM, on home regimen

## 2018-07-29 NOTE — PROGRESS NOTE ADULT - SUBJECTIVE AND OBJECTIVE BOX
CC: F/U UTI    Saw/spoke to patient. No fevers, no chills. Overall well. Back to baseline. Daughter at bedside.    Allergies  No Known Allergies    ANTIMICROBIALS:  ciprofloxacin     Tablet 250 every 12 hours    PE:    Vital Signs Last 24 Hrs  T(C): 36.8 (2018 06:33), Max: 37.2 (2018 21:45)  T(F): 98.3 (2018 06:33), Max: 98.9 (2018 21:45)  HR: 77 (2018 06:33) (77 - 95)  BP: 153/83 (2018 06:33) (121/83 - 153/83)  RR: 18 (2018 06:33) (18 - 18)  SpO2: 99% (2018 06:33) (99% - 99%)    Gen: AOx2-3, NAD, non-toxic, pleasant  CV: S1+S2 normal, no murmurs, nontachycardic  Resp: Clear bilat, no resp distress, no crackles/wheezes  Abd: Soft, nontender, +BS  Ext: No LE edema, no wounds    LABS:                        9.4    4.30  )-----------( 334      ( 2018 06:31 )             28.8     07-29    138  |  100  |  8   ----------------------------<  99  3.3<L>   |  28  |  0.65    Ca    8.4      2018 06:31  Phos  2.9     07-  Mg     1.8     -29    TPro  7.2  /  Alb  3.1<L>  /  TBili  0.4  /  DBili  x   /  AST  17  /  ALT  7   /  AlkPhos  73  -27    Urinalysis Basic - ( 2018 12:09 )    Color: PINK / Appearance: TURBID / S.019 / pH: 7.0  Gluc: NEGATIVE / Ketone: NEGATIVE  / Bili: NEGATIVE / Urobili: NORMAL mg/dL   Blood: LARGE / Protein: 300 mg/dL / Nitrite: NEGATIVE   Leuk Esterase: LARGE / RBC: >50 / WBC >50   Sq Epi: x / Non Sq Epi: x / Bacteria: x    MICROBIOLOGY:    BLOOD  18 NGTD    URINE CATHETER  18 --  --  Citrobacter freundii complex S FQ    (otherwise reviewed)    RADIOLOGY:     CT:    IMPRESSION:     Severe right hydroureteronephrosis despite the presence of a right   nephroureteral stent.    Concern for a polypoid lesion within the cecum. Correlation with   colonoscopy is suggested.

## 2018-07-29 NOTE — PROGRESS NOTE ADULT - SUBJECTIVE AND OBJECTIVE BOX
Cardiology/Vascular Medicine Inpatient Progress Note    Patient feels better this AM, more alert  Spoke to daughter at bedside who agrees with clinical improvement  No cardiac or neurologic complaints  No interval events noted on telemetry    Vital Signs Last 24 Hrs  T(C): 37.2 (2018 21:45), Max: 37.2 (2018 21:45)  T(F): 98.9 (2018 21:45), Max: 98.9 (2018 21:45)  HR: 84 (2018 21:45) (77 - 95)  BP: 134/74 (2018 21:45) (121/83 - 151/73)  BP(mean): --  RR: 18 (2018 21:45) (18 - 18)  SpO2: 99% (2018 21:45) (99% - 99%)    Appearance: NAD, chronically-ill appearing, thin elderly woman  HEENT:   Normal oral mucosa, PERRL, EOMI	  Lymphatic: No lymphadenopathy  Cardiovascular: Normal S1 S2, No JVD, 2-3/6 SM  Respiratory: Decreased BS b/l bases	  Psychiatry: Awake  Gastrointestinal:  Soft, Non-tender, + BS	  Skin: No rashes, No ecchymoses, No cyanosis	  Neurologic: Non-focal  Extremities: Normal range of motion, No clubbing, cyanosis or edema  Vascular: Peripheral pulses palpable 2+ bilaterally    MEDICATIONS  (STANDING):  amLODIPine   Tablet 5 milliGRAM(s) Oral daily  aspirin enteric coated 81 milliGRAM(s) Oral daily  atorvastatin 40 milliGRAM(s) Oral at bedtime  carvedilol 12.5 milliGRAM(s) Oral every 12 hours  dextrose 5% + sodium chloride 0.45%. 1000 milliLiter(s) (50 mL/Hr) IV Continuous <Continuous>  donepezil 5 milliGRAM(s) Oral at bedtime  furosemide    Tablet 20 milliGRAM(s) Oral daily  lamoTRIgine 100 milliGRAM(s) Oral daily  latanoprost 0.005% Ophthalmic Solution 1 Drop(s) Both EYES at bedtime  levETIRAcetam 1000 milliGRAM(s) Oral two times a day  losartan 50 milliGRAM(s) Oral daily  meropenem  IVPB 1000 milliGRAM(s) IV Intermittent every 12 hours  timolol 0.5% Solution 1 Drop(s) Both EYES daily      LABS:	 	                             9.9    4.29  )-----------( 332      ( 2018 10:08 )             30.9       137  |  99  |  10  ----------------------------<  102<H>  3.3<L>   |  29  |  0.68    Ca    8.4      2018 10:08  Phos  2.2       Mg     1.8         TPro  7.2  /  Alb  3.1<L>  /  TBili  0.4  /  DBili  x   /  AST  17  /  ALT  7   /  AlkPhos  73      PT/INR - ( 2018 11:30 )   PT: 12.4 SEC;   INR: 1.11     PTT - ( 2018 11:30 )  PTT:42.4 SEC    < from: CT Head No Cont (18 @ 12:55) >  EXAM:  CT BRAIN        PROCEDURE DATE:  2018         INTERPRETATION:  History: Partial seizure. Seizure.    Description: A noncontrast head CT was performed. Axial images were   performed from the skull base to the vertex with coronal/sagittal   reconstructions.    Comparison is made to a head CT study from 2018.    A left parietal approach catheter is again noted. The tip is again noted   to be located within a cystic focus in the left parietal lobe, unchanged   in appearance.    The study is limited by motion. There is no gross evidence for acute   infarct, acute hemorrhage, mass effect, calvarial fracture, or subdural   hematoma.    Moderately severe confluent and patchy hypodensity without mass effect is   noted in the periventricular white matter which most likely represents   chronic microvascular ischemic changes given the patient's age.    Cerebral volume loss is present with secondary proportional prominence of   the sulci and ventricles.    No lytic or blastic calvarial lesions are noted. The visualized portions   of the paranasal sinuses and mastoid air cells are clear.    IMPRESSION:    No gross acute intracranial pathology is noted. If the patient has new   and persistent symptoms, consider short interval follow-up exam.        SUHAS HERNANDEZ M.D., ATTENDING RADIOLOGIST  This document has been electronically signed. 2018  1:04PM      < from: Xray Chest 1 View- PORTABLE-Urgent (18 @ 12:19) >    EXAM:  XR CHEST PORTABLE URGENT 1V        PROCEDURE DATE:  2018         INTERPRETATION:  CLINICAL INDICATION: febrile    EXAM:  Portable upright AP chest from 2018 at 1219. Compared to prior study   from 7/3/2018.    IMPRESSION:  Clear lungs. No pleural effusions or pneumothorax.    Stable left chest wall biventricular AICD, surgical clips over medial   left apical region, and left sided  shunt tubing.    Trachea midline.    Generalized osteopenia and spinal degenerative changes with curvature   again noted.    Partially visualized tubing again seen over right upper quadrant,   probably terminal portion of the  shunt.      CRISTOFER DAVID M.D., ATTENDING RADIOLOGIST  This document has been electronically signed. 2018  1:21PM            < from: TTE Echo Doppler w/o Cont (17 @ 10:24) >  EXAM:  TTE WO CON COMPLETE W DOPPL         PROCEDURE DATE:  2017        INTERPRETATION:  REPORT:    TRANSTHORACIC ECHOCARDIOGRAM REPORT           Patient Name:   JOSE CHAUDHARI Patient Location: [facility] Rec #:  [id number]    Accession #:   [id number]  Account #:                    Height:           66.0 in 167.6 cm  YOB: 1930      Weight:           119.0 lb 54.00 kg  Patient Age:    86 years      BSA:              1.60 m²  Patient Gender: F             BP:145/75 mmHg        Date of Exam: 2017 9:48:23 AM  Sonographer:  JEFF     Procedure:     2D Echo/Doppler/Color Doppler Complete.  Indications:   Cerebral infarction, unspecified - I63.9  Diagnosis:     Cerebral infarction, unspecified - I63.9  Study Details: Technically good study.           2D AND M-MODE MEASUREMENTS (normal ranges within parentheses):  Left Ventricle:                  Normal   Aorta/Left Atrium:            Normal  IVSd (2D):              0.96 cm (0.7-1.1) Aortic Root (2D):  2.98 cm   (2.4-3.7)  LVPWd (2D):             0.94 cm (0.7-1.1) Left Atrium (2D):  4.10 cm   (1.9-4.0)  LVIDd (2D):             3.26 cm (3.4-5.7) Right Ventricle:  LVIDs (2D):             2.32 cm           TAPSE:           2.18 cm  LV FS (2D):  28.9 %   (>25%)  Relative Wall Thickness  0.58    (<0.42)     LV DIASTOLIC FUNCTION:  MV Peak E: 0.53 m/s Decel Time: 317 msec  MV Peak A: 0.74 m/s  E/A Ratio: 0.72     SPECTRAL DOPPLER ANALYSIS (where applicable):  Mitral Valve:  MV P1/2 Time: 92.06 msec  MV Area, PHT: 2.39 cm²     Aortic Valve: AoV Max Mark: 1.84 m/s AoV Peak P.5 mmHg AoV Mean P.3 mmHg     LVOT Vmax: 0.81 m/s LVOT VTI: 0.142 m LVOT Diameter:     Aortic Insufficiency:  AI Half-time:  860 msec  AI Decel Rate: 1.61 m/s²     Tricuspid Valve and PA/RV Systolic Pressure: TR Max Velocity: 2.46 m/s   RA Pressure: 5 mmHg RVSP/PASP: 29.3 mmHg        PHYSICIAN INTERPRETATION:  Left Ventricle: The left ventricular internal cavity size is normal.   There is mild concentric left ventricular hypertrophy.  Global LV systolic function was normal. Left ventricular ejection   fraction, by visual estimation, is 60 to 65%. Spectral Doppler shows   impaired relaxation pattern of left ventricular myocardial filling (Grade   I diastolic dysfunction).  Right Ventricle: Normal right ventricular size and function.  Left Atrium: Mildly enlarged left atrium.  Right Atrium: The right atrium is mildly dilated.  Pericardium: There is no evidence of pericardial effusion.  Mitral Valve: Thickening and calcification of the anterior and posterior   mitral valve leaflets. Mitral leaflet mobility is normal. There is mild   mitral annular calcification. Mild mitral valve regurgitation is seen.  Tricuspid Valve: The tricuspid valve is not well seen. Mild tricuspid   regurgitation is visualized.  Aortic Valve: The aortic valve is trileaflet. Sclerotic aortic valve with   normal opening. Mild aortic valve regurgitation is seen.  Pulmonic Valve: The pulmonic valve was not well visualized.Trace   pulmonic valve regurgitation.  Aorta: Aortic root measured at Sinus of Valsalva is normal. There is mild   aortic root calcification.  Venous: The inferior vena cava was normal sized, with respiratory size   variation greater than 50%.  Additional Comments: A pacer wire is visualized in the right atrium and   right ventricle.        Summary:   1. Left ventricular ejection fraction, by visual estimation, is 60 to   65%.   2. Technically good study.   3. Normal global left ventricular systolic function.   4. Normal left ventricular internal cavity size.   5. Spectral Doppler shows impaired relaxation pattern of left   ventricular myocardial filling (Grade I diastolic dysfunction).   6. There is mild concentric left ventricular hypertrophy.   7. Normal right ventricular size and function.   8. Mildly dilated right atrium.   9. There is no evidence of pericardial effusion.  10. Mild mitral annular calcification.  11. Mild mitral valve regurgitation.  12. Thickening and calcification of the anterior and posterior mitral   valve leaflets.  13. Mild aortic regurgitation.  14. Sclerotic aortic valve with normal opening.  15. Mildly enlarged left atrium.  16. There is mild aortic root calcification.     Vikas Hopkins MD FACC, FASANDRE,FACP  Electronically signed on 2017 at 1:14:59 PM

## 2018-07-29 NOTE — CONSULT NOTE ADULT - ASSESSMENT
87yoF with retained ureteral stent    -Patient has been afebrile since ER  -No indication for emergent intervention at this time, however, patient will need removal of retained ureteral stent  -Treat UTI, medical optimization for eventual OR procedure  -Discussed with Dr. Hidalgo

## 2018-07-29 NOTE — CHART NOTE - NSCHARTNOTEFT_GEN_A_CORE
CT chest/ abd /pelvis noted with Severe right hydroureteronephrosis despite the presence of a right nephroureteral stent - paged [facility] x 2 - awaiting call back for consult CT chest/ abd /pelvis noted with Severe right hydroureteronephrosis despite the presence of a right nephroureteral stent - paged [facility] and spoke to them - will await for their evaluation and recommendations

## 2018-07-29 NOTE — CONSULT NOTE ADULT - SUBJECTIVE AND OBJECTIVE BOX
HPI:  Patient is a 88yo F with dementia, seizures (on Keppra and Lamotrigine), CVA, HTN, CHF, AFIB p/w seizure. Pt was febrile to 103.5 on triage vitals. Patient has dementia and is unable to provide history.  Called family for collaboration, family was unaware that patient has ureteral stent, states that it must have been in for a long time.    Reviewed imaging from previous admissions.  2011 CT A&P shows extremely dilated R collecting system, no stent.  Imaging from 2014 demonstrates presence of ureteral stent, based on placement, appears to be same stent.  Stent likely placed between 4-7 years ago.    Reviewed imaging with radiology resident, R stent appears to be in place.  No stones noted in ureter alongside stent.  Patient currently without fever, flank pain.  On Cipro for UTI.    PAST MEDICAL & SURGICAL HISTORY:  Pacemaker  History of CVA (cerebrovascular accident)  GI bleed  Pulmonary hypertension  Seizure  CHF (congestive heart failure)  High cholesterol  HTN (hypertension)  Seizure  Pulmonary hypertension  Memory Loss; chronic "past few years"  Hydronephrosis; Right kidney  Afib  Hypercholesteremia  HTN (Hypertension)  Absence Seizure  CVA (Cerebral Infarction)  CHF (Congestive Heart Failure)  S/P  shunt  Ureteral Obstruction; right kidney; stent insertion 12/10  S/P Brain Surgery; for "brain cyst   few years ago"  S/P  Shunt  Pacemaker  Pacemaker  Infection of  Shunt  Infection of  Shunt  History of Stroke  Seizure  HTN (Hypertension)    MEDICATIONS  (STANDING):  amLODIPine   Tablet 5 milliGRAM(s) Oral daily  aspirin enteric coated 81 milliGRAM(s) Oral daily  atorvastatin 40 milliGRAM(s) Oral at bedtime  carvedilol 12.5 milliGRAM(s) Oral every 12 hours  ciprofloxacin     Tablet 250 milliGRAM(s) Oral every 12 hours  dextrose 5% + sodium chloride 0.45%. 1000 milliLiter(s) (50 mL/Hr) IV Continuous <Continuous>  donepezil 5 milliGRAM(s) Oral at bedtime  furosemide    Tablet 20 milliGRAM(s) Oral daily  lamoTRIgine 100 milliGRAM(s) Oral daily  latanoprost 0.005% Ophthalmic Solution 1 Drop(s) Both EYES at bedtime  levETIRAcetam 1000 milliGRAM(s) Oral two times a day  losartan 50 milliGRAM(s) Oral daily  timolol 0.5% Solution 1 Drop(s) Both EYES daily    FAMILY HISTORY:  No pertinent family history in first degree relatives  No pertinent family history in first degree relatives    No Known Allergies    REVIEW OF SYSTEMS: Pertinent positives and negatives as stated in HPI, otherwise negative    Vital signs  T(C): 36.8, Max: 37.2 (07-28 @ 21:45)  HR: 77  BP: 153/83  SpO2: 99%    Physical Exam  Gen: NAD  Pulm: No respiratory distress, no subcostal retractions  CV: RRR, no JVD  Abd: Soft, NT, ND, No SP tenderness  : No CVAT    LABS:  07-29 @ 06:31  WBC 4.30  / Hct 28.8  / SCr 0.65     07-28 @ 10:08  WBC 4.29  / Hct 30.9  / SCr 0.68     07-29  138  |  100  |  8   ----------------------------<  99  3.3<L>   |  28  |  0.65    Ca    8.4      29 Jul 2018 06:31  Phos  2.9     07-29  Mg     1.8     07-29    TPro  7.2  /  Alb  3.1<L>  /  TBili  0.4  /  DBili  x   /  AST  17  /  ALT  7   /  AlkPhos  73  07-27    Urine Cx: Citrobacter  Blood Cx: NGTD    RADIOLOGY:  IMPRESSION:   Severe right hydroureteronephrosis despite the presence of a right   nephroureteral stent.

## 2018-07-30 ENCOUNTER — APPOINTMENT (OUTPATIENT)
Dept: UROLOGY | Facility: CLINIC | Age: 83
End: 2018-07-30

## 2018-07-30 DIAGNOSIS — Z71.89 OTHER SPECIFIED COUNSELING: ICD-10-CM

## 2018-07-30 LAB
BUN SERPL-MCNC: 7 MG/DL — SIGNIFICANT CHANGE UP (ref 7–23)
CALCIUM SERPL-MCNC: 8.8 MG/DL — SIGNIFICANT CHANGE UP (ref 8.4–10.5)
CHLORIDE SERPL-SCNC: 98 MMOL/L — SIGNIFICANT CHANGE UP (ref 98–107)
CO2 SERPL-SCNC: 29 MMOL/L — SIGNIFICANT CHANGE UP (ref 22–31)
CREAT SERPL-MCNC: 0.68 MG/DL — SIGNIFICANT CHANGE UP (ref 0.5–1.3)
GLUCOSE BLDC GLUCOMTR-MCNC: 108 MG/DL — HIGH (ref 70–99)
GLUCOSE SERPL-MCNC: 108 MG/DL — HIGH (ref 70–99)
HCT VFR BLD CALC: 33.2 % — LOW (ref 34.5–45)
HGB BLD-MCNC: 10.3 G/DL — LOW (ref 11.5–15.5)
LAMOTRIGINE SERPL-MCNC: 1.8 MCG/ML — LOW (ref 2.5–15)
MAGNESIUM SERPL-MCNC: 1.9 MG/DL — SIGNIFICANT CHANGE UP (ref 1.6–2.6)
MCHC RBC-ENTMCNC: 29.9 PG — SIGNIFICANT CHANGE UP (ref 27–34)
MCHC RBC-ENTMCNC: 31 % — LOW (ref 32–36)
MCV RBC AUTO: 96.2 FL — SIGNIFICANT CHANGE UP (ref 80–100)
NRBC # FLD: 0 — SIGNIFICANT CHANGE UP
PHOSPHATE SERPL-MCNC: 2.5 MG/DL — SIGNIFICANT CHANGE UP (ref 2.5–4.5)
PLATELET # BLD AUTO: 363 K/UL — SIGNIFICANT CHANGE UP (ref 150–400)
PMV BLD: 10 FL — SIGNIFICANT CHANGE UP (ref 7–13)
POTASSIUM SERPL-MCNC: 3.5 MMOL/L — SIGNIFICANT CHANGE UP (ref 3.5–5.3)
POTASSIUM SERPL-SCNC: 3.5 MMOL/L — SIGNIFICANT CHANGE UP (ref 3.5–5.3)
RBC # BLD: 3.45 M/UL — LOW (ref 3.8–5.2)
RBC # FLD: 13.2 % — SIGNIFICANT CHANGE UP (ref 10.3–14.5)
SODIUM SERPL-SCNC: 139 MMOL/L — SIGNIFICANT CHANGE UP (ref 135–145)
WBC # BLD: 3.49 K/UL — LOW (ref 3.8–10.5)
WBC # FLD AUTO: 3.49 K/UL — LOW (ref 3.8–10.5)

## 2018-07-30 PROCEDURE — 99232 SBSQ HOSP IP/OBS MODERATE 35: CPT

## 2018-07-30 RX ORDER — ACETAMINOPHEN 500 MG
650 TABLET ORAL ONCE
Qty: 0 | Refills: 0 | Status: DISCONTINUED | OUTPATIENT
Start: 2018-07-30 | End: 2018-07-30

## 2018-07-30 RX ADMIN — Medication 250 MILLIGRAM(S): at 06:30

## 2018-07-30 RX ADMIN — Medication 250 MILLIGRAM(S): at 17:03

## 2018-07-30 RX ADMIN — SODIUM CHLORIDE 50 MILLILITER(S): 9 INJECTION, SOLUTION INTRAVENOUS at 23:09

## 2018-07-30 RX ADMIN — Medication 20 MILLIGRAM(S): at 06:30

## 2018-07-30 RX ADMIN — SODIUM CHLORIDE 50 MILLILITER(S): 9 INJECTION, SOLUTION INTRAVENOUS at 11:34

## 2018-07-30 RX ADMIN — Medication 81 MILLIGRAM(S): at 11:35

## 2018-07-30 RX ADMIN — LOSARTAN POTASSIUM 50 MILLIGRAM(S): 100 TABLET, FILM COATED ORAL at 06:30

## 2018-07-30 RX ADMIN — LATANOPROST 1 DROP(S): 0.05 SOLUTION/ DROPS OPHTHALMIC; TOPICAL at 21:39

## 2018-07-30 RX ADMIN — ATORVASTATIN CALCIUM 40 MILLIGRAM(S): 80 TABLET, FILM COATED ORAL at 21:38

## 2018-07-30 RX ADMIN — AMLODIPINE BESYLATE 5 MILLIGRAM(S): 2.5 TABLET ORAL at 06:31

## 2018-07-30 RX ADMIN — LEVETIRACETAM 1000 MILLIGRAM(S): 250 TABLET, FILM COATED ORAL at 17:03

## 2018-07-30 RX ADMIN — LEVETIRACETAM 1000 MILLIGRAM(S): 250 TABLET, FILM COATED ORAL at 06:20

## 2018-07-30 RX ADMIN — CARVEDILOL PHOSPHATE 12.5 MILLIGRAM(S): 80 CAPSULE, EXTENDED RELEASE ORAL at 17:03

## 2018-07-30 RX ADMIN — Medication 1 DROP(S): at 17:03

## 2018-07-30 RX ADMIN — CARVEDILOL PHOSPHATE 12.5 MILLIGRAM(S): 80 CAPSULE, EXTENDED RELEASE ORAL at 06:30

## 2018-07-30 RX ADMIN — LAMOTRIGINE 100 MILLIGRAM(S): 25 TABLET, ORALLY DISINTEGRATING ORAL at 11:35

## 2018-07-30 RX ADMIN — DONEPEZIL HYDROCHLORIDE 5 MILLIGRAM(S): 10 TABLET, FILM COATED ORAL at 21:38

## 2018-07-30 NOTE — PROGRESS NOTE ADULT - SUBJECTIVE AND OBJECTIVE BOX
Follow Up:      Inverval History/ROS:Patient is a 87y old  Female who presents with a chief complaint of Fever (27 Jul 2018 19:50)    Afebrile.  No seizure    Allergies    No Known Allergies    Intolerances        ANTIMICROBIALS:  ciprofloxacin     Tablet 250 every 12 hours      OTHER MEDS:  amLODIPine   Tablet 5 milliGRAM(s) Oral daily  aspirin enteric coated 81 milliGRAM(s) Oral daily  atorvastatin 40 milliGRAM(s) Oral at bedtime  carvedilol 12.5 milliGRAM(s) Oral every 12 hours  dextrose 5% + sodium chloride 0.45%. 1000 milliLiter(s) IV Continuous <Continuous>  donepezil 5 milliGRAM(s) Oral at bedtime  furosemide    Tablet 20 milliGRAM(s) Oral daily  lamoTRIgine 100 milliGRAM(s) Oral daily  latanoprost 0.005% Ophthalmic Solution 1 Drop(s) Both EYES at bedtime  levETIRAcetam 1000 milliGRAM(s) Oral two times a day  losartan 50 milliGRAM(s) Oral daily  timolol 0.5% Solution 1 Drop(s) Both EYES daily      Vital Signs Last 24 Hrs  T(C): 37.2 (30 Jul 2018 09:22), Max: 37.2 (29 Jul 2018 22:07)  T(F): 98.9 (30 Jul 2018 09:22), Max: 99 (29 Jul 2018 22:07)  HR: 81 (30 Jul 2018 09:22) (74 - 130)  BP: 132/71 (30 Jul 2018 09:22) (105/64 - 157/79)  BP(mean): --  RR: 19 (30 Jul 2018 09:22) (15 - 20)  SpO2: 99% (30 Jul 2018 09:22) (99% - 100%)    PHYSICAL EXAM:  General: [x] non-toxic  HEAD/EYES: [ ] PERRL [x ] white sclera [ ] icterus  ENT:  [ ] normal [ ] supple [ ] thrush [ ] pharyngeal exudate  Cardiovascular:   [ ] murmur [x ] normal [ ] PPM/AICD  Respiratory:  [ ] clear to ausculation bilaterally  GI:  [x ] soft, non-tender, normal bowel sounds  :  [ ] hua [ ] no CVA tenderness   Musculoskeletal:  [x ] no synovitis  Neurologic:  [x ] non-focal exam   Skin:  [ ] no rash  Lymph: [ ] no lymphadenopathy  Psychiatric:  [x ] appropriate affect [ ] alert & oriented  Lines:  [ x] no phlebitis [ ] central line                                10.3   3.49  )-----------( 363      ( 30 Jul 2018 09:25 )             33.2       07-30    139  |  98  |  7   ----------------------------<  108<H>  3.5   |  29  |  0.68    Ca    8.8      30 Jul 2018 09:25  Phos  2.5     07-30  Mg     1.9     07-30            MICROBIOLOGY:    RADIOLOGY:

## 2018-07-30 NOTE — PROGRESS NOTE ADULT - ASSESSMENT
1. Seizure event  --CT head unremarkable for acute findings  --No significant interval findings noted on telemetry  --Recommend Neurology evaluation for optimization of anti-seizure medical regimen  --On cipro    2. Cardiovascular/PPM/HTN  --Prior history of stroke --> on ASA, statin  --Will follow BPs--better this AM, on home regimen

## 2018-07-30 NOTE — PROGRESS NOTE ADULT - SUBJECTIVE AND OBJECTIVE BOX
Cardiology/Vascular Medicine Inpatient Progress Note    Patient feels better this AM, more alert  No cardiac or neurologic complaints  No interval events noted on telemetry    Vital Signs Last 24 Hrs  T(C): 36.8 (2018 13:59), Max: 37.2 (2018 22:07)  T(F): 98.3 (2018 13:59), Max: 99 (2018 22:07)  HR: 74 (2018 13:59) (74 - 130)  BP: 146/66 (2018 13:59) (105/64 - 157/79)  BP(mean): --  RR: 18 (2018 13:59) (17 - 20)  SpO2: 100% (2018 13:59) (99% - 100%)    Appearance: NAD, chronically-ill appearing, thin elderly woman  HEENT:   Normal oral mucosa, PERRL, EOMI	  Lymphatic: No lymphadenopathy  Cardiovascular: Normal S1 S2, No JVD, 2-3/6 SM  Respiratory: Decreased BS b/l bases	  Psychiatry: Awake  Gastrointestinal:  Soft, Non-tender, + BS	  Skin: No rashes, No ecchymoses, No cyanosis	  Neurologic: Non-focal  Extremities: Normal range of motion, No clubbing, cyanosis or edema  Vascular: Peripheral pulses palpable 2+ bilaterally    MEDICATIONS  (STANDING):  amLODIPine   Tablet 5 milliGRAM(s) Oral daily  aspirin enteric coated 81 milliGRAM(s) Oral daily  atorvastatin 40 milliGRAM(s) Oral at bedtime  carvedilol 12.5 milliGRAM(s) Oral every 12 hours  ciprofloxacin     Tablet 250 milliGRAM(s) Oral every 12 hours  dextrose 5% + sodium chloride 0.45%. 1000 milliLiter(s) (50 mL/Hr) IV Continuous <Continuous>  donepezil 5 milliGRAM(s) Oral at bedtime  furosemide    Tablet 20 milliGRAM(s) Oral daily  lamoTRIgine 100 milliGRAM(s) Oral daily  latanoprost 0.005% Ophthalmic Solution 1 Drop(s) Both EYES at bedtime  levETIRAcetam 1000 milliGRAM(s) Oral two times a day  losartan 50 milliGRAM(s) Oral daily  timolol 0.5% Solution 1 Drop(s) Both EYES daily      LABS:	 	                          10.3   3.49  )-----------( 363      ( 2018 09:25 )             33.2     07-30    139  |  98  |  7   ----------------------------<  108<H>  3.5   |  29  |  0.68    Ca    8.8      2018 09:25  Phos  2.5     07-30  Mg     1.9     07-30        < from: CT Head No Cont (18 @ 12:55) >  EXAM:  CT BRAIN        PROCEDURE DATE:  2018         INTERPRETATION:  History: Partial seizure. Seizure.    Description: A noncontrast head CT was performed. Axial images were   performed from the skull base to the vertex with coronal/sagittal   reconstructions.    Comparison is made to a head CT study from 2018.    A left parietal approach catheter is again noted. The tip is again noted   to be located within a cystic focus in the left parietal lobe, unchanged   in appearance.    The study is limited by motion. There is no gross evidence for acute   infarct, acute hemorrhage, mass effect, calvarial fracture, or subdural   hematoma.    Moderately severe confluent and patchy hypodensity without mass effect is   noted in the periventricular white matter which most likely represents   chronic microvascular ischemic changes given the patient's age.    Cerebral volume loss is present with secondary proportional prominence of   the sulci and ventricles.    No lytic or blastic calvarial lesions are noted. The visualized portions   of the paranasal sinuses and mastoid air cells are clear.    IMPRESSION:    No gross acute intracranial pathology is noted. If the patient has new   and persistent symptoms, consider short interval follow-up exam.        SUHAS HERNANDEZ M.D., ATTENDING RADIOLOGIST  This document has been electronically signed. 2018  1:04PM      < from: Xray Chest 1 View- PORTABLE-Urgent (18 @ 12:19) >    EXAM:  XR CHEST PORTABLE URGENT 1V        PROCEDURE DATE:  2018         INTERPRETATION:  CLINICAL INDICATION: febrile    EXAM:  Portable upright AP chest from 2018 at 1219. Compared to prior study   from 7/3/2018.    IMPRESSION:  Clear lungs. No pleural effusions or pneumothorax.    Stable left chest wall biventricular AICD, surgical clips over medial   left apical region, and left sided  shunt tubing.    Trachea midline.    Generalized osteopenia and spinal degenerative changes with curvature   again noted.    Partially visualized tubing again seen over right upper quadrant,   probably terminal portion of the  shunt.      CRISTOFER DAVID M.D., ATTENDING RADIOLOGIST  This document has been electronically signed. 2018  1:21PM            < from: TTE Echo Doppler w/o Cont (. @ 10:24) >  EXAM:  TTE WO CON COMPLETE W DOPPL         PROCEDURE DATE:  2017        INTERPRETATION:  REPORT:    TRANSTHORACIC ECHOCARDIOGRAM REPORT           Patient Name:   JOSE CHAUDHARI Patient Location: [facility] Rec #:  [id number]    Accession #:   [id number]  Account #:                    Height:           66.0 in 167.6 cm  YOB: 1930      Weight:           119.0 lb 54.00 kg  Patient Age:    86 years      BSA:              1.60 m²  Patient Gender: F             BP:145/75 mmHg        Date of Exam: 2017 9:48:23 AM  Sonographer:  JEFF     Procedure:     2D Echo/Doppler/Color Doppler Complete.  Indications:   Cerebral infarction, unspecified - I63.9  Diagnosis:     Cerebral infarction, unspecified - I63.9  Study Details: Technically good study.           2D AND M-MODE MEASUREMENTS (normal ranges within parentheses):  Left Ventricle:                  Normal   Aorta/Left Atrium:            Normal  IVSd (2D):              0.96 cm (0.7-1.1) Aortic Root (2D):  2.98 cm   (2.4-3.7)  LVPWd (2D):             0.94 cm (0.7-1.1) Left Atrium (2D):  4.10 cm   (1.9-4.0)  LVIDd (2D):             3.26 cm (3.4-5.7) Right Ventricle:  LVIDs (2D):             2.32 cm           TAPSE:           2.18 cm  LV FS (2D):  28.9 %   (>25%)  Relative Wall Thickness  0.58    (<0.42)     LV DIASTOLIC FUNCTION:  MV Peak E: 0.53 m/s Decel Time: 317 msec  MV Peak A: 0.74 m/s  E/A Ratio: 0.72     SPECTRAL DOPPLER ANALYSIS (where applicable):  Mitral Valve:  MV P1/2 Time: 92.06 msec  MV Area, PHT: 2.39 cm²     Aortic Valve: AoV Max Mark: 1.84 m/s AoV Peak P.5 mmHg AoV Mean P.3 mmHg     LVOT Vmax: 0.81 m/s LVOT VTI: 0.142 m LVOT Diameter:     Aortic Insufficiency:  AI Half-time:  860 msec  AI Decel Rate: 1.61 m/s²     Tricuspid Valve and PA/RV Systolic Pressure: TR Max Velocity: 2.46 m/s   RA Pressure: 5 mmHg RVSP/PASP: 29.3 mmHg        PHYSICIAN INTERPRETATION:  Left Ventricle: The left ventricular internal cavity size is normal.   There is mild concentric left ventricular hypertrophy.  Global LV systolic function was normal. Left ventricular ejection   fraction, by visual estimation, is 60 to 65%. Spectral Doppler shows   impaired relaxation pattern of left ventricular myocardial filling (Grade   I diastolic dysfunction).  Right Ventricle: Normal right ventricular size and function.  Left Atrium: Mildly enlarged left atrium.  Right Atrium: The right atrium is mildly dilated.  Pericardium: There is no evidence of pericardial effusion.  Mitral Valve: Thickening and calcification of the anterior and posterior   mitral valve leaflets. Mitral leaflet mobility is normal. There is mild   mitral annular calcification. Mild mitral valve regurgitation is seen.  Tricuspid Valve: The tricuspid valve is not well seen. Mild tricuspid   regurgitation is visualized.  Aortic Valve: The aortic valve is trileaflet. Sclerotic aortic valve with   normal opening. Mild aortic valve regurgitation is seen.  Pulmonic Valve: The pulmonic valve was not well visualized.Trace   pulmonic valve regurgitation.  Aorta: Aortic root measured at Sinus of Valsalva is normal. There is mild   aortic root calcification.  Venous: The inferior vena cava was normal sized, with respiratory size   variation greater than 50%.  Additional Comments: A pacer wire is visualized in the right atrium and   right ventricle.        Summary:   1. Left ventricular ejection fraction, by visual estimation, is 60 to   65%.   2. Technically good study.   3. Normal global left ventricular systolic function.   4. Normal left ventricular internal cavity size.   5. Spectral Doppler shows impaired relaxation pattern of left   ventricular myocardial filling (Grade I diastolic dysfunction).   6. There is mild concentric left ventricular hypertrophy.   7. Normal right ventricular size and function.   8. Mildly dilated right atrium.   9. There is no evidence of pericardial effusion.  10. Mild mitral annular calcification.  11. Mild mitral valve regurgitation.  12. Thickening and calcification of the anterior and posterior mitral   valve leaflets.  13. Mild aortic regurgitation.  14. Sclerotic aortic valve with normal opening.  15. Mildly enlarged left atrium.  16. There is mild aortic root calcification.     Vikas Hopkins MD FACC, FASE,FACP  Electronically signed on 2017 at 1:14:59 PM

## 2018-07-30 NOTE — PROGRESS NOTE ADULT - ASSESSMENT
87 year old female with dementia, seizures (on Keppra and Lamotrigine), CVA, HTN, CHF, AFIB p/w seizure. Pt arrives from [facility] [place] with twitching of her right face and right arm. In ED, found to have fever.    Fever with pyuria and bacteruria.  Fever resolved with abx.    Continue cipro for citrobacter UTI through 8/6  Urology follow up re management of hydronephrosis stent.

## 2018-07-30 NOTE — PROGRESS NOTE ADULT - SUBJECTIVE AND OBJECTIVE BOX
chief complaint : admitted for seizure activity        SUBJECTIVE / OVERNIGHT EVENTS: pt alert awake follwoing commands, denies chest pain,sob, saying i dont feel well    MEDICATIONS  (STANDING):  amLODIPine   Tablet 5 milliGRAM(s) Oral daily  aspirin enteric coated 81 milliGRAM(s) Oral daily  atorvastatin 40 milliGRAM(s) Oral at bedtime  carvedilol 12.5 milliGRAM(s) Oral every 12 hours  ciprofloxacin     Tablet 250 milliGRAM(s) Oral every 12 hours  dextrose 5% + sodium chloride 0.45%. 1000 milliLiter(s) (50 mL/Hr) IV Continuous <Continuous>  donepezil 5 milliGRAM(s) Oral at bedtime  furosemide    Tablet 20 milliGRAM(s) Oral daily  lamoTRIgine 100 milliGRAM(s) Oral daily  latanoprost 0.005% Ophthalmic Solution 1 Drop(s) Both EYES at bedtime  levETIRAcetam 1000 milliGRAM(s) Oral two times a day  losartan 50 milliGRAM(s) Oral daily  timolol 0.5% Solution 1 Drop(s) Both EYES daily    MEDICATIONS  (PRN):    Vital Signs Last 24 Hrs  T(C): 36.7 (2018 17:53), Max: 37.2 (2018 22:07)  T(F): 98.1 (2018 17:53), Max: 99 (2018 22:07)  HR: 78 (2018 17:53) (74 - 81)  BP: 144/75 (2018 17:53) (132/71 - 156/64)  BP(mean): --  RR: 18 (2018 17:53) (18 - 19)  SpO2: 100% (2018 17:53) (99% - 100%)      Constitutional: No fever, fatigue  Skin: No rash.  Eyes: No recent vision problems or eye pain.  ENT: No congestion, ear pain, or sore throat.  Cardiovascular: No chest pain or palpation.  Respiratory: No cough, shortness of breath, congestion, or wheezing.  Gastrointestinal: No abdominal pain, nausea, vomiting, or diarrhea.  Genitourinary: No dysuria.  Musculoskeletal: No joint swelling.  Neurologic: No headache.    PHYSICAL EXAM:  GENERAL: NAD  EYES: EOMI, PERRLA  NECK: Supple, No JVD  CHEST/LUNG: dec breath sounds rt base  HEART:  S1 , S2 +  ABDOMEN: soft, bs+  EXTREMITIES:  no edema  NEUROLOGY:alert awake calm cooperative      LABS:      139  |  98  |  7   ----------------------------<  108<H>  3.5   |  29  |  0.68    Ca    8.8      2018 09:25  Phos  2.5     -  Mg     1.9           Creatinine Trend: 0.68 <--, 0.65 <--, 0.68 <--, 0.71 <--, 0.78 <--                        10.3   3.49  )-----------( 363      ( 2018 09:25 )             33.2     Urine Studies:  Urinalysis Basic - ( 2018 12:09 )    Color: PINK / Appearance: TURBID / S.019 / pH: 7.0  Gluc: NEGATIVE / Ketone: NEGATIVE  / Bili: NEGATIVE / Urobili: NORMAL mg/dL   Blood: LARGE / Protein: 300 mg/dL / Nitrite: NEGATIVE   Leuk Esterase: LARGE / RBC: >50 / WBC >50   Sq Epi:  / Non Sq Epi:  / Bacteria:

## 2018-07-31 ENCOUNTER — TRANSCRIPTION ENCOUNTER (OUTPATIENT)
Age: 83
End: 2018-07-31

## 2018-07-31 LAB
BUN SERPL-MCNC: 9 MG/DL — SIGNIFICANT CHANGE UP (ref 7–23)
CALCIUM SERPL-MCNC: 8.3 MG/DL — LOW (ref 8.4–10.5)
CHLORIDE SERPL-SCNC: 102 MMOL/L — SIGNIFICANT CHANGE UP (ref 98–107)
CO2 SERPL-SCNC: 26 MMOL/L — SIGNIFICANT CHANGE UP (ref 22–31)
CREAT SERPL-MCNC: 0.7 MG/DL — SIGNIFICANT CHANGE UP (ref 0.5–1.3)
GLUCOSE SERPL-MCNC: 100 MG/DL — HIGH (ref 70–99)
HCT VFR BLD CALC: 30.3 % — LOW (ref 34.5–45)
HGB BLD-MCNC: 9.4 G/DL — LOW (ref 11.5–15.5)
LEVETIRACETAM SERPL-MCNC: 30.1 MCG/ML — SIGNIFICANT CHANGE UP (ref 12–46)
MAGNESIUM SERPL-MCNC: 1.8 MG/DL — SIGNIFICANT CHANGE UP (ref 1.6–2.6)
MCHC RBC-ENTMCNC: 29.8 PG — SIGNIFICANT CHANGE UP (ref 27–34)
MCHC RBC-ENTMCNC: 31 % — LOW (ref 32–36)
MCV RBC AUTO: 96.2 FL — SIGNIFICANT CHANGE UP (ref 80–100)
NRBC # FLD: 0 — SIGNIFICANT CHANGE UP
PHOSPHATE SERPL-MCNC: 2.7 MG/DL — SIGNIFICANT CHANGE UP (ref 2.5–4.5)
PLATELET # BLD AUTO: 385 K/UL — SIGNIFICANT CHANGE UP (ref 150–400)
PMV BLD: 10.2 FL — SIGNIFICANT CHANGE UP (ref 7–13)
POTASSIUM SERPL-MCNC: 3.2 MMOL/L — LOW (ref 3.5–5.3)
POTASSIUM SERPL-SCNC: 3.2 MMOL/L — LOW (ref 3.5–5.3)
RBC # BLD: 3.15 M/UL — LOW (ref 3.8–5.2)
RBC # FLD: 13.2 % — SIGNIFICANT CHANGE UP (ref 10.3–14.5)
SODIUM SERPL-SCNC: 140 MMOL/L — SIGNIFICANT CHANGE UP (ref 135–145)
WBC # BLD: 3.88 K/UL — SIGNIFICANT CHANGE UP (ref 3.8–10.5)
WBC # FLD AUTO: 3.88 K/UL — SIGNIFICANT CHANGE UP (ref 3.8–10.5)

## 2018-07-31 PROCEDURE — 99232 SBSQ HOSP IP/OBS MODERATE 35: CPT

## 2018-07-31 RX ORDER — DOCUSATE SODIUM 100 MG
100 CAPSULE ORAL THREE TIMES A DAY
Qty: 0 | Refills: 0 | Status: DISCONTINUED | OUTPATIENT
Start: 2018-07-31 | End: 2018-08-01

## 2018-07-31 RX ORDER — SENNA PLUS 8.6 MG/1
2 TABLET ORAL AT BEDTIME
Qty: 0 | Refills: 0 | Status: DISCONTINUED | OUTPATIENT
Start: 2018-07-31 | End: 2018-08-01

## 2018-07-31 RX ORDER — POTASSIUM CHLORIDE 20 MEQ
40 PACKET (EA) ORAL ONCE
Qty: 0 | Refills: 0 | Status: COMPLETED | OUTPATIENT
Start: 2018-07-31 | End: 2018-07-31

## 2018-07-31 RX ORDER — POLYETHYLENE GLYCOL 3350 17 G/17G
17 POWDER, FOR SOLUTION ORAL DAILY
Qty: 0 | Refills: 0 | Status: DISCONTINUED | OUTPATIENT
Start: 2018-07-31 | End: 2018-08-01

## 2018-07-31 RX ADMIN — CARVEDILOL PHOSPHATE 12.5 MILLIGRAM(S): 80 CAPSULE, EXTENDED RELEASE ORAL at 19:32

## 2018-07-31 RX ADMIN — LOSARTAN POTASSIUM 50 MILLIGRAM(S): 100 TABLET, FILM COATED ORAL at 06:04

## 2018-07-31 RX ADMIN — SODIUM CHLORIDE 50 MILLILITER(S): 9 INJECTION, SOLUTION INTRAVENOUS at 07:22

## 2018-07-31 RX ADMIN — LEVETIRACETAM 1000 MILLIGRAM(S): 250 TABLET, FILM COATED ORAL at 19:33

## 2018-07-31 RX ADMIN — AMLODIPINE BESYLATE 5 MILLIGRAM(S): 2.5 TABLET ORAL at 06:03

## 2018-07-31 RX ADMIN — POLYETHYLENE GLYCOL 3350 17 GRAM(S): 17 POWDER, FOR SOLUTION ORAL at 14:26

## 2018-07-31 RX ADMIN — ATORVASTATIN CALCIUM 40 MILLIGRAM(S): 80 TABLET, FILM COATED ORAL at 21:33

## 2018-07-31 RX ADMIN — LEVETIRACETAM 1000 MILLIGRAM(S): 250 TABLET, FILM COATED ORAL at 06:04

## 2018-07-31 RX ADMIN — Medication 250 MILLIGRAM(S): at 07:21

## 2018-07-31 RX ADMIN — Medication 250 MILLIGRAM(S): at 19:32

## 2018-07-31 RX ADMIN — Medication 20 MILLIGRAM(S): at 06:04

## 2018-07-31 RX ADMIN — CARVEDILOL PHOSPHATE 12.5 MILLIGRAM(S): 80 CAPSULE, EXTENDED RELEASE ORAL at 06:04

## 2018-07-31 RX ADMIN — Medication 100 MILLIGRAM(S): at 21:33

## 2018-07-31 RX ADMIN — LATANOPROST 1 DROP(S): 0.05 SOLUTION/ DROPS OPHTHALMIC; TOPICAL at 21:33

## 2018-07-31 RX ADMIN — Medication 1 DROP(S): at 12:00

## 2018-07-31 RX ADMIN — DONEPEZIL HYDROCHLORIDE 5 MILLIGRAM(S): 10 TABLET, FILM COATED ORAL at 21:33

## 2018-07-31 RX ADMIN — Medication 40 MILLIEQUIVALENT(S): at 14:26

## 2018-07-31 RX ADMIN — LAMOTRIGINE 100 MILLIGRAM(S): 25 TABLET, ORALLY DISINTEGRATING ORAL at 14:26

## 2018-07-31 RX ADMIN — Medication 100 MILLIGRAM(S): at 14:26

## 2018-07-31 RX ADMIN — Medication 81 MILLIGRAM(S): at 14:26

## 2018-07-31 NOTE — DISCHARGE NOTE ADULT - CARE PROVIDER_API CALL
Crow Aguilera), Urology  [address] [place] [address]  Phone: [phone]  Fax: [phone]

## 2018-07-31 NOTE — DISCHARGE NOTE ADULT - MEDICATION SUMMARY - MEDICATIONS TO TAKE
I will START or STAY ON the medications listed below when I get home from the hospital:    Proform Advanced Liquid Protein Supplement  -- 30 milliliter(s) by mouth 2 times a day  -- Indication: For Supplement    Boost VHC Nutritional Drink  -- 236 milliliter(s) by mouth 3 times a day  -- Indication: For Supplement    aspirin 81 mg oral delayed release tablet  -- 1 tab(s) by mouth once a day  -- Indication: For prophylaxis    acetaminophen 325 mg oral tablet  -- 2 tab(s) by mouth every 6 hours, As Needed for pain  -- Indication: For Pain    losartan 50 mg oral tablet  -- 1 tab(s) by mouth once a day  -- Indication: For HTN (hypertension)    levETIRAcetam 1000 mg oral tablet  -- 1 tab(s) by mouth 2 times a day  -- Indication: For Seizure    lamoTRIgine 100 mg oral tablet  -- 1 tab(s) by mouth once a day  -- Indication: For Seizure    atorvastatin 40 mg oral tablet  -- 1 tab(s) by mouth once a day (at bedtime)  -- Indication: For HLD    carvedilol 12.5 mg oral tablet  -- 1 tab(s) by mouth every 12 hours  -- Indication: For HTN (hypertension)    amLODIPine 5 mg oral tablet  -- 1 tab(s) by mouth once a day  -- Indication: For HTN (hypertension)    Aricept 5 mg oral tablet  -- 1 tab(s) by mouth once a day (at bedtime)  -- Indication: For Dementia    Lasix 20 mg oral tablet  -- 1 tab(s) by mouth once a day  -- Indication: For CHF (Congestive Heart Failure)    polyethylene glycol 3350 oral powder for reconstitution  -- 17 gram(s) by mouth once a day  -- Indication: For Constipation    docusate sodium 100 mg oral capsule  -- 2 cap(s) by mouth once a day (at bedtime)  -- Indication: For Constipation    Senna 8.6 mg oral tablet  -- 2 tab(s) by mouth once a day (at bedtime)  -- Indication: For Constipation    timolol maleate 0.5% ophthalmic gel forming solution  -- 1 drop(s) to each affected eye once a day  -- Indication: For eye drop    Restasis 0.05% ophthalmic emulsion  -- 1 drop(s) to each affected eye every 12 hours  -- Indication: For eye drop    Travatan 0.004% ophthalmic solution  -- 1 drop(s) to each affected eye once a day (in the evening)  -- Indication: For eye drop    ciprofloxacin 250 mg oral tablet  -- 1 tab(s) by mouth every 12 hours  -- Indication: For UTI (urinary tract infection)    Multiple Vitamins oral tablet  -- 1 tab(s) by mouth once a day  -- Indication: For Vitamin    Calcium 500+D oral tablet, chewable  -- 1 tab(s) by mouth 2 times a day  -- Indication: For Vitamin    cholecalciferol oral tablet  -- 1000 unit(s) by mouth once a day  -- Indication: For Vitamin    Vitamin C 500 mg oral tablet  -- 1 tab(s) by mouth 2 times a day  -- Indication: For Vitamin

## 2018-07-31 NOTE — PROGRESS NOTE ADULT - SUBJECTIVE AND OBJECTIVE BOX
Cardiology/Vascular Medicine Inpatient Progress Note    No new cardiac or neurologic complaints  No interval events noted on telemetry    Vital Signs Last 24 Hrs  T(C): 36.9 (2018 05:59), Max: 37.2 (2018 09:22)  T(F): 98.5 (2018 05:59), Max: 98.9 (2018 09:22)  HR: 66 (2018 05:59) (66 - 81)  BP: 153/68 (2018 05:59) (132/71 - 153/68)  BP(mean): --  RR: 18 (2018 05:59) (18 - 19)  SpO2: 98% (2018 05:59) (98% - 100%)    Appearance: NAD, chronically-ill appearing, thin elderly woman  HEENT:   Normal oral mucosa, PERRL, EOMI	  Lymphatic: No lymphadenopathy  Cardiovascular: Normal S1 S2, No JVD, 2-3/6 SM  Respiratory: Decreased BS b/l bases	  Psychiatry: Awake  Gastrointestinal:  Soft, Non-tender, + BS	  Skin: No rashes, No ecchymoses, No cyanosis	  Neurologic: Non-focal  Extremities: Normal range of motion, No clubbing, cyanosis or edema  Vascular: Peripheral pulses palpable 2+ bilaterally    MEDICATIONS  (STANDING):  amLODIPine   Tablet 5 milliGRAM(s) Oral daily  aspirin enteric coated 81 milliGRAM(s) Oral daily  atorvastatin 40 milliGRAM(s) Oral at bedtime  carvedilol 12.5 milliGRAM(s) Oral every 12 hours  ciprofloxacin     Tablet 250 milliGRAM(s) Oral every 12 hours  dextrose 5% + sodium chloride 0.45%. 1000 milliLiter(s) (50 mL/Hr) IV Continuous <Continuous>  donepezil 5 milliGRAM(s) Oral at bedtime  furosemide    Tablet 20 milliGRAM(s) Oral daily  lamoTRIgine 100 milliGRAM(s) Oral daily  latanoprost 0.005% Ophthalmic Solution 1 Drop(s) Both EYES at bedtime  levETIRAcetam 1000 milliGRAM(s) Oral two times a day  losartan 50 milliGRAM(s) Oral daily  timolol 0.5% Solution 1 Drop(s) Both EYES daily      LABS:	 	                                   9.4    3.88  )-----------( 385      ( 2018 06:00 )             30.3     07-31    140  |  102  |  9   ----------------------------<  100<H>  3.2<L>   |  26  |  0.70    Ca    8.3<L>      2018 06:00  Phos  2.7       Mg     1.8               < from: CT Head No Cont (18 @ 12:55) >  EXAM:  CT BRAIN        PROCEDURE DATE:  2018         INTERPRETATION:  History: Partial seizure. Seizure.    Description: A noncontrast head CT was performed. Axial images were   performed from the skull base to the vertex with coronal/sagittal   reconstructions.    Comparison is made to a head CT study from 2018.    A left parietal approach catheter is again noted. The tip is again noted   to be located within a cystic focus in the left parietal lobe, unchanged   in appearance.    The study is limited by motion. There is no gross evidence for acute   infarct, acute hemorrhage, mass effect, calvarial fracture, or subdural   hematoma.    Moderately severe confluent and patchy hypodensity without mass effect is   noted in the periventricular white matter which most likely represents   chronic microvascular ischemic changes given the patient's age.    Cerebral volume loss is present with secondary proportional prominence of   the sulci and ventricles.    No lytic or blastic calvarial lesions are noted. The visualized portions   of the paranasal sinuses and mastoid air cells are clear.    IMPRESSION:    No gross acute intracranial pathology is noted. If the patient has new   and persistent symptoms, consider short interval follow-up exam.        SUHAS HERNANDEZ M.D., ATTENDING RADIOLOGIST  This document has been electronically signed. 2018  1:04PM      < from: Xray Chest 1 View- PORTABLE-Urgent (18 @ 12:19) >    EXAM:  XR CHEST PORTABLE URGENT 1V        PROCEDURE DATE:  2018         INTERPRETATION:  CLINICAL INDICATION: febrile    EXAM:  Portable upright AP chest from 2018 at 1219. Compared to prior study   from 7/3/2018.    IMPRESSION:  Clear lungs. No pleural effusions or pneumothorax.    Stable left chest wall biventricular AICD, surgical clips over medial   left apical region, and left sided  shunt tubing.    Trachea midline.    Generalized osteopenia and spinal degenerative changes with curvature   again noted.    Partially visualized tubing again seen over right upper quadrant,   probably terminal portion of the  shunt.      CRISTOFER DAVID M.D., ATTENDING RADIOLOGIST  This document has been electronically signed. 2018  1:21PM            < from: TTE Echo Doppler w/o Cont (. @ 10:24) >  EXAM:  TTE WO CON COMPLETE W DOPPL         PROCEDURE DATE:  2017        INTERPRETATION:  REPORT:    TRANSTHORACIC ECHOCARDIOGRAM REPORT           Patient Name:   JOSE CHAUDHARI Patient Location: [facility] Rec #:  [id number]    Accession #:   [id number]  Account #:                    Height:           66.0 in 167.6 cm  YOB: 1930      Weight:           119.0 lb 54.00 kg  Patient Age:    86 years      BSA:              1.60 m²  Patient Gender: F             BP:145/75 mmHg        Date of Exam: 2017 9:48:23 AM  Sonographer:  JEFF     Procedure:     2D Echo/Doppler/Color Doppler Complete.  Indications:   Cerebral infarction, unspecified - I63.9  Diagnosis:     Cerebral infarction, unspecified - I63.9  Study Details: Technically good study.           2D AND M-MODE MEASUREMENTS (normal ranges within parentheses):  Left Ventricle:                  Normal   Aorta/Left Atrium:            Normal  IVSd (2D):              0.96 cm (0.7-1.1) Aortic Root (2D):  2.98 cm   (2.4-3.7)  LVPWd (2D):             0.94 cm (0.7-1.1) Left Atrium (2D):  4.10 cm   (1.9-4.0)  LVIDd (2D):             3.26 cm (3.4-5.7) Right Ventricle:  LVIDs (2D):             2.32 cm           TAPSE:           2.18 cm  LV FS (2D):  28.9 %   (>25%)  Relative Wall Thickness  0.58    (<0.42)     LV DIASTOLIC FUNCTION:  MV Peak E: 0.53 m/s Decel Time: 317 msec  MV Peak A: 0.74 m/s  E/A Ratio: 0.72     SPECTRAL DOPPLER ANALYSIS (where applicable):  Mitral Valve:  MV P1/2 Time: 92.06 msec  MV Area, PHT: 2.39 cm²     Aortic Valve: AoV Max Mark: 1.84 m/s AoV Peak P.5 mmHg AoV Mean P.3 mmHg     LVOT Vmax: 0.81 m/s LVOT VTI: 0.142 m LVOT Diameter:     Aortic Insufficiency:  AI Half-time:  860 msec  AI Decel Rate: 1.61 m/s²     Tricuspid Valve and PA/RV Systolic Pressure: TR Max Velocity: 2.46 m/s   RA Pressure: 5 mmHg RVSP/PASP: 29.3 mmHg        PHYSICIAN INTERPRETATION:  Left Ventricle: The left ventricular internal cavity size is normal.   There is mild concentric left ventricular hypertrophy.  Global LV systolic function was normal. Left ventricular ejection   fraction, by visual estimation, is 60 to 65%. Spectral Doppler shows   impaired relaxation pattern of left ventricular myocardial filling (Grade   I diastolic dysfunction).  Right Ventricle: Normal right ventricular size and function.  Left Atrium: Mildly enlarged left atrium.  Right Atrium: The right atrium is mildly dilated.  Pericardium: There is no evidence of pericardial effusion.  Mitral Valve: Thickening and calcification of the anterior and posterior   mitral valve leaflets. Mitral leaflet mobility is normal. There is mild   mitral annular calcification. Mild mitral valve regurgitation is seen.  Tricuspid Valve: The tricuspid valve is not well seen. Mild tricuspid   regurgitation is visualized.  Aortic Valve: The aortic valve is trileaflet. Sclerotic aortic valve with   normal opening. Mild aortic valve regurgitation is seen.  Pulmonic Valve: The pulmonic valve was not well visualized.Trace   pulmonic valve regurgitation.  Aorta: Aortic root measured at Sinus of Valsalva is normal. There is mild   aortic root calcification.  Venous: The inferior vena cava was normal sized, with respiratory size   variation greater than 50%.  Additional Comments: A pacer wire is visualized in the right atrium and   right ventricle.        Summary:   1. Left ventricular ejection fraction, by visual estimation, is 60 to   65%.   2. Technically good study.   3. Normal global left ventricular systolic function.   4. Normal left ventricular internal cavity size.   5. Spectral Doppler shows impaired relaxation pattern of left   ventricular myocardial filling (Grade I diastolic dysfunction).   6. There is mild concentric left ventricular hypertrophy.   7. Normal right ventricular size and function.   8. Mildly dilated right atrium.   9. There is no evidence of pericardial effusion.  10. Mild mitral annular calcification.  11. Mild mitral valve regurgitation.  12. Thickening and calcification of the anterior and posterior mitral   valve leaflets.  13. Mild aortic regurgitation.  14. Sclerotic aortic valve with normal opening.  15. Mildly enlarged left atrium.  16. There is mild aortic root calcification.     Vikas Hopkins MD FACC, FASE,FACP  Electronically signed on 2017 at 1:14:59 PM Cardiology/Vascular Medicine Inpatient Progress Note    No new cardiac or neurologic complaints  No interval events noted on telemetry  Plan for D/C to NH  WIll continue abx until 18    Vital Signs Last 24 Hrs  T(C): 36.9 (2018 05:59), Max: 37.2 (2018 09:22)  T(F): 98.5 (2018 05:59), Max: 98.9 (2018 09:22)  HR: 66 (2018 05:59) (66 - 81)  BP: 153/68 (2018 05:59) (132/71 - 153/68)  BP(mean): --  RR: 18 (2018 05:59) (18 - 19)  SpO2: 98% (2018 05:59) (98% - 100%)    Appearance: NAD, chronically-ill appearing, thin elderly woman  HEENT:   Normal oral mucosa, PERRL, EOMI	  Lymphatic: No lymphadenopathy  Cardiovascular: Normal S1 S2, No JVD, 2-3/6 SM  Respiratory: Decreased BS b/l bases	  Psychiatry: Awake  Gastrointestinal:  Soft, Non-tender, + BS	  Skin: No rashes, No ecchymoses, No cyanosis	  Neurologic: Non-focal  Extremities: Normal range of motion, No clubbing, cyanosis or edema  Vascular: Peripheral pulses palpable 2+ bilaterally    MEDICATIONS  (STANDING):  amLODIPine   Tablet 5 milliGRAM(s) Oral daily  aspirin enteric coated 81 milliGRAM(s) Oral daily  atorvastatin 40 milliGRAM(s) Oral at bedtime  carvedilol 12.5 milliGRAM(s) Oral every 12 hours  ciprofloxacin     Tablet 250 milliGRAM(s) Oral every 12 hours  dextrose 5% + sodium chloride 0.45%. 1000 milliLiter(s) (50 mL/Hr) IV Continuous <Continuous>  donepezil 5 milliGRAM(s) Oral at bedtime  furosemide    Tablet 20 milliGRAM(s) Oral daily  lamoTRIgine 100 milliGRAM(s) Oral daily  latanoprost 0.005% Ophthalmic Solution 1 Drop(s) Both EYES at bedtime  levETIRAcetam 1000 milliGRAM(s) Oral two times a day  losartan 50 milliGRAM(s) Oral daily  timolol 0.5% Solution 1 Drop(s) Both EYES daily      LABS:	 	                                   9.4    3.88  )-----------( 385      ( 2018 06:00 )             30.3     07-31    140  |  102  |  9   ----------------------------<  100<H>  3.2<L>   |  26  |  0.70    Ca    8.3<L>      2018 06:00  Phos  2.7     07-  Mg     1.8     07-          < from: CT Head No Cont (18 @ 12:55) >  EXAM:  CT BRAIN        PROCEDURE DATE:  2018         INTERPRETATION:  History: Partial seizure. Seizure.    Description: A noncontrast head CT was performed. Axial images were   performed from the skull base to the vertex with coronal/sagittal   reconstructions.    Comparison is made to a head CT study from 2018.    A left parietal approach catheter is again noted. The tip is again noted   to be located within a cystic focus in the left parietal lobe, unchanged   in appearance.    The study is limited by motion. There is no gross evidence for acute   infarct, acute hemorrhage, mass effect, calvarial fracture, or subdural   hematoma.    Moderately severe confluent and patchy hypodensity without mass effect is   noted in the periventricular white matter which most likely represents   chronic microvascular ischemic changes given the patient's age.    Cerebral volume loss is present with secondary proportional prominence of   the sulci and ventricles.    No lytic or blastic calvarial lesions are noted. The visualized portions   of the paranasal sinuses and mastoid air cells are clear.    IMPRESSION:    No gross acute intracranial pathology is noted. If the patient has new   and persistent symptoms, consider short interval follow-up exam.        SUHAS HERNANDEZ M.D., ATTENDING RADIOLOGIST  This document has been electronically signed. 2018  1:04PM      < from: Xray Chest 1 View- PORTABLE-Urgent (18 @ 12:19) >    EXAM:  XR CHEST PORTABLE URGENT 1V        PROCEDURE DATE:  2018         INTERPRETATION:  CLINICAL INDICATION: febrile    EXAM:  Portable upright AP chest from 2018 at 1219. Compared to prior study   from 7/3/2018.    IMPRESSION:  Clear lungs. No pleural effusions or pneumothorax.    Stable left chest wall biventricular AICD, surgical clips over medial   left apical region, and left sided  shunt tubing.    Trachea midline.    Generalized osteopenia and spinal degenerative changes with curvature   again noted.    Partially visualized tubing again seen over right upper quadrant,   probably terminal portion of the  shunt.      CRISTOFER DAVID M.D., ATTENDING RADIOLOGIST  This document has been electronically signed. 2018  1:21PM            < from: TTE Echo Doppler w/o Cont (17 @ 10:24) >  EXAM:  TTE WO CON COMPLETE W DOPPL         PROCEDURE DATE:  2017        INTERPRETATION:  REPORT:    TRANSTHORACIC ECHOCARDIOGRAM REPORT           Patient Name:   JOSE CHAUDHARI Patient Location: [facility] Rec #:  [id number]    Accession #:   [id number]  Account #:                    Height:           66.0 in 167.6 cm  YOB: 1930      Weight:           119.0 lb 54.00 kg  Patient Age:    86 years      BSA:              1.60 m²  Patient Gender: F             BP:145/75 mmHg        Date of Exam: 2017 9:48:23 AM  Sonographer:  JEFF     Procedure:     2D Echo/Doppler/Color Doppler Complete.  Indications:   Cerebral infarction, unspecified - I63.9  Diagnosis:     Cerebral infarction, unspecified - I63.9  Study Details: Technically good study.           2D AND M-MODE MEASUREMENTS (normal ranges within parentheses):  Left Ventricle:                  Normal   Aorta/Left Atrium:            Normal  IVSd (2D):              0.96 cm (0.7-1.1) Aortic Root (2D):  2.98 cm   (2.4-3.7)  LVPWd (2D):             0.94 cm (0.7-1.1) Left Atrium (2D):  4.10 cm   (1.9-4.0)  LVIDd (2D):             3.26 cm (3.4-5.7) Right Ventricle:  LVIDs (2D):             2.32 cm           TAPSE:           2.18 cm  LV FS (2D):  28.9 %   (>25%)  Relative Wall Thickness  0.58    (<0.42)     LV DIASTOLIC FUNCTION:  MV Peak E: 0.53 m/s Decel Time: 317 msec  MV Peak A: 0.74 m/s  E/A Ratio: 0.72     SPECTRAL DOPPLER ANALYSIS (where applicable):  Mitral Valve:  MV P1/2 Time: 92.06 msec  MV Area, PHT: 2.39 cm²     Aortic Valve: AoV Max Mark: 1.84 m/s AoV Peak P.5 mmHg AoV Mean P.3 mmHg     LVOT Vmax: 0.81 m/s LVOT VTI: 0.142 m LVOT Diameter:     Aortic Insufficiency:  AI Half-time:  860 msec  AI Decel Rate: 1.61 m/s²     Tricuspid Valve and PA/RV Systolic Pressure: TR Max Velocity: 2.46 m/s   RA Pressure: 5 mmHg RVSP/PASP: 29.3 mmHg        PHYSICIAN INTERPRETATION:  Left Ventricle: The left ventricular internal cavity size is normal.   There is mild concentric left ventricular hypertrophy.  Global LV systolic function was normal. Left ventricular ejection   fraction, by visual estimation, is 60 to 65%. Spectral Doppler shows   impaired relaxation pattern of left ventricular myocardial filling (Grade   I diastolic dysfunction).  Right Ventricle: Normal right ventricular size and function.  Left Atrium: Mildly enlarged left atrium.  Right Atrium: The right atrium is mildly dilated.  Pericardium: There is no evidence of pericardial effusion.  Mitral Valve: Thickening and calcification of the anterior and posterior   mitral valve leaflets. Mitral leaflet mobility is normal. There is mild   mitral annular calcification. Mild mitral valve regurgitation is seen.  Tricuspid Valve: The tricuspid valve is not well seen. Mild tricuspid   regurgitation is visualized.  Aortic Valve: The aortic valve is trileaflet. Sclerotic aortic valve with   normal opening. Mild aortic valve regurgitation is seen.  Pulmonic Valve: The pulmonic valve was not well visualized.Trace   pulmonic valve regurgitation.  Aorta: Aortic root measured at Sinus of Valsalva is normal. There is mild   aortic root calcification.  Venous: The inferior vena cava was normal sized, with respiratory size   variation greater than 50%.  Additional Comments: A pacer wire is visualized in the right atrium and   right ventricle.        Summary:   1. Left ventricular ejection fraction, by visual estimation, is 60 to   65%.   2. Technically good study.   3. Normal global left ventricular systolic function.   4. Normal left ventricular internal cavity size.   5. Spectral Doppler shows impaired relaxation pattern of left   ventricular myocardial filling (Grade I diastolic dysfunction).   6. There is mild concentric left ventricular hypertrophy.   7. Normal right ventricular size and function.   8. Mildly dilated right atrium.   9. There is no evidence of pericardial effusion.  10. Mild mitral annular calcification.  11. Mild mitral valve regurgitation.  12. Thickening and calcification of the anterior and posterior mitral   valve leaflets.  13. Mild aortic regurgitation.  14. Sclerotic aortic valve with normal opening.  15. Mildly enlarged left atrium.  16. There is mild aortic root calcification.     Walter. Hopkins MD FACC, FASE,FACP  Electronically signed on 2017 at 1:14:59 PM

## 2018-07-31 NOTE — DISCHARGE NOTE ADULT - COMMUNITY RESOURCES
[facility] address: [address] TEL # [phone], 1pm  via [facility] tel # [phone]

## 2018-07-31 NOTE — PROGRESS NOTE ADULT - PROBLEM SELECTOR PLAN 1
HEAD CT neg   as per neuro rec , jens check lamotrigine level  SEIZURE PRECAUTIONS  npo except for meds iv hydration , if pt becomes alert feed pt

## 2018-07-31 NOTE — CHART NOTE - NSCHARTNOTEFT_GEN_A_CORE
Discussed with daughter at length at bedside - pt is stable for discharge back to NH at this time, as per Dr. Steiner.  Will continue abx until 8/6, and will need outpatient urology follow up for stent removal. Will continue hua until then.  Daughter in agreement.  SW aware.

## 2018-07-31 NOTE — PROGRESS NOTE ADULT - ASSESSMENT
1. Seizure event  --CT head unremarkable for acute findings  --No significant interval findings noted on telemetry  --Recommend Neurology evaluation for optimization of anti-seizure medical regimen  --On cipro    2. Cardiovascular/PPM/HTN  --Prior history of stroke --> on ASA, statin  --Will follow BPs--better this AM, on home regimen 1. Seizure event  --CT head unremarkable for acute findings  --No significant interval findings noted on telemetry  --Recommend Neurology evaluation for optimization of anti-seizure medical regimen  --On cipro.  Plan to continue abx until 8/6    2. Cardiovascular/PPM/HTN  --Prior history of stroke --> on ASA, statin  --Will follow BPs--better this AM, on home regimen    3. Dispo: Plan to d/c to NH

## 2018-07-31 NOTE — DISCHARGE NOTE ADULT - PLAN OF CARE
resolved Please continue all seizure medications as directed.  Do not miss doses.  Monitor for any seizure activity. Blood pressure stable throughout hospital stay.  Continue medications as directed.  Monitor blood pressure. Continue antibiotics until completed - through 8/6/18.  Monitor for any further fevers.  Continue hua until outpatient follow up with urology. Will need stent removal after resolution of UTI - please make an appointment with urology within 1-2 weeks after discharge for further treatment and management. Will need stent removal after resolution of UTI - please make an appointment with urology within 1-2 weeks after discharge for further treatment and management.  Outpatient follow up with Dr. Aguilera for removal of retained ureteral stent  - Please call [phone] to schedule appointment.

## 2018-07-31 NOTE — DISCHARGE NOTE ADULT - CARE PLAN
Principal Discharge DX:	Seizure  Goal:	resolved  Assessment and plan of treatment:	Please continue all seizure medications as directed.  Do not miss doses.  Monitor for any seizure activity.  Secondary Diagnosis:	HTN (hypertension)  Assessment and plan of treatment:	Blood pressure stable throughout hospital stay.  Continue medications as directed.  Monitor blood pressure.  Secondary Diagnosis:	UTI (urinary tract infection)  Assessment and plan of treatment:	Continue antibiotics until completed - through 8/6/18.  Monitor for any further fevers.  Continue hua until outpatient follow up with urology.  Secondary Diagnosis:	Retained ureteral stent  Assessment and plan of treatment:	Will need stent removal after resolution of UTI - please make an appointment with urology within 1-2 weeks after discharge for further treatment and management. Principal Discharge DX:	Seizure  Goal:	resolved  Assessment and plan of treatment:	Please continue all seizure medications as directed.  Do not miss doses.  Monitor for any seizure activity.  Secondary Diagnosis:	HTN (hypertension)  Assessment and plan of treatment:	Blood pressure stable throughout hospital stay.  Continue medications as directed.  Monitor blood pressure.  Secondary Diagnosis:	UTI (urinary tract infection)  Assessment and plan of treatment:	Continue antibiotics until completed - through 8/6/18.  Monitor for any further fevers.  Continue hua until outpatient follow up with urology.  Secondary Diagnosis:	Retained ureteral stent  Assessment and plan of treatment:	Will need stent removal after resolution of UTI - please make an appointment with urology within 1-2 weeks after discharge for further treatment and management.  Outpatient follow up with Dr. Aguilera for removal of retained ureteral stent  - Please call [phone] to schedule appointment.

## 2018-07-31 NOTE — DISCHARGE NOTE ADULT - PATIENT PORTAL LINK FT
You can access the [facility], offered by [facility], by registering with the following website: [email or web address]

## 2018-07-31 NOTE — PROGRESS NOTE ADULT - PROBLEM SELECTOR PROBLEM 4
CHF (Congestive Heart Failure)

## 2018-07-31 NOTE — PROGRESS NOTE ADULT - PROBLEM SELECTOR PLAN 2
as per ID , - Meropenem 1g q 12 (CrCl 41)  - DC Vanco  - F/U blood and urine cx   ct abd with hydronephrosis - gu eval
as per ID , - Meropenem 1g q 12 (CrCl 41)  - DC Vanco  - F/U blood and urine cx  - F/U CT A/P

## 2018-07-31 NOTE — PROGRESS NOTE ADULT - SUBJECTIVE AND OBJECTIVE BOX
chief complaint : admitted for seizure activity        SUBJECTIVE / OVERNIGHT EVENTS: pt alert awake follwoing commands, denies chest pain,sob, saying i dont feel well    MEDICATIONS  (STANDING):  amLODIPine   Tablet 5 milliGRAM(s) Oral daily  aspirin enteric coated 81 milliGRAM(s) Oral daily  atorvastatin 40 milliGRAM(s) Oral at bedtime  carvedilol 12.5 milliGRAM(s) Oral every 12 hours  ciprofloxacin     Tablet 250 milliGRAM(s) Oral every 12 hours  dextrose 5% + sodium chloride 0.45%. 1000 milliLiter(s) (50 mL/Hr) IV Continuous <Continuous>  docusate sodium 100 milliGRAM(s) Oral three times a day  donepezil 5 milliGRAM(s) Oral at bedtime  furosemide    Tablet 20 milliGRAM(s) Oral daily  lamoTRIgine 100 milliGRAM(s) Oral daily  latanoprost 0.005% Ophthalmic Solution 1 Drop(s) Both EYES at bedtime  levETIRAcetam 1000 milliGRAM(s) Oral two times a day  losartan 50 milliGRAM(s) Oral daily  polyethylene glycol 3350 17 Gram(s) Oral daily  senna 2 Tablet(s) Oral at bedtime  timolol 0.5% Solution 1 Drop(s) Both EYES daily    MEDICATIONS  (PRN):    Vital Signs Last 24 Hrs  T(C): 36.7 (2018 21:21), Max: 36.9 (2018 05:59)  T(F): 98.1 (2018 21:21), Max: 98.5 (2018 05:59)  HR: 90 (2018 21:21) (66 - 90)  BP: 101/65 (2018 21:21) (101/65 - 153/68)  BP(mean): --  RR: 18 (2018 21:21) (18 - 18)  SpO2: 100% (2018 21:21) (98% - 100%)    Constitutional: No fever, fatigue  Skin: No rash.  Eyes: No recent vision problems or eye pain.  ENT: No congestion, ear pain, or sore throat.  Cardiovascular: No chest pain or palpation.  Respiratory: No cough, shortness of breath, congestion, or wheezing.  Gastrointestinal: No abdominal pain, nausea, vomiting, or diarrhea.  Genitourinary: No dysuria.  Musculoskeletal: No joint swelling.  Neurologic: No headache.    PHYSICAL EXAM:  GENERAL: NAD  EYES: EOMI, PERRLA  NECK: Supple, No JVD  CHEST/LUNG: dec breath sounds rt base  HEART:  S1 , S2 +  ABDOMEN: soft, bs+  EXTREMITIES:  no edema  NEUROLOGY:alert awake calm cooperative    LABS:      140  |  102  |  9   ----------------------------<  100<H>  3.2<L>   |  26  |  0.70    Ca    8.3<L>      2018 06:00  Phos  2.7       Mg     1.8           Creatinine Trend: 0.70 <--, 0.68 <--, 0.65 <--, 0.68 <--, 0.71 <--, 0.78 <--                        9.4    3.88  )-----------( 385      ( 2018 06:00 )             30.3     Urine Studies:  Urinalysis Basic - ( 2018 12:09 )    Color: PINK / Appearance: TURBID / S.019 / pH: 7.0  Gluc: NEGATIVE / Ketone: NEGATIVE  / Bili: NEGATIVE / Urobili: NORMAL mg/dL   Blood: LARGE / Protein: 300 mg/dL / Nitrite: NEGATIVE   Leuk Esterase: LARGE / RBC: >50 / WBC >50   Sq Epi:  / Non Sq Epi:  / Bacteria:

## 2018-07-31 NOTE — DISCHARGE NOTE ADULT - HOSPITAL COURSE
87 year old female with dementia, seizures (on Keppra and Lamotrigine), CVA, HTN, CHF, AFIB p/w seizure. Pt arrives from [facility] with twitching of her right face and right arm. In ED, found to have fever.  Noted to have hydronephrosis despite stent, urology consulted for retained stent, will need eventual removal.  ID following for ucx  citrobacter, treated with cipro.  Neuro following for seizures, noted to have missed doses, medications restarted, no further seizure activity.  Fevers resolved.  Optimized for discharge.    dispo: back to NH 87 year old female with dementia, seizures (on Keppra and Lamotrigine), CVA, HTN, CHF, AFIB p/w seizure. Pt arrived from [facility] with twitching of her right face and right arm. In ED, found to have fever.  Noted to have hydronephrosis despite stent, urology consulted for retained stent, will need eventual removal.  ID following for ucx  citrobacter, treated with cipro. Continue Cipro through 8/6/18  CHF- Cards consulted- pain appears euvolemic   Neuro following for seizures, noted to have missed doses. CT head negative. Medications resumed with no further seizure activity.  Fevers resolved.  Optimized for discharge.  Patient needs outpatient follow up with Dr. Aguilera for removal of retained ureteral stent within 1-2 weeks of discharge. Musa will remain in place upon d/c to NH    dispo: back to NH

## 2018-08-01 ENCOUNTER — APPOINTMENT (OUTPATIENT)
Dept: OBGYN | Facility: HOSPITAL | Age: 83
End: 2018-08-01

## 2018-08-01 VITALS
DIASTOLIC BLOOD PRESSURE: 79 MMHG | RESPIRATION RATE: 18 BRPM | HEART RATE: 73 BPM | SYSTOLIC BLOOD PRESSURE: 137 MMHG | TEMPERATURE: 98 F | OXYGEN SATURATION: 98 %

## 2018-08-01 LAB
BACTERIA BLD CULT: SIGNIFICANT CHANGE UP
BACTERIA BLD CULT: SIGNIFICANT CHANGE UP

## 2018-08-01 PROCEDURE — 99232 SBSQ HOSP IP/OBS MODERATE 35: CPT

## 2018-08-01 RX ORDER — CIPROFLOXACIN LACTATE 400MG/40ML
1 VIAL (ML) INTRAVENOUS
Qty: 0 | Refills: 0 | COMMUNITY
Start: 2018-08-01 | End: 2018-08-06

## 2018-08-01 RX ADMIN — AMLODIPINE BESYLATE 5 MILLIGRAM(S): 2.5 TABLET ORAL at 06:21

## 2018-08-01 RX ADMIN — LOSARTAN POTASSIUM 50 MILLIGRAM(S): 100 TABLET, FILM COATED ORAL at 06:21

## 2018-08-01 RX ADMIN — LEVETIRACETAM 1000 MILLIGRAM(S): 250 TABLET, FILM COATED ORAL at 06:21

## 2018-08-01 RX ADMIN — CARVEDILOL PHOSPHATE 12.5 MILLIGRAM(S): 80 CAPSULE, EXTENDED RELEASE ORAL at 06:20

## 2018-08-01 RX ADMIN — Medication 100 MILLIGRAM(S): at 06:21

## 2018-08-01 RX ADMIN — Medication 20 MILLIGRAM(S): at 06:21

## 2018-08-01 RX ADMIN — Medication 250 MILLIGRAM(S): at 06:21

## 2018-08-01 NOTE — PROGRESS NOTE ADULT - SUBJECTIVE AND OBJECTIVE BOX
Patient seen and examined.  Notes reviewed, stable for DC to NH.    T(F): 97.8, Max: 98.9 (08-01-18 @ 01:30)  HR: 73  BP: 137/79  SpO2: 98%    OUT:    Indwelling Catheter - Urethral: 1350 mL  Total OUT: 1350 mL    Gen NAD   Musa in place draining clear yellow urine    07-31 @ 06:00  WBC 3.88  / Hct 30.3  / SCr 0.70     07-30 @ 09:25  WBC 3.49  / Hct 33.2  / SCr 0.68     URINE CATHETER  07-27Citrobacter freundii complex

## 2018-08-01 NOTE — PROGRESS NOTE ADULT - ASSESSMENT
1. Seizure event  --CT head unremarkable for acute findings  --No significant interval findings noted on telemetry  --Neurology recommendations re: anti-seizure medications  --On cipro.  Plan to continue abx until 8/6    2. Cardiovascular/PPM/HTN  --Prior history of stroke --> on ASA, statin  --Will follow BPs--better this AM, on home regimen    3. Dispo: Plan to d/c to NH

## 2018-08-01 NOTE — PROGRESS NOTE ADULT - PROVIDER SPECIALTY LIST ADULT
Cardiology
Infectious Disease
Internal Medicine
Neurology
Urology
Vascular Cardiology
Neurology
Vascular Cardiology
Internal Medicine

## 2018-08-01 NOTE — PROGRESS NOTE ADULT - ASSESSMENT
87yoF with retained ureteral stent    -Outpatient follow up with Dr. Aguilera for removal of retained ureteral stent  [phone]

## 2018-08-01 NOTE — PROGRESS NOTE ADULT - SUBJECTIVE AND OBJECTIVE BOX
Cardiology/Vascular Medicine Inpatient Progress Note    No new cardiac or neurologic complaints  Feels better this AM  : no acute intervention needed --> treat UTI  Will be on abx until 18    Vital Signs Last 24 Hrs  T(C): 36.6 (01 Aug 2018 05:30), Max: 37.2 (01 Aug 2018 01:30)  T(F): 97.8 (01 Aug 2018 05:30), Max: 98.9 (01 Aug 2018 01:30)  HR: 73 (01 Aug 2018 05:30) (71 - 90)  BP: 137/79 (01 Aug 2018 05:30) (101/65 - 145/71)  BP(mean): --  RR: 18 (01 Aug 2018 05:30) (18 - 18)  SpO2: 98% (01 Aug 2018 05:30) (98% - 100%)    Appearance: NAD, chronically-ill appearing, thin elderly woman  HEENT:   Normal oral mucosa, PERRL, EOMI	  Lymphatic: No lymphadenopathy  Cardiovascular: Normal S1 S2, No JVD, 2-3/6 SM  Respiratory: Decreased BS b/l bases	  Psychiatry: Awake  Gastrointestinal:  Soft, Non-tender, + BS	  Skin: No rashes, No ecchymoses, No cyanosis	  Neurologic: Non-focal  Extremities: Normal range of motion, No clubbing, cyanosis or edema  Vascular: Peripheral pulses palpable 2+ bilaterally    MEDICATIONS  (STANDING):  amLODIPine   Tablet 5 milliGRAM(s) Oral daily  aspirin enteric coated 81 milliGRAM(s) Oral daily  atorvastatin 40 milliGRAM(s) Oral at bedtime  carvedilol 12.5 milliGRAM(s) Oral every 12 hours  ciprofloxacin     Tablet 250 milliGRAM(s) Oral every 12 hours  dextrose 5% + sodium chloride 0.45%. 1000 milliLiter(s) (50 mL/Hr) IV Continuous <Continuous>  docusate sodium 100 milliGRAM(s) Oral three times a day  donepezil 5 milliGRAM(s) Oral at bedtime  furosemide    Tablet 20 milliGRAM(s) Oral daily  lamoTRIgine 100 milliGRAM(s) Oral daily  latanoprost 0.005% Ophthalmic Solution 1 Drop(s) Both EYES at bedtime  levETIRAcetam 1000 milliGRAM(s) Oral two times a day  losartan 50 milliGRAM(s) Oral daily  polyethylene glycol 3350 17 Gram(s) Oral daily  senna 2 Tablet(s) Oral at bedtime  timolol 0.5% Solution 1 Drop(s) Both EYES daily      LABS:	 	                                        9.4    3.88  )-----------( 385      ( 2018 06:00 )             30.3   07-31    140  |  102  |  9   ----------------------------<  100<H>  3.2<L>   |  26  |  0.70    Ca    8.3<L>      2018 06:00  Phos  2.7       Mg     1.8                 < from: CT Head No Cont (18 @ 12:55) >  EXAM:  CT BRAIN        PROCEDURE DATE:  2018         INTERPRETATION:  History: Partial seizure. Seizure.    Description: A noncontrast head CT was performed. Axial images were   performed from the skull base to the vertex with coronal/sagittal   reconstructions.    Comparison is made to a head CT study from 2018.    A left parietal approach catheter is again noted. The tip is again noted   to be located within a cystic focus in the left parietal lobe, unchanged   in appearance.    The study is limited by motion. There is no gross evidence for acute   infarct, acute hemorrhage, mass effect, calvarial fracture, or subdural   hematoma.    Moderately severe confluent and patchy hypodensity without mass effect is   noted in the periventricular white matter which most likely represents   chronic microvascular ischemic changes given the patient's age.    Cerebral volume loss is present with secondary proportional prominence of   the sulci and ventricles.    No lytic or blastic calvarial lesions are noted. The visualized portions   of the paranasal sinuses and mastoid air cells are clear.    IMPRESSION:    No gross acute intracranial pathology is noted. If the patient has new   and persistent symptoms, consider short interval follow-up exam.        SUHAS HERNANDEZ M.D., ATTENDING RADIOLOGIST  This document has been electronically signed. 2018  1:04PM      < from: Xray Chest 1 View- PORTABLE-Urgent (18 @ 12:19) >    EXAM:  XR CHEST PORTABLE URGENT 1V        PROCEDURE DATE:  2018         INTERPRETATION:  CLINICAL INDICATION: febrile    EXAM:  Portable upright AP chest from 2018 at 1219. Compared to prior study   from 7/3/2018.    IMPRESSION:  Clear lungs. No pleural effusions or pneumothorax.    Stable left chest wall biventricular AICD, surgical clips over medial   left apical region, and left sided  shunt tubing.    Trachea midline.    Generalized osteopenia and spinal degenerative changes with curvature   again noted.    Partially visualized tubing again seen over right upper quadrant,   probably terminal portion of the  shunt.      CIRSTOFER DAVID M.D., ATTENDING RADIOLOGIST  This document has been electronically signed. 2018  1:21PM            < from: TTE Echo Doppler w/o Cont (17 @ 10:24) >  EXAM:  TTE WO CON COMPLETE W DOPPL         PROCEDURE DATE:  2017        INTERPRETATION:  REPORT:    TRANSTHORACIC ECHOCARDIOGRAM REPORT           Patient Name:   JOSE CHAUDHRAI Patient Location: [facility] Rec #:  [id number]    Accession #:   [id number]  Account #:                    Height:           66.0 in 167.6 cm  YOB: 1930      Weight:           119.0 lb 54.00 kg  Patient Age:    86 years      BSA:              1.60 m²  Patient Gender: F             BP:145/75 mmHg        Date of Exam: 2017 9:48:23 AM  Sonographer:  JEFF     Procedure:     2D Echo/Doppler/Color Doppler Complete.  Indications:   Cerebral infarction, unspecified - I63.9  Diagnosis:     Cerebral infarction, unspecified - I63.9  Study Details: Technically good study.           2D AND M-MODE MEASUREMENTS (normal ranges within parentheses):  Left Ventricle:                  Normal   Aorta/Left Atrium:            Normal  IVSd (2D):              0.96 cm (0.7-1.1) Aortic Root (2D):  2.98 cm   (2.4-3.7)  LVPWd (2D):             0.94 cm (0.7-1.1) Left Atrium (2D):  4.10 cm   (1.9-4.0)  LVIDd (2D):             3.26 cm (3.4-5.7) Right Ventricle:  LVIDs (2D):             2.32 cm           TAPSE:           2.18 cm  LV FS (2D):  28.9 %   (>25%)  Relative Wall Thickness  0.58    (<0.42)     LV DIASTOLIC FUNCTION:  MV Peak E: 0.53 m/s Decel Time: 317 msec  MV Peak A: 0.74 m/s  E/A Ratio: 0.72     SPECTRAL DOPPLER ANALYSIS (where applicable):  Mitral Valve:  MV P1/2 Time: 92.06 msec  MV Area, PHT: 2.39 cm²     Aortic Valve: AoV Max Mark: 1.84 m/s AoV Peak P.5 mmHg AoV Mean P.3 mmHg     LVOT Vmax: 0.81 m/s LVOT VTI: 0.142 m LVOT Diameter:     Aortic Insufficiency:  AI Half-time:  860 msec  AI Decel Rate: 1.61 m/s²     Tricuspid Valve and PA/RV Systolic Pressure: TR Max Velocity: 2.46 m/s   RA Pressure: 5 mmHg RVSP/PASP: 29.3 mmHg        PHYSICIAN INTERPRETATION:  Left Ventricle: The left ventricular internal cavity size is normal.   There is mild concentric left ventricular hypertrophy.  Global LV systolic function was normal. Left ventricular ejection   fraction, by visual estimation, is 60 to 65%. Spectral Doppler shows   impaired relaxation pattern of left ventricular myocardial filling (Grade   I diastolic dysfunction).  Right Ventricle: Normal right ventricular size and function.  Left Atrium: Mildly enlarged left atrium.  Right Atrium: The right atrium is mildly dilated.  Pericardium: There is no evidence of pericardial effusion.  Mitral Valve: Thickening and calcification of the anterior and posterior   mitral valve leaflets. Mitral leaflet mobility is normal. There is mild   mitral annular calcification. Mild mitral valve regurgitation is seen.  Tricuspid Valve: The tricuspid valve is not well seen. Mild tricuspid   regurgitation is visualized.  Aortic Valve: The aortic valve is trileaflet. Sclerotic aortic valve with   normal opening. Mild aortic valve regurgitation is seen.  Pulmonic Valve: The pulmonic valve was not well visualized.Trace   pulmonic valve regurgitation.  Aorta: Aortic root measured at Sinus of Valsalva is normal. There is mild   aortic root calcification.  Venous: The inferior vena cava was normal sized, with respiratory size   variation greater than 50%.  Additional Comments: A pacer wire is visualized in the right atrium and   right ventricle.        Summary:   1. Left ventricular ejection fraction, by visual estimation, is 60 to   65%.   2. Technically good study.   3. Normal global left ventricular systolic function.   4. Normal left ventricular internal cavity size.   5. Spectral Doppler shows impaired relaxation pattern of left   ventricular myocardial filling (Grade I diastolic dysfunction).   6. There is mild concentric left ventricular hypertrophy.   7. Normal right ventricular size and function.   8. Mildly dilated right atrium.   9. There is no evidence of pericardial effusion.  10. Mild mitral annular calcification.  11. Mild mitral valve regurgitation.  12. Thickening and calcification of the anterior and posterior mitral   valve leaflets.  13. Mild aortic regurgitation.  14. Sclerotic aortic valve with normal opening.  15. Mildly enlarged left atrium.  16. There is mild aortic root calcification.     Vikas Hopkins MD FACC, FASANDRE,FACP  Electronically signed on 2017 at 1:14:59 PM

## 2018-08-29 ENCOUNTER — APPOINTMENT (OUTPATIENT)
Dept: UROLOGY | Facility: CLINIC | Age: 83
End: 2018-08-29
Payer: MEDICARE

## 2018-08-29 ENCOUNTER — OTHER (OUTPATIENT)
Age: 83
End: 2018-08-29

## 2018-08-29 VITALS
HEIGHT: 67 IN | SYSTOLIC BLOOD PRESSURE: 162 MMHG | DIASTOLIC BLOOD PRESSURE: 86 MMHG | TEMPERATURE: 98 F | RESPIRATION RATE: 17 BRPM | BODY MASS INDEX: 17.58 KG/M2 | WEIGHT: 112 LBS | HEART RATE: 78 BPM

## 2018-08-29 PROCEDURE — 99203 OFFICE O/P NEW LOW 30 MIN: CPT

## 2018-09-01 PROCEDURE — G9001: CPT

## 2018-09-21 ENCOUNTER — OUTPATIENT (OUTPATIENT)
Dept: OUTPATIENT SERVICES | Facility: HOSPITAL | Age: 83
LOS: 1 days | End: 2018-09-21

## 2018-09-21 ENCOUNTER — APPOINTMENT (OUTPATIENT)
Dept: NUCLEAR MEDICINE | Facility: HOSPITAL | Age: 83
End: 2018-09-21
Payer: MEDICARE

## 2018-09-21 DIAGNOSIS — N13.30 UNSPECIFIED HYDRONEPHROSIS: ICD-10-CM

## 2018-09-21 DIAGNOSIS — Z98.2 PRESENCE OF CEREBROSPINAL FLUID DRAINAGE DEVICE: Chronic | ICD-10-CM

## 2018-09-21 PROCEDURE — 78708 K FLOW/FUNCT IMAGE W/DRUG: CPT | Mod: 26

## 2018-10-26 ENCOUNTER — APPOINTMENT (OUTPATIENT)
Dept: UROLOGY | Facility: CLINIC | Age: 83
End: 2018-10-26
Payer: MEDICARE

## 2018-10-26 ENCOUNTER — LABORATORY RESULT (OUTPATIENT)
Age: 83
End: 2018-10-26

## 2018-10-26 VITALS
DIASTOLIC BLOOD PRESSURE: 86 MMHG | SYSTOLIC BLOOD PRESSURE: 138 MMHG | OXYGEN SATURATION: 96 % | HEART RATE: 75 BPM | HEIGHT: 67 IN | RESPIRATION RATE: 16 BRPM

## 2018-10-26 DIAGNOSIS — N13.5 CROSSING VESSEL AND STRICTURE OF URETER W/OUT HYDRONEPHROSIS: ICD-10-CM

## 2018-10-26 PROCEDURE — 99215 OFFICE O/P EST HI 40 MIN: CPT

## 2018-10-29 ENCOUNTER — APPOINTMENT (OUTPATIENT)
Dept: OBGYN | Facility: HOSPITAL | Age: 83
End: 2018-10-29
Payer: MEDICARE

## 2018-10-29 ENCOUNTER — OUTPATIENT (OUTPATIENT)
Dept: OUTPATIENT SERVICES | Facility: HOSPITAL | Age: 83
LOS: 1 days | End: 2018-10-29

## 2018-10-29 VITALS
WEIGHT: 109 LBS | BODY MASS INDEX: 17.07 KG/M2 | SYSTOLIC BLOOD PRESSURE: 153 MMHG | DIASTOLIC BLOOD PRESSURE: 74 MMHG | HEART RATE: 73 BPM

## 2018-10-29 DIAGNOSIS — Z98.2 PRESENCE OF CEREBROSPINAL FLUID DRAINAGE DEVICE: Chronic | ICD-10-CM

## 2018-10-29 PROCEDURE — 99213 OFFICE O/P EST LOW 20 MIN: CPT | Mod: GE

## 2018-10-29 PROCEDURE — A4561: CPT | Mod: NC

## 2018-10-30 DIAGNOSIS — N81.4 UTEROVAGINAL PROLAPSE, UNSPECIFIED: ICD-10-CM

## 2018-10-30 DIAGNOSIS — Z46.89 ENCOUNTER FOR FITTING AND ADJUSTMENT OF OTHER SPECIFIED DEVICES: ICD-10-CM

## 2018-11-04 LAB
APPEARANCE: ABNORMAL
BACTERIA: ABNORMAL
BILIRUBIN URINE: NEGATIVE
BLOOD URINE: ABNORMAL
COLOR: YELLOW
GLUCOSE QUALITATIVE U: NEGATIVE MG/DL
HYALINE CASTS: 3 /LPF
KETONES URINE: NEGATIVE
LEUKOCYTE ESTERASE URINE: ABNORMAL
MICROSCOPIC-UA: NORMAL
NITRITE URINE: NEGATIVE
PH URINE: 8
PROTEIN URINE: ABNORMAL MG/DL
RED BLOOD CELLS URINE: 8 /HPF
SPECIFIC GRAVITY URINE: 1.01
SQUAMOUS EPITHELIAL CELLS: 0 /HPF
UROBILINOGEN URINE: NEGATIVE MG/DL
WHITE BLOOD CELLS URINE: 67 /HPF

## 2018-11-09 ENCOUNTER — OUTPATIENT (OUTPATIENT)
Dept: OUTPATIENT SERVICES | Facility: HOSPITAL | Age: 83
LOS: 1 days | End: 2018-11-09
Payer: MEDICARE

## 2018-11-09 VITALS
DIASTOLIC BLOOD PRESSURE: 93 MMHG | HEIGHT: 63 IN | TEMPERATURE: 98 F | RESPIRATION RATE: 16 BRPM | OXYGEN SATURATION: 98 % | HEART RATE: 66 BPM | SYSTOLIC BLOOD PRESSURE: 161 MMHG | WEIGHT: 111.99 LBS

## 2018-11-09 DIAGNOSIS — Z95.810 PRESENCE OF AUTOMATIC (IMPLANTABLE) CARDIAC DEFIBRILLATOR: ICD-10-CM

## 2018-11-09 DIAGNOSIS — I10 ESSENTIAL (PRIMARY) HYPERTENSION: ICD-10-CM

## 2018-11-09 DIAGNOSIS — Z98.890 OTHER SPECIFIED POSTPROCEDURAL STATES: Chronic | ICD-10-CM

## 2018-11-09 DIAGNOSIS — N13.5 CROSSING VESSEL AND STRICTURE OF URETER WITHOUT HYDRONEPHROSIS: ICD-10-CM

## 2018-11-09 DIAGNOSIS — Z98.2 PRESENCE OF CEREBROSPINAL FLUID DRAINAGE DEVICE: Chronic | ICD-10-CM

## 2018-11-09 DIAGNOSIS — N39.0 URINARY TRACT INFECTION, SITE NOT SPECIFIED: ICD-10-CM

## 2018-11-09 DIAGNOSIS — N13.30 UNSPECIFIED HYDRONEPHROSIS: ICD-10-CM

## 2018-11-09 DIAGNOSIS — Z01.818 ENCOUNTER FOR OTHER PREPROCEDURAL EXAMINATION: ICD-10-CM

## 2018-11-09 LAB
ANION GAP SERPL CALC-SCNC: 11 MMOL/L — SIGNIFICANT CHANGE UP (ref 5–17)
BUN SERPL-MCNC: 25 MG/DL — HIGH (ref 7–23)
CALCIUM SERPL-MCNC: 9.7 MG/DL — SIGNIFICANT CHANGE UP (ref 8.4–10.5)
CHLORIDE SERPL-SCNC: 94 MMOL/L — LOW (ref 96–108)
CO2 SERPL-SCNC: 30 MMOL/L — SIGNIFICANT CHANGE UP (ref 22–31)
CREAT SERPL-MCNC: 0.87 MG/DL — SIGNIFICANT CHANGE UP (ref 0.5–1.3)
GLUCOSE SERPL-MCNC: 103 MG/DL — HIGH (ref 70–99)
HCT VFR BLD CALC: 37.5 % — SIGNIFICANT CHANGE UP (ref 34.5–45)
HGB BLD-MCNC: 11.4 G/DL — LOW (ref 11.5–15.5)
MCHC RBC-ENTMCNC: 29.2 PG — SIGNIFICANT CHANGE UP (ref 27–34)
MCHC RBC-ENTMCNC: 30.4 GM/DL — LOW (ref 32–36)
MCV RBC AUTO: 96.2 FL — SIGNIFICANT CHANGE UP (ref 80–100)
PLATELET # BLD AUTO: 373 K/UL — SIGNIFICANT CHANGE UP (ref 150–400)
POTASSIUM SERPL-MCNC: 3.4 MMOL/L — LOW (ref 3.5–5.3)
POTASSIUM SERPL-SCNC: 3.4 MMOL/L — LOW (ref 3.5–5.3)
RBC # BLD: 3.9 M/UL — SIGNIFICANT CHANGE UP (ref 3.8–5.2)
RBC # FLD: 14.4 % — SIGNIFICANT CHANGE UP (ref 10.3–14.5)
SODIUM SERPL-SCNC: 135 MMOL/L — SIGNIFICANT CHANGE UP (ref 135–145)
WBC # BLD: 6.42 K/UL — SIGNIFICANT CHANGE UP (ref 3.8–10.5)
WBC # FLD AUTO: 6.42 K/UL — SIGNIFICANT CHANGE UP (ref 3.8–10.5)

## 2018-11-09 RX ORDER — CEFAZOLIN SODIUM 1 G
2000 VIAL (EA) INJECTION ONCE
Qty: 0 | Refills: 0 | Status: DISCONTINUED | OUTPATIENT
Start: 2018-11-16 | End: 2018-12-01

## 2018-11-09 RX ORDER — ASCORBIC ACID 60 MG
1 TABLET,CHEWABLE ORAL
Qty: 0 | Refills: 0 | COMMUNITY

## 2018-11-09 NOTE — H&P PST ADULT - OTHER CARE PROVIDERS
[facility] Cardiology clinic ( appt. 4/1/11 for cardiac clearance) [facility] Cardiology clinic

## 2018-11-09 NOTE — H&P PST ADULT - HISTORY OF PRESENT ILLNESS
79 Y/O BF presented to [facility] in preparation for scheduled Cystoscopy Right Retrograde Pyelogram, Right Ureteral Stent change on 4/5/11. Pt. has hx memory loss; extremely vague historian with intermittent confusion/disorientation.Pt transported to [facility]  alone via [facility]. This is an 89 y/o female: Resident of Long-term care Facility. PMH: + Dementia: A+O X1:  history obtained from sister accompanying her: * poor Historian. h/o HTN, HLD, Seizure Disorder: medically managed with no recnt seizure activity, non-ischemic Cardiomyopathy: s/p ( '10): implantation AICD: [employer] Model # N119: * last interogation @ [facility] was 2-16-17. s/p CVA : no residual, s/p  shunt, Right Hydronephrosis: s/p Cystoscopy Right Retrograde Pyelogram, Right Ureteral Stent 6/2018. Required indwelling  Musa catheter 7/2018 to 10/2018 for Urinary Retention. Now able to void. * dx: 10-26-18: + UTI: Treated with antibiotic. * Unable to obtain  repeat Urine culture @ [facility] ( 11-9-18): Rx given for Nursing home to obtain and fax to [facility] preop. Now scheduled: Cystoscopy/ Right retrograde Pyelogram/ Right Ureteral Stent change.

## 2018-11-09 NOTE — H&P PST ADULT - PSH
History of Stroke    HTN (Hypertension)    Infection of  Shunt    Infection of  Shunt    Pacemaker    Pacemaker    S/P Brain Surgery; for "brain cyst   few years ago"    S/P  shunt    S/P  Shunt    Seizure    Ureteral Obstruction; right kidney; stent insertion 12/10 S/P Brain Surgery; for "brain cyst   few years ago"    S/P  shunt    Status post cystoscopy  ' 2011 and 6/2018  Ureteral Obstruction; right kidney; stent insertion 12/10

## 2018-11-09 NOTE — H&P PST ADULT - NSANTHOSAYNRD_GEN_A_CORE
No. MYRNA screening performed.  STOP BANG Legend: 0-2 = LOW Risk; 3-4 = INTERMEDIATE Risk; 5-8 = HIGH Risk

## 2018-11-09 NOTE — H&P PST ADULT - PROBLEM SELECTOR PLAN 4
* Last inteogation 2-16-17 @ [facility]  PLAN:  Nursing Home instructed: patient Needs interogation preop: make appointment with [facility] Cardiology to have done preop.

## 2018-11-09 NOTE — H&P PST ADULT - PMH
Afib    CHF (Congestive Heart Failure)    CHF (congestive heart failure)    CVA (Cerebral Infarction)  No residual  GI bleed    High cholesterol    History of CVA (cerebrovascular accident)    HTN (hypertension)    HTN (Hypertension)    Hydronephrosis; Right kidney    Hypercholesteremia    Memory Loss; chronic "past few years"    Pacemaker    Pulmonary hypertension    Pulmonary hypertension    Seizure    Seizure disorder  Medically managed. Last episode: 11-4-18; seizure medication dosage adjusted. Afib  paroxysmal  AICD (automatic cardioverter/defibrillator) present  Implanted ' 2010  CHF (congestive heart failure)  history of: No recent hospitalizations  Dementia  medically managed  GI bleed    History of CVA (cerebrovascular accident)  No residual  History of osteoarthritis    HTN (hypertension)    Hydronephrosis; Right kidney    Hypercholesteremia    Nonischemic cardiomyopathy    Pulmonary hypertension    Seizure    Seizure disorder  Medically managed. No recent seizure activity  Ureteral obstruction, right  6/2018   insertion Right Ureteral Stent  Uterine prolapse    UTI (urinary tract infection)  dx: 10-26-18   Treated Afib  paroxysmal  AICD (automatic cardioverter/defibrillator) present  Implanted ' 2010  Carotid artery plaque, bilateral  mild  CHF (congestive heart failure)  history of: No recent hospitalizations  Dementia  medically managed  GI bleed    Glaucoma    History of CVA (cerebrovascular accident)  No residual  History of osteoarthritis    HTN (hypertension)    Hydronephrosis; Right kidney    Hypercholesteremia    Nonischemic cardiomyopathy    Pulmonary hypertension    Seizure    Seizure disorder  Medically managed. No recent seizure activity  Ureteral obstruction, right  6/2018   insertion Right Ureteral Stent  Uterine prolapse    UTI (urinary tract infection)  dx: 10-26-18   Treated

## 2018-11-09 NOTE — H&P PST ADULT - PRO ARRIVE FROM
home [facility] [phone]/long term care facility/rehabilitation facility

## 2018-11-09 NOTE — H&P PST ADULT - PROBLEM SELECTOR PLAN 2
dx: 10-26-18: Treated with Antibiotic  PLAN: Patient unable to give Urine Sample at PST: Rx given for Nursing Home to obtain preop and fax result to [facility]

## 2018-11-09 NOTE — H&P PST ADULT - PRIMARY CARE PROVIDER
[facility] (Dr. MARLEY Martinez/BAR Galaviz); appt. 3/24/11 [facility] (Dr. MARLEY Martinez/BAR Galaviz)   Jatin Caldera (PMD) [phone] [facility] (Dr. MARLEY Martinez/BAR Galaviz)           Jatin Caldera (PMD) [phone]

## 2018-11-10 PROBLEM — I50.9 HEART FAILURE, UNSPECIFIED: Chronic | Status: ACTIVE | Noted: 2017-04-16

## 2018-11-10 PROBLEM — Z86.73 PERSONAL HISTORY OF TRANSIENT ISCHEMIC ATTACK (TIA), AND CEREBRAL INFARCTION WITHOUT RESIDUAL DEFICITS: Chronic | Status: ACTIVE | Noted: 2017-05-01

## 2018-11-10 PROBLEM — E78.00 PURE HYPERCHOLESTEROLEMIA, UNSPECIFIED: Chronic | Status: INACTIVE | Noted: 2017-04-16 | Resolved: 2018-11-09

## 2018-11-10 PROBLEM — G40.909 EPILEPSY, UNSPECIFIED, NOT INTRACTABLE, WITHOUT STATUS EPILEPTICUS: Chronic | Status: ACTIVE | Noted: 2018-11-09

## 2018-11-10 PROBLEM — Z95.810 PRESENCE OF AUTOMATIC (IMPLANTABLE) CARDIAC DEFIBRILLATOR: Chronic | Status: ACTIVE | Noted: 2018-11-09

## 2018-11-10 PROBLEM — Z95.0 PRESENCE OF CARDIAC PACEMAKER: Chronic | Status: INACTIVE | Noted: 2017-05-01 | Resolved: 2018-11-09

## 2018-11-10 PROBLEM — R56.9 UNSPECIFIED CONVULSIONS: Chronic | Status: INACTIVE | Noted: 2017-04-16 | Resolved: 2018-11-09

## 2018-11-14 PROBLEM — F03.90 UNSPECIFIED DEMENTIA WITHOUT BEHAVIORAL DISTURBANCE: Chronic | Status: ACTIVE | Noted: 2018-11-09

## 2018-11-14 PROBLEM — N13.5 CROSSING VESSEL AND STRICTURE OF URETER WITHOUT HYDRONEPHROSIS: Chronic | Status: ACTIVE | Noted: 2018-11-09

## 2018-11-14 PROBLEM — I65.23 OCCLUSION AND STENOSIS OF BILATERAL CAROTID ARTERIES: Chronic | Status: ACTIVE | Noted: 2018-11-09

## 2018-11-14 PROBLEM — Z87.39 PERSONAL HISTORY OF OTHER DISEASES OF THE MUSCULOSKELETAL SYSTEM AND CONNECTIVE TISSUE: Chronic | Status: ACTIVE | Noted: 2018-11-09

## 2018-11-14 PROBLEM — N39.0 URINARY TRACT INFECTION, SITE NOT SPECIFIED: Chronic | Status: ACTIVE | Noted: 2018-11-09

## 2018-11-14 PROBLEM — N81.4 UTEROVAGINAL PROLAPSE, UNSPECIFIED: Chronic | Status: ACTIVE | Noted: 2018-11-09

## 2018-11-14 PROBLEM — I42.8 OTHER CARDIOMYOPATHIES: Chronic | Status: ACTIVE | Noted: 2018-11-09

## 2018-11-14 PROBLEM — H40.9 UNSPECIFIED GLAUCOMA: Chronic | Status: ACTIVE | Noted: 2018-11-09

## 2018-11-14 LAB — BACTERIA UR CULT: ABNORMAL

## 2018-11-14 RX ORDER — MOXIFLOXACIN HYDROCHLORIDE TABLETS, 400 MG 400 MG/1
1 TABLET, FILM COATED ORAL
Qty: 0 | Refills: 0 | COMMUNITY
Start: 2018-11-14 | End: 2018-11-23

## 2018-11-14 RX ORDER — METHENAMINE MANDELATE 1 G
1 TABLET ORAL
Qty: 0 | Refills: 0 | COMMUNITY
Start: 2018-11-14 | End: 2018-11-23

## 2018-11-15 ENCOUNTER — APPOINTMENT (OUTPATIENT)
Dept: ELECTROPHYSIOLOGY | Facility: CLINIC | Age: 83
End: 2018-11-15
Payer: MEDICARE

## 2018-11-15 ENCOUNTER — TRANSCRIPTION ENCOUNTER (OUTPATIENT)
Age: 83
End: 2018-11-15

## 2018-11-15 PROCEDURE — 93284 PRGRMG EVAL IMPLANTABLE DFB: CPT

## 2018-11-15 PROCEDURE — 93290 INTERROG DEV EVAL ICPMS IP: CPT

## 2018-11-15 RX ORDER — CARVEDILOL 12.5 MG/1
12.5 TABLET, FILM COATED ORAL TWICE DAILY
Refills: 0 | Status: ACTIVE | COMMUNITY

## 2018-11-15 NOTE — PROVIDER CONTACT NOTE (CRITICAL VALUE NOTIFICATION) - PERSON GIVING RESULT:
9/2018 Last visit. Refill pending Physician approval, please route back after completed       
Refilled  
Bliane gloria

## 2018-11-16 ENCOUNTER — OUTPATIENT (OUTPATIENT)
Dept: OUTPATIENT SERVICES | Facility: HOSPITAL | Age: 83
LOS: 1 days | End: 2018-11-16
Payer: MEDICARE

## 2018-11-16 ENCOUNTER — APPOINTMENT (OUTPATIENT)
Dept: UROLOGY | Facility: HOSPITAL | Age: 83
End: 2018-11-16

## 2018-11-16 VITALS
SYSTOLIC BLOOD PRESSURE: 159 MMHG | OXYGEN SATURATION: 98 % | DIASTOLIC BLOOD PRESSURE: 84 MMHG | TEMPERATURE: 98 F | HEART RATE: 67 BPM | HEIGHT: 63 IN | RESPIRATION RATE: 18 BRPM | WEIGHT: 111.99 LBS

## 2018-11-16 VITALS
DIASTOLIC BLOOD PRESSURE: 79 MMHG | HEART RATE: 58 BPM | OXYGEN SATURATION: 98 % | SYSTOLIC BLOOD PRESSURE: 149 MMHG | RESPIRATION RATE: 16 BRPM | TEMPERATURE: 98 F

## 2018-11-16 DIAGNOSIS — Z98.890 OTHER SPECIFIED POSTPROCEDURAL STATES: Chronic | ICD-10-CM

## 2018-11-16 DIAGNOSIS — N13.5 CROSSING VESSEL AND STRICTURE OF URETER WITHOUT HYDRONEPHROSIS: ICD-10-CM

## 2018-11-16 DIAGNOSIS — Z98.2 PRESENCE OF CEREBROSPINAL FLUID DRAINAGE DEVICE: Chronic | ICD-10-CM

## 2018-11-16 DIAGNOSIS — Z01.818 ENCOUNTER FOR OTHER PREPROCEDURAL EXAMINATION: ICD-10-CM

## 2018-11-16 DIAGNOSIS — N13.30 UNSPECIFIED HYDRONEPHROSIS: ICD-10-CM

## 2018-11-16 DIAGNOSIS — N39.0 URINARY TRACT INFECTION, SITE NOT SPECIFIED: ICD-10-CM

## 2018-11-16 PROCEDURE — 87086 URINE CULTURE/COLONY COUNT: CPT

## 2018-11-16 PROCEDURE — 76000 FLUOROSCOPY <1 HR PHYS/QHP: CPT

## 2018-11-16 PROCEDURE — C2617: CPT

## 2018-11-16 PROCEDURE — 74420 UROGRAPHY RTRGR +-KUB: CPT | Mod: 26

## 2018-11-16 PROCEDURE — 52332 CYSTOSCOPY AND TREATMENT: CPT | Mod: RT

## 2018-11-16 PROCEDURE — C1758: CPT

## 2018-11-16 PROCEDURE — C1769: CPT

## 2018-11-16 PROCEDURE — 85027 COMPLETE CBC AUTOMATED: CPT

## 2018-11-16 PROCEDURE — G0463: CPT

## 2018-11-16 PROCEDURE — 80048 BASIC METABOLIC PNL TOTAL CA: CPT

## 2018-11-16 RX ORDER — SODIUM CHLORIDE 9 MG/ML
3 INJECTION INTRAMUSCULAR; INTRAVENOUS; SUBCUTANEOUS EVERY 8 HOURS
Qty: 0 | Refills: 0 | Status: DISCONTINUED | OUTPATIENT
Start: 2018-11-16 | End: 2018-11-16

## 2018-11-16 RX ORDER — SODIUM CHLORIDE 9 MG/ML
1000 INJECTION, SOLUTION INTRAVENOUS
Qty: 0 | Refills: 0 | Status: DISCONTINUED | OUTPATIENT
Start: 2018-11-16 | End: 2018-12-01

## 2018-11-16 RX ADMIN — SODIUM CHLORIDE 3 MILLILITER(S): 9 INJECTION INTRAMUSCULAR; INTRAVENOUS; SUBCUTANEOUS at 08:30

## 2018-11-16 NOTE — ASU DISCHARGE PLAN (ADULT/PEDIATRIC). - MEDICATION SUMMARY - MEDICATIONS TO TAKE
I will START or STAY ON the medications listed below when I get home from the hospital:    keppra  500 milligrams  -- 1 dose(s) by mouth 2 times a day  -- Indication: For Home med    aspirin 81 mg oral delayed release tablet  -- 1 tab(s) by mouth once a day. * Remain on  -- Indication: For Home med    losartan 50 mg oral tablet  -- 1 tab(s) by mouth once a day  -- Indication: For Home med    lamoTRIgine 100 mg oral tablet  -- 1 tab(s) by mouth once a day  -- Indication: For Home med    atorvastatin 40 mg oral tablet  -- 1 tab(s) by mouth once a day (at bedtime)  -- Indication: For Home med    carvedilol 12.5 mg oral tablet  -- 1 tab(s) by mouth every 12 hours  -- Indication: For Home med    amLODIPine 5 mg oral tablet  -- 1 tab(s) by mouth once a day  -- Indication: For Home med    Aricept 5 mg oral tablet  -- 1 tab(s) by mouth once a day (at bedtime)  -- Indication: For Home med     Lasix 20 mg oral tablet  -- 1 tab(s) by mouth once a day  -- Indication: For Home med    polyethylene glycol 3350 oral powder for reconstitution  -- 17 gram(s) by mouth once a day  -- Indication: For Home med    docusate sodium 100 mg oral capsule  -- 2 cap(s) by mouth once a day (at bedtime)  -- Indication: For Home med    Senna 8.6 mg oral tablet  -- 2 tab(s) by mouth once a day (at bedtime)  -- Indication: For Home med    timolol maleate 0.5% ophthalmic gel forming solution  -- 1 drop(s) to each affected eye once a day  -- Indication: For Home med     Restasis 0.05% ophthalmic emulsion  -- 1 drop(s) to each affected eye every 12 hours  -- Indication: For Home med    Travatan 0.004% ophthalmic solution  -- 1 drop(s) to each affected eye once a day (in the evening)  -- Indication: For Home med    Cipro 500 mg oral tablet  -- 1 tab(s) by mouth every 12 hours  -- Indication: For finish your full antibiotic course    nitrofurantoin macrocrystals 100 mg oral capsule  -- 1 cap(s) by mouth 2 times a day  -- Indication: For finish your full antibiotics course    Multiple Vitamins oral tablet  -- 1 tab(s) by mouth once a day  -- Indication: For Home med    Calcium 500+D oral tablet, chewable  -- 1 tab(s) by mouth 2 times a day  -- Indication: For Home med    cholecalciferol oral tablet  -- 1000 unit(s) by mouth once a day  -- Indication: For Home med

## 2018-11-16 NOTE — BRIEF OPERATIVE NOTE - PROCEDURE
<<-----Click on this checkbox to enter Procedure Cystoscopic replacement of ureteral stent  11/16/2018    Active  CTABIB

## 2018-11-16 NOTE — ASU DISCHARGE PLAN (ADULT/PEDIATRIC). - ITEMS TO FOLLOWUP WITH YOUR PHYSICIAN'S
Please call to set up follow up appointment with Dr. Aguilera prior to your next stent exchange in 3 months Please call to set up follow up appointment with Dr. Aguilera in 2 weeks.

## 2018-11-17 ENCOUNTER — INPATIENT (INPATIENT)
Facility: HOSPITAL | Age: 83
LOS: 2 days | Discharge: NURSING, REHAB OR LONG-TERM CARE | End: 2018-11-20
Attending: INTERNAL MEDICINE | Admitting: INTERNAL MEDICINE
Payer: MEDICARE

## 2018-11-17 VITALS
SYSTOLIC BLOOD PRESSURE: 171 MMHG | RESPIRATION RATE: 16 BRPM | DIASTOLIC BLOOD PRESSURE: 86 MMHG | OXYGEN SATURATION: 100 % | HEART RATE: 64 BPM | TEMPERATURE: 99 F

## 2018-11-17 DIAGNOSIS — Z98.2 PRESENCE OF CEREBROSPINAL FLUID DRAINAGE DEVICE: Chronic | ICD-10-CM

## 2018-11-17 DIAGNOSIS — I10 ESSENTIAL (PRIMARY) HYPERTENSION: ICD-10-CM

## 2018-11-17 DIAGNOSIS — G40.909 EPILEPSY, UNSPECIFIED, NOT INTRACTABLE, WITHOUT STATUS EPILEPTICUS: ICD-10-CM

## 2018-11-17 DIAGNOSIS — Z98.890 OTHER SPECIFIED POSTPROCEDURAL STATES: Chronic | ICD-10-CM

## 2018-11-17 DIAGNOSIS — Z29.9 ENCOUNTER FOR PROPHYLACTIC MEASURES, UNSPECIFIED: ICD-10-CM

## 2018-11-17 DIAGNOSIS — H40.9 UNSPECIFIED GLAUCOMA: ICD-10-CM

## 2018-11-17 DIAGNOSIS — Z86.79 PERSONAL HISTORY OF OTHER DISEASES OF THE CIRCULATORY SYSTEM: ICD-10-CM

## 2018-11-17 DIAGNOSIS — R56.9 UNSPECIFIED CONVULSIONS: ICD-10-CM

## 2018-11-17 LAB
ALBUMIN SERPL ELPH-MCNC: 3.8 G/DL — SIGNIFICANT CHANGE UP (ref 3.3–5)
ALP SERPL-CCNC: 77 U/L — SIGNIFICANT CHANGE UP (ref 40–120)
ALT FLD-CCNC: 6 U/L — SIGNIFICANT CHANGE UP (ref 4–33)
APPEARANCE UR: SIGNIFICANT CHANGE UP
AST SERPL-CCNC: 20 U/L — SIGNIFICANT CHANGE UP (ref 4–32)
BACTERIA # UR AUTO: SIGNIFICANT CHANGE UP
BASE EXCESS BLDV CALC-SCNC: 6.2 MMOL/L — SIGNIFICANT CHANGE UP
BASOPHILS # BLD AUTO: 0.01 K/UL — SIGNIFICANT CHANGE UP (ref 0–0.2)
BASOPHILS NFR BLD AUTO: 0.1 % — SIGNIFICANT CHANGE UP (ref 0–2)
BILIRUB SERPL-MCNC: 0.9 MG/DL — SIGNIFICANT CHANGE UP (ref 0.2–1.2)
BILIRUB UR-MCNC: NEGATIVE — SIGNIFICANT CHANGE UP
BLOOD GAS VENOUS - CREATININE: 0.93 MG/DL — SIGNIFICANT CHANGE UP (ref 0.5–1.3)
BLOOD UR QL VISUAL: HIGH
BUN SERPL-MCNC: 24 MG/DL — HIGH (ref 7–23)
CALCIUM SERPL-MCNC: 9.4 MG/DL — SIGNIFICANT CHANGE UP (ref 8.4–10.5)
CHLORIDE BLDV-SCNC: 106 MMOL/L — SIGNIFICANT CHANGE UP (ref 96–108)
CHLORIDE SERPL-SCNC: 101 MMOL/L — SIGNIFICANT CHANGE UP (ref 98–107)
CO2 SERPL-SCNC: 26 MMOL/L — SIGNIFICANT CHANGE UP (ref 22–31)
COLOR SPEC: SIGNIFICANT CHANGE UP
CREAT SERPL-MCNC: 0.93 MG/DL — SIGNIFICANT CHANGE UP (ref 0.5–1.3)
EOSINOPHIL # BLD AUTO: 0 K/UL — SIGNIFICANT CHANGE UP (ref 0–0.5)
EOSINOPHIL NFR BLD AUTO: 0 % — SIGNIFICANT CHANGE UP (ref 0–6)
EPI CELLS # UR: SIGNIFICANT CHANGE UP
GAS PNL BLDV: 141 MMOL/L — SIGNIFICANT CHANGE UP (ref 136–146)
GLUCOSE BLDV-MCNC: 118 — HIGH (ref 70–99)
GLUCOSE SERPL-MCNC: 119 MG/DL — HIGH (ref 70–99)
GLUCOSE UR-MCNC: NEGATIVE — SIGNIFICANT CHANGE UP
HCO3 BLDV-SCNC: 29 MMOL/L — HIGH (ref 20–27)
HCT VFR BLD CALC: 34.6 % — SIGNIFICANT CHANGE UP (ref 34.5–45)
HCT VFR BLDV CALC: 34.5 % — SIGNIFICANT CHANGE UP (ref 34.5–45)
HGB BLD-MCNC: 10.9 G/DL — LOW (ref 11.5–15.5)
HGB BLDV-MCNC: 11.2 G/DL — LOW (ref 11.5–15.5)
IMM GRANULOCYTES # BLD AUTO: 0.02 # — SIGNIFICANT CHANGE UP
IMM GRANULOCYTES NFR BLD AUTO: 0.2 % — SIGNIFICANT CHANGE UP (ref 0–1.5)
KETONES UR-MCNC: NEGATIVE — SIGNIFICANT CHANGE UP
LACTATE BLDV-MCNC: 2.4 MMOL/L — HIGH (ref 0.5–2)
LEUKOCYTE ESTERASE UR-ACNC: SIGNIFICANT CHANGE UP
LYMPHOCYTES # BLD AUTO: 1.16 K/UL — SIGNIFICANT CHANGE UP (ref 1–3.3)
LYMPHOCYTES # BLD AUTO: 14 % — SIGNIFICANT CHANGE UP (ref 13–44)
MCHC RBC-ENTMCNC: 30.4 PG — SIGNIFICANT CHANGE UP (ref 27–34)
MCHC RBC-ENTMCNC: 31.5 % — LOW (ref 32–36)
MCV RBC AUTO: 96.6 FL — SIGNIFICANT CHANGE UP (ref 80–100)
MONOCYTES # BLD AUTO: 0.75 K/UL — SIGNIFICANT CHANGE UP (ref 0–0.9)
MONOCYTES NFR BLD AUTO: 9 % — SIGNIFICANT CHANGE UP (ref 2–14)
NEUTROPHILS # BLD AUTO: 6.35 K/UL — SIGNIFICANT CHANGE UP (ref 1.8–7.4)
NEUTROPHILS NFR BLD AUTO: 76.7 % — SIGNIFICANT CHANGE UP (ref 43–77)
NITRITE UR-MCNC: NEGATIVE — SIGNIFICANT CHANGE UP
NRBC # FLD: 0 — SIGNIFICANT CHANGE UP
PCO2 BLDV: 51 MMHG — SIGNIFICANT CHANGE UP (ref 41–51)
PH BLDV: 7.4 PH — SIGNIFICANT CHANGE UP (ref 7.32–7.43)
PH UR: 6.5 — SIGNIFICANT CHANGE UP (ref 5–8)
PLATELET # BLD AUTO: 285 K/UL — SIGNIFICANT CHANGE UP (ref 150–400)
PMV BLD: 11.1 FL — SIGNIFICANT CHANGE UP (ref 7–13)
PO2 BLDV: 37 MMHG — SIGNIFICANT CHANGE UP (ref 35–40)
POTASSIUM BLDV-SCNC: 3.4 MMOL/L — SIGNIFICANT CHANGE UP (ref 3.4–4.5)
POTASSIUM SERPL-MCNC: 3.6 MMOL/L — SIGNIFICANT CHANGE UP (ref 3.5–5.3)
POTASSIUM SERPL-SCNC: 3.6 MMOL/L — SIGNIFICANT CHANGE UP (ref 3.5–5.3)
PROT SERPL-MCNC: 7.6 G/DL — SIGNIFICANT CHANGE UP (ref 6–8.3)
PROT UR-MCNC: SIGNIFICANT CHANGE UP
RBC # BLD: 3.58 M/UL — LOW (ref 3.8–5.2)
RBC # FLD: 14.3 % — SIGNIFICANT CHANGE UP (ref 10.3–14.5)
RBC CASTS # UR COMP ASSIST: >20 — SIGNIFICANT CHANGE UP (ref 0–?)
SAO2 % BLDV: 62.3 % — SIGNIFICANT CHANGE UP (ref 60–85)
SODIUM SERPL-SCNC: 143 MMOL/L — SIGNIFICANT CHANGE UP (ref 135–145)
SP GR SPEC: 1.02 — SIGNIFICANT CHANGE UP (ref 1–1.04)
UROBILINOGEN FLD QL: NORMAL — SIGNIFICANT CHANGE UP
WBC # BLD: 8.29 K/UL — SIGNIFICANT CHANGE UP (ref 3.8–10.5)
WBC # FLD AUTO: 8.29 K/UL — SIGNIFICANT CHANGE UP (ref 3.8–10.5)
WBC UR QL: >20 — SIGNIFICANT CHANGE UP (ref 0–?)

## 2018-11-17 PROCEDURE — 99223 1ST HOSP IP/OBS HIGH 75: CPT

## 2018-11-17 PROCEDURE — 71045 X-RAY EXAM CHEST 1 VIEW: CPT | Mod: 26

## 2018-11-17 PROCEDURE — 70450 CT HEAD/BRAIN W/O DYE: CPT | Mod: 26

## 2018-11-17 RX ORDER — ATORVASTATIN CALCIUM 80 MG/1
40 TABLET, FILM COATED ORAL AT BEDTIME
Qty: 0 | Refills: 0 | Status: DISCONTINUED | OUTPATIENT
Start: 2018-11-17 | End: 2018-11-20

## 2018-11-17 RX ORDER — CHOLECALCIFEROL (VITAMIN D3) 125 MCG
1000 CAPSULE ORAL DAILY
Qty: 0 | Refills: 0 | Status: DISCONTINUED | OUTPATIENT
Start: 2018-11-17 | End: 2018-11-20

## 2018-11-17 RX ORDER — LEVETIRACETAM 250 MG/1
1000 TABLET, FILM COATED ORAL
Qty: 0 | Refills: 0 | Status: DISCONTINUED | OUTPATIENT
Start: 2018-11-17 | End: 2018-11-20

## 2018-11-17 RX ORDER — POLYETHYLENE GLYCOL 3350 17 G/17G
17 POWDER, FOR SOLUTION ORAL DAILY
Qty: 0 | Refills: 0 | Status: DISCONTINUED | OUTPATIENT
Start: 2018-11-17 | End: 2018-11-18

## 2018-11-17 RX ORDER — ASPIRIN/CALCIUM CARB/MAGNESIUM 324 MG
81 TABLET ORAL DAILY
Qty: 0 | Refills: 0 | Status: DISCONTINUED | OUTPATIENT
Start: 2018-11-17 | End: 2018-11-20

## 2018-11-17 RX ORDER — SENNA PLUS 8.6 MG/1
2 TABLET ORAL AT BEDTIME
Qty: 0 | Refills: 0 | Status: DISCONTINUED | OUTPATIENT
Start: 2018-11-17 | End: 2018-11-20

## 2018-11-17 RX ORDER — AMLODIPINE BESYLATE 2.5 MG/1
5 TABLET ORAL DAILY
Qty: 0 | Refills: 0 | Status: DISCONTINUED | OUTPATIENT
Start: 2018-11-17 | End: 2018-11-20

## 2018-11-17 RX ORDER — TIMOLOL 0.5 %
1 DROPS OPHTHALMIC (EYE) DAILY
Qty: 0 | Refills: 0 | Status: DISCONTINUED | OUTPATIENT
Start: 2018-11-17 | End: 2018-11-20

## 2018-11-17 RX ORDER — CEFTRIAXONE 500 MG/1
INJECTION, POWDER, FOR SOLUTION INTRAMUSCULAR; INTRAVENOUS
Qty: 0 | Refills: 0 | Status: DISCONTINUED | OUTPATIENT
Start: 2018-11-17 | End: 2018-11-19

## 2018-11-17 RX ORDER — INFLUENZA VIRUS VACCINE 15; 15; 15; 15 UG/.5ML; UG/.5ML; UG/.5ML; UG/.5ML
0.5 SUSPENSION INTRAMUSCULAR ONCE
Qty: 0 | Refills: 0 | Status: DISCONTINUED | OUTPATIENT
Start: 2018-11-17 | End: 2018-11-20

## 2018-11-17 RX ORDER — LATANOPROST 0.05 MG/ML
1 SOLUTION/ DROPS OPHTHALMIC; TOPICAL AT BEDTIME
Qty: 0 | Refills: 0 | Status: DISCONTINUED | OUTPATIENT
Start: 2018-11-17 | End: 2018-11-20

## 2018-11-17 RX ORDER — FUROSEMIDE 40 MG
20 TABLET ORAL DAILY
Qty: 0 | Refills: 0 | Status: DISCONTINUED | OUTPATIENT
Start: 2018-11-17 | End: 2018-11-20

## 2018-11-17 RX ORDER — DONEPEZIL HYDROCHLORIDE 10 MG/1
5 TABLET, FILM COATED ORAL AT BEDTIME
Qty: 0 | Refills: 0 | Status: DISCONTINUED | OUTPATIENT
Start: 2018-11-17 | End: 2018-11-20

## 2018-11-17 RX ORDER — CEFTRIAXONE 500 MG/1
1 INJECTION, POWDER, FOR SOLUTION INTRAMUSCULAR; INTRAVENOUS EVERY 24 HOURS
Qty: 0 | Refills: 0 | Status: DISCONTINUED | OUTPATIENT
Start: 2018-11-18 | End: 2018-11-19

## 2018-11-17 RX ORDER — LOSARTAN POTASSIUM 100 MG/1
50 TABLET, FILM COATED ORAL DAILY
Qty: 0 | Refills: 0 | Status: DISCONTINUED | OUTPATIENT
Start: 2018-11-17 | End: 2018-11-20

## 2018-11-17 RX ORDER — LAMOTRIGINE 25 MG/1
100 TABLET, ORALLY DISINTEGRATING ORAL DAILY
Qty: 0 | Refills: 0 | Status: DISCONTINUED | OUTPATIENT
Start: 2018-11-17 | End: 2018-11-18

## 2018-11-17 RX ORDER — DOCUSATE SODIUM 100 MG
100 CAPSULE ORAL DAILY
Qty: 0 | Refills: 0 | Status: DISCONTINUED | OUTPATIENT
Start: 2018-11-17 | End: 2018-11-18

## 2018-11-17 RX ORDER — CEFTRIAXONE 500 MG/1
1 INJECTION, POWDER, FOR SOLUTION INTRAMUSCULAR; INTRAVENOUS ONCE
Qty: 0 | Refills: 0 | Status: COMPLETED | OUTPATIENT
Start: 2018-11-17 | End: 2018-11-17

## 2018-11-17 RX ORDER — CARVEDILOL PHOSPHATE 80 MG/1
12.5 CAPSULE, EXTENDED RELEASE ORAL EVERY 12 HOURS
Qty: 0 | Refills: 0 | Status: DISCONTINUED | OUTPATIENT
Start: 2018-11-17 | End: 2018-11-20

## 2018-11-17 RX ADMIN — Medication 81 MILLIGRAM(S): at 19:09

## 2018-11-17 RX ADMIN — ATORVASTATIN CALCIUM 40 MILLIGRAM(S): 80 TABLET, FILM COATED ORAL at 22:31

## 2018-11-17 RX ADMIN — CARVEDILOL PHOSPHATE 12.5 MILLIGRAM(S): 80 CAPSULE, EXTENDED RELEASE ORAL at 18:09

## 2018-11-17 RX ADMIN — LAMOTRIGINE 100 MILLIGRAM(S): 25 TABLET, ORALLY DISINTEGRATING ORAL at 19:09

## 2018-11-17 RX ADMIN — Medication 1 DROP(S): at 21:20

## 2018-11-17 RX ADMIN — LOSARTAN POTASSIUM 50 MILLIGRAM(S): 100 TABLET, FILM COATED ORAL at 19:09

## 2018-11-17 RX ADMIN — LEVETIRACETAM 1000 MILLIGRAM(S): 250 TABLET, FILM COATED ORAL at 18:10

## 2018-11-17 RX ADMIN — Medication 1 TABLET(S): at 21:20

## 2018-11-17 RX ADMIN — DONEPEZIL HYDROCHLORIDE 5 MILLIGRAM(S): 10 TABLET, FILM COATED ORAL at 21:20

## 2018-11-17 RX ADMIN — Medication 20 MILLIGRAM(S): at 19:09

## 2018-11-17 RX ADMIN — SENNA PLUS 2 TABLET(S): 8.6 TABLET ORAL at 21:20

## 2018-11-17 RX ADMIN — LATANOPROST 1 DROP(S): 0.05 SOLUTION/ DROPS OPHTHALMIC; TOPICAL at 21:20

## 2018-11-17 RX ADMIN — Medication 1 TABLET(S): at 19:09

## 2018-11-17 RX ADMIN — Medication 1000 UNIT(S): at 19:09

## 2018-11-17 RX ADMIN — Medication 100 MILLIGRAM(S): at 21:21

## 2018-11-17 RX ADMIN — AMLODIPINE BESYLATE 5 MILLIGRAM(S): 2.5 TABLET ORAL at 18:10

## 2018-11-17 RX ADMIN — CEFTRIAXONE 100 GRAM(S): 500 INJECTION, POWDER, FOR SOLUTION INTRAMUSCULAR; INTRAVENOUS at 17:06

## 2018-11-17 NOTE — ED ADULT TRIAGE NOTE - CHIEF COMPLAINT QUOTE
from [facility]  reported seizure given 1500mg keppra. arrives awke,non verbal from [facility]  reported seizure given 1500mg keppra. arrives awake,non verbal at triage fs 145

## 2018-11-17 NOTE — ED ADULT NURSE NOTE - NSIMPLEMENTINTERV_GEN_ALL_ED
Implemented All Fall Risk Interventions:  [place] to call system. Call bell, personal items and telephone within reach. Instruct patient to call for assistance. Room bathroom lighting operational. Non-slip footwear when patient is off stretcher. Physically safe environment: no spills, clutter or unnecessary equipment. Stretcher in lowest position, wheels locked, appropriate side rails in place. Provide visual cue, wrist band, yellow gown, etc. Monitor gait and stability. Monitor for mental status changes and reorient to person, place, and time. Review medications for side effects contributing to fall risk. Reinforce activity limits and safety measures with patient and family.

## 2018-11-17 NOTE — ED PROVIDER NOTE - ATTENDING CONTRIBUTION TO CARE
[facility]  pt with h/o  shunt  dementia  sz  d/o  PPM and documented cardiomyopathy in chart  with EF on ECHO 2017  >60%       had ? sz activity today  Developed facial twitching and shaking extremities  but no LOC acc to staff  Pt did undergo rt renal stent revision  likely received anaesthesia    Here pt awake alert      moves all exts  but poorly cooperative with exam  abd nontender  no LE edema  rectal temp 99.9    Plan  check CBC  lytes  UA    CT head [facility]  pt with h/o  shunt  dementia  sz  d/o  PPM and documented cardiomyopathy in chart  with EF on ECHO 2017  >60%       had ? sz activity today  Developed facial twitching and shaking extremities  but no LOC acc to staff  Pt did undergo rt renal stent revision  likely received anaesthesia    Here pt awake alert      moves all exts  but poorly cooperative with exam  abd nontender  no LE edema  rectal temp 99.9    Plan  check CBC  lytes  UA    CT head  with recent  manipulation  UA  and culture  antibiotics if UA positive

## 2018-11-17 NOTE — ED ADULT NURSE NOTE - PSH
S/P Brain Surgery; for "brain cyst   few years ago"    S/P  shunt    Status post cystoscopy  ' 2011 and 6/2018  Ureteral Obstruction; right kidney; stent insertion 12/10

## 2018-11-17 NOTE — H&P ADULT - HISTORY OF PRESENT ILLNESS
88F hx of     At the NH, documented that patient was alert, verbally responsive and able to make her needs known. She was given supplemental oxygen to move her from SpO2 86% to 96%. Keppra 1500 mg was given. 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 History limited as patient is a poor historian and is unable to recount events. As per prior NH paperwork it appears that after the "seizure" the the patient was alert, verbally responsive and able to make her needs known. She was given supplemental oxygen to move her from SpO2 86% to 96%. Keppra 1500 mg was given.     Patient seen at bedside. When asked questions she continues to just repeat in a very soft spoken voice "i'm thirsty i've had nothing to eat" and appear sad. When asking her about what happened to day she is unable to recount the events. Her sister at bedside was called at home and was told that she had "seizure" like activity and that they were sending her to the ER.

## 2018-11-17 NOTE — ED PROVIDER NOTE - NS ED ROS FT
REVIEW OF SYSTEMS (Limited as patient is currently a poor historian)  General: No fever or chills  Skin/Breast: No itching or rash  ENMT: No nasal congestion or rhinorrhea  Respiratory and Thorax: + Hypoxemia, No shortness of breath  Cardiovascular: No chest pain or orthopnea  Gastrointestinal: No nausea, vomiting, or abdominal pain  Genitourinary: No increased urinary frequency or hematuria  Musculoskeletal: No joint pain or joint edema  Neurological: + Seizure like activity, No loss of consciousness

## 2018-11-17 NOTE — CONSULT NOTE ADULT - SUBJECTIVE AND OBJECTIVE BOX
JOSE [id number] is a 88y old  Female who presents with a chief complaint of Seizure activity (17 Nov 2018 13:48)    Patient is a poor historian. History obtained from EMR.   HPI: 87 y/o Female with past medical history of Dementia, CVA, GI bleed, CHF, Recurrent UTIs, HLD, Syncope (s/p pacemaker),  shunt (2/2 cystic occipital left sided lesion), and seizure d/o (on Keppra and Lamotrigine) who was sent to the ED from [facility] for seizure-like activity. As per EMR, patient was in her usual state of health until 8:30am when she appeared to have facial twitching and involuntary movement of her upper extremities. As per EMR, these movement continued until 9am. Patient was given her morning dose of Keppra 1000mg with an additional 500mg PO and sent to the ED for evaluation. Of note, patient underwent ureteral stent placement on 11/16. As per family, patient's last seizure was on November 4th. Previous seizures do not involve loss of consciousness and typically consist of RUE shaking.   Currently, patient has no complaints and states she is "hungry."  ROS limited secondary to patient's underlying chronic medical conditions.       MEDICATIONS  (STANDING):  amLODIPine   Tablet 5 milliGRAM(s) Oral daily  aspirin enteric coated 81 milliGRAM(s) Oral daily  atorvastatin 40 milliGRAM(s) Oral at bedtime  calcium carbonate 1250 mG  + Vitamin D (OsCal 500 + D) 1 Tablet(s) Oral two times a day  carvedilol 12.5 milliGRAM(s) Oral every 12 hours  cholecalciferol 1000 Unit(s) Oral daily  docusate sodium 100 milliGRAM(s) Oral daily  donepezil 5 milliGRAM(s) Oral at bedtime  furosemide    Tablet 20 milliGRAM(s) Oral daily  lamoTRIgine 100 milliGRAM(s) Oral daily  latanoprost 0.005% Ophthalmic Solution 1 Drop(s) Both EYES at bedtime  levETIRAcetam 1000 milliGRAM(s) Oral two times a day  losartan 50 milliGRAM(s) Oral daily  multivitamin 1 Tablet(s) Oral daily  senna 2 Tablet(s) Oral at bedtime  timolol 0.5% Solution 1 Drop(s) Both EYES daily    MEDICATIONS  (PRN):  polyethylene glycol 3350 17 Gram(s) Oral daily PRN Constipation    PAST MEDICAL & SURGICAL HISTORY:  Glaucoma  Carotid artery plaque, bilateral: mild  History of osteoarthritis  Seizure disorder: Medically managed. No recent seizure activity  Dementia: medically managed  AICD (automatic cardioverter/defibrillator) present: Implanted &#x27; 2010  Nonischemic cardiomyopathy  Uterine prolapse  Ureteral obstruction, right: 6/2018   insertion Right Ureteral Stent  UTI (urinary tract infection): dx: 10-26-18   Treated  History of CVA (cerebrovascular accident): No residual  GI bleed  Pulmonary hypertension  CHF (congestive heart failure): history of: No recent hospitalizations  HTN (hypertension)  Seizure  Hydronephrosis; Right kidney  Afib: paroxysmal  Hypercholesteremia  Status post cystoscopy: &#x27; 2011 and 6/2018  S/P  shunt  Ureteral Obstruction; right kidney; stent insertion 12/10  S/P Brain Surgery; for "brain cyst   few years ago"    FAMILY HISTORY:  No pertinent family history in first degree relatives  No pertinent family history in first degree relatives    Allergies    No Known Allergies    Intolerances        SHx - No smoking, No ETOH, No drug abuse      Review of Systems:  As per HPI      Vital Signs Last 24 Hrs  T(C): 37.7 (17 Nov 2018 12:56), Max: 37.7 (17 Nov 2018 11:00)  T(F): 99.8 (17 Nov 2018 12:56), Max: 99.9 (17 Nov 2018 11:00)  HR: 77 (17 Nov 2018 12:56) (58 - 77)  BP: 180/68 (17 Nov 2018 12:56) (149/79 - 192/61)  BP(mean): --  RR: 18 (17 Nov 2018 12:56) (16 - 20)  SpO2: 99% (17 Nov 2018 12:56) (98% - 100%)    General Exam:   General appearance: No acute distress         Neurological Exam:  Mental Status: AAOx0-1 (fluctuates at baseline).  Attention intact.  No dysarthria. Speech fluent.  Cranial Nerves:   PERRL, EOMI, VFF, no nystagmus.    CN V1-3 intact to light touch .  No facial asymmetry.  Hearing intact to finger rub bilaterally.  Tongue, uvula and palate midline.  Sternocleidomastoid and Trapezius intact bilaterally.    Motor:   Tone: normal.                  Strength:     [] Upper extremity                      Delt       Bicep    Tricep                                                  R         5/5        5/5        5/5       5/5                                               L          5/5 5/5 5/5 5/5  [] Lower extremity                       HF          KE          KF        DF         PF                                               R        5/5 5/5 5/5 5/5 5/5                                               L         5/5 5/5 5/5 5/5 5/5  Pronator drift: none                 Dysmetria: None to finger-nose-finger  Tremor: +Bilateral Upper extremity resting tremor    Sensation: intact to light touch    Deep Tendon Reflexes:     Biceps          Triceps      BR        Patellar        Ankle         Babinski                                         R       2+                   2+           2+            2+               2+           downgoing                                         L        2+                  2+           2+            2+               2+           downgoing    Gait: Deferred    Other:    11-17    143  |  101  |  24<H>  ----------------------------<  119<H>  3.6   |  26  |  0.93    Ca    9.4      17 Nov 2018 11:05    TPro  7.6  /  Alb  3.8  /  TBili  0.9  /  DBili  x   /  AST  20  /  ALT  6   /  AlkPhos  77  11-17                            10.9   8.29  )-----------( 285      ( 17 Nov 2018 11:05 )             34.6       Radiology    CT: < from: CT Head No Cont (11.17.18 @ 11:40) >  IMPRESSION:  No acuteintracranial pathology.  Stable shunt catheter.  Stable left occipital and parietal parenchymal gliosis.  No acute intracranial hemorrhage.  Moderate to severe severity microvascular ischemic change.    < end of copied text >    MRI  EKG:  tele:  TTE:  EEG:

## 2018-11-17 NOTE — ED PROVIDER NOTE - MEDICAL DECISION MAKING DETAILS
87 y/o F w/ a PMHx of Dementia, CVA, GI bleed, CHF, UTIs, HLD, Syncope (s/p pacemaker),  shunt (2/2 cystic occipital left sided lesion), seizure d/o (on Keppra and Lamotrigine) and recent R ureteral stent exchange who was sent to the ED from [facility] for seizure-like activity w/o associated LOC. Twitching and involuntary movements not seen currently. Unclear if true seizure vs occult infection vs reaction to prior procedure yesterday. Will check basic labs, infectious w/u including UA/CXR, Head CT, and Neurology consult and will reassess.

## 2018-11-17 NOTE — ED PROVIDER NOTE - PHYSICAL EXAMINATION
PHYSICAL EXAM: (Limited as patient was not fully cooperative)  Constitutional: NAD  HEENT: NC/AT, PERRLA, No mouth twitching seen  Neck: Supple  Respiratory: Grossly CTAB; No wheeze or rhonchi appreciated  Cardiovascular: RRR; +S1/S2  Gastrointestinal: +BS, Soft, NT, No rigidity  Genitourinary: No Musa, No suprapubic TTP  Extremities: No LE edema  Neurological: Awake and alert, Moves all 4 extremities spontaneously, but no involuntary movements seen, PERRLA  Skin: Warm and dry  Psychiatric: Slightly anxious

## 2018-11-17 NOTE — CONSULT NOTE ADULT - ASSESSMENT
87 y/o Female with past medical history of Dementia, CVA, GI bleed, CHF, Recurrent UTIs, HLD, Syncope (s/p pacemaker),  shunt (2/2 cystic occipital left sided lesion), and seizure d/o (on Keppra and Lamotrigine) who was sent to the ED from [facility] [place] for seizure-like activity. As per EMR, patient was in her usual state of health until 8:30am when she appeared to have facial twitching and involuntary movement of her upper extremities. As per EMR, these movement continued until 9am. Patient was given her morning dose of Keppra 1000mg with an additional 500mg PO and sent to the ED for evaluation. Of note, patient underwent ureteral stent placement on 11/16. As per family, patient's last seizure was on November 4th. Previous seizures do not involve loss of consciousness and typically consist of RUE shaking.   Currently, patient has no complaints and states she is "hungry." ROS limited secondary to patient's underlying chronic medical conditions. Neurologic exam non-focal. UA remarkable for UTI. Vitals demonstrate low grade temp (99.9F).     Impression: Breakthrough seizure likely 2/2 UTI in the setting of recent ureteral stent placement and manipulation of the  tract.     Recommendations:   [] Treatment of underlying infection  [] Lamotrigine 75mg BID  [] Keppra 1g BID   [] Follow-up with Epilepsy Clinic      Plan discussed with Epilepsy Attending.

## 2018-11-17 NOTE — H&P ADULT - PROBLEM SELECTOR PLAN 5
IMPROVE VTE Individual Risk Assessment, Score = 2, c/w dvt ppx          RISK                                                          Points  [  ] Previous VTE                                               3  [  ] Thrombophilia                                            2  [  ] Lower limb paresis/paralysis                    2    [  ] Active Cancer (in last 6 months)              2   [ x ] Immobilization > 24 hrs                             1  [  ] ICU/CCU stay > 24 hours                            1  [ x ] Age > 60                                                        1                                            Total Score ___2______

## 2018-11-17 NOTE — H&P ADULT - ASSESSMENT
88F hx of Dementia, Seizure Disorder, CVA,  Shunt 2/2 Cystic Occipital Left Sided Lesion, GIB, Syncope s/p PPM being admitted to assess seizure activity

## 2018-11-17 NOTE — H&P ADULT - NSHPPHYSICALEXAM_GEN_ALL_CORE
Vital Signs Last 24 Hrs  T(C): 37.7 (17 Nov 2018 12:56), Max: 37.7 (17 Nov 2018 11:00)  T(F): 99.8 (17 Nov 2018 12:56), Max: 99.9 (17 Nov 2018 11:00)  HR: 77 (17 Nov 2018 12:56) (64 - 77)  BP: 180/68 (17 Nov 2018 12:56) (171/86 - 192/61)  BP(mean): --  RR: 18 (17 Nov 2018 12:56) (16 - 20)  SpO2: 99% (17 Nov 2018 12:56) (99% - 100%)    General: NAD  HEENT: EOMI, no conjunctival pallor, dry MM, no JVD, no thyromegaly, neck supple, trachea midline  CV: S1S2 RRR no MRG  Lungs: CTA BL  Abdomen: soft NTND +BS   Extremities: No CCE +WWP  Skin/MSK: No rashes, preserved ROM on active & passive movement  Neuro: AAOx1 (self), non focal

## 2018-11-17 NOTE — ED ADULT NURSE NOTE - OBJECTIVE STATEMENT
patient awake ox1 (name0 came in as having seizures since this morning. patient is confused and uox2 at baseline.  breathing even and unlabored.. not in any distress. ski warm and d ry. patient moving both extremities and uncooperative at times. CM shows NSR. AICD to left chest wall noted. labs done as ordered. awaiting results and re eval.

## 2018-11-17 NOTE — CONSULT NOTE ADULT - ATTENDING COMMENTS
I performed a history and physical examination of the patient and discussed the management of the patient with the resident. I reviewed the resident's note and agree with the documented findings and plan of care with the following additions/exceptions.    DOS 11/18/18    On exam this morning she was awake and alert, telling me she was confused and looked sad about it. Disorientated to location. Fluent. Able to follow commands. Limbs symmetric. No twitching noted.    breakthru seizure in setting of uti so recommend treating uti but also increase lamictal dose as noted above

## 2018-11-17 NOTE — ED ADULT NURSE NOTE - PMH
Afib  paroxysmal  AICD (automatic cardioverter/defibrillator) present  Implanted ' 2010  Carotid artery plaque, bilateral  mild  CHF (congestive heart failure)  history of: No recent hospitalizations  Dementia  medically managed  GI bleed    Glaucoma    History of CVA (cerebrovascular accident)  No residual  History of osteoarthritis    HTN (hypertension)    Hydronephrosis; Right kidney    Hypercholesteremia    Nonischemic cardiomyopathy    Pulmonary hypertension    Seizure    Seizure disorder  Medically managed. No recent seizure activity  Ureteral obstruction, right  6/2018   insertion Right Ureteral Stent  Uterine prolapse    UTI (urinary tract infection)  dx: 10-26-18   Treated

## 2018-11-17 NOTE — ED PROVIDER NOTE - OBJECTIVE STATEMENT
Patient is an 89 y/o F w/ a PMHx of Dementia, CVA, GI bleed, CHF, UTIs, HLD, Syncope (s/p pacemaker),  shunt (2/2 cystic occipital left sided lesion), and seizure d/o (on Keppra and Lamotrigine) who was sent to the ED from [facility] for seizure-like activity. Patient is a poor historian at time of encounter and so history was primarily obtained per staff member at NH and per chart review. Patient had a R ureteral stent exchange yesterday with no complications and she returned to her NH yesterday evening at baseline. She continued to be well until this AM at ~ 8:30AM when patient was noted to have mouth twitching, involuntary movements of her extremities, and ? facial droop. No reported LOC or change in mental status at that time. Patient was also found to have an O2 sat of ~ 85% at that time which improved to 96% on NC. Patient continued to exhibit these movements until 9AM which concerned the staff and so she was sent to the ED for further evaluation. Staff reports that patient received her AM Keppra dose of 1000mg today, and they gave her an additional 500mg PO dose for these movements. No reported recent fever, chills, vomiting, diarrhea, or urinary changes. No complaints of pain. Patient has reportedly not missed any doses of her seizure medications.

## 2018-11-17 NOTE — H&P ADULT - PROBLEM SELECTOR PLAN 1
Witnessed seizure s/p Keppra in NH. Appreciate Neurology recommendations. It is possible patient with seizure due to metabolic derrangement of abnormal UA as she has pyuria with (+) LEs.   - c/w Lamotrigine and Keppra  - c/w Abx for abnormal UA, check UCx  - Appreciate further Neurology recommendations

## 2018-11-18 LAB
BACTERIA UR CULT: SIGNIFICANT CHANGE UP
BASOPHILS # BLD AUTO: 0.04 K/UL — SIGNIFICANT CHANGE UP (ref 0–0.2)
BASOPHILS NFR BLD AUTO: 0.6 % — SIGNIFICANT CHANGE UP (ref 0–2)
BUN SERPL-MCNC: 21 MG/DL — SIGNIFICANT CHANGE UP (ref 7–23)
CALCIUM SERPL-MCNC: 9.3 MG/DL — SIGNIFICANT CHANGE UP (ref 8.4–10.5)
CHLORIDE SERPL-SCNC: 106 MMOL/L — SIGNIFICANT CHANGE UP (ref 98–107)
CO2 SERPL-SCNC: 29 MMOL/L — SIGNIFICANT CHANGE UP (ref 22–31)
CREAT SERPL-MCNC: 0.87 MG/DL — SIGNIFICANT CHANGE UP (ref 0.5–1.3)
CULTURE RESULTS: SIGNIFICANT CHANGE UP
EOSINOPHIL # BLD AUTO: 0.04 K/UL — SIGNIFICANT CHANGE UP (ref 0–0.5)
EOSINOPHIL NFR BLD AUTO: 0.6 % — SIGNIFICANT CHANGE UP (ref 0–6)
GLUCOSE SERPL-MCNC: 105 MG/DL — HIGH (ref 70–99)
HCT VFR BLD CALC: 30.6 % — LOW (ref 34.5–45)
HGB BLD-MCNC: 9.6 G/DL — LOW (ref 11.5–15.5)
IMM GRANULOCYTES # BLD AUTO: 0.04 # — SIGNIFICANT CHANGE UP
IMM GRANULOCYTES NFR BLD AUTO: 0.6 % — SIGNIFICANT CHANGE UP (ref 0–1.5)
LACTATE SERPL-SCNC: 0.8 MMOL/L — SIGNIFICANT CHANGE UP (ref 0.5–2)
LYMPHOCYTES # BLD AUTO: 1.27 K/UL — SIGNIFICANT CHANGE UP (ref 1–3.3)
LYMPHOCYTES # BLD AUTO: 18.7 % — SIGNIFICANT CHANGE UP (ref 13–44)
MAGNESIUM SERPL-MCNC: 2.2 MG/DL — SIGNIFICANT CHANGE UP (ref 1.6–2.6)
MCHC RBC-ENTMCNC: 29.8 PG — SIGNIFICANT CHANGE UP (ref 27–34)
MCHC RBC-ENTMCNC: 31.4 % — LOW (ref 32–36)
MCV RBC AUTO: 95 FL — SIGNIFICANT CHANGE UP (ref 80–100)
MONOCYTES # BLD AUTO: 0.75 K/UL — SIGNIFICANT CHANGE UP (ref 0–0.9)
MONOCYTES NFR BLD AUTO: 11.1 % — SIGNIFICANT CHANGE UP (ref 2–14)
NEUTROPHILS # BLD AUTO: 4.64 K/UL — SIGNIFICANT CHANGE UP (ref 1.8–7.4)
NEUTROPHILS NFR BLD AUTO: 68.4 % — SIGNIFICANT CHANGE UP (ref 43–77)
NRBC # FLD: 0 — SIGNIFICANT CHANGE UP
PHOSPHATE SERPL-MCNC: 2.8 MG/DL — SIGNIFICANT CHANGE UP (ref 2.5–4.5)
PLATELET # BLD AUTO: 237 K/UL — SIGNIFICANT CHANGE UP (ref 150–400)
PMV BLD: 10.8 FL — SIGNIFICANT CHANGE UP (ref 7–13)
POTASSIUM SERPL-MCNC: 3.3 MMOL/L — LOW (ref 3.5–5.3)
POTASSIUM SERPL-SCNC: 3.3 MMOL/L — LOW (ref 3.5–5.3)
RBC # BLD: 3.22 M/UL — LOW (ref 3.8–5.2)
RBC # FLD: 14.7 % — HIGH (ref 10.3–14.5)
SODIUM SERPL-SCNC: 145 MMOL/L — SIGNIFICANT CHANGE UP (ref 135–145)
SPECIMEN SOURCE: SIGNIFICANT CHANGE UP
SPECIMEN SOURCE: SIGNIFICANT CHANGE UP
WBC # BLD: 6.78 K/UL — SIGNIFICANT CHANGE UP (ref 3.8–10.5)
WBC # FLD AUTO: 6.78 K/UL — SIGNIFICANT CHANGE UP (ref 3.8–10.5)

## 2018-11-18 PROCEDURE — 95819 EEG AWAKE AND ASLEEP: CPT | Mod: 26

## 2018-11-18 PROCEDURE — 99221 1ST HOSP IP/OBS SF/LOW 40: CPT

## 2018-11-18 PROCEDURE — 95951: CPT | Mod: 26

## 2018-11-18 RX ORDER — HEPARIN SODIUM 5000 [USP'U]/ML
5000 INJECTION INTRAVENOUS; SUBCUTANEOUS EVERY 12 HOURS
Qty: 0 | Refills: 0 | Status: DISCONTINUED | OUTPATIENT
Start: 2018-11-18 | End: 2018-11-20

## 2018-11-18 RX ORDER — POLYETHYLENE GLYCOL 3350 17 G/17G
17 POWDER, FOR SOLUTION ORAL DAILY
Qty: 0 | Refills: 0 | Status: DISCONTINUED | OUTPATIENT
Start: 2018-11-18 | End: 2018-11-20

## 2018-11-18 RX ORDER — DOCUSATE SODIUM 100 MG
100 CAPSULE ORAL
Qty: 0 | Refills: 0 | Status: DISCONTINUED | OUTPATIENT
Start: 2018-11-18 | End: 2018-11-20

## 2018-11-18 RX ORDER — POTASSIUM CHLORIDE 20 MEQ
40 PACKET (EA) ORAL EVERY 4 HOURS
Qty: 0 | Refills: 0 | Status: COMPLETED | OUTPATIENT
Start: 2018-11-18 | End: 2018-11-18

## 2018-11-18 RX ORDER — LAMOTRIGINE 25 MG/1
75 TABLET, ORALLY DISINTEGRATING ORAL
Qty: 0 | Refills: 0 | Status: DISCONTINUED | OUTPATIENT
Start: 2018-11-18 | End: 2018-11-20

## 2018-11-18 RX ADMIN — LOSARTAN POTASSIUM 50 MILLIGRAM(S): 100 TABLET, FILM COATED ORAL at 06:25

## 2018-11-18 RX ADMIN — Medication 40 MILLIEQUIVALENT(S): at 15:51

## 2018-11-18 RX ADMIN — DONEPEZIL HYDROCHLORIDE 5 MILLIGRAM(S): 10 TABLET, FILM COATED ORAL at 22:07

## 2018-11-18 RX ADMIN — LEVETIRACETAM 1000 MILLIGRAM(S): 250 TABLET, FILM COATED ORAL at 06:25

## 2018-11-18 RX ADMIN — Medication 1 TABLET(S): at 17:23

## 2018-11-18 RX ADMIN — Medication 100 MILLIGRAM(S): at 11:07

## 2018-11-18 RX ADMIN — Medication 1 TABLET(S): at 11:08

## 2018-11-18 RX ADMIN — HEPARIN SODIUM 5000 UNIT(S): 5000 INJECTION INTRAVENOUS; SUBCUTANEOUS at 17:23

## 2018-11-18 RX ADMIN — Medication 81 MILLIGRAM(S): at 11:07

## 2018-11-18 RX ADMIN — LAMOTRIGINE 75 MILLIGRAM(S): 25 TABLET, ORALLY DISINTEGRATING ORAL at 17:23

## 2018-11-18 RX ADMIN — CARVEDILOL PHOSPHATE 12.5 MILLIGRAM(S): 80 CAPSULE, EXTENDED RELEASE ORAL at 06:25

## 2018-11-18 RX ADMIN — Medication 1 DROP(S): at 11:08

## 2018-11-18 RX ADMIN — CARVEDILOL PHOSPHATE 12.5 MILLIGRAM(S): 80 CAPSULE, EXTENDED RELEASE ORAL at 17:23

## 2018-11-18 RX ADMIN — Medication 1000 UNIT(S): at 11:10

## 2018-11-18 RX ADMIN — SENNA PLUS 2 TABLET(S): 8.6 TABLET ORAL at 22:07

## 2018-11-18 RX ADMIN — Medication 40 MILLIEQUIVALENT(S): at 09:57

## 2018-11-18 RX ADMIN — ATORVASTATIN CALCIUM 40 MILLIGRAM(S): 80 TABLET, FILM COATED ORAL at 22:07

## 2018-11-18 RX ADMIN — LATANOPROST 1 DROP(S): 0.05 SOLUTION/ DROPS OPHTHALMIC; TOPICAL at 22:07

## 2018-11-18 RX ADMIN — Medication 1 TABLET(S): at 06:24

## 2018-11-18 RX ADMIN — CEFTRIAXONE 100 GRAM(S): 500 INJECTION, POWDER, FOR SOLUTION INTRAMUSCULAR; INTRAVENOUS at 15:58

## 2018-11-18 RX ADMIN — AMLODIPINE BESYLATE 5 MILLIGRAM(S): 2.5 TABLET ORAL at 06:25

## 2018-11-18 RX ADMIN — Medication 20 MILLIGRAM(S): at 06:25

## 2018-11-18 RX ADMIN — LEVETIRACETAM 1000 MILLIGRAM(S): 250 TABLET, FILM COATED ORAL at 17:23

## 2018-11-18 NOTE — EEG REPORT - NS EEG TEXT BOX
[facility]   COMPREHENSIVE EPILEPSY CENTER   REPORT OF ROUTINE VIDEO EEG     [facility]: [address], [place] [address], Ph#: [phone]  [facility]: [address], [place] [address], Ph#: [phone]  Office: [address] [place] [address], [facility] [address], [place] [address] Ph#: [phone]    Patient Name: JOSE CHAUDHARI  Age and : 88y (30)  MRN #: [id number]  Location: [place] [address]  Referring Physician: Werner Steiner    Study Date: 18    _____________________________________________________________  TECHNICAL INFORMATION    Placement and Labeling of Electrodes:  The EEG was performed utilizing 20 channels referential EEG connections (coronal over temporal over parasagittal montage) using all standard 10-20 electrode placements with EKG.  Recording was at a sampling rate of 256 samples per second per channel.  Time synchronized digital video recording was done simultaneously with EEG recording.  A low light infrared camera was used for low light recording.  Blayne and seizure detection algorithms were utilized.    _____________________________________________________________  HISTORY    Patient is a 88y old  Female who presents with a chief complaint of Seizure activity (2018 14:55)      PERTINENT MEDICATION:  lamoTRIgine 75 milliGRAM(s) Oral two times a day  levETIRAcetam 1000 milliGRAM(s) Oral two times a day    _____________________________________________________________  STUDY INTERPRETATION    Findings: The background was continuous, spontaneously variable and reactive. During wakefulness, the posterior dominant rhythm consisted of symmetric, 11 Hz activity, with amplitude to 30 uV, that attenuated to eye opening.  Low amplitude frontal beta was noted in wakefulness.    Background Slowing:  No generalized background slowing was present.    Focal Slowing:   Continuos polymorphic delta and theta activities over the left posterior quadrant region.     Sleep Background:  Drowsiness was characterized by fragmentation, attenuation, and slowing of the background activity.    Sleep was characterized by the presence of vertex waves, symmetric spindles, and K-complexes.    Other Non-Epileptiform Findings:  None were present.    Interictal Epileptiform Activity:   Abundant, Left posterior quadrant (max O1>P7) Blayne Wave Discharges at times occurring in runs of static lateralized periodic discharges.     Events:  Clinical events: None recorded.  Seizures: None recorded.    Activation Procedures:   Hyperventilation was not performed.    Photic stimulation was not performed.     Artifacts:  Intermittent myogenic and movement artifacts were noted.    ECG:  The heart rate on single channel ECG was predominantly between 60-70 BPM.  _____________________________________________________________  EEG SUMMARY/CLASSIFICATION  Abnormal EEG in the awake, drowsy and asleep states.  -Abundant, Left posterior quadrant (max O1>P7) Blayne Wave Discharges at times occurring in runs of static lateralized periodic discharges.    -Continuos polymorphic delta and theta activities over the left posterior quadrant region.   _____________________________________________________________  EEG IMPRESSION/CLINICAL CORRELATE  Abnormal EEG study.  1. Potential epileptogenic focus in the left posterior quadrant region.  2. Structural abnormality in the left posterior quadrant.   3. No seizure seen.    Preliminary Fellow Read:    Gayle Weller MD  Adult EEG Fellow, [facility] Epilepsy [facility]   COMPREHENSIVE EPILEPSY CENTER   REPORT OF ROUTINE VIDEO EEG     [facility]: [address], [place] [address], Ph#: [phone]  [facility]: [address], [place] [address], Ph#: [phone]  Office: [address] [place] [address], [facility] [address], [place] [address] Ph#: [phone]    Patient Name: JOSE CHAUDHARI  Age and : 88y (30)  MRN #: [id number]  Location: [place] [address]  Referring Physician: Werner Steiner    Study Date: 18    _____________________________________________________________  TECHNICAL INFORMATION    Placement and Labeling of Electrodes:  The EEG was performed utilizing 20 channels referential EEG connections (coronal over temporal over parasagittal montage) using all standard 10-20 electrode placements with EKG.  Recording was at a sampling rate of 256 samples per second per channel.  Time synchronized digital video recording was done simultaneously with EEG recording.  A low light infrared camera was used for low light recording.  Blayne and seizure detection algorithms were utilized.    _____________________________________________________________  HISTORY    Patient is a 88y old  Female who presents with a chief complaint of Seizure activity (2018 14:55)      PERTINENT MEDICATION:  lamoTRIgine 75 milliGRAM(s) Oral two times a day  levETIRAcetam 1000 milliGRAM(s) Oral two times a day    _____________________________________________________________  STUDY INTERPRETATION    Findings: The background was continuous, spontaneously variable and reactive. During wakefulness, the posterior dominant rhythm consisted of symmetric, 11 Hz activity, with amplitude to 30 uV, that attenuated to eye opening.  Low amplitude frontal beta was noted in wakefulness.    Background Slowing:  No generalized background slowing was present.    Focal Slowing:   Continuos polymorphic delta and theta activities over the left posterior quadrant region.     Sleep Background:  Drowsiness was characterized by fragmentation, attenuation, and slowing of the background activity.    Sleep was characterized by the presence of vertex waves, symmetric spindles, and K-complexes.    Other Non-Epileptiform Findings:  None were present.    Interictal Epileptiform Activity:   Abundant, Left posterior quadrant (max O1>P7) Blayne Wave Discharges at times occurring in runs of static lateralized periodic discharges near 1hz.     Events:  Clinical events: None recorded.  Seizures: None recorded.    Activation Procedures:   Hyperventilation was not performed.    Photic stimulation was not performed.     Artifacts:  Intermittent myogenic and movement artifacts were noted.    ECG:  The heart rate on single channel ECG was predominantly between 60-70 BPM.  _____________________________________________________________  EEG SUMMARY/CLASSIFICATION  Abnormal EEG in the awake, drowsy and asleep states.  -Abundant, Left posterior quadrant (max O1>P7) Blayne Wave Discharges at times occurring in runs of static lateralized periodic discharges.    -Continuos polymorphic delta and theta activities over the left posterior quadrant region.   _____________________________________________________________  EEG IMPRESSION/CLINICAL CORRELATE  Abnormal EEG study.  1. High risk of focal onset seizures from the left posterior quadrant brain region.  2. Structural abnormality in the left posterior quadrant.   3. No seizure seen.        Gayle Weller MD  Adult EEG Fellow, [facility] Epilepsy [place]

## 2018-11-19 ENCOUNTER — TRANSCRIPTION ENCOUNTER (OUTPATIENT)
Age: 83
End: 2018-11-19

## 2018-11-19 LAB
BUN SERPL-MCNC: 20 MG/DL — SIGNIFICANT CHANGE UP (ref 7–23)
CALCIUM SERPL-MCNC: 9.9 MG/DL — SIGNIFICANT CHANGE UP (ref 8.4–10.5)
CHLORIDE SERPL-SCNC: 103 MMOL/L — SIGNIFICANT CHANGE UP (ref 98–107)
CO2 SERPL-SCNC: 28 MMOL/L — SIGNIFICANT CHANGE UP (ref 22–31)
CREAT SERPL-MCNC: 0.86 MG/DL — SIGNIFICANT CHANGE UP (ref 0.5–1.3)
GLUCOSE SERPL-MCNC: 154 MG/DL — HIGH (ref 70–99)
LEVETIRACETAM SERPL-MCNC: 65.3 MCG/ML — HIGH (ref 12–46)
MAGNESIUM SERPL-MCNC: 2 MG/DL — SIGNIFICANT CHANGE UP (ref 1.6–2.6)
PHOSPHATE SERPL-MCNC: 2.8 MG/DL — SIGNIFICANT CHANGE UP (ref 2.5–4.5)
POTASSIUM SERPL-MCNC: 3.5 MMOL/L — SIGNIFICANT CHANGE UP (ref 3.5–5.3)
POTASSIUM SERPL-SCNC: 3.5 MMOL/L — SIGNIFICANT CHANGE UP (ref 3.5–5.3)
SODIUM SERPL-SCNC: 142 MMOL/L — SIGNIFICANT CHANGE UP (ref 135–145)

## 2018-11-19 RX ORDER — MEROPENEM 1 G/30ML
1000 INJECTION INTRAVENOUS ONCE
Qty: 0 | Refills: 0 | Status: COMPLETED | OUTPATIENT
Start: 2018-11-19 | End: 2018-11-20

## 2018-11-19 RX ORDER — GLYCERIN ADULT
1 SUPPOSITORY, RECTAL RECTAL ONCE
Qty: 0 | Refills: 0 | Status: COMPLETED | OUTPATIENT
Start: 2018-11-19 | End: 2018-11-19

## 2018-11-19 RX ADMIN — Medication 1 TABLET(S): at 14:23

## 2018-11-19 RX ADMIN — LAMOTRIGINE 75 MILLIGRAM(S): 25 TABLET, ORALLY DISINTEGRATING ORAL at 17:58

## 2018-11-19 RX ADMIN — LEVETIRACETAM 1000 MILLIGRAM(S): 250 TABLET, FILM COATED ORAL at 17:58

## 2018-11-19 RX ADMIN — Medication 20 MILLIGRAM(S): at 06:08

## 2018-11-19 RX ADMIN — SENNA PLUS 2 TABLET(S): 8.6 TABLET ORAL at 21:23

## 2018-11-19 RX ADMIN — ATORVASTATIN CALCIUM 40 MILLIGRAM(S): 80 TABLET, FILM COATED ORAL at 21:22

## 2018-11-19 RX ADMIN — CARVEDILOL PHOSPHATE 12.5 MILLIGRAM(S): 80 CAPSULE, EXTENDED RELEASE ORAL at 17:58

## 2018-11-19 RX ADMIN — AMLODIPINE BESYLATE 5 MILLIGRAM(S): 2.5 TABLET ORAL at 06:05

## 2018-11-19 RX ADMIN — Medication 81 MILLIGRAM(S): at 14:23

## 2018-11-19 RX ADMIN — DONEPEZIL HYDROCHLORIDE 5 MILLIGRAM(S): 10 TABLET, FILM COATED ORAL at 21:22

## 2018-11-19 RX ADMIN — Medication 100 MILLIGRAM(S): at 06:07

## 2018-11-19 RX ADMIN — LATANOPROST 1 DROP(S): 0.05 SOLUTION/ DROPS OPHTHALMIC; TOPICAL at 21:23

## 2018-11-19 RX ADMIN — Medication 1 TABLET(S): at 06:05

## 2018-11-19 RX ADMIN — Medication 1 DROP(S): at 14:24

## 2018-11-19 RX ADMIN — LAMOTRIGINE 75 MILLIGRAM(S): 25 TABLET, ORALLY DISINTEGRATING ORAL at 06:09

## 2018-11-19 RX ADMIN — Medication 1 TABLET(S): at 17:58

## 2018-11-19 RX ADMIN — CARVEDILOL PHOSPHATE 12.5 MILLIGRAM(S): 80 CAPSULE, EXTENDED RELEASE ORAL at 06:07

## 2018-11-19 RX ADMIN — Medication 1 SUPPOSITORY(S): at 14:23

## 2018-11-19 RX ADMIN — LOSARTAN POTASSIUM 50 MILLIGRAM(S): 100 TABLET, FILM COATED ORAL at 06:07

## 2018-11-19 RX ADMIN — CEFTRIAXONE 100 GRAM(S): 500 INJECTION, POWDER, FOR SOLUTION INTRAMUSCULAR; INTRAVENOUS at 16:48

## 2018-11-19 RX ADMIN — Medication 1000 UNIT(S): at 14:22

## 2018-11-19 RX ADMIN — Medication 100 MILLIGRAM(S): at 17:58

## 2018-11-19 RX ADMIN — LEVETIRACETAM 1000 MILLIGRAM(S): 250 TABLET, FILM COATED ORAL at 06:07

## 2018-11-19 RX ADMIN — POLYETHYLENE GLYCOL 3350 17 GRAM(S): 17 POWDER, FOR SOLUTION ORAL at 14:23

## 2018-11-19 RX ADMIN — HEPARIN SODIUM 5000 UNIT(S): 5000 INJECTION INTRAVENOUS; SUBCUTANEOUS at 17:59

## 2018-11-19 RX ADMIN — Medication 100 MILLIGRAM(S): at 00:00

## 2018-11-19 RX ADMIN — POLYETHYLENE GLYCOL 3350 17 GRAM(S): 17 POWDER, FOR SOLUTION ORAL at 00:00

## 2018-11-19 NOTE — PROVIDER CONTACT NOTE (OTHER) - REASON
Received a call from [facility] regarding patient's urine C+S being positive for ESBL

## 2018-11-19 NOTE — DISCHARGE NOTE ADULT - CARE PROVIDERS DIRECT ADDRESSES
[email or web address]

## 2018-11-19 NOTE — DISCHARGE NOTE ADULT - MEDICATION SUMMARY - MEDICATIONS TO TAKE
I will START or STAY ON the medications listed below when I get home from the hospital:    aspirin 81 mg oral delayed release tablet  -- 1 tab(s) by mouth once a day. * Remain on  -- Indication: For CAD    losartan 50 mg oral tablet  -- 1 tab(s) by mouth once a day  -- Indication: For HTN    Keppra 500 mg oral tablet  -- 2 tab(s) by mouth 2 times a day  -- Indication: For Seizure disorder    lamoTRIgine 25 mg oral tablet  -- 3 tab(s) by mouth 2 times a day  -- Indication: For Seizure disorder    clonazePAM 0.5 mg oral tablet  -- 1 tab(s) by mouth , As Needed seizures   -- Indication: For Seizure disorder    atorvastatin 40 mg oral tablet  -- 1 tab(s) by mouth once a day (at bedtime)  -- Indication: For HLD    carvedilol 12.5 mg oral tablet  -- 1 tab(s) by mouth every 12 hours  -- Indication: For HTN    amLODIPine 5 mg oral tablet  -- 1 tab(s) by mouth once a day  -- Indication: For HTN    Aricept 5 mg oral tablet  -- 1 tab(s) by mouth once a day (at bedtime)  -- Indication: For Dementia    Lasix 20 mg oral tablet  -- 1 tab(s) by mouth once a day  -- Indication: For Diuretic    docusate sodium 100 mg oral capsule  -- 2 cap(s) by mouth once a day (at bedtime)  -- Indication: For Bowel regimen     Senna 8.6 mg oral tablet  -- 2 tab(s) by mouth once a day (at bedtime)  -- Indication: For bowel regimen     polyethylene glycol 3350 oral powder for reconstitution  -- 17 gram(s) by mouth once a day  -- Indication: For bowel regimen    timolol maleate 0.5% ophthalmic gel forming solution  -- 1 drop(s) to each eye once a day  -- Indication: For Eye gtts    Restasis 0.05% ophthalmic emulsion  -- 1 drop(s) to each eye every 12 hours  -- Indication: For Eye gtts    Travatan 0.004% ophthalmic solution  -- 1 drop(s) to each eye once a day (in the evening)  -- Indication: For Eye gtts    Multiple Vitamins oral tablet  -- 1 tab(s) by mouth once a day  -- Indication: For Prophylactic measure    Calcium 500+D oral tablet, chewable  -- 1 tab(s) by mouth 2 times a day  -- Indication: For Prophylactic measure    cholecalciferol oral tablet  -- 1000 unit(s) by mouth once a day  -- Indication: For Prophylactic measure

## 2018-11-19 NOTE — DISCHARGE NOTE ADULT - CARE PLAN
Principal Discharge DX:	Seizure disorder  Assessment and plan of treatment:	continue with seizure medications as prescribed. follow up with epilepsy clinic for further evaluation and management  Secondary Diagnosis:	UTI (urinary tract infection)  Assessment and plan of treatment:	completed 3 days of ceftriaxone. Principal Discharge DX:	Seizure disorder  Goal:	Continue with seizure meds  Assessment and plan of treatment:	continue with seizure medications as prescribed. follow up with epilepsy clinic for further evaluation and management. Lamictal dose increased to 100 mg BID -Follow-up with Epilepsy Clinic w/ Dr. Mcghee [phone].  Secondary Diagnosis:	UTI (urinary tract infection)  Assessment and plan of treatment:	completed 3 days of ceftriaxone.  Was seen by ID - No need for further abx- No isolation needed

## 2018-11-19 NOTE — DISCHARGE NOTE ADULT - PLAN OF CARE
continue with seizure medications as prescribed. follow up with epilepsy clinic for further evaluation and management completed 3 days of ceftriaxone. Continue with seizure meds continue with seizure medications as prescribed. follow up with epilepsy clinic for further evaluation and management. Lamictal dose increased to 100 mg BID -Follow-up with Epilepsy Clinic w/ Dr. Mcghee [phone]. completed 3 days of ceftriaxone.  Was seen by ID - No need for further abx- No isolation needed

## 2018-11-19 NOTE — CHART NOTE - NSCHARTNOTEFT_GEN_A_CORE
spoke with neuro resident at [phone] regarding EEG reports. No further intervention, patient to continue with seizure meds and follow up eplisepy clinic spoke with neuro resident at [phone] regarding EEG reports. No further intervention at this time, patient to continue with seizure meds and follow up eplisepy clinic

## 2018-11-19 NOTE — PROGRESS NOTE ADULT - SUBJECTIVE AND OBJECTIVE BOX
SUBJECTIVE / OVERNIGHT EVENTS: pt seen and examined, unable to obtain complete ROS from pt , pt answering to few ros questionnaire    MEDICATIONS  (STANDING):  amLODIPine   Tablet 5 milliGRAM(s) Oral daily  aspirin enteric coated 81 milliGRAM(s) Oral daily  atorvastatin 40 milliGRAM(s) Oral at bedtime  calcium carbonate 1250 mG  + Vitamin D (OsCal 500 + D) 1 Tablet(s) Oral two times a day  carvedilol 12.5 milliGRAM(s) Oral every 12 hours  cefTRIAXone   IVPB 1 Gram(s) IV Intermittent every 24 hours  cefTRIAXone   IVPB      cholecalciferol 1000 Unit(s) Oral daily  docusate sodium 100 milliGRAM(s) Oral two times a day  donepezil 5 milliGRAM(s) Oral at bedtime  furosemide    Tablet 20 milliGRAM(s) Oral daily  heparin  Injectable 5000 Unit(s) SubCutaneous every 12 hours  influenza   Vaccine 0.5 milliLiter(s) IntraMuscular once  lamoTRIgine 75 milliGRAM(s) Oral two times a day  latanoprost 0.005% Ophthalmic Solution 1 Drop(s) Both EYES at bedtime  levETIRAcetam 1000 milliGRAM(s) Oral two times a day  losartan 50 milliGRAM(s) Oral daily  multivitamin 1 Tablet(s) Oral daily  polyethylene glycol 3350 17 Gram(s) Oral daily  senna 2 Tablet(s) Oral at bedtime  timolol 0.5% Solution 1 Drop(s) Both EYES daily    MEDICATIONS  (PRN):    Vital Signs Last 24 Hrs  T(C): 37.1 (19 Nov 2018 15:46), Max: 37.1 (19 Nov 2018 15:46)  T(F): 98.8 (19 Nov 2018 15:46), Max: 98.8 (19 Nov 2018 15:46)  HR: 65 (19 Nov 2018 15:46) (65 - 67)  BP: 142/78 (19 Nov 2018 15:46) (138/75 - 153/75)  BP(mean): --  RR: 18 (19 Nov 2018 15:46) (18 - 18)  SpO2: 99% (19 Nov 2018 15:46) (98% - 99%)    pt denies chest pain, shortness of breath    PHYSICAL EXAM:  GENERAL: NAD  EYES: EOMI, PERRLA  NECK: Supple, No JVD  CHEST/LUNG: dec breath sounds at bases  HEART:  S1 , S2 +  ABDOMEN: soft, bs+  NEUROLOGY:alert awake      LABS:                        9.6    6.78  )-----------( 237      ( 18 Nov 2018 06:00 )             30.6     11-19    142  |  103  |  20  ----------------------------<  154<H>  3.5   |  28  |  0.86    Ca    9.9      19 Nov 2018 10:20  Phos  2.8     11-19  Mg     2.0     11-19                RADIOLOGY & ADDITIONAL TESTS:    Imaging Personally Reviewed:    Consultant(s) Notes Reviewed:      Care Discussed with Consultants/Other Providers:

## 2018-11-19 NOTE — PROGRESS NOTE ADULT - PROBLEM SELECTOR PLAN 1
cont current seizure meds  seizure precautions  neuro input appreciated , eeg 1. High risk of focal onset seizures from the left posterior quadrant brain region.  2. Structural abnormality in the left posterior quadrant.   3. No seizure seen.

## 2018-11-19 NOTE — DISCHARGE NOTE ADULT - HOSPITAL COURSE
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    -Witnessed seizure s/p Keppra in NH.   - c/w Lamotrigine and Keppra  - c/w Abx for abnormal UA,  UCx neg  - EEG- no seizures   -neuro- Breakthrough seizure likely 2/2 UTI in the setting of recent ureteral stent placement and manipulation of the  tract.     UTI  - completed 3 days of CTX  urine culture neg     Essential hypertension.     c/w Amlodipine and losartan as previously prescribed.     History of congestive heart failure.  : c/w Atorvastatin, Coreg, ASA, as previously prescribed.     Glaucoma.    c/w Restasis and Latanoprost eye drops. 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    -Witnessed seizure s/p Keppra in NH.   - c/w Lamotrigine and Keppra  - c/w Abx for abnormal UA,  UCx neg  - EEG- no seizures   -neuro- Breakthrough seizure likely 2/2 UTI in the setting of recent ureteral stent placement and manipulation of the  tract.     UTI  - completed 3 days of CTX  urine culture neg   Urine culture done on  11/14 at [facility] positive for ESBL in urine,  Gave meropenem x 1 dose,  urine cultures at [facility] negative  ID was consulted- No need for abx         Essential hypertension.     c/w Amlodipine and losartan as previously prescribed.     History of congestive heart failure.  : c/w Atorvastatin, Coreg, ASA, as previously prescribed.     Glaucoma.    c/w Restasis and Latanoprost eye drops.   Dispo Return to rehab   Spoke with pts niece and made aware of Discharge back to NH

## 2018-11-19 NOTE — DISCHARGE NOTE ADULT - MEDICATION SUMMARY - MEDICATIONS TO CHANGE
I will SWITCH the dose or number of times a day I take the medications listed below when I get home from the hospital:    lamoTRIgine 100 mg oral tablet  -- 1 tab(s) by mouth once a day

## 2018-11-19 NOTE — DISCHARGE NOTE ADULT - PATIENT PORTAL LINK FT
You can access the [facility] Patient Portal, offered by [facility], by registering with the following website: [email or web address]

## 2018-11-19 NOTE — PROVIDER CONTACT NOTE (OTHER) - SITUATION
Received a call from Isai at [facility] regarding patient's urine C+S being positive for ESBL. Sample was taken 2 days ago.

## 2018-11-19 NOTE — EEG REPORT - NS EEG TEXT BOX
[facility]   COMPREHENSIVE EPILEPSY CENTER   REPORT OF CONTINOUS VIDEO EEG     [facility]: [address], [place] [address], Ph#: [phone]  [facility]: [address] [place] [address], [place] [address], Ph#: [phone]  Office: [address] [place] [address], [facility] [address], [place] [address] Ph#: [phone]    Patient Name: JOSE CHAUDHARI  Age and : 88y (30)  MRN #: [id number]  Location: [place] [address]  Referring Physician: Werner Steiner    Study Date: 2018-2018    _____________________________________________________________  HISTORY    Patient is a 88y old  Female who presents with a chief complaint of Seizure activity (2018 14:55)      PERTINENT MEDICATION:  lamoTRIgine 75 milliGRAM(s) Oral two times a day  levETIRAcetam 1000 milliGRAM(s) Oral two times a day    _____________________________________________________________  STUDY INTERPRETATION    Findings: The background was continuous, spontaneously variable and reactive. During wakefulness, the posterior dominant rhythm consisted of symmetric, 11 Hz activity, with amplitude to 30 uV, that attenuated to eye opening.  Low amplitude frontal beta was noted in wakefulness.    Background Slowing:  No generalized background slowing was present.    Focal Slowing:   Continuos polymorphic delta and theta activities over the left posterior quadrant region.     Sleep Background:  Drowsiness was characterized by fragmentation, attenuation, and slowing of the background activity.    Sleep was characterized by the presence of vertex waves, symmetric spindles, and K-complexes.    Other Non-Epileptiform Findings:  None were present.    Interictal Epileptiform Activity:   Abundant, Left posterior quadrant (max P7>O1) Blayne Wave Discharges at times occurring in runs of static lateralized periodic discharges near 1hz.     Events:  Clinical events: None recorded.  Seizures: None recorded.    Activation Procedures:   Hyperventilation was not performed.    Photic stimulation was not performed.     Artifacts:  Intermittent myogenic and movement artifacts were noted.    ECG:  The heart rate on single channel ECG was predominantly between 60-70 BPM.  _____________________________________________________________  EEG SUMMARY/CLASSIFICATION  Abnormal EEG in the awake, drowsy and asleep states.  -Abundant, Left posterior quadrant (max O1>P7) Blayne Wave Discharges at times occurring in runs of static lateralized periodic discharges.    -Continuos polymorphic delta and theta activities over the left posterior quadrant region.   _____________________________________________________________  EEG IMPRESSION/CLINICAL CORRELATE  Abnormal EEG study.  1. High risk of focal onset seizures from the left posterior quadrant brain region.  2. Structural abnormality in the left posterior quadrant.   3. No seizure seen.        Jeanie Flores MD  Adult EEG Fellow, [facility] Epilepsy [place]

## 2018-11-20 ENCOUNTER — RX RENEWAL (OUTPATIENT)
Age: 83
End: 2018-11-20

## 2018-11-20 VITALS — WEIGHT: 104.94 LBS

## 2018-11-20 DIAGNOSIS — N39.0 URINARY TRACT INFECTION, SITE NOT SPECIFIED: ICD-10-CM

## 2018-11-20 PROCEDURE — 99232 SBSQ HOSP IP/OBS MODERATE 35: CPT | Mod: GC

## 2018-11-20 PROCEDURE — 99222 1ST HOSP IP/OBS MODERATE 55: CPT | Mod: GC

## 2018-11-20 PROCEDURE — 95951: CPT | Mod: 26

## 2018-11-20 RX ORDER — LAMOTRIGINE 25 MG/1
3 TABLET, ORALLY DISINTEGRATING ORAL
Qty: 0 | Refills: 0 | DISCHARGE
Start: 2018-11-20

## 2018-11-20 RX ORDER — LAMOTRIGINE 25 MG/1
1 TABLET, ORALLY DISINTEGRATING ORAL
Qty: 0 | Refills: 0 | DISCHARGE
Start: 2018-11-20

## 2018-11-20 RX ORDER — ACETAMINOPHEN 500 MG
2 TABLET ORAL
Qty: 0 | Refills: 0 | COMMUNITY

## 2018-11-20 RX ADMIN — LAMOTRIGINE 75 MILLIGRAM(S): 25 TABLET, ORALLY DISINTEGRATING ORAL at 05:22

## 2018-11-20 RX ADMIN — Medication 1 TABLET(S): at 05:23

## 2018-11-20 RX ADMIN — MEROPENEM 100 MILLIGRAM(S): 1 INJECTION INTRAVENOUS at 05:23

## 2018-11-20 RX ADMIN — LOSARTAN POTASSIUM 50 MILLIGRAM(S): 100 TABLET, FILM COATED ORAL at 05:23

## 2018-11-20 RX ADMIN — HEPARIN SODIUM 5000 UNIT(S): 5000 INJECTION INTRAVENOUS; SUBCUTANEOUS at 05:23

## 2018-11-20 RX ADMIN — Medication 1 TABLET(S): at 16:04

## 2018-11-20 RX ADMIN — Medication 81 MILLIGRAM(S): at 16:03

## 2018-11-20 RX ADMIN — CARVEDILOL PHOSPHATE 12.5 MILLIGRAM(S): 80 CAPSULE, EXTENDED RELEASE ORAL at 05:23

## 2018-11-20 RX ADMIN — LEVETIRACETAM 1000 MILLIGRAM(S): 250 TABLET, FILM COATED ORAL at 05:22

## 2018-11-20 RX ADMIN — POLYETHYLENE GLYCOL 3350 17 GRAM(S): 17 POWDER, FOR SOLUTION ORAL at 16:04

## 2018-11-20 RX ADMIN — Medication 100 MILLIGRAM(S): at 05:22

## 2018-11-20 RX ADMIN — Medication 1000 UNIT(S): at 16:04

## 2018-11-20 RX ADMIN — Medication 20 MILLIGRAM(S): at 05:23

## 2018-11-20 RX ADMIN — Medication 1 DROP(S): at 16:04

## 2018-11-20 RX ADMIN — AMLODIPINE BESYLATE 5 MILLIGRAM(S): 2.5 TABLET ORAL at 05:23

## 2018-11-20 NOTE — CHART NOTE - NSCHARTNOTEFT_GEN_A_CORE
Received a call from [facility] regarding the results of Urine c/s that was sent n 11/14 overnight- Urine c/s- ESBL e coli- Pt was treated with IV ceftriaxone x 2 doses- Urine cx from 11/17- No growth for x 24 hrs   Pt received a dose of IV Meropenem Over night - House ID consulted for further management - DR Steiner made aware-

## 2018-11-20 NOTE — EEG REPORT - NS EEG TEXT BOX
[facility]   COMPREHENSIVE EPILEPSY CENTER   REPORT OF CONTINOUS VIDEO EEG     [facility]: [address], [place] [address], Ph#: [phone]  [facility]: [address] [place] [address], [place] [address], Ph#: [phone]  Office: [address] [place] [address], [place] [address], [place] [address] Ph#: [phone]    Patient Name: JOSE CHAUDHARI  Age and : 88y (30)  MRN #: [id number]  Location: [place] [address]  Referring Physician: Werner Steiner    Study Date: 2018-2018    _____________________________________________________________  HISTORY    Patient is a 88y old  Female who presents with a chief complaint of Seizure activity (2018 14:55)      PERTINENT MEDICATION:  lamoTRIgine 75 milliGRAM(s) Oral two times a day  levETIRAcetam 1000 milliGRAM(s) Oral two times a day    _____________________________________________________________  STUDY INTERPRETATION    Findings: The background was continuous, spontaneously variable and reactive. During wakefulness, the posterior dominant rhythm consisted of symmetric, 11 Hz activity, with amplitude to 30 uV, that attenuated to eye opening.  Low amplitude frontal beta was noted in wakefulness.    Background Slowing:  No generalized background slowing was present.    Focal Slowing:   Continuos polymorphic delta and theta activities over the left posterior quadrant (P7/O1/P3) region.     Sleep Background:  Drowsiness was characterized by fragmentation, attenuation, and slowing of the background activity.    Sleep was characterized by the presence of vertex waves, symmetric spindles, and K-complexes.    Other Non-Epileptiform Findings:  None were present.    Interictal Epileptiform Activity:   Abundant left posterior quadrant (P7>O1>P3) spikes, frequently occurring as very brief to brief runs of fluctuating 1-2 Hz lateralized periodic discharges (LPD).    Events:  Clinical events: None recorded.  Seizures: None recorded.    Activation Procedures:   Hyperventilation was not performed.    Photic stimulation was not performed.     Artifacts:  Intermittent myogenic and movement artifacts were noted.    ECG:  The heart rate on single channel ECG was predominantly between 70-90 BPM.  _____________________________________________________________  EEG SUMMARY/CLASSIFICATION  Abnormal EEG in the awake, drowsy and asleep states.  - Abundant left posterior quadrant (P7>O1>P3) spikes, frequently occurring as very brief to brief runs of fluctuating 1-2 Hz lateralized periodic discharges (LPD).  - Continuos polymorphic delta and theta activities over the left posterior quadrant (P7/O1/P3) region.    _____________________________________________________________  EEG IMPRESSION/CLINICAL CORRELATE  Abnormal EEG study.  1. Potential epileptogenic focus in the left posterior quadrant brain region.  2. Structural abnormality in the left posterior quadrant.   3. No seizure seen.      Abigail Harvey MD  Attending Physician, [facility] Epilepsy [place]

## 2018-11-20 NOTE — PROGRESS NOTE ADULT - PROBLEM SELECTOR PLAN 1
Impression: Breakthrough seizure likely 2/2 UTI     Plan:   [] Treatment of underlying infection  [] Lamotrigine Immediate release 75mg BID  [] Keppra 1g BID   [] Clonazepam Oral Dissolvable 0.5mg 1-2 tablets PRN for seizures > 1-2 minutes    [] no further inpatient neurologic workup   [] Follow-up with Epilepsy Clinic w/ Dr. Mcghee [phone]

## 2018-11-20 NOTE — PROGRESS NOTE ADULT - SUBJECTIVE AND OBJECTIVE BOX
Neurology Follow up note    Name: JOSE CHAUDHARI    Subjective: no concerns overnight. patient on contact precautions for MRSA     HPI:  89 y/o Female with past medical history of Dementia, CVA, GI bleed, CHF, Recurrent UTIs, HLD, Syncope (s/p pacemaker),  shunt (2/2 cystic occipital left sided lesion), and seizure d/o (on Keppra and Lamotrigine) who was sent to the ED from [facility] for seizure-like activity. As per EMR, patient was in her usual state of health until 8:30am when she appeared to have facial twitching and involuntary movement of her upper extremities. As per EMR, these movement continued until 9am. Patient was given her morning dose of Keppra 1000mg with an additional 500mg PO and sent to the ED for evaluation. Of note, patient underwent ureteral stent placement on 11/16. As per family, patient's last seizure was on November 4th. Previous seizures do not involve loss of consciousness and typically consist of RUE shaking.     MEDICATIONS  (STANDING):  amLODIPine   Tablet 5 milliGRAM(s) Oral daily  aspirin enteric coated 81 milliGRAM(s) Oral daily  atorvastatin 40 milliGRAM(s) Oral at bedtime  calcium carbonate 1250 mG  + Vitamin D (OsCal 500 + D) 1 Tablet(s) Oral two times a day  carvedilol 12.5 milliGRAM(s) Oral every 12 hours  cholecalciferol 1000 Unit(s) Oral daily  docusate sodium 100 milliGRAM(s) Oral two times a day  donepezil 5 milliGRAM(s) Oral at bedtime  furosemide    Tablet 20 milliGRAM(s) Oral daily  heparin  Injectable 5000 Unit(s) SubCutaneous every 12 hours  influenza   Vaccine 0.5 milliLiter(s) IntraMuscular once  lamoTRIgine 75 milliGRAM(s) Oral two times a day  latanoprost 0.005% Ophthalmic Solution 1 Drop(s) Both EYES at bedtime  levETIRAcetam 1000 milliGRAM(s) Oral two times a day  losartan 50 milliGRAM(s) Oral daily  multivitamin 1 Tablet(s) Oral daily  polyethylene glycol 3350 17 Gram(s) Oral daily  senna 2 Tablet(s) Oral at bedtime  timolol 0.5% Solution 1 Drop(s) Both EYES daily    MEDICATIONS  (PRN):    Allergies  No Known Allergies    Objective:   Vital Signs Last 24 Hrs  T(C): 36.7 (20 Nov 2018 05:19), Max: 37.1 (19 Nov 2018 15:46)  T(F): 98.1 (20 Nov 2018 05:19), Max: 98.8 (19 Nov 2018 15:46)  HR: 66 (20 Nov 2018 05:19) (65 - 68)  BP: 158/87 (20 Nov 2018 05:19) (142/78 - 158/87)  BP(mean): --  RR: 18 (20 Nov 2018 05:19) (18 - 18)  SpO2: 97% (20 Nov 2018 05:19) (97% - 100%)    General appearance: No acute distress         Neurological Exam:  Mental Status: alert,  Attention intact.  No dysarthria. Speech fluent.  Cranial Nerves:  EOMI, VFF, no nystagmus. No facial asymmetry.  Tongue, uvula and palate midline  Motor: antigravity in all 4 extremities    Tremor: +Bilateral Upper extremity resting tremor    Sensation: intact to light touch    Gait: Deferred    11-19    142  |  103  |  20  ----------------------------<  154<H>  3.5   |  28  |  0.86    Ca    9.9      19 Nov 2018 10:20  Phos  2.8     11-19  Mg     2.0     11-19    Radiology  CT: < from: CT Head No Cont (11.17.18 @ 11:40) >  IMPRESSION:  No acuteintracranial pathology.  Stable shunt catheter.  Stable left occipital and parietal parenchymal gliosis.  No acute intracranial hemorrhage.  Moderate to severe severity microvascular ischemic change.    < end of copied text >

## 2018-11-20 NOTE — PROGRESS NOTE ADULT - ASSESSMENT
89 y/o Female with past medical history of Dementia, CVA, GI bleed, CHF, Recurrent UTIs, HLD, Syncope (s/p pacemaker),  shunt (2/2 cystic occipital left sided lesion), and seizure d/o (on Keppra and Lamotrigine) who was sent to the ED from [facility] for seizure-like activity. As per EMR, patient was in her usual state of health until 8:30am when she appeared to have facial twitching and involuntary movement of her upper extremities. As per EMR, these movement continued until 9am. Patient was given her morning dose of Keppra 1000mg with an additional 500mg PO and sent to the ED for evaluation. Of note, patient underwent ureteral stent placement on 11/16. As per family, patient's last seizure was on November 4th. Previous seizures do not involve loss of consciousness and typically consist of RUE shaking.   Currently, patient has no complaints and states she is "hungry." ROS limited secondary to patient's underlying chronic medical conditions. Neurologic exam non-focal. UA remarkable for UTI. Vitals demonstrate low grade temp (99.9F).

## 2018-11-20 NOTE — PROGRESS NOTE ADULT - REASON FOR ADMISSION
Transferred from NH for Seizure activity

## 2018-11-20 NOTE — CONSULT NOTE ADULT - PROBLEM SELECTOR RECOMMENDATION 9
Urine culture sent on 11/14 from nursing home positive for ESBL EColi. Subsequent cultures sent have been negative.   - Patient was treated with Ceftriaxone after cultures were sent.  - S/p one dose meropenem  - Patient asymptomatic  - Recommend discontinuing antibiotics as recent cultures x 2 have been negative.

## 2018-11-20 NOTE — CONSULT NOTE ADULT - SUBJECTIVE AND OBJECTIVE BOX
HPI:  88F hx of Dementia, Seizure Disorder, CVA,  Shunt 2/2 Cystic Occipital Left Sided Lesion, GIB, Syncope s/p PPM, presents to [facility] ED from NH for witnessed seizure like activity. History limited as patient is a poor historian and is unable to recount events. As per prior NH paperwork it appears that after the "seizure" the the patient was alert, verbally responsive and able to make her needs known. She was given supplemental oxygen to move her from SpO2 86% to 96%. Keppra 1500 mg was given.     Patient seen at bedside. When asked questions she continues to just repeat in a very soft spoken voice "i'm thirsty i've had nothing to eat" and appear sad. When asking her about what happened to day she is unable to recount the events. Her sister at bedside was called at home and was told that she had "seizure" like activity and that they were sending her to the ER. (17 Nov 2018 13:48)        " Urine c/s that was sent n 11/14 overnight- Urine c/s- ESBL e coli- Pt was treated with IV ceftriaxone x 2 doses- Urine cx from 11/17- No growth for x 24 hrs   Pt received a dose of IV Meropenem Over night - House ID consulted for further management –"       PAST MEDICAL & SURGICAL HISTORY:  Glaucoma  Carotid artery plaque, bilateral: mild  History of osteoarthritis  Seizure disorder: Medically managed. No recent seizure activity  Dementia: medically managed  AICD (automatic cardioverter/defibrillator) present: Implanted &#x27; 2010  Nonischemic cardiomyopathy  Uterine prolapse  Ureteral obstruction, right: 6/2018   insertion Right Ureteral Stent  UTI (urinary tract infection): dx: 10-26-18   Treated  History of CVA (cerebrovascular accident): No residual  GI bleed  Pulmonary hypertension  CHF (congestive heart failure): history of: No recent hospitalizations  HTN (hypertension)  Seizure  Hydronephrosis; Right kidney  Afib: paroxysmal  Hypercholesteremia  Status post cystoscopy: &#x27; 2011 and 6/2018  S/P  shunt  Ureteral Obstruction; right kidney; stent insertion 12/10  S/P Brain Surgery; for "brain cyst   few years ago"      Allergies    No Known Allergies    Intolerances      ANTIMICROBIALS:      OTHER MEDS:  amLODIPine   Tablet 5 milliGRAM(s) Oral daily  aspirin enteric coated 81 milliGRAM(s) Oral daily  atorvastatin 40 milliGRAM(s) Oral at bedtime  calcium carbonate 1250 mG  + Vitamin D (OsCal 500 + D) 1 Tablet(s) Oral two times a day  carvedilol 12.5 milliGRAM(s) Oral every 12 hours  cholecalciferol 1000 Unit(s) Oral daily  docusate sodium 100 milliGRAM(s) Oral two times a day  donepezil 5 milliGRAM(s) Oral at bedtime  furosemide    Tablet 20 milliGRAM(s) Oral daily  heparin  Injectable 5000 Unit(s) SubCutaneous every 12 hours  influenza   Vaccine 0.5 milliLiter(s) IntraMuscular once  lamoTRIgine 75 milliGRAM(s) Oral two times a day  latanoprost 0.005% Ophthalmic Solution 1 Drop(s) Both EYES at bedtime  levETIRAcetam 1000 milliGRAM(s) Oral two times a day  losartan 50 milliGRAM(s) Oral daily  multivitamin 1 Tablet(s) Oral daily  polyethylene glycol 3350 17 Gram(s) Oral daily  senna 2 Tablet(s) Oral at bedtime  timolol 0.5% Solution 1 Drop(s) Both EYES daily      SOCIAL HISTORY:    Marital Status:    Occupation:   Lives with:     Substance Use (street drugs):   Tobacco Usage:    Alcohol Usage: Social EtOH    FAMILY HISTORY:  No pertinent family history in first degree relatives  No pertinent family history in first degree relatives      ROS:  Unobtainable because:   All other systems negative     Constitutional: no fever, no chills, no weight loss, no night sweats  Eye: no eye pain, no redness, no vision changes  ENT:  no sore throat, no rhinorrhea  Cardiovascular:  no chest pain, no palpitation  Respiratory:  no SOB, no cough  GI:  no abd pain, no vomiting, no diarrhea  urinary: no dysuria, no hematuria, no flank pain  : no  discharge or bleeding  musculoskeletal:  no joint pain, no joint swelling  skin:  no rash  neurology:  no headache, no seizure, no change in mental status  psych: no anxiety, no depression     Physical Exam:    General:    NAD, non toxic  Head: atraumatic, normocephalic  Eyes: normal sclera and conjunctiva  ENT:   no oropharyngeal lesions, no LAD, neck supple  Cardio:    regular S1,S2, no murmur  Respiratory:   clear b/l, no wheezing  abd:   soft, BS +, not tender, no hepatosplenomegaly  :     no CVAT, no suprapubic tenderness, no hua  Musculoskeletal : no joint swelling, no edema  Skin:    no rash  vascular: no lines, normal pulses  Neurologic:     no focal deficits  psych: normal affect, no suicidal ideation      Drug Dosing Weight  Height (cm): 160.02 (16 Nov 2018 08:28)  Weight (kg): 50.8 (16 Nov 2018 08:28)  BMI (kg/m2): 19.8 (16 Nov 2018 08:28)  BSA (m2): 1.51 (16 Nov 2018 08:28)    Vital Signs Last 24 Hrs  T(F): 98.1 (11-20-18 @ 05:19), Max: 99.9 (11-17-18 @ 11:00)    Vital Signs Last 24 Hrs  HR: 66 (11-20-18 @ 05:19) (65 - 68)  BP: 158/87 (11-20-18 @ 05:19) (142/78 - 158/87)  RR: 18 (11-20-18 @ 05:19)  SpO2: 97% (11-20-18 @ 05:19) (97% - 100%)  Wt(kg): --        11-19    142  |  103  |  20  ----------------------------<  154<H>  3.5   |  28  |  0.86    Ca    9.9      19 Nov 2018 10:20  Phos  2.8     11-19  Mg     2.0     11-19          MICROBIOLOGY:  v  URINE MIDSTREAM  11-17-18 --  --  --      .Urine Bladder (from O.R.)  11-17-18   No growth at 48 hours  --  -- HPI:  88F hx of Dementia, Seizure Disorder, CVA,  Shunt 2/2 Cystic Occipital Left Sided Lesion, GIB, Syncope s/p PPM, presents to [facility] ED from NH for witnessed seizure like activity. History limited as patient is a poor historian and is unable to recount events. As per prior NH paperwork it appears that after the "seizure" the the patient was alert, verbally responsive and able to make her needs known. She was given supplemental oxygen to move her from SpO2 86% to 96%. Keppra 1500 mg was given.     Patient seen at bedside. When asked questions she continues to just repeat in a very soft spoken voice "i'm thirsty i've had nothing to eat" and appear sad. When asking her about what happened to day she is unable to recount the events. Her sister at bedside was called at home and was told that she had "seizure" like activity and that they were sending her to the ER. (17 Nov 2018 13:48)        " Urine c/s that was sent n 11/14 overnight- Urine c/s- ESBL e coli- Pt was treated with IV ceftriaxone x 2 doses- Urine cx from 11/17- No growth for x 24 hrs   Pt received a dose of IV Meropenem Over night - House ID consulted for further management –"       PAST MEDICAL & SURGICAL HISTORY:  Glaucoma  Carotid artery plaque, bilateral: mild  History of osteoarthritis  Seizure disorder: Medically managed. No recent seizure activity  Dementia: medically managed  AICD (automatic cardioverter/defibrillator) present: Implanted &#x27; 2010  Nonischemic cardiomyopathy  Uterine prolapse  Ureteral obstruction, right: 6/2018   insertion Right Ureteral Stent  UTI (urinary tract infection): dx: 10-26-18   Treated  History of CVA (cerebrovascular accident): No residual  GI bleed  Pulmonary hypertension  CHF (congestive heart failure): history of: No recent hospitalizations  HTN (hypertension)  Seizure  Hydronephrosis; Right kidney  Afib: paroxysmal  Hypercholesteremia  Status post cystoscopy: &#x27; 2011 and 6/2018  S/P  shunt  Ureteral Obstruction; right kidney; stent insertion 12/10  S/P Brain Surgery; for "brain cyst   few years ago"      Allergies    No Known Allergies    Intolerances      ANTIMICROBIALS:      OTHER MEDS:  amLODIPine   Tablet 5 milliGRAM(s) Oral daily  aspirin enteric coated 81 milliGRAM(s) Oral daily  atorvastatin 40 milliGRAM(s) Oral at bedtime  calcium carbonate 1250 mG  + Vitamin D (OsCal 500 + D) 1 Tablet(s) Oral two times a day  carvedilol 12.5 milliGRAM(s) Oral every 12 hours  cholecalciferol 1000 Unit(s) Oral daily  docusate sodium 100 milliGRAM(s) Oral two times a day  donepezil 5 milliGRAM(s) Oral at bedtime  furosemide    Tablet 20 milliGRAM(s) Oral daily  heparin  Injectable 5000 Unit(s) SubCutaneous every 12 hours  influenza   Vaccine 0.5 milliLiter(s) IntraMuscular once  lamoTRIgine 75 milliGRAM(s) Oral two times a day  latanoprost 0.005% Ophthalmic Solution 1 Drop(s) Both EYES at bedtime  levETIRAcetam 1000 milliGRAM(s) Oral two times a day  losartan 50 milliGRAM(s) Oral daily  multivitamin 1 Tablet(s) Oral daily  polyethylene glycol 3350 17 Gram(s) Oral daily  senna 2 Tablet(s) Oral at bedtime  timolol 0.5% Solution 1 Drop(s) Both EYES daily      SOCIAL HISTORY:    Marital Status:    Occupation:   Lives with:     Substance Use (street drugs):   Tobacco Usage:    Alcohol Usage: Social EtOH    FAMILY HISTORY:  No pertinent family history in first degree relatives  No pertinent family history in first degree relatives      ROS:  Unobtainable because:   All other systems negative     Constitutional: no fever, no chills, no weight loss, no night sweats  Eye: no eye pain, no redness, no vision changes  ENT:  no sore throat, no rhinorrhea  Cardiovascular:  no chest pain, no palpitation  Respiratory:  no SOB, no cough  GI:  no abd pain, no vomiting, no diarrhea  urinary: no dysuria, no hematuria, no flank pain  : no  discharge or bleeding, no complaints of dysuria, hematuria, burning on urination  musculoskeletal:  no joint pain, no joint swelling  skin:  no rash  neurology:  no headache, no seizure, no change in mental status  psych: no anxiety, no depression     Physical Exam:    General:    NAD, non toxic  Head: atraumatic, normocephalic  Eyes: normal sclera and conjunctiva  ENT:   no oropharyngeal lesions, no LAD, neck supple  Cardio:    regular S1,S2, no murmur  Respiratory:   clear b/l, no wheezing  abd:   soft, BS +, not tender, no hepatosplenomegaly  :     no CVAT, no suprapubic tenderness, no hua, no CVA tenderness  Musculoskeletal : no joint swelling, no edema  Skin:    no rash  vascular: no lines, normal pulses  Neurologic:     no focal deficits      Drug Dosing Weight  Height (cm): 160.02 (16 Nov 2018 08:28)  Weight (kg): 50.8 (16 Nov 2018 08:28)  BMI (kg/m2): 19.8 (16 Nov 2018 08:28)  BSA (m2): 1.51 (16 Nov 2018 08:28)    Vital Signs Last 24 Hrs  T(F): 98.1 (11-20-18 @ 05:19), Max: 99.9 (11-17-18 @ 11:00)    Vital Signs Last 24 Hrs  HR: 66 (11-20-18 @ 05:19) (65 - 68)  BP: 158/87 (11-20-18 @ 05:19) (142/78 - 158/87)  RR: 18 (11-20-18 @ 05:19)  SpO2: 97% (11-20-18 @ 05:19) (97% - 100%)  Wt(kg): --      11-19    142  |  103  |  20  ----------------------------<  154<H>  3.5   |  28  |  0.86    Ca    9.9      19 Nov 2018 10:20  Phos  2.8     11-19  Mg     2.0     11-19        MICROBIOLOGY:  v  URINE MIDSTREAM  11-17-18 --  --  --      .Urine Bladder (from O.R.)  11-17-18   No growth at 48 hours  --  -- HPI:  88F hx of Dementia, Seizure Disorder, CVA,  Shunt 2/2 Cystic Occipital Left Sided Lesion, GIB, Syncope s/p PPM, presents to [facility] ED from NH for witnessed seizure like activity. History limited as patient is a poor historian and is unable to recount events. As per prior NH paperwork it appears that after the "seizure" the the patient was alert, verbally responsive and able to make her needs known. She was given supplemental oxygen to move her from SpO2 86% to 96%. Keppra 1500 mg was given.     Patient seen at bedside. When asked questions she continues to just repeat in a very soft spoken voice "i'm thirsty i've had nothing to eat" and appear sad. When asking her about what happened to day she is unable to recount the events. Her sister at bedside was called at home and was told that she had "seizure" like activity and that they were sending her to the ER. (17 Nov 2018 13:48)        " Urine c/s that was sent n 11/14 overnight- Urine c/s- ESBL e coli- Pt was treated with IV ceftriaxone x 2 doses- Urine cx from 11/17- No growth for x 24 hrs   Pt received a dose of IV Meropenem Over night - House ID consulted for further management –"       PAST MEDICAL & SURGICAL HISTORY:  Glaucoma  Carotid artery plaque, bilateral: mild  History of osteoarthritis  Seizure disorder: Medically managed. No recent seizure activity  Dementia: medically managed  AICD (automatic cardioverter/defibrillator) present: Implanted &#x27; 2010  Nonischemic cardiomyopathy  Uterine prolapse  Ureteral obstruction, right: 6/2018   insertion Right Ureteral Stent  UTI (urinary tract infection): dx: 10-26-18   Treated  History of CVA (cerebrovascular accident): No residual  GI bleed  Pulmonary hypertension  CHF (congestive heart failure): history of: No recent hospitalizations  HTN (hypertension)  Seizure  Hydronephrosis; Right kidney  Afib: paroxysmal  Hypercholesteremia  Status post cystoscopy: &#x27; 2011 and 6/2018  S/P  shunt  Ureteral Obstruction; right kidney; stent insertion 12/10  S/P Brain Surgery; for "brain cyst   few years ago"      Allergies    No Known Allergies    Intolerances      ANTIMICROBIALS:      OTHER MEDS:  amLODIPine   Tablet 5 milliGRAM(s) Oral daily  aspirin enteric coated 81 milliGRAM(s) Oral daily  atorvastatin 40 milliGRAM(s) Oral at bedtime  calcium carbonate 1250 mG  + Vitamin D (OsCal 500 + D) 1 Tablet(s) Oral two times a day  carvedilol 12.5 milliGRAM(s) Oral every 12 hours  cholecalciferol 1000 Unit(s) Oral daily  docusate sodium 100 milliGRAM(s) Oral two times a day  donepezil 5 milliGRAM(s) Oral at bedtime  furosemide    Tablet 20 milliGRAM(s) Oral daily  heparin  Injectable 5000 Unit(s) SubCutaneous every 12 hours  influenza   Vaccine 0.5 milliLiter(s) IntraMuscular once  lamoTRIgine 75 milliGRAM(s) Oral two times a day  latanoprost 0.005% Ophthalmic Solution 1 Drop(s) Both EYES at bedtime  levETIRAcetam 1000 milliGRAM(s) Oral two times a day  losartan 50 milliGRAM(s) Oral daily  multivitamin 1 Tablet(s) Oral daily  polyethylene glycol 3350 17 Gram(s) Oral daily  senna 2 Tablet(s) Oral at bedtime  timolol 0.5% Solution 1 Drop(s) Both EYES daily      SOCIAL HISTORY:    pt lives at nursing home, no smoking, alcohol or drug abuse    FAMILY HISTORY:    reviewed, No pertinent family history in first degree relatives      ROS:    All other systems negative     Constitutional: no fever, no chills, no weight loss, no night sweats  Eye: no eye pain, no redness, no vision changes  ENT:  no sore throat, no rhinorrhea  Cardiovascular:  no chest pain, no palpitation  Respiratory:  no SOB, no cough  GI:  no abd pain, no vomiting, no diarrhea  urinary: no dysuria, no hematuria, no flank pain  : no  discharge or bleeding, no complaints of dysuria, hematuria, burning on urination  musculoskeletal:  no joint pain, no joint swelling  skin:  no rash  neurology:  no headache, no seizure, no change in mental status  psych: no anxiety, no depression     Physical Exam:    General:    NAD, non toxic  Head: atraumatic, normocephalic  Eyes: normal sclera and conjunctiva  ENT:   no oropharyngeal lesions, no LAD, neck supple  Cardio:    regular S1,S2, no murmur  Respiratory:   clear b/l, no wheezing  abd:   soft, BS +, not tender, no hepatosplenomegaly  :     no CVAT, no suprapubic tenderness, no hua, no CVA tenderness  Musculoskeletal : no joint swelling, no edema  Skin:    no rash  vascular: no lines, normal pulses  Neurologic:     no focal deficits      Drug Dosing Weight  Height (cm): 160.02 (16 Nov 2018 08:28)  Weight (kg): 50.8 (16 Nov 2018 08:28)  BMI (kg/m2): 19.8 (16 Nov 2018 08:28)  BSA (m2): 1.51 (16 Nov 2018 08:28)    Vital Signs Last 24 Hrs  T(F): 98.1 (11-20-18 @ 05:19), Max: 99.9 (11-17-18 @ 11:00)    Vital Signs Last 24 Hrs  HR: 66 (11-20-18 @ 05:19) (65 - 68)  BP: 158/87 (11-20-18 @ 05:19) (142/78 - 158/87)  RR: 18 (11-20-18 @ 05:19)  SpO2: 97% (11-20-18 @ 05:19) (97% - 100%)  Wt(kg): --      11-19    142  |  103  |  20  ----------------------------<  154<H>  3.5   |  28  |  0.86    Ca    9.9      19 Nov 2018 10:20  Phos  2.8     11-19  Mg     2.0     11-19        MICROBIOLOGY:  v  URINE MIDSTREAM  11-17-18 --  --  --      .Urine Bladder (from O.R.)  11-17-18   No growth at 48 hours  --  --

## 2018-11-20 NOTE — PROGRESS NOTE ADULT - ATTENDING COMMENTS
87 y/o fm w/h/o dementia, CVA, CHF, HLD, syncope s/p pacemaker, recurrent UTI,  shunt for cystic occipital left sided lesion, and epilepsy (pt of Dr. Mcghee) who was sent to the ED from [facility] for focal motor status epilepticus with facial twitching and upper extremity jerking. Urine culture sent on 11/14 from nursing home + for ESBL E. Coli. CTH 11/17 stable w/ L parietal and occipital gliosis. Home AEDs: LEV 1gm BID, LTG XR 100mg daily.     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    Recommendation:  - cont LEV 1gm BID, LTG 75mg BID   - discharge with abortive AED: CZP ODT 0.5mg 1-2 tab prn sz >5m  - f/u Dr. Mcghee

## 2018-11-20 NOTE — CONSULT NOTE ADULT - ASSESSMENT
88F hx of Dementia, Seizure Disorder, CVA,  Shunt 2/2 Cystic Occipital Left Sided Lesion, GIB, Syncope s/p PPM being admitted to assess seizure activity, Urine culture sent on 11/14 from nursing home + for ESBL E. Coli 88F hx of Dementia, Seizure Disorder, CVA,  Shunt 2/2 Cystic Occipital Left Sided Lesion, GIB, Syncope s/p PPM being admitted to assess seizure activity, Urine culture sent on 11/14 from nursing home positive for ESBL E. Coli. 88 F with Dementia, Seizure Disorder, CVA,  Shunt 2/2 Cystic Occipital Left Sided Lesion, GIB, Syncope s/p PPM being admitted to assess seizure activity, Urine culture sent on 11/14 from nursing home positive for ESBL E. Coli.   Here pt afebrile, no WBC, urine cx negative before antibiotics  pt was given 3 days of ceftriaxone and then one dose of meropenem  EEG no active seizure    positive urine cx in nursing home, here negative before antibiotics and pt asymptomatic, no fever no WBC    * will hold off on further antibiotics  * if symptoms or fever, check urine and blood cxs and then start ertapenem 1 g q d

## 2018-11-24 NOTE — DIETITIAN INITIAL EVALUATION ADULT. - EST PROTEIN NEEDS6
To whom it may concern,   Eagle is allowed to have a normal diet and any food restrictions are at the discretion of the parents and Eagle himself. No special food allergy precautions need to be taken. If any questions, please contact my office at [phone].  Thank you,              Hardik Dow M.D.  Pediatric [place] [address]    Recorded as Task  Date: 06/26/2017 11:40 AM, Created By: Genoveva Escobar  Task Name: 4. Patient Message  Assigned To: HARDIK DOW  Regarding Patient: EAGLE PATTERSON, Status: In Progress  Comment:   Genoveva Escobar - 26 Jun 2017 11:40 AM    Patient Message to Provider  \"REASON FOR CALL: patients mother called in stating that due to the patients allergies she needs a letter stating that the patient is able to have a regular diet during his baseball tournament. Please call the mom and advise.    CALLER'S RELATIONSHIP TO PATIENT: Mother  IF OTHER, NAME AND RELATIONSHIP: Roberta SEVILLA NUMBER TO BE CONTACTED: [phone]/ Cell  ALTERNATIVE PHONE NUMBER: [phone]/ Work    Turnaround time given to caller:  \"\"THIS MESSAGE WILL BE SENT TO YOUR PROVIDER, THE CLINICAL TEAM WILL RETURN YOUR CALL AS SOON AS THEY HAVE REVIEWED YOUR MESSAGE\"\"    READ BACK MESSAGE TO PATIENT\"  Jessica Lowe - 26 Jun 2017 11:46 AM    TASK IN PROGRESS  Jessica Lowe - 26 Jun 2017 12:01 PM    TASK EDITED  Patient needed a sports physical before going to special baseball tournament in [place], [place]. PMD could not fit patient in so [facility]. With food allergies written in chart, it was noted in paperwork and patient needed a special diet. Patient doesn't need anything special, his father will be with him and also he is aware of allergies. Will write note that patient does not need any special diet, will make choices based on parent's discretion.      Electronically signed by:Jessica Lowe R.N.  Jun 26 2017 12:01PM CST  67.2

## 2018-12-10 ENCOUNTER — APPOINTMENT (OUTPATIENT)
Dept: UROLOGY | Facility: CLINIC | Age: 83
End: 2018-12-10
Payer: MEDICARE

## 2018-12-10 PROCEDURE — 99213 OFFICE O/P EST LOW 20 MIN: CPT

## 2019-01-01 ENCOUNTER — APPOINTMENT (OUTPATIENT)
Dept: NEUROLOGY | Facility: CLINIC | Age: 84
End: 2019-01-01

## 2019-01-01 ENCOUNTER — OUTPATIENT (OUTPATIENT)
Dept: OUTPATIENT SERVICES | Facility: HOSPITAL | Age: 84
LOS: 1 days | End: 2019-01-01
Payer: MEDICARE

## 2019-01-01 ENCOUNTER — APPOINTMENT (OUTPATIENT)
Dept: OBGYN | Facility: HOSPITAL | Age: 84
End: 2019-01-01
Payer: MEDICARE

## 2019-01-01 ENCOUNTER — APPOINTMENT (OUTPATIENT)
Dept: UROLOGY | Facility: CLINIC | Age: 84
End: 2019-01-01
Payer: MEDICARE

## 2019-01-01 ENCOUNTER — APPOINTMENT (OUTPATIENT)
Dept: CARDIOLOGY | Facility: CLINIC | Age: 84
End: 2019-01-01

## 2019-01-01 ENCOUNTER — APPOINTMENT (OUTPATIENT)
Dept: NEUROLOGY | Facility: CLINIC | Age: 84
End: 2019-01-01
Payer: MEDICARE

## 2019-01-01 ENCOUNTER — TRANSCRIPTION ENCOUNTER (OUTPATIENT)
Age: 84
End: 2019-01-01

## 2019-01-01 ENCOUNTER — APPOINTMENT (OUTPATIENT)
Dept: ELECTROPHYSIOLOGY | Facility: CLINIC | Age: 84
End: 2019-01-01
Payer: MEDICARE

## 2019-01-01 ENCOUNTER — APPOINTMENT (OUTPATIENT)
Dept: NEUROSURGERY | Facility: CLINIC | Age: 84
End: 2019-01-01
Payer: MEDICARE

## 2019-01-01 ENCOUNTER — APPOINTMENT (OUTPATIENT)
Dept: UROLOGY | Facility: CLINIC | Age: 84
End: 2019-01-01

## 2019-01-01 ENCOUNTER — OUTPATIENT (OUTPATIENT)
Dept: OUTPATIENT SERVICES | Facility: HOSPITAL | Age: 84
LOS: 1 days | End: 2019-01-01

## 2019-01-01 ENCOUNTER — APPOINTMENT (OUTPATIENT)
Dept: UROLOGY | Facility: HOSPITAL | Age: 84
End: 2019-01-01

## 2019-01-01 ENCOUNTER — APPOINTMENT (OUTPATIENT)
Dept: OBGYN | Facility: HOSPITAL | Age: 84
End: 2019-01-01

## 2019-01-01 VITALS
OXYGEN SATURATION: 99 % | DIASTOLIC BLOOD PRESSURE: 73 MMHG | TEMPERATURE: 98 F | SYSTOLIC BLOOD PRESSURE: 165 MMHG | HEART RATE: 61 BPM | RESPIRATION RATE: 18 BRPM

## 2019-01-01 VITALS
OXYGEN SATURATION: 95 % | RESPIRATION RATE: 18 BRPM | DIASTOLIC BLOOD PRESSURE: 84 MMHG | HEART RATE: 70 BPM | TEMPERATURE: 98 F | SYSTOLIC BLOOD PRESSURE: 184 MMHG

## 2019-01-01 VITALS
BODY MASS INDEX: 19.99 KG/M2 | HEART RATE: 75 BPM | WEIGHT: 120 LBS | DIASTOLIC BLOOD PRESSURE: 88 MMHG | HEIGHT: 65 IN | SYSTOLIC BLOOD PRESSURE: 160 MMHG

## 2019-01-01 VITALS
SYSTOLIC BLOOD PRESSURE: 155 MMHG | WEIGHT: 120 LBS | HEIGHT: 65 IN | HEART RATE: 74 BPM | DIASTOLIC BLOOD PRESSURE: 80 MMHG | BODY MASS INDEX: 19.99 KG/M2

## 2019-01-01 VITALS
DIASTOLIC BLOOD PRESSURE: 77 MMHG | HEART RATE: 66 BPM | HEIGHT: 64 IN | WEIGHT: 121.92 LBS | OXYGEN SATURATION: 96 % | RESPIRATION RATE: 14 BRPM | TEMPERATURE: 96 F | SYSTOLIC BLOOD PRESSURE: 150 MMHG

## 2019-01-01 VITALS
TEMPERATURE: 98 F | DIASTOLIC BLOOD PRESSURE: 99 MMHG | OXYGEN SATURATION: 97 % | RESPIRATION RATE: 16 BRPM | HEIGHT: 64 IN | HEART RATE: 63 BPM | SYSTOLIC BLOOD PRESSURE: 186 MMHG

## 2019-01-01 VITALS
SYSTOLIC BLOOD PRESSURE: 133 MMHG | HEART RATE: 76 BPM | BODY MASS INDEX: 20.83 KG/M2 | HEIGHT: 65 IN | DIASTOLIC BLOOD PRESSURE: 74 MMHG | WEIGHT: 125 LBS

## 2019-01-01 VITALS
WEIGHT: 121.92 LBS | OXYGEN SATURATION: 98 % | HEART RATE: 68 BPM | TEMPERATURE: 98 F | DIASTOLIC BLOOD PRESSURE: 96 MMHG | RESPIRATION RATE: 18 BRPM | SYSTOLIC BLOOD PRESSURE: 166 MMHG | HEIGHT: 64 IN

## 2019-01-01 VITALS
SYSTOLIC BLOOD PRESSURE: 150 MMHG | BODY MASS INDEX: 20.66 KG/M2 | HEART RATE: 70 BPM | DIASTOLIC BLOOD PRESSURE: 75 MMHG | HEIGHT: 65 IN | WEIGHT: 124 LBS

## 2019-01-01 VITALS
SYSTOLIC BLOOD PRESSURE: 146 MMHG | WEIGHT: 124 LBS | BODY MASS INDEX: 20.66 KG/M2 | HEIGHT: 65 IN | HEART RATE: 83 BPM | DIASTOLIC BLOOD PRESSURE: 94 MMHG

## 2019-01-01 DIAGNOSIS — Z95.810 PRESENCE OF AUTOMATIC (IMPLANTABLE) CARDIAC DEFIBRILLATOR: Chronic | ICD-10-CM

## 2019-01-01 DIAGNOSIS — Z98.2 PRESENCE OF CEREBROSPINAL FLUID DRAINAGE DEVICE: Chronic | ICD-10-CM

## 2019-01-01 DIAGNOSIS — Z46.89 ENCOUNTER FOR FITTING AND ADJUSTMENT OF OTHER SPECIFIED DEVICES: ICD-10-CM

## 2019-01-01 DIAGNOSIS — N13.30 UNSPECIFIED HYDRONEPHROSIS: ICD-10-CM

## 2019-01-01 DIAGNOSIS — Z01.419 ENCOUNTER FOR GYNECOLOGICAL EXAMINATION (GENERAL) (ROUTINE) WITHOUT ABNORMAL FINDINGS: ICD-10-CM

## 2019-01-01 DIAGNOSIS — Z95.810 PRESENCE OF AUTOMATIC (IMPLANTABLE) CARDIAC DEFIBRILLATOR: ICD-10-CM

## 2019-01-01 DIAGNOSIS — Z98.890 OTHER SPECIFIED POSTPROCEDURAL STATES: Chronic | ICD-10-CM

## 2019-01-01 DIAGNOSIS — Z46.6 ENCOUNTER FOR FITTING AND ADJUSTMENT OF URINARY DEVICE: Chronic | ICD-10-CM

## 2019-01-01 DIAGNOSIS — Z01.818 ENCOUNTER FOR OTHER PREPROCEDURAL EXAMINATION: ICD-10-CM

## 2019-01-01 DIAGNOSIS — I50.9 HEART FAILURE, UNSPECIFIED: ICD-10-CM

## 2019-01-01 DIAGNOSIS — N81.4 UTEROVAGINAL PROLAPSE, UNSPECIFIED: ICD-10-CM

## 2019-01-01 DIAGNOSIS — I42.9 CARDIOMYOPATHY, UNSPECIFIED: ICD-10-CM

## 2019-01-01 DIAGNOSIS — R33.9 RETENTION OF URINE, UNSPECIFIED: ICD-10-CM

## 2019-01-01 LAB
ANION GAP SERPL CALC-SCNC: 13 MMOL/L — SIGNIFICANT CHANGE UP (ref 5–17)
BUN SERPL-MCNC: 21 MG/DL — SIGNIFICANT CHANGE UP (ref 7–23)
CALCIUM SERPL-MCNC: 9.8 MG/DL — SIGNIFICANT CHANGE UP (ref 8.4–10.5)
CHLORIDE SERPL-SCNC: 101 MMOL/L — SIGNIFICANT CHANGE UP (ref 96–108)
CO2 SERPL-SCNC: 28 MMOL/L — SIGNIFICANT CHANGE UP (ref 22–31)
CREAT SERPL-MCNC: 0.84 MG/DL — SIGNIFICANT CHANGE UP (ref 0.5–1.3)
CULTURE RESULTS: SIGNIFICANT CHANGE UP
GLUCOSE SERPL-MCNC: 111 MG/DL — HIGH (ref 70–99)
HCT VFR BLD CALC: 37.4 % — SIGNIFICANT CHANGE UP (ref 34.5–45)
HGB BLD-MCNC: 11.6 G/DL — SIGNIFICANT CHANGE UP (ref 11.5–15.5)
MCHC RBC-ENTMCNC: 31 GM/DL — LOW (ref 32–36)
MCHC RBC-ENTMCNC: 31.4 PG — SIGNIFICANT CHANGE UP (ref 27–34)
MCV RBC AUTO: 101.4 FL — HIGH (ref 80–100)
PLATELET # BLD AUTO: 271 K/UL — SIGNIFICANT CHANGE UP (ref 150–400)
POTASSIUM SERPL-MCNC: 3.3 MMOL/L — LOW (ref 3.5–5.3)
POTASSIUM SERPL-SCNC: 3.3 MMOL/L — LOW (ref 3.5–5.3)
RBC # BLD: 3.69 M/UL — LOW (ref 3.8–5.2)
RBC # FLD: 13.1 % — SIGNIFICANT CHANGE UP (ref 10.3–14.5)
SODIUM SERPL-SCNC: 142 MMOL/L — SIGNIFICANT CHANGE UP (ref 135–145)
SPECIMEN SOURCE: SIGNIFICANT CHANGE UP
WBC # BLD: 5.58 K/UL — SIGNIFICANT CHANGE UP (ref 3.8–10.5)
WBC # FLD AUTO: 5.58 K/UL — SIGNIFICANT CHANGE UP (ref 3.8–10.5)

## 2019-01-01 PROCEDURE — 52332 CYSTOSCOPY AND TREATMENT: CPT | Mod: RT

## 2019-01-01 PROCEDURE — 80048 BASIC METABOLIC PNL TOTAL CA: CPT

## 2019-01-01 PROCEDURE — 99397 PER PM REEVAL EST PAT 65+ YR: CPT | Mod: GC

## 2019-01-01 PROCEDURE — 87086 URINE CULTURE/COLONY COUNT: CPT

## 2019-01-01 PROCEDURE — G0463: CPT

## 2019-01-01 PROCEDURE — 74420 UROGRAPHY RTRGR +-KUB: CPT | Mod: 26

## 2019-01-01 PROCEDURE — 76000 FLUOROSCOPY <1 HR PHYS/QHP: CPT

## 2019-01-01 PROCEDURE — C1889: CPT

## 2019-01-01 PROCEDURE — C2625: CPT

## 2019-01-01 PROCEDURE — 93295 DEV INTERROG REMOTE 1/2/MLT: CPT

## 2019-01-01 PROCEDURE — 93296 REM INTERROG EVL PM/IDS: CPT

## 2019-01-01 PROCEDURE — 85027 COMPLETE CBC AUTOMATED: CPT

## 2019-01-01 PROCEDURE — C1758: CPT

## 2019-01-01 PROCEDURE — 99214 OFFICE O/P EST MOD 30 MIN: CPT

## 2019-01-01 PROCEDURE — C1769: CPT

## 2019-01-01 PROCEDURE — 99213 OFFICE O/P EST LOW 20 MIN: CPT | Mod: GE

## 2019-01-01 PROCEDURE — 93284 PRGRMG EVAL IMPLANTABLE DFB: CPT

## 2019-01-01 PROCEDURE — 99212 OFFICE O/P EST SF 10 MIN: CPT

## 2019-01-01 PROCEDURE — 99213 OFFICE O/P EST LOW 20 MIN: CPT

## 2019-01-01 RX ORDER — VITAMIN E MIXED 1000 UNIT
500-200 CAPSULE ORAL
Refills: 0 | Status: DISCONTINUED | COMMUNITY
End: 2019-01-01

## 2019-01-01 RX ORDER — DONEPEZIL HYDROCHLORIDE 10 MG/1
10 TABLET, FILM COATED ORAL DAILY
Refills: 0 | Status: ACTIVE | COMMUNITY

## 2019-01-01 RX ORDER — CYCLOSPORINE 0.5 MG/ML
1 EMULSION OPHTHALMIC
Qty: 0 | Refills: 0 | DISCHARGE

## 2019-01-01 RX ORDER — LIDOCAINE HCL 20 MG/ML
0.2 VIAL (ML) INJECTION ONCE
Refills: 0 | Status: DISCONTINUED | OUTPATIENT
Start: 2019-01-01 | End: 2019-01-01

## 2019-01-01 RX ORDER — ERGOCALCIFEROL 1.25 MG/1
1 CAPSULE ORAL
Qty: 0 | Refills: 0 | DISCHARGE

## 2019-01-01 RX ORDER — SODIUM CHLORIDE 9 MG/ML
3 INJECTION INTRAMUSCULAR; INTRAVENOUS; SUBCUTANEOUS EVERY 8 HOURS
Refills: 0 | Status: DISCONTINUED | OUTPATIENT
Start: 2019-01-01 | End: 2019-01-01

## 2019-01-01 RX ORDER — MOXIFLOXACIN HYDROCHLORIDE TABLETS, 400 MG 400 MG/1
1 TABLET, FILM COATED ORAL
Qty: 6 | Refills: 0
Start: 2019-01-01 | End: 2019-01-01

## 2019-01-01 RX ORDER — DOCUSATE SODIUM 100 MG/1
100 CAPSULE ORAL 3 TIMES DAILY
Qty: 270 | Refills: 3 | Status: DISCONTINUED | COMMUNITY
Start: 2017-05-12 | End: 2019-01-01

## 2019-01-01 RX ORDER — CEFAZOLIN SODIUM 1 G
2000 VIAL (EA) INJECTION ONCE
Refills: 0 | Status: DISCONTINUED | OUTPATIENT
Start: 2019-01-01 | End: 2019-01-01

## 2019-01-01 RX ORDER — MIRTAZAPINE 45 MG/1
1 TABLET, ORALLY DISINTEGRATING ORAL
Qty: 0 | Refills: 0 | DISCHARGE

## 2019-01-01 RX ORDER — SODIUM CHLORIDE 9 MG/ML
1000 INJECTION, SOLUTION INTRAVENOUS
Refills: 0 | Status: DISCONTINUED | OUTPATIENT
Start: 2019-01-01 | End: 2019-01-01

## 2019-01-01 RX ORDER — CIPROFLOXACIN LACTATE 400MG/40ML
400 VIAL (ML) INTRAVENOUS ONCE
Refills: 0 | Status: DISCONTINUED | OUTPATIENT
Start: 2019-01-01 | End: 2019-01-01

## 2019-01-01 RX ORDER — CLONAZEPAM 1 MG
1 TABLET ORAL
Qty: 0 | Refills: 0 | DISCHARGE

## 2019-01-01 RX ORDER — TIMOLOL 0.5 %
1 DROPS OPHTHALMIC (EYE)
Qty: 0 | Refills: 0 | DISCHARGE

## 2019-01-01 RX ORDER — LIDOCAINE HYDROCHLORIDE 20 MG/ML
2 JELLY TOPICAL
Refills: 0 | Status: DISCONTINUED | COMMUNITY
Start: 2017-05-12 | End: 2019-01-01

## 2019-01-01 RX ORDER — ONDANSETRON 8 MG/1
4 TABLET, FILM COATED ORAL ONCE
Refills: 0 | Status: DISCONTINUED | OUTPATIENT
Start: 2019-01-01 | End: 2019-01-01

## 2019-01-01 RX ORDER — ACETAMINOPHEN 500 MG
2 TABLET ORAL
Qty: 0 | Refills: 0 | DISCHARGE

## 2019-01-01 RX ORDER — MULTIVITAMIN
TABLET ORAL
Refills: 0 | Status: ACTIVE | COMMUNITY

## 2019-01-01 RX ORDER — LEVETIRACETAM 500 MG/1
500 TABLET, FILM COATED ORAL
Refills: 0 | Status: DISCONTINUED | COMMUNITY

## 2019-01-01 RX ORDER — DONEPEZIL HYDROCHLORIDE 10 MG/1
2 TABLET, FILM COATED ORAL
Qty: 0 | Refills: 0 | DISCHARGE

## 2019-01-01 RX ORDER — ACETAMINOPHEN 325 MG/1
325 TABLET, FILM COATED ORAL
Refills: 0 | Status: ACTIVE | COMMUNITY

## 2019-01-01 RX ORDER — MIRTAZAPINE 30 MG/1
30 TABLET, FILM COATED ORAL
Refills: 0 | Status: ACTIVE | COMMUNITY

## 2019-01-01 RX ORDER — LEVETIRACETAM 250 MG/1
2 TABLET, FILM COATED ORAL
Qty: 0 | Refills: 0 | DISCHARGE

## 2019-01-01 RX ORDER — ATORVASTATIN CALCIUM 10 MG/1
10 TABLET, FILM COATED ORAL
Refills: 0 | Status: DISCONTINUED | COMMUNITY

## 2019-01-01 RX ORDER — ENEMA 19; 7 G/133ML; G/133ML
7-19 ENEMA RECTAL
Refills: 0 | Status: DISCONTINUED | COMMUNITY
Start: 2017-05-12 | End: 2019-01-01

## 2019-01-01 RX ORDER — ACETAMINOPHEN 500 MG
1000 TABLET ORAL ONCE
Refills: 0 | Status: DISCONTINUED | OUTPATIENT
Start: 2019-01-01 | End: 2019-01-01

## 2019-01-01 RX ORDER — MULTIVITAMIN
TABLET ORAL
Refills: 0 | Status: DISCONTINUED | COMMUNITY
Start: 2017-05-12 | End: 2019-01-01

## 2019-01-01 RX ORDER — AMOXICILLIN AND CLAVULANATE POTASSIUM 875; 125 MG/1; MG/1
875-125 TABLET, COATED ORAL
Qty: 6 | Refills: 0 | Status: DISCONTINUED | OUTPATIENT
Start: 2019-03-05 | End: 2019-01-01

## 2019-01-01 RX ORDER — TRAVOPROST 0.04 MG/ML
1 SOLUTION/ DROPS OPHTHALMIC
Qty: 0 | Refills: 0 | DISCHARGE

## 2019-01-01 RX ORDER — SULFAMETHOXAZOLE AND TRIMETHOPRIM 800; 160 MG/1; MG/1
800-160 TABLET ORAL TWICE DAILY
Qty: 6 | Refills: 0 | Status: COMPLETED | COMMUNITY
Start: 2018-01-18 | End: 2019-01-01

## 2019-01-14 ENCOUNTER — APPOINTMENT (OUTPATIENT)
Dept: UROLOGY | Facility: CLINIC | Age: 84
End: 2019-01-14

## 2019-01-15 ENCOUNTER — APPOINTMENT (OUTPATIENT)
Dept: OBGYN | Facility: HOSPITAL | Age: 84
End: 2019-01-15
Payer: MEDICARE

## 2019-01-15 ENCOUNTER — OUTPATIENT (OUTPATIENT)
Dept: OUTPATIENT SERVICES | Facility: HOSPITAL | Age: 84
LOS: 1 days | End: 2019-01-15

## 2019-01-15 VITALS
DIASTOLIC BLOOD PRESSURE: 70 MMHG | BODY MASS INDEX: 19.54 KG/M2 | HEART RATE: 71 BPM | HEIGHT: 65 IN | WEIGHT: 117.29 LBS | SYSTOLIC BLOOD PRESSURE: 164 MMHG

## 2019-01-15 DIAGNOSIS — Z98.2 PRESENCE OF CEREBROSPINAL FLUID DRAINAGE DEVICE: Chronic | ICD-10-CM

## 2019-01-15 DIAGNOSIS — Z98.890 OTHER SPECIFIED POSTPROCEDURAL STATES: Chronic | ICD-10-CM

## 2019-01-15 PROCEDURE — 99213 OFFICE O/P EST LOW 20 MIN: CPT | Mod: GC

## 2019-01-16 NOTE — PHYSICAL EXAM
[Normal] : uterus [No Bleeding] : there was no active vaginal bleeding [Uterine Adnexae] : were not tender and not enlarged [de-identified] : vulvar atrophy appropriate for age [FreeTextEntry4] : physiologic discharge present

## 2019-01-17 DIAGNOSIS — N81.4 UTEROVAGINAL PROLAPSE, UNSPECIFIED: ICD-10-CM

## 2019-02-27 ENCOUNTER — APPOINTMENT (OUTPATIENT)
Dept: NEUROLOGY | Facility: CLINIC | Age: 84
End: 2019-02-27

## 2019-02-28 ENCOUNTER — OUTPATIENT (OUTPATIENT)
Dept: OUTPATIENT SERVICES | Facility: HOSPITAL | Age: 84
LOS: 1 days | End: 2019-02-28
Payer: MEDICARE

## 2019-02-28 VITALS
DIASTOLIC BLOOD PRESSURE: 78 MMHG | SYSTOLIC BLOOD PRESSURE: 140 MMHG | RESPIRATION RATE: 16 BRPM | OXYGEN SATURATION: 96 % | WEIGHT: 117.95 LBS | HEART RATE: 75 BPM | HEIGHT: 64 IN | TEMPERATURE: 98 F

## 2019-02-28 DIAGNOSIS — Z01.818 ENCOUNTER FOR OTHER PREPROCEDURAL EXAMINATION: ICD-10-CM

## 2019-02-28 DIAGNOSIS — Z95.810 PRESENCE OF AUTOMATIC (IMPLANTABLE) CARDIAC DEFIBRILLATOR: Chronic | ICD-10-CM

## 2019-02-28 DIAGNOSIS — Z46.6 ENCOUNTER FOR FITTING AND ADJUSTMENT OF URINARY DEVICE: ICD-10-CM

## 2019-02-28 DIAGNOSIS — N13.30 UNSPECIFIED HYDRONEPHROSIS: ICD-10-CM

## 2019-02-28 DIAGNOSIS — Z98.2 PRESENCE OF CEREBROSPINAL FLUID DRAINAGE DEVICE: Chronic | ICD-10-CM

## 2019-02-28 DIAGNOSIS — I10 ESSENTIAL (PRIMARY) HYPERTENSION: ICD-10-CM

## 2019-02-28 DIAGNOSIS — Z46.6 ENCOUNTER FOR FITTING AND ADJUSTMENT OF URINARY DEVICE: Chronic | ICD-10-CM

## 2019-02-28 DIAGNOSIS — Z98.890 OTHER SPECIFIED POSTPROCEDURAL STATES: Chronic | ICD-10-CM

## 2019-02-28 DIAGNOSIS — I42.8 OTHER CARDIOMYOPATHIES: ICD-10-CM

## 2019-02-28 LAB
ANION GAP SERPL CALC-SCNC: 15 MMOL/L — SIGNIFICANT CHANGE UP (ref 5–17)
BUN SERPL-MCNC: 22 MG/DL — SIGNIFICANT CHANGE UP (ref 7–23)
CALCIUM SERPL-MCNC: 9.7 MG/DL — SIGNIFICANT CHANGE UP (ref 8.4–10.5)
CHLORIDE SERPL-SCNC: 99 MMOL/L — SIGNIFICANT CHANGE UP (ref 96–108)
CO2 SERPL-SCNC: 28 MMOL/L — SIGNIFICANT CHANGE UP (ref 22–31)
CREAT SERPL-MCNC: 0.82 MG/DL — SIGNIFICANT CHANGE UP (ref 0.5–1.3)
GLUCOSE SERPL-MCNC: 87 MG/DL — SIGNIFICANT CHANGE UP (ref 70–99)
HCT VFR BLD CALC: 39.2 % — SIGNIFICANT CHANGE UP (ref 34.5–45)
HGB BLD-MCNC: 12.1 G/DL — SIGNIFICANT CHANGE UP (ref 11.5–15.5)
MCHC RBC-ENTMCNC: 30.8 PG — SIGNIFICANT CHANGE UP (ref 27–34)
MCHC RBC-ENTMCNC: 30.9 GM/DL — LOW (ref 32–36)
MCV RBC AUTO: 99.7 FL — SIGNIFICANT CHANGE UP (ref 80–100)
PLATELET # BLD AUTO: 274 K/UL — SIGNIFICANT CHANGE UP (ref 150–400)
POTASSIUM SERPL-MCNC: 3.2 MMOL/L — LOW (ref 3.5–5.3)
POTASSIUM SERPL-SCNC: 3.2 MMOL/L — LOW (ref 3.5–5.3)
RBC # BLD: 3.93 M/UL — SIGNIFICANT CHANGE UP (ref 3.8–5.2)
RBC # FLD: 13.4 % — SIGNIFICANT CHANGE UP (ref 10.3–14.5)
SODIUM SERPL-SCNC: 142 MMOL/L — SIGNIFICANT CHANGE UP (ref 135–145)
WBC # BLD: 4.76 K/UL — SIGNIFICANT CHANGE UP (ref 3.8–10.5)
WBC # FLD AUTO: 4.76 K/UL — SIGNIFICANT CHANGE UP (ref 3.8–10.5)

## 2019-02-28 PROCEDURE — 87186 SC STD MICRODIL/AGAR DIL: CPT

## 2019-02-28 PROCEDURE — 80048 BASIC METABOLIC PNL TOTAL CA: CPT

## 2019-02-28 PROCEDURE — 85027 COMPLETE CBC AUTOMATED: CPT

## 2019-02-28 PROCEDURE — 87086 URINE CULTURE/COLONY COUNT: CPT

## 2019-02-28 PROCEDURE — G0463: CPT

## 2019-02-28 RX ORDER — CEFAZOLIN SODIUM 1 G
2000 VIAL (EA) INJECTION ONCE
Qty: 0 | Refills: 0 | Status: DISCONTINUED | OUTPATIENT
Start: 2019-03-07 | End: 2019-03-22

## 2019-02-28 RX ORDER — SODIUM CHLORIDE 9 MG/ML
3 INJECTION INTRAMUSCULAR; INTRAVENOUS; SUBCUTANEOUS EVERY 8 HOURS
Qty: 0 | Refills: 0 | Status: DISCONTINUED | OUTPATIENT
Start: 2019-03-07 | End: 2019-03-22

## 2019-02-28 NOTE — H&P PST ADULT - HISTORY OF PRESENT ILLNESS
This is an 89 y/o female: Resident of Long-term care Facility. PMH: + Dementia: A+O X1:  history obtained from sister accompanying her: * poor Historian. h/o HTN, HLD, Seizure Disorder: medically managed with no recnt seizure activity, non-ischemic Cardiomyopathy: s/p ( '10): implantation AICD: [place] [employer] Model # N119: * last interogation @ [facility] was 2-16-17. s/p CVA : no residual, s/p  shunt, Right Hydronephrosis: s/p Cystoscopy Right Retrograde Pyelogram, Right Ureteral Stent 6/2018. Required indwelling  Musa catheter 7/2018 to 10/2018 for Urinary Retention. Now able to void. * dx: 10-26-18: + UTI: Treated with antibiotic. * Unable to obtain  repeat Urine culture @ [facility] ( 11-9-18): Rx given for Nursing home to obtain and fax to [facility] preop. Now scheduled: Cystoscopy/ Right retrograde Pyelogram/ Right Ureteral Stent change. This is an 89 y/o female: Resident of Long-term care Facility. PMH: + Dementia: A+O X1:  history obtained from sister accompanying her: * poor Historian. h/o HTN, HLD, Seizure Disorder: medically managed with no recnt seizure activity, non-ischemic Cardiomyopathy: s/p ( '10): implantation AICD: [employer] Model # N119: * last interogation @ [facility] was 11-15-18. s/p CVA : no residual, s/p  shunt, Right Hydronephrosis: s/p Cystoscopy Right Retrograde Pyelogram, Right Ureteral Stent 6/2018. Required indwelling  Musa catheter 7/2018 to 10/2018 for Urinary Retention. Now able to void. * dx: 10-26-18: + UTI: Treated with antibiotic. * Unable to obtain  repeat Urine culture @ [facility] ( 11-9-18): Rx given for Nursing home to obtain and fax to [facility] preop. Now scheduled: Cystoscopy/ Right retrograde Pyelogram/ Right Ureteral Stent change. This is an 87 y/o female: Resident of Long-term care Facility. PMH: + Dementia: A+O X1:  history obtained from sister accompanying her: * poor Historian. h/o HTN, HLD, Seizure Disorder: medically managed with no recnt seizure activity, non-ischemic Cardiomyopathy: s/p ( '10): implantation AICD: [place] Model # N119: * last interogation @ [facility] was 11-15-18. s/p CVA : no residual, s/p  shunt, Right Hydronephrosis: s/p Cystoscopy Right Retrograde Pyelogram, Right Ureteral Stent 6/2018. Required indwelling  Musa catheter 7/2018 to 10/2018 for Urinary Retention. Now able to void.  Now scheduled: Cystoscopy/ Right retrograde Pyelogram/ Right Ureteral Stent change. This is an 87 y/o female: Resident of Long-term care Facility. PMH: + Dementia: A+O X1:  history obtained from sister accompanying her: * poor Historian. h/o HTN, HLD, Seizure Disorder: medically managed with no recnt seizure activity, non-ischemic Cardiomyopathy: s/p ( '10): implantation AICD: [employer] Model # N119: * last interogation @ [facility] was 11-15-18. s/p CVA : no residual, s/p  shunt, Right Hydronephrosis: s/p Cystoscopy Right Retrograde Pyelogram, Right Ureteral Stent 6/2018. Required indwelling  Musa catheter 7/2018 to 10/2018 for Urinary Retention. Now able to void. s/p ( 10/2018): Insertion Right Ureteral Stent: changed every 3 months. Now scheduled:Cystoscopy/Right Ureteral Stent change.

## 2019-02-28 NOTE — H&P PST ADULT - PSH
S/P Brain Surgery; for "brain cyst   few years ago"    S/P  shunt    Status post cystoscopy  ' 2011 and 6/2018  Ureteral Obstruction; right kidney; stent insertion 12/10 AICD (automatic cardioverter/defibrillator) present  implanted '2010  Encounter for insertion of ureteral catheter  11/2018  S/P Brain Surgery; for "brain cyst   few years ago"    S/P  shunt    Status post cystoscopy  ' 2011 and 6/2018  Ureteral Obstruction; right kidney; stent insertion 12/10

## 2019-02-28 NOTE — H&P PST ADULT - PRIMARY CARE PROVIDER
[facility] (Dr. MARLEY Martinez/BAR Galaviz)           Jatin Caldera (PMD) [phone]

## 2019-02-28 NOTE — H&P PST ADULT - CARDIOVASCULAR COMMENTS
HTN/ HLD/ + AICD/ nonischemic Cardiomyopathy/ h/o CHF + AICD HTN/ HLD/ + AICD/ nonischemic Cardiomyopathy + AICD left sublclavian

## 2019-02-28 NOTE — H&P PST ADULT - OTHER CARE PROVIDERS
[facility] Cardiology clinic [facility]        [facility] [phone]

## 2019-03-01 ENCOUNTER — APPOINTMENT (OUTPATIENT)
Dept: ELECTROPHYSIOLOGY | Facility: CLINIC | Age: 84
End: 2019-03-01

## 2019-03-04 ENCOUNTER — APPOINTMENT (OUTPATIENT)
Dept: NEUROLOGY | Facility: CLINIC | Age: 84
End: 2019-03-04
Payer: MEDICARE

## 2019-03-04 VITALS
SYSTOLIC BLOOD PRESSURE: 167 MMHG | WEIGHT: 117 LBS | HEART RATE: 72 BPM | BODY MASS INDEX: 19.49 KG/M2 | HEIGHT: 65 IN | DIASTOLIC BLOOD PRESSURE: 84 MMHG

## 2019-03-04 PROCEDURE — 95819 EEG AWAKE AND ASLEEP: CPT

## 2019-03-04 PROCEDURE — 99214 OFFICE O/P EST MOD 30 MIN: CPT

## 2019-03-05 ENCOUNTER — CLINICAL ADVICE (OUTPATIENT)
Age: 84
End: 2019-03-05

## 2019-03-05 NOTE — DATA REVIEWED
[de-identified] : MRI 2008: L MTS, WM dz\par CT 9/2015 IMPRESSION: 1. No acute infarct, hemorrhage, mass or mass effect.\par 2. Slightly enlarged left parafalcine parieto-occipital arachnoid cyst.\par 3. Left parietal approach  shunt catheter with tip in the arachnoid \par cyst, not significantly changed in position. [FreeTextEntry1] :  02-13-08 Classification:  Abnormal study- \par -focal spike-and-wave and polyspike-and-wave discharges arising in the left frontotemporal region. intermittent polymorphic delta activity in the left temporal region.\par \par 1/29/08 EEG Classification:  Abnormal study-\par -continuous polymorphic delta activity in the left temporal region.\par \par 8/31/11 VEEG Summary/Classification:\par 1)  Left much greater than right independent anterior inferior maximal temporal sharp waves\par 2)  Left temporal maximal slowing\par \par TTE May 2012: 1. Mitral annular calcification, otherwise normal mitral valve. Moderate-severe mitral regurgitation.  2. Calcified trileaflet aortic valve with normal opening. At least, moderate aortic regurgitation. 3. Severely dilated left atrium.  LA volume index = 41 cc/m2. 4. Normal left ventricular systolic function. No segmental wall motion abnormalities. EF about 60% 5. Normal right ventricular size and function. A device wire is noted in the right heart. 6. Normal tricuspid valve. Moderate-severe tricuspid regurgitation. *** Compared with echocardiogram of 12/23/2010, the mitral regurgitation and tricuspid regurgitation is worse \par \par Labs:CHOLESTEROL: Jun 2012: HDL 92,  TG = 61\par  on 4/2013.  Labs done per Dr García yearly per patient.\par 5/18/15: cbc, bmp, lft nl, chol 208. LTG 2.7, LEV 41.3\par 9/2015 cbc, bmp, lft

## 2019-03-05 NOTE — ASSESSMENT
[FreeTextEntry1] : 89yo F w/ L MTS and left parietal occipital arachnoid cyst s/p shunt in 1994. VEEG indicating bilateral left greater than right temporal slowing and sharp waves. Mild dementia, related to age, vascular issues, epilepsy, possibly primary process. \par \par - cont lamotrigine 100mg BID, keppra 1000mg po bid\par - f/u rEEG done in office today\par - f/u Dr. Mcghee, pt's established epileptologist in a few months\par \par \par Seizure Type: complex partial\par Epilepsy Syndrome: focal and symptomatic\par Patient Discussion / Education: The patient was advised in regards to risks and driving privileges associated with the [place] Guidelines.  The patient was advised in regards to the risk of seizures and general seizure safety recommendations including not to be bathing alone, climbing to high places and operating heavy machinery. The importance of compliance with medications was reinforced. Sleep hygiene and the risks of sleep disruption were discussed.

## 2019-03-05 NOTE — REVIEW OF SYSTEMS
[Feeling Poorly] : not feeling poorly [Feeling Tired] : not feeling tired [Memory Lapses or Loss] : memory loss [Decr. Concentrating Ability] : decreased concentrating ability [Seizures] : convulsions [Migraine Headache] : no migraine headache [Tension Headache] : no tension-type headache [Joint Pain] : joint pain [Negative] : Heme/Lymph [FreeTextEntry9] :  knee pains, using cane

## 2019-03-05 NOTE — HISTORY OF PRESENT ILLNESS
[Right Handed] : right handed [___ per year] : [unfilled] times per year [Grand Mal Status Epilepticus] : no [] : no [Levetiracetam (Keppra)] : levetiracetam (Keppra) [FreeTextEntry1] : 76 years. [FreeTextEntry8] : denies mised meds [de-identified] : 6/2012

## 2019-03-05 NOTE — PHYSICAL EXAM
[FreeTextEntry1] : GENERAL PHYSICAL EXAM:\par GEN: well appearing, well nourished, no distress, normal affect, cooperative \par HEENT:  NCAT, OP clear\par EYES: sclera white, conjunctiva clear, no nystagmus\par NECK: supple\par CV: nl S1/S2, RRR, no murmur     		\par PULM: CTAB, no wheezing\par GI: soft ABD, +BS, NT, ND\par SKIN: warm, dry, no rash or lesion on exposed skin \par \par NEUROLOGICAL EXAM:\par Mental Status\par Orientation: alert and oriented to self only\par Language: clear and fluent, follows simple command\par \par Cranial Nerves\par II: full visual fields intact \par III, IV, VI: PERRL, EOMI\par V, VII: facial sensation and movement intact and symmetric \par VIII: hearing intact \par IX, X: uvula midline, soft palate elevates normally \par XI: BL shoulder shrug intact \par XII: tongue midline\par \par Motor\par BUE: 5\par LLE: 4+\par RLE: at least 3 but limited by knee pain\par \par Sensation\par Intact to light touch in all 4 EXTs\par \par Reflex\par 1-2 in BL biceps, brachioradiali, patella                                    \par \par Gait\par Sitting in wheelchair\par \par

## 2019-03-06 ENCOUNTER — TRANSCRIPTION ENCOUNTER (OUTPATIENT)
Age: 84
End: 2019-03-06

## 2019-03-07 ENCOUNTER — OUTPATIENT (OUTPATIENT)
Dept: OUTPATIENT SERVICES | Facility: HOSPITAL | Age: 84
LOS: 1 days | End: 2019-03-07
Payer: MEDICARE

## 2019-03-07 ENCOUNTER — APPOINTMENT (OUTPATIENT)
Dept: UROLOGY | Facility: HOSPITAL | Age: 84
End: 2019-03-07

## 2019-03-07 VITALS
OXYGEN SATURATION: 97 % | SYSTOLIC BLOOD PRESSURE: 148 MMHG | RESPIRATION RATE: 18 BRPM | DIASTOLIC BLOOD PRESSURE: 81 MMHG | TEMPERATURE: 99 F | HEART RATE: 67 BPM

## 2019-03-07 VITALS
SYSTOLIC BLOOD PRESSURE: 162 MMHG | HEIGHT: 64 IN | TEMPERATURE: 98 F | DIASTOLIC BLOOD PRESSURE: 82 MMHG | WEIGHT: 117.95 LBS | RESPIRATION RATE: 18 BRPM | OXYGEN SATURATION: 95 % | HEART RATE: 65 BPM

## 2019-03-07 DIAGNOSIS — Z95.810 PRESENCE OF AUTOMATIC (IMPLANTABLE) CARDIAC DEFIBRILLATOR: Chronic | ICD-10-CM

## 2019-03-07 DIAGNOSIS — N13.30 UNSPECIFIED HYDRONEPHROSIS: ICD-10-CM

## 2019-03-07 DIAGNOSIS — Z98.890 OTHER SPECIFIED POSTPROCEDURAL STATES: Chronic | ICD-10-CM

## 2019-03-07 DIAGNOSIS — Z01.818 ENCOUNTER FOR OTHER PREPROCEDURAL EXAMINATION: ICD-10-CM

## 2019-03-07 DIAGNOSIS — Z46.6 ENCOUNTER FOR FITTING AND ADJUSTMENT OF URINARY DEVICE: Chronic | ICD-10-CM

## 2019-03-07 DIAGNOSIS — Z98.2 PRESENCE OF CEREBROSPINAL FLUID DRAINAGE DEVICE: Chronic | ICD-10-CM

## 2019-03-07 PROCEDURE — C2617: CPT

## 2019-03-07 PROCEDURE — 52332 CYSTOSCOPY AND TREATMENT: CPT | Mod: RT

## 2019-03-07 PROCEDURE — 76000 FLUOROSCOPY <1 HR PHYS/QHP: CPT

## 2019-03-07 PROCEDURE — C1769: CPT

## 2019-03-07 PROCEDURE — 52351 CYSTOURETERO & OR PYELOSCOPE: CPT | Mod: RT

## 2019-03-07 PROCEDURE — 74420 UROGRAPHY RTRGR +-KUB: CPT | Mod: 26

## 2019-03-07 PROCEDURE — C1758: CPT

## 2019-03-07 RX ORDER — SODIUM CHLORIDE 9 MG/ML
1000 INJECTION, SOLUTION INTRAVENOUS
Qty: 0 | Refills: 0 | Status: DISCONTINUED | OUTPATIENT
Start: 2019-03-07 | End: 2019-03-22

## 2019-03-07 RX ORDER — HYDROMORPHONE HYDROCHLORIDE 2 MG/ML
0.25 INJECTION INTRAMUSCULAR; INTRAVENOUS; SUBCUTANEOUS
Qty: 0 | Refills: 0 | Status: DISCONTINUED | OUTPATIENT
Start: 2019-03-07 | End: 2019-03-07

## 2019-03-07 RX ORDER — ONDANSETRON 8 MG/1
4 TABLET, FILM COATED ORAL ONCE
Qty: 0 | Refills: 0 | Status: DISCONTINUED | OUTPATIENT
Start: 2019-03-07 | End: 2019-03-07

## 2019-03-07 NOTE — BRIEF OPERATIVE NOTE - PROCEDURE
<<-----Click on this checkbox to enter Procedure Change of ureteral stent  03/07/2019  cystoscopy, right side, retrograde pyelogram  Active  DMIKHAIL

## 2019-03-22 ENCOUNTER — APPOINTMENT (OUTPATIENT)
Dept: UROLOGY | Facility: CLINIC | Age: 84
End: 2019-03-22
Payer: MEDICARE

## 2019-03-22 VITALS
RESPIRATION RATE: 14 BRPM | WEIGHT: 117 LBS | DIASTOLIC BLOOD PRESSURE: 78 MMHG | OXYGEN SATURATION: 97 % | SYSTOLIC BLOOD PRESSURE: 118 MMHG | HEART RATE: 71 BPM | HEIGHT: 65 IN | BODY MASS INDEX: 19.49 KG/M2

## 2019-03-22 PROCEDURE — 99212 OFFICE O/P EST SF 10 MIN: CPT

## 2019-03-23 NOTE — HISTORY OF PRESENT ILLNESS
[FreeTextEntry1] : s/p RIGHT ureteral stent change on 3/7/2019 for chronic right hydroureeronephrosis.\par Retrograde pyelogram showed persistent hydronephrosis.\par \par she is here for f/u\par no new c/o

## 2019-03-23 NOTE — PHYSICAL EXAM
[General Appearance - Well Developed] : well developed [General Appearance - Well Nourished] : well nourished [Normal Appearance] : normal appearance [Well Groomed] : well groomed [General Appearance - In No Acute Distress] : no acute distress [Abdomen Soft] : soft [Abdomen Tenderness] : non-tender [Costovertebral Angle Tenderness] : no ~M costovertebral angle tenderness [Urinary Bladder Findings] : the bladder was normal on palpation [Skin Color & Pigmentation] : normal skin color and pigmentation [] : no respiratory distress [Respiration, Rhythm And Depth] : normal respiratory rhythm and effort [Exaggerated Use Of Accessory Muscles For Inspiration] : no accessory muscle use [Not Anxious] : not anxious [FreeTextEntry1] : wheelchair assisted

## 2019-04-16 ENCOUNTER — APPOINTMENT (OUTPATIENT)
Dept: OBGYN | Facility: HOSPITAL | Age: 84
End: 2019-04-16

## 2019-06-01 NOTE — PROGRESS NOTE ADULT - NEGATIVE GENERAL GENITOURINARY SYMPTOMS
Pt states she had vomiting last noc, reports midsternal/epigastric pain this AM.  Pt states it feels like her previous cardiac problems, states she has had 9 stents placed.  Pt did take one of her own Nitro with minimal pain relief, given  mg by EMS.  Pt was also wheezy, so was given a duoneb en route as well.  Pt is diabetic, BG was 52 per EMS.    
no hematuria

## 2019-06-06 NOTE — H&P PST ADULT - VENOUS THROMBOEMBOLISM CURRENT STATUS
(0) indicator not present (1) other risk factor (includes escalating BMI, pack-years of smoking, diabetes requiring insulin, chemotherapy, female gender and length of surgery)/(1) congestive heart failure (within 1 month)

## 2019-06-06 NOTE — H&P PST ADULT - HISTORY OF PRESENT ILLNESS
This is an 87 y/o female: Resident of Long-term care Facility. PMH: + Dementia: A+O X1:  history obtained from sister accompanying her: * poor Historian. h/o HTN, HLD, Seizure Disorder: medically managed with no recnt seizure activity, non-ischemic Cardiomyopathy: s/p ( '10): implantation AICD: [employer] Model # N119: * last interogation @ [facility] was 11-15-18. s/p CVA : no residual, s/p  shunt, Right Hydronephrosis: s/p Cystoscopy Right Retrograde Pyelogram, Right Ureteral Stent 6/2018. Required indwelling  Musa catheter 7/2018 to 10/2018 for Urinary Retention. Now able to void. s/p ( 10/2018): Insertion Right Ureteral Stent: changed every 3 months. Now scheduled:Cystoscopy/Right Ureteral Stent change.     ----------    History of Present Illness  s/p RIGHT ureteral stent change on 3/7/2019 for chronic right hydroureeronephrosis.  Retrograde pyelogram showed persistent hydronephrosis. This is an 89 y/o female: Resident of Long-term care Facility. PMH: + Dementia: A+O X1:  history obtained from chart, pt's POA, niece Jan Delcid: * poor Historian. h/o HTN, HLD, Seizure Disorder: medically managed with no recnt seizure activity, non-ischemic Cardiomyopathy: s/p ( '10): implantation AICD: [place] [employer] Model # N119: * last interogation @ [facility] was 11-15-18 (s/w [facility] care facility nurse, Bianca, who will is aware pt needs to have another ICD interrogation (one that is current within 6 months of planned procedure, left message with Dr. Aguilera's office of above).. s/p CVA : no residual, s/p  shunt, Right Hydronephrosis: s/p Cystoscopy Right Retrograde Pyelogram, Right Ureteral Stent 6/2018. Required indwelling  Musa catheter 7/2018 to 10/2018 for Urinary Retention. Now able to void. s/p ( 10/2018): Insertion Right Ureteral Stent: changed every 3 months. Now scheduled:Cystoscopy/Right Ureteral Stent change.     ----------    History of Present Illness  s/p RIGHT ureteral stent change on 3/7/2019 for chronic right hydroureeronephrosis.  Retrograde pyelogram showed persistent hydronephrosis.

## 2019-06-06 NOTE — H&P PST ADULT - NS MD HP INPLANTS MED DEV
AICD: [email or web address] [facility] Model # N119 AICD: [employer] Model # N119,  shunt

## 2019-06-06 NOTE — H&P PST ADULT - NSICDXPROBLEM_GEN_ALL_CORE_FT
PROBLEM DIAGNOSES  Problem: Hydronephrosis, right  Assessment and Plan: cystoscopy  right ureteral stent change    Problem: ICD (implantable cardioverter-defibrillator) in place  Assessment and Plan: OR booking notified of presence of ICD  [facility] interrogation appointment prior to planned procedure, left message with Dr. Aguilera

## 2019-06-06 NOTE — H&P PST ADULT - MENTAL STATUS
alert x1, knows her name, birthday, knows her sister is her POA.  needed re-orientation to time and place

## 2019-06-06 NOTE — H&P PST ADULT - OTHER CARE PROVIDERS
[facility]        [facility] [phone] [facility] Cardiology clinic  (last check 11/2018),       [facility] [phone], neurology 3/4/2019- Mcghee

## 2019-06-06 NOTE — H&P PST ADULT - PRIMARY CARE PROVIDER
[facility] (Dr. MARLEY Martinez/BAR Galaviz)           Jatin Caldera (PMD) [phone]

## 2019-06-06 NOTE — H&P PST ADULT - NSICDXPASTSURGICALHX_GEN_ALL_CORE_FT
PAST SURGICAL HISTORY:  AICD (automatic cardioverter/defibrillator) present implanted '2010    Encounter for insertion of ureteral catheter 11/2018    S/P Brain Surgery; for "brain cyst   few years ago"     S/P  shunt     Status post cystoscopy ' 2011 and 6/2018    Ureteral Obstruction; right kidney; stent insertion 12/10 PAST SURGICAL HISTORY:  AICD (automatic cardioverter/defibrillator) present implanted '2010    Encounter for insertion of ureteral catheter 11/2018    S/P Brain Surgery; for "brain cyst   few years ago"     S/P  shunt     Status post cystoscopy ' 2011 and 6/2018, 11/2018, 2/2019    Ureteral Obstruction; right kidney; stent insertion 12/10

## 2019-06-06 NOTE — H&P PST ADULT - NSICDXPASTMEDICALHX_GEN_ALL_CORE_FT
PAST MEDICAL HISTORY:  Afib paroxysmal    AICD (automatic cardioverter/defibrillator) present Implanted ' 2010    Carotid artery plaque, bilateral mild    CHF (congestive heart failure) history of: No recent hospitalizations    Dementia medically managed    GI bleed     Glaucoma     History of CVA (cerebrovascular accident) No residual    History of osteoarthritis     HTN (hypertension)     Hydronephrosis; Right kidney     Hypercholesteremia     Nonischemic cardiomyopathy     Pulmonary hypertension     Seizure     Seizure disorder Medically managed. No recent seizure activity    Ureteral obstruction, right 6/2018   insertion Right Ureteral Stent    Uterine prolapse     UTI (urinary tract infection) dx: 10-26-18   Treated PAST MEDICAL HISTORY:  Afib paroxysmal    AICD (automatic cardioverter/defibrillator) present Implanted ' 2010    Bipolar disorder     Carotid artery plaque, bilateral mild    CHF (congestive heart failure) history of: No recent hospitalizations    Dementia medically managed    GI bleed     Glaucoma     History of CVA (cerebrovascular accident) No residual    History of osteoarthritis     HTN (hypertension)     Hydronephrosis; Right kidney     Hypercholesteremia     Major depressive disorder     Nonischemic cardiomyopathy     Pulmonary hypertension     Seizure     Seizure disorder Medically managed. No recent seizure activity    Ureteral obstruction, right 6/2018   insertion Right Ureteral Stent    Uterine prolapse     UTI (urinary tract infection) dx: 10-26-18   Treated

## 2019-06-06 NOTE — H&P PST ADULT - REASON FOR ADMISSION
" It's time to change the stent in her kidney" " It's time to change the stent in her kidney, it gets changed every 3 months"

## 2019-06-07 NOTE — ED ADULT NURSE NOTE - GASTROINTESTINAL WDL
Process for Freestyle Osiel CGM initiated.     Abdomen soft, nontender, nondistended, bowel sounds present in all 4 quadrants.

## 2019-06-11 PROBLEM — F31.9 BIPOLAR DISORDER, UNSPECIFIED: Chronic | Status: ACTIVE | Noted: 2019-01-01

## 2019-06-11 PROBLEM — F32.9 MAJOR DEPRESSIVE DISORDER, SINGLE EPISODE, UNSPECIFIED: Chronic | Status: ACTIVE | Noted: 2019-01-01

## 2019-06-11 NOTE — REVIEW OF SYSTEMS
[Memory Lapses or Loss] : memory loss [Decr. Concentrating Ability] : decreased concentrating ability [Seizures] : convulsions [Joint Pain] : joint pain [Negative] : Endocrine [Feeling Poorly] : not feeling poorly [Feeling Tired] : not feeling tired [Migraine Headache] : no migraine headache [Tension Headache] : no tension-type headache [FreeTextEntry9] :  knee pains, using cane

## 2019-06-11 NOTE — ASSESSMENT
[FreeTextEntry1] : 87yo F w/ L MTS and left parietal occipital arachnoid cyst s/p shunt in 1994. VEEG indicating bilateral left greater than right temporal slowing and sharp waves. \par \par Seizure Type: complex partial\par Epilepsy Syndrome: focal and symptomatic\par \par Moderate progression of dementia, related to age, vascular issues, epilepsy, possibly primary process (AD).   Anomia\par \par - cont lamotrigine 100mgQD, doing ok p reduction in dose\par - keppra 1000mg po bid\par - aricept 10mg/d\par - f/u Dr. Mcghee in 6mo\par

## 2019-06-11 NOTE — DATA REVIEWED
[de-identified] : MRI 2008: L MTS, WM dz\par CT 9/2015 IMPRESSION: 1. No acute infarct, hemorrhage, mass or mass effect.\par 2. Slightly enlarged left parafalcine parieto-occipital arachnoid cyst.\par 3. Left parietal approach  shunt catheter with tip in the arachnoid \par cyst, not significantly changed in position. [FreeTextEntry1] :  02-13-08 Classification:  Abnormal study- \par -focal spike-and-wave and polyspike-and-wave discharges arising in the left frontotemporal region. intermittent polymorphic delta activity in the left temporal region.\par \par 1/29/08 EEG Classification:  Abnormal study-\par -continuous polymorphic delta activity in the left temporal region.\par \par 8/31/11 VEEG Summary/Classification:\par 1)  Left much greater than right independent anterior inferior maximal temporal sharp waves\par 2)  Left temporal maximal slowing\par \par TTE May 2012: 1. Mitral annular calcification, otherwise normal mitral valve. Moderate-severe mitral regurgitation.  2. Calcified trileaflet aortic valve with normal opening. At least, moderate aortic regurgitation. 3. Severely dilated left atrium.  LA volume index = 41 cc/m2. 4. Normal left ventricular systolic function. No segmental wall motion abnormalities. EF about 60% 5. Normal right ventricular size and function. A device wire is noted in the right heart. 6. Normal tricuspid valve. Moderate-severe tricuspid regurgitation. *** Compared with echocardiogram of 12/23/2010, the mitral regurgitation and tricuspid regurgitation is worse \par \par Labs:CHOLESTEROL: Jun 2012: HDL 92,  TG = 61\par  on 4/2013.  Labs done per Dr García yearly per patient.\par 5/18/15: cbc, bmp, lft nl, chol 208. LTG 2.7, LEV 41.3\par 9/2015 cbc, bmp, lft

## 2019-06-11 NOTE — PHYSICAL EXAM
[FreeTextEntry1] : GENERAL PHYSICAL EXAM:\par GEN: well appearing, well nourished, no distress, normal affect, cooperative \par HEENT:  NCAT, OP clear\par EYES: sclera white, conjunctiva clear, no nystagmus\par NECK: supple\par CV: nl S1/S2, RRR, no murmur     		\par PULM: CTAB, no wheezing\par GI: soft ABD, +BS, NT, ND\par SKIN: warm, dry, no rash or lesion on exposed skin \par \par NEUROLOGICAL EXAM:\par Mental Status\par Orientation: alert and oriented to self only, not to time (1/5), place.  anomic\par Language: clear and fluent, follows simple command\par \par Cranial Nerves\par II: full visual fields intact \par III, IV, VI: PERRL, EOMI\par V, VII: facial sensation and movement intact and symmetric \par VIII: hearing intact \par IX, X: uvula midline, soft palate elevates normally \par XI: BL shoulder shrug intact \par XII: tongue midline\par \par Motor\par BUE: 5\par LLE: 4+\par RLE: at least 3 but limited by knee pain\par \par Sensation\par Intact to light touch in all 4 EXTs\par \par Reflex\par 1-2 in BL biceps, brachioradiali, patella                                    \par \par Gait\par Sitting in wheelchair, arthritis to knee, limits mobility\par \par

## 2019-06-11 NOTE — HISTORY OF PRESENT ILLNESS
[Right Handed] : right handed [___ per year] : [unfilled] times per year [Levetiracetam (Keppra)] : levetiracetam (Keppra) [Grand Mal Status Epilepticus] : no [] : no [FreeTextEntry1] : 76 years. [FreeTextEntry8] : denies mised meds [de-identified] : 6/2012

## 2019-06-13 NOTE — ASU DISCHARGE PLAN (ADULT/PEDIATRIC) - CARE PROVIDER_API CALL
Crow Aguilera)  Urology  [address] [place] [address]  Phone: [phone]  Fax: [phone]  Follow Up Time: 2 weeks

## 2019-06-13 NOTE — ASU DISCHARGE PLAN (ADULT/PEDIATRIC) - ASU DC SPECIAL INSTRUCTIONSFT
Please continue and finish the antibiotics you were taking prior to today's procedure.  Follow up with Dr Aguilera in 2 weeks.

## 2019-06-13 NOTE — ASU DISCHARGE PLAN (ADULT/PEDIATRIC) - CALL YOUR DOCTOR IF YOU HAVE ANY OF THE FOLLOWING:
Nausea and vomiting that does not stop/Unable to urinate/Inability to tolerate liquids or foods/Fever greater than (need to indicate Fahrenheit or Celsius)

## 2019-06-13 NOTE — BRIEF OPERATIVE NOTE - NSICDXBRIEFPROCEDURE_GEN_ALL_CORE_FT
PROCEDURES:  Cystoscopic insertion of ureteric stent 13-Jun-2019 11:34:35 RIGHT URETERAL STENT CHANGE Crow Aguilera

## 2019-06-17 NOTE — REVIEW OF SYSTEMS
[Nl] : Musculoskeletal [Sight Problems] : no sight problems [Headache] : no headache [Anxiety] : no anxiety [Depression] : no depression [Feeling Weak] : no feelings of weakness

## 2019-06-17 NOTE — PHYSICAL EXAM
[Awake] : awake [Alert] : alert [Soft] : soft [Oriented x3] : oriented to person, place, and time [Normal] : uterus [Atrophy] : atrophy [No Bleeding] : there was no active vaginal bleeding [Uterine Adnexae] : were not tender and not enlarged [Acute Distress] : no acute distress [Mass] : no breast mass [Nipple Discharge] : no nipple discharge [Axillary LAD] : no axillary lymphadenopathy [Tender] : non tender [FreeTextEntry4] : Areas of irritation from pessary. Pessary was cleaned and replaced.

## 2019-07-21 NOTE — ASU PREOP CHECKLIST - SITE MARKED BY SURGEON
Problem: Adult Inpatient Plan of Care  Goal: Plan of Care Review  Outcome: Ongoing (interventions implemented as appropriate)  Patient admitted with UTI and altered mental status. VSS. Afebrile. Patient currently receiving NS at 75ml/hr. Patient is incontinent and wears diapers. Patient can turn independently in bed. Patient received IV Levaquin in ER. Patient refused SCDs. No signs of distress noted. Call light in reach bed locked and low. Plan of care reviewed with patient's family  Family verbalized understanding.       yes

## 2019-08-05 PROBLEM — R56.9 SEIZURES: Status: ACTIVE | Noted: 2017-03-22

## 2019-08-19 NOTE — PROGRESS NOTE ADULT - SUBJECTIVE AND OBJECTIVE BOX
CHIEF COMPLAINT/INTERVAL HISTORY:    Patient is a 86y old  Female who presents with a chief complaint of Syncope (10 Keven 2017 22:21)      HPI:  86 year old woman with PMH of HTN, HLD, CHF s/p PPM/AICD, absence seizures, hx of CVA, h/o GI Bleed, ischemic colitis, s/p  shunt for cyst drainage, h/o pulm HTN,  h/o multiple prior admissions sent from rehab for syncopal episode.  Patient was recently discharged to rehab after pubic ramus fx from mechanical fall.  She says she was doing exercise in rehab and felt lightheaded.  She thinks she remembers the whole incident.  As per ED attending, she lost consciousness.  She denies associated chest pain, SOB, palpitations, diaphoresis, tremors, visual disturbance, weakness, slurred speech.  She offers no complaints in the ED.      In the ED, head CT, labs unremarkable. (10 Keven 2017 22:21)    Overnight issues    b/p lowered to sbp <90   norvasc discontinued   SUBJECTIVE & OBJECTIVE: Pt seen and examined at bedside.   ROS:  CONSTITUTIONAL: No fever, weight loss, or fatigue  NECK: No pain or stiffness  RESPIRATORY: No cough, wheezing, chills or hemoptysis; No shortness of breath  CARDIOVASCULAR: No chest pain, palpitations, dizziness, or leg swelling  GASTROINTESTINAL: No abdominal or epigastric pain. No nausea, vomiting, or hematemesis; No diarrhea or constipation. No melena or hematochezia.  GENITOURINARY: No dysuria, frequency, hematuria, or incontinence  NEUROLOGICAL: No headaches, memory loss, loss of strength, numbness, or tremors  SKIN: No itching, burning, rashes, or lesions   ICU Vital Signs Last 24 Hrs  T(C): 36.7, Max: 37.1 ( @ 05:17)  T(F): 98.1, Max: 98.7 ( @ 05:17)  HR: 66 (64 - 123)  BP: 82/45 (82/45 - 146/82)  BP(mean): --  ABP: --  ABP(mean): --  RR: 18 (18 - 20)  SpO2: 95% (95% - 99%)        MEDICATIONS  (STANDING):  heparin  Injectable 5000Unit(s) SubCutaneous every 8 hours  aspirin  chewable 81milliGRAM(s) Oral daily  losartan 50milliGRAM(s) Oral daily  atorvastatin 40milliGRAM(s) Oral at bedtime  carvedilol 25milliGRAM(s) Oral every 12 hours  furosemide    Tablet 20milliGRAM(s) Oral daily  timolol 0.5% Solution 1Drop(s) Both EYES daily  latanoprost 0.005% Ophthalmic Solution 1Drop(s) Both EYES at bedtime  multivitamin 1Tablet(s) Oral daily  cholecalciferol 1000Unit(s) Oral daily  lamoTRIgine 100milliGRAM(s) Oral daily  levETIRAcetam 1000milliGRAM(s) Oral two times a day  ciprofloxacin     Tablet 500milliGRAM(s) Oral every 12 hours  sodium chloride 0.9%. 1000milliLiter(s) IV Continuous <Continuous>    MEDICATIONS  (PRN):  acetaminophen   Tablet. 650milliGRAM(s) Oral every 6 hours PRN Moderate Pain (4 - 6)  oxyCODONE  5 mG/acetaminophen 325 mG 1Tablet(s) Oral every 6 hours PRN Severe Pain (7 - 10)        PHYSICAL EXAM:    GENERAL: NAD, well-groomed, well-developed  HEAD:  Atraumatic, Normocephalic  EYES: EOMI, PERRLA, conjunctiva and sclera clear  ENMT: Moist mucous membranes  NECK: Supple, No JVD  NERVOUS SYSTEM:  Alert & Oriented X3, Motor Strength 5/5 B/L upper and lower extremities; DTRs 2+ intact and symmetric  CHEST/LUNG: Clear to auscultation bilaterally; No rales, rhonchi, wheezing, or rubs  HEART: Regular rate and rhythm; No murmurs, rubs, or gallops  ABDOMEN: Soft, Nontender, Nondistended; Bowel sounds present  EXTREMITIES:  2+ Peripheral Pulses, No clubbing, cyanosis, or edema    LABS:                        11.1   8.7   )-----------( 278      ( 10 Keven 2017 14:10 )             32.3     06-10    136  |  97  |  18  ----------------------------<  119<H>  4.0   |  33<H>  |  0.92    Ca    8.8      10 Keven 2017 14:10    TPro  6.9  /  Alb  2.9<L>  /  TBili  0.8  /  DBili  x   /  AST  37  /  ALT  21  /  AlkPhos  68  06-10    PT/INR - ( 10 Keven 2017 13:58 )   PT: 12.1 sec;   INR: 1.11 ratio         PTT - ( 10 Keven 2017 13:58 )  PTT:41.7 sec  Urinalysis Basic - ( 10 Keven 2017 21:58 )    Color: Yellow / Appearance: Slightly Turbid / S.015 / pH: x  Gluc: x / Ketone: Negative  / Bili: Negative / Urobili: Negative mg/dL   Blood: x / Protein: 500 mg/dL / Nitrite: Negative   Leuk Esterase: Moderate / RBC: >50 /HPF / WBC 6-10   Sq Epi: x / Non Sq Epi: Occasional / Bacteria: Moderate        CAPILLARY BLOOD GLUCOSE      RECENT CULTURES:      RADIOLOGY & ADDITIONAL TESTS:  Imaging Personally Reviewed:  [ ] YES      Consultant(s) Notes Reviewed:  [ ] YES     Care Discussed with [ ] Consultants [X ] Patient [ ] Family  [x ]    [x ]  Other; RN  HEALTH ISSUES - PROBLEM Dx:  Preventive measure: Preventive measure  High cholesterol: High cholesterol  Chronic diastolic congestive heart failure: Chronic diastolic congestive heart failure  Seizure: Seizure  Essential hypertension: Essential hypertension  Fracture of pubic ramus, right, sequela: Fracture of pubic ramus, right, sequela  Acute cystitis without hematuria: Acute cystitis without hematuria  Syncope, unspecified syncope type: Syncope, unspecified syncope type        DVT/GI ppx  Discussed with pt @ bedside TELE/STEPDOWN

## 2019-09-23 NOTE — BRIEF OPERATIVE NOTE - OPERATION/FINDINGS
Procedure: R inguinal hernia repair, exploratory laparotomy, small bowel resection    Findings: A R groin incision was made. Bowel incarcerated in a direct hernia was non-viable and was reduced. Hernia was primarily repaired without mesh. A midline incision was then made and carried down through the fascia. The abdomen was entered. Loop of non-viable bowel was identified and 5 cm was resected. A hand sewn anastomosis was then performed. Fascia and skin were then closed. Full range of motion of upper and lower extremities, no joint tenderness/swelling.

## 2019-10-07 PROBLEM — R33.9 INCOMPLETE EMPTYING OF BLADDER: Status: ACTIVE | Noted: 2018-10-26

## 2019-10-07 NOTE — HISTORY OF PRESENT ILLNESS
[FreeTextEntry1] : Accompanied by her sister today and Aide from Nursing home.\par 88y/o woman\par s/p RIGHT ureteral stent change on June 2019 for chronic right hydroureteronephrosis.\par Retrograde pyelogram showed persistence of hydronephrosis.\par \par One UTI since last visit per sister's recollection.

## 2019-10-07 NOTE — PHYSICAL EXAM
[General Appearance - Well Nourished] : well nourished [General Appearance - Well Developed] : well developed [Normal Appearance] : normal appearance [Well Groomed] : well groomed [General Appearance - In No Acute Distress] : no acute distress [Abdomen Tenderness] : non-tender [Abdomen Soft] : soft [Costovertebral Angle Tenderness] : no ~M costovertebral angle tenderness [Urinary Bladder Findings] : the bladder was normal on palpation [] : no respiratory distress [Skin Color & Pigmentation] : normal skin color and pigmentation [Respiration, Rhythm And Depth] : normal respiratory rhythm and effort [Exaggerated Use Of Accessory Muscles For Inspiration] : no accessory muscle use [Not Anxious] : not anxious [FreeTextEntry1] : wheelchair assisted

## 2019-10-12 NOTE — PHYSICAL EXAM
[No Lesions] : no genitalia lesions [Vulvar Atrophy] : vulvar atrophy [Labia Majora] : labia major [Atrophy] : atrophy [No Bleeding] : there was no active vaginal bleeding [Discharge] : no discharge

## 2019-10-29 PROBLEM — Z00.00 ENCOUNTER FOR PREVENTIVE HEALTH EXAMINATION: Status: ACTIVE | Noted: 2019-01-01

## 2019-11-15 NOTE — REASON FOR VISIT
[New Patient Visit] : a new patient visit [Formal Caregiver] : formal caregiver [Family Member] : family member [Referred By: _________] : Patient was referred by JEANETTE

## 2019-11-19 NOTE — DATA REVIEWED
[FreeTextEntry1] :  02-13-08 Classification:  Abnormal study- \par -focal spike-and-wave and polyspike-and-wave discharges arising in the left frontotemporal region. intermittent polymorphic delta activity in the left temporal region.\par \par 1/29/08 EEG Classification:  Abnormal study-\par -continuous polymorphic delta activity in the left temporal region.\par \par 8/31/11 VEEG Summary/Classification:\par 1)  Left much greater than right independent anterior inferior maximal temporal sharp waves\par 2)  Left temporal maximal slowing\par \par TTE May 2012: 1. Mitral annular calcification, otherwise normal mitral valve. Moderate-severe mitral regurgitation.  2. Calcified trileaflet aortic valve with normal opening. At least, moderate aortic regurgitation. 3. Severely dilated left atrium.  LA volume index = 41 cc/m2. 4. Normal left ventricular systolic function. No segmental wall motion abnormalities. EF about 60% 5. Normal right ventricular size and function. A device wire is noted in the right heart. 6. Normal tricuspid valve. Moderate-severe tricuspid regurgitation. *** Compared with echocardiogram of 12/23/2010, the mitral regurgitation and tricuspid regurgitation is worse \par \par Labs:CHOLESTEROL: Jun 2012: HDL 92,  TG = 61\par  on 4/2013.  Labs done per Dr García yearly per patient.\par 5/18/15: cbc, bmp, lft nl, chol 208. LTG 2.7, LEV 41.3\par 9/2015 cbc, bmp, lft [de-identified] : MRI 2008: L MTS, WM dz\par CT 9/2015 IMPRESSION: 1. No acute infarct, hemorrhage, mass or mass effect.\par 2. Slightly enlarged left parafalcine parieto-occipital arachnoid cyst.\par 3. Left parietal approach  shunt catheter with tip in the arachnoid \par cyst, not significantly changed in position.

## 2019-11-19 NOTE — ASSESSMENT
[FreeTextEntry1] : 89yo F w/ L MTS and left parietal occipital arachnoid cyst s/p shunt in 1994. VEEG indicating bilateral left greater than right temporal slowing and sharp waves. \par \par Seizure Type: complex partial\par Epilepsy Syndrome: focal and symptomatic\par \par Moderate progression of dementia, related to age, vascular issues, epilepsy, possibly primary process (AD).   Anomia\par \par Plan:\par - follow up with neurosurgery  regarding shunt  c/o headaches  (seen by Aracely in 2017 or Jimenez )\par - cont lamotrigine 100mgQD\par - keppra 1000mg po bid\par - aricept 10mg/d\par - f/u Dr. Mcghee in 3-4 months\par

## 2019-11-19 NOTE — REVIEW OF SYSTEMS
[Memory Lapses or Loss] : memory loss [Decr. Concentrating Ability] : decreased concentrating ability [Seizures] : convulsions [Joint Pain] : joint pain [Negative] : Heme/Lymph [Feeling Poorly] : not feeling poorly [Feeling Tired] : not feeling tired [Migraine Headache] : no migraine headache [Tension Headache] : no tension-type headache [FreeTextEntry9] :  knee pains, using cane

## 2019-11-19 NOTE — HISTORY OF PRESENT ILLNESS
[Right Handed] : right handed [___ per year] : [unfilled] times per year [Levetiracetam (Keppra)] : levetiracetam (Keppra) [] : no [[place] Status Epilepticus] : no [FreeTextEntry1] : 76 years. [FreeTextEntry8] : denies mised meds [de-identified] : 6/2012

## 2019-11-25 NOTE — ASSESSMENT
[FreeTextEntry1] : 89 year old female with cysto-peritoneal shunt. Complains of intermittent left sided headache for years. No acute signs of shunt malfunctions or infection noted\par \par Plan:\par - follow up with PCP or neurology for headaches

## 2019-11-25 NOTE — PHYSICAL EXAM
[General Appearance - Alert] : alert [General Appearance - In No Acute Distress] : in no acute distress [Person] : oriented to person [Cranial Nerves Optic (II)] : visual acuity intact bilaterally,  pupils equal round and reactive to light [Cranial Nerves Oculomotor (III)] : extraocular motion intact [Cranial Nerves Facial (VII)] : face symmetrical [Cranial Nerves Accessory (XI - Cranial And Spinal)] : head turning and shoulder shrug symmetric [Cranial Nerves Glossopharyngeal (IX)] : tongue and palate midline [Cranial Nerves Hypoglossal (XII)] : there was no tongue deviation with protrusion [Motor Tone] : muscle tone was normal in all four extremities [1+] : Patella left 1+ [Neck Appearance] : the appearance of the neck was normal [Outer Ear] : the ears and nose were normal in appearance [] : no respiratory distress [Exaggerated Use Of Accessory Muscles For Inspiration] : no accessory muscle use [Edema] : there was no peripheral edema [Involuntary Movements] : no involuntary movements were seen [Skin Color & Pigmentation] : normal skin color and pigmentation [FreeTextEntry1] : in wheelchair; able to get up with help; unable to take a step without holding on  [Span Intact] : the attention span was decreased [Registration Intact] : recent registration memory intact [Oriented To Time, Place, And Person] : oriented to person, place, and time [Fluency] : fluency intact [Comprehension] : comprehension intact [Cranial Nerves Trigeminal (V)] : facial sensation intact symmetrically [Vocabulary] : adequate range of vocabulary [Motor Strength] : muscle strength was normal in all four extremities [No Muscle Atrophy] : normal bulk in all four extremities [Sensation Tactile Decrease] : light touch was intact [Dysesthesia] : no dysesthesia [Allodynia] : no ~T allodynia present [Hyperesthesia] : no hyperesthesia [Past-pointing] : there was no past-pointing [Limited Balance] : the patient's balance was impaired [Dysdiadochokinesia Bilaterally] : not present [Tremor] : no tremor present [Coordination - Dysmetria Impaired Heel-to-Shin Bilateral] : not present [Coordination - Dysmetria Impaired Finger-to-Nose Bilateral] : not present [Optic Disc Abnormality] : the optic disc were normal in size and color [Heart Rate And Rhythm] : heart rate was normal and rhythm regular [Arterial Pulses Carotid] : carotid pulses were normal with no bruits

## 2019-11-25 NOTE — HISTORY OF PRESENT ILLNESS
[> 3 months] : more  than 3 months [de-identified] : This is an 88yo woman w/PMH of HTN, HLD, AICD, arachnoid cyst s/p shunt in 1994, seizure disorder x 10 yrs. The patient resides at a nursing home and is a poor historian.\par \par She has been complaining about left sided headache for a couple of years. She gets it a few times a week and takes Tylenol. Pain is described as throbbing like she is having a toothache. It can get very severe and does not responsive to Tylenol. She was evaluated by Dr. Jessica in 2017 with the same complaint. CT head and shunt series were done and no significant findings appreciated. Shunt is a nonprogrammable valve.

## 2019-11-25 NOTE — REVIEW OF SYSTEMS
[Feeling Poorly] : feeling poorly [Confused or Disoriented] : confusion [Memory Lapses or Loss] : memory loss [Decr. Concentrating Ability] : decreased concentrating ability [Negative] : Heme/Lymph [FreeTextEntry9] : right knee pain

## 2019-11-26 NOTE — H&P PST ADULT - NSICDXPASTSURGICALHX_GEN_ALL_CORE_FT
PAST SURGICAL HISTORY:  AICD (automatic cardioverter/defibrillator) present implanted '2010    Encounter for insertion of ureteral catheter 11/2018    S/P Brain Surgery; for "brain cyst   few years ago"     S/P  shunt     Status post cystoscopy ' 2011 and 6/2018, 11/2018, 2/2019    Ureteral Obstruction; right kidney; stent insertion 12/10

## 2019-11-26 NOTE — ASU DISCHARGE PLAN (ADULT/PEDIATRIC) - ASU DC SPECIAL INSTRUCTIONSFT
Please follow up with Dr. Aguilera in 6 months for stent exchange.  Please take ciprofloxacin for 3 days postoperatively for antibiotic prophylaxis.  You may take tylenol as needed for pain.

## 2019-11-26 NOTE — H&P PST ADULT - NSICDXPASTMEDICALHX_GEN_ALL_CORE_FT
PAST MEDICAL HISTORY:  Afib paroxysmal    AICD (automatic cardioverter/defibrillator) present Implanted ' 2010    Bipolar disorder     Carotid artery plaque, bilateral mild    CHF (congestive heart failure) history of: No recent hospitalizations    Dementia medically managed    GI bleed     Glaucoma     History of CVA (cerebrovascular accident) No residual    History of osteoarthritis     HTN (hypertension)     Hydronephrosis; Right kidney     Hypercholesteremia     Major depressive disorder     Nonischemic cardiomyopathy     Pulmonary hypertension     Seizure     Seizure disorder Medically managed. No recent seizure activity    Ureteral obstruction, right 6/2018   insertion Right Ureteral Stent    Uterine prolapse     UTI (urinary tract infection) dx: 10-26-18   Treated

## 2019-12-27 PROBLEM — N13.30 HYDRONEPHROSIS: Status: ACTIVE | Noted: 2018-08-29

## 2019-12-27 NOTE — ADDENDUM
[FreeTextEntry1] :  I, Elisha Meier, acted solely as a scribe for Dr. Kvng Meneses. The documentation recorded by the scribe accurately reflects the service I personally performed and the decision by me.\par

## 2019-12-27 NOTE — HISTORY OF PRESENT ILLNESS
[FreeTextEntry1] : 89 year old female presents for hydronephrosis\par Pt had a double J right ureteral stent inserted on 11/26//19 due to right hydronephrosis. \par Communicated with pt that she will need to change her stent in another month. \par \par Pt will be scheduled for stent change and cystoscopy. They will receive a call from the office to schedule.

## 2019-12-27 NOTE — ASSESSMENT
[FreeTextEntry1] : Pt will be scheduled for stent change and cystoscopy. They will receive a call from the office to schedule.

## 2020-01-01 ENCOUNTER — OUTPATIENT (OUTPATIENT)
Dept: OUTPATIENT SERVICES | Facility: HOSPITAL | Age: 85
LOS: 1 days | End: 2020-01-01
Payer: COMMERCIAL

## 2020-01-01 ENCOUNTER — APPOINTMENT (OUTPATIENT)
Dept: CARDIOLOGY | Facility: CLINIC | Age: 85
End: 2020-01-01

## 2020-01-01 ENCOUNTER — OUTPATIENT (OUTPATIENT)
Dept: OUTPATIENT SERVICES | Facility: HOSPITAL | Age: 85
LOS: 1 days | End: 2020-01-01

## 2020-01-01 ENCOUNTER — APPOINTMENT (OUTPATIENT)
Dept: ELECTROPHYSIOLOGY | Facility: CLINIC | Age: 85
End: 2020-01-01
Payer: MEDICARE

## 2020-01-01 ENCOUNTER — APPOINTMENT (OUTPATIENT)
Dept: CARDIOLOGY | Facility: CLINIC | Age: 85
End: 2020-01-01
Payer: MEDICARE

## 2020-01-01 ENCOUNTER — NON-APPOINTMENT (OUTPATIENT)
Age: 85
End: 2020-01-01

## 2020-01-01 ENCOUNTER — FORM ENCOUNTER (OUTPATIENT)
Age: 85
End: 2020-01-01

## 2020-01-01 ENCOUNTER — APPOINTMENT (OUTPATIENT)
Dept: NEUROLOGY | Facility: CLINIC | Age: 85
End: 2020-01-01
Payer: MEDICARE

## 2020-01-01 ENCOUNTER — APPOINTMENT (OUTPATIENT)
Dept: CT IMAGING | Facility: CLINIC | Age: 85
End: 2020-01-01
Payer: MEDICARE

## 2020-01-01 ENCOUNTER — APPOINTMENT (OUTPATIENT)
Dept: OBGYN | Facility: HOSPITAL | Age: 85
End: 2020-01-01
Payer: MEDICARE

## 2020-01-01 ENCOUNTER — APPOINTMENT (OUTPATIENT)
Dept: OBGYN | Facility: HOSPITAL | Age: 85
End: 2020-01-01

## 2020-01-01 VITALS
DIASTOLIC BLOOD PRESSURE: 82 MMHG | SYSTOLIC BLOOD PRESSURE: 142 MMHG | HEART RATE: 69 BPM | HEIGHT: 65 IN | WEIGHT: 128 LBS | BODY MASS INDEX: 21.33 KG/M2

## 2020-01-01 VITALS
SYSTOLIC BLOOD PRESSURE: 157 MMHG | HEIGHT: 65 IN | HEART RATE: 68 BPM | WEIGHT: 128 LBS | BODY MASS INDEX: 21.33 KG/M2 | DIASTOLIC BLOOD PRESSURE: 78 MMHG

## 2020-01-01 VITALS
SYSTOLIC BLOOD PRESSURE: 160 MMHG | HEIGHT: 65 IN | HEART RATE: 61 BPM | DIASTOLIC BLOOD PRESSURE: 86 MMHG | BODY MASS INDEX: 21.33 KG/M2 | WEIGHT: 128 LBS | OXYGEN SATURATION: 97 %

## 2020-01-01 DIAGNOSIS — Z95.810 PRESENCE OF AUTOMATIC (IMPLANTABLE) CARDIAC DEFIBRILLATOR: Chronic | ICD-10-CM

## 2020-01-01 DIAGNOSIS — Z46.6 ENCOUNTER FOR FITTING AND ADJUSTMENT OF URINARY DEVICE: Chronic | ICD-10-CM

## 2020-01-01 DIAGNOSIS — Z98.890 OTHER SPECIFIED POSTPROCEDURAL STATES: Chronic | ICD-10-CM

## 2020-01-01 DIAGNOSIS — Z98.2 PRESENCE OF CEREBROSPINAL FLUID DRAINAGE DEVICE: Chronic | ICD-10-CM

## 2020-01-01 DIAGNOSIS — G91.9 HYDROCEPHALUS, UNSPECIFIED: ICD-10-CM

## 2020-01-01 DIAGNOSIS — N81.4 UTEROVAGINAL PROLAPSE, UNSPECIFIED: ICD-10-CM

## 2020-01-01 DIAGNOSIS — N81.9 FEMALE GENITAL PROLAPSE, UNSPECIFIED: ICD-10-CM

## 2020-01-01 DIAGNOSIS — G40.219 LOCALIZATION-RELATED (FOCAL) (PARTIAL) SYMPTOMATIC EPILEPSY AND EPILEPTIC SYNDROMES WITH COMPLEX PARTIAL SEIZURES, INTRACTABLE, W/OUT STATUS EPILEPTICUS: ICD-10-CM

## 2020-01-01 DIAGNOSIS — G40.909 EPILEPSY, UNSPECIFIED, NOT INTRACTABLE, W/OUT STATUS EPILEPTICUS: ICD-10-CM

## 2020-01-01 DIAGNOSIS — R51 HEADACHE: ICD-10-CM

## 2020-01-01 DIAGNOSIS — Z00.8 ENCOUNTER FOR OTHER GENERAL EXAMINATION: ICD-10-CM

## 2020-01-01 DIAGNOSIS — Z46.89 ENCOUNTER FOR FITTING AND ADJUSTMENT OF OTHER SPECIFIED DEVICES: ICD-10-CM

## 2020-01-01 PROCEDURE — 93295 DEV INTERROG REMOTE 1/2/MLT: CPT

## 2020-01-01 PROCEDURE — 99215 OFFICE O/P EST HI 40 MIN: CPT

## 2020-01-01 PROCEDURE — 70450 CT HEAD/BRAIN W/O DYE: CPT | Mod: 26

## 2020-01-01 PROCEDURE — 70450 CT HEAD/BRAIN W/O DYE: CPT

## 2020-01-01 PROCEDURE — 93296 REM INTERROG EVL PM/IDS: CPT

## 2020-01-01 PROCEDURE — 99214 OFFICE O/P EST MOD 30 MIN: CPT

## 2020-01-01 PROCEDURE — 93000 ELECTROCARDIOGRAM COMPLETE: CPT

## 2020-01-01 PROCEDURE — 99213 OFFICE O/P EST LOW 20 MIN: CPT | Mod: GE

## 2020-01-01 RX ORDER — LEVETIRACETAM 500 MG/1
500 TABLET, FILM COATED ORAL TWICE DAILY
Refills: 0 | Status: DISCONTINUED | COMMUNITY
End: 2020-01-01

## 2020-01-01 RX ORDER — DIVALPROEX SODIUM 500 1/1
500 TABLET, EXTENDED RELEASE ORAL AT BEDTIME
Qty: 30 | Refills: 5 | Status: ACTIVE | OUTPATIENT
Start: 2020-01-01

## 2020-01-01 RX ORDER — LEVETIRACETAM 500 MG/1
500 TABLET, FILM COATED ORAL TWICE DAILY
Qty: 120 | Refills: 3 | Status: ACTIVE | COMMUNITY
Start: 2020-01-01

## 2020-01-08 NOTE — REVIEW OF SYSTEMS
[Incontinence] : incontinence [Chills] : no chills [Abdominal Pain] : no abdominal pain [Pelvic Pain] : no pelvic pain [Abn Vag Bleeding] : no abnormal vaginal bleeding [Urethral Discharge] : no abnormal urethral discharge

## 2020-01-08 NOTE — PHYSICAL EXAM
[Vulvar Atrophy] : vulvar atrophy [Labia Majora] : labia major [Normal] : vagina [Labia Minora] : labia minora [Atrophy] : atrophy [No Bleeding] : there was no active vaginal bleeding [Vulvar Stricture] : no vulvar stricture [Rectocele] : no rectocele [FreeTextEntry4] : Ring pessary removed; no bleeding, no foul smelling discharge [Tenderness] : no tenderness

## 2020-01-24 NOTE — PATIENT PROFILE ADULT. - DOES PATIENT HAVE ADVANCE DIRECTIVE
No 1) admit to surgery Dr. Alvarez  2) npo, IVF  3) NGT to lws  4) serial abdominal exams  5) hold eliquis  6) possible OR tonight  7) pre-op labs  8) case and plan discussed with Dr. Alvarez and agrees

## 2020-02-01 NOTE — ASSESSMENT
[FreeTextEntry1] : 89 F with HTN, NICM, ICD, HLD, Epilepsy presenting for follow up\par \par --BP is elevated today, Aide who accompanies her spoke with Nursing Home staff who state that BP was 140/80 in am. For now will monitor, not sure if reading today reflects baseline given report of better BP from NH today\par ---Recent interrogations have shown ATP therapy for NSVT, no ICD shocks, currently on Coreg 12.5 mg BID, will monitor for now\par ---Euvolemic on exam, SOB she mentions is likely due to deconditioning, will continue Losartan with Lasix and Coreg as above; TTE from outside institution 2017, with normal LV function, no significant valvulopathy, mild LAE, mild LVH

## 2020-02-01 NOTE — REASON FOR VISIT
[FreeTextEntry1] : 89 yo F with history of NICM,  ICD, HTN, HLD, Dementia,  Epilepsy presenting for follow up, last visit with Cardiologist was three years ago. Ms. Gonzalez currently lives in a Nursing Home, she is accompanied by and Aide as well as her sister. She is wheelchair bound. She states that she does feel shortness of breath while bathing and overall moving around. She denies chest pain, syncope or palpitations. She feels that her blood pressures are not checked accurately by Nursing Home staff. Of note, she underwent general anesthesia for Ureteral stent revision last November and had no complications. \par

## 2020-02-01 NOTE — PHYSICAL EXAM
[General Appearance - Well Developed] : well developed [Normal Appearance] : normal appearance [Normal Conjunctiva] : the conjunctiva exhibited no abnormalities [Normal Jugular Venous A Waves Present] : normal jugular venous A waves present [Normal Jugular Venous V Waves Present] : normal jugular venous V waves present [No Jugular Venous Prescott A Waves] : no jugular venous prescott A waves [Auscultation Breath Sounds / Voice Sounds] : lungs were clear to auscultation bilaterally [Heart Sounds] : normal S1 and S2 [Murmurs] : no murmurs present [Arterial Pulses Normal] : the arterial pulses were normal [Edema] : no peripheral edema present [Abdomen Tenderness] : non-tender [Abnormal Walk] : normal gait [] : no rash [Petechial Hemorrhages (___cm)] : no petechial hemorrhages [Oriented To Time, Place, And Person] : oriented to person, place, and time

## 2020-02-01 NOTE — REVIEW OF SYSTEMS
[Shortness Of Breath] : shortness of breath [Memory Lapses Or Loss] : memory lapses or loss [Negative] : Heme/Lymph

## 2020-02-17 NOTE — PROGRESS NOTE ADULT - ASSESSMENT
Pt is an 88 yo F PMH of Dementia, CVA, GI bleed, CHF, UTIs, HLD, Syncope (s/p pacemaker), A-Fib?,  shunt (2/2 cystic occipital left sided lesion), Seizures (on Keppra and Lamotrigine), recent hospitalization s/p laparotomy for RT inguinal hernia repair and small bowel resection, complicated by E. coli bacteremia earlier in the month, here for seizure at nursing home. Pt started to have twitching of her right lip and arm, then when EMS arrived had a 30 second GTC relieved by Ativan. Of note pt has the tendency to refuse meds at nursing home, and this AM had not received her seizure meds, was loaded with Keppra in the ED.   Neurology evaluated the patient in the AM. She is arousable but mostly responds with "i dont know". She endorses RUE weakness which based on her history is very likely chronic. No new seizure activity    - Continue with current care.   - please obtain Keppra level and lamictal level please  - c/w home dose of Keppra and Lamotrigine  - Treat underlying infection  - Fall Precautions  - Seizure Precautions  - Ativan 1mg IV q5mins for sustained seizure activity lasting greater than 3 minutes Alert-The patient is alert, awake and responds to voice. The patient is oriented to time, place, and person. The triage nurse is able to obtain subjective information.

## 2020-02-26 PROBLEM — G91.9 HYDROCEPHALUS: Status: ACTIVE | Noted: 2020-01-01

## 2020-02-26 PROBLEM — R51 HEADACHE: Status: ACTIVE | Noted: 2019-01-01

## 2020-02-26 NOTE — PHYSICAL EXAM
[FreeTextEntry1] : GENERAL PHYSICAL EXAM:\par GEN: well appearing, well nourished, no distress, normal affect, cooperative \par HEENT:  NCAT, OP clear. no temporal a. cord\par EYES: sclera white, conjunctiva clear, no nystagmus\par NECK: supple\par CV: nl S1/S2, RRR, no murmur     		\par PULM: CTAB, no wheezing\par GI: soft ABD, +BS, NT, ND\par SKIN: warm, dry, no rash or lesion on exposed skin \par \par NEUROLOGICAL EXAM:\par Mental Status\par Orientation: alert and oriented to self only, not to time (1/5), place.  anomic. does not know age\par Language: clear and fluent, follows simple command\par \par Cranial Nerves\par II: full visual fields intact \par III, IV, VI: PERRL, EOMI\par V, VII: facial sensation and movement intact and symmetric \par VIII: hearing intact \par IX, X: uvula midline, soft palate elevates normally \par XI: BL shoulder shrug intact \par XII: tongue midline\par \par Motor\par BUE: 5\par LLE: 4+\par RLE: at least 3 but limited by knee pain\par \par Sensation\par Intact to light touch in all 4 EXTs\par \par Reflex\par 1-2 in BL biceps, brachioradiali, patella                                    \par \par Gait\par Sitting in wheelchair, arthritis to knee, limits mobility\par \par

## 2020-02-26 NOTE — HISTORY OF PRESENT ILLNESS
[Right Handed] : right handed [___ per year] : [unfilled] times per year [Levetiracetam (Keppra)] : levetiracetam (Keppra) [Grand Mal Status Epilepticus] : no [] : no [FreeTextEntry1] : 76 years. [FreeTextEntry8] : denies mised meds [de-identified] : 6/2012

## 2020-02-26 NOTE — DATA REVIEWED
[de-identified] : MRI 2008: L MTS, WM dz\par CT 9/2015 IMPRESSION: 1. No acute infarct, hemorrhage, mass or mass effect.\par 2. Slightly enlarged left parafalcine parieto-occipital arachnoid cyst.\par 3. Left parietal approach  shunt catheter with tip in the arachnoid \par cyst, not significantly changed in position. [FreeTextEntry1] :  02-13-08 Classification:  Abnormal study- \par -focal spike-and-wave and polyspike-and-wave discharges arising in the left frontotemporal region. intermittent polymorphic delta activity in the left temporal region.\par \par 1/29/08 EEG Classification:  Abnormal study-\par -continuous polymorphic delta activity in the left temporal region.\par \par 8/31/11 VEEG Summary/Classification:\par 1)  Left much greater than right independent anterior inferior maximal temporal sharp waves\par 2)  Left temporal maximal slowing\par \par TTE May 2012: 1. Mitral annular calcification, otherwise normal mitral valve. Moderate-severe mitral regurgitation.  2. Calcified trileaflet aortic valve with normal opening. At least, moderate aortic regurgitation. 3. Severely dilated left atrium.  LA volume index = 41 cc/m2. 4. Normal left ventricular systolic function. No segmental wall motion abnormalities. EF about 60% 5. Normal right ventricular size and function. A device wire is noted in the right heart. 6. Normal tricuspid valve. Moderate-severe tricuspid regurgitation. *** Compared with echocardiogram of 12/23/2010, the mitral regurgitation and tricuspid regurgitation is worse \par \par Labs:CHOLESTEROL: Jun 2012: HDL 92,  TG = 61\par  on 4/2013.  Labs done per Dr García yearly per patient.\par 5/18/15: cbc, bmp, lft nl, chol 208. LTG 2.7, LEV 41.3\par 9/2015 cbc, bmp, lft

## 2020-02-26 NOTE — DISCUSSION/SUMMARY
[FreeTextEntry1] : 87yo F w/ L MTS and left parietal occipital arachnoid cyst s/p shunt in 1994. VEEG indicating bilateral left greater than right temporal slowing and sharp waves. \par Seizure Type: complex partial\par Epilepsy Syndrome: focal and symptomatic\par \par Moderate progression of dementia, related to age, vascular issues, epilepsy, possibly primary process (AD).   Anomia\par \par Recent increase in HA burden. \par s/p consult with neurosurgery  regarding shunt  c/o headaches  (seen by Dr Jessica in 2017 or Dr Jimenez 2019)\par \par Plan:\par - check CT head, ESR, CRP to eval HA\par - monitor BP, h/o HTN, fluctuations, evaluate BP during more severe HA to assess if uncontrolled BP is an issue\par - d/c lamotrigine at 100mg QD.  consider transition to VPA ER 500mg /day for chronic persistent headache x3-4 months now\par - keppra 1000mg po bid (LEV level 42 in 6/2019), consider slight dec in future if level increasing with dec GFR\par - aricept 10mg/d\par - f/u Dr. Mcghee in 4 months\par

## 2020-04-27 ENCOUNTER — APPOINTMENT (OUTPATIENT)
Dept: OBGYN | Facility: HOSPITAL | Age: 85
End: 2020-04-27

## 2020-04-28 ENCOUNTER — APPOINTMENT (OUTPATIENT)
Dept: OBGYN | Facility: HOSPITAL | Age: 85
End: 2020-04-28

## 2020-05-18 ENCOUNTER — APPOINTMENT (OUTPATIENT)
Dept: NEUROLOGY | Facility: CLINIC | Age: 85
End: 2020-05-18

## 2020-06-25 ENCOUNTER — APPOINTMENT (OUTPATIENT)
Dept: ELECTROPHYSIOLOGY | Facility: CLINIC | Age: 85
End: 2020-06-25

## 2020-06-30 ENCOUNTER — APPOINTMENT (OUTPATIENT)
Dept: ELECTROPHYSIOLOGY | Facility: CLINIC | Age: 85
End: 2020-06-30

## 2020-07-14 NOTE — DISCHARGE NOTE ADULT - NURSING SECTION COMPLETE
See telephone encounter from 7/14/20 pt was informed of results as ordered by provider and listed below. Had no further questions or concerns at this time.    Patient/Caregiver provided printed discharge information.

## 2020-09-23 NOTE — ED PROVIDER NOTE - GASTROINTESTINAL NEGATIVE STATEMENT, MLM
no abdominal pain, no bloating, no constipation, no diarrhea, no nausea and no vomiting.
PROVIDER:[TOKEN:[[id number]]]

## 2020-12-16 PROBLEM — Z87.440 HISTORY OF URINARY TRACT INFECTION: Status: RESOLVED | Noted: 2018-01-18 | Resolved: 2020-12-16

## 2021-01-23 NOTE — ED ADULT NURSE NOTE - EENT WDL
Eyes with no visual disturbances.  Ears clean and dry and no hearing difficulties. Nose with pink mucosa and no drainage.  Mouth mucous membranes moist and pink.  No tenderness or swelling to throat or neck. Attending Attestation (For Attendings USE Only)...

## 2021-01-26 NOTE — PRE-OP CHECKLIST - NS PREOP CHK CHLOROHEX WASH
Hospitalist Progress Note      PCP: Kady Stanley MD    Date of Admission: 1/22/2021    Chief Complaint: weakness    Subjective: pt awake, alert     Medications:  Reviewed    Infusion Medications    sodium chloride       Scheduled Medications    polyethylene glycol  17 g Oral BID    senna  2 tablet Oral BID    lactulose  20 g Oral BID    meropenem  1,000 mg Intravenous Q8H    lidocaine PF  5 mL Intradermal Once    sodium chloride flush  10 mL Intravenous 2 times per day    tamsulosin  0.4 mg Oral Daily    finasteride  5 mg Oral Daily    Magnesium Citrate  296 mL Oral Once    bisacodyl  10 mg Rectal Daily    sodium chloride flush  3 mL Intravenous Q8H    baclofen  10 mg Oral QAM    busPIRone  5 mg Oral BID    gabapentin  100 mg Oral TID    lamoTRIgine  200 mg Oral BID    levothyroxine  50 mcg Oral Daily    divalproex  750 mg Oral Nightly    divalproex  250 mg Oral BID     PRN Meds: sodium chloride flush, acetaminophen, bisacodyl, ondansetron      Intake/Output Summary (Last 24 hours) at 1/26/2021 0827  Last data filed at 1/26/2021 0701  Gross per 24 hour   Intake 16 ml   Output 1450 ml   Net -1434 ml       Exam:    BP (!) 156/71   Pulse 87   Temp 98.1 °F (36.7 °C)   Resp 18   Ht 5' 10\" (1.778 m)   Wt 214 lb 6.4 oz (97.3 kg)   SpO2 100%   BMI 30.76 kg/m²     General appearance: No apparent distress, appears stated age and cooperative. HEENT: Pupils equal, round, and reactive to light. Conjunctivae/corneas clear. Neck: Supple, with full range of motion. No jugular venous distention. Trachea midline. Respiratory:  Normal respiratory effort. Clear to auscultation, bilaterally without Rales/Wheezes/Rhonchi. Cardiovascular: Regular rate and rhythm with normal S1/S2 without murmurs, rubs or gallops. Abdomen: Soft, non-tender, non-distended with normal bowel sounds. Musculoskeletal: No clubbing, cyanosis or edema bilaterally.     Skin: Skin color, texture, turgor normal.  No rashes or lesions. Neurologic:  Neurovascularly intact without any focal sensory/motor deficits. Psychiatric: Alert and oriented,  Capillary Refill: Brisk,< 3 seconds   Peripheral Pulses: +2 palpable, equal bilaterally       Labs:   Recent Labs     01/25/21  0531   WBC 4.4*   HGB 11.4*   HCT 34.9*        Recent Labs     01/25/21  0531      K 3.4      CO2 28   BUN 13   CREATININE 0.77   CALCIUM 9.2     No results for input(s): AST, ALT, BILIDIR, BILITOT, ALKPHOS in the last 72 hours. No results for input(s): INR in the last 72 hours. No results for input(s): Diana Simmer in the last 72 hours. Urinalysis:      Lab Results   Component Value Date    NITRU Negative 01/22/2021    WBCUA >100 01/22/2021    BACTERIA MANY 01/22/2021    RBCUA 0-2 01/22/2021    BLOODU Trace-intact 01/22/2021    SPECGRAV 1.025 01/22/2021    GLUCOSEU Negative 01/22/2021       Radiology:  CT ABDOMEN PELVIS W IV CONTRAST Additional Contrast? Oral   Final Result      MILDLY WORSENING MODERATE URINARY BLADDER DISTENTION AND WALL THICKENING. MILD UPPER URINARY TRACT DILATATION AND SURROUNDING INFLAMMATION SUGGESTIVE OF ASCENDING INFECTION. NO ABSCESS OR OTHER COMPLICATION IDENTIFIED. CONSTIPATION. XR ABDOMEN (KUB) (SINGLE AP VIEW)   Final Result   There are air-filled loops of colon including a distended cecum measuring 13 cm in greatest diameter. There is a large amount retained fecal material throughout the colon. The findings may be secondary to colonic ileus versus obstruction,    [place]'s. IR Picc Equal or Greater Than 5 Years    (Results Pending)           Assessment/Plan:    Active Hospital Problems    Diagnosis Date Noted    UTI (urinary tract infection) [N39.0] 01/22/2021         DVT Prophylaxis:   Diet: DIET GENERAL;  Code Status: Prior    PT/OT Eval Status:     Dispo - UTI/fever- UC report pending, previous multidrug resistant klebsiella- continue with meropenem for now, ID on case. Needs PICC for prolong atbs course    Acute/chronic urinary retention- washburn in place.  Added flomax/finasteride per  urology service   Constipation- added lactulose, follow up clinically  MRDD- supportive care, group home resident    Hypokalemia- replaced   Medically stable for acute admission at [place]          Electronically signed by Africa Newton MD on 1/26/2021 at 8:27 AM N/A

## 2021-08-10 NOTE — ED PROVIDER NOTE - NS ED MD DISPO ADMIT [facility] UNIT
MED PAST SURGICAL HISTORY:   (normal spontaneous vaginal delivery) 3/20/2007, 19    Status post D&C 2006 Missed AB

## 2021-09-16 NOTE — DISCHARGE NOTE ADULT - ABILITY TO HEAR (WITH HEARING AID OR HEARING APPLIANCE IF NORMALLY USED):
Patient reassured vision is doing well and not a LVC candidate at this time. Adequate: hears normal conversation without difficulty

## 2021-11-10 NOTE — DISCHARGE NOTE ADULT - ABILITY TO HEAR (WITH HEARING AID OR HEARING APPLIANCE IF NORMALLY USED):
Waiting advise from MD she has message and will reply through that message   Adequate: hears normal conversation without difficulty

## 2021-12-08 NOTE — H&P ADULT - NSHPLABSRESULTS_GEN_ALL_CORE
CBC Full  -  ( 17 Nov 2018 11:05 )  WBC Count : 8.29 K/uL  Hemoglobin : 10.9 g/dL  Hematocrit : 34.6 %  Platelet Count - Automated : 285 K/uL  Mean Cell Volume : 96.6 fL  Mean Cell Hemoglobin : 30.4 pg  Mean Cell Hemoglobin Concentration : 31.5 %  Auto Neutrophil # : 6.35 K/uL  Auto Lymphocyte # : 1.16 K/uL  Auto Monocyte # : 0.75 K/uL  Auto Eosinophil # : 0.00 K/uL  Auto Basophil # : 0.01 K/uL  Auto Neutrophil % : 76.7 %  Auto Lymphocyte % : 14.0 %  Auto Monocyte % : 9.0 %  Auto Eosinophil % : 0.0 %  Auto Basophil % : 0.1 %    11-17    143  |  101  |  24<H>  ----------------------------<  119<H>  3.6   |  26  |  0.93    Ca    9.4      17 Nov 2018 11:05    TPro  7.6  /  Alb  3.8  /  TBili  0.9  /  DBili  x   /  AST  20  /  ALT  6   /  AlkPhos  77  11-17    Blood Gas Venous Comprehensive (11.17.18 @ 11:05)    Blood Gas Venous - Lactate: 2.4: Please note updated reference range. mmol/L    Blood Gas Venous - Chloride: 106 mmol/L    Blood Gas Venous - Creatinine: 0.93 mg/dL    pH, Venous: 7.40 pH    pCO2, Venous: 51 mmHg    pO2, Venous: 37 mmHg    HCO3, Venous: 29 mmol/L    Base Excess, Venous: 6.2: REFERENCE RANGE = -3 + 2 mmol/L mmol/L    Oxygen Saturation, Venous: 62.3 %    Blood Gas Venous - Sodium: 141 mmol/L    Blood Gas Venous - Potassium: 3.4 mmol/L    Blood Gas Venous - Glucose: 118    Blood Gas Venous - Hemoglobin: 11.2 g/dL    Blood Gas Venous - Hematocrit: 34.5 %    Urinalysis (11.17.18 @ 12:00)    Color: BROWN    Urine Appearance: TURBID    Glucose: NEGATIVE    Bilirubin: NEGATIVE    Ketone - Urine: NEGATIVE    Specific Gravity: 1.019    Blood: LARGE    pH - Urine: 6.5    Protein, Urine: LARGE    Urobilinogen: NORMAL    Nitrite: NEGATIVE    Leukocyte Esterase Concentration: LARGE    Red Blood Cell - Urine: >20    White Blood Cell - Urine: >20    Epithelial Cells: OCC    Bacteria: FEW    < from: CT Head No Cont (11.17.18 @ 11:40) >  IMPRESSION:  No acuteintracranial pathology.  Stable shunt catheter.  Stable left occipital and parietal parenchymal gliosis.  No acute intracranial hemorrhage.  Moderate to severe severity microvascular ischemic change.    < from: Xray Chest 1 View- PORTABLE-Urgent (11.17.18 @ 12:31) >    IMPRESSION:  No acute pulmonary pathology.    < end of copied text >
normal...

## 2021-12-27 NOTE — DISCHARGE NOTE ADULT - NS AS DC FU [facility] EJECTION FRACTION >40 YES
PFT susie 1/4/22. Did have Ct screen completed. Currently rosy for f/u 12/29/21 ok to leave or move out til after PFT? normal LV function/mildly reduced LV function

## 2022-01-21 NOTE — SWALLOW BEDSIDE ASSESSMENT ADULT - SWALLOW EVAL: CURRENT DIET
[address] [place] [address] Dr  [facility] EMERGENCY DEPT  [phone]  [phone]    Work/School Note    Date: 1/21/2022    To Whom It May concern:    Shauna Jiménez was seen and treated today in the emergency room by the following provider(s):  Attending Provider: Tri Sebastian MD  Nurse Practitioner: Elizabeth Albrecht NP. Shauna Jiménez is excused from work/school on 01/21/22 and 01/22/22. She is medically clear to return to work/school on 1/23/2022.        Sincerely,          Sarah Light NP
NPO

## 2022-01-24 NOTE — H&P PST ADULT - SPO2 (%)
Quality 226: Preventive Care And Screening: Tobacco Use: Screening And Cessation Intervention: Patient screened for tobacco use and is an ex/non-smoker Detail Level: Detailed Quality 130: Documentation Of Current Medications In The Medical Record: Current Medications Documented Quality 111:Pneumonia Vaccination Status For Older Adults: Pneumococcal Vaccination Previously Received 96

## 2022-02-01 NOTE — BRIEF OPERATIVE NOTE - NSICDXBRIEFPREOP_GEN_ALL_CORE_FT
[FreeTextEntry1] : General: Exam somewhat limited due to developmental delay - patient nonverbal with ASD, very active in exam room \par HEENT: NCAT, Normal conjunctiva\par Cardio: Appears well perfused, no peripheral edema, brisk cap refill. \par Lungs: no obvious increased WOB, no audible wheeze heard without use of stethoscope. \par Abdomen: not examined. \par Skin: No visible rashes on exposed skin\par \par RUE: \par Skin over clavicle is clean and intact. \par There is bump approximately midshaft both observed and palpated. It is non mobile and consistent with fracture. There is no tenderness with palpation of the area. No skin tenting or irritation. No blanching of skin. 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

## 2022-04-11 NOTE — ED ADULT NURSE NOTE - FALL HARM RISK TYPE OF ASSESSMENT
Procedure Note:


ICD 10 Code:


ICD 10 Code:


M54.16


M51.36


M 96.1





Procedure Note:


Patient was consented for lumbar epidural steroid injection with fluoroscopic 

guidance.  Risks were discussed including but not limited to: Bleeding, 

infection, possibility of epidural hematoma and subsequent neurological 

compromise, dural puncture, headaches, spinal cord and/or nerve damage, side 

effects of steroid medication, and poor results regarding pain control.  Patient

understands and wished to proceed.


Procedure is lumbar epidural steroid injection under local anesthetic using ster

ile prep and drape at the L4-5 level using C-arm fluoroscopic guidance in both 

AP and lateral views medications injected is 20 mg dexamethasone +10mL 

preservative-free normal saline and 2 mL contrast- condition at discharge is 

stable patient tolerated procedure well had no complications.











ERNESTO LEES MD               Apr 11, 2022 08:41 Daily Assessment

## 2022-05-13 NOTE — ED PROVIDER NOTE - ENMT NEGATIVE STATEMENT, MLM
Ears: no ear pain and no hearing problems.Nose: no nasal congestion and no nasal drainage.Mouth/Throat: no dysphagia, no hoarseness and no throat pain.Neck: no lumps, no pain, no stiffness and no swollen glands.
Calm/Appropriate

## 2022-05-18 NOTE — H&P ADULT - GASTROINTESTINAL DETAILS
no rebound tenderness/no rigidity/soft/no masses palpable/no guarding/no distention/nontender Chonodrocutaneous Helical Advancement Flap Text: The defect edges were debeveled with a #15 scalpel blade.  Given the location of the defect and the proximity to free margins a chondrocutaneous helical advancement flap was deemed most appropriate.  Using a sterile surgical marker, the appropriate advancement flap was drawn incorporating the defect and placing the expected incisions within the relaxed skin tension lines where possible.    The area thus outlined was incised deep to adipose tissue with a #15 scalpel blade.  The skin margins were undermined to an appropriate distance in all directions utilizing iris scissors.

## 2022-05-20 NOTE — PATIENT PROFILE ADULT - CAREGIVER PHONE NUMBER
Instructions were given for an overnight nocturnal pulse oximetry study. The assigned GE number of the pulse oximetry was [id number]. A log sheet was completed. Patient was instructed on documenting any events that occurred throughout the night on the log sheet. The procedure was explained to the learner(s). Patient understanding of the procedure was excellent. Patient does have a mean of transportation to bring back the study the next day. The pulse oximetrys memory was cleared. Patient had no questions and was sent home with the pulse oximeter. [phone]

## 2022-07-13 NOTE — ED PROVIDER NOTE - OBJECTIVE STATEMENT
"Jason Beltre had to take someone to an appointment. She asked that I speak to Iza, the     Confirmed:  Rytary 95 2 caps @ 6a   Rytary 145 2 caps @ 9:30a,1p,4:30p   Comtan 200mg 1 tab @ 6a     Plan/recommendaiton:  (1) Please get blood test \"Basic Metabolic Panel\" (or any panel that includes creatinine)     (2)  For the next 1-2 weeks, please change medications as follows     Rytary 95 2,2 caps @ 6a,9:30a --DOSE INCREASE   Rytary 145 2,2 caps @ 1p,4:30p --DOSE DECREASE   Comtan 200mg 1 tab @ 6a --NO CHANGE     After 1-2 weeks on that dose, please let us know how she is doing:  particularly the dyskinesias but also the tremor.  It's very helpful if we can get details about what time of the day these are worst, or best (or which meals are most affected). - they will update us via [employer]    We will fax the new medication orders and lab order to Ирина  " Pt is a 87 yo lady with a pmhx of HTN, HL, CVA w some l. side weakness, shunt draining cystic structure, absence seizures, PM/AICD who presents to the ED with sudden onset slurred speech and AMS. This happened about 45 minutes prior to arrival in ED. Pt had similar presentation 2 weeks ago, but was at that time hypertensive to 240s systolic, admitted to ICU for BP control, and then cleared for floor, where had a GI bleed. Mental status was improved prior to this episode.

## 2022-08-01 NOTE — H&P ADULT - CLICK TO LAUNCH ORM
. [employer] Counseling:  I discussed with the patient the risks of mycophenolate mofetil including but not limited to infection/immunosuppression, GI upset, hypokalemia, hypercholesterolemia, bone marrow suppression, lymphoproliferative disorders, malignancy, GI ulceration/bleed/perforation, colitis, interstitial lung disease, kidney failure, progressive multifocal leukoencephalopathy, and birth defects.  The patient understands that monitoring is required including a baseline creatinine and regular CBC testing. In addition, patient must alert us immediately if symptoms of infection or other concerning signs are noted.

## 2022-08-24 NOTE — ED ADULT NURSE NOTE - CAS DISCH CONDITION
Stable
Render In Strict Bullet Format?: No
Initiate Treatment: Accutane 40mg QD once labs are received and reviewed
Discontinue Regimen: Clindamycin lotion QAM, minocycline 100mg bid, keto cream bid (once\\nAccutane is started)
Detail Level: Zone

## 2023-02-09 NOTE — ED ADULT TRIAGE NOTE - LOCATION:
----- Message from Jayy Bustamante PA-C sent at 2/8/2023  1:21 PM CST -----  Covering Dr. Bee.     A1c is stable at 5.8.     Electrolytes and kidney function are normal.     
Attempted to reach patient to convey the provider note listed below.  No answer.  Left message asking for a return call.  Clinic number provided.    
Attempted to reach patient's , Shalom.  No answer.  Left message requesting a return call. Clinic number provided.  
Patient is returning call.   
Spoke with patient and conveyed the provider results listed below. Patient expressed understanding. No questions or concerns at this time.  
Left arm;

## 2023-02-20 NOTE — ED PROVIDER NOTE - ADDITIONAL HPI
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

## 2023-04-17 NOTE — ED ADULT NURSE NOTE - PRO INTERPRETER NEED 2
English Solaraze Pregnancy And Lactation Text: This medication is Pregnancy Category B and is considered safe. There is some data to suggest avoiding during the third trimester. It is unknown if this medication is excreted in breast milk. Humira Pregnancy And Lactation Text: This medication is Pregnancy Category B and is considered safe during pregnancy. It is unknown if this medication is excreted in breast milk. Azithromycin Counseling:  I discussed with the patient the risks of azithromycin including but not limited to GI upset, allergic reaction, drug rash, diarrhea, and yeast infections. SSKI Counseling:  I discussed with the patient the risks of SSKI including but not limited to thyroid abnormalities, metallic taste, GI upset, fever, headache, acne, arthralgias, paraesthesias, lymphadenopathy, easy bleeding, arrhythmias, and allergic reaction. Isotretinoin Pregnancy And Lactation Text: This medication is Pregnancy Category X and is considered extremely dangerous during pregnancy. It is unknown if it is excreted in breast milk. Hydroxyzine Counseling: Patient advised that the medication is sedating and not to drive a car after taking this medication.  Patient informed of potential adverse effects including but not limited to dry mouth, urinary retention, and blurry vision.  The patient verbalized understanding of the proper use and possible adverse effects of hydroxyzine.  All of the patient's questions and concerns were addressed. Humira Counseling:  I discussed with the patient the risks of adalimumab including but not limited to myelosuppression, immunosuppression, autoimmune hepatitis, demyelinating diseases, lymphoma, and serious infections.  The patient understands that monitoring is required including a PPD at baseline and must alert us or the primary physician if symptoms of infection or other concerning signs are noted. Metronidazole Pregnancy And Lactation Text: This medication is Pregnancy Category B and considered safe during pregnancy.  It is also excreted in breast milk. Cyclophosphamide Counseling:  I discussed with the patient the risks of cyclophosphamide including but not limited to hair loss, hormonal abnormalities, decreased fertility, abdominal pain, diarrhea, nausea and vomiting, bone marrow suppression and infection. The patient understands that monitoring is required while taking this medication. Drysol Pregnancy And Lactation Text: This medication is considered safe during pregnancy and breast feeding. Minocycline Counseling: Patient advised regarding possible photosensitivity and discoloration of the teeth, skin, lips, tongue and gums.  Patient instructed to avoid sunlight, if possible.  When exposed to sunlight, patients should wear protective clothing, sunglasses, and sunscreen.  The patient was instructed to call the office immediately if the following severe adverse effects occur:  hearing changes, easy bruising/bleeding, severe headache, or vision changes.  The patient verbalized understanding of the proper use and possible adverse effects of minocycline.  All of the patient's questions and concerns were addressed. Cyclophosphamide Pregnancy And Lactation Text: This medication is Pregnancy Category D and it isn't considered safe during pregnancy. This medication is excreted in breast milk. Arava Counseling:  Patient counseled regarding adverse effects of Arava including but not limited to nausea, vomiting, abnormalities in liver function tests. Patients may develop mouth sores, rash, diarrhea, and abnormalities in blood counts. The patient understands that monitoring is required including LFTs and blood counts.  There is a rare possibility of scarring of the liver and lung problems that can occur when taking methotrexate. Persistent nausea, loss of appetite, pale stools, dark urine, cough, and shortness of breath should be reported immediately. Patient advised to discontinue Arava treatment and consult with a physician prior to attempting conception. The patient will have to undergo a treatment to eliminate Arava from the body prior to conception. Sski Pregnancy And Lactation Text: This medication is Pregnancy Category D and isn't considered safe during pregnancy. It is excreted in breast milk. Griseofulvin Counseling:  I discussed with the patient the risks of griseofulvin including but not limited to photosensitivity, cytopenia, liver damage, nausea/vomiting and severe allergy.  The patient understands that this medication is best absorbed when taken with a fatty meal (e.g., ice cream or french fries). Elidel Counseling: Patient may experience a mild burning sensation during topical application. Elidel is not approved in children less than 2 years of age. There have been case reports of hematologic and skin malignancies in patients using topical calcineurin inhibitors although causality is questionable. High Dose Vitamin A Counseling: Side effects reviewed, pt to contact office should one occur. Hydroxyzine Pregnancy And Lactation Text: This medication is not safe during pregnancy and should not be taken. It is also excreted in breast milk and breast feeding isn't recommended. Hydroxychloroquine Counseling:  I discussed with the patient that a baseline ophthalmologic exam is needed at the start of therapy and every year thereafter while on therapy. A CBC may also be warranted for monitoring.  The side effects of this medication were discussed with the patient, including but not limited to agranulocytosis, aplastic anemia, seizures, rashes, retinopathy, and liver toxicity. Patient instructed to call the office should any adverse effect occur.  The patient verbalized understanding of the proper use and possible adverse effects of Plaquenil.  All the patient's questions and concerns were addressed. High Dose Vitamin A Pregnancy And Lactation Text: High dose vitamin A therapy is contraindicated during pregnancy and breast feeding. Taltz Pregnancy And Lactation Text: The risk during pregnancy and breastfeeding is uncertain with this medication. Minocycline Pregnancy And Lactation Text: This medication is Pregnancy Category D and not consider safe during pregnancy. It is also excreted in breast milk. Thalidomide Counseling: I discussed with the patient the risks of thalidomide including but not limited to birth defects, anxiety, weakness, chest pain, dizziness, cough and severe allergy. Cyclosporine Counseling:  I discussed with the patient the risks of cyclosporine including but not limited to hypertension, gingival hyperplasia,myelosuppression, immunosuppression, liver damage, kidney damage, neurotoxicity, lymphoma, and serious infections. The patient understands that monitoring is required including baseline blood pressure, CBC, CMP, lipid panel and uric acid, and then 1-2 times monthly CMP and blood pressure. Azithromycin Pregnancy And Lactation Text: This medication is considered safe during pregnancy and is also secreted in breast milk. Elidel Pregnancy And Lactation Text: This medication is Pregnancy Category C. It is unknown if this medication is excreted in breast milk. Hydroxychloroquine Pregnancy And Lactation Text: This medication has been shown to cause fetal harm but it isn't assigned a Pregnancy Risk Category. There are small amounts excreted in breast milk. Ilumya Counseling: I discussed with the patient the risks of tildrakizumab including but not limited to immunosuppression, malignancy, posterior leukoencephalopathy syndrome, and serious infections.  The patient understands that monitoring is required including a PPD at baseline and must alert us or the primary physician if symptoms of infection or other concerning signs are noted. Taltz Counseling: I discussed with the patient the risks of ixekizumab including but not limited to immunosuppression, serious infections, worsening of inflammatory bowel disease and drug reactions.  The patient understands that monitoring is required including a PPD at baseline and must alert us or the primary physician if symptoms of infection or other concerning signs are noted. Arava Pregnancy And Lactation Text: This medication is Pregnancy Category X and is absolutely contraindicated during pregnancy. It is unknown if it is excreted in breast milk. Griseofulvin Pregnancy And Lactation Text: This medication is Pregnancy Category X and is known to cause serious birth defects. It is unknown if this medication is excreted in breast milk but breast feeding should be avoided. Topical Retinoid counseling:  Patient advised to apply a pea-sized amount only at bedtime and wait 30 minutes after washing their face before applying.  If too drying, patient may add a non-comedogenic moisturizer. The patient verbalized understanding of the proper use and possible adverse effects of retinoids.  All of the patient's questions and concerns were addressed. Cyclosporine Pregnancy And Lactation Text: This medication is Pregnancy Category C and it isn't know if it is safe during pregnancy. This medication is excreted in breast milk. Detail Level: Zone Itraconazole Counseling:  I discussed with the patient the risks of itraconazole including but not limited to liver damage, nausea/vomiting, neuropathy, and severe allergy.  The patient understands that this medication is best absorbed when taken with acidic beverages such as non-diet cola or ginger ale.  The patient understands that monitoring is required including baseline LFTs and repeat LFTs at intervals.  The patient understands that they are to contact us or the primary physician if concerning signs are noted. Nsaids Counseling: NSAID Counseling: I discussed with the patient that NSAIDs should be taken with food. Prolonged use of NSAIDs can result in the development of stomach ulcers.  Patient advised to stop taking NSAIDs if abdominal pain occurs.  The patient verbalized understanding of the proper use and possible adverse effects of NSAIDs.  All of the patient's questions and concerns were addressed. Clofazimine Counseling:  I discussed with the patient the risks of clofazimine including but not limited to skin and eye pigmentation, liver damage, nausea/vomiting, gastrointestinal bleeding and allergy. Eucrisa Counseling: Patient may experience a mild burning sensation during topical application. Eucrisa is not approved in children less than 2 years of age. Ivermectin Pregnancy And Lactation Text: This medication is Pregnancy Category C and it isn't known if it is safe during pregnancy. It is also excreted in breast milk. Bactrim Counseling:  I discussed with the patient the risks of sulfa antibiotics including but not limited to GI upset, allergic reaction, drug rash, diarrhea, dizziness, photosensitivity, and yeast infections.  Rarely, more serious reactions can occur including but not limited to aplastic anemia, agranulocytosis, methemoglobinemia, blood dyscrasias, liver or kidney failure, lung infiltrates or desquamative/blistering drug rashes. Albendazole Counseling:  I discussed with the patient the risks of albendazole including but not limited to cytopenia, kidney damage, nausea/vomiting and severe allergy.  The patient understands that this medication is being used in an off-label manner. Nsaids Pregnancy And Lactation Text: These medications are considered safe up to 30 weeks gestation. It is excreted in breast milk. Itraconazole Pregnancy And Lactation Text: This medication is Pregnancy Category C and it isn't know if it is safe during pregnancy. It is also excreted in breast milk. Infliximab Counseling:  I discussed with the patient the risks of infliximab including but not limited to myelosuppression, immunosuppression, autoimmune hepatitis, demyelinating diseases, lymphoma, and serious infections.  The patient understands that monitoring is required including a PPD at baseline and must alert us or the primary physician if symptoms of infection or other concerning signs are noted. Bactrim Pregnancy And Lactation Text: This medication is Pregnancy Category D and is known to cause fetal risk.  It is also excreted in breast milk. Clofazimine Pregnancy And Lactation Text: This medication is Pregnancy Category C and isn't considered safe during pregnancy. It is excreted in breast milk. Methotrexate Counseling:  Patient counseled regarding adverse effects of methotrexate including but not limited to nausea, vomiting, abnormalities in liver function tests. Patients may develop mouth sores, rash, diarrhea, and abnormalities in blood counts. The patient understands that monitoring is required including LFT's and blood counts.  There is a rare possibility of scarring of the liver and lung problems that can occur when taking methotrexate. Persistent nausea, loss of appetite, pale stools, dark urine, cough, and shortness of breath should be reported immediately. Patient advised to discontinue methotrexate treatment at least three months before attempting to become pregnant.  I discussed the need for folate supplements while taking methotrexate.  These supplements can decrease side effects during methotrexate treatment. The patient verbalized understanding of the proper use and possible adverse effects of methotrexate.  All of the patient's questions and concerns were addressed. Valtrex Counseling: I discussed with the patient the risks of valacyclovir including but not limited to kidney damage, nausea, vomiting and severe allergy.  The patient understands that if the infection seems to be worsening or is not improving, they are to call. Eucrisa Pregnancy And Lactation Text: This medication has not been assigned a Pregnancy Risk Category but animal studies failed to show danger with the topical medication. It is unknown if the medication is excreted in breast milk. Quinolones Counseling:  I discussed with the patient the risks of fluoroquinolones including but not limited to GI upset, allergic reaction, drug rash, diarrhea, dizziness, photosensitivity, yeast infections, liver function test abnormalities, tendonitis/tendon rupture. Tremfya Counseling: I discussed with the patient the risks of guselkumab including but not limited to immunosuppression, serious infections, worsening of inflammatory bowel disease and drug reactions.  The patient understands that monitoring is required including a PPD at baseline and must alert us or the primary physician if symptoms of infection or other concerning signs are noted. Tazorac Counseling:  Patient advised that medication is irritating and drying.  Patient may need to apply sparingly and wash off after an hour before eventually leaving it on overnight.  The patient verbalized understanding of the proper use and possible adverse effects of tazorac.  All of the patient's questions and concerns were addressed. Hydroquinone Counseling:  Patient advised that medication may result in skin irritation, lightening (hypopigmentation), dryness, and burning.  In the event of skin irritation, the patient was advised to reduce the amount of the drug applied or use it less frequently.  Rarely, spots that are treated with hydroquinone can become darker (pseudoochronosis).  Should this occur, patient instructed to stop medication and call the office. The patient verbalized understanding of the proper use and possible adverse effects of hydroquinone.  All of the patient's questions and concerns were addressed. Ketoconazole Counseling:   Patient counseled regarding improving absorption with orange juice.  Adverse effects include but are not limited to breast enlargement, headache, diarrhea, nausea, upset stomach, liver function test abnormalities, taste disturbance, and stomach pain.  There is a rare possibility of liver failure that can occur when taking ketoconazole. The patient understands that monitoring of LFTs may be required, especially at baseline. The patient verbalized understanding of the proper use and possible adverse effects of ketoconazole.  All of the patient's questions and concerns were addressed. Valtrex Pregnancy And Lactation Text: this medication is Pregnancy Category B and is considered safe during pregnancy. This medication is not directly found in breast milk but it's metabolite acyclovir is present. Ivermectin Counseling:  Patient instructed to take medication on an empty stomach with a full glass of water.  Patient informed of potential adverse effects including but not limited to nausea, diarrhea, dizziness, itching, and swelling of the extremities or lymph nodes.  The patient verbalized understanding of the proper use and possible adverse effects of ivermectin.  All of the patient's questions and concerns were addressed. Odomzo Counseling- I discussed with the patient the risks of Odomzo including but not limited to nausea, vomiting, diarrhea, constipation, weight loss, changes in the sense of taste, decreased appetite, muscle spasms, and hair loss.  The patient verbalized understanding of the proper use and possible adverse effects of Odomzo.  All of the patient's questions and concerns were addressed. Colchicine Counseling:  Patient counseled regarding adverse effects including but not limited to stomach upset (nausea, vomiting, stomach pain, or diarrhea).  Patient instructed to limit alcohol consumption while taking this medication.  Colchicine may reduce blood counts especially with prolonged use.  The patient understands that monitoring of kidney function and blood counts may be required, especially at baseline. The patient verbalized understanding of the proper use and possible adverse effects of colchicine.  All of the patient's questions and concerns were addressed. Tazorac Pregnancy And Lactation Text: This medication is not safe during pregnancy. It is unknown if this medication is excreted in breast milk. Cephalexin Counseling: I counseled the patient regarding use of cephalexin as an antibiotic for prophylactic and/or therapeutic purposes. Cephalexin (commonly prescribed under brand name Keflex) is a cephalosporin antibiotic which is active against numerous classes of bacteria, including most skin bacteria. Side effects may include nausea, diarrhea, gastrointestinal upset, rash, hives, yeast infections, and in rare cases, hepatitis, kidney disease, seizures, fever, confusion, neurologic symptoms, and others. Patients with severe allergies to penicillin medications are cautioned that there is about a 10% incidence of cross-reactivity with cephalosporins. When possible, patients with penicillin allergies should use alternatives to cephalosporins for antibiotic therapy. Cephalexin Pregnancy And Lactation Text: This medication is Pregnancy Category B and considered safe during pregnancy.  It is also excreted in breast milk but can be used safely for shorter doses. Xeljanz Counseling: I discussed with the patient the risks of Xeljanz therapy including increased risk of infection, liver issues, headache, diarrhea, or cold symptoms. Live vaccines should be avoided. They were instructed to call if they have any problems. Topical Clindamycin Counseling: Patient counseled that this medication may cause skin irritation or allergic reactions.  In the event of skin irritation, the patient was advised to reduce the amount of the drug applied or use it less frequently.   The patient verbalized understanding of the proper use and possible adverse effects of clindamycin.  All of the patient's questions and concerns were addressed. Methotrexate Pregnancy And Lactation Text: This medication is Pregnancy Category X and is known to cause fetal harm. This medication is excreted in breast milk. Rifampin Counseling: I discussed with the patient the risks of rifampin including but not limited to liver damage, kidney damage, red-orange body fluids, nausea/vomiting and severe allergy. Dapsone Counseling: I discussed with the patient the risks of dapsone including but not limited to hemolytic anemia, agranulocytosis, rashes, methemoglobinemia, kidney failure, peripheral neuropathy, headaches, GI upset, and liver toxicity.  Patients who start dapsone require monitoring including baseline LFTs and weekly CBCs for the first month, then every month thereafter.  The patient verbalized understanding of the proper use and possible adverse effects of dapsone.  All of the patient's questions and concerns were addressed. Rifampin Pregnancy And Lactation Text: This medication is Pregnancy Category C and it isn't know if it is safe during pregnancy. It is also excreted in breast milk and should not be used if you are breast feeding. Cimzia Counseling:  I discussed with the patient the risks of Cimzia including but not limited to immunosuppression, allergic reactions and infections.  The patient understands that monitoring is required including a PPD at baseline and must alert us or the primary physician if symptoms of infection or other concerning signs are noted. Topical Clindamycin Pregnancy And Lactation Text: This medication is Pregnancy Category B and is considered safe during pregnancy. It is unknown if it is excreted in breast milk. Imiquimod Counseling:  I discussed with the patient the risks of imiquimod including but not limited to erythema, scaling, itching, weeping, crusting, and pain.  Patient understands that the inflammatory response to imiquimod is variable from person to person and was educated regarded proper titration schedule.  If flu-like symptoms develop, patient knows to discontinue the medication and contact us. Rituxan Pregnancy And Lactation Text: This medication is Pregnancy Category C and it isn't know if it is safe during pregnancy. It is unknown if this medication is excreted in breast milk but similar antibodies are known to be excreted. Clindamycin Counseling: I counseled the patient regarding use of clindamycin as an antibiotic for prophylactic and/or therapeutic purposes. Clindamycin is active against numerous classes of bacteria, including skin bacteria. Side effects may include nausea, diarrhea, gastrointestinal upset, rash, hives, yeast infections, and in rare cases, colitis. Ketoconazole Pregnancy And Lactation Text: This medication is Pregnancy Category C and it isn't know if it is safe during pregnancy. It is also excreted in breast milk and breast feeding isn't recommended. Rituxan Counseling:  I discussed with the patient the risks of Rituxan infusions. Side effects can include infusion reactions, severe drug rashes including mucocutaneous reactions, reactivation of latent hepatitis and other infections and rarely progressive multifocal leukoencephalopathy.  All of the patient's questions and concerns were addressed. Xelkandisz Pregnancy And Lactation Text: This medication is Pregnancy Category D and is not considered safe during pregnancy.  The risk during breast feeding is also uncertain. Prednisone Counseling:  I discussed with the patient the risks of prolonged use of prednisone including but not limited to weight gain, insomnia, osteoporosis, mood changes, diabetes, susceptibility to infection, glaucoma and high blood pressure.  In cases where prednisone use is prolonged, patients should be monitored with blood pressure checks, serum glucose levels and an eye exam.  Additionally, the patient may need to be placed on GI prophylaxis, PCP prophylaxis, and calcium and vitamin D supplementation and/or a bisphosphonate.  The patient verbalized understanding of the proper use and the possible adverse effects of prednisone.  All of the patient's questions and concerns were addressed. Xolair Counseling:  Patient informed of potential adverse effects including but not limited to fever, muscle aches, rash and allergic reactions.  The patient verbalized understanding of the proper use and possible adverse effects of Xolair.  All of the patient's questions and concerns were addressed. Otezla Pregnancy And Lactation Text: This medication is Pregnancy Category C and it isn't known if it is safe during pregnancy. It is unknown if it is excreted in breast milk. Sarecycline Counseling: Patient advised regarding possible photosensitivity and discoloration of the teeth, skin, lips, tongue and gums.  Patient instructed to avoid sunlight, if possible.  When exposed to sunlight, patients should wear protective clothing, sunglasses, and sunscreen.  The patient was instructed to call the office immediately if the following severe adverse effects occur:  hearing changes, easy bruising/bleeding, severe headache, or vision changes.  The patient verbalized understanding of the proper use and possible adverse effects of sarecycline.  All of the patient's questions and concerns were addressed. Include Pregnancy/Lactation Warning?: No Benzoyl Peroxide Counseling: Patient counseled that medicine may cause skin irritation and bleach clothing.  In the event of skin irritation, the patient was advised to reduce the amount of the drug applied or use it less frequently.   The patient verbalized understanding of the proper use and possible adverse effects of benzoyl peroxide.  All of the patient's questions and concerns were addressed. Topical Sulfur Applications Counseling: Topical Sulfur Counseling: Patient counseled that this medication may cause skin irritation or allergic reactions.  In the event of skin irritation, the patient was advised to reduce the amount of the drug applied or use it less frequently.   The patient verbalized understanding of the proper use and possible adverse effects of topical sulfur application.  All of the patient's questions and concerns were addressed. Terbinafine Counseling: Patient counseling regarding adverse effects of terbinafine including but not limited to headache, diarrhea, rash, upset stomach, liver function test abnormalities, itching, taste/smell disturbance, nausea, abdominal pain, and flatulence.  There is a rare possibility of liver failure that can occur when taking terbinafine.  The patient understands that a baseline LFT and kidney function test may be required. The patient verbalized understanding of the proper use and possible adverse effects of terbinafine.  All of the patient's questions and concerns were addressed. Otezla Counseling: The side effects of Otezla were discussed with the patient, including but not limited to worsening or new depression, weight loss, diarrhea, nausea, upper respiratory tract infection, and headache. Patient instructed to call the office should any adverse effect occur.  The patient verbalized understanding of the proper use and possible adverse effects of Otezla.  All the patient's questions and concerns were addressed. Cimzia Pregnancy And Lactation Text: This medication crosses the placenta but can be considered safe in certain situations. Cimzia may be excreted in breast milk. Benzoyl Peroxide Pregnancy And Lactation Text: This medication is Pregnancy Category C. It is unknown if benzoyl peroxide is excreted in breast milk. Cosentyx Counseling:  I discussed with the patient the risks of Cosentyx including but not limited to worsening of Crohn's disease, immunosuppression, allergic reactions and infections.  The patient understands that monitoring is required including a PPD at baseline and must alert us or the primary physician if symptoms of infection or other concerning signs are noted. Topical Sulfur Applications Pregnancy And Lactation Text: This medication is Pregnancy Category C and has an unknown safety profile during pregnancy. It is unknown if this topical medication is excreted in breast milk. Minoxidil Counseling: Minoxidil is a topical medication which can increase blood flow where it is applied. It is uncertain how this medication increases hair growth. Side effects are uncommon and include stinging and allergic reactions. Xolair Pregnancy And Lactation Text: This medication is Pregnancy Category B and is considered safe during pregnancy. This medication is excreted in breast milk. Terbinafine Pregnancy And Lactation Text: This medication is Pregnancy Category B and is considered safe during pregnancy. It is also excreted in breast milk and breast feeding isn't recommended. Siliq Counseling:  I discussed with the patient the risks of Siliq including but not limited to new or worsening depression, suicidal thoughts and behavior, immunosuppression, malignancy, posterior leukoencephalopathy syndrome, and serious infections.  The patient understands that monitoring is required including a PPD at baseline and must alert us or the primary physician if symptoms of infection or other concerning signs are noted. There is also a special program designed to monitor depression which is required with Siliq. Clindamycin Pregnancy And Lactation Text: This medication can be used in pregnancy if certain situations. Clindamycin is also present in breast milk. Dapsone Pregnancy And Lactation Text: This medication is Pregnancy Category C and is not considered safe during pregnancy or breast feeding. Zyclara Counseling:  I discussed with the patient the risks of imiquimod including but not limited to erythema, scaling, itching, weeping, crusting, and pain.  Patient understands that the inflammatory response to imiquimod is variable from person to person and was educated regarded proper titration schedule.  If flu-like symptoms develop, patient knows to discontinue the medication and contact us. Acitretin Counseling:  I discussed with the patient the risks of acitretin including but not limited to hair loss, dry lips/skin/eyes, liver damage, hyperlipidemia, depression/suicidal ideation, photosensitivity.  Serious rare side effects can include but are not limited to pancreatitis, pseudotumor cerebri, bony changes, clot formation/stroke/heart attack.  Patient understands that alcohol is contraindicated since it can result in liver toxicity and significantly prolong the elimination of the drug by many years. Carac Counseling:  I discussed with the patient the risks of Carac including but not limited to erythema, scaling, itching, weeping, crusting, and pain. Oxybutynin Counseling:  I discussed with the patient the risks of oxybutynin including but not limited to skin rash, drowsiness, dry mouth, difficulty urinating, and blurred vision. Doxycycline Counseling:  Patient counseled regarding possible photosensitivity and increased risk for sunburn.  Patient instructed to avoid sunlight, if possible.  When exposed to sunlight, patients should wear protective clothing, sunglasses, and sunscreen.  The patient was instructed to call the office immediately if the following severe adverse effects occur:  hearing changes, easy bruising/bleeding, severe headache, or vision changes.  The patient verbalized understanding of the proper use and possible adverse effects of doxycycline.  All of the patient's questions and concerns were addressed. Erivedge Counseling- I discussed with the patient the risks of Erivedge including but not limited to nausea, vomiting, diarrhea, constipation, weight loss, changes in the sense of taste, decreased appetite, muscle spasms, and hair loss.  The patient verbalized understanding of the proper use and possible adverse effects of Erivedge.  All of the patient's questions and concerns were addressed. Acitretin Pregnancy And Lactation Text: This medication is Pregnancy Category X and should not be given to women who are pregnant or may become pregnant in the future. This medication is excreted in breast milk. Cimetidine Counseling:  I discussed with the patient the risks of Cimetidine including but not limited to gynecomastia, headache, diarrhea, nausea, drowsiness, arrhythmias, pancreatitis, skin rashes, psychosis, bone marrow suppression and kidney toxicity. Dupixent Counseling: I discussed with the patient the risks of dupilumab including but not limited to eye infection and irritation, cold sores, injection site reactions, worsening of asthma, allergic reactions and increased risk of parasitic infection.  Live vaccines should be avoided while taking dupilumab. Dupilumab will also interact with certain medications such as warfarin and cyclosporine. The patient understands that monitoring is required and they must alert us or the primary physician if symptoms of infection or other concerning signs are noted. Doxycycline Pregnancy And Lactation Text: This medication is Pregnancy Category D and not consider safe during pregnancy. It is also excreted in breast milk but is considered safe for shorter treatment courses. Azathioprine Counseling:  I discussed with the patient the risks of azathioprine including but not limited to myelosuppression, immunosuppression, hepatotoxicity, lymphoma, and infections.  The patient understands that monitoring is required including baseline LFTs, Creatinine, possible TPMP genotyping and weekly CBCs for the first month and then every 2 weeks thereafter.  The patient verbalized understanding of the proper use and possible adverse effects of azathioprine.  All of the patient's questions and concerns were addressed. Picato Counseling:  I discussed with the patient the risks of Picato including but not limited to erythema, scaling, itching, weeping, crusting, and pain. Simponi Counseling:  I discussed with the patient the risks of golimumab including but not limited to myelosuppression, immunosuppression, autoimmune hepatitis, demyelinating diseases, lymphoma, and serious infections.  The patient understands that monitoring is required including a PPD at baseline and must alert us or the primary physician if symptoms of infection or other concerning signs are noted. Tetracycline Counseling: Patient counseled regarding possible photosensitivity and increased risk for sunburn.  Patient instructed to avoid sunlight, if possible.  When exposed to sunlight, patients should wear protective clothing, sunglasses, and sunscreen.  The patient was instructed to call the office immediately if the following severe adverse effects occur:  hearing changes, easy bruising/bleeding, severe headache, or vision changes.  The patient verbalized understanding of the proper use and possible adverse effects of tetracycline.  All of the patient's questions and concerns were addressed. Patient understands to avoid pregnancy while on therapy due to potential birth defects. Erythromycin Counseling:  I discussed with the patient the risks of erythromycin including but not limited to GI upset, allergic reaction, drug rash, diarrhea, increase in liver enzymes, and yeast infections. Bexarotene Counseling:  I discussed with the patient the risks of bexarotene including but not limited to hair loss, dry lips/skin/eyes, liver abnormalities, hyperlipidemia, pancreatitis, depression/suicidal ideation, photosensitivity, drug rash/allergic reactions, hypothyroidism, anemia, leukopenia, infection, cataracts, and teratogenicity.  Patient understands that they will need regular blood tests to check lipid profile, liver function tests, white blood cell count, thyroid function tests and pregnancy test if applicable. 5-Fu Counseling: 5-Fluorouracil Counseling:  I discussed with the patient the risks of 5-fluorouracil including but not limited to erythema, scaling, itching, weeping, crusting, and pain. Dupixent Pregnancy And Lactation Text: This medication likely crosses the placenta but the risk for the fetus is uncertain. This medication is excreted in breast milk. Gabapentin Counseling: I discussed with the patient the risks of gabapentin including but not limited to dizziness, somnolence, fatigue and ataxia. Carac Pregnancy And Lactation Text: This medication is Pregnancy Category X and contraindicated in pregnancy and in women who may become pregnant. It is unknown if this medication is excreted in breast milk. Birth Control Pills Counseling: Birth Control Pill Counseling: I discussed with the patient the potential side effects of OCPs including but not limited to increased risk of stroke, heart attack, thrombophlebitis, deep venous thrombosis, hepatic adenomas, breast changes, GI upset, headaches, and depression.  The patient verbalized understanding of the proper use and possible adverse effects of OCPs. All of the patient's questions and concerns were addressed. Doxepin Counseling:  Patient advised that the medication is sedating and not to drive a car after taking this medication. Patient informed of potential adverse effects including but not limited to dry mouth, urinary retention, and blurry vision.  The patient verbalized understanding of the proper use and possible adverse effects of doxepin.  All of the patient's questions and concerns were addressed. Enbrel Counseling:  I discussed with the patient the risks of etanercept including but not limited to myelosuppression, immunosuppression, autoimmune hepatitis, demyelinating diseases, lymphoma, and infections.  The patient understands that monitoring is required including a PPD at baseline and must alert us or the primary physician if symptoms of infection or other concerning signs are noted. Skyrizi Counseling: I discussed with the patient the risks of risankizumab-rzaa including but not limited to immunosuppression, and serious infections.  The patient understands that monitoring is required including a PPD at baseline and must alert us or the primary physician if symptoms of infection or other concerning signs are noted. Erythromycin Pregnancy And Lactation Text: This medication is Pregnancy Category B and is considered safe during pregnancy. It is also excreted in breast milk. Cellcept Counseling:  I discussed with the patient the risks of mycophenolate mofetil including but not limited to infection/immunosuppression, GI upset, hypokalemia, hypercholesterolemia, bone marrow suppression, lymphoproliferative disorders, malignancy, GI ulceration/bleed/perforation, colitis, interstitial lung disease, kidney failure, progressive multifocal leukoencephalopathy, and birth defects.  The patient understands that monitoring is required including a baseline creatinine and regular CBC testing. In addition, patient must alert us immediately if symptoms of infection or other concerning signs are noted. Protopic Counseling: Patient may experience a mild burning sensation during topical application. Protopic is not approved in children less than 2 years of age. There have been case reports of hematologic and skin malignancies in patients using topical calcineurin inhibitors although causality is questionable. Azathioprine Pregnancy And Lactation Text: This medication is Pregnancy Category D and isn't considered safe during pregnancy. It is unknown if this medication is excreted in breast milk. Birth Control Pills Pregnancy And Lactation Text: This medication should be avoided if pregnant and for the first 30 days post-partum. Fluconazole Counseling:  Patient counseled regarding adverse effects of fluconazole including but not limited to headache, diarrhea, nausea, upset stomach, liver function test abnormalities, taste disturbance, and stomach pain.  There is a rare possibility of liver failure that can occur when taking fluconazole.  The patient understands that monitoring of LFTs and kidney function test may be required, especially at baseline. The patient verbalized understanding of the proper use and possible adverse effects of fluconazole.  All of the patient's questions and concerns were addressed. Protopic Pregnancy And Lactation Text: This medication is Pregnancy Category C. It is unknown if this medication is excreted in breast milk when applied topically. Drysol Counseling:  I discussed with the patient the risks of drysol/aluminum chloride including but not limited to skin rash, itching, irritation, burning. Spironolactone Counseling: Patient advised regarding risks of diarrhea, abdominal pain, hyperkalemia, birth defects (for female patients), liver toxicity and renal toxicity. The patient may need blood work to monitor liver and kidney function and potassium levels while on therapy. The patient verbalized understanding of the proper use and possible adverse effects of spironolactone.  All of the patient's questions and concerns were addressed. Doxepin Pregnancy And Lactation Text: This medication is Pregnancy Category C and it isn't known if it is safe during pregnancy. It is also excreted in breast milk and breast feeding isn't recommended. Bexarotene Pregnancy And Lactation Text: This medication is Pregnancy Category X and should not be given to women who are pregnant or may become pregnant. This medication should not be used if you are breast feeding. Solaraze Counseling:  I discussed with the patient the risks of Solaraze including but not limited to erythema, scaling, itching, weeping, crusting, and pain. Glycopyrrolate Pregnancy And Lactation Text: This medication is Pregnancy Category B and is considered safe during pregnancy. It is unknown if it is excreted breast milk. Stelara Counseling:  I discussed with the patient the risks of ustekinumab including but not limited to immunosuppression, malignancy, posterior leukoencephalopathy syndrome, and serious infections.  The patient understands that monitoring is required including a PPD at baseline and must alert us or the primary physician if symptoms of infection or other concerning signs are noted. Metronidazole Counseling:  I discussed with the patient the risks of metronidazole including but not limited to seizures, nausea/vomiting, a metallic taste in the mouth, nausea/vomiting and severe allergy. Spironolactone Pregnancy And Lactation Text: This medication can cause feminization of the male fetus and should be avoided during pregnancy. The active metabolite is also found in breast milk. Isotretinoin Counseling: Patient should get monthly blood tests, not donate blood, not drive at night if vision affected, not share medication, and not undergo elective surgery for 6 months after tx completed. Side effects reviewed, pt to contact office should one occur. Glycopyrrolate Counseling:  I discussed with the patient the risks of glycopyrrolate including but not limited to skin rash, drowsiness, dry mouth, difficulty urinating, and blurred vision.

## 2023-04-23 NOTE — ED PROVIDER NOTE - CONDUCTED A DETAILED DISCUSSION WITH PATIENT AND/OR GUARDIAN REGARDING, MDM
No radiology results/lab results/need for outpatient follow-up/return to ED if symptoms worsen, persist or questions arise
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
## 2023-09-05 NOTE — PATIENT PROFILE ADULT. - FUNCTIONAL SCREEN CURRENT LEVEL: BATHING, MLM
Griseofulvin Counseling:  I discussed with the patient the risks of griseofulvin including but not limited to photosensitivity, cytopenia, liver damage, nausea/vomiting and severe allergy.  The patient understands that this medication is best absorbed when taken with a fatty meal (e.g., ice cream or french fries). (2) assistive person

## 2023-09-12 NOTE — PATIENT PROFILE ADULT. - AS SC BRADEN MOISTURE
(3) occasionally moist Minocycline Counseling: Patient advised regarding possible photosensitivity and discoloration of the teeth, skin, lips, tongue and gums.  Patient instructed to avoid sunlight, if possible.  When exposed to sunlight, patients should wear protective clothing, sunglasses, and sunscreen.  The patient was instructed to call the office immediately if the following severe adverse effects occur:  hearing changes, easy bruising/bleeding, severe headache, or vision changes.  The patient verbalized understanding of the proper use and possible adverse effects of minocycline.  All of the patient's questions and concerns were addressed.

## 2023-09-19 NOTE — PROGRESS NOTE ADULT - PROBLEM SELECTOR PLAN 4
Body mass index is 30.24 kg/m².  Increases insulin resistance.   Discussed DM diet and exercise.     
-Ensure Enlive 2 x day=750 calories, 40 grams protein, multivitamin with minerals daily
-Low sodium . Ensure Enlive 2 x day=750 calories, 40 grams protein, multivitamin c minerals daily
-Low sodium . Ensure Enlive 2 x day=750 calories, 40 grams protein, multivitamin c minerals daily
-atorvastatin 40 milliGRAM(s) Oral at bedtime

## 2023-10-30 NOTE — PHYSICAL THERAPY INITIAL EVALUATION ADULT - REFERRING PHYSICIAN, REHAB EVAL
Jenna Ojeda
You can access the FollowMyHealth Patient Portal offered by [facility] by registering at the following website: [email or web address]. By joining [employer]’s FollowMyHealth portal, you will also be able to view your health information using other applications (apps) compatible with our system.

## 2024-01-31 NOTE — PATIENT PROFILE ADULT. - TEACHING/LEARNING FACTORS INFLUENCE READINESS TO LEARN
Spoke with patient today in regard to smoking cessation progress for 12 month follow up. She states she is not tobacco free. Patient is not interested in the program. Informed patient of benefit period, future follow ups and contact information if any further help is needed. Will resolve smart form for 12 month follow up for Quit # 1.      
anxiety
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
## 2024-04-10 NOTE — ED ADULT NURSE NOTE - QUALITY
Plan of care reviewed with Mary Blair. Patient verbalizes understanding.     altered level of consciousness
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
## 2025-02-19 NOTE — DISCHARGE NOTE ADULT - SECONDARY DIAGNOSIS.
Urinary tract infection, site unspecified Severe protein-calorie malnutrition Nonsustained ventricular tachycardia Chronic diastolic congestive heart failure Essential hypertension High cholesterol Spontaneous, unlabored and symmetrical

## 2025-06-06 NOTE — ED ADULT NURSE NOTE - OBJECTIVE STATEMENT
Patient instructed to remove shoes and socks and instructed to sit in exam chair.  Current PCP is Jacobo Robles MD and date of last visit was 1/28/2025.   Do you have a follow up visit scheduled?  No  If yes, the date is     Brought in by EMS from nursing home actively seizing upon arrival to ER. Ativan given as order with relief. Garbled speech noted. O2 sat 99 on nonrebreather.
